# Patient Record
Sex: MALE | Race: WHITE | Employment: OTHER | URBAN - METROPOLITAN AREA
[De-identification: names, ages, dates, MRNs, and addresses within clinical notes are randomized per-mention and may not be internally consistent; named-entity substitution may affect disease eponyms.]

---

## 2021-02-03 ENCOUNTER — HOSPITAL ENCOUNTER (OUTPATIENT)
Age: 66
Setting detail: OBSERVATION
Discharge: HOME | End: 2021-02-05
Attending: EMERGENCY MEDICINE | Admitting: FAMILY MEDICINE
Payer: MEDICARE

## 2021-02-03 ENCOUNTER — APPOINTMENT (OUTPATIENT)
Dept: CT IMAGING | Age: 66
End: 2021-02-03
Payer: MEDICARE

## 2021-02-03 ENCOUNTER — APPOINTMENT (OUTPATIENT)
Dept: GENERAL RADIOLOGY | Age: 66
End: 2021-02-03
Payer: MEDICARE

## 2021-02-03 DIAGNOSIS — I50.9 ACUTE CONGESTIVE HEART FAILURE, UNSPECIFIED HEART FAILURE TYPE (HCC): ICD-10-CM

## 2021-02-03 DIAGNOSIS — N17.9 AKI (ACUTE KIDNEY INJURY) (HCC): ICD-10-CM

## 2021-02-03 DIAGNOSIS — I10 HYPERTENSION, UNSPECIFIED TYPE: ICD-10-CM

## 2021-02-03 DIAGNOSIS — R42 LIGHTHEADEDNESS: Primary | ICD-10-CM

## 2021-02-03 LAB
ABSOLUTE EOS #: 0.2 K/UL (ref 0–0.4)
ABSOLUTE IMMATURE GRANULOCYTE: ABNORMAL K/UL (ref 0–0.3)
ABSOLUTE LYMPH #: 1.3 K/UL (ref 1–4.8)
ABSOLUTE MONO #: 0.5 K/UL (ref 0.1–1.3)
ANION GAP SERPL CALCULATED.3IONS-SCNC: 13 MMOL/L (ref 9–17)
BASOPHILS # BLD: 1 % (ref 0–2)
BASOPHILS ABSOLUTE: 0.1 K/UL (ref 0–0.2)
BNP INTERPRETATION: ABNORMAL
BUN BLDV-MCNC: 24 MG/DL (ref 8–23)
BUN/CREAT BLD: ABNORMAL (ref 9–20)
CALCIUM SERPL-MCNC: 9.6 MG/DL (ref 8.6–10.4)
CHLORIDE BLD-SCNC: 96 MMOL/L (ref 98–107)
CO2: 27 MMOL/L (ref 20–31)
CREAT SERPL-MCNC: 1.57 MG/DL (ref 0.7–1.2)
DIFFERENTIAL TYPE: ABNORMAL
EOSINOPHILS RELATIVE PERCENT: 3 % (ref 0–4)
ETHANOL PERCENT: <0.01 %
ETHANOL: <10 MG/DL
GFR AFRICAN AMERICAN: 54 ML/MIN
GFR NON-AFRICAN AMERICAN: 44 ML/MIN
GFR SERPL CREATININE-BSD FRML MDRD: ABNORMAL ML/MIN/{1.73_M2}
GFR SERPL CREATININE-BSD FRML MDRD: ABNORMAL ML/MIN/{1.73_M2}
GLUCOSE BLD-MCNC: 182 MG/DL (ref 70–99)
HCT VFR BLD CALC: 33.4 % (ref 41–53)
HEMOGLOBIN: 11.6 G/DL (ref 13.5–17.5)
IMMATURE GRANULOCYTES: ABNORMAL %
INR BLD: 1.4
LYMPHOCYTES # BLD: 19 % (ref 24–44)
MAGNESIUM: 1.6 MG/DL (ref 1.6–2.6)
MCH RBC QN AUTO: 32.5 PG (ref 26–34)
MCHC RBC AUTO-ENTMCNC: 34.6 G/DL (ref 31–37)
MCV RBC AUTO: 94 FL (ref 80–100)
MONOCYTES # BLD: 7 % (ref 1–7)
NRBC AUTOMATED: ABNORMAL PER 100 WBC
PARTIAL THROMBOPLASTIN TIME: 36.2 SEC (ref 24–36)
PDW BLD-RTO: 13.6 % (ref 11.5–14.9)
PLATELET # BLD: 159 K/UL (ref 150–450)
PLATELET ESTIMATE: ABNORMAL
PMV BLD AUTO: 8.7 FL (ref 6–12)
POTASSIUM SERPL-SCNC: 3.5 MMOL/L (ref 3.7–5.3)
PRO-BNP: 1803 PG/ML
PROTHROMBIN TIME: 17.4 SEC (ref 11.8–14.6)
RBC # BLD: 3.56 M/UL (ref 4.5–5.9)
RBC # BLD: ABNORMAL 10*6/UL
SEG NEUTROPHILS: 70 % (ref 36–66)
SEGMENTED NEUTROPHILS ABSOLUTE COUNT: 5.2 K/UL (ref 1.3–9.1)
SODIUM BLD-SCNC: 136 MMOL/L (ref 135–144)
TROPONIN INTERP: ABNORMAL
TROPONIN INTERP: ABNORMAL
TROPONIN T: ABNORMAL NG/ML
TROPONIN T: ABNORMAL NG/ML
TROPONIN, HIGH SENSITIVITY: 26 NG/L (ref 0–22)
TROPONIN, HIGH SENSITIVITY: 27 NG/L (ref 0–22)
WBC # BLD: 7.3 K/UL (ref 3.5–11)
WBC # BLD: ABNORMAL 10*3/UL

## 2021-02-03 PROCEDURE — 99283 EMERGENCY DEPT VISIT LOW MDM: CPT

## 2021-02-03 PROCEDURE — 85610 PROTHROMBIN TIME: CPT

## 2021-02-03 PROCEDURE — 71046 X-RAY EXAM CHEST 2 VIEWS: CPT

## 2021-02-03 PROCEDURE — 84484 ASSAY OF TROPONIN QUANT: CPT

## 2021-02-03 PROCEDURE — 83880 ASSAY OF NATRIURETIC PEPTIDE: CPT

## 2021-02-03 PROCEDURE — 96375 TX/PRO/DX INJ NEW DRUG ADDON: CPT

## 2021-02-03 PROCEDURE — 36415 COLL VENOUS BLD VENIPUNCTURE: CPT

## 2021-02-03 PROCEDURE — 6360000002 HC RX W HCPCS: Performed by: EMERGENCY MEDICINE

## 2021-02-03 PROCEDURE — 2500000003 HC RX 250 WO HCPCS: Performed by: EMERGENCY MEDICINE

## 2021-02-03 PROCEDURE — 85025 COMPLETE CBC W/AUTO DIFF WBC: CPT

## 2021-02-03 PROCEDURE — 80048 BASIC METABOLIC PNL TOTAL CA: CPT

## 2021-02-03 PROCEDURE — 83735 ASSAY OF MAGNESIUM: CPT

## 2021-02-03 PROCEDURE — 6370000000 HC RX 637 (ALT 250 FOR IP): Performed by: EMERGENCY MEDICINE

## 2021-02-03 PROCEDURE — 93005 ELECTROCARDIOGRAM TRACING: CPT | Performed by: EMERGENCY MEDICINE

## 2021-02-03 PROCEDURE — G0480 DRUG TEST DEF 1-7 CLASSES: HCPCS

## 2021-02-03 PROCEDURE — 85730 THROMBOPLASTIN TIME PARTIAL: CPT

## 2021-02-03 PROCEDURE — G0378 HOSPITAL OBSERVATION PER HR: HCPCS

## 2021-02-03 PROCEDURE — 70450 CT HEAD/BRAIN W/O DYE: CPT

## 2021-02-03 PROCEDURE — 96374 THER/PROPH/DIAG INJ IV PUSH: CPT

## 2021-02-03 RX ORDER — FOLIC ACID 1 MG/1
1 TABLET ORAL ONCE
Status: COMPLETED | OUTPATIENT
Start: 2021-02-03 | End: 2021-02-03

## 2021-02-03 RX ORDER — GAUZE BANDAGE 2" X 2"
100 BANDAGE TOPICAL ONCE
Status: COMPLETED | OUTPATIENT
Start: 2021-02-03 | End: 2021-02-03

## 2021-02-03 RX ORDER — FUROSEMIDE 10 MG/ML
80 INJECTION INTRAMUSCULAR; INTRAVENOUS ONCE
Status: COMPLETED | OUTPATIENT
Start: 2021-02-03 | End: 2021-02-03

## 2021-02-03 RX ORDER — METOPROLOL TARTRATE 5 MG/5ML
5 INJECTION INTRAVENOUS ONCE
Status: COMPLETED | OUTPATIENT
Start: 2021-02-03 | End: 2021-02-03

## 2021-02-03 RX ADMIN — POTASSIUM BICARBONATE 40 MEQ: 782 TABLET, EFFERVESCENT ORAL at 23:24

## 2021-02-03 RX ADMIN — THIAMINE HCL TAB 100 MG 100 MG: 100 TAB at 23:23

## 2021-02-03 RX ADMIN — METOPROLOL TARTRATE 5 MG: 1 INJECTION, SOLUTION INTRAVENOUS at 23:27

## 2021-02-03 RX ADMIN — FUROSEMIDE 80 MG: 10 INJECTION, SOLUTION INTRAMUSCULAR; INTRAVENOUS at 23:26

## 2021-02-03 RX ADMIN — FOLIC ACID 1 MG: 1 TABLET ORAL at 23:23

## 2021-02-03 RX ADMIN — Medication 400 MG: at 23:23

## 2021-02-03 ASSESSMENT — ENCOUNTER SYMPTOMS
ABDOMINAL PAIN: 0
BACK PAIN: 0
DIARRHEA: 0
COUGH: 0
VOMITING: 0
SHORTNESS OF BREATH: 0

## 2021-02-04 ENCOUNTER — APPOINTMENT (OUTPATIENT)
Dept: NUCLEAR MEDICINE | Age: 66
End: 2021-02-04
Payer: MEDICARE

## 2021-02-04 LAB
ABSOLUTE EOS #: 0.2 K/UL (ref 0–0.4)
ABSOLUTE IMMATURE GRANULOCYTE: ABNORMAL K/UL (ref 0–0.3)
ABSOLUTE LYMPH #: 1.3 K/UL (ref 1–4.8)
ABSOLUTE MONO #: 0.6 K/UL (ref 0.1–1.3)
ALBUMIN SERPL-MCNC: 3.7 G/DL (ref 3.5–5.2)
ALBUMIN/GLOBULIN RATIO: ABNORMAL (ref 1–2.5)
ALP BLD-CCNC: 86 U/L (ref 40–129)
ALT SERPL-CCNC: 34 U/L (ref 5–41)
ANION GAP SERPL CALCULATED.3IONS-SCNC: 10 MMOL/L (ref 9–17)
AST SERPL-CCNC: 32 U/L
BASOPHILS # BLD: 1 % (ref 0–2)
BASOPHILS ABSOLUTE: 0 K/UL (ref 0–0.2)
BILIRUB SERPL-MCNC: 0.72 MG/DL (ref 0.3–1.2)
BUN BLDV-MCNC: 22 MG/DL (ref 8–23)
BUN/CREAT BLD: ABNORMAL (ref 9–20)
CALCIUM SERPL-MCNC: 10 MG/DL (ref 8.6–10.4)
CHLORIDE BLD-SCNC: 100 MMOL/L (ref 98–107)
CHOLESTEROL/HDL RATIO: 4
CHOLESTEROL: 112 MG/DL
CO2: 29 MMOL/L (ref 20–31)
CREAT SERPL-MCNC: 1.72 MG/DL (ref 0.7–1.2)
DIFFERENTIAL TYPE: ABNORMAL
EKG ATRIAL RATE: 89 BPM
EKG Q-T INTERVAL: 388 MS
EKG QRS DURATION: 96 MS
EKG QTC CALCULATION (BAZETT): 450 MS
EKG R AXIS: 81 DEGREES
EKG T AXIS: 60 DEGREES
EKG VENTRICULAR RATE: 81 BPM
EOSINOPHILS RELATIVE PERCENT: 3 % (ref 0–4)
GFR AFRICAN AMERICAN: 48 ML/MIN
GFR NON-AFRICAN AMERICAN: 40 ML/MIN
GFR SERPL CREATININE-BSD FRML MDRD: ABNORMAL ML/MIN/{1.73_M2}
GFR SERPL CREATININE-BSD FRML MDRD: ABNORMAL ML/MIN/{1.73_M2}
GLUCOSE BLD-MCNC: 156 MG/DL (ref 75–110)
GLUCOSE BLD-MCNC: 158 MG/DL (ref 75–110)
GLUCOSE BLD-MCNC: 168 MG/DL (ref 70–99)
GLUCOSE BLD-MCNC: 267 MG/DL (ref 75–110)
HCT VFR BLD CALC: 32.6 % (ref 41–53)
HDLC SERPL-MCNC: 28 MG/DL
HEMOGLOBIN: 11.3 G/DL (ref 13.5–17.5)
IMMATURE GRANULOCYTES: ABNORMAL %
INR BLD: 1.5
LDL CHOLESTEROL: 45 MG/DL (ref 0–130)
LV EF: 53 %
LVEF MODALITY: NORMAL
LYMPHOCYTES # BLD: 19 % (ref 24–44)
MCH RBC QN AUTO: 32.3 PG (ref 26–34)
MCHC RBC AUTO-ENTMCNC: 34.5 G/DL (ref 31–37)
MCV RBC AUTO: 93.7 FL (ref 80–100)
MONOCYTES # BLD: 9 % (ref 1–7)
NRBC AUTOMATED: ABNORMAL PER 100 WBC
PDW BLD-RTO: 13.9 % (ref 11.5–14.9)
PLATELET # BLD: 162 K/UL (ref 150–450)
PLATELET ESTIMATE: ABNORMAL
PMV BLD AUTO: 9 FL (ref 6–12)
POTASSIUM SERPL-SCNC: 4.1 MMOL/L (ref 3.7–5.3)
PROTHROMBIN TIME: 17.7 SEC (ref 11.8–14.6)
RBC # BLD: 3.48 M/UL (ref 4.5–5.9)
RBC # BLD: ABNORMAL 10*6/UL
SEG NEUTROPHILS: 68 % (ref 36–66)
SEGMENTED NEUTROPHILS ABSOLUTE COUNT: 4.7 K/UL (ref 1.3–9.1)
SODIUM BLD-SCNC: 139 MMOL/L (ref 135–144)
TOTAL PROTEIN: 6.7 G/DL (ref 6.4–8.3)
TRIGL SERPL-MCNC: 195 MG/DL
TROPONIN INTERP: ABNORMAL
TROPONIN T: ABNORMAL NG/ML
TROPONIN, HIGH SENSITIVITY: 28 NG/L (ref 0–22)
TSH SERPL DL<=0.05 MIU/L-ACNC: 3.93 MIU/L (ref 0.3–5)
VLDLC SERPL CALC-MCNC: ABNORMAL MG/DL (ref 1–30)
WBC # BLD: 6.9 K/UL (ref 3.5–11)
WBC # BLD: ABNORMAL 10*3/UL

## 2021-02-04 PROCEDURE — 3430000000 HC RX DIAGNOSTIC RADIOPHARMACEUTICAL: Performed by: INTERNAL MEDICINE

## 2021-02-04 PROCEDURE — 82947 ASSAY GLUCOSE BLOOD QUANT: CPT

## 2021-02-04 PROCEDURE — 85610 PROTHROMBIN TIME: CPT

## 2021-02-04 PROCEDURE — 6360000004 HC RX CONTRAST MEDICATION: Performed by: INTERNAL MEDICINE

## 2021-02-04 PROCEDURE — 85025 COMPLETE CBC W/AUTO DIFF WBC: CPT

## 2021-02-04 PROCEDURE — G0378 HOSPITAL OBSERVATION PER HR: HCPCS

## 2021-02-04 PROCEDURE — 80053 COMPREHEN METABOLIC PANEL: CPT

## 2021-02-04 PROCEDURE — 6370000000 HC RX 637 (ALT 250 FOR IP): Performed by: PHYSICIAN ASSISTANT

## 2021-02-04 PROCEDURE — C8929 TTE W OR WO FOL WCON,DOPPLER: HCPCS

## 2021-02-04 PROCEDURE — 80061 LIPID PANEL: CPT

## 2021-02-04 PROCEDURE — 93010 ELECTROCARDIOGRAM REPORT: CPT | Performed by: INTERNAL MEDICINE

## 2021-02-04 PROCEDURE — 2580000003 HC RX 258: Performed by: FAMILY MEDICINE

## 2021-02-04 PROCEDURE — 2580000003 HC RX 258: Performed by: INTERNAL MEDICINE

## 2021-02-04 PROCEDURE — 84443 ASSAY THYROID STIM HORMONE: CPT

## 2021-02-04 PROCEDURE — A9500 TC99M SESTAMIBI: HCPCS | Performed by: INTERNAL MEDICINE

## 2021-02-04 PROCEDURE — 6370000000 HC RX 637 (ALT 250 FOR IP): Performed by: FAMILY MEDICINE

## 2021-02-04 PROCEDURE — 36415 COLL VENOUS BLD VENIPUNCTURE: CPT

## 2021-02-04 PROCEDURE — 84484 ASSAY OF TROPONIN QUANT: CPT

## 2021-02-04 PROCEDURE — 6370000000 HC RX 637 (ALT 250 FOR IP): Performed by: INTERNAL MEDICINE

## 2021-02-04 RX ORDER — LOSARTAN POTASSIUM 50 MG/1
50 TABLET ORAL
Status: DISCONTINUED | OUTPATIENT
Start: 2021-02-05 | End: 2021-02-04

## 2021-02-04 RX ORDER — CARVEDILOL 12.5 MG/1
12.5 TABLET ORAL 2 TIMES DAILY WITH MEALS
Status: DISCONTINUED | OUTPATIENT
Start: 2021-02-04 | End: 2021-02-05 | Stop reason: HOSPADM

## 2021-02-04 RX ORDER — ASPIRIN 81 MG/1
81 TABLET ORAL DAILY
Status: DISCONTINUED | OUTPATIENT
Start: 2021-02-04 | End: 2021-02-05 | Stop reason: HOSPADM

## 2021-02-04 RX ORDER — FUROSEMIDE 40 MG/1
80 TABLET ORAL DAILY
Status: DISCONTINUED | OUTPATIENT
Start: 2021-02-04 | End: 2021-02-05 | Stop reason: HOSPADM

## 2021-02-04 RX ORDER — ATORVASTATIN CALCIUM 40 MG/1
40 TABLET, FILM COATED ORAL DAILY
Status: DISCONTINUED | OUTPATIENT
Start: 2021-02-04 | End: 2021-02-04

## 2021-02-04 RX ORDER — ISOSORBIDE MONONITRATE 30 MG/1
30 TABLET, EXTENDED RELEASE ORAL DAILY
Status: DISCONTINUED | OUTPATIENT
Start: 2021-02-04 | End: 2021-02-05

## 2021-02-04 RX ORDER — ATROPINE SULFATE 0.1 MG/ML
0.5 INJECTION INTRAVENOUS EVERY 5 MIN PRN
Status: DISCONTINUED | OUTPATIENT
Start: 2021-02-04 | End: 2021-02-04

## 2021-02-04 RX ORDER — NITROGLYCERIN 0.4 MG/1
0.4 TABLET SUBLINGUAL EVERY 5 MIN PRN
Status: DISCONTINUED | OUTPATIENT
Start: 2021-02-04 | End: 2021-02-04

## 2021-02-04 RX ORDER — GLIMEPIRIDE 4 MG/1
4 TABLET ORAL
Status: ON HOLD | COMMUNITY
End: 2022-03-15 | Stop reason: HOSPADM

## 2021-02-04 RX ORDER — WARFARIN SODIUM 2 MG/1
4 TABLET ORAL
COMMUNITY
Start: 2020-06-23

## 2021-02-04 RX ORDER — WARFARIN SODIUM 3 MG/1
6 TABLET ORAL ONCE
Status: COMPLETED | OUTPATIENT
Start: 2021-02-04 | End: 2021-02-04

## 2021-02-04 RX ORDER — CARVEDILOL 25 MG/1
25 TABLET ORAL 2 TIMES DAILY WITH MEALS
Status: DISCONTINUED | OUTPATIENT
Start: 2021-02-04 | End: 2021-02-04

## 2021-02-04 RX ORDER — NICOTINE POLACRILEX 4 MG
15 LOZENGE BUCCAL PRN
Status: DISCONTINUED | OUTPATIENT
Start: 2021-02-04 | End: 2021-02-05 | Stop reason: HOSPADM

## 2021-02-04 RX ORDER — SODIUM CHLORIDE 0.9 % (FLUSH) 0.9 %
10 SYRINGE (ML) INJECTION PRN
Status: DISCONTINUED | OUTPATIENT
Start: 2021-02-04 | End: 2021-02-04

## 2021-02-04 RX ORDER — HYDRALAZINE HYDROCHLORIDE 50 MG/1
50 TABLET, FILM COATED ORAL EVERY 8 HOURS SCHEDULED
Status: DISCONTINUED | OUTPATIENT
Start: 2021-02-04 | End: 2021-02-05 | Stop reason: HOSPADM

## 2021-02-04 RX ORDER — SODIUM CHLORIDE 0.9 % (FLUSH) 0.9 %
10 SYRINGE (ML) INJECTION PRN
Status: DISCONTINUED | OUTPATIENT
Start: 2021-02-04 | End: 2021-02-05 | Stop reason: HOSPADM

## 2021-02-04 RX ORDER — HYDRALAZINE HYDROCHLORIDE 25 MG/1
25 TABLET, FILM COATED ORAL DAILY
Status: ON HOLD | COMMUNITY
Start: 2020-07-22 | End: 2021-02-05 | Stop reason: HOSPADM

## 2021-02-04 RX ORDER — HYDRALAZINE HYDROCHLORIDE 25 MG/1
25 TABLET, FILM COATED ORAL EVERY 8 HOURS SCHEDULED
Status: DISCONTINUED | OUTPATIENT
Start: 2021-02-04 | End: 2021-02-04

## 2021-02-04 RX ORDER — ATORVASTATIN CALCIUM 40 MG/1
40 TABLET, FILM COATED ORAL NIGHTLY
Status: ON HOLD | COMMUNITY
End: 2021-02-04

## 2021-02-04 RX ORDER — DEXTROSE MONOHYDRATE 50 MG/ML
100 INJECTION, SOLUTION INTRAVENOUS PRN
Status: DISCONTINUED | OUTPATIENT
Start: 2021-02-04 | End: 2021-02-05 | Stop reason: HOSPADM

## 2021-02-04 RX ORDER — FUROSEMIDE 80 MG
80 TABLET ORAL DAILY
Status: ON HOLD | COMMUNITY
End: 2022-03-07

## 2021-02-04 RX ORDER — DEXTROSE MONOHYDRATE 25 G/50ML
12.5 INJECTION, SOLUTION INTRAVENOUS PRN
Status: DISCONTINUED | OUTPATIENT
Start: 2021-02-04 | End: 2021-02-05 | Stop reason: HOSPADM

## 2021-02-04 RX ORDER — SODIUM CHLORIDE 9 MG/ML
INJECTION, SOLUTION INTRAVENOUS CONTINUOUS
Status: DISCONTINUED | OUTPATIENT
Start: 2021-02-04 | End: 2021-02-05

## 2021-02-04 RX ORDER — NITROGLYCERIN 0.4 MG/1
0.4 TABLET SUBLINGUAL EVERY 5 MIN PRN
Status: DISCONTINUED | OUTPATIENT
Start: 2021-02-04 | End: 2021-02-05 | Stop reason: HOSPADM

## 2021-02-04 RX ORDER — SODIUM CHLORIDE 0.9 % (FLUSH) 0.9 %
10 SYRINGE (ML) INJECTION EVERY 12 HOURS SCHEDULED
Status: DISCONTINUED | OUTPATIENT
Start: 2021-02-04 | End: 2021-02-05 | Stop reason: HOSPADM

## 2021-02-04 RX ORDER — AMINOPHYLLINE DIHYDRATE 25 MG/ML
50 INJECTION, SOLUTION INTRAVENOUS PRN
Status: DISCONTINUED | OUTPATIENT
Start: 2021-02-04 | End: 2021-02-04

## 2021-02-04 RX ORDER — LABETALOL HYDROCHLORIDE 5 MG/ML
20 INJECTION, SOLUTION INTRAVENOUS ONCE
Status: DISCONTINUED | OUTPATIENT
Start: 2021-02-04 | End: 2021-02-05 | Stop reason: HOSPADM

## 2021-02-04 RX ORDER — CARVEDILOL 12.5 MG/1
12.5 TABLET ORAL 2 TIMES DAILY WITH MEALS
Status: DISCONTINUED | OUTPATIENT
Start: 2021-02-04 | End: 2021-02-04

## 2021-02-04 RX ORDER — SODIUM CHLORIDE 9 MG/ML
500 INJECTION, SOLUTION INTRAVENOUS CONTINUOUS PRN
Status: DISCONTINUED | OUTPATIENT
Start: 2021-02-04 | End: 2021-02-04

## 2021-02-04 RX ORDER — METOPROLOL TARTRATE 5 MG/5ML
5 INJECTION INTRAVENOUS EVERY 5 MIN PRN
Status: DISCONTINUED | OUTPATIENT
Start: 2021-02-04 | End: 2021-02-04

## 2021-02-04 RX ORDER — ACETAMINOPHEN 325 MG/1
650 TABLET ORAL EVERY 4 HOURS PRN
Status: DISCONTINUED | OUTPATIENT
Start: 2021-02-04 | End: 2021-02-05 | Stop reason: HOSPADM

## 2021-02-04 RX ORDER — CARVEDILOL 6.25 MG/1
6.25 TABLET ORAL 2 TIMES DAILY WITH MEALS
Status: DISCONTINUED | OUTPATIENT
Start: 2021-02-04 | End: 2021-02-04

## 2021-02-04 RX ORDER — LOSARTAN POTASSIUM 50 MG/1
50 TABLET ORAL NIGHTLY
Status: DISCONTINUED | OUTPATIENT
Start: 2021-02-04 | End: 2021-02-04

## 2021-02-04 RX ORDER — WARFARIN SODIUM 2 MG/1
4 TABLET ORAL DAILY
Status: DISCONTINUED | OUTPATIENT
Start: 2021-02-04 | End: 2021-02-04 | Stop reason: DRUGHIGH

## 2021-02-04 RX ORDER — LOSARTAN POTASSIUM 50 MG/1
50 TABLET ORAL NIGHTLY
Status: ON HOLD | COMMUNITY
End: 2021-02-05 | Stop reason: HOSPADM

## 2021-02-04 RX ADMIN — INSULIN LISPRO 3 UNITS: 100 INJECTION, SOLUTION INTRAVENOUS; SUBCUTANEOUS at 21:06

## 2021-02-04 RX ADMIN — FUROSEMIDE 80 MG: 40 TABLET ORAL at 09:07

## 2021-02-04 RX ADMIN — HYDRALAZINE HYDROCHLORIDE 50 MG: 50 TABLET, FILM COATED ORAL at 23:02

## 2021-02-04 RX ADMIN — CARVEDILOL 12.5 MG: 12.5 TABLET, FILM COATED ORAL at 09:07

## 2021-02-04 RX ADMIN — ISOSORBIDE MONONITRATE 30 MG: 30 TABLET, EXTENDED RELEASE ORAL at 09:07

## 2021-02-04 RX ADMIN — TETRAKIS(2-METHOXYISOBUTYLISOCYANIDE)COPPER(I) TETRAFLUOROBORATE 12.1 MILLICURIE: 1 INJECTION, POWDER, LYOPHILIZED, FOR SOLUTION INTRAVENOUS at 06:54

## 2021-02-04 RX ADMIN — SODIUM CHLORIDE: 9 INJECTION, SOLUTION INTRAVENOUS at 11:52

## 2021-02-04 RX ADMIN — WARFARIN SODIUM 6 MG: 3 TABLET ORAL at 19:05

## 2021-02-04 RX ADMIN — ASPIRIN 81 MG: 81 TABLET, COATED ORAL at 09:06

## 2021-02-04 RX ADMIN — INSULIN LISPRO 2 UNITS: 100 INJECTION, SOLUTION INTRAVENOUS; SUBCUTANEOUS at 17:31

## 2021-02-04 RX ADMIN — PERFLUTREN 2.2 MG: 6.52 INJECTION, SUSPENSION INTRAVENOUS at 11:09

## 2021-02-04 RX ADMIN — HYDRALAZINE HYDROCHLORIDE 50 MG: 50 TABLET, FILM COATED ORAL at 17:29

## 2021-02-04 RX ADMIN — HYDRALAZINE HYDROCHLORIDE 25 MG: 25 TABLET, FILM COATED ORAL at 07:53

## 2021-02-04 RX ADMIN — Medication 10 ML: at 07:54

## 2021-02-04 RX ADMIN — SODIUM CHLORIDE, PRESERVATIVE FREE 10 ML: 5 INJECTION INTRAVENOUS at 06:54

## 2021-02-04 RX ADMIN — CARVEDILOL 12.5 MG: 12.5 TABLET, FILM COATED ORAL at 17:29

## 2021-02-04 ASSESSMENT — PAIN SCALES - GENERAL
PAINLEVEL_OUTOF10: 0

## 2021-02-04 NOTE — PROGRESS NOTES
Spoke with Onesimo Lepe who was on call for Dr. Radha Mason regarding patients high blood pressure. Orders received to give 20mg labetalol IV and Cozaar 50mg. Orders entered at this time.

## 2021-02-04 NOTE — PROGRESS NOTES
Pharmacy Note  Warfarin Consult    Jolanta Rodriguez is a 76 y.o. male for whom pharmacy has been consulted to manage warfarin therapy. Consulting Physician: Charla Woods,  Reason for Admission: HF    Warfarin dose prior to admission: 4 mg daily  Warfarin indication: AFib  Target INR range: 2-3    Past Medical History:   Diagnosis Date    Atrial fibrillation (HCC)     CAD (coronary artery disease)     CHF (congestive heart failure) ([facility] [address]     Di[facility]     Hyperlipidemia     Hypertension                 Recent Labs     02/03/21 2120   INR 1.4     Recent Labs     02/03/21 2120   HGB 11.6*   HCT 33.4*          Current warfarin drug-drug interactions: ASA, Lovenox      Date             INR        Dose   2/4/2021          pending    Daily PT/INR while inpatient. Thank you for the consult. Will continue to follow.

## 2021-02-04 NOTE — PROGRESS NOTES
[facility] cardiology visits; speaks with Dr Shanel Perez and pt; stress test cancelled for today; stress lab notified

## 2021-02-04 NOTE — FLOWSHEET NOTE
02/04/21 1438   Encounter Summary   Services provided to: Patient   Referral/Consult From: [facility]   Continue Visiting   (2-4-21)   Complexity of Encounter Low   Length of Encounter 15 minutes   Routine   Type Initial   Assessment Calm; Approachable;Coping;Peaceful   Intervention Active listening;Explored feelings, thoughts, concerns;[place];Sustaining presence/ Ministry of presence   Outcome Expressed gratitude;Engaged in conversation;Coping;Receptive

## 2021-02-04 NOTE — ED NOTES
Report given to, MADALYN from [facility]. Report method by phone   The following was reviewed with receiving RN:   Current vital signs:  BP (!) 195/90   Pulse 71   Temp 97.6 °F (36.4 °C) (Oral)   Resp 20   Ht 5' 10\" (1.778 m)   Wt 220 lb (99.8 kg)   SpO2 99%   BMI 31.57 kg/m²                      Any medication or safety alerts were reviewed. Any pending diagnostics and notifications were also reviewed, as well as any safety concerns or issues, abnormal labs, abnormal imaging, and abnormal assessment findings. Questions were answered.           Karl Jane RN  02/03/21 5881

## 2021-02-04 NOTE — PLAN OF CARE
Discharge- Марина Burks 1960, 60 y o  male MRN: [id number]    Unit/Bed#: [address] [address] Encounter: [id number]    Primary Care Provider: Viri Waite MD   Date and time admitted to hospital: 11/16/2018 12:17 AM        Shortness of breath   Assessment & Plan    Likely multifactorial from COPD, restrictive lung disease, deconditioning  Continue oxygen supplementation     * Acute on chronic respiratory failure with hypoxia [facility])   Assessment & Plan    Resolved  Likely secondary to COPD and deconditioning  Continue 2 liters oxygen supplementation with CPAP q h s  COPD with exacerbation [facility])   Assessment & Plan    Patient was treated with IV steroids and has been discharged on prednisone taper during last admission  Continue prednisone taper and nebulizers and Advair  Continue Tessalon Perles     Positive blood culture   Assessment & Plan    Patient is 1/2 blood cultures was due which later turned out to be coagulase-negative Staphylococcus which is most likely contaminant     SHAVONNE (obstructive sleep apnea)   Assessment & Plan    Patient has mild obstructive sleep apnea  Continue CPAP 9 centimeter water with 2 liters oxygen q h s  Diabetes mellitus type 2, uncontrolled ([facility] [address] )   Assessment & Plan    Lab Results   Component Value Date    HGBA1C 7 6 (H) 10/29/2018       Recent Labs      11/18/18   2102  11/19/18   0709  11/19/18   1121  11/19/18   1606   POCGLU  195*  229*  280*  284*       Blood Sugar Average: Last 72 hrs:  (P) 040 [id number]   Elevated blood sugar likely 2nd to steroids  Continue Lantus 40 units subcutaneously daily along with Humalog sliding scale  Continue metformin and Victoza     Esophageal reflux   Assessment & Plan    Continue PPI     CAD (coronary artery disease)   Assessment & Plan    Continue aspirin, metoprolol and statin     Bipolar affective disorder (HCC)   Assessment & Plan    Continue Effexor, Seroquel, Remeron, BuSpar, Xanax           Discharging Physician / Problem: Falls - Risk of:  Goal: Will remain free from falls  Description: Will remain free from falls  Outcome: Met This Shift  Goal: Absence of physical injury  Description: Absence of physical injury  Outcome: Met This Shift     Problem: Safety:  Goal: Free from accidental physical injury  Description: Free from accidental physical injury  Outcome: Met This Shift     Problem: Daily Care:  Goal: Daily care needs are met  Description: Daily care needs are met  Outcome: Met This Shift     Problem: Pain:  Goal: Patient's pain/discomfort is manageable  Description: Patient's pain/discomfort is manageable  Outcome: Met This Shift     Problem: Infection:  Goal: Will remain free from infection  Description: Will remain free from infection  Outcome: Ongoing     Problem: Skin Integrity:  Goal: Skin integrity will stabilize  Description: Skin integrity will stabilize  Outcome: Ongoing     Problem: Discharge Planning:  Goal: Patients continuum of care needs are met  Description: Patients continuum of care needs are met  Outcome: Ongoing     Problem: Fluid Volume:  Goal: Hemodynamic stability will improve  Description: Hemodynamic stability will improve  Outcome: Ongoing  Goal: Ability to maintain a balanced intake and output will improve  Description: Ability to maintain a balanced intake and output will improve  Outcome: Ongoing     Problem: Health Behavior:  Goal: Identification of resources available to assist in meeting health care needs will improve  Description: Identification of resources available to assist in meeting health care needs will improve  Outcome: Ongoing Practitioner: Ivelisse Adams MD  PCP: Mindy Perez MD  Admission Date: 11/16/2018  Discharge Date: 11/19/18    Reason for Admission: Shortness of Breath (pt c/o SOB, in hopsital last week for same, states coughed up some blood x2 today)        Resolved Problems  Date Reviewed: 11/19/2018    None          Consultations During Hospital Stay:  Elba Altman    Significant Findings / Test Results:     ·     Results from last 7 days  Lab Units 11/18/18  0507 11/16/18  0436 11/16/18  0024   WBC Thousand/uL 9 08 14 20* 17 15*   HEMOGLOBIN g/dL 13 5 12 6 13 7   PLATELETS Thousands/uL 198 210 230       Results from last 7 days  Lab Units 11/18/18  0507 11/17/18  0513 11/16/18  0917 11/16/18  0436 11/16/18  0024   SODIUM mmol/L 136 137 138 138 133*   POTASSIUM mmol/L 3 6 3 7 3 9 3 8 4 6   CHLORIDE mmol/L 99* 101 102 103 95*   CO2 mmol/L 25 27 25 20* 17*   BUN mg/dL 19 19 21 23 30*   CREATININE mg/dL 0 80 0 81 0 98 1 02 1 25   CALCIUM mg/dL 8 9 9 2 8 7 8 3 8 8   TOTAL BILIRUBIN mg/dL  --   --   --  0 30 0 40   ALK PHOS U/L  --   --   --  73 84   ALT U/L  --   --   --  23 28   AST U/L  --   --   --  5 12       Results from last 7 days  Lab Units 11/16/18  0024   INR  0 91       Results from last 7 days  Lab Units 11/16/18  0917 11/16/18  0438 11/16/18  0024   TROPONIN I ng/mL <0 02 <0 02 <0 02     Lab Results   Component Value Date/Time    HGBA1C 7 6 (H) 10/29/2018 08:01 AM    HGBA1C 8 0 07/21/2017 09:27 AM       Results from last 7 days  Lab Units 11/19/18  1606 11/19/18  1121 11/19/18  0709 11/18/18  2102 11/18/18  1605 11/18/18  1120 11/18/18  0748 11/17/18  2126 11/17/18  1611 11/17/18  1111 11/17/18  0659 11/16/18  2114   POC GLUCOSE mg/dl 284* 280* 229* 195* 256* 243* 165* 209* 247* 285* 173* 248*       Results from last 7 days  Lab Units 11/16/18  0435 11/16/18  0024   LACTIC ACID mmol/L  --  1 8   PROCALCITONIN ng/ml <0 05  --      Blood Culture:   Lab Results   Component Value Date    BLOODCX No Growth at 72 hrs  11/16/2018    BLOODCX Staphylococcus coagulase negative (A) 11/16/2018    BLOODCX No Growth After 5 Days  10/31/2018    BLOODCX No Growth After 5 Days  10/31/2018    BLOODCX No Growth After 5 Days  09/06/2017    BLOODCX No Growth After 5 Days  09/06/2017     Urine Culture:   Lab Results   Component Value Date    URINECX 1193-7055 cfu/ml Mixed Contaminants X2 03/20/2017    URINECX No Growth <1000 cfu/mL 11/27/2016    URINECX No Growth <1000 cfu/mL 09/02/2016     Sputum Culture: No components found for: SPUTUMCX  Wound Culture: No results found for: WOUNDCULT     VAS lower limb venous duplex study, complete bilateral   Final Result by Alana Ponce MD (11/16 1100)      XR chest 1 view portable   Final Result by Shannan Basilio DO (11/16 0205)      Low lung volumes  Mild platelike atelectasis suspected in the bilateral lower lung fields but no acute cardiopulmonary disease is seen  No significant interval change  Workstation performed: [id number]              Outpatient Tests Requested:  · Follow-up with PCP    Complications:  None    Reason for Admission:   Chief Complaint   Patient presents with    Shortness of Breath     pt c/o SOB, in hopsital last week for same, states coughed up some blood x2 today       Hospital Course:     Helen Markham is a 60 y o  male patient with a PMH of obstructive sleep apnea, obesity, hypertension, hyperlipidemia, COPD, diabetes, CAD who originally presented to the hospital on 11/16/2018 due to worsening shortness of breath  Patient had a recent hospitalization before this admission for COPD exacerbation was discharged home on p  O  Prednisone  In the ED patient was noted to have anion gap with a stone in his blood concerning for diabetic ketoacidosis  Patient was initially placed on BiPAP    Patient's Breo dose was increased and patient was started on supportive treatment for his cough which was thought to be causing his worsening dyspnea  Patient initially needed insulin drip for brief period of time  Patient continued to improve with supportive treatment and patient was later transferred out of the floor and patient remained stable on 2 liters oxygen  Patient also had physical therapy and patient will be discharged to short-term rehabilitation  Patient will continue on prednisone taper as was prescribed previously without any antibiotics  Patient will continue on Breo and nebulizers  Please see above list of diagnoses and related plan for additional information  Condition at Discharge: stable       Discharge Day Visit / Exam:     Subjective:  Patient is feeling well  Patient is still complaining of pain in the right lower chest and right upper abdomen where he had up bruising previously  Patient otherwise denies any shortness of breath, abdominal pain, nausea or vomiting  Cough has improved significantly  Vitals: Blood Pressure: 135/87 (11/19/18 1349)  Pulse: 92 (11/19/18 1349)  Temperature: 98 2 °F (36 8 °C) (11/19/18 1349)  Temp Source: Oral (11/19/18 1349)  Respirations: 20 (11/19/18 1349)  Height: 5' 11" (180 3 cm) (11/16/18 0602)  Weight - Scale: 130 kg (286 lb) (11/19/18 0600)  SpO2: 92 % (11/19/18 1349)  Exam:   Physical Exam   Constitutional: No distress  HENT:   Head: Normocephalic and atraumatic  Eyes: Pupils are equal, round, and reactive to light  Conjunctivae are normal    Neck: Normal range of motion  Neck supple  Cardiovascular: Normal rate, regular rhythm and normal heart sounds  Pulmonary/Chest: Effort normal  No respiratory distress  He has no wheezes  He has no rhonchi  He has no rales  He exhibits no tenderness  Abdominal: Soft  Bowel sounds are normal  He exhibits no distension  There is no tenderness  There is no rebound and no guarding  Musculoskeletal: He exhibits no edema  Neurological: He is alert  No cranial nerve deficit     Skin: Skin is warm and dry  No rash noted  Discharge instructions/Information to patient and family:   See after visit summary for information provided to patient and family  Provisions for Follow-Up Care:  See after visit summary for information related to follow-up care and any pertinent home health orders  Disposition:     Home    Planned Readmission: No     Discharge Statement:  I spent 35 minutes discharging the patient  This time was spent on the day of discharge  I had direct contact with the patient on the day of discharge  Greater than 50% of the total time was spent examining patient, answering all patient questions, arranging and discussing plan of care with patient as well as directly providing post-discharge instructions  Additional time then spent on discharge activities  Discharge Medications:  See after visit summary for reconciled discharge medications provided to patient and family        ** Please Note: This note has been constructed using a voice recognition system **

## 2021-02-04 NOTE — H&P
E11.621, L97.509    PVD (peripheral vascular disease) ([facility]) I73.9    Atherosclerosis of coronary artery without angina pectoris I25.10    Cardiomyopathy ([facility] [address] I42.9    Cardiac left ventricular ejection fraction 21-40 percent R94.30    Diabetes mellitus type 2 with peripheral artery disease ([facility]) E11.51    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 ([facility]) L97.514    Chronic osteomyelitis of left foot ([facility]) M86.672    Onychomycosis B35.1    Heart failure ([facility]) I50.9       Past Surgical History:       Past Surgical History:   Procedure Laterality Date    CARDIAC SURGERY  2010    CABG X3    COLONOSCOPY      DEBRIDEMENT Right 11/19/2015    DEBRIDEMENT WOUND FOOT RIGHT W/ APPLICATION BILAT INTEG RAT GRAFT    ENDOSCOPY, COLON, DIAGNOSTIC      EYE SURGERY Bilateral     cataracts    HERNIA REPAIR      umbilical    KNEE ARTHROSCOPY Right     OTHER SURGICAL HISTORY Right 12 29 15    wound care graft procedure foot,appl of integra    PACEMAKER PLACEMENT  2011    WITH DEFIBRILLATOR    TOE AMPUTATION Right     R great       Current Medications:    Current Facility-Administered Medications   Medication Dose Route Frequency Provider Last Rate Last Admin    sodium chloride flush 0.9 % injection 10 mL  10 mL Intravenous 2 times per day Corrinne Bloomer, DO   10 mL at 02/04/21 0754    sodium chloride flush 0.9 % injection 10 mL  10 mL Intravenous PRN Lucas T Satya, DO        enoxaparin (LOVENOX) injection 40 mg  40 mg Subcutaneous Daily Lucas T Satya, DO        acetaminophen (TYLENOL) tablet 650 mg  650 mg Oral Q4H PRN Lucas T Satya, DO        labetalol (NORMODYNE;TRANDATE) injection 20 mg  20 mg Intravenous Once Shira Sprague PA-C        regadenoson (LEXISCAN) injection 0.4 mg  0.4 mg Intravenous ONCE PRN Amirah Sims MD        sodium chloride flush 0.9 % injection 10 mL  10 mL Intravenous PRN Amirah Sims MD        0.9 % sodium chloride infusion  500 mL Intravenous Continuous PRN Tato POMPA MD Levi        atropine injection 0.5 mg  0.5 mg Intravenous Q5 Min PRN Vicki Sims MD        nitroGLYCERIN (NITROSTAT) SL tablet 0.4 mg  0.4 mg Sublingual Q5 Min PRN Vicki Sims MD        metoprolol (LOPRESSOR) injection 5 mg  5 mg Intravenous Q5 Min PRN Vicki Sims MD        aminophylline injection 50 mg  50 mg Intravenous PRN Mel Juarez MD        aspirin EC tablet 81 mg  81 mg Oral Daily Vicki Sims MD        atorvastatin (LIPITOR) tablet 40 mg  40 mg Oral Daily Vicki Sims MD        furosemide (LASIX) tablet 80 mg  80 mg Oral Daily Vicki Sims MD        nitroGLYCERIN (NITROSTAT) SL tablet 0.4 mg  0.4 mg Sublingual Q5 Min PRN Mel Juarez MD        warfarin (COUMADIN) daily dosing (placeholder)   Other RX Placeholder Mel Juarez MD        warfarin (COUMADIN) tablet 4 mg  4 mg Oral Daily Vicki Sims MD        sodium chloride flush 0.9 % injection 10 mL  10 mL Intravenous PRN Vicki Sims MD   10 mL at 02/04/21 0654    perflutren lipid microspheres (DEFINITY) injection 2.2 mg  2 mL Intravenous ONCE PRN Craig Little MD        hydrALAZINE (APRESOLINE) tablet 50 mg  50 mg Oral 3 times per day Lucas Hernadez, DO        carvedilol (COREG) tablet 12.5 mg  12.5 mg Oral BID  Lucasvicente Hernadez, DO        isosorbide mononitrate (IMDUR) extended release tablet 30 mg  30 mg Oral Daily Mel Juarez MD           Allergies:  Pcn [penicillins]    Social History:    reports that he has never smoked. He has never used smokeless tobacco. He reports current alcohol use. He reports that he does not use drugs.     Family History:   Family History   Problem Relation Age of Onset   Josue Iniguez Cancer Father         pancreatic cancer    Other Brother         MI    Osteoarthritis Mother     Other Maternal Grandfather         MI       REVIEW OF SYSTEMS:  RESPIRATORY:  negative for  dry cough, dyspnea, wheezing and chest pain positive for CARDIOVASCULAR:  negative for  chest pain, dyspnea, palpitations, orthopnea, exertional chest pressure/discomfort, fatigue, edema, syncope   GASTROINTESTINAL:  negative for nausea, vomiting, change in bowel habits, diarrhea, constipation, abdominal pain and reflux   GENITOURINARY:  negative for frequency, dysuria, nocturia, urinary incontinence and hesitancy   HEMATOLOGIC/LYMPHATIC:  negative for easy bruising, bleeding and swelling/edemapositive for   ENDOCRINE:  negative for weight changes, change in bowel habits and diabetic symptoms including neither polyuria nor polydipsia nor blurred vision nor foot ulcerations nor neuropathypositive for   MUSCULOSKELETAL:  negative for  myalgias, arthralgias, pain, joint swelling, stiff joints and muscle weakness   NEUROLOGICAL:  negative for headaches, dizziness, memory problems, speech problems, visual disturbance, gait problems, weakness and numbness     Vitals:  BP (!) 203/103   Pulse 78   Temp 97.6 °F (36.4 °C) (Oral)   Resp 12   Ht 5' 10\" (1.778 m)   Wt 220 lb (99.8 kg)   SpO2 96%   BMI 31.57 kg/m²     PHYSICAL EXAM:    CONSTITUTIONAL:  awake, alert, cooperative, no apparent distress, and appears stated age  EYES:  Lids and lashes normal, pupils equal, round and reactive to light, extra ocular muscles intact, sclera clear, conjunctiva normal   ENT:  Normocephalic, without obvious abnormality, atraumatic, sinuses nontender on palpation, external ears without lesions, oral pharynx with moist mucus membranes, tonsils without erythema or exudates,   NECK:  Supple, symmetrical, trachea midline, no adenopathy, thyroid symmetric, not enlarged and no tenderness, skin normal   HEMATOLOGIC/LYMPHATICS:  no cervical lymphadenopathy   BACK:  Symmetric, no curvature, spinous processes are non-tender on palpation, paraspinous muscles are non-tender on palpation, no costal vertebral tenderness    LUNGS:  No increased work of breathing, good air exchange, clear to auscultation bilaterally, no crackles or wheezing   CARDIOVASCULAR:  Normal apical impulse, regular rate and rhythm, normal S1 and S2, no S3 or S4, and pos  murmur noted    ABDOMEN:  No scars, normal bowel sounds, soft, non-distended, non-tender, no masses palpated, no hepatosplenomegally    GENITAL/URINARY:  na  MUSCULOSKELETAL:  There is no redness, warmth, or swelling of the joints. Full range of motion noted. Motor strength is 5 out of 5 all extremities bilaterally. Tone is normal.   NEUROLOGIC:  Awake, alert, oriented to name, place and time. Cranial nerves II-XII are grossly intact. Motor is 5 out of 5 bilaterally. Sensory is intact. gait is normal.    SKIN:  no bruising or bleeding, normal skin color, texture, turgor, no redness, warmth, or swelling and no rashes     RECENT DATA:  No results found for: CBC, BMP  EKG is normal sinus with some PVCs than afib  DATA:   Component Value Units   Troponin [[id number]]    Collected: 02/04/21 0816    Updated: 02/04/21 0825    Specimen Source: Blood    EKG 12 Lead [[id number]]    Collected: 02/03/21 2025    Updated: 02/04/21 0723     Ventricular Rate 81 BPM    Atrial Rate 89 BPM    QRS Duration 96 ms    Q-T Interval 388 ms    QTc Calculation (Bazett) 450 ms    R Axis 81 degrees    T Axis 60 degrees   Narrative:     Atrial fibrillation   Low voltage QRS   Cannot rule out Inferior infarct , age undetermined   Abnormal ECG   When compared with ECG of 23-AUG-2012 19:47,   Atrial fibrillation has replaced Sinus rhythm   Vent.  rate has decreased BY  40 BPM   T wave inversion no longer evident in Inferior leads   NM Cardiac Stress Test Nuclear Imaging [[id number]]    Resulted: 02/04/21 0648    Updated: 02/04/21 0648    TSH w/reflex to FT4 [[id number]]    Collected: 02/04/21 0558    Updated: 02/04/21 0643    Specimen Type: Blood     TSH 3.93 mIU/L   Comprehensive Metabolic Panel w/ Reflex to MG [[id number]] (Abnormal)    Collected: 02/04/21 0558    Updated: 02/04/21 0428    Specimen Source: Blood     Glucose 168High  mg/dL    BUN 22 mg/dL    CREATININE 1. 72High  mg/dL    Bun/Cre Ratio NOT REPORTED    Calcium 10.0 mg/dL    Sodium 139 mmol/L    Potassium 4.1 mmol/L    Chloride 100 mmol/L    CO2 29 mmol/L    Anion Gap 10 mmol/L    Alkaline Phosphatase 86 U/L    ALT 34 U/L    AST 32 U/L    Total Bilirubin 0.72 mg/dL    Total Protein 6.7 g/dL    Albumin 3.7 g/dL    Albumin/Globulin Ratio NOT REPORTED    GFR Non- 40Low  mL/min    GFR  48Low  mL/min    GFR Comment         Comment: Average GFR for 61-76 years old:    85 mL/min/1.73sq m   Chronic Kidney Disease:    <60 mL/min/1.73sq m   Kidney failure:    <15 mL/min/1.73sq m               eGFR calculated using average adult body mass. Additional eGFR calculator available at:         [email or web address]              GFR Staging NOT REPORTED   Lipid Panel [[id number]] (Abnormal)    Collected: 02/04/21 0558    Updated: 02/04/21 0643    Specimen Source: Blood     Cholesterol 112 mg/dL    Comment:     Cholesterol Guidelines:       <200  Desirable    200-240  Borderline       >240  Undesirable            HDL 28Low  mg/dL    Comment:     HDL Guidelines:     <40     Undesirable    40-59    Borderline     >59     Desirable            LDL Cholesterol 45 mg/dL    Comment:     LDL Guidelines:      <100    Desirable    100-129   Near to/above Desirable    130-159   Borderline       >159   Undesirable       Direct (measured) LDL and calculated LDL are not interchangeable tests.         Chol/HDL Ratio 4.0    Comment:            Triglycerides 195High  mg/dL    Comment:     Triglyceride Guidelines:      <150   Desirable    150-199  Borderline    200-499  High      >499   Very high    Based on AHA Guidelines for fasting triglyceride, October 2012.            VLDL NOT REPORTEDHigh  mg/dL   CBC with DIFF [[id number]] (Abnormal)    Collected: 02/04/21 0558    Updated: 02/04/21 2698    Specimen Source: Blood     WBC FINDINGS:   BRAIN/VENTRICLES: Mild generalized involutional changes with prominence of   ventricles sulci.  Mild periventricular subcortical white matter   hypoattenuation suggestive chronic small vessel ischemic disease.  Remote   lacunar infarct right anterior basal ganglia.  Encephalomalacia identified   both inferior frontal lobes corresponding to the site previous hemorrhage. No shift of midline structure.  Basal cisterns are patent.  Gray-white   differentiation is otherwise grossly preserved.  Intracranial vascular   calcifications. ORBITS: Previous cataract surgery. SINUSES: Paranasal sinus mucosal thickening. SOFT TISSUES/SKULL:  No acute abnormality of the visualized skull or soft   tissues. Impression:     No acute intracranial abnormality. Mild chronic small ischemic disease with remote right basal ganglia lacunar   infarct. Encephalomalacia of both inferior frontal lobes related to site of previous   trauma/hemorrhage. Brain Natriuretic Peptide [[id number]] (Abnormal)    Collected: 02/03/21 2120    Updated: 02/03/21 2216    Specimen Source: Blood     Pro-BNP 1,803High  pg/mL    Comment:  Pro-BNP results cannot be compared to BNP results. BNP Interpretation Pro-BNP Reference Range:    Comment:        Rule Out:    <300         Grey Zone:    Age <50     300-450    Age 50-75   300-900    Age >75     300-1800   Usually represents mild to moderate HF but other cardiopulmonary causes cannot be ruled out.         Rule In:    Age <50     >450    Age 50-75   >900    Age >75    >1800       XR CHEST (2 VW) [[id number]]    Collected: 02/03/21 2203    Updated: 02/03/21 2207    Specimen Type: Chest    Narrative:     EXAMINATION:   TWO XRAY VIEWS OF THE CHEST     2/3/2021 9:31 pm     COMPARISON:   October 23, 2015     HISTORY:   ORDERING SYSTEM PROVIDED HISTORY: lightheaded   TECHNOLOGIST PROVIDED HISTORY:   lightheaded   Reason for Exam: PT CO dizziness X several days.  HX cardiac GFR Non- 44Low  mL/min    GFR  54Low  mL/min    GFR Comment         Comment: Average GFR for 61-76 years old:    85 mL/min/1.73sq m   Chronic Kidney Disease:    <60 mL/min/1.73sq m   Kidney failure:    <15 mL/min/1.73sq m               eGFR calculated using average adult body mass.  Additional eGFR calculator available at:         [email or web address]              GFR Staging NOT REPORTED   Ethanol [[id number]]    Collected: 02/03/21 2120    Updated: 02/03/21 2147    Specimen Source: Blood     Ethanol <10 mg/dL    Ethanol percent <0.010 %   CBC Auto Differential [[id number]] (Abnormal)    Collected: 02/03/21 2120    Updated: 02/03/21 2134    Specimen Source: Blood     WBC 7.3 k/uL    RBC 3.56Low  m/uL    Hemoglobin 11.6Low  g/dL    Hematocrit 33.4Low  %    MCV 94.0 fL    MCH 32.5 pg    MCHC 34.6 g/dL    RDW 13.6 %    Platelets 665 k/uL    MPV 8.7 fL    NRBC Automated NOT REPORTED per 100 WBC    Differential Type NOT REPORTED    Seg Neutrophils 70High  %    Lymphocytes 19Low  %    Monocytes 7 %    Eosinophils % 3 %    Basophils 1 %    Immature Granulocytes NOT REPORTED %    Segs Absolute 5.20 k/uL    Absolute Lymph # 1.30 k/uL    Absolute Mono # 0.50 k/uL    Absolute Eos # 0.20 k/uL    Basophils Absolute 0.10 k/uL    Absolute Immature Granulocyte NOT REPORTED k/uL    WBC Morphology NOT REPORTED    RBC Morphology NOT REPORTED    Platelet Estimate NOT REPORTED             ASSESSMENT:  Patient Active Problem List   Diagnosis Code    Diabetic foot infection (HCC) E11.628, L08.9    Diabetes ([facility] [address] E11.9    Chronic osteomyelitis (HCC) M86.60    Diabetic foot ulcer ([facility] [address] E11.621, L97.509    PVD (peripheral vascular disease) ([facility] [address] I73.9    Atherosclerosis of coronary artery without angina pectoris I25.10    Cardiomyopathy ([facility] [address] I42.9    Cardiac left ventricular ejection fraction 21-40 percent R94.30    Diabetes mellitus type 2 with peripheral artery disease [facility]) E11.51    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 ([facility] [address] L97.514    Chronic osteomyelitis of left foot ([facility]) M86.672    Onychomycosis B35.1    Heart failure ([facility]) I50.9     · Dizziness  ·   · Hypertensive urgency  ·   · Heart murmur history of tricuspid regurg  ·   · History of coronary disease status post CABG  ·   · Defibrillator  ·   · History of chronic venous stasis discoloration of his legs  ·   · History of peripheral vascular disease  ·   · History of CHF  ·   · Renal insufficiency  ·   · History of diabetes mellitus  ·   ·     PLAN:  · We will give him normal saline at about 50 mL an hour for now  ·   · We will hold on stress test today as discussed with cardiology both Ariel Juarez his nurse practitioner Michael Yusuf  ·   · We will adjust his blood pressure meds we will increase his hydralazine to 50 3 times daily and his Coreg was increased to 12.5 mg twice daily just this morning we will keep him on that dose for now monitor his blood pressure  ·   · The blood pressure comes down may be stress test tomorrow with meloxicam  ·   · He is getting an echocardiogram  ·   · We will get an order for troponin since it was not ordered follow-up last night  ·   · We will monitor his symptoms once his blood pressure decreases        Electronically signed by JOSE Briceno on 2/4/2021 at 8:38 [address] [facility], [address]

## 2021-02-04 NOTE — CARE COORDINATION
CASE MANAGEMENT NOTE:    Admission Date:  2/3/2021 Steve De La Paz is a 76 y.o.  male    Admitted for : Heart failure ([facility] [address] [I50.9]    Met with:  Patient    PCP:  Dr. Fermin Muñoz:  Carlene Font[employer] Medicare      Current Residence/ Living Arrangements:  independently at home with his mother. Is a              Current Services PTA:  On Coumadin, follows at [facility] [place]    Is patient agreeable to VNS: No    Freedom of choice provided:  Yes    List of [address] provided: No    VNS chosen:  NA    DME:  none    Home Oxygen: No    Nebulizer: No    CPAP/BIPAP: No    Supplier: N/A    Potential Assistance Needed: No    SNF needed: No    Freedom of choice and list provided: NA    Pharmacy:  [facility]       Does Patient want to use MEDS to BEDS? No    Is patient currently receiving oral anticoagulation therapy? Yes - Coumadin, INR 1.5    Is the Patient an [facility] with Readmission Risk Score greater than 14%? No  If yes, pt needs a follow up appointment made within 7 days. Family Members/Caregivers that pt would like involved in their care:    Yes    If yes, list name here:  Aakash Griffin    Transportation Provider:  Patient              Discharge Plan:  Home without needs.                  Electronically signed by: Qing Avila RN on 2/4/2021 at 10:25 AM

## 2021-02-04 NOTE — PROGRESS NOTES
Pharmacy Note  Warfarin Consult follow-up      Recent Labs     02/03/21 2120 02/04/21  0600   INR 1.4 1.5     Recent Labs     02/03/21 2120 02/04/21  0558   HGB 11.6* 11.3*   HCT 33.4* 32.6*    162       Significant Drug-Drug Interactions:  New warfarin drug-drug interactions: none  Discontinued drug-drug interactions: none  Current warfarin drug-drug interactions: aspirin      Date             INR        Dose given previous day  Dose scheduled for today  2/4/2021            1.5      none           6 mg        Notes:         Spoke with patient who confirms that he takes 4 mg daily but did not take any yesterday before he came in. Will give small booster dose of 6 mg tonight, likely resume 4 mg tomorrow. Daily PT/INR while inpatient.      Ev Bliss,PharmD,  2/4/2021, 2:01 PM

## 2021-02-04 NOTE — PROGRESS NOTES
Patient arrived to floor via wheelchair. Patient ambulated to bed independently. RN assessed patient, obtained vitals, and applied telemetry. Admission paperwork complete. Patient resting comfortably, no distress noted.

## 2021-02-04 NOTE — ED PROVIDER NOTES
EMERGENCY DEPARTMENT ENCOUNTER    Pt Name: Ev Hart  MRN: [id number]  [place] 1955  Date of evaluation: 2/3/21  CHIEF COMPLAINT       Chief Complaint   Patient presents with    Dizziness     HISTORY OF PRESENT ILLNESS   HPI     This is a 77-year-old male with a history of diabetes who comes in today. The patient's has been feeling lightheaded when going from a seated to a standing position for the last few days. He is a  and he was at the [employer] and he \"did not look\" the  took his blood pressure and it was in the 950 systolic. The patient reports compliance with his medications except for the losartan because he looked and saw that the side effect was lightheadedness but he has been taking all of his other medications including Coumadin. The patient does have a history of atrial fibrillation for which he is on warfarin. He also is status post triple bypass. His most recent echocardiogram revealed an EF of 25% with severe mitral regurgitation and severe tricuspid regurgitation. He is status post Medtronic pacer defibrillator. He denies any chest pain shortness of breath nausea vomiting diaphoresis no recent illness fever cough. He states he is a drinker. He describes lightheadedness not room spinning sensation no near syncope. REVIEW OF SYSTEMS     Review of Systems   Constitutional: Negative for fever. HENT: Negative for congestion. Respiratory: Negative for cough and shortness of breath. Cardiovascular: Negative for chest pain. Gastrointestinal: Negative for abdominal pain, diarrhea and vomiting. Genitourinary: Negative for dysuria. Musculoskeletal: Negative for back pain. Skin: Negative for rash. Neurological: Positive for light-headedness. Negative for headaches. All other systems reviewed and are negative.     PASTMEDICAL HISTORY     Past Medical History:   Diagnosis Date    Atrial fibrillation ([address]     CAD (coronary artery disease)     [facility]     Diabetes mellitus ([facility] [address]     Hyperlipidemia     Hypertension      SURGICAL HISTORY       Past Surgical History:   Procedure Laterality Date    CARDIAC SURGERY  2010    CABG X3    COLONOSCOPY      DEBRIDEMENT Right 11/19/2015    DEBRIDEMENT WOUND FOOT RIGHT W/ APPLICATION BILAT INTEG RAT GRAFT    ENDOSCOPY, COLON, DIAGNOSTIC      EYE SURGERY Bilateral     cataracts    HERNIA REPAIR      umbilical    KNEE ARTHROSCOPY Right     OTHER SURGICAL HISTORY Right 12 29 15    wound care graft procedure foot,appl of integra    PACEMAKER PLACEMENT  2011    WITH DEFIBRILLATOR    TOE AMPUTATION Right     R great     CURRENT MEDICATIONS       Previous Medications    ASPIRIN 81 MG TABLET    Take 81 mg by mouth daily     ATORVASTATIN (LIPITOR) 40 MG TABLET    Take 40 mg by mouth daily. CARVEDILOL (COREG) 12.5 MG TABLET    Take 25 mg by mouth 2 times daily Patient takes 6.25mg (1/2 of a 12.5mg tablet) twice a day    FUROSEMIDE (LASIX) 80 MG TABLET    Take 80 mg by mouth 2 times daily    GLIMEPIRIDE (AMARYL) 4 MG TABLET    Take 4 mg by mouth 2 times daily. ISOSORBIDE MONONITRATE (IMDUR) 30 MG CR TABLET    Take 30 mg by mouth daily. LOSARTAN (COZAAR) 50 MG TABLET    Take 50 mg by mouth daily Take 1/2 of pill in am    METFORMIN (GLUCOPHAGE) 500 MG TABLET    Take 1,000 mg by mouth 2 times daily (with meals). METOLAZONE (ZAROXOLYN) 5 MG TABLET    Take 5 mg by mouth as needed. SODIUM HYPOCHLORITE, DAKINS, 0.125 % SOLN EXTERNAL SOLUTION    Moist to moist dressing with 0.125% dakins solution BID. SODIUM HYPOCHLORITE, DAKINS, 0.125 % SOLN EXTERNAL SOLUTION    Cleanse wound with 0.125% dakins solution. WARFARIN (COUMADIN) 2 MG TABLET    Take 6 mg by mouth four times a week Managed by [facility] - 6mg (3 x 2mg warfarin tablets) on Mon/Thurs  and 4mg (2 x 2mg tablets) all other days. ALLERGIES     is allergic to pcn [penicillins].   FAMILY otherwisenoted in MDM or here. XR CHEST (2 VW)   Final Result   Interstitial prominence throughout the lungs which may represent mild edema. CT HEAD WO CONTRAST   Final Result   No acute intracranial abnormality. Mild chronic small ischemic disease with remote right basal ganglia lacunar   infarct. Encephalomalacia of both inferior frontal lobes related to site of previous   trauma/hemorrhage. LABS: All lab results were reviewed by myself, and all abnormals are listed below. Labs Reviewed   CBC WITH AUTO DIFFERENTIAL - Abnormal; Notable for the following components:       Result Value    RBC 3.56 (*)     Hemoglobin 11.6 (*)     Hematocrit 33.4 (*)     Seg Neutrophils 70 (*)     Lymphocytes 19 (*)     All other components within normal limits   BASIC METABOLIC PANEL - Abnormal; Notable for the following components:    Glucose 182 (*)     BUN 24 (*)     CREATININE 1.57 (*)     Potassium 3.5 (*)     Chloride 96 (*)     GFR Non- 44 (*)     GFR  54 (*)     All other components within normal limits   BRAIN NATRIURETIC PEPTIDE - Abnormal; Notable for the following components:    Pro-BNP 1,803 (*)     All other components within normal limits   TROPONIN - Abnormal; Notable for the following components:    Troponin, High Sensitivity 26 (*)     All other components within normal limits   APTT - Abnormal; Notable for the following components:    PTT 36.2 (*)     All other components within normal limits   PROTIME-INR - Abnormal; Notable for the following components:    Protime 17.4 (*)     All other components within normal limits   MAGNESIUM   ETHANOL   TROPONIN       EMERGENCY DEPARTMENTCOURSE:     Differential diagnosis includes hypertensive urgency, orthostatic hypotension, arrhythmia, electrolyte abnormality, ACS. 11:09 PM EST  Creatinine is elevated to 1.57.   Mildly low potassium at 3.5 I am going to give him diuretics so I will replace both his magnesium and potassium for electrolyte management given his heart failure. He does have an elevation in BNP as well as edema on his chest x-ray as well as a troponin of 26 because of this I am concerned that the patient has heart failure especially given the extra evidence that upon device interrogation it stated that there was fluid accumulation which can indicate heart failure. He has no leukocytosis he does have a chronic anemia with a hemoglobin of 11.6. CT head is negative for acute process. Will meet the patient for heart failure lightheadedness in the setting of congestive heart failure. I did speak to Dr. Earl Vincent on call for Dr. Kim Hoover who recommends giving the patient medication to decrease his blood pressure he recommends 5 mg of Lopressor. This will be done. He also recommends consulted cardiology Dr. Anila Salinas will be consulted in the morning. Bridging orders have been placed patient will be admitted for further management         EKG:All EKG's are interpreted by the Emergency Department Physician who either signs or Co-signs this chart in the absence of a cardiologist.  Atrial fibrillation with a heart rate 81 bpm no prolonged intervals normal axis no acute ST or T wave changes      CONSULTS:  IP CONSULT TO FAMILY MEDICINE  IP CONSULT TO CARDIOLOGY    Vitals:    Vitals:    02/03/21 2110 02/03/21 2111 02/03/21 2112 02/03/21 2113   BP:       Pulse: 82 79 80 90   Resp: 19 17 18 20   Temp:       TempSrc:       SpO2: 96% 98% 98% 99%   Weight:       Height:           The patient was given the following medications while in the emergency department:  Orders Placed This Encounter   Medications    thiamine mononitrate tablet 027 mg    folic acid (FOLVITE) tablet 1 mg    furosemide (LASIX) injection 80 mg    metoprolol (LOPRESSOR) injection 5 mg    potassium bicarb-citric acid (EFFER-K) effervescent tablet 40 mEq    magnesium oxide (MAG-OX) tablet 400 mg         FINAL IMPRESSION      1. Lightheadedness    2. Hypertension, unspecified type    3. Acute congestive heart failure, unspecified heart failure type ([facility] [address]    4.  BRIANNA (acute kidney injury) [facility])         [address] Admitted 02/03/2021 11:09:08 PM    Smith Camarillo MD  Attending Emergency Physician    This charting supersedes any ED resident or staff charting and was written using speech recognition Emma Tang MD  02/03/21 9230

## 2021-02-04 NOTE — PLAN OF CARE
Problem: Falls - Risk of:  Goal: Will remain free from falls  Description: Will remain free from falls  2/4/2021 1757 by Jessi Beal RN  Outcome: Met This Shift  2/4/2021 0758 by Tom Roy RN  Outcome: Met This Shift  Goal: Absence of physical injury  Description: Absence of physical injury  2/4/2021 1757 by Jessi Beal RN  Outcome: Met This Shift  Note: Up independenytly w/o c/o lightheadedness; BP now under clontrol  2/4/2021 0758 by Tom Roy RN  Outcome: Met This Shift     Problem: Infection:  Goal: Will remain free from infection  Description: Will remain free from infection  2/4/2021 1757 by Jessi Beal RN  Outcome: Met This Shift  2/4/2021 0758 by oTm Roy RN  Outcome: Ongoing     Problem: Safety:  Goal: Free from accidental physical injury  Description: Free from accidental physical injury  2/4/2021 1757 by Jessi Beal RN  Outcome: Met This Shift  2/4/2021 0758 by Tom Roy RN  Outcome: Met This Shift  Goal: Free from intentional harm  Description: Free from intentional harm  Outcome: Met This Shift     Problem: Daily Care:  Goal: Daily care needs are met  Description: Daily care needs are met  2/4/2021 1757 by Jessi Beal RN  Outcome: Met This Shift  2/4/2021 0758 by Tom Roy RN  Outcome: Met This Shift     Problem: Pain:  Goal: Patient's pain/discomfort is manageable  Description: Patient's pain/discomfort is manageable  2/4/2021 1757 by Jessi Beal RN  Outcome: Met This Shift  2/4/2021 0758 by Tom Roy RN  Outcome: Met This Shift     Problem: Skin Integrity:  Goal: Skin integrity will stabilize  Description: Skin integrity will stabilize  2/4/2021 1757 by Jessi Beal RN  Outcome: Met This Shift  2/4/2021 0758 by Tom Roy RN  Outcome: Ongoing     Problem: Health Behavior:  Goal: Identification of resources available to assist in meeting health care needs will improve  Description: Identification of resources available to assist in meeting health care needs will improve  2/4/2021 1757 by Cyrus Mejia RN  Outcome: Met This Shift  2/4/2021 0758 by Maricruz Cunningham RN  Outcome: Ongoing     Problem: Respiratory:  Goal: Respiratory status will improve  Description: Respiratory status will improve  Outcome: Met This Shift

## 2021-02-04 NOTE — PROGRESS NOTES
Stress lab notified of elevated /103; will discuss stress test with Dr Jacques Neal; Dr Jacques Neal visits;condition update given re: elevated /103; see orders; monitor BP

## 2021-02-04 NOTE — CONSULTS
Consult Note            Date:2/4/2021        Patient Name:Asher Rahman     YOB: 1955     Age:65 y.o. Consults    Chief Complaint     Chief Complaint   Patient presents with    Dizziness      Acute HFrEF    History Obtained From   patient, electronic medical record    History of Present Illness     Patient with known history CABG x3 in 03/2010, ischemic CMP with EF 25-30%, chronic HFrEF, DC -ICD for primary prevention, permanent Afib, severe MR, severe TR, type 2 DM, chronic anemia, weekend EtOH, occasional cigar smoking. ER 02/03/2021 w/  Positional lightheadedness noted for a few days prior to presentation, significant elevated BP. Patient reports no progressive dyspnea, PND, orthopnea. Denies chest pain, syncope, near syncope, palpitations. ER workup:  proBNP 1803  high sensitivity troponin 26  creatinine 1.57  K+ 3.5  EKG rate controlled AFib  CXR with evidence mild edema  CT head no acute process  ICD interrogation in ER unremarkable    Admitted for HFrEF and BP management. Patient received furosemide 80 mg IV x1. This a.m. creatinine increased 1.7, BP with suboptimal control. Patient seen and discussed with Dr. Toma Alba. Plan outlined below.     Past Medical History     Past Medical History:   Diagnosis Date    [facility] [address]     CAD (coronary artery disease)     CHF (congestive heart failure) (H[facility])     Diabetes mellitus ([facility] [address]     Hyperlipidemia     Hypertension         Past Surgical History     Past Surgical History:   Procedure Laterality Date    CARDIAC SURGERY  2010    CABG X3    COLONOSCOPY      DEBRIDEMENT Right 11/19/2015    DEBRIDEMENT WOUND FOOT RIGHT W/ APPLICATION BILAT INTEG RAT GRAFT    ENDOSCOPY, COLON, DIAGNOSTIC      EYE SURGERY Bilateral     cataracts    HERNIA REPAIR      umbilical    KNEE ARTHROSCOPY Right     OTHER SURGICAL HISTORY Right 12 29 15    wound care graft procedure foot,appl of integra    PACEMAKER PLACEMENT  2011    WITH atropine injection 0.5 mg, Q5 Min PRN      nitroGLYCERIN (NITROSTAT) SL tablet 0.4 mg, Q5 Min PRN      metoprolol (LOPRESSOR) injection 5 mg, Q5 Min PRN      aminophylline injection 50 mg, PRN      aspirin EC tablet 81 mg, Daily      atorvastatin (LIPITOR) tablet 40 mg, Daily      furosemide (LASIX) tablet 80 mg, Daily      nitroGLYCERIN (NITROSTAT) SL tablet 0.4 mg, Q5 Min PRN      warfarin (COUMADIN) daily dosing (placeholder), RX Placeholder      warfarin (COUMADIN) tablet 4 mg, Daily      sodium chloride flush 0.9 % injection 10 mL, PRN      perflutren lipid microspheres (DEFINITY) injection 2.2 mg, ONCE PRN      hydrALAZINE (APRESOLINE) tablet 50 mg, 3 times per day      carvedilol (COREG) tablet 12.5 mg, BID WC        Allergies   Pcn [penicillins]    Social History     Social History     Tobacco History     Smoking Status  Never Smoker    Smokeless Tobacco Use  Never Used          Alcohol History     Alcohol Use Status  Yes Comment  SOCIAL on weekends          Drug Use     Drug Use Status  No          Sexual Activity     Sexually Active  Not Asked                Family History     Family History   Problem Relation Age of Onset    Cancer Father         pancreatic cancer    Other Brother         MI    Osteoarthritis Mother     Other Maternal Grandfather         MI       Review of Systems   Review of Systems   Review of Systems - General ROS: negative  Respiratory ROS: no cough, shortness of breath, or wheezing  Cardiovascular ROS: no chest pain or dyspnea on exertion  Gastrointestinal ROS: no abdominal pain, change in bowel habits, or black or bloody stools  Neurological ROS: no TIA or stroke symptoms      Physical Exam   BP (!) 203/103   Pulse 78   Temp 97.6 °F (36.4 °C) (Oral)   Resp 12   Ht 5' 10\" (1.778 m)   Wt 220 lb (99.8 kg)   SpO2 96%   BMI 31.57 kg/m²      Physical Exam   Physical Examination: General appearance - alert, well appearing, and in no distress  Neck - supple, no JVD  Chest -slightly diminished posterior bases  Heart - irregularly irregular rhythm with rate controlled A. fib, systolic murmur at TV and MV location  Abdomen - soft, nontender, nondistended, no masses or organomegaly  Extremities -chronic vascular discoloration, mild bilateral lower extremity peripheral edema, nonpitting, bilateral great toe amputations, pulses palpable      Labs    CBC:  Recent Labs     02/03/21 2120 02/04/21  0558   WBC 7.3 6.9   RBC 3.56* 3.48*   HGB 11.6* 11.3*   HCT 33.4* 32.6*   MCV 94.0 93.7   RDW 13.6 13.9    162     CHEMISTRIES:  Recent Labs     02/03/21 2120 02/04/21  0558    139   K 3.5* 4.1   CL 96* 100   CO2 27 29   BUN 24* 22   CREATININE 1.57* 1.72*   GLUCOSE 182* 168*   MG 1.6  --      PT/INR:  Recent Labs     02/03/21 2120   PROTIME 17.4*   INR 1.4     APTT:  Recent Labs     02/03/21 2120   APTT 36.2*     LIVER PROFILE:  Recent Labs     02/04/21  0558   AST 32   ALT 34   BILITOT 0.72   ALKPHOS 86       Imaging/Diagnostics   Xr Chest (2 Vw)    Result Date: 2/3/2021  Interstitial prominence throughout the lungs which may represent mild edema. Ct Head Wo Contrast    Result Date: 2/3/2021  No acute intracranial abnormality. Mild chronic small ischemic disease with remote right basal ganglia lacunar infarct. Encephalomalacia of both inferior frontal lobes related to site of previous trauma/hemorrhage.        Assessment      Hospital Problems           Last Modified POA    Heart failure ([facility] [address] 2/3/2021 Yes          Native vessel ASCVD w/o angina   - CABG x3: LIMA/LAD, SVG/Diag/RCA in 03/2010    ICMP, EF 25-30% on TTE 2017   Mild acute on chronic HFrEF -no overt fluid overload on exam  DC-ICD ([employer]) for primary prevention    Mod-severe MR  Severe TR    Permanent Afib - rate controlled   - [id number] score 5 (Age-1, DM, HTN, CHF, CAD) - warfarin    Essential HTN    Hypokalemia -replacement as ordered    EtOH daily  Tobacco abuse    Type 2 DM                Check stress test once BP with better control. Check TTE. Increase carvedilol 12.5 mg twice daily. Continue hydralazine. Hold off on losartan 2/2 increasing creatinine. Dr. Catherine Drake considering mild IV hydration.           Electronically signed by KAYLENE Bee CNP on 2/4/21 at 7:14 AM EST

## 2021-02-05 ENCOUNTER — APPOINTMENT (OUTPATIENT)
Dept: NUCLEAR MEDICINE | Age: 66
End: 2021-02-05
Payer: MEDICARE

## 2021-02-05 VITALS
HEIGHT: 70 IN | RESPIRATION RATE: 16 BRPM | DIASTOLIC BLOOD PRESSURE: 73 MMHG | HEART RATE: 77 BPM | TEMPERATURE: 97.3 F | WEIGHT: 212.96 LBS | BODY MASS INDEX: 30.49 KG/M2 | SYSTOLIC BLOOD PRESSURE: 156 MMHG | OXYGEN SATURATION: 99 %

## 2021-02-05 LAB
ABSOLUTE EOS #: 0.2 K/UL (ref 0–0.4)
ABSOLUTE IMMATURE GRANULOCYTE: ABNORMAL K/UL (ref 0–0.3)
ABSOLUTE LYMPH #: 1.1 K/UL (ref 1–4.8)
ABSOLUTE MONO #: 0.5 K/UL (ref 0.1–1.3)
ANION GAP SERPL CALCULATED.3IONS-SCNC: 13 MMOL/L (ref 9–17)
BASOPHILS # BLD: 1 % (ref 0–2)
BASOPHILS ABSOLUTE: 0 K/UL (ref 0–0.2)
BUN BLDV-MCNC: 25 MG/DL (ref 8–23)
BUN/CREAT BLD: ABNORMAL (ref 9–20)
CALCIUM SERPL-MCNC: 9.2 MG/DL (ref 8.6–10.4)
CHLORIDE BLD-SCNC: 103 MMOL/L (ref 98–107)
CO2: 26 MMOL/L (ref 20–31)
CREAT SERPL-MCNC: 1.63 MG/DL (ref 0.7–1.2)
DIFFERENTIAL TYPE: ABNORMAL
EOSINOPHILS RELATIVE PERCENT: 3 % (ref 0–4)
GFR AFRICAN AMERICAN: 52 ML/MIN
GFR NON-AFRICAN AMERICAN: 43 ML/MIN
GFR SERPL CREATININE-BSD FRML MDRD: ABNORMAL ML/MIN/{1.73_M2}
GFR SERPL CREATININE-BSD FRML MDRD: ABNORMAL ML/MIN/{1.73_M2}
GLUCOSE BLD-MCNC: 136 MG/DL (ref 75–110)
GLUCOSE BLD-MCNC: 146 MG/DL (ref 70–99)
GLUCOSE BLD-MCNC: 154 MG/DL (ref 75–110)
HCT VFR BLD CALC: 30.8 % (ref 41–53)
HEMOGLOBIN: 10.8 G/DL (ref 13.5–17.5)
IMMATURE GRANULOCYTES: ABNORMAL %
INR BLD: 1.4
LV EF: 38 %
LVEF MODALITY: NORMAL
LYMPHOCYTES # BLD: 17 % (ref 24–44)
MCH RBC QN AUTO: 32.6 PG (ref 26–34)
MCHC RBC AUTO-ENTMCNC: 35.1 G/DL (ref 31–37)
MCV RBC AUTO: 92.7 FL (ref 80–100)
MONOCYTES # BLD: 9 % (ref 1–7)
NRBC AUTOMATED: ABNORMAL PER 100 WBC
PDW BLD-RTO: 14.2 % (ref 11.5–14.9)
PLATELET # BLD: 145 K/UL (ref 150–450)
PLATELET ESTIMATE: ABNORMAL
PMV BLD AUTO: 8.6 FL (ref 6–12)
POTASSIUM SERPL-SCNC: 3.6 MMOL/L (ref 3.7–5.3)
PROTHROMBIN TIME: 16.8 SEC (ref 11.8–14.6)
RBC # BLD: 3.32 M/UL (ref 4.5–5.9)
RBC # BLD: ABNORMAL 10*6/UL
SEG NEUTROPHILS: 70 % (ref 36–66)
SEGMENTED NEUTROPHILS ABSOLUTE COUNT: 4.3 K/UL (ref 1.3–9.1)
SODIUM BLD-SCNC: 142 MMOL/L (ref 135–144)
WBC # BLD: 6.2 K/UL (ref 3.5–11)
WBC # BLD: ABNORMAL 10*3/UL

## 2021-02-05 PROCEDURE — 93017 CV STRESS TEST TRACING ONLY: CPT

## 2021-02-05 PROCEDURE — 85610 PROTHROMBIN TIME: CPT

## 2021-02-05 PROCEDURE — 82947 ASSAY GLUCOSE BLOOD QUANT: CPT

## 2021-02-05 PROCEDURE — A9500 TC99M SESTAMIBI: HCPCS | Performed by: FAMILY MEDICINE

## 2021-02-05 PROCEDURE — G0378 HOSPITAL OBSERVATION PER HR: HCPCS

## 2021-02-05 PROCEDURE — 2580000003 HC RX 258: Performed by: FAMILY MEDICINE

## 2021-02-05 PROCEDURE — 6370000000 HC RX 637 (ALT 250 FOR IP): Performed by: FAMILY MEDICINE

## 2021-02-05 PROCEDURE — 78452 HT MUSCLE IMAGE SPECT MULT: CPT

## 2021-02-05 PROCEDURE — 85025 COMPLETE CBC W/AUTO DIFF WBC: CPT

## 2021-02-05 PROCEDURE — 96372 THER/PROPH/DIAG INJ SC/IM: CPT

## 2021-02-05 PROCEDURE — 6360000002 HC RX W HCPCS: Performed by: FAMILY MEDICINE

## 2021-02-05 PROCEDURE — 80048 BASIC METABOLIC PNL TOTAL CA: CPT

## 2021-02-05 PROCEDURE — 6370000000 HC RX 637 (ALT 250 FOR IP): Performed by: INTERNAL MEDICINE

## 2021-02-05 PROCEDURE — 3430000000 HC RX DIAGNOSTIC RADIOPHARMACEUTICAL: Performed by: FAMILY MEDICINE

## 2021-02-05 PROCEDURE — 36415 COLL VENOUS BLD VENIPUNCTURE: CPT

## 2021-02-05 PROCEDURE — 2580000003 HC RX 258: Performed by: INTERNAL MEDICINE

## 2021-02-05 PROCEDURE — 6360000002 HC RX W HCPCS: Performed by: INTERNAL MEDICINE

## 2021-02-05 RX ORDER — ALBUTEROL SULFATE 90 UG/1
2 AEROSOL, METERED RESPIRATORY (INHALATION) PRN
Status: ACTIVE | OUTPATIENT
Start: 2021-02-05 | End: 2021-02-05

## 2021-02-05 RX ORDER — METOPROLOL TARTRATE 5 MG/5ML
5 INJECTION INTRAVENOUS EVERY 5 MIN PRN
Status: ACTIVE | OUTPATIENT
Start: 2021-02-05 | End: 2021-02-05

## 2021-02-05 RX ORDER — ATROPINE SULFATE 0.1 MG/ML
0.5 INJECTION INTRAVENOUS EVERY 5 MIN PRN
Status: ACTIVE | OUTPATIENT
Start: 2021-02-05 | End: 2021-02-05

## 2021-02-05 RX ORDER — POTASSIUM CHLORIDE 20 MEQ/1
20 TABLET, EXTENDED RELEASE ORAL
Qty: 60 TABLET | Refills: 3 | Status: SHIPPED | OUTPATIENT
Start: 2021-02-06 | End: 2022-03-07

## 2021-02-05 RX ORDER — SODIUM CHLORIDE 0.9 % (FLUSH) 0.9 %
10 SYRINGE (ML) INJECTION PRN
Status: DISCONTINUED | OUTPATIENT
Start: 2021-02-05 | End: 2021-02-05 | Stop reason: HOSPADM

## 2021-02-05 RX ORDER — CARVEDILOL 12.5 MG/1
12.5 TABLET ORAL 2 TIMES DAILY WITH MEALS
Qty: 60 TABLET | Refills: 3 | Status: ON HOLD | OUTPATIENT
Start: 2021-02-05 | End: 2022-07-13

## 2021-02-05 RX ORDER — WARFARIN SODIUM 3 MG/1
6 TABLET ORAL
Status: DISCONTINUED | OUTPATIENT
Start: 2021-02-05 | End: 2021-02-05 | Stop reason: HOSPADM

## 2021-02-05 RX ORDER — POTASSIUM CHLORIDE 20 MEQ/1
20 TABLET, EXTENDED RELEASE ORAL
Status: DISCONTINUED | OUTPATIENT
Start: 2021-02-05 | End: 2021-02-05 | Stop reason: HOSPADM

## 2021-02-05 RX ORDER — NITROGLYCERIN 0.4 MG/1
TABLET SUBLINGUAL
Qty: 25 TABLET | Refills: 3 | Status: ON HOLD | OUTPATIENT
Start: 2021-02-05 | End: 2022-03-07

## 2021-02-05 RX ORDER — HYDRALAZINE HYDROCHLORIDE 50 MG/1
50 TABLET, FILM COATED ORAL EVERY 8 HOURS SCHEDULED
Qty: 90 TABLET | Refills: 3 | Status: SHIPPED | OUTPATIENT
Start: 2021-02-05 | End: 2022-03-07

## 2021-02-05 RX ORDER — AMINOPHYLLINE DIHYDRATE 25 MG/ML
50 INJECTION, SOLUTION INTRAVENOUS PRN
Status: ACTIVE | OUTPATIENT
Start: 2021-02-05 | End: 2021-02-05

## 2021-02-05 RX ORDER — ISOSORBIDE MONONITRATE 60 MG/1
60 TABLET, EXTENDED RELEASE ORAL DAILY
Qty: 30 TABLET | Refills: 3 | Status: SHIPPED | OUTPATIENT
Start: 2021-02-06 | End: 2022-03-07

## 2021-02-05 RX ORDER — NITROGLYCERIN 0.4 MG/1
0.4 TABLET SUBLINGUAL EVERY 5 MIN PRN
Status: ACTIVE | OUTPATIENT
Start: 2021-02-05 | End: 2021-02-05

## 2021-02-05 RX ORDER — SODIUM CHLORIDE 0.9 % (FLUSH) 0.9 %
10 SYRINGE (ML) INJECTION PRN
Status: ACTIVE | OUTPATIENT
Start: 2021-02-05 | End: 2021-02-05

## 2021-02-05 RX ORDER — SODIUM CHLORIDE 9 MG/ML
500 INJECTION, SOLUTION INTRAVENOUS CONTINUOUS PRN
Status: ACTIVE | OUTPATIENT
Start: 2021-02-05 | End: 2021-02-05

## 2021-02-05 RX ORDER — ISOSORBIDE MONONITRATE 60 MG/1
60 TABLET, EXTENDED RELEASE ORAL DAILY
Status: DISCONTINUED | OUTPATIENT
Start: 2021-02-05 | End: 2021-02-05 | Stop reason: HOSPADM

## 2021-02-05 RX ADMIN — HYDRALAZINE HYDROCHLORIDE 50 MG: 50 TABLET, FILM COATED ORAL at 14:05

## 2021-02-05 RX ADMIN — CARVEDILOL 12.5 MG: 12.5 TABLET, FILM COATED ORAL at 17:22

## 2021-02-05 RX ADMIN — ISOSORBIDE MONONITRATE 60 MG: 30 TABLET, EXTENDED RELEASE ORAL at 11:37

## 2021-02-05 RX ADMIN — FUROSEMIDE 80 MG: 40 TABLET ORAL at 11:39

## 2021-02-05 RX ADMIN — INSULIN LISPRO 2 UNITS: 100 INJECTION, SOLUTION INTRAVENOUS; SUBCUTANEOUS at 07:49

## 2021-02-05 RX ADMIN — ENOXAPARIN SODIUM 100 MG: 100 INJECTION SUBCUTANEOUS at 12:03

## 2021-02-05 RX ADMIN — SODIUM CHLORIDE, PRESERVATIVE FREE 10 ML: 5 INJECTION INTRAVENOUS at 07:41

## 2021-02-05 RX ADMIN — ASPIRIN 81 MG: 81 TABLET, COATED ORAL at 07:49

## 2021-02-05 RX ADMIN — HYDRALAZINE HYDROCHLORIDE 50 MG: 50 TABLET, FILM COATED ORAL at 07:49

## 2021-02-05 RX ADMIN — REGADENOSON 0.4 MG: 0.08 INJECTION, SOLUTION INTRAVENOUS at 09:19

## 2021-02-05 RX ADMIN — CARVEDILOL 12.5 MG: 12.5 TABLET, FILM COATED ORAL at 11:39

## 2021-02-05 RX ADMIN — Medication 10 ML: at 08:52

## 2021-02-05 RX ADMIN — TETRAKIS(2-METHOXYISOBUTYLISOCYANIDE)COPPER(I) TETRAFLUOROBORATE 11 MILLICURIE: 1 INJECTION, POWDER, LYOPHILIZED, FOR SOLUTION INTRAVENOUS at 07:41

## 2021-02-05 RX ADMIN — POTASSIUM CHLORIDE 20 MEQ: 1500 TABLET, EXTENDED RELEASE ORAL at 12:06

## 2021-02-05 RX ADMIN — TETRAKIS(2-METHOXYISOBUTYLISOCYANIDE)COPPER(I) TETRAFLUOROBORATE 37.5 MILLICURIE: 1 INJECTION, POWDER, LYOPHILIZED, FOR SOLUTION INTRAVENOUS at 09:21

## 2021-02-05 ASSESSMENT — PAIN SCALES - GENERAL
PAINLEVEL_OUTOF10: 0

## 2021-02-05 NOTE — CARE COORDINATION
ONGOING DISCHARGE PLAN:    Spoke with patient regarding discharge plan yesterday and patient confirmed that plan is home without needs. VNS has been declined. Stress test today, based on results, possible d/c home today. On Coumadin, INR 1.4 today. Will continue to follow for additional discharge needs.     Electronically signed by Nathan Tafoya RN on 2/5/2021 at 11:03 AM

## 2021-02-05 NOTE — PROGRESS NOTES
RN paged and spoke with Dr. Randi Armstrong. Updated MD that cardiology is ok with patient discharging and following up. MD states to continue current medications at discharge.

## 2021-02-05 NOTE — PROGRESS NOTES
RN paged and spoke with Dr. Reginald Lei regarding stress test results. RN inquired if patient could discharge. MD states will discuss with Dr. Jl White. MD calls RN back immediately to states that patient may discharge home and follow up with cardiology in the office in a couple weeks.

## 2021-02-05 NOTE — PROGRESS NOTES
[id number] [employer]      PROGRESS NOTE        Patient:  Theron Glass  YOB: 1955    MRN: [id number]     Acct: [de-identified]     Admit date: 2/3/2021    Pt seen and Chart reviewed. Consultant notes reviewed and care evaluated. Subjective: Patient stated he has been feeling okay he has not had any episodes of dizziness with ambulation. His echo x-ray does not look better compared to what he had about his history now his EF reported as 50 to 55% he does have moderate pulmonary hypertension since his symptoms and the numbers coming down we will proceed with Lexiscan stress test discussed with cardiology Jyoti Zepeda yesterday and he was okay with that as well.   Pending on the test will decide on discharge  Diet:  Diet NPO, After Midnight      Medications:Current Inpatient    Scheduled Meds:   sodium chloride flush  10 mL Intravenous 2 times per day    labetalol  20 mg Intravenous Once    aspirin  81 mg Oral Daily    furosemide  80 mg Oral Daily    warfarin (COUMADIN) daily dosing (placeholder)   Other RX Placeholder    hydrALAZINE  50 mg Oral 3 times per day    carvedilol  12.5 mg Oral BID WC    isosorbide mononitrate  30 mg Oral Daily    insulin lispro  0-12 Units Subcutaneous TID WC    insulin lispro  0-6 Units Subcutaneous Nightly     Continuous Infusions:   sodium chloride 50 mL/hr at 02/04/21 1152    dextrose       PRN Meds:sodium chloride flush, sodium chloride flush, acetaminophen, nitroGLYCERIN, sodium chloride flush, glucose, dextrose, glucagon (rDNA), dextrose        Physical Exam:  Vitals: BP (!) 177/87   Pulse 72   Temp 97.5 °F (36.4 °C)   Resp 16   Ht 5' 10\" (1.778 m)   Wt 212 lb 15.4 oz (96.6 kg)   SpO2 96%   BMI 30.56 kg/m²   24 hour intake/output:    Intake/Output Summary (Last 24 hours) at 2/5/2021 0750  Last data filed at 2/5/2021 1227  Gross per 24 hour   Intake 999.17 ml   Output 1700 ml   Net -700.83 ml     Last 3 weights: Wt Readings from Last 3 Encounters:   02/05/21 212 lb 15.4 oz (96.6 kg)   05/23/16 223 lb (101.2 kg)   05/16/16 223 lb (101.2 kg)       Physical Examination:   General appearance - alert, well appearing, and in no distress  Mental status - alert, oriented to person, place, and time  PERRLA wnl  Chest - clear to auscultation, no wheezes, rales or rhonchi, symmetric air entry  Heart - normal rate, regular rhythm, normal S1, S2, no murmurs, rubs, clicks or gallops  Abdomen - soft, nontender, nondistended, no masses or organomegaly  Neurological - alert, oriented, normal speech, no focal findings or movement disorder noted}  Extremities - peripheral pulses normal, no pedal edema, no clubbing or cyanosis just venous stasis discoloration changes  Skin - normal coloration and turgor, no rashes, no suspicious skin lesions noted      Component Value Units   POC Glucose Fingerstick [[id number]] (Abnormal)    Collected: 02/05/21 0729    Updated: 02/05/21 0743     POC Glucose 154High  mg/dL   NM MYOCARDIAL SPECT REST EXERCISE OR RX [[id number]]    Resulted: 02/05/21 0741    Updated: 02/05/21 9442    Basic Metabolic Prof [[id number]] (Abnormal)    Collected: 02/05/21 0530    Updated: 02/05/21 1882    Specimen Source: Blood     Glucose 146High  mg/dL    BUN 25High  mg/dL    CREATININE 1. 63High  mg/dL    Bun/Cre Ratio NOT REPORTED    Calcium 9.2 mg/dL    Sodium 142 mmol/L    Potassium 3.6Low  mmol/L    Chloride 103 mmol/L    CO2 26 mmol/L    Anion Gap 13 mmol/L    GFR Non-African American 43Low  mL/min    GFR African American 52Low  mL/min    GFR Comment         Comment: Average GFR for 61-76 years old:    85 mL/min/1.73sq m   Chronic Kidney Disease:    <60 mL/min/1.73sq m   Kidney failure:    <15 mL/min/1.73sq m               eGFR calculated using average adult body mass.  Additional eGFR calculator available at:         [email or web address]              GFR Staging NOT REPORTED PROTIME-INR [[id number]] (Abnormal)    Collected: 02/05/21 0530    Updated: 02/05/21 0559    Specimen Source: Blood     Protime 16. 8High  sec    INR 1.4    Comment:        Non-therapeutic Range:      INR = 0.9-1.2   Therapeutic Range:    Moderate Anticoagulant Intensity:      INR = 2.0-3.0    High Anticoagulant Intensity:      INR = 2.5-3. 5             CBC with DIFF [[id number]] (Abnormal)    Collected: 02/05/21 0530    Updated: 02/05/21 0544    Specimen Source: Blood     WBC 6.2 k/uL    RBC 3. 32Low  m/uL    Hemoglobin 10.8Low  g/dL    Hematocrit 30.8Low  %    MCV 92.7 fL    MCH 32.6 pg    MCHC 35.1 g/dL    RDW 14.2 %    Platelets 205WUN  k/uL    MPV 8.6 fL    NRBC Automated NOT REPORTED per 100 WBC    Differential Type NOT REPORTED    Seg Neutrophils 70High  %    Lymphocytes 17Low  %    Monocytes 9High  %    Eosinophils % 3 %    Basophils 1 %    Immature Granulocytes NOT REPORTED %    Segs Absolute 4.30 k/uL    Absolute Lymph # 1.10 k/uL    Absolute Mono # 0.50 k/uL    Absolute Eos # 0.20 k/uL    Basophils Absolute 0.00 k/uL    Absolute Immature Granulocyte NOT REPORTED k/uL    WBC Morphology NOT REPORTED    RBC Morphology NOT REPORTED    Platelet Estimate NOT REPORTED   POC Glucose Fingerstick [[id number]] (Abnormal)    Collected: 02/04/21 2102    Updated: 02/04/21 2116     POC Glucose 267High  mg/dL   POC Glucose Fingerstick [[id number]] (Abnormal)    Collected: 02/04/21 1659    Updated: 02/04/21 1708     POC Glucose 156High  mg/dL   ECHO Complete 2D W Doppler W Color [[id number]]    Collected: 02/04/21 1026    Updated: 02/04/21 1227    Narrative:     [address] [facility]     Transthoracic Echocardiography Report (TTE)      Patient Name Tyra Winston    Date of Study               02/04/2021                 JONO CUNNINGHAM        Date of      1955  Gender                      Male    Birth        Age          65 year(s)  Race                                Room Number  5667        Height:             ventricular systolic   pressure is 56 mmHg. Signature   ----------------------------------------------------------------------------    Electronically signed by Tsering Zarate(Sonographer) on 02/04/2021 12:23    PM   ----------------------------------------------------------------------------     ----------------------------------------------------------------------------    Electronically signed by Mono Isabel(Interpreting physician) on 02/04/2021    12:27 PM   ----------------------------------------------------------------------------   FINDINGS   Left Atrium   Left atrium is moderately dilated. Left Ventricle   Left ventricle is normal in size. Mild left ventricular hypertrophy. Lower limits of normal left ventricular systolic function. Estimated LV EF 50-55 %. Apical hypokinesis noted. Right Atrium   Right atrium is mildly dilated . Right Ventricle   Difficult visualization of the right ventricle. MPI and TAPSE values suggest   normal RV systolic function. Mitral Valve   No obvious valvular abnormality seen. Mild mitral regurgitation. Aortic Valve   Aortic valve sclerosis without stenosis. No evidence of aortic insufficiency. Tricuspid Valve   Tricuspid valve was not well visualized. Mild tricuspid regurgitation. Moderate pulmonary hypertension. Estimated right ventricular systolic pressure is 56 mmHg. Pulmonic Valve   Pulmonic valve was not well visualized. No evidence of pulmonic insufficiency or stenosis. Pericardial Effusion   No significant pericardial effusion is seen. Pleural Effusion   No pleural effusion seen. Miscellaneous   Aortic root dimension is at upper limit of normal.   E/E' average = 10.3.      M-mode / 2D Measurements & Calculations:        LVIDd:5.64 cm(3.7 - 5.6 cm)      Diastolic MHJTPM:742 ml    ETKPX:7.36 cm(2.2 - 4.0 cm)      Systolic ZXGKZD:25.5 ml    IVSd:1.1 cm(0.6 - 1.1 cm)        Aortic Root:3.8 cm(2.0 - 3.7 cm)    LVPWd:1.15 cm(0.6 - 1.1 cm)      LA Dimension: 4.7 cm(1.9 - 4.0 cm)    Fractional Shortenin.62 %    LA volume/Index: 95.8 ml /44m^2    Calculated LVEF (%): 45.98 %     LVOT:2.1 cm        Mitral:                                Aortic        Peak E-Wave: 1.00 m/s                  Peak Velocity: 0.79 m/s    Peak A-Wave: 0.36 m/s                  Mean Velocity: 0.53 m/s    E/A Ratio: 2.74                        Peak Gradient: 2.49 mmHg    Peak Gradient: 3.99 mmHg               Mean Gradient: 1 mmHg    Deceleration Time: 243 msec                                               Area (continuity): 2.42 cm^2                                           AV VTI: 16.3 cm        Tricuspid:                             Pulmonic:        Estimated RVSP: 56 mmHg    Peak TR Velocity: 3.49 m/s    Peak TR Gradient: 48.7204 mmHg    Estimated RA Pressure: 8 mmHg                                           Estimated PASP: 56.72 mmHg       Septal Wall E' velocity:0.10 m/s   Lateral Wall E' velocity:0.09 m/s   EKG REPORT [[id number]]    Resulted: 21    Updated: 21    EKG 12 Lead [[id number]]    Collected: 21    Updated: 21     Ventricular Rate 81 BPM    Atrial Rate 89 BPM    QRS Duration 96 ms    Q-T Interval 388 ms    QTc Calculation (Bazett) 450 ms    R Axis 81 degrees    T Axis 60 degrees   Narrative:     Atrial fibrillation   Low voltage QRS   Cannot rule out Inferior infarct , age undetermined   Abnormal ECG   When compared with ECG of 23-AUG-2012 19:47,   Atrial fibrillation has replaced Sinus rhythm   Vent. rate has decreased BY  40 BPM   T wave inversion no longer evident in Inferior leads   PROTIME-INR [[id number]] (Abnormal)    Collected: 21 06    Updated: 21 0855    Specimen Source: Blood     Protime 17. 7High  sec    INR 1.5    Comment:        Non-therapeutic Range:      INR = 0.9-1.2   Therapeutic Range:    Moderate Anticoagulant Intensity:      INR = 2.0-3.0    High Anticoagulant Intensity:      INR = 2.5-3. 5             POC Glucose Fingerstick [[id number]] (Abnormal)    Collected: 02/04/21 0837    Updated: 02/04/21 0850     POC Glucose 158High  mg/dL   Troponin [[id number]] (Abnormal)    Collected: 02/04/21 0816    Updated: 02/04/21 0846    Specimen Source: Blood     Troponin, High Sensitivity 28High  ng/L    Comment:        High Sensitivity Troponin values cannot be compared with other Troponin methodologies.         Patients with high levels of Biotin oral intake (i.e >5mg/day) may have falsely decreased   Troponin levels. Samples collected within 8 hours of biotin intake may require additional   information for diagnosis. Troponin T NOT REPORTED ng/mL    Troponin Interp NOT REPORTED     INR 1.3    Assessment:  Active Problems:    Heart failure ([facility])  Resolved Problems:    * No resolved hospital problems. *     Diabetic foot infection ([facility]) E11.628, L08.9    Diabetes ([facility] E11.9    Chronic osteomyelitis ([facility]) M86.60    Diabetic foot ulcer ([facility] E11.621, L97.509    PVD (peripheral vascular disease) ([facility] I73.9    Atherosclerosis of coronary artery without angina pectoris I25.10    Cardiomyopathy ([facility] I42.9    Cardiac left ventricular ejection fraction 21-40 percent R94.30    Diabetes mellitus type 2 with peripheral artery disease ([facility]) E11.51    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 ([facility]) L97.514    Chronic osteomyelitis of left foot ([facility]) M69.666    Onychomycosis B35.1    Heart failure ([facility]) I50.9      · Dizziness  ·    · Hypertensive urgency  ·    · Heart murmur history of tricuspid regurg  ·    · History of coronary disease status post CABG  ·    · Defibrillator  ·    · History of chronic venous stasis discoloration of his legs  ·    · History of peripheral vascular disease  ·    · History of CHF  ·    · Renal insufficiency  ·    · History of diabetes mellitus  · Given a dose of Lovenox since his INR is low  ·           Plan:  1.  We will proceed with Lexiscan stress test this morning based on the results we will decide on discharge this afternoon home and continue with blood pressure medicine controlling his blood pressure and follow-up patient discussed with patient he agrees with the plan  2.  We will also increase his isosorbide to 60 mg daily secondary to continuation of increase in blood pressure    JOSE Mack             2/5/2021, 7:50 AM

## 2021-02-05 NOTE — PROGRESS NOTES
RN called the [facility] pharmacy in [facility] and left voicemail for new meds prescribed at discharged. Prescription called in for hydralazine, Imdur, nitro SL, and potassium.

## 2021-02-05 NOTE — DISCHARGE INSTR - DIET

## 2021-02-05 NOTE — PROGRESS NOTES
[facility] CARDIOLOGY Progress Note    2/5/2021 9:04 AM      Subjective:  Mr. Beto Bolden had a good night sleep and denies any chest pain or shortness of breath or palpitations or lightheadedness or dizziness. Review of systems:  No fever or chills. No cough. No diarrhea. No headaches. I saw the patient in the stress lab area. Await nuclear images.              LABS:     Recent Results (from the past 24 hour(s))   POC Glucose Fingerstick    Collection Time: 02/04/21  4:59 PM   Result Value Ref Range    POC Glucose 156 (H) 75 - 110 mg/dL   POC Glucose Fingerstick    Collection Time: 02/04/21  9:02 PM   Result Value Ref Range    POC Glucose 267 (H) 75 - 110 mg/dL   PROTIME-INR    Collection Time: 02/05/21  5:30 AM   Result Value Ref Range    Protime 16.8 (H) 11.8 - 14.6 sec    INR 1.4    CBC with DIFF    Collection Time: 02/05/21  5:30 AM   Result Value Ref Range    WBC 6.2 3.5 - 11.0 k/uL    RBC 3.32 (L) 4.5 - 5.9 m/uL    Hemoglobin 10.8 (L) 13.5 - 17.5 g/dL    Hematocrit 30.8 (L) 41 - 53 %    MCV 92.7 80 - 100 fL    MCH 32.6 26 - 34 pg    MCHC 35.1 31 - 37 g/dL    RDW 14.2 11.5 - 14.9 %    Platelets 914 (L) 752 - 450 k/uL    MPV 8.6 6.0 - 12.0 fL    NRBC Automated NOT REPORTED per 100 WBC    Differential Type NOT REPORTED     Seg Neutrophils 70 (H) 36 - 66 %    Lymphocytes 17 (L) 24 - 44 %    Monocytes 9 (H) 1 - 7 %    Eosinophils % 3 0 - 4 %    Basophils 1 0 - 2 %    Immature Granulocytes NOT REPORTED 0 %    Segs Absolute 4.30 1.3 - 9.1 k/uL    Absolute Lymph # 1.10 1.0 - 4.8 k/uL    Absolute Mono # 0.50 0.1 - 1.3 k/uL    Absolute Eos # 0.20 0.0 - 0.4 k/uL    Basophils Absolute 0.00 0.0 - 0.2 k/uL    Absolute Immature Granulocyte NOT REPORTED 0.00 - 0.30 k/uL    WBC Morphology NOT REPORTED     RBC Morphology NOT REPORTED     Platelet Estimate NOT REPORTED    Basic Metabolic Prof    Collection Time: 02/05/21  5:30 AM   Result Value Ref Range    Glucose 146 (H) 70 - 99 mg/dL    BUN 25 (H) 8 - 23 mg/dL    CREATININE 1.63 (H) 0.70 - 1.20 mg/dL    Bun/Cre Ratio NOT REPORTED 9 - 20    Calcium 9.2 8.6 - 10.4 mg/dL    Sodium 142 135 - 144 mmol/L    Potassium 3.6 (L) 3.7 - 5.3 mmol/L    Chloride 103 98 - 107 mmol/L    CO2 26 20 - 31 mmol/L    Anion Gap 13 9 - 17 mmol/L    GFR Non-African American 43 (L) >60 mL/min    GFR  52 (L) >60 mL/min    GFR Comment          GFR Staging NOT REPORTED    POC Glucose Fingerstick    Collection Time: 21  7:29 AM   Result Value Ref Range    POC Glucose 154 (H) 75 - 110 mg/dL       Pulse Ox: SpO2  Av.3 %  Min: 96 %  Max: 98 %    Supplemental O2:       Current Meds:    isosorbide mononitrate  60 mg Oral Daily    enoxaparin  1 mg/kg Subcutaneous Once    warfarin  6 mg Oral Once    sodium chloride flush  10 mL Intravenous 2 times per day    labetalol  20 mg Intravenous Once    aspirin  81 mg Oral Daily    furosemide  80 mg Oral Daily    warfarin (COUMADIN) daily dosing (placeholder)   Other RX Placeholder    hydrALAZINE  50 mg Oral 3 times per day    carvedilol  12.5 mg Oral BID WC    insulin lispro  0-12 Units Subcutaneous TID WC    insulin lispro  0-6 Units Subcutaneous Nightly         Continuous Infusions:    sodium chloride      sodium chloride 50 mL/hr at 21 1152    dextrose                VITAL SIGNS:    BP (!) 177/87   Pulse 72   Temp 97.5 °F (36.4 °C)   Resp 16   Ht 5' 10\" (1.778 m)   Wt 212 lb 15.4 oz (96.6 kg)   SpO2 96%   BMI 30.56 kg/m²         Admit Weight:  220 lb (99.8 kg)    Last 3 weights: Wt Readings from Last 3 Encounters:   21 212 lb 15.4 oz (96.6 kg)   16 223 lb (101.2 kg)   16 223 lb (101.2 kg)       BMI: Body mass index is 30.56 kg/m². INPUT/OUTPUT:          Intake/Output Summary (Last 24 hours) at 2021 0904  Last data filed at 2021 6427  Gross per 24 hour   Intake 999.17 ml   Output 1700 ml   Net -700.83 ml         Telemetry shows sinus rhythm.       EXAM:     General appearance: awake, alert. Pleasant. Neck: No JVD or thyromegaly  Chest: clear bilaterally. No tenderness. No rhonchi or wheezing. Cardiac: Regular rate and rhythm. No significant murmur or gallop or rubs. Abdomen: soft, non-tender. Extremities: no cyanosis, no clubbing, no calf tenderness, no leg edema. Pulses: intact bilateral radial pulses. Skin:  warm and dry. Neuro:  Able to move all 4 extremities. No new gross focal deficits. 2 D ECHO 2/4/2021:    Summary  Contrast was utilized on this technically difficult study. Left ventricle is normal in size. Mild left ventricular hypertrophy. Lower limits of normal left ventricular systolic function. Estimated LV EF  50-55 %. Apical hypokinesis noted. Left atrium is moderately dilated. Right atrium is mildly dilated . Difficult visualization of the right ventricle. MPI and TAPSE values suggest  normal RV systolic function. Mild mitral regurgitation. Mild tricuspid regurgitation. Moderate pulmonary hypertension. Estimated right ventricular systolic  pressure is 56 mmHg.     Nuclear stress test 2/5/2021:    Pending. ASSESSMENT:    Intermittent lightheadedness and dizziness - positional.  Improved.     Acute on chronic systolic heart failure.       ASCAD, prior CABG x 3 with the LIMA to LAD, SVG to diagonal, SVG to RCA in March 2010. No active angina pectoris.       Nonspecific high sensitivity troponin  Elevation which peaked at only 26 range. Does not represent acute myocardial infarction.     Ischemic cardiomyopathy with severe left ventricular systolic dysfunction with LVEF 25-30% on 2D echocardiogram 2017. Improved LVEF 50 to 55% with apical hypokinesis on 2D echocardiogram 2/4/2021.     [employer] AICD -no AICD shocks.       Permanent atrial fibrillation - on chronic warfarin therapy.       History of moderate to severe mitral regurgitation, severe tricuspid regurgitation on 2D echocardiogram in 2017.     Only mild MR, mild TR, moderate pulmonary hypertension with RVSP 56 mmHg on repeat 2D echocardiogram 2/4/2021.     Essential hypertension-poorly controlled.       Acute on chronic kidney disease Stage III with creatinine 1.6 to 1.7.     Hypokalemia -being replaced. Potassium level 3.6 today.      Diabetes mellitus.     History of alcohol use.     Obesity with BMI 31 range.       Retired . .       Other problems as charted. REC/PLAN:    Improved clinically. Reviewed official 2D echocardiogram results which showed improved LVEF to 50 to 55%. Suggest discontinuation of IV fluids. Discussed with Dr. Anthony Mario personally over the phone on 2/4/2021. Continue on all current cardiac medications including aspirin 81 mg daily, Coreg 12.5 mg twice daily, Apresoline 50 mg 3 times daily, Imdur 30 mg daily, Lasix 80 mg daily, add KCl 20 mEq daily, warfarin with target INR of 2-3 range. Patient was taken off of losartan 30 mg daily which was his home medication due to CKD stage III. Alternatively patient is on nitrates/hydralazine combination for afterload reduction. If no large areas of stress-induced myocardial ischemia on nuclear images, okay to discharge patient to home later today along with continued medical therapy and aggressive cardiac risk factor modification and follow-up closely in our office from cardiac standpoint. Also advised patient to stop drinking alcohol. Discussed with the charge nurse. Thank you. Electronically signed by Shaw Arriola MD, [facility]        PLEASE NOTE:  This progress note was completed using a voice transcription system. Every effort was made to ensure accuracy. However, inadvertent computerized transcription errors may be present. ADDENDUM 3:44 PM:    Nuclear stress test 2/5/2021:    EKG IMPRESSION: ELECTROCARDIOGRAPHICALLY UNINTERPRETABLE LEXISCAN STRESS  TEST. RADIOISOTOPE RESULTS TO FOLLOW FROM THE DEPARTMENT OF NUCLEAR  MEDICINE. Impression:     1. Small fixed perfusion defect within the left ventricular apex suggestive   of a small infarction. 2. Septal hypokinesis/akinesis.  Calculated ejection fraction of 38%. 3. Risk stratification: Intermediate      OK to discharge today. Follow up with me in the office in 3-4 weeks at [phone]. F/U with PCP next week. D/W Dr Shanel Perez. Signing off. Thanks.     Arnold Bernard MD, [facility] [place]

## 2021-02-05 NOTE — PROCEDURES
[address] [facility]                    [address] [facility]                              CARDIAC STRESS TEST    PATIENT NAME: Hi Tian                 :        1955  MED REC NO:   [id number]                              ROOM:       [id number]  ACCOUNT NO:   [de-identified]                           ADMIT DATE: 2021  PROVIDER:     Tommy Belcher    DATE OF STUDY:  2021    TEST TYPE: LEXISCAN CARDIOLYTE STRESS TEST  INDICATION: DIZZINESS WITH ACTIVITY  REFERRING PHYSICIAN: ELAINE GUIDRY    RESTING HEART RATE: 72 BEATS PER MINUTE  RESTING BLOOD PRESSURE: 175/95    MEDICATION(S) GIVEN: 0.4MG IV LEXISCAN  REASON FOR TERMINATION: MEDICATION INFUSION COMPLETE    RESTING EKG: ABNORMAL, ATRIAL FIBRILLATION  STRESS HEART RESPONSE: NORMAL RESPONSE  BLOOD PRESSURE RESPONSE: APPROPRIATE  STRESS EKGs: NO CHANGES SEEN  CHEST DISCOMFORT: NO PAIN DURING STRESS  ISCHEMIC EKG CHANGES: UNINTERPRETABLE    EKG IMPRESSION: ELECTROCARDIOGRAPHICALLY UNINTERPRETABLE LEXISCAN STRESS  TEST. RADIOISOTOPE RESULTS TO FOLLOW FROM THE DEPARTMENT OF NUCLEAR  MEDICINE.     Bennie Aguirre    D: 2021 12:31:45       T: 2021 12:32:37     KOBE/MEGA  Job#: [id number]     Doc#: Unknown    CC:    (Retain this field even if not dictated or not decipherable)

## 2021-02-05 NOTE — PLAN OF CARE
Problem: Falls - Risk of:  Goal: Will remain free from falls  Description: Will remain free from falls  2/5/2021 0330 by Hood Coelho RN  Outcome: Ongoing  2/4/2021 1757 by Manny Coles RN  Outcome: Met This Shift  Goal: Absence of physical injury  Description: Absence of physical injury  2/5/2021 0330 by Hood Coelho RN  Outcome: Ongoing  2/4/2021 1757 by Manny Coles RN  Outcome: Met This Shift  Note: Up independenytly w/o c/o lightheadedness; BP now under clontrol     Problem: Infection:  Goal: Will remain free from infection  Description: Will remain free from infection  2/5/2021 0330 by Hood Coelho RN  Outcome: Ongoing  2/4/2021 1757 by Manny Coles RN  Outcome: Met This Shift     Problem: Safety:  Goal: Free from accidental physical injury  Description: Free from accidental physical injury  2/5/2021 0330 by Hood Coelho RN  Outcome: Ongoing  2/4/2021 1757 by Manny Coles RN  Outcome: Met This Shift  Goal: Free from intentional harm  Description: Free from intentional harm  2/5/2021 0330 by Hood Coelho RN  Outcome: Ongoing  2/4/2021 1757 by Manny Coles RN  Outcome: Met This Shift     Problem: Daily Care:  Goal: Daily care needs are met  Description: Daily care needs are met  2/5/2021 0330 by Hood Coelho RN  Outcome: Ongoing  2/4/2021 1757 by Manny Coles RN  Outcome: Met This Shift     Problem: Pain:  Goal: Patient's pain/discomfort is manageable  Description: Patient's pain/discomfort is manageable  2/5/2021 0330 by Hood Coelho RN  Outcome: Ongoing  2/4/2021 1757 by Manny Coles RN  Outcome: Met This Shift     Problem: Skin Integrity:  Goal: Skin integrity will stabilize  Description: Skin integrity will stabilize  2/5/2021 0330 by Hood Coelho RN  Outcome: Ongoing  2/4/2021 1757 by Manny Coles RN  Outcome: Met This Shift     Problem: Discharge Planning:  Goal: Patients continuum of care needs are met  Description: Patients continuum of care needs are met  2/5/2021 0330 by Asuncion Pete RN  Outcome: Ongoing  2/4/2021 1757 by Tejas Plasencia RN  Outcome: Not Met This Shift     Problem: Fluid Volume:  Goal: Hemodynamic stability will improve  Description: Hemodynamic stability will improve  2/5/2021 0330 by Asuncion Pete RN  Outcome: Ongoing  2/4/2021 1757 by Tejas Plasencia RN  Outcome: Ongoing  Goal: Ability to maintain a balanced intake and output will improve  Description: Ability to maintain a balanced intake and output will improve  2/5/2021 0330 by Asuncion Pete RN  Outcome: Ongoing  2/4/2021 1757 by Tejas Plasencia RN  Outcome: Ongoing  Note: Voiding much post diuretic; also on continuous IVF     Problem: Health Behavior:  Goal: Identification of resources available to assist in meeting health care needs will improve  Description: Identification of resources available to assist in meeting health care needs will improve  2/5/2021 0330 by Asuncion Pete RN  Outcome: Ongoing  2/4/2021 1757 by Tejas Plasencia RN  Outcome: Met This Shift     Problem: Respiratory:  Goal: Respiratory status will improve  Description: Respiratory status will improve  2/5/2021 0330 by Asuncion Pete RN  Outcome: Ongoing  2/4/2021 1757 by Tejas Plasencia RN  Outcome: Met This Shift

## 2021-02-05 NOTE — PROGRESS NOTES
Pharmacy Note  Warfarin Consult follow-up      Recent Labs     02/03/21 2120 02/04/21  0600 02/05/21  0530   INR 1.4 1.5 1.4     Recent Labs     02/03/21 2120 02/04/21  0558 02/05/21  0530   HGB 11.6* 11.3* 10.8*   HCT 33.4* 32.6* 30.8*    162 145*       Significant Drug-Drug Interactions:  New warfarin drug-drug interactions: none  Discontinued drug-drug interactions: none  Current warfarin drug-drug interactions: aspirin      Date             INR        Dose given previous day  Dose scheduled for today  2/5/2021            1.4       6 mg           6 mg        Notes:                   INR remains subtherapeutic. Will give a higher dose of 6 mg tonight. (Pt takes 4 mg daily at home)  Daily PT/INR while inpatient.    Lady Fall, Pharm D, [address]  2/5/2021 8:18 AM

## 2021-02-05 NOTE — PLAN OF CARE
Problem: Falls - Risk of:  Goal: Will remain free from falls  Description: Will remain free from falls  2/5/2021 1724 by Wood Gibbs RN  Outcome: Completed  2/5/2021 0330 by Teresa Rose RN  Outcome: Ongoing  Goal: Absence of physical injury  Description: Absence of physical injury  2/5/2021 1724 by Wood Gibbs RN  Outcome: Completed  2/5/2021 0330 by Teresa Rose RN  Outcome: Ongoing     Problem: Infection:  Goal: Will remain free from infection  Description: Will remain free from infection  2/5/2021 1724 by Wood Gibbs RN  Outcome: Completed  2/5/2021 0330 by Teresa Rose RN  Outcome: Ongoing     Problem: Safety:  Goal: Free from accidental physical injury  Description: Free from accidental physical injury  2/5/2021 1724 by Wood Gibbs RN  Outcome: Completed  2/5/2021 0330 by Teresa Rose RN  Outcome: Ongoing  Goal: Free from intentional harm  Description: Free from intentional harm  2/5/2021 1724 by Wood Gibbs RN  Outcome: Completed  2/5/2021 0330 by Teresa Rose RN  Outcome: Ongoing     Problem: Daily Care:  Goal: Daily care needs are met  Description: Daily care needs are met  2/5/2021 1724 by Wood Gibbs RN  Outcome: Completed  2/5/2021 0330 by Teresa Rose RN  Outcome: Ongoing     Problem: Pain:  Goal: Patient's pain/discomfort is manageable  Description: Patient's pain/discomfort is manageable  2/5/2021 1724 by Wood Gibbs RN  Outcome: Completed  2/5/2021 0330 by Teresa Rose RN  Outcome: Ongoing     Problem: Skin Integrity:  Goal: Skin integrity will stabilize  Description: Skin integrity will stabilize  2/5/2021 1724 by Wood Gibbs RN  Outcome: Completed  2/5/2021 0330 by Teresa Rose RN  Outcome: Ongoing     Problem: Discharge Planning:  Goal: Patients continuum of care needs are met  Description: Patients continuum of care needs are met  2/5/2021 1724 by Wood Gibbs RN  Outcome: Completed  2/5/2021 0330 by Aggie Alarcon RN  Outcome: Ongoing     Problem: Fluid Volume:  Goal: Hemodynamic stability will improve  Description: Hemodynamic stability will improve  2/5/2021 1724 by Adina Ford RN  Outcome: Completed  2/5/2021 0330 by Aggie Alarcon RN  Outcome: Ongoing  Goal: Ability to maintain a balanced intake and output will improve  Description: Ability to maintain a balanced intake and output will improve  2/5/2021 1724 by Adina Ford RN  Outcome: Completed  2/5/2021 0330 by Aggie Alarcon RN  Outcome: Ongoing     Problem: Health Behavior:  Goal: Identification of resources available to assist in meeting health care needs will improve  Description: Identification of resources available to assist in meeting health care needs will improve  2/5/2021 1724 by Adina Ford RN  Outcome: Completed  2/5/2021 0330 by Aggie Alarcon RN  Outcome: Ongoing     Problem: Respiratory:  Goal: Respiratory status will improve  Description: Respiratory status will improve  2/5/2021 1724 by Adina Ford RN  Outcome: Completed  2/5/2021 0330 by Aggie Alarcon RN  Outcome: Ongoing

## 2021-02-06 NOTE — DISCHARGE SUMMARY
Physician Discharge Summary     Patient ID:  Jer Pichardo  [id number]  52 y.o.  1955    Admit date: 2/3/2021    Discharge date and time: 2/5/2021  7:00 PM     Admitting Physician: Steffany Cummings DO     Discharge Physician: Steffany Cummings DO      Admission Diagnoses:   Heart failure [facility]) [I50.9]    Discharge Diagnoses: Active Problems:    Heart failure ([facility]  Resolved Problems:    * No resolved hospital problems. *     Diabetic foot infection (HCC) E11.628, L08.9    Diabetes ([facility] E11.9    Chronic osteomyelitis ([facility]) M86.60    Diabetic foot ulcer ([facility] E11.621, L97.509    PVD (peripheral vascular disease) ([facility] I73.9    Atherosclerosis of coronary artery without angina pectoris I25.10    Cardiomyopathy ([facility] I42.9    Cardiac left ventricular ejection fraction 21-40 percent R94.30    Diabetes mellitus type 2 with peripheral artery disease ([facility]) E11.51    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 ([facility]) L97.514    Chronic osteomyelitis of left foot ([facility]) X15.119    Onychomycosis B35.1    Heart failure ([facility]) I50.9      · Dizziness  ·    · Hypertensive urgency  ·    · Heart murmur history of tricuspid regurg  ·    · History of coronary disease status post CABG  ·    · Defibrillator  ·    · History of chronic venous stasis discoloration of his legs  ·    · History of peripheral vascular disease  ·    · History of CHF  ·    · Renal insufficiency  ·    · History of diabetes mellitus  ·   ·        Hospital Course: Patient was admitted with dizziness feeling lethargic he was found to have a blood pressure in the 200s. He has not was having some shortness of breath with some activities he does have coronary artery disease with A. fib history as well as cardiomyopathy has defibrillator. That was working okay interrogated in the emergency room. Patient troponins were okay seen by his cardiologist his blood pressure medication adjusted.   He had a stress test and was negative for acute ischemia his echo showed an EF in the 50- 85% but his stress test showed around the 40 percentile. But patient was okay to be discharged home to be followed outpatient and was [facility] by his cardiologist.  Transthoracic Echocardiography Report (TTE)      Patient Name Tyra Winston    Date of Study               02/04/2021                 JONO CUNNINGHAM        Date of      1955  Gender                      Male    Birth        Age          65 year(s)  Race                                Room Number  9050        Height:                     70 inch, 177.8 cm        Corporate ID [id number]    Weight:                     220 pounds, 99.8 kg    #        Patient Acct [de-identified]   BSA:          2.17 m^2      BMI:      31.57    #                                                              kg/m^2        MR #         [id number]      Sonographer                 Tsering Zarate        Accession #  [id number]  Interpreting Physician      Mono Isabel        Fellow                   Referring Nurse                             Practitioner        Interpreting             Referring Physician         Vinod Sims    Fellow       Additional Comments   Technically difficult study. Type of Study        TTE procedure:2D Echocardiogram, M-Mode, Doppler, Color Doppler, Contrast    study.       Procedure Date   Date: 02/04/2021 Start: 10:26 AM     Study Location: [facility]   Technical Quality: Limited visualization due to body habitus. Indications:Coronary artery disease, Cardiomyopathy, ischemic, Congestive   heart failure, Hypertension, Dizziness and Atrial fibrillation. History / Tech. Comments:   Pacemaker, HLD, CAD, HTN, CABG x3, CHF, DM     Patient Status: Inpatient     Contrast Medium: Definity. Amount - 2 ml     Height: 70 inches Weight: 220 pounds BSA: 2.17 m^2 BMI: 31.57 kg/m^2     Rhythm: Within normal limits HR: 69 bpm BP: 149/83 mmHg     Allergies     - Penicillin.      CONCLUSIONS     Summary   Contrast was evidence of pulmonic insufficiency or stenosis. Pericardial Effusion   No significant pericardial effusion is seen. Pleural Effusion   No pleural effusion seen. Miscellaneous   Aortic root dimension is at upper limit of normal.   E/E' average = 10.3.      M-mode / 2D Measurements & Calculations:        LVIDd:5.64 cm(3.7 - 5.6 cm)      Diastolic TIPGVZ:395 ml    OWWCC:7.53 cm(2.2 - 4.0 cm)      Systolic IFGKNE:56.0 ml    IVSd:1.1 cm(0.6 - 1.1 cm)        Aortic Root:3.8 cm(2.0 - 3.7 cm)    LVPWd:1.15 cm(0.6 - 1.1 cm)      LA Dimension: 4.7 cm(1.9 - 4.0 cm)    Fractional Shortenin.62 %    LA volume/Index: 95.8 ml /44m^2    Calculated LVEF (%): 45.98 %     LVOT:2.1 cm        Mitral:                                Aortic        Peak E-Wave: 1.00 m/s                  Peak Velocity: 0.79 m/s    Peak A-Wave: 0.36 m/s                  Mean Velocity: 0.53 m/s    E/A Ratio: 2.74                        Peak Gradient: 2.49 mmHg    Peak Gradient: 3.99 mmHg               Mean Gradient: 1 mmHg    Deceleration Time: 243 msec                                               Area (continuity): 2.42 cm^2                                           AV VTI: 16.3 cm        Tricuspid:                             Pulmonic:        Estimated RVSP: 56 mmHg    Peak TR Velocity: 3.49 m/s    Peak TR Gradient: 48.7204 mmHg    Estimated RA Pressure: 8 mmHg                                           Estimated PASP: 56.72 mmHg       Septal Wall E' velocity:0.10 m/s   Lateral Wall E' velocity:0.09 m/s   EKG REPORT [[id number]]     Resulted: 21     Updated: 21     EKG 12 Lead [[id number]]     Collected: 21     Updated: 21       Ventricular Rate 81 BPM     Atrial Rate 89 BPM     QRS Duration 96 ms     Q-T Interval 388 ms     QTc Calculation (Bazett) 450 ms     R Axis 81 degrees     T Axis 60 degrees   Narrative:     Atrial fibrillation   Low voltage QRS   Cannot rule out Inferior infarct , age undetermined Abnormal ECG   When compared with ECG of 23-AUG-2012 19:47,   Atrial fibrillation has replaced Sinus rhythm   Vent. rate has decreased BY  40 BPM   T wave inversion no longer evident in Inferior leads   PROTIME-INR [[id number]] (Abnormal)     Collected: 02/04/21 0600     Updated: 02/04/21 0855     Specimen Source: Blood       Protime 17. 7High  sec     INR 1.5     Comment:        Non-therapeutic Range:      INR = 0.9-1.2   Therapeutic Range:    Moderate Anticoagulant Intensity:      INR = 2.0-3.0    High Anticoagulant Intensity:      INR = 2.5-3. 5             POC Glucose Fingerstick [[id number]] (Abnormal)     Collected: 02/04/21 0837     Updated: 02/04/21 0850       POC Glucose 158High  mg/dL   Troponin [[id number]] (Abnormal)     Collected: 02/04/21 0816     Updated: 02/04/21 0846     Specimen Source: Blood       Troponin, High Sensitivity 28High  ng/L     Comment:        High Sensitivity Troponin values cannot be compared with other Troponin methodologies.         Patients with high levels of Biotin oral intake (i.e >5mg/day) may have falsely decreased   Troponin levels. Samples collected within 8 hours of biotin intake may require additional   information for diagnosis.         Troponin T NOT REPORTED ng/mL     Troponin Interp NOT REPORTED            Patient Instructions: Continue with home meds and the new medications  Position at home  Discharge Medications: Continue with the medications hydralazine and Coreg and diuretics held his Coumadin and follow-up INR outpatient  [unfilled]    Activity: Ad mariah. Diet: Low-fat low-cholesterol diet low-salt diet  Follow-up with  in 1 to 2 weeks,  With dr Ector Lubin patient to call  for an appointment and if has any problems.       Electronically signed by JOSE Santos  on 2/6/2021 at 11:19 AM

## 2021-02-06 NOTE — CARE COORDINATION
Writer received a [facility] [employer] [facility] and audie wanted to clarify order for Imdur and Nitro. Amirah Sanford needed the Quantity  dose.   Gave quantity dose from discharge med list.    Electronically signed by Gerardo Newton RN on 2/6/2021 at 10:38 AM

## 2021-06-30 ENCOUNTER — HOSPITAL ENCOUNTER (OUTPATIENT)
Age: 66
Discharge: HOME | End: 2021-06-30
Payer: MEDICARE

## 2021-06-30 LAB
ABSOLUTE EOS #: 0.1 K/UL (ref 0–0.4)
ABSOLUTE IMMATURE GRANULOCYTE: ABNORMAL K/UL (ref 0–0.3)
ABSOLUTE LYMPH #: 1 K/UL (ref 1–4.8)
ABSOLUTE MONO #: 0.4 K/UL (ref 0.1–1.3)
ALBUMIN SERPL-MCNC: 3.9 G/DL (ref 3.5–5.2)
ALBUMIN/GLOBULIN RATIO: ABNORMAL (ref 1–2.5)
ALP BLD-CCNC: 93 U/L (ref 40–129)
ALT SERPL-CCNC: 18 U/L (ref 5–41)
ANION GAP SERPL CALCULATED.3IONS-SCNC: 10 MMOL/L (ref 9–17)
AST SERPL-CCNC: 19 U/L
BASOPHILS # BLD: 1 % (ref 0–2)
BASOPHILS ABSOLUTE: 0.1 K/UL (ref 0–0.2)
BILIRUB SERPL-MCNC: 0.56 MG/DL (ref 0.3–1.2)
BUN BLDV-MCNC: 20 MG/DL (ref 8–23)
BUN/CREAT BLD: ABNORMAL (ref 9–20)
CALCIUM SERPL-MCNC: 9 MG/DL (ref 8.6–10.4)
CHLORIDE BLD-SCNC: 107 MMOL/L (ref 98–107)
CHOLESTEROL/HDL RATIO: 3.3
CHOLESTEROL: 88 MG/DL
CO2: 25 MMOL/L (ref 20–31)
CREAT SERPL-MCNC: 1.74 MG/DL (ref 0.7–1.2)
CREATININE URINE: 138.9 MG/DL (ref 39–259)
DIFFERENTIAL TYPE: ABNORMAL
EOSINOPHILS RELATIVE PERCENT: 3 % (ref 0–4)
GFR AFRICAN AMERICAN: 48 ML/MIN
GFR NON-AFRICAN AMERICAN: 39 ML/MIN
GFR SERPL CREATININE-BSD FRML MDRD: ABNORMAL ML/MIN/{1.73_M2}
GFR SERPL CREATININE-BSD FRML MDRD: ABNORMAL ML/MIN/{1.73_M2}
GLUCOSE BLD-MCNC: 140 MG/DL (ref 70–99)
HCT VFR BLD CALC: 33.2 % (ref 41–53)
HDLC SERPL-MCNC: 27 MG/DL
HEMOGLOBIN: 11.4 G/DL (ref 13.5–17.5)
IMMATURE GRANULOCYTES: ABNORMAL %
LDL CHOLESTEROL: 33 MG/DL (ref 0–130)
LYMPHOCYTES # BLD: 20 % (ref 24–44)
MCH RBC QN AUTO: 31.7 PG (ref 26–34)
MCHC RBC AUTO-ENTMCNC: 34.2 G/DL (ref 31–37)
MCV RBC AUTO: 92.7 FL (ref 80–100)
MICROALBUMIN/CREAT 24H UR: 1894 MG/L
MICROALBUMIN/CREAT UR-RTO: 1364 MCG/MG CREAT
MONOCYTES # BLD: 8 % (ref 1–7)
NRBC AUTOMATED: ABNORMAL PER 100 WBC
PDW BLD-RTO: 14.5 % (ref 11.5–14.9)
PLATELET # BLD: 160 K/UL (ref 150–450)
PLATELET ESTIMATE: ABNORMAL
PMV BLD AUTO: 8.8 FL (ref 6–12)
POTASSIUM SERPL-SCNC: 4 MMOL/L (ref 3.7–5.3)
PROSTATE SPECIFIC ANTIGEN: 2.43 UG/L
RBC # BLD: 3.59 M/UL (ref 4.5–5.9)
RBC # BLD: ABNORMAL 10*6/UL
SEG NEUTROPHILS: 68 % (ref 36–66)
SEGMENTED NEUTROPHILS ABSOLUTE COUNT: 3.6 K/UL (ref 1.3–9.1)
SODIUM BLD-SCNC: 142 MMOL/L (ref 135–144)
THYROXINE, FREE: 1.31 NG/DL (ref 0.93–1.7)
TOTAL PROTEIN: 6.9 G/DL (ref 6.4–8.3)
TRIGL SERPL-MCNC: 141 MG/DL
TSH SERPL DL<=0.05 MIU/L-ACNC: 3.8 MIU/L (ref 0.3–5)
VITAMIN D 25-HYDROXY: 27.5 NG/ML (ref 30–100)
VLDLC SERPL CALC-MCNC: ABNORMAL MG/DL (ref 1–30)
WBC # BLD: 5.2 K/UL (ref 3.5–11)
WBC # BLD: ABNORMAL 10*3/UL

## 2021-06-30 PROCEDURE — G0103 PSA SCREENING: HCPCS

## 2021-06-30 PROCEDURE — 36415 COLL VENOUS BLD VENIPUNCTURE: CPT

## 2021-06-30 PROCEDURE — 85025 COMPLETE CBC W/AUTO DIFF WBC: CPT

## 2021-06-30 PROCEDURE — 80053 COMPREHEN METABOLIC PANEL: CPT

## 2021-06-30 PROCEDURE — 84443 ASSAY THYROID STIM HORMONE: CPT

## 2021-06-30 PROCEDURE — 84439 ASSAY OF FREE THYROXINE: CPT

## 2021-06-30 PROCEDURE — 82043 UR ALBUMIN QUANTITATIVE: CPT

## 2021-06-30 PROCEDURE — 82306 VITAMIN D 25 HYDROXY: CPT

## 2021-06-30 PROCEDURE — 80061 LIPID PANEL: CPT

## 2021-06-30 PROCEDURE — 82570 ASSAY OF URINE CREATININE: CPT

## 2022-03-07 ENCOUNTER — HOSPITAL ENCOUNTER (INPATIENT)
Age: 67
LOS: 8 days | Discharge: HOME | DRG: 256 | End: 2022-03-15
Attending: STUDENT IN AN ORGANIZED HEALTH CARE EDUCATION/TRAINING PROGRAM | Admitting: FAMILY MEDICINE
Payer: MEDICARE

## 2022-03-07 ENCOUNTER — APPOINTMENT (OUTPATIENT)
Dept: CT IMAGING | Age: 67
DRG: 256 | End: 2022-03-07
Payer: MEDICARE

## 2022-03-07 ENCOUNTER — APPOINTMENT (OUTPATIENT)
Dept: GENERAL RADIOLOGY | Age: 67
DRG: 256 | End: 2022-03-07
Payer: MEDICARE

## 2022-03-07 DIAGNOSIS — L08.9 DIABETIC FOOT INFECTION (HCC): Primary | ICD-10-CM

## 2022-03-07 DIAGNOSIS — I96 GANGRENE OF TOE OF LEFT FOOT (HCC): ICD-10-CM

## 2022-03-07 DIAGNOSIS — M86.672 CHRONIC OSTEOMYELITIS OF LEFT FOOT (HCC): ICD-10-CM

## 2022-03-07 DIAGNOSIS — L97.524 ULCER OF TOE OF LEFT FOOT, WITH NECROSIS OF BONE (HCC): ICD-10-CM

## 2022-03-07 DIAGNOSIS — N17.9 ACUTE KIDNEY INJURY (HCC): ICD-10-CM

## 2022-03-07 DIAGNOSIS — E11.628 DIABETIC FOOT INFECTION (HCC): Primary | ICD-10-CM

## 2022-03-07 LAB
ABSOLUTE EOS #: 0 K/UL (ref 0–0.4)
ABSOLUTE LYMPH #: 0.5 K/UL (ref 1–4.8)
ABSOLUTE MONO #: 0.4 K/UL (ref 0.1–1.3)
ANION GAP SERPL CALCULATED.3IONS-SCNC: 16 MMOL/L (ref 9–17)
BASOPHILS # BLD: 1 % (ref 0–2)
BASOPHILS ABSOLUTE: 0.1 K/UL (ref 0–0.2)
BUN BLDV-MCNC: 31 MG/DL (ref 8–23)
C-REACTIVE PROTEIN: 61.1 MG/L (ref 0–5)
CALCIUM SERPL-MCNC: 9.5 MG/DL (ref 8.6–10.4)
CHLORIDE BLD-SCNC: 90 MMOL/L (ref 98–107)
CO2: 25 MMOL/L (ref 20–31)
CREAT SERPL-MCNC: 2.1 MG/DL (ref 0.7–1.2)
EOSINOPHILS RELATIVE PERCENT: 0 % (ref 0–4)
GFR AFRICAN AMERICAN: 38 ML/MIN
GFR NON-AFRICAN AMERICAN: 32 ML/MIN
GFR SERPL CREATININE-BSD FRML MDRD: ABNORMAL ML/MIN/{1.73_M2}
GLUCOSE BLD-MCNC: 140 MG/DL (ref 70–99)
GLUCOSE BLD-MCNC: 164 MG/DL (ref 75–110)
HCT VFR BLD CALC: 32.6 % (ref 41–53)
HEMOGLOBIN: 11.4 G/DL (ref 13.5–17.5)
INR BLD: 1.4
LACTIC ACID, SEPSIS: 1.7 MMOL/L (ref 0.5–1.9)
LACTIC ACID, SEPSIS: 3.1 MMOL/L (ref 0.5–1.9)
LYMPHOCYTES # BLD: 6 % (ref 24–44)
MAGNESIUM: 1.6 MG/DL (ref 1.6–2.6)
MCH RBC QN AUTO: 31.8 PG (ref 26–34)
MCHC RBC AUTO-ENTMCNC: 34.9 G/DL (ref 31–37)
MCV RBC AUTO: 91.2 FL (ref 80–100)
MONOCYTES # BLD: 6 % (ref 1–7)
PARTIAL THROMBOPLASTIN TIME: 35.6 SEC (ref 24–36)
PDW BLD-RTO: 14.4 % (ref 11.5–14.9)
PLATELET # BLD: 207 K/UL (ref 150–450)
PMV BLD AUTO: 7.7 FL (ref 6–12)
POTASSIUM SERPL-SCNC: 3.4 MMOL/L (ref 3.7–5.3)
PROTHROMBIN TIME: 16.7 SEC (ref 11.8–14.6)
RBC # BLD: 3.58 M/UL (ref 4.5–5.9)
SEDIMENTATION RATE, ERYTHROCYTE: 21 MM (ref 0–20)
SEG NEUTROPHILS: 87 % (ref 36–66)
SEGMENTED NEUTROPHILS ABSOLUTE COUNT: 6.4 K/UL (ref 1.3–9.1)
SODIUM BLD-SCNC: 131 MMOL/L (ref 135–144)
WBC # BLD: 7.3 K/UL (ref 3.5–11)

## 2022-03-07 PROCEDURE — 6360000002 HC RX W HCPCS: Performed by: FAMILY MEDICINE

## 2022-03-07 PROCEDURE — 73630 X-RAY EXAM OF FOOT: CPT

## 2022-03-07 PROCEDURE — 73700 CT LOWER EXTREMITY W/O DYE: CPT

## 2022-03-07 PROCEDURE — 85652 RBC SED RATE AUTOMATED: CPT

## 2022-03-07 PROCEDURE — 99223 1ST HOSP IP/OBS HIGH 75: CPT | Performed by: PODIATRIST

## 2022-03-07 PROCEDURE — 96365 THER/PROPH/DIAG IV INF INIT: CPT

## 2022-03-07 PROCEDURE — 99285 EMERGENCY DEPT VISIT HI MDM: CPT

## 2022-03-07 PROCEDURE — 87040 BLOOD CULTURE FOR BACTERIA: CPT

## 2022-03-07 PROCEDURE — 83605 ASSAY OF LACTIC ACID: CPT

## 2022-03-07 PROCEDURE — 80048 BASIC METABOLIC PNL TOTAL CA: CPT

## 2022-03-07 PROCEDURE — 2500000003 HC RX 250 WO HCPCS: Performed by: STUDENT IN AN ORGANIZED HEALTH CARE EDUCATION/TRAINING PROGRAM

## 2022-03-07 PROCEDURE — 85610 PROTHROMBIN TIME: CPT

## 2022-03-07 PROCEDURE — 2580000003 HC RX 258: Performed by: STUDENT IN AN ORGANIZED HEALTH CARE EDUCATION/TRAINING PROGRAM

## 2022-03-07 PROCEDURE — 82947 ASSAY GLUCOSE BLOOD QUANT: CPT

## 2022-03-07 PROCEDURE — 6370000000 HC RX 637 (ALT 250 FOR IP): Performed by: FAMILY MEDICINE

## 2022-03-07 PROCEDURE — 6360000002 HC RX W HCPCS: Performed by: STUDENT IN AN ORGANIZED HEALTH CARE EDUCATION/TRAINING PROGRAM

## 2022-03-07 PROCEDURE — 86140 C-REACTIVE PROTEIN: CPT

## 2022-03-07 PROCEDURE — 2580000003 HC RX 258: Performed by: FAMILY MEDICINE

## 2022-03-07 PROCEDURE — 83735 ASSAY OF MAGNESIUM: CPT

## 2022-03-07 PROCEDURE — 85730 THROMBOPLASTIN TIME PARTIAL: CPT

## 2022-03-07 PROCEDURE — 36415 COLL VENOUS BLD VENIPUNCTURE: CPT

## 2022-03-07 PROCEDURE — 87186 SC STD MICRODIL/AGAR DIL: CPT

## 2022-03-07 PROCEDURE — 1200000000 HC SEMI PRIVATE

## 2022-03-07 PROCEDURE — 87205 SMEAR GRAM STAIN: CPT

## 2022-03-07 PROCEDURE — 87150 DNA/RNA AMPLIFIED PROBE: CPT

## 2022-03-07 PROCEDURE — 85025 COMPLETE CBC W/AUTO DIFF WBC: CPT

## 2022-03-07 PROCEDURE — 86403 PARTICLE AGGLUT ANTBDY SCRN: CPT

## 2022-03-07 RX ORDER — CARVEDILOL 6.25 MG/1
6.25 TABLET ORAL 2 TIMES DAILY WITH MEALS
Status: DISCONTINUED | OUTPATIENT
Start: 2022-03-07 | End: 2022-03-15 | Stop reason: HOSPADM

## 2022-03-07 RX ORDER — ONDANSETRON 2 MG/ML
4 INJECTION INTRAMUSCULAR; INTRAVENOUS EVERY 6 HOURS PRN
Status: DISCONTINUED | OUTPATIENT
Start: 2022-03-07 | End: 2022-03-15 | Stop reason: HOSPADM

## 2022-03-07 RX ORDER — DEXTROSE MONOHYDRATE 50 MG/ML
100 INJECTION, SOLUTION INTRAVENOUS PRN
Status: DISCONTINUED | OUTPATIENT
Start: 2022-03-07 | End: 2022-03-15 | Stop reason: HOSPADM

## 2022-03-07 RX ORDER — ONDANSETRON 4 MG/1
4 TABLET, ORALLY DISINTEGRATING ORAL EVERY 8 HOURS PRN
Status: DISCONTINUED | OUTPATIENT
Start: 2022-03-07 | End: 2022-03-15 | Stop reason: HOSPADM

## 2022-03-07 RX ORDER — LINEZOLID 2 MG/ML
600 INJECTION, SOLUTION INTRAVENOUS EVERY 12 HOURS
Status: DISCONTINUED | OUTPATIENT
Start: 2022-03-07 | End: 2022-03-08

## 2022-03-07 RX ORDER — SODIUM CHLORIDE 0.9 % (FLUSH) 0.9 %
5-40 SYRINGE (ML) INJECTION PRN
Status: DISCONTINUED | OUTPATIENT
Start: 2022-03-07 | End: 2022-03-15 | Stop reason: HOSPADM

## 2022-03-07 RX ORDER — ACETAMINOPHEN 325 MG/1
650 TABLET ORAL EVERY 4 HOURS PRN
Status: DISCONTINUED | OUTPATIENT
Start: 2022-03-07 | End: 2022-03-15 | Stop reason: HOSPADM

## 2022-03-07 RX ORDER — SODIUM CHLORIDE 0.9 % (FLUSH) 0.9 %
5-40 SYRINGE (ML) INJECTION EVERY 12 HOURS SCHEDULED
Status: DISCONTINUED | OUTPATIENT
Start: 2022-03-07 | End: 2022-03-15 | Stop reason: HOSPADM

## 2022-03-07 RX ORDER — DEXTROSE MONOHYDRATE 25 G/50ML
12.5 INJECTION, SOLUTION INTRAVENOUS PRN
Status: DISCONTINUED | OUTPATIENT
Start: 2022-03-07 | End: 2022-03-15 | Stop reason: HOSPADM

## 2022-03-07 RX ORDER — SODIUM CHLORIDE 9 MG/ML
25 INJECTION, SOLUTION INTRAVENOUS PRN
Status: DISCONTINUED | OUTPATIENT
Start: 2022-03-07 | End: 2022-03-15 | Stop reason: HOSPADM

## 2022-03-07 RX ORDER — 0.9 % SODIUM CHLORIDE 0.9 %
1000 INTRAVENOUS SOLUTION INTRAVENOUS ONCE
Status: COMPLETED | OUTPATIENT
Start: 2022-03-07 | End: 2022-03-07

## 2022-03-07 RX ORDER — ATORVASTATIN CALCIUM 40 MG/1
40 TABLET, FILM COATED ORAL DAILY
Status: DISCONTINUED | OUTPATIENT
Start: 2022-03-07 | End: 2022-03-08

## 2022-03-07 RX ORDER — BENAZEPRIL HYDROCHLORIDE 5 MG/1
5 TABLET, FILM COATED ORAL DAILY
Status: ON HOLD | COMMUNITY
End: 2022-03-07

## 2022-03-07 RX ORDER — NICOTINE POLACRILEX 4 MG
15 LOZENGE BUCCAL PRN
Status: DISCONTINUED | OUTPATIENT
Start: 2022-03-07 | End: 2022-03-15 | Stop reason: HOSPADM

## 2022-03-07 RX ORDER — GLIPIZIDE 5 MG/1
10 TABLET ORAL
Status: DISCONTINUED | OUTPATIENT
Start: 2022-03-08 | End: 2022-03-10

## 2022-03-07 RX ADMIN — METRONIDAZOLE 500 MG: 500 INJECTION, SOLUTION INTRAVENOUS at 14:31

## 2022-03-07 RX ADMIN — CARVEDILOL 6.25 MG: 6.25 TABLET, FILM COATED ORAL at 20:09

## 2022-03-07 RX ADMIN — SODIUM CHLORIDE 1000 ML: 9 INJECTION, SOLUTION INTRAVENOUS at 13:42

## 2022-03-07 RX ADMIN — ENOXAPARIN SODIUM 80 MG: 100 INJECTION SUBCUTANEOUS at 20:20

## 2022-03-07 RX ADMIN — LINEZOLID 600 MG: 600 INJECTION, SOLUTION INTRAVENOUS at 16:58

## 2022-03-07 RX ADMIN — ATORVASTATIN CALCIUM 40 MG: 40 TABLET, FILM COATED ORAL at 20:09

## 2022-03-07 RX ADMIN — ACETAMINOPHEN 650 MG: 325 TABLET, FILM COATED ORAL at 23:54

## 2022-03-07 RX ADMIN — SODIUM CHLORIDE, PRESERVATIVE FREE 10 ML: 5 INJECTION INTRAVENOUS at 20:12

## 2022-03-07 RX ADMIN — CEFEPIME HYDROCHLORIDE 2000 MG: 2 INJECTION, POWDER, FOR SOLUTION INTRAVENOUS at 13:44

## 2022-03-07 ASSESSMENT — ENCOUNTER SYMPTOMS
DIARRHEA: 0
EYE PAIN: 0
NAUSEA: 0
SORE THROAT: 0
CONSTIPATION: 0
EYE REDNESS: 0
BACK PAIN: 0
FACIAL SWELLING: 0
SHORTNESS OF BREATH: 0
ABDOMINAL PAIN: 0
CHEST TIGHTNESS: 0
RHINORRHEA: 0
COUGH: 0
COLOR CHANGE: 0
VOMITING: 0
COLOR CHANGE: 1

## 2022-03-07 ASSESSMENT — PAIN - FUNCTIONAL ASSESSMENT: PAIN_FUNCTIONAL_ASSESSMENT: 0-10

## 2022-03-07 ASSESSMENT — PAIN DESCRIPTION - DESCRIPTORS: DESCRIPTORS: SHOOTING

## 2022-03-07 ASSESSMENT — PAIN DESCRIPTION - PAIN TYPE: TYPE: ACUTE PAIN

## 2022-03-07 ASSESSMENT — PAIN SCALES - GENERAL
PAINLEVEL_OUTOF10: 1
PAINLEVEL_OUTOF10: 0

## 2022-03-07 ASSESSMENT — PAIN DESCRIPTION - LOCATION: LOCATION: FOOT

## 2022-03-07 ASSESSMENT — PAIN DESCRIPTION - ORIENTATION: ORIENTATION: LEFT

## 2022-03-07 ASSESSMENT — PAIN DESCRIPTION - FREQUENCY: FREQUENCY: INTERMITTENT

## 2022-03-07 NOTE — ED NOTES
Podiatry resident notified that patient not able to have mri on foot d/t patient having pacemaker.  Order changed to [address], RN  03/07/22 3719

## 2022-03-07 NOTE — ED TRIAGE NOTES
Mode of arrival (squad #, walk in, police, etc) : squad        Chief complaint(s): Lt foot pain, toe infection        Arrival Note (brief scenario, treatment PTA, etc). : onset \"2 days\" with open area to Tip of 2nd digit, left foot. Pt has black fransisca- area, and redness into foot. C= \"Have you ever felt that you should Cut down on your drinking? \"  no  A= \"Have people Annoyed you by criticizing your drinking? \" no  G= \"Have you ever felt bad or Guilty about your drinking? \" no  E= \"Have you ever had a drink as an Eye-opener first thing in the morning to steady your nerves or to help a hangover? \"  no     Deferred []      Reason for deferring: na    *If yes to two or more: probable alcohol abuse. *

## 2022-03-07 NOTE — ED PROVIDER NOTES
EMERGENCY DEPARTMENT ENCOUNTER    Pt Name: Donny Turcios  MRN: [id number]  [place] 1955  Date of evaluation: 3/7/22  CHIEF COMPLAINT       Chief Complaint   Patient presents with    Foot Pain     HISTORY OF PRESENT ILLNESS   HPI  70-year-old male history of hypertension, diabetes, A. fib on Coumadin, peripheral vascular disease status post bilateral TMA's presents for evaluation of left foot pain and swelling. Symptoms began gradually over the past 2 days. Noticed discoloration of his left foot and second toe. Symptoms are moderate. Symptoms are progressive. No associated fever or chills. No home treatment prior to arrival. History of similar symptoms with diabetic foot infections in the past.       REVIEW OF SYSTEMS     Review of Systems   Constitutional: Negative for chills and fatigue. HENT: Negative for facial swelling, nosebleeds, rhinorrhea and sore throat. Eyes: Negative for pain and redness. Respiratory: Negative for cough and shortness of breath. Cardiovascular: Negative for chest pain and leg swelling. Gastrointestinal: Negative for abdominal pain, diarrhea, nausea and vomiting. Genitourinary: Negative for flank pain and hematuria. Musculoskeletal: Positive for arthralgias. Negative for back pain. Skin: Positive for wound. Negative for color change and rash. Neurological: Negative for dizziness, tremors, facial asymmetry, speech difficulty, weakness and numbness.      PASTMEDICAL HISTORY     Past Medical History:   Diagnosis Date    Atrial fibrillation ([facility] [address]     CAD (coronary artery disease)     CHF (congestive heart failure) (HCC)     Diabetes mellitus (HCC)     Hyperlipidemia     Hypertension      Past Problem List  Patient Active Problem List   Diagnosis Code    Diabetic foot infection ([facility] [address] E11.628, L08.9    Diabetes ([address] E11.9    Chronic osteomyelitis ([facility] [address] M86.60    Diabetic foot ulcer (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 (HCC) L97.514    Chronic osteomyelitis of left foot ([facility]) M86.672    Onychomycosis B35.1    Heart failure ([facility]) I50.9     SURGICAL HISTORY       Past Surgical History:   Procedure Laterality Date    CARDIAC SURGERY  2010    CABG X3    COLONOSCOPY      DEBRIDEMENT Right 11/19/2015    DEBRIDEMENT WOUND FOOT RIGHT W/ APPLICATION BILAT INTEG RAT GRAFT    ENDOSCOPY, COLON, DIAGNOSTIC      EYE SURGERY Bilateral     cataracts    HERNIA REPAIR      umbilical    KNEE ARTHROSCOPY Right     OTHER SURGICAL HISTORY Right 12 29 15    wound care graft procedure foot,appl of integra    PACEMAKER PLACEMENT  2011    WITH DEFIBRILLATOR    TOE AMPUTATION Right     R great     CURRENT MEDICATIONS       Previous Medications    ASPIRIN 81 MG EC TABLET    Take 81 mg by mouth daily     ATORVASTATIN (LIPITOR) 40 MG TABLET    Take 40 mg by mouth daily. BENAZEPRIL (LOTENSIN) 5 MG TABLET    Take 5 mg by mouth daily    CARVEDILOL (COREG) 12.5 MG TABLET    Take 1 tablet by mouth 2 times daily (with meals)    FUROSEMIDE (LASIX) 80 MG TABLET    Take 80 mg by mouth daily    GLIMEPIRIDE (AMARYL) 4 MG TABLET    Take 4 mg by mouth 2 times daily (before meals)     METFORMIN (GLUCOPHAGE) 500 MG TABLET    Take 1,000 mg by mouth 2 times daily (with meals). NITROGLYCERIN (NITROSTAT) 0.4 MG SL TABLET    up to max of 3 total doses. If no relief after 1 dose, call 911. WARFARIN (COUMADIN) 2 MG TABLET    Take 4 mg by mouth daily [facility]     ALLERGIES     is allergic to pcn [penicillins]. FAMILY HISTORY     He indicated that the status of his mother is unknown. He indicated that the status of his father is unknown.  He indicated that the status of his brother is unknown. He indicated that the status of his maternal grandfather is unknown. SOCIAL HISTORY       Social History     Tobacco Use    Smoking status: Never Smoker    Smokeless tobacco: Never Used   Substance Use Topics    Alcohol use: Yes     Comment: SOCIAL on weekends    Drug use: No     PHYSICAL EXAM     INITIAL VITALS: /77   Pulse 78   Temp 98.1 °F (36.7 °C) (Oral)   Resp 18   Ht 5' 11\" (1.803 m)   Wt 215 lb (97.5 kg)   SpO2 100%   BMI 29.99 kg/m²    Physical Exam  Vitals and nursing note reviewed. Constitutional:       Appearance: Normal appearance. HENT:      Head: Normocephalic and atraumatic. Nose: Nose normal.   Eyes:      Extraocular Movements: Extraocular movements intact. Pupils: Pupils are equal, round, and reactive to light. Cardiovascular:      Rate and Rhythm: Normal rate and regular rhythm. Pulmonary:      Effort: Pulmonary effort is normal. No respiratory distress. Breath sounds: Normal breath sounds. Abdominal:      General: Abdomen is flat. There is no distension. Palpations: Abdomen is soft. There is no mass. Musculoskeletal:         General: No swelling. Normal range of motion. Cervical back: Normal range of motion. No rigidity. Skin:     General: Skin is warm and dry. Comments: Wet gangrene of the left 2nd toe with associated erythema extending up to the dorsal midfoot. Soft compartments neurovascularly intact. S/p bilateral TMA of 1st toe   Neurological:      General: No focal deficit present. Mental Status: He is alert. Mental status is at baseline. Cranial Nerves: No cranial nerve deficit. MEDICAL DECISION MAKIN-year-old male presents for evaluation with looks like wet gangrene of the left second toe with associated cellulitis of the foot.  We will check inflammatory markers, blood cultures, x-ray, plan for podiatry consultation, IV antibiotics, other components within normal limits   SEDIMENTATION RATE - Abnormal; Notable for the following components:    Sed Rate 21 (*)     All other components within normal limits   C-REACTIVE PROTEIN - Abnormal; Notable for the following components:    CRP 61.1 (*)     All other components within normal limits   LACTATE, SEPSIS - Abnormal; Notable for the following components:    Lactic Acid, Sepsis 3.1 (*)     All other components within normal limits   PROTIME-INR - Abnormal; Notable for the following components:    Protime 16.7 (*)     All other components within normal limits   CULTURE, BLOOD 1   CULTURE, BLOOD 2   LACTATE, SEPSIS   APTT   MAGNESIUM       EMERGENCY DEPARTMENTCOURSE:         Vitals:    Vitals:    03/07/22 1229 03/07/22 1235 03/07/22 1250 03/07/22 1315   BP: 123/73 123/73 108/77    Pulse: 82   78   Resp: 18      Temp: 98.1 °F (36.7 °C)      TempSrc: Oral      SpO2: 100% 100% 100%    Weight: 215 lb (97.5 kg)      Height: 5' 11\" (1.803 m)          The patient was given the following medications while in the emergency department:  Orders Placed This Encounter   Medications    0.9 % sodium chloride IV bolus 1,000 mL    cefepime (MAXIPIME) 2000 mg IVPB minibag     Order Specific Question:   Antimicrobial Indications     Answer:   Skin and Soft Tissue Infection    metronidazole (FLAGYL) 500 mg in NaCl 100 mL IVPB premix     Order Specific Question:   Antimicrobial Indications     Answer:   Skin and Soft Tissue Infection    DISCONTD: vancomycin (VANCOCIN) intermittent dosing (placeholder)     Order Specific Question:   Antimicrobial Indications     Answer:   Skin and Soft Tissue Infection    DISCONTD: vancomycin (VANCOCIN) 2,500 mg in dextrose 5 % 500 mL IVPB     Order Specific Question:   Antimicrobial Indications     Answer:   Skin and Soft Tissue Infection    linezolid (ZYVOX) IVPB 600 mg     Order Specific Question:   Antimicrobial Indications     Answer:   Skin and Soft Tissue Infection    sodium chloride flush 0.9 % injection 5-40 mL    sodium chloride flush 0.9 % injection 5-40 mL    0.9 % sodium chloride infusion    acetaminophen (TYLENOL) tablet 650 mg    OR Linked Order Group     ondansetron (ZOFRAN-ODT) disintegrating tablet 4 mg     ondansetron (ZOFRAN) injection 4 mg     CONSULTS:  PHARMACY TO DOSE VANCOMYCIN  IP CONSULT TO INTERNAL MEDICINE  IP CONSULT TO PODIATRY    FINAL IMPRESSION      1. Diabetic foot infection ([facility] [address]    2. Acute kidney injury [facility])          DISPOSITION/PLAN   DISPOSITION Admitted 03/07/2022 02:11:39 PM      PATIENT REFERRED TO:  No follow-up provider specified. DISCHARGE MEDICATIONS:  New Prescriptions    No medications on file     The care is provided during an unprecedented national emergency due to the novel coronavirus, COVID 19.   MD Klarissa Lang MD  03/07/22 8901

## 2022-03-07 NOTE — CONSULTS
Consultation Note  Podiatric Medicine and Surgery     Subjective     Chief Complaint: left foot wound, cellulitis    HPI:  Sulma Jeffers is a 78 y.o. male seen at [address] [place] [address] ER for left 2nd digit wound and cellulitis. Patient is a type 2 diabetic with history of PVD. He has had previous amputations of hallux of bilateral feet, and partial 2nd digit amputation. Per patient, he was on the way to doctor appoitnment today and was feeling fatigued and unwell, was told by staff at PCP to present to ED. Denies any trauma to left foot, reports started noticing worsening discoloration of 2nd digit and feeling of general malaise over the weekend. Denies any further pedal complaints at this time. PCP is Vincent Xiong DO    ROS:   Review of Systems   Constitutional: Negative for chills and fever. Respiratory: Negative for chest tightness and shortness of breath. Cardiovascular: Negative for chest pain and leg swelling. Gastrointestinal: Negative for abdominal pain, constipation, diarrhea, nausea and vomiting. Musculoskeletal: Negative for arthralgias, gait problem, joint swelling and myalgias. Skin: Positive for color change and wound. Neurological: Negative for weakness and numbness. Past Medical History   has a past medical history of Atrial fibrillation ([address] CAD (coronary artery disease), CHF (congestive heart failure) ([address] Diabetes mellitus ([address] Hyperlipidemia, and Hypertension. Past Surgical History   has a past surgical history that includes eye surgery (Bilateral); hernia repair; Knee arthroscopy (Right); Colonoscopy; Endoscopy, colon, diagnostic; pacemaker placement (2011); Cardiac surgery (2010); Toe amputation (Right); debridement (Right, 11/19/2015); and other surgical history (Right, 12 29 15). Medications  Prior to Admission medications    Medication Sig Start Date End Date Taking?  Authorizing Provider   isosorbide mononitrate (IMDUR) 60 MG extended release tablet Take 1 tablet by mouth daily 2/6/21   Lucas  Satya, DO   nitroGLYCERIN (NITROSTAT) 0.4 MG SL tablet up to max of 3 total doses. If no relief after 1 dose, call 911. 2/5/21   Mikaela Franklin, DO   hydrALAZINE (APRESOLINE) 50 MG tablet Take 1 tablet by mouth every 8 hours 2/5/21   Lucas T Satya, DO   carvedilol (COREG) 12.5 MG tablet Take 1 tablet by mouth 2 times daily (with meals) 2/5/21   Lucas T Satya, DO   potassium chloride (KLOR-CON M) 20 MEQ extended release tablet Take 1 tablet by mouth daily (with breakfast) 2/6/21   Lucas T Satya, DO   furosemide (LASIX) 80 MG tablet Take 80 mg by mouth daily    Historical Provider, MD   warfarin (COUMADIN) 2 MG tablet Take 2 tablets by mouth daily as directed by [facility]. 6/23/20   Historical Provider, MD   glimepiride (AMARYL) 4 MG tablet Take 4 mg by mouth every morning (before breakfast)    Historical Provider, MD   warfarin (COUMADIN) 2 MG tablet Take 6 mg by mouth four times a week Managed by [facility] - 6mg (3 x 2mg warfarin tablets) on Mon/Thurs  and 4mg (2 x 2mg tablets) all other days. Historical Provider, MD   aspirin 81 MG tablet Take 81 mg by mouth daily     Historical Provider, MD   metFORMIN (GLUCOPHAGE) 500 MG tablet Take 1,000 mg by mouth 2 times daily (with meals). Historical Provider, MD   atorvastatin (LIPITOR) 40 MG tablet Take 40 mg by mouth daily. Historical Provider, MD   metolazone (ZAROXOLYN) 5 MG tablet Take 5 mg by mouth as needed. Historical Provider, MD    Scheduled Meds:   0.9 % sodium chloride  1,000 mL IntraVENous Once    cefepime  2,000 mg IntraVENous Once    metroNIDAZOLE  500 mg IntraVENous Once    linezolid  600 mg IntraVENous Q12H     Continuous Infusions:  PRN Meds:. Allergies  is allergic to pcn [penicillins]. Family History  family history includes Cancer in his father; Osteoarthritis in his mother; Other in his brother and maternal grandfather.     Social History   reports that he has never smoked. He has never used smokeless tobacco.   reports current alcohol use. reports no history of drug use. Objective     Vitals:  Patient Vitals for the past 8 hrs:   BP Temp Temp src Pulse Resp SpO2 Height Weight   22 1315 -- -- -- 78 -- -- -- --   22 1229 123/73 98.1 °F (36.7 °C) Oral 82 18 100 % 5' 11\" (1.803 m) 215 lb (97.5 kg)     Average, Min, and Max for last 24 hours Vitals:  TEMPERATURE:  Temp  Av.1 °F (36.7 °C)  Min: 98.1 °F (36.7 °C)  Max: 98.1 °F (36.7 °C)    RESPIRATIONS RANGE: Resp  Av  Min: 18  Max: 18    PULSE RANGE: Pulse  Av  Min: 78  Max: 82    BLOOD PRESSURE RANGE:  Systolic (77NHW), HRK:230 , Min:123 , URP:691   ; Diastolic (09EFY), FXR:74, Min:73, Max:73      PULSE OXIMETRY RANGE: SpO2  Av %  Min: 100 %  Max: 100 %  I&O:  No intake/output data recorded. CBC:  Recent Labs     22  1256   WBC 7.3   HGB 11.4*   HCT 32.6*      CRP 61.1*        BMP:  Recent Labs     22  1256   *   K 3.4*   CL 90*   CO2 25   BUN 31*   CREATININE 2.10*   GLUCOSE 140*   CALCIUM 9.5        Coags:  Recent Labs     22  1256   APTT 35.6   INR 1.4       Lab Results   Component Value Date    LABA1C 8.0 (H) 2016     Lab Results   Component Value Date    SEDRATE 21 (H) 2022     Lab Results   Component Value Date    CRP 61.1 (H) 2022         Lower Extremity Physical Exam:  Vascular: DP and PT pulses are non-palpable, bilateral. Audible waveform on doppler. CFT >3 seconds to all digits. Hair growth is absent to the level of the digits. Mild nonpitting edema localized to dorsum of left foot and 2nd digit of left foot. Neuro: Saph/sural/SP/DP/plantar sensation intact to light touch. Musculoskeletal: Muscle strength is adequate ROM, adequate strength to all lower extremity muscle groups. Gross deformity is previous hallux amputation of bilateral LE, partial 2nd digit amputation, right foot. .    Dermatologic: Sirisha Adams chronic ulceration to 2nd digit, left foot with necrotic changes to distal tip. Pinpoint sanguinous drainage noted to distal aspect of wound. No malodor, no purulent drainage noted. Erythema overlying dorsal midfoot, left. No fluctuance,mild induration. Interdigital maceration absent. Clinical Images:            Imaging:   XR FOOT LEFT (MIN 3 VIEWS)   Final Result   Findings suspicious for possible early osteomyelitis at the 2nd toe distal   phalanx. Small area of erosive change at the 1st metatarsal head, though the features   are more suggestive possible underlying erosive or inflammatory arthropathy   rather than osteomyelitis, though clinical correlation is required. Soft tissue swelling of the 2nd toe at the forefoot. Assessment     Fiorella Camara is a 78 y.o. male with   Type 2 diabetes mellitus with nonhealing ulceration  PVD  Cellulitis    Active Problems:    * No active hospital problems. *  Resolved Problems:    * No resolved hospital problems. *        Plan     Patient examined and evaluated at bedside   Treatment options discussed in detail with the patient  X-rays left foot reviewed. No signs of acute gas noted. Possible early osteomyelitis to left 2nd digit     No acute surgical intervention planned at this time. Will monitor clinical response to IV abx. Discussed with patient he will benefit from receiving IV antibiotic therapy at this time due to his prior history of amputations and high risk of healing secondary to PVD. Patient is amenable to plan.   Pt has pacemaker in place, CT left foot ordered  NIVS ordered  IV abx: Zyvox, cefepime, flagyl , to be continued upon admission  ID recommendation appreciated  Medical management per IM  Dressing applied to Left foot: DSD, tape  WBAT to left lower extremity in surgical shoe  Will discuss with Dr. Zaheer Garcia, EMREM   Podiatric Medicine & Surgery   3/7/2022 at 2:05 PM      I performed a history and physical examination of the patient and discussed management with the resident. I reviewed the residents note and agree with the documented findings and plan of care. Any areas of disagreement are noted on the chart. I was personally present for the key portions of any procedures. I have documented in the chart those procedures where I was not present during the key portions. I have reviewed the Podiatry Resident progress note. I agree with the chief complaint, past medical history, past surgical history, allergies, medications, social and family history as documented unless otherwise noted below. Documentation of the HPI, Physical Exam and Medical Decision Making performed by medical students or scribes is based on my personal performance of the HPI, PE and MDM. I have personally evaluated this patient and have completed at least one if not all key elements of the E/M (history, physical exam, and MDM). Additional findings are as noted.      Electronically signed by Brennan Pandya DPM on 3/7/2022

## 2022-03-07 NOTE — PROGRESS NOTES
Medication History completed:    New medications: benazepril    Medications discontinued: potassium chloride ER tablets, metolazone, isosorbide mononitrate, hydralazine    Changes to dosing:   Warfarin changed to 4 mg (two 2 mg tablets) daily  Glimepiride changed to 4 mg twice daily with meals    Stated allergies: As listed    Other pertinent information: Medications [facility]. The patient was seen on 3/1/22 with an INR of 2.2 at that time. The patient reports that he believes he missed a dose of warfarin on Saturday 3/5/22.      Thank you,  Ed Xie, PharmD, BCPS  [phone]

## 2022-03-07 NOTE — PROGRESS NOTES
Ro Davis DPM was contacted through [employer] [facility] by Dave Bell about Arterial Segmental Pressure order. Dr. Geni Kauffman confirms order is routine and may be performed Tuesday morning.

## 2022-03-08 ENCOUNTER — APPOINTMENT (OUTPATIENT)
Dept: ULTRASOUND IMAGING | Age: 67
DRG: 256 | End: 2022-03-08
Payer: MEDICARE

## 2022-03-08 ENCOUNTER — APPOINTMENT (OUTPATIENT)
Dept: VASCULAR LAB | Age: 67
DRG: 256 | End: 2022-03-08
Payer: MEDICARE

## 2022-03-08 LAB
ABSOLUTE EOS #: 0 K/UL (ref 0–0.4)
ABSOLUTE LYMPH #: 0.6 K/UL (ref 1–4.8)
ABSOLUTE MONO #: 0.5 K/UL (ref 0.1–1.3)
ALBUMIN SERPL-MCNC: 3.6 G/DL (ref 3.5–5.2)
ALP BLD-CCNC: 112 U/L (ref 40–129)
ALT SERPL-CCNC: 19 U/L (ref 5–41)
ANION GAP SERPL CALCULATED.3IONS-SCNC: 13 MMOL/L (ref 9–17)
AST SERPL-CCNC: 23 U/L
BACTERIA: NORMAL
BASOPHILS # BLD: 0 % (ref 0–2)
BASOPHILS ABSOLUTE: 0 K/UL (ref 0–0.2)
BILIRUB SERPL-MCNC: 1.55 MG/DL (ref 0.3–1.2)
BILIRUBIN DIRECT: 0.83 MG/DL
BILIRUBIN URINE: NEGATIVE
BILIRUBIN, INDIRECT: 0.72 MG/DL (ref 0–1)
BUN BLDV-MCNC: 33 MG/DL (ref 8–23)
C-REACTIVE PROTEIN: 87.8 MG/L (ref 0–5)
CALCIUM SERPL-MCNC: 9 MG/DL (ref 8.6–10.4)
CASTS UA: NORMAL /LPF
CHLORIDE BLD-SCNC: 94 MMOL/L (ref 98–107)
CO2: 25 MMOL/L (ref 20–31)
COLOR: ABNORMAL
COMPLEMENT C3: 137 MG/DL (ref 90–180)
COMPLEMENT C4: 27 MG/DL (ref 10–40)
CREAT SERPL-MCNC: 2.13 MG/DL (ref 0.7–1.2)
CREATININE URINE: 99.5 MG/DL (ref 39–259)
EOSINOPHILS RELATIVE PERCENT: 0 % (ref 0–4)
EPITHELIAL CELLS UA: NORMAL /HPF
FREE KAPPA/LAMBDA RATIO: 1.47 (ref 0.26–1.65)
GFR AFRICAN AMERICAN: 38 ML/MIN
GFR NON-AFRICAN AMERICAN: 31 ML/MIN
GFR SERPL CREATININE-BSD FRML MDRD: ABNORMAL ML/MIN/{1.73_M2}
GLUCOSE BLD-MCNC: 115 MG/DL (ref 75–110)
GLUCOSE BLD-MCNC: 118 MG/DL (ref 75–110)
GLUCOSE BLD-MCNC: 119 MG/DL (ref 70–99)
GLUCOSE BLD-MCNC: 126 MG/DL (ref 75–110)
GLUCOSE BLD-MCNC: 70 MG/DL (ref 75–110)
GLUCOSE URINE: NEGATIVE
HCT VFR BLD CALC: 32 % (ref 41–53)
HEMOGLOBIN: 11.2 G/DL (ref 13.5–17.5)
KAPPA FREE LIGHT CHAINS QNT: 5.94 MG/DL (ref 0.37–1.94)
KETONES, URINE: NEGATIVE
LAMBDA FREE LIGHT CHAINS QNT: 4.05 MG/DL (ref 0.57–2.63)
LEUKOCYTE ESTERASE, URINE: NEGATIVE
LYMPHOCYTES # BLD: 8 % (ref 24–44)
MAGNESIUM: 1.7 MG/DL (ref 1.6–2.6)
MCH RBC QN AUTO: 31.8 PG (ref 26–34)
MCHC RBC AUTO-ENTMCNC: 35.1 G/DL (ref 31–37)
MCV RBC AUTO: 90.7 FL (ref 80–100)
MONOCYTES # BLD: 8 % (ref 1–7)
NITRITE, URINE: NEGATIVE
PDW BLD-RTO: 14.3 % (ref 11.5–14.9)
PH UA: 5.5 (ref 5–8)
PLATELET # BLD: 178 K/UL (ref 150–450)
PMV BLD AUTO: 8.5 FL (ref 6–12)
POTASSIUM SERPL-SCNC: 3.5 MMOL/L (ref 3.7–5.3)
PROTEIN UA: ABNORMAL
RBC # BLD: 3.52 M/UL (ref 4.5–5.9)
RBC UA: NORMAL /HPF
SEDIMENTATION RATE, ERYTHROCYTE: 63 MM (ref 0–20)
SEG NEUTROPHILS: 84 % (ref 36–66)
SEGMENTED NEUTROPHILS ABSOLUTE COUNT: 5.9 K/UL (ref 1.3–9.1)
SODIUM BLD-SCNC: 132 MMOL/L (ref 135–144)
SODIUM,UR: 78 MMOL/L
SPECIFIC GRAVITY UA: 1.01 (ref 1–1.03)
TOTAL PROTEIN, URINE: 147 MG/DL
TOTAL PROTEIN: 6.7 G/DL (ref 6.4–8.3)
TURBIDITY: CLEAR
UREA NITROGEN, UR: 535 MG/DL
URINE HGB: ABNORMAL
URINE TOTAL PROTEIN CREATININE RATIO: 1.48 (ref 0–0.2)
UROBILINOGEN, URINE: NORMAL
WBC # BLD: 7 K/UL (ref 3.5–11)
WBC UA: NORMAL /HPF

## 2022-03-08 PROCEDURE — 76770 US EXAM ABDO BACK WALL COMP: CPT

## 2022-03-08 PROCEDURE — 2580000003 HC RX 258: Performed by: FAMILY MEDICINE

## 2022-03-08 PROCEDURE — 86225 DNA ANTIBODY NATIVE: CPT

## 2022-03-08 PROCEDURE — 80048 BASIC METABOLIC PNL TOTAL CA: CPT

## 2022-03-08 PROCEDURE — 84166 PROTEIN E-PHORESIS/URINE/CSF: CPT

## 2022-03-08 PROCEDURE — 1200000000 HC SEMI PRIVATE

## 2022-03-08 PROCEDURE — 6360000002 HC RX W HCPCS: Performed by: INTERNAL MEDICINE

## 2022-03-08 PROCEDURE — 82947 ASSAY GLUCOSE BLOOD QUANT: CPT

## 2022-03-08 PROCEDURE — 86021 WBC ANTIBODY IDENTIFICATION: CPT

## 2022-03-08 PROCEDURE — 84540 ASSAY OF URINE/UREA-N: CPT

## 2022-03-08 PROCEDURE — 84165 PROTEIN E-PHORESIS SERUM: CPT

## 2022-03-08 PROCEDURE — 86160 COMPLEMENT ANTIGEN: CPT

## 2022-03-08 PROCEDURE — 85652 RBC SED RATE AUTOMATED: CPT

## 2022-03-08 PROCEDURE — 84155 ASSAY OF PROTEIN SERUM: CPT

## 2022-03-08 PROCEDURE — 85025 COMPLETE CBC W/AUTO DIFF WBC: CPT

## 2022-03-08 PROCEDURE — 81001 URINALYSIS AUTO W/SCOPE: CPT

## 2022-03-08 PROCEDURE — 2580000003 HC RX 258: Performed by: INTERNAL MEDICINE

## 2022-03-08 PROCEDURE — 6360000002 HC RX W HCPCS: Performed by: STUDENT IN AN ORGANIZED HEALTH CARE EDUCATION/TRAINING PROGRAM

## 2022-03-08 PROCEDURE — 84156 ASSAY OF PROTEIN URINE: CPT

## 2022-03-08 PROCEDURE — 93923 UPR/LXTR ART STDY 3+ LVLS: CPT

## 2022-03-08 PROCEDURE — 82570 ASSAY OF URINE CREATININE: CPT

## 2022-03-08 PROCEDURE — 86038 ANTINUCLEAR ANTIBODIES: CPT

## 2022-03-08 PROCEDURE — 6370000000 HC RX 637 (ALT 250 FOR IP): Performed by: FAMILY MEDICINE

## 2022-03-08 PROCEDURE — 99222 1ST HOSP IP/OBS MODERATE 55: CPT | Performed by: INTERNAL MEDICINE

## 2022-03-08 PROCEDURE — 2500000003 HC RX 250 WO HCPCS: Performed by: FAMILY MEDICINE

## 2022-03-08 PROCEDURE — 86140 C-REACTIVE PROTEIN: CPT

## 2022-03-08 PROCEDURE — 83735 ASSAY OF MAGNESIUM: CPT

## 2022-03-08 PROCEDURE — 84300 ASSAY OF URINE SODIUM: CPT

## 2022-03-08 PROCEDURE — 80076 HEPATIC FUNCTION PANEL: CPT

## 2022-03-08 PROCEDURE — 6360000002 HC RX W HCPCS: Performed by: FAMILY MEDICINE

## 2022-03-08 PROCEDURE — 36415 COLL VENOUS BLD VENIPUNCTURE: CPT

## 2022-03-08 PROCEDURE — 83883 ASSAY NEPHELOMETRY NOT SPEC: CPT

## 2022-03-08 RX ORDER — POTASSIUM CHLORIDE 20 MEQ/1
20 TABLET, EXTENDED RELEASE ORAL ONCE
Status: DISCONTINUED | OUTPATIENT
Start: 2022-03-08 | End: 2022-03-08

## 2022-03-08 RX ORDER — SODIUM CHLORIDE 9 MG/ML
INJECTION, SOLUTION INTRAVENOUS CONTINUOUS
Status: DISCONTINUED | OUTPATIENT
Start: 2022-03-08 | End: 2022-03-10

## 2022-03-08 RX ADMIN — LINEZOLID 600 MG: 600 INJECTION, SOLUTION INTRAVENOUS at 02:31

## 2022-03-08 RX ADMIN — METRONIDAZOLE 500 MG: 500 INJECTION, SOLUTION INTRAVENOUS at 16:45

## 2022-03-08 RX ADMIN — METRONIDAZOLE 500 MG: 500 INJECTION, SOLUTION INTRAVENOUS at 10:06

## 2022-03-08 RX ADMIN — CARVEDILOL 6.25 MG: 6.25 TABLET, FILM COATED ORAL at 16:45

## 2022-03-08 RX ADMIN — CEFEPIME HYDROCHLORIDE 1000 MG: 1 INJECTION, POWDER, FOR SOLUTION INTRAMUSCULAR; INTRAVENOUS at 21:02

## 2022-03-08 RX ADMIN — SODIUM CHLORIDE 450 MG: 9 INJECTION, SOLUTION INTRAVENOUS at 14:22

## 2022-03-08 RX ADMIN — ENOXAPARIN SODIUM 80 MG: 100 INJECTION SUBCUTANEOUS at 07:59

## 2022-03-08 RX ADMIN — SODIUM CHLORIDE, PRESERVATIVE FREE 10 ML: 5 INJECTION INTRAVENOUS at 08:00

## 2022-03-08 RX ADMIN — CEFEPIME HYDROCHLORIDE 1000 MG: 1 INJECTION, POWDER, FOR SOLUTION INTRAMUSCULAR; INTRAVENOUS at 09:49

## 2022-03-08 RX ADMIN — GLIPIZIDE 10 MG: 5 TABLET ORAL at 05:41

## 2022-03-08 RX ADMIN — SODIUM CHLORIDE: 9 INJECTION, SOLUTION INTRAVENOUS at 09:47

## 2022-03-08 RX ADMIN — POTASSIUM BICARBONATE 20 MEQ: 782 TABLET, EFFERVESCENT ORAL at 10:02

## 2022-03-08 RX ADMIN — CARVEDILOL 6.25 MG: 6.25 TABLET, FILM COATED ORAL at 07:59

## 2022-03-08 RX ADMIN — ACETAMINOPHEN 650 MG: 325 TABLET, FILM COATED ORAL at 21:06

## 2022-03-08 RX ADMIN — ATORVASTATIN CALCIUM 40 MG: 40 TABLET, FILM COATED ORAL at 07:59

## 2022-03-08 ASSESSMENT — PAIN SCALES - GENERAL
PAINLEVEL_OUTOF10: 3
PAINLEVEL_OUTOF10: 0
PAINLEVEL_OUTOF10: 0

## 2022-03-08 ASSESSMENT — ENCOUNTER SYMPTOMS
COLOR CHANGE: 1
NAUSEA: 0
COUGH: 0
WHEEZING: 0
CONSTIPATION: 0
SHORTNESS OF BREATH: 0
DIARRHEA: 0
CHEST TIGHTNESS: 0
ABDOMINAL PAIN: 0

## 2022-03-08 NOTE — FLOWSHEET NOTE
03/08/22 1120   Encounter Summary   Services provided to: Patient   Referral/Consult From: Juliette   Continue Visiting   (3-8-22)   Complexity of Encounter Moderate   Length of Encounter 15 minutes   Spiritual Assessment Completed Yes   Spiritual/Jainism   Type Spiritual support   Assessment Calm; Approachable;Coping   Intervention Active listening;Explored feelings, thoughts, concerns;Sustaining presence/ Ministry of presence   Outcome Expressed gratitude; Refused/declined;Engaged in conversation

## 2022-03-08 NOTE — PLAN OF CARE
Problem: Falls - Risk of:  Goal: Will remain free from falls  Description: Will remain free from falls  3/8/2022 1820 by Franky Jones RN  Outcome: Ongoing     Problem: Falls - Risk of:  Goal: Absence of physical injury  Description: Absence of physical injury  3/8/2022 1820 by Franky Jones RN  Outcome: Ongoing     Problem: Skin Integrity:  Goal: Will show no infection signs and symptoms  Description: Will show no infection signs and symptoms  3/8/2022 1820 by Franky Jones RN  Outcome: Ongoing     Problem: Skin Integrity:  Goal: Absence of new skin breakdown  Description: Absence of new skin breakdown  3/8/2022 1820 by Franky Jones RN  Outcome: Ongoing     Problem: Nutrition  Goal: Optimal nutrition therapy  3/8/2022 1820 by Franky Jones RN  Outcome: Ongoing

## 2022-03-08 NOTE — CONSULTS
NEPHROLOGY CONSULT     Patient :  Woo Grace; 78 y.o. MRN# [id number]  Location:  2052/2052-01  Attending:  Mikaela Franklin DO  Admit Date:  3/7/2022   Hospital Day: 1      Reason for Consult: Acute kidney injury      Chief Complaint:    History Obtained From:      History of Present Illness: This is a 78 y.o. male past medical history of coronary artery disease, bypass surgery, CHF, ype 2 diabetes, Essential hypertension, chronic kidney disease stage IIIb with baseline creatinine of about 1.5 to 1.7 mg/dL since 2019, patient has not follow-up with any nephrologist as an outpatient. Patient has patient presented to the hospital with complaints of left foot redness and black toe, patient noticed black toe about last 1 week, patient started to have pain in the foot and redness and therefore decided to come to the hospital.  Denied any nausea vomiting diarrhea no fever chills  Denied any shortness of breath chest pain  Labs showed serum creatinine of 2.1 mg/dL on admission from nephrology consultation has been requested  Blood cultures positive for MRSA  Pt denies any history of  prolonged NSAID use. Patient denies dysuria, gross hematuria, flank pain, nocturia, urgency, passing frothy urine or urinary incontinence. There has been no recent exposure to IV contrast.   There is no history  of paraprotein disease. Pt denies any history of recurrent UTI or kidney stones. Medication review shows use of ACE-inhibitor/diuretics.     Past Medical History:        Diagnosis Date    [facility]rial fibrillation [facility]     CAD (coronary artery disease)     [facility]congestive heart failure) [facility]     Diabetes mellitus ([facility]     Hyperlipidemia     Hypertension        Past Surgical History:        Procedure Laterality Date    CARDIAC SURGERY  2010    CABG X3    COLONOSCOPY      DEBRIDEMENT Right 11/19/2015    DEBRIDEMENT WOUND FOOT RIGHT W/ APPLICATION BILAT INTEG RAT GRAFT    ENDOSCOPY, COLON, DIAGNOSTIC      EYE SURGERY Bilateral     cataracts    HERNIA REPAIR      umbilical    KNEE ARTHROSCOPY Right     OTHER SURGICAL HISTORY Right 12 29 15    wound care graft procedure foot,appl of integra    PACEMAKER PLACEMENT  2011    WITH DEFIBRILLATOR    TOE AMPUTATION Right     R great       Current Medications:    cefepime (MAXIPIME) 1000 mg IVPB minibag, Q12H  metronidazole (FLAGYL) 500 mg in NaCl 100 mL IVPB premix, Q8H  [START ON 3/9/2022] enoxaparin (LOVENOX) injection 30 mg, Daily  0.9 % sodium chloride infusion, Continuous  DAPTOmycin (CUBICIN) 450 mg in sodium chloride 0.9 % 50 mL IVPB, Q24H  sodium chloride flush 0.9 % injection 5-40 mL, 2 times per day  sodium chloride flush 0.9 % injection 5-40 mL, PRN  0.9 % sodium chloride infusion, PRN  acetaminophen (TYLENOL) tablet 650 mg, Q4H PRN  ondansetron (ZOFRAN-ODT) disintegrating tablet 4 mg, Q8H PRN   Or  ondansetron (ZOFRAN) injection 4 mg, Q6H PRN  carvedilol (COREG) tablet 6.25 mg, BID WC  glipiZIDE (GLUCOTROL) tablet 10 mg, QAM AC  insulin lispro (HUMALOG) injection vial 0-12 Units, TID WC  insulin lispro (HUMALOG) injection vial 0-6 Units, Nightly  glucose (GLUTOSE) 40 % oral gel 15 g, PRN  dextrose 50 % IV solution, PRN  glucagon (rDNA) injection 1 mg, PRN  dextrose 5 % solution, PRN        Allergies:  Pcn [penicillins]    Social History:   Social History     Socioeconomic History    Marital status: Single     Spouse name: Not on file    Number of children: Not on file    Years of education: Not on file    Highest education level: Not on file   Occupational History    Not on file   Tobacco Use    Smoking status: Never Smoker    Smokeless tobacco: Never Used   Substance and Sexual Activity    Alcohol use: Yes     Comment: SOCIAL on weekends    Drug use: No    Sexual activity: Not on file   Other Topics Concern    Not on file   Social History Narrative    Not on file     Social Determinants of Health     Financial Resource Strain:     Difficulty of Paying Living Expenses: Not on file   Food Insecurity:     Worried About [address] [employer] in the Last Year: Not on file    Tee of Food in the Last Year: Not on file   Transportation Needs:     Lack of Transportation (Medical): Not on file    Lack of Transportation (Non-Medical): Not on file   Physical Activity:     Days of Exercise per Week: Not on file    Minutes of Exercise per Session: Not on file   Stress:     Feeling of Stress : Not on file   Social Connections:     Frequency of Communication with Friends and Family: Not on file    Frequency of Social Gatherings with Friends and Family: Not on file    Attends Gnosticist Services: Not on file    Active Member of [address] [place] [address] or Organizations: Not on file    Attends Club or Organization Meetings: Not on file    Marital Status: Not on file   Intimate Partner Violence:     Fear of Current or Ex-Partner: Not on file    Emotionally Abused: Not on file    Physically Abused: Not on file    Sexually Abused: Not on file   Housing Stability:     Unable to Pay for Housing in the Last Year: Not on file    Number of [place] in the Last Year: Not on file    Unstable Housing in the Last Year: Not on file       Family History:   Family History   Problem Relation Age of Onset    Cancer Father         pancreatic cancer    Other Brother         MI    Osteoarthritis Mother     Other Maternal Grandfather         MI       Review of Systems:    Constitutional: No fever, no chills, no night sweats, fatigue, generalized weakness, loss of appetite  HEENT:  No headache, otalgia, itchy eyes, epistaxis, nasal discharge or sore throat. Cardiac:  No chest pain, dyspnea, orthopnea or PND, palpitations  Chest:  No cough, hemoptysis, pleuritic chest pain, wheezing,SOB  Abdomen:  No abdominal pain, nausea, vomiting, diarrhea, melena, dysphagia hematemesis,constipation, abdominal bloating, flank pain  Neuro:  No CVA, TIA or seizure like activity.   Skin:   No rashes, no itching. :   No hematuria, no pyuria, no dysuria, no flank pain. Extremities:  Left foot pain redness black toe      Objective:  CURRENT TEMPERATURE:  Temp: 98.6 °F (37 °C)  MAXIMUM TEMPERATURE OVER 24HRS:  Temp (24hrs), Av.2 °F (37.3 °C), Min:98.2 °F (36.8 °C), Max:102 °F (38.9 °C)    CURRENT RESPIRATORY RATE:  Resp: 16  CURRENT PULSE:  Pulse: 73  CURRENT BLOOD PRESSURE:  BP: (!) 117/55  24HR BLOOD PRESSURE RANGE:  Systolic (09UCU), TYO:909 , Min:106 , WRK:319   ; Diastolic (27KWG), EFR:55, Min:55, Max:81    24HR INTAKE/OUTPUT:      Intake/Output Summary (Last 24 hours) at 3/8/2022 1326  Last data filed at 3/8/2022 1206  Gross per 24 hour   Intake 970 ml   Output 200 ml   Net 770 ml     Patient Vitals for the past 96 hrs (Last 3 readings):   Weight   22 2331 198 lb 13.7 oz (90.2 kg)   22 1722 198 lb 10.2 oz (90.1 kg)   22 1229 215 lb (97.5 kg)       Physical Exam:  GENERAL APPEARANCE: Alert and cooperative, and appears to be in no acute distress. HEAD: normocephalic  EYES:  EOMI. Not pale, anicteric   NOSE:  No nasal discharge. THROAT:  Oral cavity and pharynx normal. Moist  CARDIAC: Normal S1 and S2. No S3, S4 or murmurs. Rhythm is regular. LUNGS: Clear to auscultation and percussion without rales, rhonchi, wheezing or diminished breath sounds. GI-soft nontender  MUSKULOSKELETAL: Adequately aligned spine. No joint erythema or tenderness. EXTREMITIES: No edema.   Left foot toe erythema over the foot, second toe black  NEURO: Nonfocal      Labs:   CBC:  Recent Labs     22  1256 22  0543   WBC 7.3 7.0   RBC 3.58* 3.52*   HGB 11.4* 11.2*   HCT 32.6* 32.0*   MCV 91.2 90.7   MCH 31.8 31.8   MCHC 34.9 35.1   RDW 14.4 14.3    178   MPV 7.7 8.5      BMP:   Recent Labs     22  1256 22  0543   * 132*   K 3.4* 3.5*   CL 90* 94*   CO2 25 25   BUN 31* 33*   CREATININE 2.10* 2.13*   GLUCOSE 140* 119*   CALCIUM 9.5 9.0      Phosphorus:  No results for input(s): PHOS in the last 72 hours. Magnesium:   Recent Labs     03/07/22  1256 03/08/22  0543   MG 1.6 1.7     Albumin:   Recent Labs     03/08/22  0543   LABALBU 3.6       IRON:  No results for input(s): IRON in the last 72 hours. Iron Saturation:  Invalid input(s): PERCENTFE  TIBC:  No results for input(s): TIBC in the last 72 hours. FERRITIN:  No results for input(s): FERRITIN in the last 72 hours. SPEP: No results for input(s): SPEP in the last 72 hours. Recent Labs     03/08/22  0543   PROT 6.7         Radiology:  Reviewed as available. 1. Assessment:  Patient admitted with left foot infection/cellulitis dry gangrene of left foot second toe toe    2. Acute kidney injury on CKD stage IIIb most likely secondary to prerenal azotemia, use of diuretics, rule out urinary retention  3. Chronic kidney disease stage IIIb secondary to diabetic/hypertensive nephropathy, baseline serum creatinine is about 1.5 to 1.7 mg/dL  4. Type 2 diabetes non-insulin-dependent diabetes mellitus  5. Coronary artery disease status post CABG  6. Peripheral vascular disease  7. CHF with preserved EF mild left ventricular hypertrophy diastolic dysfunction moderate pulmonary hypertension       Plan:  1. Urine electrolytes, post void bladder scan  2. Gentle hydration  3. Hold Lasix  4. UA, urine protein to creatinine ratio  5. AZIZA, ANCA, C3-C4  6. SPEP UPEP kappa lambda ratio      Thank you for the consultation.       Electronically signed by Ramon Pitts MD on 3/8/2022 at 1:26 PM

## 2022-03-08 NOTE — PLAN OF CARE
Problem: Falls - Risk of:  Goal: Will remain free from falls  Description: Will remain free from falls  3/8/2022 0523 by Francisca Monson RN  Outcome: Ongoing  Note: Patient remains free of falls and injuries throughout shift. Bed remains in the lowest position, wheels locked, call light and bedside table are within reach. 3/8/2022 0522 by Francisca Monson RN  Note: Patient remains free of falls and injuries throughout shift. Bed remains in the lowest position, wheels locked, call light and bedside table are within reach. Goal: Absence of physical injury  Description: Absence of physical injury  Outcome: Ongoing     Problem: Skin Integrity:  Goal: Will show no infection signs and symptoms  Description: Will show no infection signs and symptoms  Outcome: Ongoing  Goal: Absence of new skin breakdown  Description: Absence of new skin breakdown  Outcome: Ongoing  Note: Assess the overall condition of the skin. Check on bony prominences such as the sacrum, trochanters, scapulae, elbows, heels, inner and outer malleolus, inner and outer knees, back of head). Reinforce the importance of turning, mobility, and ambulation.

## 2022-03-08 NOTE — PROGRESS NOTES
Writer quiana served Dr. Hieu Bardales in regards to new consult. Dr. Pete Sotelo is on call. Antoine Parsons Writer spoke with Dr. Pete Sotelo @ 1276.

## 2022-03-08 NOTE — PROGRESS NOTES
Comprehensive Nutrition Assessment    Type and Reason for Visit:  Initial,Positive Nutrition Screen (wt loss, wounds)    Nutrition Recommendations/Plan:   Will provide 5 carbohydrate choices per tray and add Ensure High Protein twice daily    Nutrition Assessment:  Pt admitted with gangrenous toe/diabetic foot infection. He consumed around 50% of pancake at breakfast. He is willing to try Ensure High Protein (Chocolate). Malnutrition Assessment:  Malnutrition Status: At risk for malnutrition (Comment)    Context:  Acute Illness     Findings of the 6 clinical characteristics of malnutrition:  Energy Intake:  No significant decrease in energy intake  Weight Loss:  No significant weight loss     Body Fat Loss:  No significant body fat loss     Muscle Mass Loss:  No significant muscle mass loss    Fluid Accumulation:  1 - Mild Extremities   Strength:  Not Performed    Estimated Daily Nutrient Needs:  Energy (kcal): Glennie x 1.2= 2000 kcal; Weight Used for Energy Requirements:  Admission     Protein (g):  1.5g/kg= 115-120 g; Weight Used for Protein Requirements:  Ideal          Nutrition Related Findings:  +2 LLE edema, Labs: Glu 198, Meds: reviewed, PMH: DM      Wounds:   (gangrenous toe)       Current Nutrition Therapies:    ADULT DIET; Regular; 4 carb choices (60 gm/meal); Low Fat/Low Chol/High Fiber/SANJEEV    Anthropometric Measures:  · Height: 5' 11\" (180.3 cm)  · Current Body Weight: 198 lb (89.8 kg)   · Admission Body Weight: 198 lb (89.8 kg)    · Usual Body Weight: 203 lb (92.1 kg) (stated)     · Ideal Body Weight: 172 lbs; BMI: 27.6  · BMI Categories: Overweight (BMI 25.0-29. 9)       Nutrition Diagnosis:   · Increased nutrient needs related to  (healing) as evidenced by wounds    Nutrition Interventions:   Food and/or Nutrient Delivery:  Modify Current Diet,Start Oral Nutrition Supplement  Nutrition Education/Counseling:  Education completed (discussed supplements)   Coordination of Nutrition Care: Continue to monitor while inpatient    Goals:  po intake greater than 50%       Nutrition Monitoring and Evaluation:   Food/Nutrient Intake Outcomes:  Food and Nutrient Intake,Supplement Intake  Physical Signs/Symptoms Outcomes:  Biochemical Data,GI Status,Fluid Status or Edema,Skin,Weight     Discharge Planning:     Too soon to determine     Electronically signed by Chadwick Orozco RD, LD on 3/8/22 at 2:39 PM EST    Contact: [phone]

## 2022-03-08 NOTE — PROGRESS NOTES
Writer quiana served Dr. Linda Alcala in regards to new consult. .. Dr. Linda Alcala replied to perfect serve 1270.

## 2022-03-08 NOTE — H&P
Dr. Yola Mireles        History and Physical Examination   Admission Note    CHIEF COMPLAINT:   Chief Complaint   Patient presents with    Foot Pain       Reason for Admission: Left foot cellulitis and gangrenous toe    History Obtained From:  Patient     HISTORY OF PRESENT ILLNESS:      The patient is a pleasant 78 y.o. male with significant medical history of diabetes and peripheral vascular disease has had already his big toes on both feet amputated in the past about 10 years ago or so presented with left toe gangrenous and cellulitis patient states he stubbed his foot on the weekend and had like a blister he peeled it off and the next day it got red and yesterday got more red came into the emergency room he denies having any fevers or chills or any nausea or vomiting. He has no pain he has neuropathy and peripheral vascular disease and he does not feel any stabbing sensation in those areas he says. He was seen by vascular surgeon in the past he think it might have been Dr. Radha Carrera but was told there is no intervention can be done for his disease according to the patient.   He is admitted with antibiotics and podiatry was consulted    Past Medical History:    Past Medical History:   Diagnosis Date    [facility]     CAD (coronary artery disease)     CHF (congestive heart failure) ([facility]     Diabetes mellitus ([facility]     Hyperlipidemia     Hypertension      Patient Active Problem List   Diagnosis Code    Diabetic foot infection ([facility] E11.628, L08.9    Diabetes ([facility] E11.9    Chronic osteomyelitis ([facility] M86.60    Diabetic foot ulcer ([facility] E11.621, L97.509    PVD (peripheral vascular disease) ([facility] I73.9    Atherosclerosis of coronary artery without angina pectoris I25.10    Cardiomyopathy ([facility] I42.9    Cardiac left ventricular ejection fraction 21-40 percent R94.30    Diabetes mellitus type 2 with peripheral artery disease (HCC) E11.51    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 ([facility] L97.514    Chronic osteomyelitis of left foot ([facility] [facility] M86.672    Onychomycosis B35.1    Heart failure (HCC) I50.9       Past Surgical History:       Past Surgical History:   Procedure Laterality Date    CARDIAC SURGERY  2010    CABG X3    COLONOSCOPY      DEBRIDEMENT Right 11/19/2015    DEBRIDEMENT WOUND FOOT RIGHT W/ APPLICATION BILAT INTEG RAT GRAFT    ENDOSCOPY, COLON, DIAGNOSTIC      EYE SURGERY Bilateral     cataracts    HERNIA REPAIR      umbilical    KNEE ARTHROSCOPY Right     OTHER SURGICAL HISTORY Right 12 29 15    wound care graft procedure foot,appl of integra    PACEMAKER PLACEMENT  2011    WITH DEFIBRILLATOR    TOE AMPUTATION Right     R great       Current Medications:    Current Facility-Administered Medications   Medication Dose Route Frequency Provider Last Rate Last Admin    linezolid (ZYVOX) IVPB 600 mg  600 mg IntraVENous Q12H Ahmet Gomez MD   Stopped at 03/08/22 0331    sodium chloride flush 0.9 % injection 5-40 mL  5-40 mL IntraVENous 2 times per day Alivia Camarillo, DO   10 mL at 03/07/22 2012    sodium chloride flush 0.9 % injection 5-40 mL  5-40 mL IntraVENous PRN Lucas T Satya, DO        0.9 % sodium chloride infusion  25 mL IntraVENous PRN Lucas T Satya, DO        acetaminophen (TYLENOL) tablet 650 mg  650 mg Oral Q4H PRN Lucas T Satya, DO   650 mg at 03/07/22 2354    ondansetron (ZOFRAN-ODT) disintegrating tablet 4 mg  4 mg Oral Q8H PRN Lucas T Satya, DO        Or    ondansetron (ZOFRAN) injection 4 mg  4 mg IntraVENous Q6H PRN Lucas T Satya, DO        atorvastatin (LIPITOR) tablet 40 mg  40 mg Oral Daily Lucas T Satya, DO   40 mg at 03/07/22 2009    carvedilol (COREG) tablet 6.25 mg  6.25 mg Oral BID  Lucas T Satya, DO   6.25 mg at 03/07/22 2009    glipiZIDE (GLUCOTROL) tablet 10 mg  10 mg Oral QAM  Lucas T Satya, DO   10 mg at 03/08/22 0541    insulin lispro (HUMALOG) injection vial 0-12 Units  0-12 Units SubCUTAneous TID  Lucas T Satya, DO  insulin lispro (HUMALOG) injection vial 0-6 Units  0-6 Units SubCUTAneous Nightly Lucas T Satya, DO        enoxaparin (LOVENOX) injection 80 mg  1 mg/kg (Adjusted) SubCUTAneous Daily Lucas T Satya, DO   80 mg at 03/07/22 2020    glucose (GLUTOSE) 40 % oral gel 15 g  15 g Oral PRN Lucas T Satya, DO        dextrose 50 % IV solution  12.5 g IntraVENous PRN Lucas T Satya, DO        glucagon (rDNA) injection 1 mg  1 mg IntraMUSCular PRN Lucas T Satya, DO        dextrose 5 % solution  100 mL/hr IntraVENous PRN Lucas T Satya, DO           Allergies:  Pcn [penicillins]    Social History:    reports that he has never smoked. He has never used smokeless tobacco. He reports current alcohol use. He reports that he does not use drugs.     Family History:   Family History   Problem Relation Age of Onset   [facility] Cancer Father         pancreatic cancer    Other Brother         MI    Osteoarthritis Mother     Other Maternal Grandfather         MI       REVIEW OF SYSTEMS:  RESPIRATORY:  negative for  dry cough, dyspnea, wheezing and chest pain   CARDIOVASCULAR:  negative for  chest pain, dyspnea, palpitations, orthopnea, exertional chest pressure/discomfort, fatigue, edema, syncope   GASTROINTESTINAL:  negative for nausea, vomiting, change in bowel habits, diarrhea, constipation, abdominal pain and reflux   GENITOURINARY:  negative for frequency, dysuria, nocturia, urinary incontinence and hesitancy positive for   HEMATOLOGIC/LYMPHATIC:  negative for easy bruising, bleeding and swelling/edemapositive for   ENDOCRINE:  negative for weight changes, change in bowel habits and diabetic symptoms including neither polyuria nor polydipsia nor blurred vision nor foot ulcerations nor neuropathy  MUSCULOSKELETAL:  negative for  myalgias, arthralgias, pain, joint swelling, stiff joints and muscle weakness   NEUROLOGICAL:  negative for headaches, dizziness, memory problems, speech problems, visual disturbance, gait problems, weakness and numbness     Vitals:  /65   Pulse 73   Temp 99.6 °F (37.6 °C) (Oral)   Resp 18   Ht 5' 11\" (1.803 m)   Wt 198 lb 13.7 oz (90.2 kg)   SpO2 96%   BMI 27.73 kg/m²     PHYSICAL EXAM:    CONSTITUTIONAL:  awake, alert, cooperative, no apparent distress, and appears stated age  EYES:  Lids and lashes normal, pupils equal, round and reactive to light, extra ocular muscles intact, sclera clear, conjunctiva normal   ENT:  Normocephalic, without obvious abnormality, atraumatic, sinuses nontender on palpation, external ears without lesions, oral pharynx with moist mucus membranes, tonsils without erythema or exudates,    NECK:  Supple, symmetrical, trachea midline, no adenopathy, thyroid symmetric, not enlarged and no tenderness, skin normal   HEMATOLOGIC/LYMPHATICS:  no cervical lymphadenopathy   BACK:  Symmetric, no curvature, spinous processes are non-tender on palpation, paraspinous muscles are non-tender on palpation, no costal vertebral tenderness  LUNGS:  No increased work of breathing, good air exchange, clear to auscultation bilaterally, no crackles or wheezing   CARDIOVASCULAR:  Normal apical impulse, regular rate and rhythm, normal S1 and S2, no S3 or S4, and no murmur noted    ABDOMEN:  No scars, normal bowel sounds, soft, non-distended, non-tender, no masses palpated, no hepatosplenomegally    GENITAL/URINARY:    MUSCULOSKELETAL: Right foot has amputation of the right big toe and evidence of PVD changes    The left foot has cellulitis of the forefoot and erythema slight swelling his second toe is very gangrenous. NEUROLOGIC:  Awake, alert, oriented to name, place and time. Cranial nerves II-XII are grossly intact. Motor is 5 out of 5 bilaterally.     Sensory is intact. gait is normal.     SKIN:  no bruising or bleeding, normal skin color, texture, turgor, no redness, warmth, or swelling and no rashes    RECENT DATA:  No results found for: CBC, BMP    DATA:    Time Davina Sterlign Orders] Results     Component Value Units   C-Reactive Protein [[id number]] (Abnormal)    Collected: 03/08/22 0543    Updated: 03/08/22 0742    Specimen Source: Blood     CRP 87.8 High  mg/L   Sedimentation Rate [[id number]] (Abnormal)    Collected: 03/08/22 0543    Updated: 03/08/22 0728    Specimen Type: Blood     Sed Rate 63 High  mm   Basic Metabolic Panel [[id number]] (Abnormal)    Collected: 03/08/22 0543    Updated: 03/08/22 0651    Specimen Source: Blood     Glucose 119 High  mg/dL    BUN 33 High  mg/dL    CREATININE 2.13 High  mg/dL    Calcium 9.0 mg/dL    Sodium 132 Low  mmol/L    Potassium 3.5 Low  mmol/L    Chloride 94 Low  mmol/L    CO2 25 mmol/L    Anion Gap 13 mmol/L    GFR Non-African American 31 Low  mL/min    GFR  38 Low  mL/min    GFR Comment         Comment: Average GFR for 61-76 years old:    85 mL/min/1.73sq m   Chronic Kidney Disease:    <60 mL/min/1.73sq m   Kidney failure:    <15 mL/min/1.73sq m               eGFR calculated using average adult body mass.  Additional eGFR calculator available at:         [email or web address]             CBC with Auto Differential [[id number]] (Abnormal)    Collected: 03/08/22 0543    Updated: 03/08/22 0636    Specimen Source: Blood     WBC 7.0 k/uL    RBC 3.52 Low  m/uL    Hemoglobin 11.2 Low  g/dL    Hematocrit 32.0 Low  %    MCV 90.7 fL    MCH 31.8 pg    MCHC 35.1 g/dL    RDW 14.3 %    Platelets 117 k/uL    MPV 8.5 fL    Seg Neutrophils 84 High  %    Lymphocytes 8 Low  %    Monocytes 8 High  %    Eosinophils % 0 %    Basophils 0 %    Segs Absolute 5.90 k/uL    Absolute Lymph # 0.60 Low  k/uL    Absolute Mono # 0.50 k/uL    Absolute Eos # 0.00 k/uL    Basophils Absolute 0.00 k/uL   POC Glucose Fingerstick [[id number]] (Abnormal)    Collected: 03/08/22 0603    Updated: 03/08/22 0610     POC Glucose 126 High  mg/dL   Culture, Blood 1 [[id number]]    Collected: 03/07/22 1247    Updated: 03/08/22 0542    Specimen Source: Blood     Specimen Description . BLOOD LT AC. UNK VOL. Culture NO GROWTH 12 HOURS   Culture, Blood 2 [[id number]]    Collected: 03/07/22 1255    Updated: 03/08/22 0541    Specimen Source: Blood     Specimen Description . BLOOD LT WRIST. UNK VOL. Culture NO GROWTH 12 HOURS   POC Glucose Fingerstick [[id number]] (Abnormal)    Collected: 03/07/22 1933    Updated: 03/07/22 1939     POC Glucose 164 High  mg/dL   CT FOOT LEFT WO CONTRAST [[id number]]    Collected: 03/07/22 1606    Updated: 03/07/22 1938    Narrative:     EXAMINATION:   CT OF THE LEFT FOOT WITHOUT CONTRAST 3/7/2022 4:00 pm     TECHNIQUE:   CT of the left foot was performed without the administration of intravenous   contrast.  Multiplanar reformatted images are provided for review. Dose   modulation, iterative reconstruction, and/or weight based adjustment of the   mA/kV was utilized to reduce the radiation dose to as low as reasonably   achievable. COMPARISON:   Left foot radiographs 03/07/2022. HISTORY   ORDERING SYSTEM PROVIDED HISTORY: r/o osteo v gas   TECHNOLOGIST PROVIDED HISTORY:   r/o osteo v gas   Reason for Exam: diabetic wound on left foot     FINDINGS:   The distal tibia and fibula are intact.  No syndesmotic widening.  The ankle   mortise is intact.  Avascular necrosis of the talar dome without subchondral   collapse.  Mild tibiotalar degenerative changes.  The subtalar joint is   unremarkable.  Small retrocalcaneal and plantar calcaneal enthesophytes.  The   talonavicular joint is unremarkable.  Mild calcaneocuboid degenerative   changes.  Normal midfoot alignment is maintained.  No Lisfranc interval   widening.  No significant midfoot degenerative changes.  Status post 1st toe   amputation.  Mild degenerative changes at the articulation between the 1st   metatarsal head and hallux sesamoids.  No fracture or dislocation.  No   osseous erosion.      Diffuse subcutaneous edema.  No drainable fluid collection or soft tissue gas identified.  Extensive atherosclerotic vascular calcifications.  The   visualized tendons are grossly intact. Impression:     1. No convincing evidence of osteomyelitis or other acute osseous   abnormality.  If there is persistent clinical concern for osteomyelitis   further evaluation could be obtained with MRI. 2. Extensive subcutaneous edema compatible with bland edema or cellulitis. No soft tissue gas or drainable fluid collection identified. Basic Metabolic Panel w/ Reflex to MG [[id number]] (Abnormal)    Collected: 03/07/22 1256    Updated: 03/07/22 1523    Specimen Source: Blood     Glucose 140 High  mg/dL    BUN 31 High  mg/dL    CREATININE 2.10 High  mg/dL    Calcium 9.5 mg/dL    Sodium 131 Low  mmol/L    Potassium 3.4 Low  mmol/L    Chloride 90 Low  mmol/L    CO2 25 mmol/L    Anion Gap 16 mmol/L    GFR Non-African American 32 Low  mL/min    GFR  38 Low  mL/min    GFR Comment         Comment: Average GFR for 61-76 years old:    85 mL/min/1.73sq m   Chronic Kidney Disease:    <60 mL/min/1.73sq m   Kidney failure:    <15 mL/min/1.73sq m               eGFR calculated using average adult body mass.  Additional eGFR calculator available at:         [employer]             Magnesium [[id number]]    Collected: 03/07/22 1256    Updated: 03/07/22 1523     Magnesium 1.6 mg/dL   Lactate, Sepsis [[id number]]    Collected: 03/07/22 1435    Updated: 03/07/22 1454    Specimen Source: Blood     Lactic Acid, Sepsis 1.7 mmol/L   C-Reactive Protein [[id number]] (Abnormal)    Collected: 03/07/22 1256    Updated: 03/07/22 1332    Specimen Source: Blood     CRP 61.1 High  mg/L   Lactate, Sepsis [[id number]] (Abnormal)    Collected: 03/07/22 1257    Updated: 03/07/22 1318    Specimen Source: Blood     Lactic Acid, Sepsis 3.1 High  mmol/L   APTT [[id number]]    Collected: 03/07/22 1256    Updated: 03/07/22 1315    Specimen Source: Blood     PTT 35.6 sec    Comment:        IV Heparin Therapy Range:       62.0-94.0             Protime-INR [[id number]] (Abnormal)    Collected: 03/07/22 1256    Updated: 03/07/22 1314    Specimen Source: Blood     Protime 16.7 High  sec    INR 1.4    Comment:        Non-therapeutic Range:      INR = 0.9-1.2   Therapeutic Range:    Moderate Anticoagulant Intensity:      INR = 2.0-3.0    High Anticoagulant Intensity:      INR = 2.5-3. 5             Sedimentation Rate [[id number]] (Abnormal)    Collected: 03/07/22 1256    Updated: 03/07/22 1312    Specimen Type: Blood     Sed Rate 21 High  mm   CBC with Auto Differential [[id number]] (Abnormal)    Collected: 03/07/22 1256    Updated: 03/07/22 1305    Specimen Source: Blood     WBC 7.3 k/uL    RBC 3.58 Low  m/uL    Hemoglobin 11.4 Low  g/dL    Hematocrit 32.6 Low  %    MCV 91.2 fL    MCH 31.8 pg    MCHC 34.9 g/dL    RDW 14.4 %    Platelets 804 k/uL    MPV 7.7 fL    Seg Neutrophils 87 High  %    Lymphocytes 6 Low  %    Monocytes 6 %    Eosinophils % 0 %    Basophils 1 %    Segs Absolute 6.40 k/uL    Absolute Lymph # 0.50 Low  k/uL    Absolute Mono # 0.40 k/uL    Absolute Eos # 0.00 k/uL    Basophils Absolute 0.10 k/uL   XR FOOT LEFT (MIN 3 VIEWS) [[id number]]    Collected: 03/07/22 1244    Updated: 03/07/22 130    Narrative:     EXAMINATION:   THREE XRAY VIEWS OF THE LEFT FOOT     3/7/2022 9:26 am     COMPARISON:   None. HISTORY:   ORDERING SYSTEM PROVIDED HISTORY: infection   TECHNOLOGIST PROVIDED HISTORY:   infection   Reason for Exam: left foot pain/ infection to distal foot and 2nd digit     FINDINGS:   Mild irregularity and demineralization at the distal tuft of the 2nd toe with   soft tissue swelling of the digit.  Prior amputation of the great toe. Suggestion of a small erosion at the medial aspect of the 1st metatarsal   head.  Degenerative changes at the ankle and midfoot which are overall mild.    Tiny posterior and plantar calcaneal enthesophytes.  Arterial vascular   calcifications throughout with sheet like dermal calcifications in the imaged   portion of the lower leg.  Forefoot soft tissue swelling. Impression:     Findings suspicious for possible early osteomyelitis at the 2nd toe distal   phalanx. Small area of erosive change at the 1st metatarsal head, though the features   are more suggestive possible underlying erosive or inflammatory arthropathy   rather than osteomyelitis, though clinical correlation is required. Soft tissue swelling of the 2nd toe at the forefoot. Current  Meds            ASSESSMENT:  Patient Active Problem List   Diagnosis Code    Diabetic foot infection ([facility] E11.628, L08.9    Diabetes ([address] E11.9    Chronic osteomyelitis ([facility] M86.60    Diabetic foot ulcer ([facility] E11.621, L97.509    PVD (peripheral vascular disease) ([facility] I73.9    Atherosclerosis of coronary artery without angina pectoris I25.10    Cardiomyopathy ([facility] I42.9    Cardiac left ventricular ejection fraction 21-40 percent R94.30    Diabetes mellitus type 2 with peripheral artery disease ([facility] E11.51    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 ([facility] L97.514    Chronic osteomyelitis of left foot ([facility]) I77.749    Onychomycosis B35.1    Heart failure ([facility]) I50.9     · Cellulitis of left foot  ·   · Gangrenous left big toe questionable osteomyelitis no evidence of that on CT  ·   · Renal insufficiency and probably chronic because that MRI probably would not be done secondary to requirement of IV dye  ·   · Diabetes mellitus  ·   · History of peripheral vascular disease  ·   · History of previous amputations  ·   · Elevated inflammatory markers consistent with his infection  · Mild hypokalemia    PLAN:  · Start IV fluid normal saline 70 mL an hour  ·   · We will replace his potassium with 20 mEq p.o.   ·   · We will consult vascular  ·   · We will consult ID  ·   · Continue with cefepime Flagyl and Zyvox for now  ·   · We will await podiatry input  · Consult nephrology as well  · Patient might require amputation of the second toe        Electronically signed by JOSE Alba on 3/8/2022 at 8:01 [address], [id number]

## 2022-03-08 NOTE — CONSULTS
[facility] -   Infectious diseases evaluation  admission date 3/7/2022    reason for consultation:   Left second toe gangrene and cellulitis    Impression :   Current:  · MRSA bacteremia 2/2 suspected osteomyelitis of the left 2nd toe r/o infective endocarditis  · Blood culture 2/2 growing gram-positive cocci in clusters  · Preliminary report shows 1/2 blood cultures growing MRSA  · Gangrene left 2nd toe  · Cellulitis left foot  · S/p bilateral great big toe amputation  · Type 2 diabetes mellitus  · BRIANNA on CKD stage IIIb  · PVD  · CHF  · S/p CABG  · A. fib on warfarin  · Essential hypertension  · LA 3.1-->1.7  · Elevated ESR 21-->63  · Elevated CRP 61.1-->87.8    Other:  ·   Discussion / summary of stay / plan of care   -Blood culture 2/2 growing gram-positive cocci in clusters 3/7/22 likely MRSA  -Podiatry: No acute surgical intervention    Recommendations   · Suspected MRSA bacteremia - r/o infective endocarditis  · Left foot 2nd toe gangrene -we'll discuss case with podiatry  · F/u vascular input  · F/u ECHO, VL lower extremity arterial segmental pressures  · DC Lipitor  · LFTs WNL  · We will DC IV Zyvox and start IV Daptomycin pharmacy to dose  · Continue IV cefepime and IV Flagyl  · Follow-up cultures and sensitivities  · CBC  · Monitor renal function    Infection Control Recommendations   · Vista Precautions  · Contact Isolation   · Airborne isolation  · Droplet Isolation  · George discontinuation  · Central line discontinuation    Antimicrobial Stewardship Recommendations   · Simplification of therapy  · Targeted therapy  · IV to oral conversion  · PK dosing  · Restricted antimicrobial use  · Discontinuation of therapy    Coordination ofOutpatient Care:   · Estimated Length of IV antimicrobials:  · Patient will need Midline / picc Catheter Insertion:   · Patient will need SNF:  · Patient will need outpatient wound care:     History of Present Illness: Initial history:  Edyta Gutierrez is a 78y.o.-year-old male with a PMH significant for type 2 diabetes mellitus, CKD stage IIIb,  PVD, s/p CABG, CHF, A. fib on Coumadin, essential hypertension, and s/p bilateral great big toe amputation who presents to [facility] on 3/7/2022 and is admitted for the management of left second toe gangrene and cellulitis. Labs upon presentation were remarkable for , K3.4, creatinine 2.10, LA 3.1--->1.7, CRP 61.1--->87.8, WBC 7.3, ESR 21--->63. X-ray left foot showed suspicion for possible early osteomyelitis at the second toe distal phalanx. It also showed soft tissue swelling of the second toe at the forefoot. MRI of the foot was not possible because patient has an ICD. Therefore, CT of the left foot was taken which showed no convincing evidence of osteomyelitis or other acute osseous abnormality. There was extensive subcutaneous edema compatible with bland edema or cellulitis. No soft tissue gas or drainable fluid collection identified. Blood cultures 1 and 2 positive for gram-positive cocci in clusters with at least 1 blood culture showing MRSA in preliminary report. Hence, infectious disease consultation. Interval changes  3/8/2022   Patient Vitals for the past 8 hrs:   BP Temp Temp src Pulse Resp SpO2   03/08/22 1200 (!) 117/55 98.6 °F (37 °C) Oral 73 16 99 %       Summary of relevant labs:    Labs: LA 3.1--->1.7  CRP 61.1--->87.8  ESR 21--->63  Creatitine 2.10---2.13    Micro: Blood culture 1& 2 positive for gram-positive cocci in clusters 3/7/22. At least one blood culture shows MRSA in preliminary report. -UA pending  -ALT 19, AST 23, alk phos 112, TB 1.55    Imaging:    3/7/22 XR left foot: shows suspicion for possible early osteomyelitis at the second toe distal phalanx. Soft tissue swelling of the second toe at the forefoot. 3/7/22 CT left foot: No convincing evidence of osteomyelitis or other acute osseous abnormality.   Extensive subcutaneous edema compatible with benign edema or cellulitis. No soft tissue gas or drainable fluid collection identified. VL duplex arterial pending     ECHO pending        I have personally reviewed the past medical history, past surgical history, medications, social history, and family history, and I haveupdated the database accordingly. Allergies:   Pcn [penicillins]     Review of Systems:     Review of Systems   Constitutional: Negative for activity change, appetite change and fever. HENT: Negative for congestion. Respiratory: Negative for cough, chest tightness, shortness of breath and wheezing. Cardiovascular: Negative for chest pain. Gastrointestinal: Negative for abdominal pain, constipation, diarrhea and nausea. Genitourinary: Negative for difficulty urinating. Musculoskeletal: Negative for joint swelling. Skin: Positive for color change and wound. Neurological: Negative for dizziness, weakness, light-headedness, numbness and headaches. Psychiatric/Behavioral: Negative for agitation and confusion. Physical Examination :       Physical Exam  Constitutional:       General: He is not in acute distress. Appearance: Normal appearance. He is not ill-appearing. Cardiovascular:      Rate and Rhythm: Normal rate and regular rhythm. Heart sounds: No murmur heard. No gallop. Pulmonary:      Effort: Pulmonary effort is normal. No respiratory distress. Breath sounds: Normal breath sounds. No wheezing. Abdominal:      General: Bowel sounds are normal. There is no distension. Tenderness: There is no abdominal tenderness. There is no guarding or rebound. Musculoskeletal:         General: No swelling or deformity. Comments: S/p bilateral great big toe amputation. Left foot second toe gangrenous. Cellulitis of the left foot erythematous and warm. Negative TTP   Neurological:      General: No focal deficit present.       Mental Status: He is alert and oriented to person, place, and time. Psychiatric:         Mood and Affect: Mood normal.         Thought Content:  Thought content normal.         Past Medical History:     Past Medical History:   Diagnosis Date    [facility]rial fibrillation [facility] [address]     CAD (coronary artery disease)     [facility]ive [facility]     Diabetes mellitus ([facility] [address]     Hyperlipidemia     Hypertension        Past Surgical  History:     Past Surgical History:   Procedure Laterality Date    CARDIAC SURGERY  2010    CABG X3    COLONOSCOPY      DEBRIDEMENT Right 11/19/2015    DEBRIDEMENT WOUND FOOT RIGHT W/ APPLICATION BILAT INTEG RAT GRAFT    ENDOSCOPY, COLON, DIAGNOSTIC      EYE SURGERY Bilateral     cataracts    HERNIA REPAIR      umbilical    KNEE ARTHROSCOPY Right     OTHER SURGICAL HISTORY Right 12 29 15    wound care graft procedure foot,appl of integra    PACEMAKER PLACEMENT  2011    WITH DEFIBRILLATOR    TOE AMPUTATION Right     R great       Medications:      cefepime  1,000 mg IntraVENous Q12H    metroNIDAZOLE  500 mg IntraVENous Q8H    [START ON 3/9/2022] enoxaparin  30 mg SubCUTAneous Daily    daptomycin (CUBICIN) IVPB  6 mg/kg (Ideal) IntraVENous Q24H    sodium chloride flush  5-40 mL IntraVENous 2 times per day    carvedilol  6.25 mg Oral BID WC    glipiZIDE  10 mg Oral QAM AC    insulin lispro  0-12 Units SubCUTAneous TID WC    insulin lispro  0-6 Units SubCUTAneous Nightly       Social History:     Social History     Socioeconomic History    Marital status: Single     Spouse name: Not on file    Number of children: Not on file    Years of education: Not on file    Highest education level: Not on file   Occupational History    Not on file   Tobacco Use    Smoking status: Never Smoker    Smokeless tobacco: Never Used   Substance and Sexual Activity    Alcohol use: Yes     Comment: SOCIAL on weekends    Drug use: No    Sexual activity: Not on file   Other Topics Concern    Not on file Social History Narrative    Not on file     Social Determinants of Health     Financial Resource Strain:     Difficulty of Paying Living Expenses: Not on file   Food Insecurity:     Worried About Running Out of Food in the Last Year: Not on file    Tee of Food in the Last Year: Not on file   Transportation Needs:     Lack of Transportation (Medical): Not on file    Lack of Transportation (Non-Medical):  Not on file   Physical Activity:     Days of Exercise per Week: Not on file    Minutes of Exercise per Session: Not on file   Stress:     Feeling of Stress : Not on file   Social Connections:     Frequency of Communication with Friends and Family: Not on file    Frequency of Social Gatherings with Friends and Family: Not on file    Attends Zoroastrian Services: Not on file    Active Member of [address] [place] [address] [employer] or Organizations: Not on file    Attends Club or Organization Meetings: Not on file    Marital Status: Not on file   Intimate Partner Violence:     Fear of Current or Ex-Partner: Not on file    Emotionally Abused: Not on file    Physically Abused: Not on file    Sexually Abused: Not on file   Housing Stability:     Unable to Pay for Housing in the Last Year: Not on file    Number of Places Lived in the Last Year: Not on file    Unstable Housing in the Last Year: Not on file       Family History:     Family History   Problem Relation Age of Onset    Cancer Father         pancreatic cancer    Other Brother         MI    Osteoarthritis Mother     Other Maternal Grandfather         MI      Medical Decision Making:   I have independently reviewed/ordered the following labs:    CBC with Differential:   Recent Labs     03/07/22  1256 03/08/22  0543   WBC 7.3 7.0   HGB 11.4* 11.2*   HCT 32.6* 32.0*    178   LYMPHOPCT 6* 8*   MONOPCT 6 8*     BMP:  Recent Labs     03/07/22  1256 03/08/22  0543   * 132*   K 3.4* 3.5*   CL 90* 94*   CO2 25 25   BUN 31* 33*   CREATININE 2.10* 2.13*   MG 1.6 1.7     Hepatic Function Panel:   Recent Labs     03/08/22  0543   PROT 6.7   LABALBU 3.6   BILIDIR 0.83*   IBILI 0.72   BILITOT 1.55*   ALKPHOS 112   ALT 19   AST 23     No results for input(s): RPR in the last 72 hours. No results for input(s): HIV in the last 72 hours. No results for input(s): BC in the last 72 hours. Lab Results   Component Value Date    CREATININE 2.13 03/08/2022    GLUCOSE 119 03/08/2022    GLUCOSE 335 12/28/2011       Detailed results: Thank you for allowing us to participate in the care of this patient. Please call with questions. This note is created with the assistance of a speech recognition program.  While intending to generate adocument that actually reflects the content of the visit, the document can still have some errors including those of syntax and sound a like substitutions which may escape proof reading. It such instances, actual meaningcan be extrapolated by contextual diversion. Nery Perdomo MD   Attending Physician Statement  I have discussed the care of the patient, including pertinent history and exam findings,  with the resident. I have reviewed the key elements of all parts of the encounter with the resident. I agree with the assessment, plan and orders as documented by the resident.     Lidia Powell MD    Office: [phone]  Perfect serve / office [phone]

## 2022-03-08 NOTE — PROGRESS NOTES
Progress Note  Podiatric Medicine and Surgery     Subjective     CC: left foot wound, cellulitis    Patient seen and examined at bedside. Vital signs, labs, and imaging reviewed  No acute events overnight. Afebrile, vital signs stable, slight hypotension  Podiatry will sign off and patient at this point wound care orders have been put in for Betadine to be painted on the right second digit daily    HPI :  Zoila Rice is a 78 y.o. male seen at [facility] ER for left 2nd digit wound and cellulitis. Patient is a type 2 diabetic with history of PVD. He has had previous amputations of hallux of bilateral feet, and partial 2nd digit amputation. Per patient, he was on the way to doctor appoitnment today and was feeling fatigued and unwell, was told by staff at PCP to present to ED. Denies any trauma to left foot, reports started noticing worsening discoloration of 2nd digit and feeling of general malaise over the weekend. Denies any further pedal complaints at this time.       PCP is Vincent Xiong DO    ROS: Denies N/V/F/C/SOB/CP. Otherwise negative except at stated in the HPI.      Medications:  Scheduled Meds:   linezolid  600 mg IntraVENous Q12H    sodium chloride flush  5-40 mL IntraVENous 2 times per day    atorvastatin  40 mg Oral Daily    carvedilol  6.25 mg Oral BID WC    glipiZIDE  10 mg Oral QAM AC    insulin lispro  0-12 Units SubCUTAneous TID WC    insulin lispro  0-6 Units SubCUTAneous Nightly    enoxaparin  1 mg/kg (Adjusted) SubCUTAneous Daily       Continuous Infusions:   sodium chloride      dextrose         PRN Meds:sodium chloride flush, sodium chloride, acetaminophen, ondansetron **OR** ondansetron, glucose, dextrose, glucagon (rDNA), dextrose    Objective     Vitals:  Patient Vitals for the past 8 hrs:   BP Temp Pulse Resp SpO2 Weight   03/08/22 0604 (!) 114/56 98.2 °F (36.8 °C) 68 18 96 % --   03/08/22 0236 -- 98.5 °F (36.9 °C) -- -- -- --   03/07/22 2331 -- -- -- -- -- 198 lb 13.7 oz (90.2 kg)   22 2325 137/81 102 °F (38.9 °C) 86 18 93 % --     Average, Min, and Max for last 24 hours Vitals:  TEMPERATURE:  Temp  Av.1 °F (37.3 °C)  Min: 98.1 °F (36.7 °C)  Max: 102 °F (38.9 °C)    RESPIRATIONS RANGE: Resp  Av.8  Min: 12  Max: 25    PULSE RANGE: Pulse  Av.9  Min: 68  Max: 97    BLOOD PRESSURE RANGE:  Systolic (85WLK), JZV:517 , Min:106 , WAZ:483   ; Diastolic (33BJM), FXR:39, Min:55, Max:81      PULSE OXIMETRY RANGE: SpO2  Av.3 %  Min: 93 %  Max: 100 %    I/O last 3 completed shifts:  In: -   Out: 200 [Urine:200]    CBC:  Recent Labs     22  1256 22  0543   WBC 7.3 7.0   HGB 11.4* 11.2*   HCT 32.6* 32.0*    178   CRP 61.1*  --         BMP:  Recent Labs     22  1256 22  0543   * 132*   K 3.4* 3.5*   CL 90* 94*   CO2 25 25   BUN 31* 33*   CREATININE 2.10* 2.13*   GLUCOSE 140* 119*   CALCIUM 9.5 9.0        Coags:  Recent Labs     22  1256   APTT 35.6   INR 1.4       Lab Results   Component Value Date    SEDRATE 21 (H) 2022     Recent Labs     22  1256   CRP 61.1*       Physical Exam:  Vascular: DP and PT pulses are non-palpable, bilateral. Audible waveform on doppler. CFT >3 seconds to all digits. Hair growth is absent to the level of the digits. Mild nonpitting edema localized to dorsum of left foot and 2nd digit of left foot.     Neuro: Saph/sural/SP/DP/plantar sensation intact to light touch.     Musculoskeletal: Muscle strength is adequate ROM, adequate strength to all lower extremity muscle groups. Gross deformity is previous hallux amputation of bilateral LE, partial 2nd digit amputation, right foot. .     Dermatologic: Unstageable chronic ulceration to 2nd digit, left foot with necrotic changes to distal tip. Pinpoint sanguinous drainage noted to distal aspect of wound. No malodor, no purulent drainage noted. Erythema overlying dorsal midfoot, left. No fluctuance,mild induration.   Interdigital maceration absent.        Clinical Images:          Imaging:   CT FOOT LEFT WO CONTRAST   Final Result   1. No convincing evidence of osteomyelitis or other acute osseous   abnormality. If there is persistent clinical concern for osteomyelitis   further evaluation could be obtained with MRI. 2. Extensive subcutaneous edema compatible with bland edema or cellulitis. No soft tissue gas or drainable fluid collection identified. XR FOOT LEFT (MIN 3 VIEWS)   Final Result   Findings suspicious for possible early osteomyelitis at the 2nd toe distal   phalanx. Small area of erosive change at the 1st metatarsal head, though the features   are more suggestive possible underlying erosive or inflammatory arthropathy   rather than osteomyelitis, though clinical correlation is required. Soft tissue swelling of the 2nd toe at the forefoot. VL Lower Extremity Arterial Segmental Pressures W Ppg    (Results Pending)       Cultures:  NGTD    Assessment   Amy Uiras is a 78 y.o. male with   1. DMII with non-healing ulceration down to muscle  2. Osteomyelitis left second toe  3. PVD  4. Cellulitis, left foot    Principal Problem:    Diabetic foot infection ([address]  Resolved Problems:    * No resolved hospital problems. *       Plan   · Patient examined and evaluated at bedside   · Treatment options discussed in detail with the patient  · X-rays and CT left foot reviewed. No signs of acute gas noted. No early OM noted on CT.  · No acute surgical intervention planned at this time. Will monitor clinical response to IV abx. Discussed with patient he will benefit from receiving IV antibiotic therapy at this time due to his prior history of amputations and high risk of healing secondary to PVD.  Patient is amenable to plan and open to an amputation as outpatient  · Pt has pacemaker in place therefore no MRI was possible  · NIVS ordered  · IV abx: Zyvox, cefepime, flagyl , to be continued upon admission  · ID recommendation appreciated  · Medical management per IM  · Dressing applied to Left foot: Betadine paint left second digit  · Podiatry will sign off at this time wound care orders are placed for daily application of Betadine to left second second digit to control any potential change from dry to wet gangrene. The patient understands the importance of maintaining a very dry environment around his left foot at all times  · Follow-up with Dr. Chris Dan within 1 week of discharge for possible planning of outpatient amputation of left second digit.   · WBAT to left lower extremity in surgical shoe  · Will discuss with Dr. Bonnie Craven DPM   Podiatric Medicine & Surgery   3/8/2022 at 7:19 AM

## 2022-03-08 NOTE — CARE COORDINATION
CASE MANAGEMENT NOTE:    Admission Date:  3/7/2022 Shantel Peraza is a 78 y.o.  male    Admitted for : Acute kidney injury [facility]) [N17.9]  Diabetic foot infection ([facility] [address] [E11.628, L08.9]    Met with:  Patient    PCP:  Sarbjit Shah                                Insurance:  [employer] Medicare      Is patient alert and oriented at time of discussion:  Yes    Current Residence/ Living Arrangements:  independently at home w/ Mom, Pt. Is a , for [address] PTA:  [employer], INR 1.4    Does patient go to outpatient dialysis: No  If yes, location and chair time: NA    Is patient agreeable to VNS: No    Freedom of choice provided:  Yes    List of [address] provided: No    VNS chosen:  No, Denies the need    DME:  none    Home Oxygen: No    Nebulizer: No    CPAP/BIPAP: No    Supplier: N/A    Potential Assistance Needed: No    SNF needed: No    Freedom of choice and list provided: NA    Pharmacy:  [facility]       Does Patient want to use MEDS to BEDS? No    Is patient currently receiving oral anticoagulation therapy? Yes    Is the Patient an [facility] [employer] with Readmission Risk Score greater than 14%? No  If yes, pt needs a follow up appointment made within 7 days. Family Members/Caregivers that pt would like involved in their care:    Yes    If yes, list name here:  Lilliana [place]arzamsted    Transportation Provider:  Patient             Discharge Plan:  3/8/22 [employer] Medicare Pt. Lives at home w/ Mom. Pt. Is a , for [employer]. No DME. Follows at [facility], INR 1.4. CR 2.13, [address], , Nephro, IV Zyvox/Cefepime/Flagyl, ID, Has Don Big Toes amp already. Podiatry/Vascular.  Denies needs, will follow closely//KB                 Electronically signed by: Edwin Ludwig RN on 3/8/2022 at 9:37 AM

## 2022-03-09 ENCOUNTER — APPOINTMENT (OUTPATIENT)
Dept: NON INVASIVE DIAGNOSTICS | Age: 67
DRG: 256 | End: 2022-03-09
Payer: MEDICARE

## 2022-03-09 LAB
ABSOLUTE EOS #: 0.04 K/UL (ref 0–0.4)
ABSOLUTE LYMPH #: 0.53 K/UL (ref 1–4.8)
ABSOLUTE MONO #: 0.49 K/UL (ref 0.1–1.3)
ALBUMIN SERPL-MCNC: 3.1 G/DL (ref 3.5–5.2)
ALP BLD-CCNC: 112 U/L (ref 40–129)
ALT SERPL-CCNC: 23 U/L (ref 5–41)
ANION GAP SERPL CALCULATED.3IONS-SCNC: 14 MMOL/L (ref 9–17)
ANTI DNA DOUBLE STRANDED: 0.8 IU/ML
ANTI-NUCLEAR ANTIBODY (ANA): NEGATIVE
AST SERPL-CCNC: 34 U/L
BASOPHILS # BLD: 1 % (ref 0–2)
BASOPHILS ABSOLUTE: 0.04 K/UL (ref 0–0.2)
BILIRUB SERPL-MCNC: 1.65 MG/DL (ref 0.3–1.2)
BILIRUBIN DIRECT: 1.12 MG/DL
BILIRUBIN, INDIRECT: 0.53 MG/DL (ref 0–1)
BUN BLDV-MCNC: 31 MG/DL (ref 8–23)
CALCIUM SERPL-MCNC: 8.7 MG/DL (ref 8.6–10.4)
CHLORIDE BLD-SCNC: 98 MMOL/L (ref 98–107)
CO2: 23 MMOL/L (ref 20–31)
CREAT SERPL-MCNC: 1.92 MG/DL (ref 0.7–1.2)
CULTURE: ABNORMAL
ENA ANTIBODIES SCREEN: 0.4 U/ML
EOSINOPHILS RELATIVE PERCENT: 1 % (ref 0–4)
GFR AFRICAN AMERICAN: 43 ML/MIN
GFR NON-AFRICAN AMERICAN: 35 ML/MIN
GFR SERPL CREATININE-BSD FRML MDRD: ABNORMAL ML/MIN/{1.73_M2}
GLUCOSE BLD-MCNC: 275 MG/DL (ref 75–110)
GLUCOSE BLD-MCNC: 50 MG/DL (ref 75–110)
GLUCOSE BLD-MCNC: 50 MG/DL (ref 75–110)
GLUCOSE BLD-MCNC: 54 MG/DL (ref 70–99)
GLUCOSE BLD-MCNC: 77 MG/DL (ref 75–110)
GLUCOSE BLD-MCNC: 80 MG/DL (ref 75–110)
GLUCOSE BLD-MCNC: 89 MG/DL (ref 75–110)
HCT VFR BLD CALC: 30.5 % (ref 41–53)
HEMOGLOBIN: 10.8 G/DL (ref 13.5–17.5)
LV EF: 38 %
LVEF MODALITY: NORMAL
LYMPHOCYTES # BLD: 14 % (ref 24–44)
MCH RBC QN AUTO: 31.8 PG (ref 26–34)
MCHC RBC AUTO-ENTMCNC: 35.5 G/DL (ref 31–37)
MCV RBC AUTO: 89.5 FL (ref 80–100)
MONOCYTES # BLD: 13 % (ref 1–7)
MORPHOLOGY: ABNORMAL
PDW BLD-RTO: 14.8 % (ref 11.5–14.9)
PLATELET # BLD: 143 K/UL (ref 150–450)
PMV BLD AUTO: 7.9 FL (ref 6–12)
POTASSIUM SERPL-SCNC: 3.8 MMOL/L (ref 3.7–5.3)
RBC # BLD: 3.41 M/UL (ref 4.5–5.9)
SEG NEUTROPHILS: 71 % (ref 36–66)
SEGMENTED NEUTROPHILS ABSOLUTE COUNT: 2.7 K/UL (ref 1.3–9.1)
SODIUM BLD-SCNC: 135 MMOL/L (ref 135–144)
SPECIMEN DESCRIPTION: ABNORMAL
SPECIMEN DESCRIPTION: ABNORMAL
TOTAL CK: 99 U/L (ref 39–308)
TOTAL PROTEIN: 6.3 G/DL (ref 6.4–8.3)
WBC # BLD: 3.8 K/UL (ref 3.5–11)

## 2022-03-09 PROCEDURE — 6360000004 HC RX CONTRAST MEDICATION: Performed by: INTERNAL MEDICINE

## 2022-03-09 PROCEDURE — 2500000003 HC RX 250 WO HCPCS: Performed by: FAMILY MEDICINE

## 2022-03-09 PROCEDURE — C8929 TTE W OR WO FOL WCON,DOPPLER: HCPCS

## 2022-03-09 PROCEDURE — 1200000000 HC SEMI PRIVATE

## 2022-03-09 PROCEDURE — 2580000003 HC RX 258: Performed by: INTERNAL MEDICINE

## 2022-03-09 PROCEDURE — 36415 COLL VENOUS BLD VENIPUNCTURE: CPT

## 2022-03-09 PROCEDURE — 6360000002 HC RX W HCPCS: Performed by: FAMILY MEDICINE

## 2022-03-09 PROCEDURE — 80048 BASIC METABOLIC PNL TOTAL CA: CPT

## 2022-03-09 PROCEDURE — 6360000002 HC RX W HCPCS: Performed by: INTERNAL MEDICINE

## 2022-03-09 PROCEDURE — 6370000000 HC RX 637 (ALT 250 FOR IP): Performed by: FAMILY MEDICINE

## 2022-03-09 PROCEDURE — 99233 SBSQ HOSP IP/OBS HIGH 50: CPT | Performed by: INTERNAL MEDICINE

## 2022-03-09 PROCEDURE — 80076 HEPATIC FUNCTION PANEL: CPT

## 2022-03-09 PROCEDURE — 85025 COMPLETE CBC W/AUTO DIFF WBC: CPT

## 2022-03-09 PROCEDURE — 82550 ASSAY OF CK (CPK): CPT

## 2022-03-09 PROCEDURE — 2580000003 HC RX 258: Performed by: FAMILY MEDICINE

## 2022-03-09 PROCEDURE — 51798 US URINE CAPACITY MEASURE: CPT

## 2022-03-09 PROCEDURE — 82947 ASSAY GLUCOSE BLOOD QUANT: CPT

## 2022-03-09 RX ADMIN — SODIUM CHLORIDE, PRESERVATIVE FREE 10 ML: 5 INJECTION INTRAVENOUS at 21:10

## 2022-03-09 RX ADMIN — CARVEDILOL 6.25 MG: 6.25 TABLET, FILM COATED ORAL at 17:26

## 2022-03-09 RX ADMIN — METRONIDAZOLE 500 MG: 500 INJECTION, SOLUTION INTRAVENOUS at 10:03

## 2022-03-09 RX ADMIN — SODIUM CHLORIDE: 9 INJECTION, SOLUTION INTRAVENOUS at 17:22

## 2022-03-09 RX ADMIN — METRONIDAZOLE 500 MG: 500 INJECTION, SOLUTION INTRAVENOUS at 17:26

## 2022-03-09 RX ADMIN — METRONIDAZOLE 500 MG: 500 INJECTION, SOLUTION INTRAVENOUS at 00:59

## 2022-03-09 RX ADMIN — CEFEPIME HYDROCHLORIDE 1000 MG: 1 INJECTION, POWDER, FOR SOLUTION INTRAMUSCULAR; INTRAVENOUS at 21:10

## 2022-03-09 RX ADMIN — ACETAMINOPHEN 650 MG: 325 TABLET, FILM COATED ORAL at 10:38

## 2022-03-09 RX ADMIN — GLIPIZIDE 10 MG: 5 TABLET ORAL at 06:09

## 2022-03-09 RX ADMIN — SODIUM CHLORIDE 500 MG: 9 INJECTION, SOLUTION INTRAVENOUS at 14:33

## 2022-03-09 RX ADMIN — CARVEDILOL 6.25 MG: 6.25 TABLET, FILM COATED ORAL at 08:36

## 2022-03-09 RX ADMIN — CEFEPIME HYDROCHLORIDE 1000 MG: 1 INJECTION, POWDER, FOR SOLUTION INTRAMUSCULAR; INTRAVENOUS at 08:36

## 2022-03-09 RX ADMIN — ENOXAPARIN SODIUM 30 MG: 100 INJECTION SUBCUTANEOUS at 09:51

## 2022-03-09 RX ADMIN — PERFLUTREN 3 MG: 6.52 INJECTION, SUSPENSION INTRAVENOUS at 09:42

## 2022-03-09 RX ADMIN — DEXTROSE 15 G: 15 GEL ORAL at 16:10

## 2022-03-09 ASSESSMENT — PAIN SCALES - GENERAL: PAINLEVEL_OUTOF10: 1

## 2022-03-09 ASSESSMENT — ENCOUNTER SYMPTOMS
DIARRHEA: 0
NAUSEA: 0
COLOR CHANGE: 1
WHEEZING: 0
COUGH: 0
SHORTNESS OF BREATH: 0
CONSTIPATION: 0
CHEST TIGHTNESS: 0
ABDOMINAL PAIN: 0

## 2022-03-09 NOTE — CONSULTS
VASCULAR SURGERY H&P      Subjective:    Mr. Jared Haji is a 78year old patient with a past medical history that includes Atrial fibrillation (on warfarin), CAD, CHF, diabetes, hypertension, lower extremity wounds with a history of left great toe amputation that follows with podiatry, and peripheral arterial disease. He presented on 3/7/22 with left foot pain and swelling. There was clinical suspicion for osteomyelitis, and CT demonstrated no clear abscess or gas, and showed subcutaneous edema consistent with cellulitis. He does havea history of chronic osteomyelitis of the left foot according to the documentation. He has MRSA bacteremia, and is being worked up for infective endocarditis in addition to suspicion of left second toe as a source. Currently, Mr. Jared Haji reports feeling \"tired\", stating that \"my sheets were soaked overnight\", and reporting mild discomfort associated with the left foot. Podiatry has evaluated Mr. Jared Haji and elected to monitor his progress on antibiotics. The patient is under the impression that no surgery is being planned. My understanding based on the documentation is that monitoring on antibiotics is being pursued as noted above with an eye toward outpatient follow up and potential future toe amputation. MONIQUE/PVR studies demonstrated dampened toe waveforms and inability to obtain MONIQUE at the ankles due to noncompressibility.     Objective:      Past Medical History  Past Medical History:   Diagnosis Date    Atrial fibrillation ([facility] [address]     CAD (coronary artery disease)     CHF (congestive heart failure) (HCC)     Diabetes mellitus ([facility] [facility]     Hyperlipidemia     Hypertension         Past Surgical History   Past Surgical History:   Procedure Laterality Date    CARDIAC SURGERY  2010    CABG X3    COLONOSCOPY      DEBRIDEMENT Right 11/19/2015    DEBRIDEMENT WOUND FOOT RIGHT W/ APPLICATION BILAT INTEG RAT GRAFT    ENDOSCOPY, COLON, DIAGNOSTIC      EYE SURGERY Bilateral     cataracts    HERNIA REPAIR      umbilical    KNEE ARTHROSCOPY Right     OTHER SURGICAL HISTORY Right 12 29 15    wound care graft procedure foot,appl of integra    PACEMAKER PLACEMENT  2011    WITH DEFIBRILLATOR    TOE AMPUTATION Right     R great        Social History  Social History     Socioeconomic History    Marital status: Single     Spouse name: Not on file    Number of children: Not on file    Years of education: Not on file    Highest education level: Not on file   Occupational History    Not on file   Tobacco Use    Smoking status: Never Smoker    Smokeless tobacco: Never Used   Substance and Sexual Activity    Alcohol use: Yes     Comment: SOCIAL on weekends    Drug use: No    Sexual activity: Not on file   Other Topics Concern    Not on file   Social History Narrative    Not on file     Social Determinants of Health     Financial Resource Strain:     Difficulty of Paying Living Expenses: Not on file   Food Insecurity:     Worried About Running Out of Food in the Last Year: Not on file    Tee of Food in the Last Year: Not on file   Transportation Needs:     Lack of Transportation (Medical): Not on file    Lack of Transportation (Non-Medical):  Not on file   Physical Activity:     Days of Exercise per Week: Not on file    Minutes of Exercise per Session: Not on file   Stress:     Feeling of Stress : Not on file   Social Connections:     Frequency of Communication with Friends and Family: Not on file    Frequency of Social Gatherings with Friends and Family: Not on file    Attends [employer] Services: Not on file    Active Member of Clubs or Organizations: Not on file    Attends Club or Organization Meetings: Not on file    Marital Status: Not on file   Intimate Partner Violence:     Fear of Current or Ex-Partner: Not on file    Emotionally Abused: Not on file    Physically Abused: Not on file    Sexually Abused: Not on file   Housing Stability:     Unable to Pay for Housing in the Last Year: Not on file    Number of Places Lived in the Last Year: Not on file    Unstable Housing in the Last Year: Not on file        Current Medications   [START ON 3/10/2022] daptomycin (CUBICIN) IVPB  6 mg/kg (Ideal) IntraVENous Q24H    daptomycin (CUBICIN) IVPB  500 mg IntraVENous Once    cefepime  1,000 mg IntraVENous Q12H    metroNIDAZOLE  500 mg IntraVENous Q8H    enoxaparin  30 mg SubCUTAneous Daily    sodium chloride flush  5-40 mL IntraVENous 2 times per day    carvedilol  6.25 mg Oral BID WC    glipiZIDE  10 mg Oral QAM AC    insulin lispro  0-12 Units SubCUTAneous TID WC    insulin lispro  0-6 Units SubCUTAneous Nightly        Allergies  Allergies   Allergen Reactions    Pcn [Penicillins] Rash        Labs  Recent Labs     03/09/22  0556 03/08/22  0543 03/07/22  1256   WBC 3.8 7.0 7.3   HGB 10.8* 11.2* 11.4*   HCT 30.5* 32.0* 32.6*   MCV 89.5 90.7 91.2   * 178 207     Lab Results   Component Value Date     03/09/2022    K 3.8 03/09/2022    CL 98 03/09/2022    CO2 23 03/09/2022    BUN 31 03/09/2022    CREATININE 1.92 03/09/2022    GLUCOSE 54 03/09/2022    GLUCOSE 335 12/28/2011    CALCIUM 8.7 03/09/2022      Lab Results   Component Value Date    CKTOTAL 99 03/09/2022    TROPONINI 0.04 08/23/2012     Lab Results   Component Value Date    ALT 23 03/09/2022    AST 34 03/09/2022    ALKPHOS 112 03/09/2022    BILITOT 1.65 (H) 03/09/2022          Imaging  MONIQUE/PVR/PPG, 3/8/22  ----------------------------------------------------------------    Electronically signed by Aria Banuelos(Interpreting    physician) on 03/08/2022 01:01 PM    ----------------------------------------------------------------       Findings:        Right Impression:                    Left Impression:    Normal waveforms at the thigh, calf  Normal waveforms at the thigh, calf    and ankle levels.                    and ankle levels.        Diminished digit waveforms.          Diminished digit waveforms.      MONIQUE could not be calculated due to   MONIQUE could not be calculated due to    non-compressibility.                 non-compressibility.       Allergies     - Allergy:Penicillin(Drug).     Velocities are measured in cm/s ; Diameters are measured in cm       Pressures   +------------+----+------------+---------+--------+------------+-----------+   !            !    ! Right       !         ! Left    !            !           !   +------------+----+------------+---------+--------+------------+-----------+   ! Location    !    !Pressure    !Ratio    !        !Pressure    !Ratio      !   +------------+----+------------+---------+--------+------------+-----------+   ! Ankle PT    !    !255         !1.71     !        !255         !1.71       !   +------------+----+------------+---------+--------+------------+-----------+   ! Ankle DP    !    !255         !1.71     !        !255         !1.71       !   +------------+----+------------+---------+--------+------------+-----------+         - Brachial Pressure:Right: 149. Left:149.         - MONIQUE:Right: 1.71. Left: 1.71. I/O  I/O last 3 completed shifts: In: 1 [P.O.:960; I.V.:10]  Out: 1150 [Urine:1150]  No intake/output data recorded. Vitals  Vitals:    03/09/22 1245   BP: 121/61   Pulse: 83   Resp: 18   Temp: 98 °F (36.7 °C)   SpO2: 97%          Physical Examination    General: Alert and oriented, appropriate in conversation. Resting comfortable in the bedside chair. Eyes: PERRL, EOMI, no scleral icterus or conjunctivitis  HENT: Edentulous. No facial droop noted, no bleeding or discharge from the ears or nares. CV: Irregularly irregular rhythm, 2/6 systolic murmur. Radial: 2+ bilaterally   Popliteal: Right: 2+, Left: 1+   Dorsalis Pedis: unable to appreciate   Posterior Tibial: unable to appreciate  Respiratory: No respiratory distress, effort normal  Extremities: History of left great toe amputation, well-healed. Able to move all extremities.   Skin: Erythema with calor over the dorsal aspect of the left foot with necrotic changes affecting the left second toe. Psych: Conversational content is appropriate and does not reflect aberrant mentation. Assessment:    Peripheral arterial disease    Plan:    MONIQUE/PVR studies demonstrate noncompressibility at the level of the ankles with dampened toe waveforms. We await the results of the arterial duplexes. Ultimately, we would remain available at podiatry's discretion. If they move forward with left second toe amputation and healing is poor, we would certainly be willing to evaluate him for revascularization options. Otherwise, we would defer decisions regarding toe amputations (or potentially any future need for more assertive intervention above the ankle) to their service. We would be happy to follow on an outpatient basis, and remain available to address any questions or concerns. Thank you for allowing us to participate in Mr. Denis Mc[facility], your referrals are sincerely appreciated.     Jonatan Patel PA-C  [facility] [place]  Daytime office/After-hours call #: [phone]  Pager: [phone]

## 2022-03-09 NOTE — PROGRESS NOTES
Patient's AM blood sugar this morning was 50. Writer went to check on patient and patient was sitting on the side of the bed eating breakfast. Given orange juice to drink as well. Recheck of blood sugar was 77. Patient had left for echo. Blood sugar at lunch time was 80. Before dinner check was again at 50. Patient given a tube of glutose gel, orange juice, and ice cream cup per his request. Recheck of blood sugar was 89. Patient up eating dinner at this time.

## 2022-03-09 NOTE — CARE COORDINATION
ONGOING DISCHARGE PLAN:    Patient is alert and oriented x4. Spoke with patient regarding discharge plan and patient confirms that plan is still to discharge to home with no needs  Vascular to see   Corom following for IV atbs   Continue to hold furosemide. Continue gentle hydration with IV fluid 0.9 normal saline at 70 mL/h. Will continue to follow for additional discharge needs.     Electronically signed by Jose Gillespie RN on 3/9/2022 at 2:49 PM

## 2022-03-09 NOTE — PROGRESS NOTES
[facility] -   Infectious diseases evaluation  admission date 3/7/2022    reason for consultation:   Left second toe gangrene and cellulitis    Impression :   Current:  · MRSA bacteremia 2/2 suspected osteomyelitis of the left 2nd toe   · Gangrene left 2nd toe  · Cellulitis left foot  · S/p bilateral great big toe amputation  · Type 2 diabetes mellitus  · BRIANNA on CKD stage IIIb  · PVD  · CHF  · S/p CABG  · A. fib on warfarin  · Essential hypertension      Recommendations        IV Daptomycin   · Continue IV cefepime and IV Flagyl  · Follow arterial Doppler. · Vascular surgery following  · The patient will need left second toe amputation. · Repeat blood cultures tomorrow  · Monitor renal function        History of Present Illness:   Initial history:  Parish Shirley is a 78y.o.-year-old male with a PMH significant for type 2 diabetes mellitus, CKD stage IIIb,  PVD, s/p CABG, CHF, A. fib on Coumadin, essential hypertension, and s/p bilateral great big toe amputation who presents to [facility] on 3/7/2022 and is admitted for the management of left second toe gangrene and cellulitis. Labs upon presentation were remarkable for , K3.4, creatinine 2.10, LA 3.1--->1.7, CRP 61.1--->87.8, WBC 7.3, ESR 21--->63. X-ray left foot showed suspicion for possible early osteomyelitis at the second toe distal phalanx. It also showed soft tissue swelling of the second toe at the forefoot. MRI of the foot was not possible because patient has an ICD. Therefore, CT of the left foot was taken which showed no convincing evidence of osteomyelitis or other acute osseous abnormality. There was extensive subcutaneous edema compatible with bland edema or cellulitis. No soft tissue gas or drainable fluid collection identified.      Interval changes  3/9/2022   He is afebrile today, denied significant pain, denied shortness of breath or cough, no other complaints. Patient Vitals for the past 8 hrs:   BP Temp Pulse Resp SpO2   03/09/22 0629 134/68 98.1 °F (36.7 °C) 68 18 99 %       Summary of relevant labs:      Micro: Blood cultures grew MRSA    Imaging:    3/7/22 XR left foot: shows suspicion for possible early osteomyelitis at the second toe distal phalanx. Soft tissue swelling of the second toe at the forefoot. 3/7/22 CT left foot: No convincing evidence of osteomyelitis or other acute osseous abnormality. Extensive subcutaneous edema compatible with benign edema or cellulitis. No soft tissue gas or drainable fluid collection identified. VL duplex arterial pending     ECHO pending        I have personally reviewed the past medical history, past surgical history, medications, social history, and family history, and I haveupdated the database accordingly. Allergies:   Pcn [penicillins]     Review of Systems:     Review of Systems   Constitutional: Negative for activity change, appetite change and fever. HENT: Negative for congestion. Respiratory: Negative for cough, chest tightness, shortness of breath and wheezing. Cardiovascular: Negative for chest pain. Gastrointestinal: Negative for abdominal pain, constipation, diarrhea and nausea. Genitourinary: Negative for difficulty urinating. Musculoskeletal: Negative for joint swelling. Skin: Positive for color change and wound. Neurological: Negative for dizziness, weakness, light-headedness, numbness and headaches. Psychiatric/Behavioral: Negative for agitation and confusion. Physical Examination :       Physical Exam  Constitutional:       General: He is not in acute distress. Appearance: Normal appearance. He is not ill-appearing. Cardiovascular:      Rate and Rhythm: Normal rate and regular rhythm. Heart sounds: No murmur heard. No gallop. Pulmonary:      Effort: Pulmonary effort is normal. No respiratory distress. Breath sounds: Normal breath sounds.  No wheezing. Abdominal:      General: Bowel sounds are normal. There is no distension. Tenderness: There is no abdominal tenderness. There is no guarding or rebound. Musculoskeletal:         General: No swelling or deformity. Comments: Left foot dressing was not removed   Neurological:      General: No focal deficit present. Mental Status: He is alert and oriented to person, place, and time. Psychiatric:         Mood and Affect: Mood normal.         Thought Content:  Thought content normal.         Past Medical History:     Past Medical History:   Diagnosis Date    Atrial fibrillation ([facility] [address]     CAD (coronary artery disease)     CHF (congestive heart failure) (HCC)     Diabetes mellitus ([facility] [address]     Hyperlipidemia     Hypertension        Past Surgical  History:     Past Surgical History:   Procedure Laterality Date    CARDIAC SURGERY  2010    CABG X3    COLONOSCOPY      DEBRIDEMENT Right 11/19/2015    DEBRIDEMENT WOUND FOOT RIGHT W/ APPLICATION BILAT INTEG RAT GRAFT    ENDOSCOPY, COLON, DIAGNOSTIC      EYE SURGERY Bilateral     cataracts    HERNIA REPAIR      umbilical    KNEE ARTHROSCOPY Right     OTHER SURGICAL HISTORY Right 12 29 15    wound care graft procedure foot,appl of integra    PACEMAKER PLACEMENT  2011    WITH DEFIBRILLATOR    TOE AMPUTATION Right     R great       Medications:      cefepime  1,000 mg IntraVENous Q12H    metroNIDAZOLE  500 mg IntraVENous Q8H    enoxaparin  30 mg SubCUTAneous Daily    daptomycin (CUBICIN) IVPB  6 mg/kg (Ideal) IntraVENous Q24H    sodium chloride flush  5-40 mL IntraVENous 2 times per day    carvedilol  6.25 mg Oral BID WC    glipiZIDE  10 mg Oral QAM AC    insulin lispro  0-12 Units SubCUTAneous TID WC    insulin lispro  0-6 Units SubCUTAneous Nightly       Social History:     Social History     Socioeconomic History    Marital status: Single     Spouse name: Not on file    Number of children: Not on file    Years of education: Not on file    Highest education level: Not on file   Occupational History    Not on file   Tobacco Use    Smoking status: Never Smoker    Smokeless tobacco: Never Used   Substance and Sexual Activity    Alcohol use: Yes     Comment: SOCIAL on weekends    Drug use: No    Sexual activity: Not on file   Other Topics Concern    Not on file   Social History Narrative    Not on file     Social Determinants of Health     Financial Resource Strain:     Difficulty of Paying Living Expenses: Not on file   Food Insecurity:     Worried About Running Out of Food in the Last Year: Not on file    Tee of Food in the Last Year: Not on file   Transportation Needs:     Lack of Transportation (Medical): Not on file    Lack of Transportation (Non-Medical):  Not on file   Physical Activity:     Days of Exercise per Week: Not on file    Minutes of Exercise per Session: Not on file   Stress:     Feeling of Stress : Not on file   Social Connections:     Frequency of Communication with Friends and Family: Not on file    Frequency of Social Gatherings with Friends and Family: Not on file    Attends Jehovah's witness Services: Not on file    Active Member of [address] [place] [address] [employer] or Organizations: Not on file    Attends Club or Organization Meetings: Not on file    Marital Status: Not on file   Intimate Partner Violence:     Fear of Current or Ex-Partner: Not on file    Emotionally Abused: Not on file    Physically Abused: Not on file    Sexually Abused: Not on file   Housing Stability:     Unable to Pay for Housing in the Last Year: Not on file    Number of [place] in the Last Year: Not on file    Unstable Housing in the Last Year: Not on file       Family History:     Family History   Problem Relation Age of Onset    Cancer Father         pancreatic cancer    Other Brother         MI    Osteoarthritis Mother     Other Maternal Grandfather         MI      Medical Decision Making:   I have independently reviewed/ordered the following labs:    CBC with Differential:   Recent Labs     03/08/22  0543 03/09/22  0556   WBC 7.0 3.8   HGB 11.2* 10.8*   HCT 32.0* 30.5*    143*   LYMPHOPCT 8* 14*   MONOPCT 8* 13*     BMP:  Recent Labs     03/07/22  1256 03/07/22  1256 03/08/22  0543 03/09/22  0556   *   < > 132* 135   K 3.4*   < > 3.5* 3.8   CL 90*   < > 94* 98   CO2 25   < > 25 23   BUN 31*   < > 33* 31*   CREATININE 2.10*   < > 2.13* 1.92*   MG 1.6  --  1.7  --     < > = values in this interval not displayed. Hepatic Function Panel:   Recent Labs     03/08/22  0543 03/08/22  0543 03/08/22  1516 03/09/22  0556   PROT 6.7   < > 6.2* 6.3*   LABALBU 3.6  --   --  3.1*   BILIDIR 0.83*  --   --  1.12*   IBILI 0.72  --   --  0.53   BILITOT 1.55*  --   --  1.65*   ALKPHOS 112  --   --  112   ALT 19  --   --  23   AST 23  --   --  34    < > = values in this interval not displayed. No results for input(s): RPR in the last 72 hours. No results for input(s): HIV in the last 72 hours. No results for input(s): BC in the last 72 hours. Lab Results   Component Value Date    CREATININE 1.92 03/09/2022    GLUCOSE 54 03/09/2022    GLUCOSE 335 12/28/2011       Detailed results: Thank you for allowing us to participate in the care of this patient. Please call with questions. This note is created with the assistance of a speech recognition program.  While intending to generate adocument that actually reflects the content of the visit, the document can still have some errors including those of syntax and sound a like substitutions which may escape proof reading. It such instances, actual meaningcan be extrapolated by contextual diversion.     Christelle Godinez MD  Office: [phone]  Perfect serve / office [phone]

## 2022-03-09 NOTE — PROGRESS NOTES
NEPHROLOGY PROGRESS NOTE    Patient :  Tabitha Dias; 78 y.o. MRN# [id number]  Location:  2052/2052-01  Attending:  Gemini aRmsey DO  Admit Date:  3/7/2022   Hospital Day: 2    Reason for consultation: Management of acute kidney injury. Consulting physician: Carole Goodell, DO    Interval history: Patient was seen and examined today and he is refusing to wear special boot for left second toe gangrene. He does not have increased dyspnea at rest and he is nonoliguric. History of Present Illness: This is a 78 y.o. male past medical history of coronary artery disease, bypass surgery, CHF, ype 2 diabetes, Essential hypertension, chronic kidney disease stage IIIb with baseline creatinine of about 1.5 to 1.7 mg/dL since 2019, patient has not follow-up with any nephrologist as an outpatient. Patient has patient presented to the hospital with complaints of left foot redness and black toe, patient noticed black toe about last 1 week, patient started to have pain in the foot and redness and therefore decided to come to the hospital.  Denied any nausea vomiting diarrhea no fever chills  Denied any shortness of breath chest pain  Labs showed serum creatinine of 2.1 mg/dL on admission from nephrology consultation has been requested  Blood cultures positive for MRSA  Pt denies any history of  prolonged NSAID use. Patient denies dysuria, gross hematuria, flank pain, nocturia, urgency, passing frothy urine or urinary incontinence. There has been no recent exposure to IV contrast.   There is no history  of paraprotein disease. Pt denies any history of recurrent UTI or kidney stones. Medication review shows use of ACE-inhibitor/diuretics.     Current Medications:    [START ON 3/10/2022] DAPTOmycin (CUBICIN) 450 mg in sodium chloride 0.9 % 50 mL IVPB, Q24H  DAPTOmycin (CUBICIN) 500 mg in sodium chloride 0.9 % 50 mL IVPB, Once  cefepime (MAXIPIME) 1000 mg IVPB minibag, Q12H  metronidazole (FLAGYL) 500 mg in NaCl 100 mL IVPB premix, Q8H  enoxaparin (LOVENOX) injection 30 mg, Daily  0.9 % sodium chloride infusion, Continuous  sodium chloride flush 0.9 % injection 5-40 mL, 2 times per day  sodium chloride flush 0.9 % injection 5-40 mL, PRN  0.9 % sodium chloride infusion, PRN  acetaminophen (TYLENOL) tablet 650 mg, Q4H PRN  ondansetron (ZOFRAN-ODT) disintegrating tablet 4 mg, Q8H PRN   Or  ondansetron (ZOFRAN) injection 4 mg, Q6H PRN  carvedilol (COREG) tablet 6.25 mg, BID WC  glipiZIDE (GLUCOTROL) tablet 10 mg, QAM AC  insulin lispro (HUMALOG) injection vial 0-12 Units, TID WC  insulin lispro (HUMALOG) injection vial 0-6 Units, Nightly  glucose (GLUTOSE) 40 % oral gel 15 g, PRN  dextrose 50 % IV solution, PRN  glucagon (rDNA) injection 1 mg, PRN  dextrose 5 % solution, PRN      Objective:  CURRENT TEMPERATURE:  Temp: 98 °F (36.7 °C)  MAXIMUM TEMPERATURE OVER 24HRS:  Temp (24hrs), Av.2 °F (36.8 °C), Min:98 °F (36.7 °C), Max:98.5 °F (36.9 °C)    CURRENT RESPIRATORY RATE:  Resp: 18  CURRENT PULSE:  Pulse: 83  CURRENT BLOOD PRESSURE:  BP: 121/61  24HR BLOOD PRESSURE RANGE:  Systolic (58LHM), GMP:220 , Min:115 , QVC:742   ; Diastolic (19AUP), XYX:60, Min:53, Max:68    24HR INTAKE/OUTPUT:      Intake/Output Summary (Last 24 hours) at 3/9/2022 1427  Last data filed at 3/9/2022 1068  Gross per 24 hour   Intake --   Output 950 ml   Net -950 ml     Patient Vitals for the past 96 hrs (Last 3 readings):   Weight   22 2331 198 lb 13.7 oz (90.2 kg)   22 1722 198 lb 10.2 oz (90.1 kg)   22 1229 215 lb (97.5 kg)     Physical Exam:  GENERAL APPEARANCE: Alert and cooperative, and appears to be in no acute distress. HEAD: Normocephalic and atraumatic  EYES:  EOMI. Not pale, anicteric   NOSE:  No nasal discharge. THROAT:  Oral cavity and pharynx normal. Moist  CARDIAC: Normal S1 and S2. No S3, S4 or murmurs. Rhythm is regular.   LUNGS: Clear to auscultation and percussion without rales, rhonchi, wheezing or diminished breath sounds. GI-soft nontender  MUSKULOSKELETAL: Adequately aligned spine. No joint erythema or tenderness. EXTREMITIES: Left foot erythema with wet gangrene of the second left toe. NEURO: No acute focal neurologic deficits    Labs:   CBC:  Recent Labs     03/07/22  1256 03/08/22  0543 03/09/22  0556   WBC 7.3 7.0 3.8   RBC 3.58* 3.52* 3.41*   HGB 11.4* 11.2* 10.8*   HCT 32.6* 32.0* 30.5*   MCV 91.2 90.7 89.5   MCH 31.8 31.8 31.8   MCHC 34.9 35.1 35.5   RDW 14.4 14.3 14.8    178 143*   MPV 7.7 8.5 7.9      BMP:   Recent Labs     03/07/22  1256 03/08/22  0543 03/09/22  0556   * 132* 135   K 3.4* 3.5* 3.8   CL 90* 94* 98   CO2 25 25 23   BUN 31* 33* 31*   CREATININE 2.10* 2.13* 1.92*   GLUCOSE 140* 119* 54*   CALCIUM 9.5 9.0 8.7      Magnesium:   Recent Labs     03/07/22  1256 03/08/22  0543   MG 1.6 1.7     Albumin:   Recent Labs     03/08/22  0543 03/09/22  0556   LABALBU 3.6 3.1*     Recent Labs     03/08/22  1516 03/08/22  1516 03/08/22 2033 03/09/22  0556   PROT 6.2*   < >  --  6.3*   ALBCAL 3.6  --   --   --    ALBPCT 59  --   --   --    LABALPH 0.3  0.8  --   --   --    A1PCT 4  --   --   --    A2PCT 14*  --   --   --    LABBETA 0.6  --   --   --    BETAPCT 10*  --   --   --    GAMGLOB 0.8  --   --   --    GGPCT 13  --   --   --    PATH PENDING   < > PENDING  --     < > = values in this interval not displayed. Assessment/plan:    1. 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 AZIZA is negative, complements and plasma free light chain ratios are within normal. Renal ultrasound showed bilateral nonobstructing nephrolithiasis with no hydronephrosis and is simple appearing renal cyst 3.4 cm on the left. Plan: Continue to hold furosemide. Continue gentle hydration with IV fluid 0.9 normal saline at 70 mL/h.   Avoid

## 2022-03-09 NOTE — PLAN OF CARE
Problem: Falls - Risk of:  Goal: Will remain free from falls  Description: Will remain free from falls  3/9/2022 0435 by Tab Colby RN  Outcome: Ongoing  Note: Patient remains free of falls and injuries throughout shift. Bed remains in the lowest position, wheels locked, call light and bedside table are within reach. 3/8/2022 1820 by Gee Flores RN  Outcome: Ongoing  Goal: Absence of physical injury  Description: Absence of physical injury  3/9/2022 0435 by Tab Colby RN  Outcome: Ongoing  3/8/2022 1820 by Gee Flores RN  Outcome: Ongoing     Problem: Skin Integrity:  Goal: Will show no infection signs and symptoms  Description: Will show no infection signs and symptoms  3/9/2022 0435 by Tab Colby RN  Outcome: Ongoing  3/8/2022 1820 by Gee Flores RN  Outcome: Ongoing  Goal: Absence of new skin breakdown  Description: Absence of new skin breakdown  3/9/2022 0435 by Tab Colby RN  Outcome: Ongoing  Note: Assess the overall condition of the skin. Check on bony prominences such as the sacrum, trochanters, scapulae, elbows, heels, inner and outer malleolus, inner and outer knees, back of head). Reinforce the importance of turning, mobility, and ambulation.    3/8/2022 1820 by Gee Flores RN  Outcome: Ongoing     Problem: Nutrition  Goal: Optimal nutrition therapy  3/9/2022 0435 by Tab Colby RN  Outcome: Ongoing  3/8/2022 1820 by Gee Flores RN  Outcome: Ongoing  3/8/2022 1441 by Henrry Cerda RD, LD  Outcome: Ongoing

## 2022-03-09 NOTE — PLAN OF CARE
Problem: Falls - Risk of:  Goal: Will remain free from falls  Description: Will remain free from falls  3/9/2022 1800 by Eulalio Mejia RN  Outcome: Ongoing  Note: Patient is alert and oriented and able to make needs known. 3/9/2022 0435 by Loraine Medina RN  Outcome: Ongoing  Note: Patient remains free of falls and injuries throughout shift. Bed remains in the lowest position, wheels locked, call light and bedside table are within reach. Goal: Absence of physical injury  Description: Absence of physical injury  3/9/2022 1800 by Eulalio Mejia RN  Outcome: Ongoing  3/9/2022 0435 by Loraine Medina RN  Outcome: Ongoing     Problem: Skin Integrity:  Goal: Will show no infection signs and symptoms  Description: Will show no infection signs and symptoms  3/9/2022 1800 by Eulalio Mejia RN  Outcome: Ongoing  Note: Patient taking IV antibiotics for Lt. 2nd toe infection. 3/9/2022 0435 by Loraine Medina RN  Outcome: Ongoing  Goal: Absence of new skin breakdown  Description: Absence of new skin breakdown  3/9/2022 1800 by Eulalio Mejia RN  Outcome: Ongoing  3/9/2022 0435 by Loraine Medina RN  Outcome: Ongoing  Note: Assess the overall condition of the skin. Check on bony prominences such as the sacrum, trochanters, scapulae, elbows, heels, inner and outer malleolus, inner and outer knees, back of head). Reinforce the importance of turning, mobility, and ambulation.       Problem: Nutrition  Goal: Optimal nutrition therapy  3/9/2022 1800 by Eulalio Mejia RN  Outcome: Ongoing  3/9/2022 0435 by Loraine Medina RN  Outcome: Ongoing

## 2022-03-10 LAB
ABSOLUTE EOS #: 0.04 K/UL (ref 0–0.4)
ABSOLUTE LYMPH #: 0.71 K/UL (ref 1–4.8)
ABSOLUTE MONO #: 0.46 K/UL (ref 0.1–1.3)
ALBUMIN (CALCULATED): 3.6 G/DL (ref 3.2–5.2)
ALBUMIN PERCENT: 59 % (ref 45–65)
ALPHA 1 PERCENT: 4 % (ref 3–6)
ALPHA 2 PERCENT: 14 % (ref 6–13)
ALPHA-1-GLOBULIN: 0.3 G/DL (ref 0.1–0.4)
ALPHA-2-GLOBULIN: 0.8 G/DL (ref 0.5–0.9)
ANION GAP SERPL CALCULATED.3IONS-SCNC: 12 MMOL/L (ref 9–17)
BASOPHILS # BLD: 0 % (ref 0–2)
BASOPHILS ABSOLUTE: 0 K/UL (ref 0–0.2)
BETA GLOBULIN: 0.6 G/DL (ref 0.5–1.1)
BETA PERCENT: 10 % (ref 11–19)
BUN BLDV-MCNC: 36 MG/DL (ref 8–23)
CALCIUM SERPL-MCNC: 8.6 MG/DL (ref 8.6–10.4)
CHLORIDE BLD-SCNC: 99 MMOL/L (ref 98–107)
CO2: 23 MMOL/L (ref 20–31)
CREAT SERPL-MCNC: 1.74 MG/DL (ref 0.7–1.2)
EOSINOPHILS RELATIVE PERCENT: 1 % (ref 0–4)
GAMMA GLOBULIN %: 13 % (ref 9–20)
GAMMA GLOBULIN: 0.8 G/DL (ref 0.5–1.5)
GFR AFRICAN AMERICAN: 48 ML/MIN
GFR NON-AFRICAN AMERICAN: 39 ML/MIN
GFR SERPL CREATININE-BSD FRML MDRD: ABNORMAL ML/MIN/{1.73_M2}
GLUCOSE BLD-MCNC: 108 MG/DL (ref 75–110)
GLUCOSE BLD-MCNC: 149 MG/DL (ref 75–110)
GLUCOSE BLD-MCNC: 175 MG/DL (ref 75–110)
GLUCOSE BLD-MCNC: 200 MG/DL (ref 75–110)
GLUCOSE BLD-MCNC: 48 MG/DL (ref 75–110)
GLUCOSE BLD-MCNC: 65 MG/DL (ref 70–99)
HCT VFR BLD CALC: 27.6 % (ref 41–53)
HEMOGLOBIN: 9.8 G/DL (ref 13.5–17.5)
LYMPHOCYTES # BLD: 17 % (ref 24–44)
MCH RBC QN AUTO: 32.5 PG (ref 26–34)
MCHC RBC AUTO-ENTMCNC: 35.3 G/DL (ref 31–37)
MCV RBC AUTO: 92.1 FL (ref 80–100)
MONOCYTES # BLD: 11 % (ref 1–7)
MORPHOLOGY: ABNORMAL
MORPHOLOGY: ABNORMAL
PATHOLOGIST: ABNORMAL
PDW BLD-RTO: 14.5 % (ref 11.5–14.9)
PLATELET # BLD: 145 K/UL (ref 150–450)
PMV BLD AUTO: 7.9 FL (ref 6–12)
POTASSIUM SERPL-SCNC: 3.5 MMOL/L (ref 3.7–5.3)
PROTEIN ELECTROPHORESIS, SERUM: ABNORMAL
RBC # BLD: 3 M/UL (ref 4.5–5.9)
SEG NEUTROPHILS: 71 % (ref 36–66)
SEGMENTED NEUTROPHILS ABSOLUTE COUNT: 2.99 K/UL (ref 1.3–9.1)
SODIUM BLD-SCNC: 134 MMOL/L (ref 135–144)
TOTAL PROT. SUM,%: 100 % (ref 98–102)
TOTAL PROT. SUM: 6.1 G/DL (ref 6.3–8.2)
TOTAL PROTEIN: 6.2 G/DL (ref 6.4–8.3)
WBC # BLD: 4.2 K/UL (ref 3.5–11)

## 2022-03-10 PROCEDURE — 87040 BLOOD CULTURE FOR BACTERIA: CPT

## 2022-03-10 PROCEDURE — 82947 ASSAY GLUCOSE BLOOD QUANT: CPT

## 2022-03-10 PROCEDURE — 2580000003 HC RX 258: Performed by: INTERNAL MEDICINE

## 2022-03-10 PROCEDURE — 1200000000 HC SEMI PRIVATE

## 2022-03-10 PROCEDURE — 6360000002 HC RX W HCPCS: Performed by: FAMILY MEDICINE

## 2022-03-10 PROCEDURE — 36415 COLL VENOUS BLD VENIPUNCTURE: CPT

## 2022-03-10 PROCEDURE — 6360000002 HC RX W HCPCS: Performed by: INTERNAL MEDICINE

## 2022-03-10 PROCEDURE — 2580000003 HC RX 258: Performed by: FAMILY MEDICINE

## 2022-03-10 PROCEDURE — 6370000000 HC RX 637 (ALT 250 FOR IP): Performed by: FAMILY MEDICINE

## 2022-03-10 PROCEDURE — 2500000003 HC RX 250 WO HCPCS: Performed by: FAMILY MEDICINE

## 2022-03-10 PROCEDURE — 99233 SBSQ HOSP IP/OBS HIGH 50: CPT | Performed by: INTERNAL MEDICINE

## 2022-03-10 PROCEDURE — 85025 COMPLETE CBC W/AUTO DIFF WBC: CPT

## 2022-03-10 PROCEDURE — 80048 BASIC METABOLIC PNL TOTAL CA: CPT

## 2022-03-10 RX ORDER — GLIPIZIDE 5 MG/1
5 TABLET ORAL
Status: DISCONTINUED | OUTPATIENT
Start: 2022-03-11 | End: 2022-03-15 | Stop reason: HOSPADM

## 2022-03-10 RX ORDER — DEXTROSE AND SODIUM CHLORIDE 5; .9 G/100ML; G/100ML
INJECTION, SOLUTION INTRAVENOUS CONTINUOUS
Status: DISCONTINUED | OUTPATIENT
Start: 2022-03-10 | End: 2022-03-12

## 2022-03-10 RX ADMIN — METRONIDAZOLE 500 MG: 500 INJECTION, SOLUTION INTRAVENOUS at 08:29

## 2022-03-10 RX ADMIN — CARVEDILOL 6.25 MG: 6.25 TABLET, FILM COATED ORAL at 08:07

## 2022-03-10 RX ADMIN — INSULIN LISPRO 2 UNITS: 100 INJECTION, SOLUTION INTRAVENOUS; SUBCUTANEOUS at 12:18

## 2022-03-10 RX ADMIN — DEXTROSE AND SODIUM CHLORIDE: 5; 900 INJECTION, SOLUTION INTRAVENOUS at 10:48

## 2022-03-10 RX ADMIN — DAPTOMYCIN 450 MG: 500 INJECTION, POWDER, LYOPHILIZED, FOR SOLUTION INTRAVENOUS at 15:15

## 2022-03-10 RX ADMIN — CEFEPIME HYDROCHLORIDE 1000 MG: 1 INJECTION, POWDER, FOR SOLUTION INTRAMUSCULAR; INTRAVENOUS at 21:05

## 2022-03-10 RX ADMIN — CEFEPIME HYDROCHLORIDE 1000 MG: 1 INJECTION, POWDER, FOR SOLUTION INTRAMUSCULAR; INTRAVENOUS at 10:50

## 2022-03-10 RX ADMIN — SODIUM CHLORIDE, PRESERVATIVE FREE 10 ML: 5 INJECTION INTRAVENOUS at 08:08

## 2022-03-10 RX ADMIN — DEXTROSE MONOHYDRATE 100 ML/HR: 50 INJECTION, SOLUTION INTRAVENOUS at 06:18

## 2022-03-10 RX ADMIN — ACETAMINOPHEN 650 MG: 325 TABLET, FILM COATED ORAL at 01:50

## 2022-03-10 RX ADMIN — CARVEDILOL 6.25 MG: 6.25 TABLET, FILM COATED ORAL at 17:18

## 2022-03-10 RX ADMIN — Medication 12.5 G: at 06:24

## 2022-03-10 RX ADMIN — ACETAMINOPHEN 650 MG: 325 TABLET, FILM COATED ORAL at 17:52

## 2022-03-10 RX ADMIN — ENOXAPARIN SODIUM 30 MG: 100 INJECTION SUBCUTANEOUS at 08:07

## 2022-03-10 RX ADMIN — METRONIDAZOLE 500 MG: 500 INJECTION, SOLUTION INTRAVENOUS at 17:50

## 2022-03-10 RX ADMIN — METRONIDAZOLE 500 MG: 500 INJECTION, SOLUTION INTRAVENOUS at 01:47

## 2022-03-10 RX ADMIN — INSULIN LISPRO 2 UNITS: 100 INJECTION, SOLUTION INTRAVENOUS; SUBCUTANEOUS at 17:18

## 2022-03-10 ASSESSMENT — PAIN DESCRIPTION - LOCATION
LOCATION: FOOT
LOCATION: FOOT

## 2022-03-10 ASSESSMENT — PAIN DESCRIPTION - PROGRESSION: CLINICAL_PROGRESSION: NOT CHANGED

## 2022-03-10 ASSESSMENT — PAIN DESCRIPTION - FREQUENCY: FREQUENCY: CONTINUOUS

## 2022-03-10 ASSESSMENT — ENCOUNTER SYMPTOMS
NAUSEA: 0
COLOR CHANGE: 1
CONSTIPATION: 0
SHORTNESS OF BREATH: 0
CHEST TIGHTNESS: 0
DIARRHEA: 0
ABDOMINAL PAIN: 0
WHEEZING: 0
COUGH: 0

## 2022-03-10 ASSESSMENT — PAIN DESCRIPTION - ORIENTATION
ORIENTATION: LEFT
ORIENTATION: LEFT

## 2022-03-10 ASSESSMENT — PAIN - FUNCTIONAL ASSESSMENT: PAIN_FUNCTIONAL_ASSESSMENT: ACTIVITIES ARE NOT PREVENTED

## 2022-03-10 ASSESSMENT — PAIN DESCRIPTION - PAIN TYPE
TYPE: ACUTE PAIN
TYPE: ACUTE PAIN

## 2022-03-10 ASSESSMENT — PAIN DESCRIPTION - ONSET: ONSET: ON-GOING

## 2022-03-10 ASSESSMENT — PAIN SCALES - GENERAL
PAINLEVEL_OUTOF10: 0
PAINLEVEL_OUTOF10: 1
PAINLEVEL_OUTOF10: 1

## 2022-03-10 ASSESSMENT — PAIN DESCRIPTION - DESCRIPTORS: DESCRIPTORS: BURNING;CONSTANT

## 2022-03-10 NOTE — PLAN OF CARE
Problem: Falls - Risk of:  Goal: Will remain free from falls  Description: Will remain free from falls  3/10/2022 1530 by Akash Lobato RN  Outcome: Ongoing  Note: Patient is alert and oriented and able to make needs known. 3/10/2022 0356 by Nahomy Elizabeth RN  Outcome: Ongoing  Goal: Absence of physical injury  Description: Absence of physical injury  3/10/2022 1530 by Akash Lobato RN  Outcome: Ongoing  3/10/2022 0356 by Nahomy Elizabeth RN  Outcome: Ongoing     Problem: Skin Integrity:  Goal: Will show no infection signs and symptoms  Description: Will show no infection signs and symptoms  3/10/2022 1530 by Akash Lobato RN  Outcome: Ongoing  3/10/2022 0356 by Nahomy Elizabeth RN  Outcome: Ongoing  Goal: Absence of new skin breakdown  Description: Absence of new skin breakdown  3/10/2022 1530 by Akash Lobato RN  Outcome: Ongoing  3/10/2022 0356 by Nahomy Elizabeth RN  Outcome: Ongoing     Problem: Nutrition  Goal: Optimal nutrition therapy  3/10/2022 1530 by Akash Lobato RN  Outcome: Ongoing  Note: Patient eating % of meals.   3/10/2022 0356 by Nahomy Elizabeth RN  Outcome: Ongoing

## 2022-03-10 NOTE — PLAN OF CARE
Problem: Falls - Risk of:  Goal: Will remain free from falls  Description: Will remain free from falls  3/10/2022 0356 by Ericka Soto RN  Outcome: Ongoing     Problem: Falls - Risk of:  Goal: Absence of physical injury  Description: Absence of physical injury  3/10/2022 0356 by Ericka Soto RN  Outcome: Ongoing     Problem: Skin Integrity:  Goal: Will show no infection signs and symptoms  Description: Will show no infection signs and symptoms  3/10/2022 0356 by Ericka Soto RN  Outcome: Ongoing     Problem: Skin Integrity:  Goal: Absence of new skin breakdown  Description: Absence of new skin breakdown  3/10/2022 0356 by Ericka Soto RN  Outcome: Ongoing     Problem: Nutrition  Goal: Optimal nutrition therapy  3/10/2022 0356 by Ericka Soto RN  Outcome: Ongoing

## 2022-03-10 NOTE — CARE COORDINATION
ONGOING DISCHARGE PLAN:    Patient is alert and oriented x4. Spoke with patient regarding discharge plan and patient confirms that plan is still to dsicharge to home with no needs  Patient refused vns   Check A1C tomorrow   Blood and UA Culture pending  Hold lasix        Will continue to follow for additional discharge needs.     Electronically signed by Nakita Ochoa RN on 3/10/2022 at 11:16 AM

## 2022-03-10 NOTE — PROGRESS NOTES
Writer entered patient's room. Patient requesting to put his T-shirt and shorts on. Writer informs patient that it is preferable to stay in hospital gown. Patient still insisting he wants to wear his clothes. Writer will continue to monitor.

## 2022-03-10 NOTE — PROGRESS NOTES
[id number] [employer]      PROGRESS NOTE        Patient:  Mervin Tuttle  YOB: 1955    MRN: [id number]     Acct: [de-identified]     Admit date: 3/7/2022    Pt seen and Chart reviewed. Consultant notes reviewed and care evaluated. Subjective: Patient states he is feeling better today but this morning sugar was at 50 he felt some sweaty then went back up he ate his breakfast he feels much better now and he feels better than what he felt yesterday he says. He denies any pain and in what ever he has pain wise he is tolerating that with no big problem he says vascular seen patient is just observing he still tells me podiatry is not planning to amputate his toe meanwhile he had an echo and his EF is in the 35 percentile he said he saw his cardiologist about 6 months ago and was told his heart actually stronger he is on Coumadin for chronic A. fib itself now second to planning surgery but if there is no plan for surgery might have put him back on his Coumadin    Diet:  ADULT DIET; Regular; 5 carb choices (75 gm/meal)  ADULT ORAL NUTRITION SUPPLEMENT; Breakfast, Dinner;  Low Calorie/High Protein Oral Supplement      Medications:Current Inpatient    Scheduled Meds:   daptomycin (CUBICIN) IVPB  6 mg/kg (Ideal) IntraVENous Q24H    cefepime  1,000 mg IntraVENous Q12H    metroNIDAZOLE  500 mg IntraVENous Q8H    enoxaparin  30 mg SubCUTAneous Daily    sodium chloride flush  5-40 mL IntraVENous 2 times per day    carvedilol  6.25 mg Oral BID WC    glipiZIDE  10 mg Oral QAM AC    insulin lispro  0-12 Units SubCUTAneous TID WC    insulin lispro  0-6 Units SubCUTAneous Nightly     Continuous Infusions:   sodium chloride 70 mL/hr at 03/09/22 1923    sodium chloride      dextrose 100 mL/hr (03/10/22 0618)     PRN Meds:sodium chloride flush, sodium chloride, acetaminophen, ondansetron **OR** ondansetron, glucose, dextrose, glucagon (rDNA), dextrose        Physical Exam:  Vitals: BP (!) 152/72   Pulse 55   Temp 97.9 °F (36.6 °C)   Resp 18   Ht 5' 11\" (1.803 m)   Wt 198 lb 13.7 oz (90.2 kg)   SpO2 99%   BMI 27.73 kg/m²   24 hour intake/output:    Intake/Output Summary (Last 24 hours) at 3/10/2022 0749  Last data filed at 3/10/2022 0152  Gross per 24 hour   Intake 2019.89 ml   Output 375 ml   Net 1644.89 ml     Last 3 weights: Wt Readings from Last 3 Encounters:   03/07/22 198 lb 13.7 oz (90.2 kg)   02/05/21 212 lb 15.4 oz (96.6 kg)   05/23/16 223 lb (101.2 kg)       Physical Examination:   General appearance - alert, well appearing, and in no distress  Mental status - alert, oriented to person, place, and time  PERRLA wnl  Chest - clear to auscultation, no wheezes, rales or rhonchi, symmetric air entry  Heart - normal rate, regular rhythm, normal S1, S2, no murmurs, rubs, clicks or gallops  Abdomen - soft, nontender, nondistended, no masses or organomegaly  Neurological - alert, oriented, normal speech, no focal findings or movement disorder noted}  Extremities - peripheral pulses decreased but his left foot redness is slightly more red than yesterday at the dorsal foot he still have gangrenous second toe.   No drainage  Skin - normal coloration and turgor, no rashes, no suspicious skin lesions noted      Component Value Units   CBC with Auto Differential [[id number]] (Abnormal)    Collected: 03/10/22 0534    Updated: 03/10/22 0729    Specimen Source: Blood     WBC 4.2 k/uL    RBC 3.00 Low  m/uL    Hemoglobin 9.8 Low  g/dL    Hematocrit 27.6 Low  %    MCV 92.1 fL    MCH 32.5 pg    MCHC 35.3 g/dL    RDW 14.5 %    Platelets 630 OES  k/uL    MPV 7.9 fL    Seg Neutrophils 71 High  %    Lymphocytes 17 Low  %    Monocytes 11 High  %    Eosinophils % 1 %    Basophils 0 %    Segs Absolute 2.99 k/uL    Absolute Lymph # 0.71 Low  k/uL    Absolute Mono # 0.46 k/uL    Absolute Eos # 0.04 k/uL    Basophils Absolute 0.00 k/uL    Morphology HYPOCHROMIA PRESENT Morphology ANISOCYTOSIS PRESENT   Electrophoresis Protein, Serum [[id number]] (Abnormal)    Collected: 03/08/22 1516    Updated: 03/10/22 0712    Specimen Source: Blood     Total Protein 6.2 Low  g/dL    Albumin (calculated) 3.6 g/dL    Albumin % 59 %    Alpha-1-Globulin 0.3 g/dL    Alpha 1 % 4 %    Alpha-2-Globulin 0.8 g/dL    Alpha 2 % 14 High  %    Beta Globulin 0.6 g/dL    Beta Percent 10 Low  %    Gamma Globulin 0.8 g/dL    Gamma Globulin % 13 %    Total Prot. Sum 6.1 Low  g/dL    Total Prot. Sum,% 100 %    Protein Electrophoresis, Serum NORMAL ELECTROPHORETIC PATTERN    Pathologist ELECTRONICALLY SIGNED. Alin Phelps M.D.   POC Glucose Fingerstick [[id number]]    Collected: 03/10/22 0639    Updated: 03/10/22 0647     POC Glucose 108 mg/dL   POC Glucose Fingerstick [[id number]] (Abnormal)    Collected: 03/10/22 0607    Updated: 03/10/22 0643     POC Glucose 48 Low  mg/dL    Comment: Critical Noted      Basic Metabolic Panel [[id number]] (Abnormal)    Collected: 03/10/22 0534    Updated: 03/10/22 0602    Specimen Source: Blood     Glucose 65 Low  mg/dL    BUN 36 High  mg/dL    CREATININE 1.74 High  mg/dL    Calcium 8.6 mg/dL    Sodium 134 Low  mmol/L    Potassium 3.5 Low  mmol/L    Chloride 99 mmol/L    CO2 23 mmol/L    Anion Gap 12 mmol/L    GFR Non-African American 39 Low  mL/min    GFR  48 Low  mL/min    GFR Comment         Comment: Average GFR for 61-76 years old:    85 mL/min/1.73sq m   Chronic Kidney Disease:    <60 mL/min/1.73sq m   Kidney failure:    <15 mL/min/1.73sq m               eGFR calculated using average adult body mass. Additional eGFR calculator available at:         [email or web address]             Culture, Blood 1 [[id number]] (Abnormal)    Collected: 03/07/22 1247    Updated: 03/09/22 2228    Specimen Source: Blood     Specimen Description . BLOOD LT AC. UNK VOL.     Culture POSITIVE Blood Culture     DIRECT GRAM STAIN FROM BOTTLE: Jean Carlos Greene            Practitioner        Interpreting             Referring Physician         Altaf Rondon       Type of Study        TTE procedure:2D Echocardiogram, M-Mode, Doppler, Color Doppler.       Procedure Date   Date: 03/09/2022 Start: 08:57 AM     Study Location: [address] [place] [address]   Technical Quality: Limited visualization     Indications:Bacteremia. Patient Status: Inpatient     Contrast Medium: Definity. Amount - 2 ml     Height: 71 inches Weight: 198 pounds BSA: 2.1 m^2 BMI: 27.62 kg/m^2     Rhythm: Within normal limits HR: 75 bpm BP: 134/68 mmHg     Allergies     - Penicillin. CONCLUSIONS     Summary   Contrast was utilized on this technically difficult study. Left ventricle is normal in size and wall thickness. Left ventricular systolic function is moderately reduced. Estimated LV EF   35-40 %. Global hypokinesis. Left atrium is mildly dilated. Right atrium is moderately dilated . Difficult visualization of the right ventricle. MPI and TAPSE values suggest   normal RV systolic function. Mild mitral regurgitation. Moderate tricuspid regurgitation. Mild pulmonary hypertension. Estimated right ventricular systolic pressure   is 72WLOK. No significant pericardial effusion is seen. Normal aortic root dimension.    IVC appears normal size, difficult to assess respiratory variation   No obvious vegetation noted   If clinically indicated TAMARA is recommended     Signature   ----------------------------------------------------------------------------    Electronically signed by Skye HerzogInterpreting physician) on    03/09/2022 07:30 PM   ----------------------------------------------------------------------------     ----------------------------------------------------------------------------    Electronically signed by Karol ZarateSonographer) on 03/09/2022 10:34    AM   ----------------------------------------------------------------------------   FINDINGS   Left Atrium   Left atrium is mildly dilated. Left Ventricle   Left ventricle is normal in size and wall thickness. Left ventricular systolic function is moderately reduced. Estimated LV EF 35-40 %. Global hypokinesis. Right Atrium   Right atrium is moderately dilated . Right Ventricle   Difficult visualization of the right ventricle. MPI and TAPSE values suggest   normal RV systolic function. Mitral Valve   Thickened mitral valve leaflets. Mild mitral regurgitation. Aortic Valve   No obvious valvular abnormality seen. No evidence of aortic insufficiency or stenosis. Tricuspid Valve   No obvious valvular abnormality. Moderate tricuspid regurgitation. Moderate pulmonary hypertension. Estimated right ventricular systolic   pressure is 01NVEO. Pulmonic Valve   Pulmonic valve was not well visualized. No evidence of pulmonic insufficiency or stenosis. Pericardial Effusion   No significant pericardial effusion is seen. Pleural Effusion   No pleural effusion seen. Miscellaneous   Normal aortic root dimension.    IVC normal diameter     M-mode / 2D Measurements & Calculations:        LVIDd:5.57 cm(3.7 - 5.6 cm)      Diastolic UDYFAC:539 ml    BRWCK:5.20 cm(2.2 - 4.0 cm)      Systolic ZXYGWL:12.1 ml    IVSd:0.95 cm(0.6 - 1.1 cm)       Aortic Root:2.8 cm(2.0 - 3.7 cm)    LVPWd:1.02 cm(0.6 - 1.1 cm)      LA Dimension: 4.9 cm(1.9 - 4.0 cm)    Fractional Shortenin.26 %    LA volume/Index: 90.6 ml /43m^2    Calculated LVEF (%): 53.06 %     LVOT:2 cm        Mitral:                                 Aortic        Peak E-Wave: 0.85 m/s                   Peak Velocity: 1.64 m/s    Peak A-Wave: 0.36 m/s                   Peak Gradient: 10.76 mmHg    E/A Ratio: 2.36    Peak Gradient: 2.88 mmHg    Deceleration Time: 137 msec        Tricuspid:                              Pulmonic:        Estimated RVSP: 41 mmHg    Peak TR Velocity: 3.11 m/s    Peak TR Gradient: 38.6884 mmHg    Estimated RA Pressure: 3 mmHg <=0.25  Sensitive     erythromycin >=8  Resistant     gentamicin <=0.5  Sensitive 1     Induced Clind Resist NEGATIVE  Negative     levofloxacin 4  Intermediate     oxacillin >=4  Resistant     penicillin >=0.5  Resistant     tetracycline <=1  Sensitive     trimethoprim-sulfamethoxazole <=10  Sensitive     vancomycin 1  Sensitive              1 Gentamicin is used only in combination with other active agents that test susceptible. Specimen Collected: 03/07/22 12:55 Last Resulted: 03/09/22 22:00        Lab Flowsheet     Order Details     View Encounter     Lab and Collection Details     Routing     Result History             Result Care Coordination      Patient Communication    Not Released Not seen Back to Top             Testing Performed By:    [address] [place] [address] LAB   [address] [place] [id number]   Tel: [phone]   : Jose Castaneda DO                Result Information    Flag: Abnormal Abnormal   Status: Final result (Collected: 3/7/2022 12:55) Provider Status: Ordered     Culture, Blood 2: Patient Communication    Not Released Not seen     Routing History    Priority Sent On From         Assessment:  Principal Problem:    Diabetic foot infection ([facility]  Resolved Problems:    * No resolved hospital problems.  *      Diabetic foot infection (HCC) E11.628, L08.9    Diabetes ([facility] E11.9    Chronic osteomyelitis ([facility]) M86.60    Diabetic foot ulcer ([facility] E11.621, L97.509    PVD (peripheral vascular disease) ([facility] I73.9    Atherosclerosis of coronary artery without angina pectoris I25.10    Cardiomyopathy ([facility] I42.9    Cardiac left ventricular ejection fraction 21-40 percent R94.30    Diabetes mellitus type 2 with peripheral artery disease ([facility]) E11.51    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 ([facility]) L97.514    Chronic osteomyelitis of left foot ([facility]) M86.672    Onychomycosis B35.1    Heart failure ([facility]) I50.9      · Cellulitis of left foot  ·    · Gangrenous left big toe questionable osteomyelitis no evidence of that on CT  ·    · Renal insufficiency and probably chronic because that MRI probably would not be done secondary to requirement of IV dye  · With acute on chronic kidney injury  · Diabetes mellitus  ·    · History of peripheral vascular disease  ·    · History of previous amputations  ·    · Elevated inflammatory markers consistent with his infection  · Mild hypokalemia  · Chronic A. fib  · Cardiomyopathy with EF 35 percentile no evidence of CHF clinically at this time  · Moderate tricuspid regurgitation  ·   · Moderate pulmonary hypertension  · Positive blood culture with MRSA  ·   · Hyperglycemia need to watch what he is on glimepiride              Plan:  23. Monitor blood sugar might need adjustment in his meds  24.   25. Check his A1c tomorrow  26.   27. Continue with antibiotic for now  28. We will decrease his glipizide to 5 mg daily  29. We will check with podiatry see what the plan is for his toe  30. Will make his Lovenox 1 mg/kg twice daily all for his A. fib pending surgery otherwise we will place him back on his 4 mg of Coumadin  31.  Monitor his fluid and overload secondary his cardiomyopathy    JOSE Carrera             3/10/2022, 7:49 AM

## 2022-03-10 NOTE — PROGRESS NOTES
[facility] -   Infectious diseases evaluation  admission date 3/7/2022    reason for consultation:   Left second toe gangrene and cellulitis    Impression :   Current:  · MRSA bacteremia 2/2 suspected osteomyelitis of the left 2nd toe   · Gangrene left 2nd toe  · Cellulitis left foot  · S/p bilateral great big toe amputation  · Type 2 diabetes mellitus  · BRIANNA on CKD stage IIIb  · PVD  · CHF  · S/p CABG  · A. fib on warfarin  · Essential hypertension      Recommendations        IV Daptomycin   · Continue IV cefepime and IV Flagyl  · No reported vegetations on echocardiogram  · Vascular surgery and podiatry following  · The patient will need left second toe amputation, will discuss with podiatry. · Repeat blood cultures   · Monitor renal function        History of Present Illness:   Initial history:  Zoe Kruger is a 78y.o.-year-old male with a PMH significant for type 2 diabetes mellitus, CKD stage IIIb,  PVD, s/p CABG, CHF, A. fib on Coumadin, essential hypertension, and s/p bilateral great big toe amputation who presents to [facility] on 3/7/2022 and is admitted for the management of left second toe gangrene and cellulitis. Labs upon presentation were remarkable for , K3.4, creatinine 2.10, LA 3.1--->1.7, CRP 61.1--->87.8, WBC 7.3, ESR 21--->63. X-ray left foot showed suspicion for possible early osteomyelitis at the second toe distal phalanx. It also showed soft tissue swelling of the second toe at the forefoot. MRI of the foot was not possible because patient has an ICD. Therefore, CT of the left foot was taken which showed no convincing evidence of osteomyelitis or other acute osseous abnormality. There was extensive subcutaneous edema compatible with bland edema or cellulitis. No soft tissue gas or drainable fluid collection identified.      Interval changes  3/10/2022   He is up to the chair, eating lunch, denied significant pain, denied fever or chills, no other complaint  Patient Vitals for the past 8 hrs:   BP Temp Pulse Resp SpO2   03/10/22 0611 (!) 152/72 97.9 °F (36.6 °C) 55 18 99 %       Summary of relevant labs:      Micro: Blood cultures grew MRSA    Imaging:    3/7/22 XR left foot: shows suspicion for possible early osteomyelitis at the second toe distal phalanx. Soft tissue swelling of the second toe at the forefoot. 3/7/22 CT left foot: No convincing evidence of osteomyelitis or other acute osseous abnormality. Extensive subcutaneous edema compatible with benign edema or cellulitis. No soft tissue gas or drainable fluid collection identified. I have personally reviewed the past medical history, past surgical history, medications, social history, and family history, and I haveupdated the database accordingly. Allergies:   Pcn [penicillins]     Review of Systems:     Review of Systems   Constitutional: Negative for activity change, appetite change and fever. HENT: Negative for congestion. Respiratory: Negative for cough, chest tightness, shortness of breath and wheezing. Cardiovascular: Negative for chest pain. Gastrointestinal: Negative for abdominal pain, constipation, diarrhea and nausea. Genitourinary: Negative for difficulty urinating. Musculoskeletal: Negative for joint swelling. Skin: Positive for color change and wound. Neurological: Negative for dizziness, weakness, light-headedness, numbness and headaches. Psychiatric/Behavioral: Negative for agitation and confusion. Physical Examination :       Physical Exam  Constitutional:       General: He is not in acute distress. Appearance: Normal appearance. He is not ill-appearing. Cardiovascular:      Rate and Rhythm: Normal rate and regular rhythm. Heart sounds: No murmur heard. No gallop. Pulmonary:      Effort: Pulmonary effort is normal. No respiratory distress. Breath sounds: Normal breath sounds.  No wheezing. Abdominal:      General: Bowel sounds are normal. There is no distension. Tenderness: There is no abdominal tenderness. There is no guarding or rebound. Musculoskeletal:         General: No swelling or deformity. Comments: Left foot dressing was not removed   Neurological:      General: No focal deficit present. Mental Status: He is alert and oriented to person, place, and time. Psychiatric:         Mood and Affect: Mood normal.         Thought Content:  Thought content normal.         Past Medical History:     Past Medical History:   Diagnosis Date    Atrial fibrillation ([facility]     CAD (coronary artery disease)     CHF (congestive heart failure) (HCC)     Diabetes mellitus ([facility]     Hyperlipidemia     Hypertension        Past Surgical  History:     Past Surgical History:   Procedure Laterality Date    CARDIAC SURGERY  2010    CABG X3    COLONOSCOPY      DEBRIDEMENT Right 11/19/2015    DEBRIDEMENT WOUND FOOT RIGHT W/ APPLICATION BILAT INTEG RAT GRAFT    ENDOSCOPY, COLON, DIAGNOSTIC      EYE SURGERY Bilateral     cataracts    HERNIA REPAIR      umbilical    KNEE ARTHROSCOPY Right     OTHER SURGICAL HISTORY Right 12 29 15    wound care graft procedure foot,appl of integra    PACEMAKER PLACEMENT  2011    WITH DEFIBRILLATOR    TOE AMPUTATION Right     R great       Medications:      [START ON 3/11/2022] glipiZIDE  5 mg Oral QAM AC    enoxaparin  1 mg/kg (Adjusted) SubCUTAneous BID    daptomycin (CUBICIN) IVPB  6 mg/kg (Ideal) IntraVENous Q24H    cefepime  1,000 mg IntraVENous Q12H    metroNIDAZOLE  500 mg IntraVENous Q8H    sodium chloride flush  5-40 mL IntraVENous 2 times per day    carvedilol  6.25 mg Oral BID WC    insulin lispro  0-12 Units SubCUTAneous TID WC    insulin lispro  0-6 Units SubCUTAneous Nightly       Social History:     Social History     Socioeconomic History    Marital status: Single     Spouse name: Not on file    Number of children: Not on file    Years of education: Not on file    Highest education level: Not on file   Occupational History    Not on file   Tobacco Use    Smoking status: Never Smoker    Smokeless tobacco: Never Used   Substance and Sexual Activity    Alcohol use: Yes     Comment: SOCIAL on weekends    Drug use: No    Sexual activity: Not on file   Other Topics Concern    Not on file   Social History Narrative    Not on file     Social Determinants of Health     Financial Resource Strain:     Difficulty of Paying Living Expenses: Not on file   Food Insecurity:     Worried About Running Out of Food in the Last Year: Not on file    Tee of Food in the Last Year: Not on file   Transportation Needs:     Lack of Transportation (Medical): Not on file    Lack of Transportation (Non-Medical):  Not on file   Physical Activity:     Days of Exercise per Week: Not on file    Minutes of Exercise per Session: Not on file   Stress:     Feeling of Stress : Not on file   Social Connections:     Frequency of Communication with Friends and Family: Not on file    Frequency of Social Gatherings with Friends and Family: Not on file    Attends Muslim Services: Not on file    Active Member of [address] [place] [address] or Organizations: Not on file    Attends Club or Organization Meetings: Not on file    Marital Status: Not on file   Intimate Partner Violence:     Fear of Current or Ex-Partner: Not on file    Emotionally Abused: Not on file    Physically Abused: Not on file    Sexually Abused: Not on file   Housing Stability:     Unable to Pay for Housing in the Last Year: Not on file    Number of [place] in the Last Year: Not on file    Unstable Housing in the Last Year: Not on file       Family History:     Family History   Problem Relation Age of Onset    Cancer Father         pancreatic cancer    Other Brother         MI    Osteoarthritis Mother     Other Maternal Grandfather         MI      Medical Decision Making:   I have independently reviewed/ordered the following labs:    CBC with Differential:   Recent Labs     03/09/22  0556 03/10/22  0534   WBC 3.8 4.2   HGB 10.8* 9.8*   HCT 30.5* 27.6*   * 145*   LYMPHOPCT 14* 17*   MONOPCT 13* 11*     BMP:  Recent Labs     03/08/22  0543 03/08/22  0543 03/09/22  0556 03/10/22  0534   *   < > 135 134*   K 3.5*   < > 3.8 3.5*   CL 94*   < > 98 99   CO2 25   < > 23 23   BUN 33*   < > 31* 36*   CREATININE 2.13*   < > 1.92* 1.74*   MG 1.7  --   --   --     < > = values in this interval not displayed. Hepatic Function Panel:   Recent Labs     03/08/22  0543 03/08/22  0543 03/08/22  1516 03/09/22  0556   PROT 6.7   < > 6.2* 6.3*   LABALBU 3.6  --   --  3.1*   BILIDIR 0.83*  --   --  1.12*   IBILI 0.72  --   --  0.53   BILITOT 1.55*  --   --  1.65*   ALKPHOS 112  --   --  112   ALT 19  --   --  23   AST 23  --   --  34    < > = values in this interval not displayed. No results for input(s): RPR in the last 72 hours. No results for input(s): HIV in the last 72 hours. No results for input(s): BC in the last 72 hours. Lab Results   Component Value Date    CREATININE 1.74 03/10/2022    GLUCOSE 65 03/10/2022    GLUCOSE 335 12/28/2011       Detailed results: Thank you for allowing us to participate in the care of this patient. Please call with questions. This note is created with the assistance of a speech recognition program.  While intending to generate adocument that actually reflects the content of the visit, the document can still have some errors including those of syntax and sound a like substitutions which may escape proof reading. It such instances, actual meaningcan be extrapolated by contextual diversion.     Parul Godinez MD  Office: [phone]  Perfect serve / office [phone]

## 2022-03-10 NOTE — PROGRESS NOTES
NEPHROLOGY PROGRESS NOTE    Patient :  Zeke Xie; 78 y.o. MRN# [id number]  Location:  2052/2052-01  Attending:  Benjamin Grimes DO  Admit Date:  3/7/2022   Hospital Day: 3    Reason for consultation: Management of acute kidney injury. Consulting physician: Katlin De La Torre DO    Interval history: Patient was seen and examined today and he has had recurrent episodes of hypoglycemia this morning. He has wet/dry gangrene of the left second toe and there are no immediate plans for surgical intervention. He is on IV Cubicin, metronidazole and cefepime. History of Present Illness: This is a 78 y.o. male past medical history of coronary artery disease, bypass surgery, CHF, ype 2 diabetes, Essential hypertension, chronic kidney disease stage IIIb with baseline creatinine of about 1.5 to 1.7 mg/dL since 2019, patient has not follow-up with any nephrologist as an outpatient. Patient has patient presented to the hospital with complaints of left foot redness and black toe, patient noticed black toe about last 1 week, patient started to have pain in the foot and redness and therefore decided to come to the hospital.  Denied any nausea vomiting diarrhea no fever chills  Denied any shortness of breath chest pain  Labs showed serum creatinine of 2.1 mg/dL on admission from nephrology consultation has been requested  Blood cultures positive for MRSA  Pt denies any history of  prolonged NSAID use. Patient denies dysuria, gross hematuria, flank pain, nocturia, urgency, passing frothy urine or urinary incontinence. There has been no recent exposure to IV contrast.   There is no history  of paraprotein disease. Pt denies any history of recurrent UTI or kidney stones. Medication review shows use of ACE-inhibitor/diuretics.     Current Medications:    [START ON 3/11/2022] glipiZIDE (GLUCOTROL) tablet 5 mg, QAM AC  enoxaparin (LOVENOX) injection 80 mg, BID  dextrose 5 % and 0.9 % sodium chloride infusion, Continuous  DAPTOmycin (CUBICIN) 450 mg in sodium chloride 0.9 % 50 mL IVPB, Q24H  cefepime (MAXIPIME) 1000 mg IVPB minibag, Q12H  metronidazole (FLAGYL) 500 mg in NaCl 100 mL IVPB premix, Q8H  0.9 % sodium chloride infusion, Continuous  sodium chloride flush 0.9 % injection 5-40 mL, 2 times per day  sodium chloride flush 0.9 % injection 5-40 mL, PRN  0.9 % sodium chloride infusion, PRN  acetaminophen (TYLENOL) tablet 650 mg, Q4H PRN  ondansetron (ZOFRAN-ODT) disintegrating tablet 4 mg, Q8H PRN   Or  ondansetron (ZOFRAN) injection 4 mg, Q6H PRN  carvedilol (COREG) tablet 6.25 mg, BID WC  insulin lispro (HUMALOG) injection vial 0-12 Units, TID WC  insulin lispro (HUMALOG) injection vial 0-6 Units, Nightly  glucose (GLUTOSE) 40 % oral gel 15 g, PRN  dextrose 50 % IV solution, PRN  glucagon (rDNA) injection 1 mg, PRN  dextrose 5 % solution, PRN      Objective:  CURRENT TEMPERATURE:  Temp: 97.9 °F (36.6 °C)  MAXIMUM TEMPERATURE OVER 24HRS:  Temp (24hrs), Av.9 °F (36.6 °C), Min:97.7 °F (36.5 °C), Max:98 °F (36.7 °C)    CURRENT RESPIRATORY RATE:  Resp: 18  CURRENT PULSE:  Pulse: 55  CURRENT BLOOD PRESSURE:  BP: (!) 152/72  24HR BLOOD PRESSURE RANGE:  Systolic (96RUW), QHZ:883 , Min:121 , PBT:091   ; Diastolic (01NAC), CTA:27, Min:61, Max:72    24HR INTAKE/OUTPUT:      Intake/Output Summary (Last 24 hours) at 3/10/2022 1229  Last data filed at 3/10/2022 0152  Gross per 24 hour   Intake 2019.89 ml   Output 375 ml   Net 1644.89 ml     Patient Vitals for the past 96 hrs (Last 3 readings):   Weight   22 2331 198 lb 13.7 oz (90.2 kg)   22 1722 198 lb 10.2 oz (90.1 kg)   22 1229 215 lb (97.5 kg)     Physical Exam:  GENERAL APPEARANCE: Alert and cooperative, and appears to be in no acute distress. HEAD: Normocephalic and atraumatic  EYES:  EOMI. Not pale, anicteric   NOSE:  No nasal discharge. THROAT:  Oral cavity and pharynx normal. Moist  CARDIAC: Normal S1 and S2. No S3, S4 or murmurs.  Rhythm is regular. LUNGS: Clear to auscultation and percussion without rales, rhonchi, wheezing or diminished breath sounds. GI-soft nontender  MUSKULOSKELETAL: Adequately aligned spine. No joint erythema or tenderness. EXTREMITIES: Left foot erythema with wet gangrene of the second left toe. NEURO: No acute focal neurologic deficits    Labs:   CBC:  Recent Labs     03/08/22  0543 03/09/22  0556 03/10/22  0534   WBC 7.0 3.8 4.2   RBC 3.52* 3.41* 3.00*   HGB 11.2* 10.8* 9.8*   HCT 32.0* 30.5* 27.6*   MCV 90.7 89.5 92.1   MCH 31.8 31.8 32.5   MCHC 35.1 35.5 35.3   RDW 14.3 14.8 14.5    143* 145*   MPV 8.5 7.9 7.9      BMP:   Recent Labs     03/08/22  0543 03/09/22  0556 03/10/22  0534   * 135 134*   K 3.5* 3.8 3.5*   CL 94* 98 99   CO2 25 23 23   BUN 33* 31* 36*   CREATININE 2.13* 1.92* 1.74*   GLUCOSE 119* 54* 65*   CALCIUM 9.0 8.7 8.6      Magnesium:   Recent Labs     03/07/22  1256 03/08/22  0543   MG 1.6 1.7     Albumin:   Recent Labs     03/08/22  0543 03/09/22  0556   LABALBU 3.6 3.1*     Recent Labs     03/08/22  1516 03/08/22  1516 03/08/22 2033 03/09/22  0556   PROT 6.2*   < >  --  6.3*   ALBCAL 3.6  --   --   --    ALBPCT 59  --   --   --    LABALPH 0.3  0.8  --   --   --    A1PCT 4  --   --   --    A2PCT 14*  --   --   --    LABBETA 0.6  --   --   --    BETAPCT 10*  --   --   --    GAMGLOB 0.8  --   --   --    GGPCT 13  --   --   --    PATH ELECTRONICALLY SIGNED. Prabha Messina M.D.   < > PENDING  --     < > = values in this interval not displayed. Assessment/plan:    1. 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  AZIZA is negative, complements and plasma free light chain ratios are within normal. Renal ultrasound showed bilateral nonobstructing nephrolithiasis with no hydronephrosis and is simple appearing renal cyst 3.4 cm on the left. Renal function is improving. Plan: Continue to hold furosemide. Change IV fluids to D5/0.9 normal saline at 100 mL/h. Avoid nephrotoxic agents. Basic metabolic profile daily. 2.  Systemic hypertension - Continue current medications carvedilol 6.25 mg p.o. twice daily    3. Chronic kidney disease stage IIIb [baseline serum creatinine 1.5 to 1.7 mg/dL] - consistent with diabetic glomerulopathy. Renal function is back to baseline. 4.  Peripheral arterial disease with left second toe gangrene will follow vascular and podiatry recommendations. 5.  Left second toe gangrene - continue antibiotics. Prognosis is guarded.     Electronically signed by Latanya Lima MD on 3/10/2022 at 12:29 PM

## 2022-03-11 LAB
ABSOLUTE EOS #: 0.1 K/UL (ref 0–0.4)
ABSOLUTE LYMPH #: 0.7 K/UL (ref 1–4.8)
ABSOLUTE MONO #: 0.4 K/UL (ref 0.1–1.3)
ANION GAP SERPL CALCULATED.3IONS-SCNC: 10 MMOL/L (ref 9–17)
BASOPHILS # BLD: 1 % (ref 0–2)
BASOPHILS ABSOLUTE: 0 K/UL (ref 0–0.2)
BUN BLDV-MCNC: 31 MG/DL (ref 8–23)
CALCIUM SERPL-MCNC: 8.9 MG/DL (ref 8.6–10.4)
CHLORIDE BLD-SCNC: 100 MMOL/L (ref 98–107)
CO2: 26 MMOL/L (ref 20–31)
CREAT SERPL-MCNC: 1.57 MG/DL (ref 0.7–1.2)
EOSINOPHILS RELATIVE PERCENT: 2 % (ref 0–4)
ESTIMATED AVERAGE GLUCOSE: 148 MG/DL
GFR AFRICAN AMERICAN: 54 ML/MIN
GFR NON-AFRICAN AMERICAN: 44 ML/MIN
GFR SERPL CREATININE-BSD FRML MDRD: ABNORMAL ML/MIN/{1.73_M2}
GLUCOSE BLD-MCNC: 131 MG/DL (ref 75–110)
GLUCOSE BLD-MCNC: 144 MG/DL (ref 70–99)
GLUCOSE BLD-MCNC: 145 MG/DL (ref 75–110)
GLUCOSE BLD-MCNC: 184 MG/DL (ref 75–110)
HBA1C MFR BLD: 6.8 % (ref 4–6)
HCT VFR BLD CALC: 28.7 % (ref 41–53)
HEMOGLOBIN: 10.1 G/DL (ref 13.5–17.5)
LYMPHOCYTES # BLD: 17 % (ref 24–44)
MCH RBC QN AUTO: 31.8 PG (ref 26–34)
MCHC RBC AUTO-ENTMCNC: 35.2 G/DL (ref 31–37)
MCV RBC AUTO: 90.3 FL (ref 80–100)
MONOCYTES # BLD: 10 % (ref 1–7)
PDW BLD-RTO: 14.6 % (ref 11.5–14.9)
PLATELET # BLD: 167 K/UL (ref 150–450)
PMV BLD AUTO: 7.9 FL (ref 6–12)
POTASSIUM SERPL-SCNC: 3.9 MMOL/L (ref 3.7–5.3)
PRO-BNP: 1821 PG/ML
RBC # BLD: 3.18 M/UL (ref 4.5–5.9)
SEG NEUTROPHILS: 70 % (ref 36–66)
SEGMENTED NEUTROPHILS ABSOLUTE COUNT: 2.8 K/UL (ref 1.3–9.1)
SODIUM BLD-SCNC: 136 MMOL/L (ref 135–144)
WBC # BLD: 4.1 K/UL (ref 3.5–11)

## 2022-03-11 PROCEDURE — 99232 SBSQ HOSP IP/OBS MODERATE 35: CPT | Performed by: INTERNAL MEDICINE

## 2022-03-11 PROCEDURE — 83036 HEMOGLOBIN GLYCOSYLATED A1C: CPT

## 2022-03-11 PROCEDURE — 83880 ASSAY OF NATRIURETIC PEPTIDE: CPT

## 2022-03-11 PROCEDURE — 82947 ASSAY GLUCOSE BLOOD QUANT: CPT

## 2022-03-11 PROCEDURE — 6360000002 HC RX W HCPCS: Performed by: INTERNAL MEDICINE

## 2022-03-11 PROCEDURE — 36415 COLL VENOUS BLD VENIPUNCTURE: CPT

## 2022-03-11 PROCEDURE — 2580000003 HC RX 258: Performed by: INTERNAL MEDICINE

## 2022-03-11 PROCEDURE — 80048 BASIC METABOLIC PNL TOTAL CA: CPT

## 2022-03-11 PROCEDURE — 6370000000 HC RX 637 (ALT 250 FOR IP): Performed by: FAMILY MEDICINE

## 2022-03-11 PROCEDURE — 1200000000 HC SEMI PRIVATE

## 2022-03-11 PROCEDURE — 2500000003 HC RX 250 WO HCPCS: Performed by: FAMILY MEDICINE

## 2022-03-11 PROCEDURE — 6360000002 HC RX W HCPCS: Performed by: FAMILY MEDICINE

## 2022-03-11 PROCEDURE — 85025 COMPLETE CBC W/AUTO DIFF WBC: CPT

## 2022-03-11 PROCEDURE — 2580000003 HC RX 258: Performed by: FAMILY MEDICINE

## 2022-03-11 RX ADMIN — CARVEDILOL 6.25 MG: 6.25 TABLET, FILM COATED ORAL at 08:17

## 2022-03-11 RX ADMIN — INSULIN LISPRO 1 UNITS: 100 INJECTION, SOLUTION INTRAVENOUS; SUBCUTANEOUS at 21:57

## 2022-03-11 RX ADMIN — CARVEDILOL 6.25 MG: 6.25 TABLET, FILM COATED ORAL at 16:51

## 2022-03-11 RX ADMIN — GLIPIZIDE 5 MG: 5 TABLET ORAL at 08:17

## 2022-03-11 RX ADMIN — METRONIDAZOLE 500 MG: 500 INJECTION, SOLUTION INTRAVENOUS at 01:40

## 2022-03-11 RX ADMIN — SODIUM CHLORIDE, PRESERVATIVE FREE 10 ML: 5 INJECTION INTRAVENOUS at 10:00

## 2022-03-11 RX ADMIN — CEFEPIME HYDROCHLORIDE 1000 MG: 1 INJECTION, POWDER, FOR SOLUTION INTRAMUSCULAR; INTRAVENOUS at 20:50

## 2022-03-11 RX ADMIN — METRONIDAZOLE 500 MG: 500 INJECTION, SOLUTION INTRAVENOUS at 08:25

## 2022-03-11 RX ADMIN — CEFEPIME HYDROCHLORIDE 1000 MG: 1 INJECTION, POWDER, FOR SOLUTION INTRAMUSCULAR; INTRAVENOUS at 08:22

## 2022-03-11 RX ADMIN — ACETAMINOPHEN 650 MG: 325 TABLET, FILM COATED ORAL at 23:31

## 2022-03-11 RX ADMIN — DAPTOMYCIN 450 MG: 500 INJECTION, POWDER, LYOPHILIZED, FOR SOLUTION INTRAVENOUS at 15:53

## 2022-03-11 RX ADMIN — ENOXAPARIN SODIUM 80 MG: 40 INJECTION SUBCUTANEOUS at 12:12

## 2022-03-11 RX ADMIN — METRONIDAZOLE 500 MG: 500 INJECTION, SOLUTION INTRAVENOUS at 16:52

## 2022-03-11 RX ADMIN — INSULIN LISPRO 2 UNITS: 100 INJECTION, SOLUTION INTRAVENOUS; SUBCUTANEOUS at 08:00

## 2022-03-11 RX ADMIN — INSULIN LISPRO 2 UNITS: 100 INJECTION, SOLUTION INTRAVENOUS; SUBCUTANEOUS at 12:30

## 2022-03-11 ASSESSMENT — ENCOUNTER SYMPTOMS
ABDOMINAL PAIN: 0
COLOR CHANGE: 1
DIARRHEA: 0
NAUSEA: 0
CHEST TIGHTNESS: 0
WHEEZING: 0
SHORTNESS OF BREATH: 0
COUGH: 0
CONSTIPATION: 0

## 2022-03-11 ASSESSMENT — PAIN SCALES - GENERAL
PAINLEVEL_OUTOF10: 1
PAINLEVEL_OUTOF10: 0

## 2022-03-11 NOTE — PROGRESS NOTES
[id number] [place] [employer]      PROGRESS NOTE        Patient:  Josue Dallas  YOB: 1955    MRN: [id number]     Acct: [de-identified]     Admit date: 3/7/2022    Pt seen and Chart reviewed. Consultant notes reviewed and care evaluated. Subjective: Patient states he is doing okay he feels her right occasionally gets a stabbing pain from his foot but goes away. Reviewing his records I do not see any notes since admission from podiatry. Discussed with the charge nurse and it looks like they signed off from not sure why he was told and reported to him that no surgical intervention. Diet:  ADULT DIET; Regular; 5 carb choices (75 gm/meal)  ADULT ORAL NUTRITION SUPPLEMENT; Breakfast, Dinner;  Low Calorie/High Protein Oral Supplement      Medications:Current Inpatient    Scheduled Meds:   glipiZIDE  5 mg Oral QAM AC    enoxaparin  1 mg/kg (Adjusted) SubCUTAneous BID    daptomycin (CUBICIN) IVPB  6 mg/kg (Ideal) IntraVENous Q24H    cefepime  1,000 mg IntraVENous Q12H    metroNIDAZOLE  500 mg IntraVENous Q8H    sodium chloride flush  5-40 mL IntraVENous 2 times per day    carvedilol  6.25 mg Oral BID WC    insulin lispro  0-12 Units SubCUTAneous TID WC    insulin lispro  0-6 Units SubCUTAneous Nightly     Continuous Infusions:   dextrose 5 % and 0.9 % NaCl 100 mL/hr at 03/10/22 1808    sodium chloride      dextrose 100 mL/hr (03/10/22 0618)     PRN Meds:sodium chloride flush, sodium chloride, acetaminophen, ondansetron **OR** ondansetron, glucose, dextrose, glucagon (rDNA), dextrose        Physical Exam:  Vitals: /64   Pulse 72   Temp 97.5 °F (36.4 °C)   Resp 18   Ht 5' 11\" (1.803 m)   Wt 198 lb 13.7 oz (90.2 kg)   SpO2 97%   BMI 27.73 kg/m²   24 hour intake/output:    Intake/Output Summary (Last 24 hours) at 3/11/2022 0753  Last data filed at 3/11/2022 0634  Gross per 24 hour   Intake 785.82 ml   Output 1000 ml   Net -214.18 ml Last 3 weights: Wt Readings from Last 3 Encounters:   03/07/22 198 lb 13.7 oz (90.2 kg)   02/05/21 212 lb 15.4 oz (96.6 kg)   05/23/16 223 lb (101.2 kg)       Physical Examination:   General appearance - alert, well appearing, and in no distress  Mental status - alert, oriented to person, place, and time  PERRLA;WNL  Chest - clear to auscultation, no wheezes, rales or rhonchi, symmetric air entry  Heart - normal rate, regular rhythm, normal S1, S2, no murmurs, rubs, clicks or gallops  Abdomen - soft, nontender, nondistended, no masses or organomegaly  Neurological - alert, oriented, normal speech, no focal findings or movement disorder noted}  Extremities - peripheral pulses normal, his erythema slowly resolving he still have some erythema dorsal foot. And forefoot but decrease edema his second toe still gangrenous from the proximal distal phalanx. Skin - normal coloration and turgor, no rashes, no suspicious skin lesions noted      Component Value Units   Basic Metabolic Panel [[id number]] (Abnormal)    Collected: 03/11/22 0606    Updated: 03/11/22 0723    Specimen Source: Blood     Glucose 144 High  mg/dL    BUN 31 High  mg/dL    CREATININE 1.57 High  mg/dL    Calcium 8.9 mg/dL    Sodium 136 mmol/L    Potassium 3.9 mmol/L    Chloride 100 mmol/L    CO2 26 mmol/L    Anion Gap 10 mmol/L    GFR Non-African American 44 Low  mL/min    GFR  54 Low  mL/min    GFR Comment         Comment: Average GFR for 61-76 years old:    85 mL/min/1.73sq m   Chronic Kidney Disease:    <60 mL/min/1.73sq m   Kidney failure:    <15 mL/min/1.73sq m               eGFR calculated using average adult body mass.  Additional eGFR calculator available at:         [email or web address]             CBC with Auto Differential [[id number]] (Abnormal)    Collected: 03/11/22 0606    Updated: 03/11/22 8534    Specimen Source: Blood     WBC 4.1 k/uL    RBC 3.18 Low  m/uL    Hemoglobin 10.1 Low  g/dL Hematocrit 28.7 Low  %    MCV 90.3 fL    MCH 31.8 pg    MCHC 35.2 g/dL    RDW 14.6 %    Platelets 011 k/uL    MPV 7.9 fL    Seg Neutrophils 70 High  %    Lymphocytes 17 Low  %    Monocytes 10 High  %    Eosinophils % 2 %    Basophils 1 %    Segs Absolute 2.80 k/uL    Absolute Lymph # 0.70 Low  k/uL    Absolute Mono # 0.40 k/uL    Absolute Eos # 0.10 k/uL    Basophils Absolute 0.00 k/uL   POC Glucose Fingerstick [[id number]] (Abnormal)    Collected: 03/11/22 0611    Updated: 03/11/22 0617     POC Glucose 145 High  mg/dL   Hemoglobin A1C [[id number]]    Collected: 03/11/22 0606    Updated: 03/11/22 0607    Specimen Source: Blood    Brain Natriuretic Peptide [[id number]]    Collected: 03/11/22 0606    Updated: 03/11/22 0607    Specimen Source: Blood    Culture, Blood 1 [[id number]]    Collected: 03/10/22 0942    Updated: 03/11/22 0013    Specimen Source: Blood     Specimen Description . BLOOD    Culture NO GROWTH 12 HOURS   Culture, Blood 1 [[id number]]    Collected: 03/10/22 0942    Updated: 03/11/22 0012    Specimen Source: Blood     Specimen Description . BLOOD    Culture NO GROWTH 12 HOURS   POC Glucose Fingerstick [[id number]] (Abnormal)    Collected: 03/10/22 1944    Updated: 03/10/22 1950     POC Glucose 200 High  mg/dL   POC Glucose Fingerstick [[id number]] (Abnormal)    Collected: 03/10/22 1625    Updated: 03/10/22 1638     POC Glucose 175 High  mg/dL   POC Glucose Fingerstick [[id number]] (Abnormal)    Collected: 03/10/22 1108    Updated: 03/10/22 1119     POC Glucose 149 High           Assessment:  Principal Problem:    Diabetic foot infection ([facility]  Resolved Problems:    * No resolved hospital problems.  *     Diabetic foot infection (HCC) E11.628, L08.9    Diabetes ([facility] E11.9    Chronic osteomyelitis ([facility]) M86.60    Diabetic foot ulcer ([facility] E11.621, L97.509    PVD (peripheral vascular disease) ([facility]) I73.9    Atherosclerosis of coronary artery without angina pectoris I25.10    Cardiomyopathy ([facility] I42.9    Cardiac left ventricular ejection fraction 21-40 percent R94.30    Diabetes mellitus type 2 with peripheral artery disease ([facility]) E11.51    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 ([facility]) L97.514    Chronic osteomyelitis of left foot ([facility]) U49.119    Onychomycosis B35.1    Heart failure ([facility]) I50.9      · Cellulitis of left foot  ·    · Gangrenous left big toe questionable osteomyelitis no evidence of that on CT  ·    · Renal insufficiency and probably chronic because that MRI probably would not be done secondary to requirement of IV dye  · With acute on chronic kidney injury  · Diabetes mellitus  ·    · History of peripheral vascular disease  ·    · History of previous amputations  ·    · Elevated inflammatory markers consistent with his infection  · Mild hypokalemia  · Chronic A. fib  · Cardiomyopathy with EF 35 percentile no evidence of CHF clinically at this time  · Moderate tricuspid regurgitation  ·    · Moderate pulmonary hypertension  · Positive blood culture with MRSA  ·    · Hyperglycemia need to watch WHILE  on glipizide but will decrease the dose                    Plan:  1. We will consult a new podiatrist for this case to get their opinion about the toe  2.   3. From my abuse and infectious disease the toe might need to be amputated  4.   5. Continue with the current antibiotics  6.  With IV fluid 75 mL an hour second to his cardiomyopathy and EF of 35%    Starr Has, DO  FAAFP           3/11/2022, 7:53 AM

## 2022-03-11 NOTE — PROGRESS NOTES
Lexicomp trissel's compatibility unclear if flagyl is compatible with D5NS. Writer called and spoke with Tressa Coleman Pharmacist to clarify medication and IV fluid compatibility. Tressa Coleman states it is ok to run IVPB flagyl with D5NS.

## 2022-03-11 NOTE — PROGRESS NOTES
A consult was placed per Dr Renetta Balbuena for Dr Gloria Gordillo and the residents were sent a perfect serve and notified. .. thank you    Per Mita Izaguirre DPM, he stated to changed the follow up to Dr Gloria Gordillo as there is nothing to be done for this patient at this time but Dr Gloria Gordillo can certainly do the amputation on an outpatient basis. .. thank you!

## 2022-03-11 NOTE — PLAN OF CARE
Problem: Falls - Risk of:  Goal: Will remain free from falls  Description: Will remain free from falls  3/11/2022 0313 by Benjamin Mccloud RN  Outcome: Ongoing     Problem: Falls - Risk of:  Goal: Absence of physical injury  Description: Absence of physical injury  3/11/2022 0313 by Benjamin Mccloud RN  Outcome: Ongoing     Problem: Skin Integrity:  Goal: Will show no infection signs and symptoms  Description: Will show no infection signs and symptoms  3/11/2022 0313 by Benjamin Mccloud RN  Outcome: Ongoing     Problem: Skin Integrity:  Goal: Absence of new skin breakdown  Description: Absence of new skin breakdown  3/11/2022 0313 by Benjamin Mccloud RN  Outcome: Ongoing     Problem: Nutrition  Goal: Optimal nutrition therapy  3/11/2022 0313 by Benjamin Mccloud RN  Outcome: Ongoing

## 2022-03-11 NOTE — PROGRESS NOTES
NEPHROLOGY PROGRESS NOTE    Patient :  Slava Jaimes; 78 y.o. MRN# [id number]  Location:  2052/2052-01  Attending:  Forrest Cuevas DO  Admit Date:  3/7/2022   Hospital Day: 4    Reason for consultation: Management of acute kidney injury. Consulting physician: Alison Blake DO    Interval history: Patient was seen and examined today and he had recurrent episodes of hypoglycemia yesterday and was started on D5 0.9 saline at 75 cc/hr. He has wet/dry gangrene of the left second toe and vascular consulted to determine need for revascularization. He is on IV Cubicin, metronidazole and cefepime. Renal function is improving. History of Present Illness: This is a 78 y.o. male past medical history of coronary artery disease, bypass surgery, CHF, ype 2 diabetes, Essential hypertension, chronic kidney disease stage IIIb with baseline creatinine of about 1.5 to 1.7 mg/dL since 2019, patient has not follow-up with any nephrologist as an outpatient. Patient has patient presented to the hospital with complaints of left foot redness and black toe, patient noticed black toe about last 1 week, patient started to have pain in the foot and redness and therefore decided to come to the hospital.  Denied any nausea vomiting diarrhea no fever chills  Denied any shortness of breath chest pain  Labs showed serum creatinine of 2.1 mg/dL on admission from nephrology consultation has been requested  Blood cultures positive for MRSA  Pt denies any history of  prolonged NSAID use. Patient denies dysuria, gross hematuria, flank pain, nocturia, urgency, passing frothy urine or urinary incontinence. There has been no recent exposure to IV contrast.   There is no history  of paraprotein disease. Pt denies any history of recurrent UTI or kidney stones. Medication review shows use of ACE-inhibitor/diuretics.     Current Medications:    glipiZIDE (GLUCOTROL) tablet 5 mg, QAM AC  enoxaparin (LOVENOX) injection 80 mg, BID  dextrose 5 % and 0.9 % sodium chloride infusion, Continuous  DAPTOmycin (CUBICIN) 450 mg in sodium chloride 0.9 % 50 mL IVPB, Q24H  cefepime (MAXIPIME) 1000 mg IVPB minibag, Q12H  metronidazole (FLAGYL) 500 mg in NaCl 100 mL IVPB premix, Q8H  sodium chloride flush 0.9 % injection 5-40 mL, 2 times per day  sodium chloride flush 0.9 % injection 5-40 mL, PRN  0.9 % sodium chloride infusion, PRN  acetaminophen (TYLENOL) tablet 650 mg, Q4H PRN  ondansetron (ZOFRAN-ODT) disintegrating tablet 4 mg, Q8H PRN   Or  ondansetron (ZOFRAN) injection 4 mg, Q6H PRN  carvedilol (COREG) tablet 6.25 mg, BID WC  insulin lispro (HUMALOG) injection vial 0-12 Units, TID WC  insulin lispro (HUMALOG) injection vial 0-6 Units, Nightly  glucose (GLUTOSE) 40 % oral gel 15 g, PRN  dextrose 50 % IV solution, PRN  glucagon (rDNA) injection 1 mg, PRN  dextrose 5 % solution, PRN      Objective:  CURRENT TEMPERATURE:  Temp: 97.6 °F (36.4 °C)  MAXIMUM TEMPERATURE OVER 24HRS:  Temp (24hrs), Av.5 °F (36.4 °C), Min:97.3 °F (36.3 °C), Max:97.6 °F (36.4 °C)    CURRENT RESPIRATORY RATE:  Resp: 18  CURRENT PULSE:  Pulse: 83  CURRENT BLOOD PRESSURE:  BP: (!) 155/85  24HR BLOOD PRESSURE RANGE:  Systolic (45EJW), LANA:562 , Min:139 , DSX:881   ; Diastolic (45BLK), WWP:10, Min:64, Max:87    24HR INTAKE/OUTPUT:      Intake/Output Summary (Last 24 hours) at 3/11/2022 1506  Last data filed at 3/11/2022 8812  Gross per 24 hour   Intake 785.82 ml   Output 1000 ml   Net -214.18 ml     Patient Vitals for the past 96 hrs (Last 3 readings):   Weight   22 2331 198 lb 13.7 oz (90.2 kg)   22 1722 198 lb 10.2 oz (90.1 kg)     Physical Exam:  GENERAL APPEARANCE: Alert and cooperative, and appears to be in no acute distress. HEAD: Normocephalic and atraumatic  EYES:  EOMI. Not pale, anicteric   NOSE:  No nasal discharge. THROAT:  Oral cavity and pharynx normal. Moist  CARDIAC: Normal S1 and S2. No S3, S4 or murmurs. Rhythm is regular.   LUNGS: Clear to auscultation and percussion without rales, rhonchi, wheezing or diminished breath sounds. GI-soft nontender  MUSKULOSKELETAL: Adequately aligned spine. No joint erythema or tenderness. EXTREMITIES: Left foot erythema with wet gangrene of the second left toe. NEURO: No acute focal neurologic deficits    Labs:   CBC:  Recent Labs     03/09/22  0556 03/10/22  0534 03/11/22  0606   WBC 3.8 4.2 4.1   RBC 3.41* 3.00* 3.18*   HGB 10.8* 9.8* 10.1*   HCT 30.5* 27.6* 28.7*   MCV 89.5 92.1 90.3   MCH 31.8 32.5 31.8   MCHC 35.5 35.3 35.2   RDW 14.8 14.5 14.6   * 145* 167   MPV 7.9 7.9 7.9      BMP:   Recent Labs     03/09/22  0556 03/10/22  0534 03/11/22  0606    134* 136   K 3.8 3.5* 3.9   CL 98 99 100   CO2 23 23 26   BUN 31* 36* 31*   CREATININE 1.92* 1.74* 1.57*   GLUCOSE 54* 65* 144*   CALCIUM 8.7 8.6 8.9      Magnesium:   No results for input(s): MG in the last 72 hours. Albumin:   Recent Labs     03/09/22  0556   LABALBU 3.1*     Recent Labs     03/08/22  1516 03/08/22  1516 03/08/22 2033 03/09/22  0556   PROT 6.2*   < >  --  6.3*   ALBCAL 3.6  --   --   --    ALBPCT 59  --   --   --    LABALPH 0.3  0.8  --   --   --    A1PCT 4  --   --   --    A2PCT 14*  --   --   --    LABBETA 0.6  --   --   --    BETAPCT 10*  --   --   --    GAMGLOB 0.8  --   --   --    GGPCT 13  --   --   --    PATH ELECTRONICALLY SIGNED. Leilani Kehr, M.D.   < > PENDING  --     < > = values in this interval not displayed. Assessment/plan:    1. Acute kidney injury superimposed on chronic kidney disease stage 3b - most consistent with prerenal azotemia from aggressive diuretic therapy as well as ischemic ATN 2nd to left toe infection. AZIZA is negative, complements and plasma free light chain ratios are within normal. Renal ultrasound showed bilateral nonobstructing nephrolithiasis with no hydronephrosis and is simple appearing renal cyst 3.4 cm on the left. Renal function is improving. Plan: Continue to hold furosemide.   Decrease  IV fluids  D5/0.9 normal saline to 50 cc/hour as Hypoglycemia is improving. Avoid nephrotoxic agents. Basic metabolic profile daily. Monitor CPK levels while on IV Cubicin    2. Systemic hypertension - Continue current medications carvedilol 6.25 mg p.o. twice daily    3. Chronic kidney disease stage IIIb [baseline serum creatinine 1.5 to 1.7 mg/dL] - consistent with diabetic glomerulopathy. Renal function is back to baseline. 4.  Peripheral arterial disease with left second toe gangrene will follow vascular and podiatry recommendations. 5.  Left second toe gangrene - continue antibiotics. Prognosis is guarded.     Electronically signed by Catrachito Chance MD on 3/11/2022 at 3:06 PM

## 2022-03-11 NOTE — PROGRESS NOTES
Dr. Diamante sibley, dissatisfied with current podiatry group, would like new consult for Dr. Susan Mack.

## 2022-03-11 NOTE — CARE COORDINATION
ONGOING DISCHARGE PLAN:    Patient is alert and oriented x4. Spoke with patient regarding discharge plan and patient confirms that plan is still home with mother with no needs. Sabin following for Poss need for IV ATB. Will discuss VNS if IV ATB needed. New consult for a different podiatry group. Vasc on board. On IV cefepime, daptomycin, Flagyl. Will continue to follow for additional discharge needs.     Electronically signed by Linwood Woo RN on 3/11/2022 at 11:09 AM

## 2022-03-11 NOTE — PROGRESS NOTES
[facility] -   Infectious diseases evaluation  admission date 3/7/2022    reason for consultation:   Left second toe gangrene and cellulitis    Impression :   Current:  · MRSA bacteremia 2/2 suspected osteomyelitis of the left 2nd toe   · Gangrene left 2nd toe  · Cellulitis left foot  · S/p bilateral great big toe amputation  · Type 2 diabetes mellitus  · BRIANNA on CKD stage IIIb  · PVD  · CHF  · S/p CABG  · A. fib on warfarin  · Essential hypertension      Recommendations        IV Daptomycin   · Continue IV cefepime and IV Flagyl  · No reported vegetations on echocardiogram  · The patient will need left second toe amputation. · Await vascular surgery recommendation regarding revascularization. · Primary Dr. Lara Adams  consulted another a podiatry group Dr. Robyn Oh  · Repeat blood cultures from yesterday no growth  · Monitor renal function  · Discussed with nursing staff, will discuss with Dr. Robyn Oh        History of Present Illness:   Initial history:  Rell Patel is a 78y.o.-year-old male with a PMH significant for type 2 diabetes mellitus, CKD stage IIIb,  PVD, s/p CABG, CHF, A. fib on Coumadin, essential hypertension, and s/p bilateral great big toe amputation who presents to [facility] on 3/7/2022 and is admitted for the management of left second toe gangrene and cellulitis. Labs upon presentation were remarkable for , K3.4, creatinine 2.10, LA 3.1--->1.7, CRP 61.1--->87.8, WBC 7.3, ESR 21--->63. X-ray left foot showed suspicion for possible early osteomyelitis at the second toe distal phalanx. It also showed soft tissue swelling of the second toe at the forefoot. MRI of the foot was not possible because patient has an ICD. Therefore, CT of the left foot was taken which showed no convincing evidence of osteomyelitis or other acute osseous abnormality. There was extensive subcutaneous edema compatible with bland edema or cellulitis.   No soft tissue gas or drainable fluid collection identified. Interval changes  3/11/2022   He is feeling well, denied significant pain, denied fever or chills, denied nausea or vomiting, no new events. Creatinine 1.57  Patient Vitals for the past 8 hrs:   BP Temp Pulse Resp SpO2   03/11/22 0647 139/64 97.5 °F (36.4 °C) 72 18 97 %       Summary of relevant labs:      Micro: Blood cultures grew MRSA    Imaging:    3/7/22 XR left foot: shows suspicion for possible early osteomyelitis at the second toe distal phalanx. Soft tissue swelling of the second toe at the forefoot. 3/7/22 CT left foot: No convincing evidence of osteomyelitis or other acute osseous abnormality. Extensive subcutaneous edema compatible with benign edema or cellulitis. No soft tissue gas or drainable fluid collection identified. I have personally reviewed the past medical history, past surgical history, medications, social history, and family history, and I haveupdated the database accordingly. Allergies:   Pcn [penicillins]     Review of Systems:     Review of Systems   Constitutional: Negative for activity change, appetite change and fever. HENT: Negative for congestion. Respiratory: Negative for cough, chest tightness, shortness of breath and wheezing. Cardiovascular: Negative for chest pain. Gastrointestinal: Negative for abdominal pain, constipation, diarrhea and nausea. Genitourinary: Negative for difficulty urinating. Musculoskeletal: Negative for joint swelling. Skin: Positive for color change and wound. Neurological: Negative for dizziness, weakness, light-headedness, numbness and headaches. Psychiatric/Behavioral: Negative for agitation and confusion. Physical Examination :       Physical Exam  Constitutional:       General: He is not in acute distress. Appearance: Normal appearance. He is not ill-appearing. Cardiovascular:      Rate and Rhythm: Normal rate and regular rhythm.       Heart sounds: No murmur heard. No gallop. Pulmonary:      Effort: Pulmonary effort is normal. No respiratory distress. Breath sounds: Normal breath sounds. No wheezing. Abdominal:      General: Bowel sounds are normal. There is no distension. Tenderness: There is no abdominal tenderness. There is no guarding or rebound. Musculoskeletal:         General: No swelling or deformity. Comments: Left second toe tip black discoloration, mild purulent drainage to the proximal toe with erythema. Erythema extends to the foot dorsum. Neurological:      General: No focal deficit present. Mental Status: He is alert and oriented to person, place, and time. Psychiatric:         Mood and Affect: Mood normal.         Thought Content:  Thought content normal.         Past Medical History:     Past Medical History:   Diagnosis Date    Atrial fibrillation ([facility]     CAD (coronary artery disease)     [facility]     Diabetes mellitus ([facility]     Hyperlipidemia     Hypertension        Past Surgical  History:     Past Surgical History:   Procedure Laterality Date    CARDIAC SURGERY  2010    CABG X3    COLONOSCOPY      DEBRIDEMENT Right 11/19/2015    DEBRIDEMENT WOUND FOOT RIGHT W/ APPLICATION BILAT INTEG RAT GRAFT    ENDOSCOPY, COLON, DIAGNOSTIC      EYE SURGERY Bilateral     cataracts    HERNIA REPAIR      umbilical    KNEE ARTHROSCOPY Right     OTHER SURGICAL HISTORY Right 12 29 15    wound care graft procedure foot,appl of integra    PACEMAKER PLACEMENT  2011    WITH DEFIBRILLATOR    TOE AMPUTATION Right     R great       Medications:      glipiZIDE  5 mg Oral QAM AC    enoxaparin  1 mg/kg (Adjusted) SubCUTAneous BID    daptomycin (CUBICIN) IVPB  6 mg/kg (Ideal) IntraVENous Q24H    cefepime  1,000 mg IntraVENous Q12H    metroNIDAZOLE  500 mg IntraVENous Q8H    sodium chloride flush  5-40 mL IntraVENous 2 times per day    carvedilol  6.25 mg Oral BID WC    insulin lispro  0-12 Units SubCUTAneous TID WC    insulin lispro  0-6 Units SubCUTAneous Nightly       Social History:     Social History     Socioeconomic History    Marital status: Single     Spouse name: Not on file    Number of children: Not on file    Years of education: Not on file    Highest education level: Not on file   Occupational History    Not on file   Tobacco Use    Smoking status: Never Smoker    Smokeless tobacco: Never Used   Substance and Sexual Activity    Alcohol use: Yes     Comment: SOCIAL on weekends    Drug use: No    Sexual activity: Not on file   Other Topics Concern    Not on file   Social History Narrative    Not on file     Social Determinants of Health     Financial Resource Strain:     Difficulty of Paying Living Expenses: Not on file   Food Insecurity:     Worried About Running Out of Food in the Last Year: Not on file    Tee of Food in the Last Year: Not on file   Transportation Needs:     Lack of Transportation (Medical): Not on file    Lack of Transportation (Non-Medical):  Not on file   Physical Activity:     Days of Exercise per Week: Not on file    Minutes of Exercise per Session: Not on file   Stress:     Feeling of Stress : Not on file   Social Connections:     Frequency of Communication with Friends and Family: Not on file    Frequency of Social Gatherings with Friends and Family: Not on file    Attends Mosque Services: Not on file    Active Member of [address] [place] [address] or Organizations: Not on file    Attends Club or Organization Meetings: Not on file    Marital Status: Not on file   Intimate Partner Violence:     Fear of Current or Ex-Partner: Not on file    Emotionally Abused: Not on file    Physically Abused: Not on file    Sexually Abused: Not on file   Housing Stability:     Unable to Pay for Housing in the Last Year: Not on file    Number of [place] in the Last Year: Not on file    Unstable Housing in the Last Year: Not on file       Family History:     Family History   Problem Relation Age of Onset    Cancer Father         pancreatic cancer    Other Brother         MI    Osteoarthritis Mother     Other Maternal Grandfather         MI      Medical Decision Making:   I have independently reviewed/ordered the following labs:    CBC with Differential:   Recent Labs     03/10/22  0534 03/11/22  0606   WBC 4.2 4.1   HGB 9.8* 10.1*   HCT 27.6* 28.7*   * 167   LYMPHOPCT 17* 17*   MONOPCT 11* 10*     BMP:  Recent Labs     03/10/22  0534 03/11/22  0606   * 136   K 3.5* 3.9   CL 99 100   CO2 23 26   BUN 36* 31*   CREATININE 1.74* 1.57*     Hepatic Function Panel:   Recent Labs     03/08/22  1516 03/09/22  0556   PROT 6.2* 6.3*   LABALBU  --  3.1*   BILIDIR  --  1.12*   IBILI  --  0.53   BILITOT  --  1.65*   ALKPHOS  --  112   ALT  --  23   AST  --  34     No results for input(s): RPR in the last 72 hours. No results for input(s): HIV in the last 72 hours. No results for input(s): BC in the last 72 hours. Lab Results   Component Value Date    CREATININE 1.57 03/11/2022    GLUCOSE 144 03/11/2022    GLUCOSE 335 12/28/2011       Detailed results: Thank you for allowing us to participate in the care of this patient. Please call with questions. This note is created with the assistance of a speech recognition program.  While intending to generate adocument that actually reflects the content of the visit, the document can still have some errors including those of syntax and sound a like substitutions which may escape proof reading. It such instances, actual meaningcan be extrapolated by contextual diversion.     Ruben Ellington MD  Office: [phone]  Perfect serve / office [phone]

## 2022-03-12 LAB
ABSOLUTE EOS #: 0.1 K/UL (ref 0–0.4)
ABSOLUTE LYMPH #: 0.9 K/UL (ref 1–4.8)
ABSOLUTE MONO #: 0.5 K/UL (ref 0.1–1.3)
ANION GAP SERPL CALCULATED.3IONS-SCNC: 10 MMOL/L (ref 9–17)
BASOPHILS # BLD: 1 % (ref 0–2)
BASOPHILS ABSOLUTE: 0 K/UL (ref 0–0.2)
BUN BLDV-MCNC: 26 MG/DL (ref 8–23)
CALCIUM SERPL-MCNC: 8.9 MG/DL (ref 8.6–10.4)
CHLORIDE BLD-SCNC: 100 MMOL/L (ref 98–107)
CO2: 24 MMOL/L (ref 20–31)
CREAT SERPL-MCNC: 1.52 MG/DL (ref 0.7–1.2)
EOSINOPHILS RELATIVE PERCENT: 2 % (ref 0–4)
GFR AFRICAN AMERICAN: 56 ML/MIN
GFR NON-AFRICAN AMERICAN: 46 ML/MIN
GFR SERPL CREATININE-BSD FRML MDRD: ABNORMAL ML/MIN/{1.73_M2}
GLUCOSE BLD-MCNC: 122 MG/DL (ref 75–110)
GLUCOSE BLD-MCNC: 124 MG/DL (ref 70–99)
GLUCOSE BLD-MCNC: 151 MG/DL (ref 75–110)
GLUCOSE BLD-MCNC: 162 MG/DL (ref 75–110)
GLUCOSE BLD-MCNC: 207 MG/DL (ref 75–110)
HCT VFR BLD CALC: 28.8 % (ref 41–53)
HEMOGLOBIN: 10.2 G/DL (ref 13.5–17.5)
LYMPHOCYTES # BLD: 18 % (ref 24–44)
MCH RBC QN AUTO: 32 PG (ref 26–34)
MCHC RBC AUTO-ENTMCNC: 35.3 G/DL (ref 31–37)
MCV RBC AUTO: 90.5 FL (ref 80–100)
MONOCYTES # BLD: 10 % (ref 1–7)
PDW BLD-RTO: 14.6 % (ref 11.5–14.9)
PLATELET # BLD: 180 K/UL (ref 150–450)
PMV BLD AUTO: 7.4 FL (ref 6–12)
POTASSIUM SERPL-SCNC: 4.1 MMOL/L (ref 3.7–5.3)
RBC # BLD: 3.19 M/UL (ref 4.5–5.9)
SARS-COV-2, RAPID: NOT DETECTED
SEG NEUTROPHILS: 69 % (ref 36–66)
SEGMENTED NEUTROPHILS ABSOLUTE COUNT: 3.4 K/UL (ref 1.3–9.1)
SODIUM BLD-SCNC: 134 MMOL/L (ref 135–144)
SPECIMEN DESCRIPTION: NORMAL
WBC # BLD: 5 K/UL (ref 3.5–11)

## 2022-03-12 PROCEDURE — 1200000000 HC SEMI PRIVATE

## 2022-03-12 PROCEDURE — 2500000003 HC RX 250 WO HCPCS: Performed by: FAMILY MEDICINE

## 2022-03-12 PROCEDURE — 99232 SBSQ HOSP IP/OBS MODERATE 35: CPT | Performed by: NURSE PRACTITIONER

## 2022-03-12 PROCEDURE — 2580000003 HC RX 258: Performed by: FAMILY MEDICINE

## 2022-03-12 PROCEDURE — 85025 COMPLETE CBC W/AUTO DIFF WBC: CPT

## 2022-03-12 PROCEDURE — 6370000000 HC RX 637 (ALT 250 FOR IP): Performed by: FAMILY MEDICINE

## 2022-03-12 PROCEDURE — 2580000003 HC RX 258: Performed by: INTERNAL MEDICINE

## 2022-03-12 PROCEDURE — 6360000002 HC RX W HCPCS: Performed by: FAMILY MEDICINE

## 2022-03-12 PROCEDURE — 6360000002 HC RX W HCPCS: Performed by: INTERNAL MEDICINE

## 2022-03-12 PROCEDURE — 80048 BASIC METABOLIC PNL TOTAL CA: CPT

## 2022-03-12 PROCEDURE — 82947 ASSAY GLUCOSE BLOOD QUANT: CPT

## 2022-03-12 PROCEDURE — 87635 SARS-COV-2 COVID-19 AMP PRB: CPT

## 2022-03-12 PROCEDURE — 36415 COLL VENOUS BLD VENIPUNCTURE: CPT

## 2022-03-12 RX ADMIN — GLIPIZIDE 5 MG: 5 TABLET ORAL at 06:27

## 2022-03-12 RX ADMIN — METRONIDAZOLE 500 MG: 500 INJECTION, SOLUTION INTRAVENOUS at 17:28

## 2022-03-12 RX ADMIN — CARVEDILOL 6.25 MG: 6.25 TABLET, FILM COATED ORAL at 17:33

## 2022-03-12 RX ADMIN — CEFEPIME HYDROCHLORIDE 1000 MG: 1 INJECTION, POWDER, FOR SOLUTION INTRAMUSCULAR; INTRAVENOUS at 21:54

## 2022-03-12 RX ADMIN — CARVEDILOL 6.25 MG: 6.25 TABLET, FILM COATED ORAL at 08:09

## 2022-03-12 RX ADMIN — INSULIN LISPRO 2 UNITS: 100 INJECTION, SOLUTION INTRAVENOUS; SUBCUTANEOUS at 17:33

## 2022-03-12 RX ADMIN — INSULIN LISPRO 2 UNITS: 100 INJECTION, SOLUTION INTRAVENOUS; SUBCUTANEOUS at 11:28

## 2022-03-12 RX ADMIN — SODIUM CHLORIDE, PRESERVATIVE FREE 10 ML: 5 INJECTION INTRAVENOUS at 08:15

## 2022-03-12 RX ADMIN — DAPTOMYCIN 450 MG: 500 INJECTION, POWDER, LYOPHILIZED, FOR SOLUTION INTRAVENOUS at 15:09

## 2022-03-12 RX ADMIN — METRONIDAZOLE 500 MG: 500 INJECTION, SOLUTION INTRAVENOUS at 01:59

## 2022-03-12 RX ADMIN — INSULIN LISPRO 2 UNITS: 100 INJECTION, SOLUTION INTRAVENOUS; SUBCUTANEOUS at 21:54

## 2022-03-12 RX ADMIN — METRONIDAZOLE 500 MG: 500 INJECTION, SOLUTION INTRAVENOUS at 10:28

## 2022-03-12 RX ADMIN — CEFEPIME HYDROCHLORIDE 1000 MG: 1 INJECTION, POWDER, FOR SOLUTION INTRAMUSCULAR; INTRAVENOUS at 08:42

## 2022-03-12 ASSESSMENT — PAIN SCALES - GENERAL
PAINLEVEL_OUTOF10: 0

## 2022-03-12 NOTE — PROGRESS NOTES
Progress Note  Podiatric Medicine and Surgery     Subjective     CC: left foot wound, cellulitis    Re-consulted by medicine for worsening symptoms  Patient complains no pain. WBC WNL  Plan OR tomorrow    HPI :  Kristi Porras is a 78 y.o. male seen at [facility] ER for left 2nd digit wound and cellulitis. Patient is a type 2 diabetic with history of PVD. He has had previous amputations of hallux of bilateral feet, and partial 2nd digit amputation. Per patient, he was on the way to doctor appoitnment today and was feeling fatigued and unwell, was told by staff at PCP to present to ED. Denies any trauma to left foot, reports started noticing worsening discoloration of 2nd digit and feeling of general malaise over the weekend. Denies any further pedal complaints at this time.       PCP is Vincent Xiong DO    ROS: Denies N/V/F/C/SOB/CP. Otherwise negative except at stated in the HPI.      Medications:  Scheduled Meds:   glipiZIDE  5 mg Oral QAM AC    daptomycin (CUBICIN) IVPB  6 mg/kg (Ideal) IntraVENous Q24H    cefepime  1,000 mg IntraVENous Q12H    metroNIDAZOLE  500 mg IntraVENous Q8H    sodium chloride flush  5-40 mL IntraVENous 2 times per day    carvedilol  6.25 mg Oral BID WC    insulin lispro  0-12 Units SubCUTAneous TID WC    insulin lispro  0-6 Units SubCUTAneous Nightly       Continuous Infusions:   sodium chloride      dextrose 100 mL/hr (03/10/22 0618)       PRN Meds:sodium chloride flush, sodium chloride, acetaminophen, ondansetron **OR** ondansetron, glucose, dextrose, glucagon (rDNA), dextrose    Objective     Vitals:  Patient Vitals for the past 8 hrs:   BP Temp Temp src Pulse Resp SpO2   22 1730 (!) 187/90 -- -- 85 -- --   22 1311 (!) 154/69 97.5 °F (36.4 °C) Oral 63 16 98 %   22 1130 (!) 164/78 97 °F (36.1 °C) Oral 64 16 98 %     Average, Min, and Max for last 24 hours Vitals:  TEMPERATURE:  Temp  Av.6 °F (36.4 °C)  Min: 97 °F (36.1 °C)  Max: 98.3 °F (36.8 °C)    RESPIRATIONS RANGE: Resp  Av.5  Min: 16  Max: 18    PULSE RANGE: Pulse  Av  Min: 62  Max: 85    BLOOD PRESSURE RANGE:  Systolic (75XUL), SKO:139 , Min:142 , ZFA:331   ; Diastolic (35EIW), HGP:80, Min:55, Max:91      PULSE OXIMETRY RANGE: SpO2  Av.3 %  Min: 97 %  Max: 100 %    I/O last 3 completed shifts: In: 1417.2 [I.V.:912.3; IV Piggyback:504.9]  Out: 1000 [Urine:1000]    CBC:  Recent Labs     03/10/22  0534 2215   WBC 4.2 4.1 5.0   HGB 9.8* 10.1* 10.2*   HCT 27.6* 28.7* 28.8*   * 167 180        BMP:  Recent Labs     03/10/22  0534 2215   * 136 134*   K 3.5* 3.9 4.1   CL 99 100 100   CO2 23 26 24   BUN 36* 31* 26*   CREATININE 1.74* 1.57* 1.52*   GLUCOSE 65* 144* 124*   CALCIUM 8.6 8.9 8.9        Coags:  No results for input(s): APTT, PROT, INR in the last 72 hours. Lab Results   Component Value Date    SEDRATE 63 (H) 2022     No results for input(s): CRP in the last 72 hours. Physical Exam:  Vascular: DP and PT pulses are non-palpable, bilateral. Audible waveform on doppler. CFT >3 seconds to all digits. Hair growth is absent to the level of the digits. +2 non-pitting edema.      Neuro: Saph/sural/SP/DP/plantar sensation intact to light touch.     Musculoskeletal: Muscle strength is adequate ROM, adequate strength to all lower extremity muscle groups. Gross deformity is previous hallux amputation of bilateral LE, partial 2nd digit amputation, right foot. .     Dermatologic: Dry gangrene noted to left 2nd digit. There seems to be demarcation at today's exam. Discoloration seems worse than previous exam. Skin is taut. There is no open lesion or drainage upon my evaluation today. Erythema overlying dorsal midfoot, left. No fluctuance,mild induration. Interdigital maceration absent.        Clinical Images:              Imaging:   US RENAL COMPLETE   Final Result   1. Bilateral nonobstructing nephrolithiasis.   No hydronephrosis. 2. Simple appearing bilateral renal cysts measuring up to 3.4 cm on the left. VL Lower Extremity Arterial Segmental Pressures W Ppg   Final Result      CT FOOT LEFT WO CONTRAST   Final Result   1. No convincing evidence of osteomyelitis or other acute osseous   abnormality. If there is persistent clinical concern for osteomyelitis   further evaluation could be obtained with MRI. 2. Extensive subcutaneous edema compatible with bland edema or cellulitis. No soft tissue gas or drainable fluid collection identified. XR FOOT LEFT (MIN 3 VIEWS)   Final Result   Findings suspicious for possible early osteomyelitis at the 2nd toe distal   phalanx. Small area of erosive change at the 1st metatarsal head, though the features   are more suggestive possible underlying erosive or inflammatory arthropathy   rather than osteomyelitis, though clinical correlation is required. Soft tissue swelling of the 2nd toe at the forefoot. VL Lower Extremity Arteries Bilateral    (Results Pending)       Cultures:  NGTD    Assessment   Rupinder Ham is a 78 y.o. male with   1. Dry gangrene, left foot  2. Osteomyelitis left second toe  3. PVD  4. Cellulitis, left foot  5. History of bilateral hallux amputation    Principal Problem:    Diabetic foot infection ([address]  Active Problems:    MRSA bacteremia    Gangrene of toe of left foot (HCC)    Elevated C-reactive protein (CRP)    Elevated erythrocyte sedimentation rate    Acute kidney injury ([address]    Allergy to penicillin  Resolved Problems:    * No resolved hospital problems. *       Plan   · Patient examined and evaluated at bedside   · Treatment options discussed in detail with the patient  · X-rays reviewed: possible early sign of OM to distal 2nd digit. · CT reviewed: no OM  · PVR: MONIQUE b/l 1.71  · IV abx: Daptomycin , cefepime, flagyl .  ID recommendation appreciated  · Medical management per IM  · Dressing applied to Left foot per nursing order: Betadine paint left second digit, dry gauze, tape  · We were re-consulted for worrisome symptoms and second opinion. Patient did have positive blood culture on 3/8 which is cleared now. Vascular consult note reviwed from 3/9 which defers decision to podiatry.  Given his erythema and edema have not greatly improved, we will perform surgery at 8 am tomorrow  · Consent to be signed  · Foot to be marked  · COVID test ordered  · NPO at midnight  · Morning AC will be held  · WBAT to left lower extremity in surgical shoe  · Discussed with Dr. Hanna Fox, [address] [place] [address] Surgery   3/12/2022 at [facility]

## 2022-03-12 NOTE — PROGRESS NOTES
NEPHROLOGY PROGRESS NOTE    Patient :  Eliseo Ulloa; 78 y.o. MRN# [id number]  Location:  2052/2052-01  Attending:  Saira Peters DO  Admit Date:  3/7/2022   Hospital Day: 5    Reason for consultation: Management of acute kidney injury. Consulting physician: Keyshawn Marie DO    Interval history: Patient was seen and examined today -he denies any shortness of breath but has some pain in the left foot. His blood sugars are improving renal function is stable creatinine 1.52 mg/dL   He has wet/dry gangrene of the left second toe and vascular consulted to determine need for revascularization. He is on IV Cubicin, metronidazole and cefepime. Renal function is improving. History of Present Illness: This is a 78 y.o. male past medical history of coronary artery disease, bypass surgery, CHF, ype 2 diabetes, Essential hypertension, chronic kidney disease stage IIIb with baseline creatinine of about 1.5 to 1.7 mg/dL since 2019, patient has not follow-up with any nephrologist as an outpatient. Patient has patient presented to the hospital with complaints of left foot redness and black toe, patient noticed black toe about last 1 week, patient started to have pain in the foot and redness and therefore decided to come to the hospital.  Denied any nausea vomiting diarrhea no fever chills  Denied any shortness of breath chest pain  Labs showed serum creatinine of 2.1 mg/dL on admission from nephrology consultation has been requested  Blood cultures positive for MRSA  Pt denies any history of  prolonged NSAID use. Patient denies dysuria, gross hematuria, flank pain, nocturia, urgency, passing frothy urine or urinary incontinence. There has been no recent exposure to IV contrast.   There is no history  of paraprotein disease. Pt denies any history of recurrent UTI or kidney stones. Medication review shows use of ACE-inhibitor/diuretics.     Current Medications:    glipiZIDE (GLUCOTROL) tablet 5 mg, QAM AC  dextrose 5 % and 0.9 % sodium chloride infusion, Continuous  DAPTOmycin (CUBICIN) 450 mg in sodium chloride 0.9 % 50 mL IVPB, Q24H  cefepime (MAXIPIME) 1000 mg IVPB minibag, Q12H  metronidazole (FLAGYL) 500 mg in NaCl 100 mL IVPB premix, Q8H  sodium chloride flush 0.9 % injection 5-40 mL, 2 times per day  sodium chloride flush 0.9 % injection 5-40 mL, PRN  0.9 % sodium chloride infusion, PRN  acetaminophen (TYLENOL) tablet 650 mg, Q4H PRN  ondansetron (ZOFRAN-ODT) disintegrating tablet 4 mg, Q8H PRN   Or  ondansetron (ZOFRAN) injection 4 mg, Q6H PRN  carvedilol (COREG) tablet 6.25 mg, BID WC  insulin lispro (HUMALOG) injection vial 0-12 Units, TID WC  insulin lispro (HUMALOG) injection vial 0-6 Units, Nightly  glucose (GLUTOSE) 40 % oral gel 15 g, PRN  dextrose 50 % IV solution, PRN  glucagon (rDNA) injection 1 mg, PRN  dextrose 5 % solution, PRN      Objective:  CURRENT TEMPERATURE:  Temp: 97.5 °F (36.4 °C)  MAXIMUM TEMPERATURE OVER 24HRS:  Temp (24hrs), Av.6 °F (36.4 °C), Min:97 °F (36.1 °C), Max:98.3 °F (36.8 °C)    CURRENT RESPIRATORY RATE:  Resp: 16  CURRENT PULSE:  Pulse: 63  CURRENT BLOOD PRESSURE:  BP: (!) 154/69  24HR BLOOD PRESSURE RANGE:  Systolic (50PIQ), SRQ:930 , Min:142 , UQE:504   ; Diastolic (48IMZ), PCZ:04, Min:55, Max:91    24HR INTAKE/OUTPUT:      Intake/Output Summary (Last 24 hours) at 3/12/2022 1617  Last data filed at 3/12/2022 1257  Gross per 24 hour   Intake 1777.23 ml   Output 200 ml   Net 1577.23 ml     No data found. Physical Exam:  GENERAL APPEARANCE: Alert and cooperative, and appears to be in no acute distress. HEAD: Normocephalic and atraumatic  EYES:  EOMI. Not pale, anicteric   NOSE:  No nasal discharge. THROAT:  Oral cavity and pharynx normal. Moist  CARDIAC: Normal S1 and S2. No S3, S4 or murmurs. Rhythm is regular. LUNGS: Clear to auscultation and percussion without rales, rhonchi, wheezing or diminished breath sounds. GI-soft nontender  MUSKULOSKELETAL: Adequately aligned spine. No joint erythema or tenderness. EXTREMITIES: Left foot erythema with wet gangrene of the second left toe. NEURO: No acute focal neurologic deficits    Labs:   CBC:  Recent Labs     03/10/22  0534 03/11/22  0606 03/12/22  0615   WBC 4.2 4.1 5.0   RBC 3.00* 3.18* 3.19*   HGB 9.8* 10.1* 10.2*   HCT 27.6* 28.7* 28.8*   MCV 92.1 90.3 90.5   MCH 32.5 31.8 32.0   MCHC 35.3 35.2 35.3   RDW 14.5 14.6 14.6   * 167 180   MPV 7.9 7.9 7.4      BMP:   Recent Labs     03/10/22  0534 03/11/22  0606 03/12/22  0615   * 136 134*   K 3.5* 3.9 4.1   CL 99 100 100   CO2 23 26 24   BUN 36* 31* 26*   CREATININE 1.74* 1.57* 1.52*   GLUCOSE 65* 144* 124*   CALCIUM 8.6 8.9 8.9      Magnesium:   No results for input(s): MG in the last 72 hours. Albumin:   No results for input(s): LABALBU in the last 72 hours. No results for input(s): PROT, ALBCAL, ALBCAL, ALBPCT, LABALPH, A1PCT, LABALPH, A2PCT, LABBETA, BETAPCT, GAMGLOB, GGPCT, PATH in the last 72 hours. Assessment/plan:    1. Acute kidney injury superimposed on chronic kidney disease stage 3b - most consistent with prerenal azotemia from aggressive diuretic therapy as well as ischemic ATN 2nd to left toe infection. AZIZA is negative, complements and plasma free light chain ratios are within normal. Renal ultrasound showed bilateral nonobstructing nephrolithiasis with no hydronephrosis and is simple appearing renal cyst 3.4 cm on the left. Renal function is improving-pending 1.5 mg/dL at baseline    Plan: Continue to hold furosemide. We will discontinue IV fluids as renal function is stable and blood sugars are stable  Avoid nephrotoxic agents. Basic metabolic profile daily. Monitor CPK levels while on IV Cubicin    2. Systemic hypertension - Continue current medications carvedilol 6.25 mg p.o. twice daily    3. Chronic kidney disease stage IIIb [baseline serum creatinine 1.5 to 1.7 mg/dL] - consistent with diabetic glomerulopathy.  Renal function is back to baseline. 4.  Peripheral arterial disease with left second toe gangrene will follow vascular and podiatry recommendations. 5.  Left second toe gangrene - continue antibiotics. Unable to get MRI of the foot due to AICD    Prognosis is guarded.     Electronically signed by Pranay Christianson MD on 3/12/2022 at 4:17 PM

## 2022-03-12 NOTE — PLAN OF CARE
Problem: Falls - Risk of:  Goal: Will remain free from falls  Description: Will remain free from falls  Outcome: Ongoing  Goal: Absence of physical injury  Description: Absence of physical injury  Outcome: Ongoing     Problem: Skin Integrity:  Goal: Will show no infection signs and symptoms  Description: Will show no infection signs and symptoms  Outcome: Ongoing  Note: IV flagyl, cefepime, daptomycin for foot infection  Goal: Absence of new skin breakdown  Description: Absence of new skin breakdown  Outcome: Ongoing  Note: See skin assessment in chart.       Problem: Nutrition  Goal: Optimal nutrition therapy  Outcome: Ongoing

## 2022-03-12 NOTE — PROGRESS NOTES
[facility] -   Infectious diseases evaluation  admission date 3/7/2022    reason for consultation:   Left Second Toe Gangrene and Cellulitis    Impression :   Current:  MRSA bacteremia 2/2 suspected osteomyelitis of the left 2nd toe   Gangrene left 2nd toe  Cellulitis left foot  S/p bilateral great big toe amputation  Elevated CRP  Elevated ESR  Type 2 diabetes mellitus  BRIANNA on CKD stage IIIb  PVD  CHF  S/p CABG  A. fib on warfarin  Essential hypertension  Allergy to Penicillin-Rash    Other:    Discussion / summary of stay / plan of care   Echo negative for obvious vegetations. Unable to obtain MRI as patient has an AICD. Recommendations   Continue Daptomycin IV, pharmacy to dose, Cefepime 1 gm IV every 12 hours, Flagyl 500 mg IV every 8 hours for now. No statins while on Daptomycin. Follow CBC and renal function closely. Follow final sensitivity from [facility] x 2 from 3/10. Adjust antibiotics. Discussed with patient. Infection Control Recommendations   [place] Precautions  Contact Isolation     Antimicrobial Stewardship Recommendations   Simplification of therapy  Targeted therapy    Coordination ofOutpatient Care:   Estimated Length of IV antimicrobials:  Patient will need Midline / picc Catheter Insertion:   Patient will need SNF:  Patient will need outpatient wound care:     History of Present Illness:   Initial history:  Adrianne Remy is a 78y.o.-year-old male with a PMH significant for type 2 diabetes mellitus, CKD stage IIIb,  PVD, s/p CABG, CHF, A. fib on Coumadin, essential hypertension, and s/p bilateral great big toe amputation who presents to [facility] on 3/7/2022 and is admitted for the management of left second toe gangrene and cellulitis. Labs upon presentation were remarkable for , K3.4, creatinine 2.10, LA 3.1--->1.7, CRP 61.1--->87.8, WBC 7.3, ESR 21--->63.   X-ray left foot showed suspicion for possible early osteomyelitis at the second toe distal phalanx. It also showed soft tissue swelling of the second toe at the forefoot. MRI of the foot was not possible because patient has an ICD. Therefore, CT of the left foot was taken which showed no convincing evidence of osteomyelitis or other acute osseous abnormality. There was extensive subcutaneous edema compatible with bland edema or cellulitis. No soft tissue gas or drainable fluid collection identified. 3/7/22                Interval changes  3/12/2022   Afebrile. Sitting up on side of the bed eating breakfast.  Feeling good. Denies any pain to the left foot, n/v/d. Left forefoot red, second left toe necrotic. Patient Vitals for the past 8 hrs:   BP Temp Temp src Pulse Resp SpO2   03/12/22 0825 -- -- -- 78 -- --   03/12/22 0745 (!) 159/91 97.6 °F (36.4 °C) Oral 74 16 97 %       Summary of relevant labs:  Labs:  Cre: 1.74-1.57-1.52  CRP: 87.8  WBC: 4.2-4.1-5.0  ESR: 63      Micro:  3/7 BC x 2-MRSA  3/10 BC x 2-Negative to date. Imaging:  3/7 CT Left Foot    Narrative   EXAMINATION:   CT OF THE LEFT FOOT WITHOUT CONTRAST 3/7/2022 4:00 pm       TECHNIQUE:   CT of the left foot was performed without the administration of intravenous   contrast.  Multiplanar reformatted images are provided for review.  Dose   modulation, iterative reconstruction, and/or weight based adjustment of the   mA/kV was utilized to reduce the radiation dose to as low as reasonably   achievable.       COMPARISON:   Left foot radiographs 03/07/2022.       HISTORY   ORDERING SYSTEM PROVIDED HISTORY: r/o osteo v gas   TECHNOLOGIST PROVIDED HISTORY:   r/o osteo v gas   Reason for Exam: diabetic wound on left foot       FINDINGS:   The distal tibia and fibula are intact.  No syndesmotic widening.  The ankle   mortise is intact.  Avascular necrosis of the talar dome without subchondral   collapse.  Mild tibiotalar degenerative changes.  The subtalar joint is   unremarkable.  Small retrocalcaneal and plantar calcaneal enthesophytes.  The   talonavicular joint is unremarkable.  Mild calcaneocuboid degenerative   changes.  Normal midfoot alignment is maintained.  No Lisfranc interval   widening.  No significant midfoot degenerative changes.  Status post 1st toe   amputation.  Mild degenerative changes at the articulation between the 1st   metatarsal head and hallux sesamoids.  No fracture or dislocation.  No   osseous erosion.       Diffuse subcutaneous edema.  No drainable fluid collection or soft tissue gas   identified.  Extensive atherosclerotic vascular calcifications.  The   visualized tendons are grossly intact.           Impression   1. No convincing evidence of osteomyelitis or other acute osseous   abnormality.  If there is persistent clinical concern for osteomyelitis   further evaluation could be obtained with MRI. 2. Extensive subcutaneous edema compatible with bland edema or cellulitis. No soft tissue gas or drainable fluid collection identified.           I have personally reviewed the past medical history, past surgical history, medications, social history, and family history, and I haveupdated the database accordingly. Allergies:   Pcn [penicillins]     Review of Systems:     Review of Systems   Skin: Positive for wound. Second left toe. All other systems reviewed and are negative. Physical Examination :       Physical Exam  Vitals and nursing note reviewed. Constitutional:       General: He is not in acute distress. Appearance: Normal appearance. He is normal weight. HENT:      Head: Normocephalic and atraumatic. Right Ear: External ear normal.      Left Ear: External ear normal.      Nose: Nose normal.      Mouth/Throat:      Mouth: Mucous membranes are moist.      Pharynx: Oropharynx is clear. Eyes:      Extraocular Movements: Extraocular movements intact.       Conjunctiva/sclera: Conjunctivae normal.      Pupils: Pupils are equal, round, and reactive to light. Cardiovascular:      Rate and Rhythm: Normal rate and regular rhythm. Heart sounds: Normal heart sounds. No murmur heard. Pulmonary:      Effort: Pulmonary effort is normal. No respiratory distress. Breath sounds: Normal breath sounds. No wheezing. Abdominal:      General: Bowel sounds are normal. There is no distension. Palpations: Abdomen is soft. Tenderness: There is no abdominal tenderness. Genitourinary:     Comments: No almazan. Musculoskeletal:         General: Normal range of motion. Cervical back: No rigidity. Right lower leg: No edema. Left lower leg: No edema. Comments: Bilateral great toes amputated. Second left toe necrotic. Left forefoot red. Skin:     General: Skin is warm and dry. Capillary Refill: Capillary refill takes less than 2 seconds. Findings: Erythema present. Neurological:      Mental Status: He is alert and oriented to person, place, and time. Psychiatric:         Mood and Affect: Mood normal.         Behavior: Behavior normal.         Thought Content:  Thought content normal.         Judgment: Judgment normal.         Past Medical History:     Past Medical History:   Diagnosis Date    [facility]     CAD (coronary artery disease)     [facility]congestive [facility] failure) ([facility]     Diabetes mellitus [facility]     Hyperlipidemia     Hypertension        Past Surgical  History:     Past Surgical History:   Procedure Laterality Date    CARDIAC SURGERY  2010    CABG X3    COLONOSCOPY      DEBRIDEMENT Right 11/19/2015    DEBRIDEMENT WOUND FOOT RIGHT W/ APPLICATION BILAT INTEG RAT GRAFT    ENDOSCOPY, COLON, DIAGNOSTIC      EYE SURGERY Bilateral     cataracts    HERNIA REPAIR      umbilical    KNEE ARTHROSCOPY Right     OTHER SURGICAL HISTORY Right 12 29 15    wound care graft procedure foot,appl of integra    PACEMAKER PLACEMENT  2011    WITH DEFIBRILLATOR    TOE AMPUTATION Right     R great       Medications:      glipiZIDE  5 mg Oral QAM AC    daptomycin (CUBICIN) IVPB  6 mg/kg (Ideal) IntraVENous Q24H    cefepime  1,000 mg IntraVENous Q12H    metroNIDAZOLE  500 mg IntraVENous Q8H    sodium chloride flush  5-40 mL IntraVENous 2 times per day    carvedilol  6.25 mg Oral BID WC    insulin lispro  0-12 Units SubCUTAneous TID WC    insulin lispro  0-6 Units SubCUTAneous Nightly       Social History:     Social History     Socioeconomic History    Marital status: Single     Spouse name: Not on file    Number of children: Not on file    Years of education: Not on file    Highest education level: Not on file   Occupational History    Not on file   Tobacco Use    Smoking status: Never Smoker    Smokeless tobacco: Never Used   Substance and Sexual Activity    Alcohol use: Yes     Comment: SOCIAL on weekends    Drug use: No    Sexual activity: Not on file   Other Topics Concern    Not on file   Social History Narrative    Not on file     Social Determinants of Health     Financial Resource Strain:     Difficulty of Paying Living Expenses: Not on file   Food Insecurity:     Worried About Running Out of Food in the Last Year: Not on file    Tee of Food in the Last Year: Not on file   Transportation Needs:     Lack of Transportation (Medical): Not on file    Lack of Transportation (Non-Medical):  Not on file   Physical Activity:     Days of Exercise per Week: Not on file    Minutes of Exercise per Session: Not on file   Stress:     Feeling of Stress : Not on file   Social Connections:     Frequency of Communication with Friends and Family: Not on file    Frequency of Social Gatherings with Friends and Family: Not on file    Attends Sabianism Services: Not on file    Active Member of Clubs or Organizations: Not on file    Attends Club or Organization Meetings: Not on file    Marital Status: Not on file   Intimate Partner Violence:     Fear of Current or Ex-Partner: Not on

## 2022-03-12 NOTE — CARE COORDINATION
ONGOING DISCHARGE PLAN:    Patient is alert and oriented x4. Spoke with patient regarding discharge plan and patient confirms that plan is still to return to home. Pt. Remains on IV Cefepime/Dapto/Flagyl. ID on board. [place] following if needed. Podiatry/Vascular following. Awaiting Plans. Follow for VNS or any other needs. Will continue to follow for additional discharge needs.     Electronically signed by Mita Velazco RN on 3/12/2022 at 10:44 AM

## 2022-03-12 NOTE — PROGRESS NOTES
[id number] [facility]      PROGRESS NOTE        Patient:  Shantel Peraza  YOB: 1955    MRN: [id number]     Acct: [de-identified]     Admit date: 3/7/2022    Pt seen and Chart reviewed. Consultant notes reviewed and care evaluated. Subjective: Patient states feels okay he gets some pain in the foot but he is not sleeping at night second to his IVs in his antecubital area not bothersome. As well as the machine keep peeping at night. He does 3 IVs for some reason to in the antecubital bone in his hand right now which what they are using but it looks like his left arm is swelling up and in the antecubital on the left and needs to come out discussed with his RN Parish Morataya as well as did not see any note from the podiatrist that we consulted yesterday and not sure what happened discussed with her to find out for me if there are around if not we will consult somebody else to make sure we get a an idea of what exactly can be done for this still before patient is discharged residents and staff podiatrist decided no surgical intervention but the toe still very gangrenous    Diet:  ADULT DIET; Regular; 5 carb choices (75 gm/meal)  ADULT ORAL NUTRITION SUPPLEMENT; Breakfast, Dinner;  Low Calorie/High Protein Oral Supplement      Medications:Current Inpatient    Scheduled Meds:   glipiZIDE  5 mg Oral QAM AC    daptomycin (CUBICIN) IVPB  6 mg/kg (Ideal) IntraVENous Q24H    cefepime  1,000 mg IntraVENous Q12H    metroNIDAZOLE  500 mg IntraVENous Q8H    sodium chloride flush  5-40 mL IntraVENous 2 times per day    carvedilol  6.25 mg Oral BID WC    insulin lispro  0-12 Units SubCUTAneous TID WC    insulin lispro  0-6 Units SubCUTAneous Nightly     Continuous Infusions:   dextrose 5 % and 0.9 % NaCl Stopped (03/11/22 1402)    sodium chloride      dextrose 100 mL/hr (03/10/22 0618)     PRN Meds:sodium chloride flush, sodium chloride, acetaminophen, ondansetron **OR** ondansetron, glucose, dextrose, glucagon (rDNA), dextrose        Physical Exam:  Vitals: BP (!) 154/69   Pulse 63   Temp 97.5 °F (36.4 °C) (Oral)   Resp 16   Ht 5' 11\" (1.803 m)   Wt 198 lb 13.7 oz (90.2 kg)   SpO2 98%   BMI 27.73 kg/m²   24 hour intake/output:    Intake/Output Summary (Last 24 hours) at 3/12/2022 1355  Last data filed at 3/12/2022 1257  Gross per 24 hour   Intake 1777.23 ml   Output 200 ml   Net 1577.23 ml     Last 3 weights: Wt Readings from Last 3 Encounters:   03/07/22 198 lb 13.7 oz (90.2 kg)   02/05/21 212 lb 15.4 oz (96.6 kg)   05/23/16 223 lb (101.2 kg)       Physical Examination:   General appearance - alert, well appearing, and in no distress  Mental status - alert, oriented to person, place, and time  PERRLA wnl  Chest - clear to auscultation, no wheezes, rales or rhonchi, symmetric air entry  Heart - normal rate, regular rhythm, normal S1, S2, no murmurs, rubs, clicks or gallops  Abdomen - soft, nontender, nondistended, no masses or organomegaly  Neurological - alert, oriented, normal speech, no focal findings or movement disorder noted}  Extremities -his left dorsal foot still erythematous may be slightly better than yesterday the edema is resolved he is tender to touch in that area. His left second toe at the proximal to distal phalanx is gangrenous. No drainage from it his first toes big toes on both feet amputated  Skin - normal coloration and turgor, no rashes, no suspicious skin lesions noted      Component Value Units   Culture, Blood 1 [[id number]]    Collected: 03/10/22 0942    Updated: 03/12/22 1213    Specimen Source: Blood     Specimen Description . BLOOD    Culture NO GROWTH 2 DAYS   Culture, Blood 1 [[id number]]    Collected: 03/10/22 0936    Updated: 03/12/22 1212    Specimen Source: Blood     Specimen Description . BLOOD    Culture NO GROWTH 2 DAYS   POC Glucose Fingerstick [[id number]] (Abnormal)    Collected: 03/12/22 1118    Updated: 03/12/22 1125     POC Glucose 151 High  mg/dL   Basic Metabolic Panel [[id number]] (Abnormal)    Collected: 03/12/22 0615    Updated: 03/12/22 0656    Specimen Source: Blood     Glucose 124 High  mg/dL    BUN 26 High  mg/dL    CREATININE 1.52 High  mg/dL    Calcium 8.9 mg/dL    Sodium 134 Low  mmol/L    Potassium 4.1 mmol/L    Chloride 100 mmol/L    CO2 24 mmol/L    Anion Gap 10 mmol/L    GFR Non-African American 46 Low  mL/min    GFR  56 Low  mL/min    GFR Comment         Comment: Average GFR for 61-76 years old:    85 mL/min/1.73sq m   Chronic Kidney Disease:    <60 mL/min/1.73sq m   Kidney failure:    <15 mL/min/1.73sq m               eGFR calculated using average adult body mass.  Additional eGFR calculator available at:         [employer]             POC Glucose Fingerstick [[id number]] (Abnormal)    Collected: 03/12/22 0635    Updated: 03/12/22 0641     POC Glucose 122 High  mg/dL   CBC with Auto Differential [[id number]] (Abnormal)    Collected: 03/12/22 0615    Updated: 03/12/22 0636    Specimen Source: Blood     WBC 5.0 k/uL    RBC 3.19 Low  m/uL    Hemoglobin 10.2 Low  g/dL    Hematocrit 28.8 Low  %    MCV 90.5 fL    MCH 32.0 pg    MCHC 35.3 g/dL    RDW 14.6 %    Platelets 677 k/uL    MPV 7.4 fL    Seg Neutrophils 69 High  %    Lymphocytes 18 Low  %    Monocytes 10 High  %    Eosinophils % 2 %    Basophils 1 %    Segs Absolute 3.40 k/uL    Absolute Lymph # 0.90 Low  k/uL    Absolute Mono # 0.50 k/uL    Absolute Eos # 0.10 k/uL    Basophils Absolute 0.00 k/uL   POC Glucose Fingerstick [[id number]] (Abnormal)    Collected: 03/11/22 2137    Updated: 03/11/22 2144     POC Glucose 184 High  mg/dL   POC Glucose Fingerstick [[id number]] (Abnormal)    Collected: 03/11/22 1552    Updated: 03/11/22 1629     POC Glucose 131 High  mg/dL   PROTEIN ELECTROPHORESIS, URINE [[id number]]    Collected: 03/08/22 2033    Updated: 03/11/22 1503    Specimen Source: Urine, clean catch Specimen Type . CLEAN CATCH URINE    Urine Total Protein 147 mg/dL    P E Interpretation, U PENDING    Pathologist PENDING   Hemoglobin A1C [[id number]] (Abnormal)    Collected: 03/11/22 0606    Updated: 03/11/22 1432    Specimen Source: Blood     Hemoglobin A1C 6.8 High  %    Estimated Avg Glucose 148 mg/dL    Comment: The ADA and AACC recommend providing the estimated average glucose result to permit better   patient understanding of their HBA1c result. Assessment:  Principal Problem:    Diabetic foot infection ([facility]  Active Problems:    MRSA bacteremia    Gangrene of toe of left foot ([facility])    Elevated C-reactive protein (CRP)    Elevated erythrocyte sedimentation rate    Acute kidney injury ([facility]    Allergy to penicillin  Resolved Problems:    * No resolved hospital problems.  *      Diabetic foot infection ([facility]) E11.628, L08.9    Diabetes ([facility] E11.9    Chronic osteomyelitis ([facility]) M86.60    Diabetic foot ulcer ([facility] E11.621, L97.509    PVD (peripheral vascular disease) ([facility] I73.9    Atherosclerosis of coronary artery without angina pectoris I25.10    Cardiomyopathy ([facility] I42.9    Cardiac left ventricular ejection fraction 21-40 percent R94.30    Diabetes mellitus type 2 with peripheral artery disease ([facility]) E11.51    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 ([facility]) L97.514    Chronic osteomyelitis of left foot ([facility]) S41.971    Onychomycosis B35.1    Heart failure ([facility]) I50.9      · Cellulitis of left foot  ·    · Gangrenous left big toe questionable osteomyelitis no evidence of that on CT  ·    · Renal insufficiency and probably chronic because that MRI probably would not be done secondary to requirement of IV dye  · With acute on chronic kidney injury  · Diabetes mellitus  ·    · History of peripheral vascular disease  ·    · History of previous amputations  ·    · Elevated inflammatory markers consistent with his infection  · Mild hypokalemia  · Chronic A. fib  · Cardiomyopathy with EF 35 percentile no evidence of CHF clinically at this time  · Moderate tricuspid regurgitation  ·    · Moderate pulmonary hypertension  · Positive blood culture with MRSA new blood cultures are negative  ·    · Hyperglycemia need to watch WHILE  on glipizide but will decrease the dose  And hemoglobin A1c is therapeutic working on the symptoms  Left arm swelling and infiltrate from his IV site no infection no evidence of DVT adjust the arm but the rest of the forearm is intact                  Plan:  1. Continue the current treatment monitor his hydration second to his cardiomyopathy  2.   3. We will find out if free still needs to get another podiatrist to see him to get another opinion about either debriding the toe or amputating it  4.   5. Continue with IV antibiotics  6. DC his antecubital IVs taken to infiltrated IV  7.  Agree patient does not qualify for MRI with contrast second to his kidney function    Donny Dent DO FAAFP            3/12/2022, 1:55 PM

## 2022-03-13 ENCOUNTER — ANESTHESIA EVENT (OUTPATIENT)
Dept: OPERATING ROOM | Age: 67
DRG: 256 | End: 2022-03-13
Payer: MEDICARE

## 2022-03-13 ENCOUNTER — ANESTHESIA (OUTPATIENT)
Dept: OPERATING ROOM | Age: 67
DRG: 256 | End: 2022-03-13
Payer: MEDICARE

## 2022-03-13 VITALS — SYSTOLIC BLOOD PRESSURE: 112 MMHG | TEMPERATURE: 96.8 F | DIASTOLIC BLOOD PRESSURE: 56 MMHG | OXYGEN SATURATION: 100 %

## 2022-03-13 LAB
ABSOLUTE EOS #: 0.1 K/UL (ref 0–0.4)
ABSOLUTE LYMPH #: 0.8 K/UL (ref 1–4.8)
ABSOLUTE MONO #: 0.4 K/UL (ref 0.1–1.3)
ANION GAP SERPL CALCULATED.3IONS-SCNC: 9 MMOL/L (ref 9–17)
BASOPHILS # BLD: 1 % (ref 0–2)
BASOPHILS ABSOLUTE: 0.1 K/UL (ref 0–0.2)
BUN BLDV-MCNC: 19 MG/DL (ref 8–23)
CALCIUM SERPL-MCNC: 9 MG/DL (ref 8.6–10.4)
CHLORIDE BLD-SCNC: 102 MMOL/L (ref 98–107)
CO2: 25 MMOL/L (ref 20–31)
CREAT SERPL-MCNC: 1.42 MG/DL (ref 0.7–1.2)
EOSINOPHILS RELATIVE PERCENT: 2 % (ref 0–4)
GFR AFRICAN AMERICAN: >60 ML/MIN
GFR NON-AFRICAN AMERICAN: 50 ML/MIN
GFR SERPL CREATININE-BSD FRML MDRD: ABNORMAL ML/MIN/{1.73_M2}
GLUCOSE BLD-MCNC: 123 MG/DL (ref 75–110)
GLUCOSE BLD-MCNC: 126 MG/DL (ref 70–99)
GLUCOSE BLD-MCNC: 126 MG/DL (ref 75–110)
GLUCOSE BLD-MCNC: 134 MG/DL (ref 75–110)
GLUCOSE BLD-MCNC: 136 MG/DL (ref 75–110)
GLUCOSE BLD-MCNC: 223 MG/DL (ref 75–110)
HCT VFR BLD CALC: 29.7 % (ref 41–53)
HCT VFR BLD CALC: NORMAL % (ref 41–53)
HEMOGLOBIN: 10.2 G/DL (ref 13.5–17.5)
HEMOGLOBIN: NORMAL G/DL (ref 13.5–17.5)
LYMPHOCYTES # BLD: 15 % (ref 24–44)
MCH RBC QN AUTO: 31.4 PG (ref 26–34)
MCH RBC QN AUTO: NORMAL PG (ref 26–34)
MCHC RBC AUTO-ENTMCNC: 34.3 G/DL (ref 31–37)
MCHC RBC AUTO-ENTMCNC: NORMAL G/DL (ref 31–37)
MCV RBC AUTO: 91.5 FL (ref 80–100)
MCV RBC AUTO: NORMAL FL (ref 80–100)
MONOCYTES # BLD: 8 % (ref 1–7)
PDW BLD-RTO: 14.5 % (ref 11.5–14.9)
PDW BLD-RTO: NORMAL % (ref 11.5–14.9)
PLATELET # BLD: 208 K/UL (ref 150–450)
PLATELET # BLD: NORMAL K/UL (ref 150–450)
PMV BLD AUTO: 6.9 FL (ref 6–12)
PMV BLD AUTO: NORMAL FL (ref 6–12)
POTASSIUM SERPL-SCNC: 3.9 MMOL/L (ref 3.7–5.3)
RBC # BLD: 3.25 M/UL (ref 4.5–5.9)
RBC # BLD: NORMAL M/UL (ref 4.5–5.9)
REASON FOR REJECTION: NORMAL
SEG NEUTROPHILS: 74 % (ref 36–66)
SEGMENTED NEUTROPHILS ABSOLUTE COUNT: 3.9 K/UL (ref 1.3–9.1)
SODIUM BLD-SCNC: 136 MMOL/L (ref 135–144)
WBC # BLD: 5.3 K/UL (ref 3.5–11)
WBC # BLD: NORMAL K/UL (ref 3.5–11)
ZZ NTE CLEAN UP: ORDERED TEST: NORMAL
ZZ NTE WITH NAME CLEAN UP: SPECIMEN SOURCE: NORMAL

## 2022-03-13 PROCEDURE — 2580000003 HC RX 258: Performed by: ANESTHESIOLOGY

## 2022-03-13 PROCEDURE — 2709999900 HC NON-CHARGEABLE SUPPLY: Performed by: PODIATRIST

## 2022-03-13 PROCEDURE — 14350 FILLETED FINGER/TOE FLAP: CPT | Performed by: PODIATRIST

## 2022-03-13 PROCEDURE — 7100000001 HC PACU RECOVERY - ADDTL 15 MIN: Performed by: PODIATRIST

## 2022-03-13 PROCEDURE — 87075 CULTR BACTERIA EXCEPT BLOOD: CPT

## 2022-03-13 PROCEDURE — 3600000002 HC SURGERY LEVEL 2 BASE: Performed by: PODIATRIST

## 2022-03-13 PROCEDURE — 87186 SC STD MICRODIL/AGAR DIL: CPT

## 2022-03-13 PROCEDURE — 80048 BASIC METABOLIC PNL TOTAL CA: CPT

## 2022-03-13 PROCEDURE — 88311 DECALCIFY TISSUE: CPT

## 2022-03-13 PROCEDURE — 0Y6S0Z2 DETACHMENT AT LEFT 2ND TOE, MID, OPEN APPROACH: ICD-10-PCS | Performed by: PODIATRIST

## 2022-03-13 PROCEDURE — 99232 SBSQ HOSP IP/OBS MODERATE 35: CPT | Performed by: NURSE PRACTITIONER

## 2022-03-13 PROCEDURE — 85025 COMPLETE CBC W/AUTO DIFF WBC: CPT

## 2022-03-13 PROCEDURE — 7100000000 HC PACU RECOVERY - FIRST 15 MIN: Performed by: PODIATRIST

## 2022-03-13 PROCEDURE — 1200000000 HC SEMI PRIVATE

## 2022-03-13 PROCEDURE — 88305 TISSUE EXAM BY PATHOLOGIST: CPT

## 2022-03-13 PROCEDURE — 6360000002 HC RX W HCPCS: Performed by: ANESTHESIOLOGY

## 2022-03-13 PROCEDURE — 6370000000 HC RX 637 (ALT 250 FOR IP): Performed by: FAMILY MEDICINE

## 2022-03-13 PROCEDURE — 2580000003 HC RX 258: Performed by: PODIATRIST

## 2022-03-13 PROCEDURE — 86403 PARTICLE AGGLUT ANTBDY SCRN: CPT

## 2022-03-13 PROCEDURE — 6370000000 HC RX 637 (ALT 250 FOR IP): Performed by: PODIATRIST

## 2022-03-13 PROCEDURE — 2500000003 HC RX 250 WO HCPCS: Performed by: FAMILY MEDICINE

## 2022-03-13 PROCEDURE — 3700000001 HC ADD 15 MINUTES (ANESTHESIA): Performed by: PODIATRIST

## 2022-03-13 PROCEDURE — 6360000002 HC RX W HCPCS: Performed by: FAMILY MEDICINE

## 2022-03-13 PROCEDURE — 87070 CULTURE OTHR SPECIMN AEROBIC: CPT

## 2022-03-13 PROCEDURE — 36415 COLL VENOUS BLD VENIPUNCTURE: CPT

## 2022-03-13 PROCEDURE — 3700000000 HC ANESTHESIA ATTENDED CARE: Performed by: PODIATRIST

## 2022-03-13 PROCEDURE — 2500000003 HC RX 250 WO HCPCS: Performed by: PODIATRIST

## 2022-03-13 PROCEDURE — 6360000002 HC RX W HCPCS: Performed by: PODIATRIST

## 2022-03-13 PROCEDURE — 82947 ASSAY GLUCOSE BLOOD QUANT: CPT

## 2022-03-13 PROCEDURE — 87205 SMEAR GRAM STAIN: CPT

## 2022-03-13 PROCEDURE — 3600000012 HC SURGERY LEVEL 2 ADDTL 15MIN: Performed by: PODIATRIST

## 2022-03-13 RX ORDER — LIDOCAINE HYDROCHLORIDE 10 MG/ML
INJECTION, SOLUTION INFILTRATION; PERINEURAL PRN
Status: DISCONTINUED | OUTPATIENT
Start: 2022-03-13 | End: 2022-03-13 | Stop reason: ALTCHOICE

## 2022-03-13 RX ORDER — OXYCODONE HYDROCHLORIDE 10 MG/1
10 TABLET ORAL EVERY 4 HOURS PRN
Status: DISCONTINUED | OUTPATIENT
Start: 2022-03-13 | End: 2022-03-15 | Stop reason: HOSPADM

## 2022-03-13 RX ORDER — MIDAZOLAM HYDROCHLORIDE 1 MG/ML
INJECTION INTRAMUSCULAR; INTRAVENOUS PRN
Status: DISCONTINUED | OUTPATIENT
Start: 2022-03-13 | End: 2022-03-13 | Stop reason: SDUPTHER

## 2022-03-13 RX ORDER — SODIUM CHLORIDE 9 MG/ML
INJECTION, SOLUTION INTRAVENOUS CONTINUOUS PRN
Status: DISCONTINUED | OUTPATIENT
Start: 2022-03-13 | End: 2022-03-13 | Stop reason: SDUPTHER

## 2022-03-13 RX ORDER — PROPOFOL 10 MG/ML
INJECTION, EMULSION INTRAVENOUS CONTINUOUS PRN
Status: DISCONTINUED | OUTPATIENT
Start: 2022-03-13 | End: 2022-03-13 | Stop reason: SDUPTHER

## 2022-03-13 RX ORDER — WARFARIN SODIUM 10 MG/1
10 TABLET ORAL
Status: COMPLETED | OUTPATIENT
Start: 2022-03-13 | End: 2022-03-13

## 2022-03-13 RX ORDER — FENTANYL CITRATE 50 UG/ML
INJECTION, SOLUTION INTRAMUSCULAR; INTRAVENOUS PRN
Status: DISCONTINUED | OUTPATIENT
Start: 2022-03-13 | End: 2022-03-13 | Stop reason: SDUPTHER

## 2022-03-13 RX ORDER — BUPIVACAINE HYDROCHLORIDE 5 MG/ML
INJECTION, SOLUTION EPIDURAL; INTRACAUDAL PRN
Status: DISCONTINUED | OUTPATIENT
Start: 2022-03-13 | End: 2022-03-13 | Stop reason: ALTCHOICE

## 2022-03-13 RX ORDER — OXYCODONE HYDROCHLORIDE 5 MG/1
5 TABLET ORAL EVERY 4 HOURS PRN
Status: DISCONTINUED | OUTPATIENT
Start: 2022-03-13 | End: 2022-03-15 | Stop reason: HOSPADM

## 2022-03-13 RX ADMIN — FENTANYL CITRATE 25 MCG: 50 INJECTION, SOLUTION INTRAMUSCULAR; INTRAVENOUS at 08:40

## 2022-03-13 RX ADMIN — SODIUM CHLORIDE: 9 INJECTION, SOLUTION INTRAVENOUS at 08:08

## 2022-03-13 RX ADMIN — ENOXAPARIN SODIUM 90 MG: 100 INJECTION SUBCUTANEOUS at 21:04

## 2022-03-13 RX ADMIN — FENTANYL CITRATE 25 MCG: 50 INJECTION, SOLUTION INTRAMUSCULAR; INTRAVENOUS at 08:27

## 2022-03-13 RX ADMIN — FENTANYL CITRATE 25 MCG: 50 INJECTION, SOLUTION INTRAMUSCULAR; INTRAVENOUS at 08:51

## 2022-03-13 RX ADMIN — CARVEDILOL 6.25 MG: 6.25 TABLET, FILM COATED ORAL at 09:48

## 2022-03-13 RX ADMIN — CEFEPIME HYDROCHLORIDE 1000 MG: 1 INJECTION, POWDER, FOR SOLUTION INTRAMUSCULAR; INTRAVENOUS at 23:32

## 2022-03-13 RX ADMIN — METRONIDAZOLE 500 MG: 500 INJECTION, SOLUTION INTRAVENOUS at 03:12

## 2022-03-13 RX ADMIN — CARVEDILOL 6.25 MG: 6.25 TABLET, FILM COATED ORAL at 16:43

## 2022-03-13 RX ADMIN — DAPTOMYCIN 450 MG: 500 INJECTION, POWDER, LYOPHILIZED, FOR SOLUTION INTRAVENOUS at 16:38

## 2022-03-13 RX ADMIN — METRONIDAZOLE 500 MG: 500 INJECTION, SOLUTION INTRAVENOUS at 17:48

## 2022-03-13 RX ADMIN — PROPOFOL 75 MCG/KG/MIN: 10 INJECTION, EMULSION INTRAVENOUS at 08:12

## 2022-03-13 RX ADMIN — FENTANYL CITRATE 25 MCG: 50 INJECTION, SOLUTION INTRAMUSCULAR; INTRAVENOUS at 08:26

## 2022-03-13 RX ADMIN — ENOXAPARIN SODIUM 90 MG: 100 INJECTION SUBCUTANEOUS at 14:25

## 2022-03-13 RX ADMIN — MIDAZOLAM 2 MG: 1 INJECTION INTRAMUSCULAR; INTRAVENOUS at 08:09

## 2022-03-13 RX ADMIN — OXYCODONE HYDROCHLORIDE 10 MG: 10 TABLET ORAL at 14:23

## 2022-03-13 RX ADMIN — CEFEPIME HYDROCHLORIDE 1000 MG: 1 INJECTION, POWDER, FOR SOLUTION INTRAMUSCULAR; INTRAVENOUS at 10:48

## 2022-03-13 RX ADMIN — OXYCODONE HYDROCHLORIDE 10 MG: 10 TABLET ORAL at 20:55

## 2022-03-13 RX ADMIN — METRONIDAZOLE 500 MG: 500 INJECTION, SOLUTION INTRAVENOUS at 09:08

## 2022-03-13 RX ADMIN — WARFARIN SODIUM 10 MG: 10 TABLET ORAL at 17:48

## 2022-03-13 RX ADMIN — INSULIN LISPRO 2 UNITS: 100 INJECTION, SOLUTION INTRAVENOUS; SUBCUTANEOUS at 20:55

## 2022-03-13 RX ADMIN — GLIPIZIDE 5 MG: 5 TABLET ORAL at 09:48

## 2022-03-13 ASSESSMENT — PULMONARY FUNCTION TESTS
PIF_VALUE: 1
PIF_VALUE: 2
PIF_VALUE: 1
PIF_VALUE: 2
PIF_VALUE: 1
PIF_VALUE: 1

## 2022-03-13 ASSESSMENT — PAIN DESCRIPTION - PAIN TYPE: TYPE: SURGICAL PAIN

## 2022-03-13 ASSESSMENT — PAIN SCALES - GENERAL
PAINLEVEL_OUTOF10: 7
PAINLEVEL_OUTOF10: 0
PAINLEVEL_OUTOF10: 7

## 2022-03-13 ASSESSMENT — PAIN - FUNCTIONAL ASSESSMENT: PAIN_FUNCTIONAL_ASSESSMENT: ACTIVITIES ARE NOT PREVENTED

## 2022-03-13 ASSESSMENT — PAIN DESCRIPTION - ONSET: ONSET: GRADUAL

## 2022-03-13 ASSESSMENT — PAIN DESCRIPTION - ORIENTATION: ORIENTATION: LEFT

## 2022-03-13 ASSESSMENT — PAIN DESCRIPTION - PROGRESSION: CLINICAL_PROGRESSION: GRADUALLY WORSENING

## 2022-03-13 ASSESSMENT — PAIN DESCRIPTION - FREQUENCY: FREQUENCY: CONTINUOUS

## 2022-03-13 ASSESSMENT — PAIN DESCRIPTION - LOCATION: LOCATION: FOOT

## 2022-03-13 ASSESSMENT — PAIN DESCRIPTION - DESCRIPTORS: DESCRIPTORS: CONSTANT;BURNING;ACHING

## 2022-03-13 NOTE — ANESTHESIA PRE PROCEDURE
Department of Anesthesiology  Preprocedure Note       Name:  Tereza Ponce   Age:  78 y.o.  :  1955                                          MRN:  [id number]         Date:  3/13/2022      Surgeon: Antwon Thomas):  Chet Craig DPM    Procedure: Procedure(s):  TOE AMPUTATION--left 2nd digit    Medications prior to admission:   Prior to Admission medications    Medication Sig Start Date End Date Taking? Authorizing Provider   carvedilol (COREG) 12.5 MG tablet Take 1 tablet by mouth 2 times daily (with meals)  Patient taking differently: Take 6.25 mg by mouth 2 times daily (with meals)  21  Yes Lucas Hernadez, DO   warfarin (COUMADIN) 2 MG tablet Take 4 mg by mouth daily Per [facility] 20  Yes Historical Provider, MD   glimepiride (AMARYL) 4 MG tablet Take 4 mg by mouth 2 times daily (before meals)    Yes Historical Provider, MD   metFORMIN (GLUCOPHAGE) 500 MG tablet Take 1,000 mg by mouth 2 times daily (with meals). Yes Historical Provider, MD   atorvastatin (LIPITOR) 40 MG tablet Take 40 mg by mouth daily.    Yes Historical Provider, MD       Current medications:    Current Facility-Administered Medications   Medication Dose Route Frequency Provider Last Rate Last Admin    glipiZIDE (GLUCOTROL) tablet 5 mg  5 mg Oral QAM AC Lucas ORDOÑEZ Satya, DO   5 mg at 22 1702    DAPTOmycin (CUBICIN) 450 mg in sodium chloride 0.9 % 50 mL IVPB  6 mg/kg (Ideal) IntraVENous Q24H Shayy Godinez MD   Stopped at 22 1539    cefepime (MAXIPIME) 1000 mg IVPB minibag  1,000 mg IntraVENous Q12H Lucas ORDOÑEZ Satya, DO   Stopped at 22 0155    metronidazole (FLAGYL) 500 mg in NaCl 100 mL IVPB premix  500 mg IntraVENous Q8H Lucas ORDOÑEZ Satya, DO   Stopped at 22 0415    sodium chloride flush 0.9 % injection 5-40 mL  5-40 mL IntraVENous 2 times per day Melissa Hughes, DO   10 mL at 22 0815    sodium chloride flush 0.9 % injection 5-40 mL  5-40 mL IntraVENous PRN Lucas Hernadez, DO        0.9 % sodium chloride infusion  25 mL IntraVENous PRN Samella Brewer, DO        acetaminophen (TYLENOL) tablet 650 mg  650 mg Oral Q4H PRN Lucas T Satya, DO   650 mg at 03/11/22 2331    ondansetron (ZOFRAN-ODT) disintegrating tablet 4 mg  4 mg Oral Q8H PRN Lucas T Satya, DO        Or    ondansetron (ZOFRAN) injection 4 mg  4 mg IntraVENous Q6H PRN Lucas T Satya, DO        carvedilol (COREG) tablet 6.25 mg  6.25 mg Oral BID  Lucas T Satya, DO   6.25 mg at 03/12/22 1733    insulin lispro (HUMALOG) injection vial 0-12 Units  0-12 Units SubCUTAneous TID  Lucas T Satya, DO   2 Units at 03/12/22 1733    insulin lispro (HUMALOG) injection vial 0-6 Units  0-6 Units SubCUTAneous Nightly Lucas T Satya, DO   2 Units at 03/12/22 2154    glucose (GLUTOSE) 40 % oral gel 15 g  15 g Oral PRN Lucas T Satya, DO   15 g at 03/09/22 1610    dextrose 50 % IV solution  12.5 g IntraVENous PRN Lucas T Satya, DO   12.5 g at 03/10/22 0624    glucagon (rDNA) injection 1 mg  1 mg IntraMUSCular PRN Samella Brewer, DO        dextrose 5 % solution  100 mL/hr IntraVENous PRN Lucas T Satya,  mL/hr at 03/10/22 0619 100 mL/hr at 03/10/22 7178       Allergies:     Allergies   Allergen Reactions    Pcn [Penicillins] Rash       Problem List:    Patient Active Problem List   Diagnosis Code    Diabetic foot infection ([facility] E11.628, L08.9    Diabetes ([facility] E11.9    Chronic osteomyelitis ([facility] M86.60    Diabetic foot ulcer ([facility] E11.621, L97.509    PVD (peripheral vascular disease) ([facility] I73.9    Atherosclerosis of coronary artery without angina pectoris I25.10    Cardiomyopathy ([facility] I42.9    Cardiac left ventricular ejection fraction 21-40 percent R94.30    Diabetes mellitus type 2 with peripheral artery disease ([facility] E11.51    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 ([facility] L97.514    Chronic osteomyelitis of left foot ([facility] M86.672    Onychomycosis B35.1    Heart failure ([facility] I50.9    MRSA bacteremia R78.81, B95.62  Gangrene of toe of left foot (HCC) I96    Elevated C-reactive protein (CRP) R79.82    Elevated erythrocyte sedimentation rate R70.0    Acute kidney injury ([facility] N17.9    Allergy to penicillin Z88.0       Past Medical History:        Diagnosis Date    Atrial fibrillation ([facility]     CAD (coronary artery disease)     CHF (congestive heart failure) ([facility]     Diabetes mellitus [facility]     Hyperlipidemia     Hypertension        Past Surgical History:        Procedure Laterality Date    CARDIAC SURGERY  2010    CABG X3    COLONOSCOPY      DEBRIDEMENT Right 11/19/2015    DEBRIDEMENT WOUND FOOT RIGHT W/ APPLICATION BILAT INTEG RAT GRAFT    ENDOSCOPY, COLON, DIAGNOSTIC      EYE SURGERY Bilateral     cataracts    HERNIA REPAIR      umbilical    KNEE ARTHROSCOPY Right     OTHER SURGICAL HISTORY Right 12 29 15    wound care graft procedure foot,appl of integra    PACEMAKER PLACEMENT  2011    WITH DEFIBRILLATOR    TOE AMPUTATION Right     R great       Social History:    Social History     Tobacco Use    Smoking status: Never Smoker    Smokeless tobacco: Never Used   Substance Use Topics    Alcohol use: Yes     Comment: SOCIAL on weekends                                Counseling given: Not Answered      Vital Signs (Current):   Vitals:    03/12/22 1311 03/12/22 1730 03/12/22 1922 03/13/22 0650   BP: (!) 154/69 (!) 187/90 (!) 154/78 (!) 175/75   Pulse: 63 85 83 84   Resp: 16  18 19   Temp: 97.5 °F (36.4 °C)  97.7 °F (36.5 °C) 98.6 °F (37 °C)   TempSrc: Oral      SpO2: 98%  98% 100%   Weight:       Height:                                                  BP Readings from Last 3 Encounters:   03/13/22 (!) 175/75   02/05/21 (!) 156/73   08/02/16 (!) 168/91       NPO Status:                                                                                 BMI:   Wt Readings from Last 3 Encounters:   03/07/22 198 lb 13.7 oz (90.2 kg)   02/05/21 212 lb 15.4 oz (96.6 kg)   05/23/16 223 lb (101.2 kg)     Body mass index is 27.73 kg/m².     CBC:   Lab Results   Component Value Date    WBC 5.0 03/12/2022    RBC 3.19 03/12/2022    RBC 4.43 12/28/2011    HGB 10.2 03/12/2022    HCT 28.8 03/12/2022    MCV 90.5 03/12/2022    RDW 14.6 03/12/2022     03/12/2022     12/28/2011       CMP:   Lab Results   Component Value Date     03/12/2022    K 4.1 03/12/2022     03/12/2022    CO2 24 03/12/2022    BUN 26 03/12/2022    CREATININE 1.52 03/12/2022    GFRAA 56 03/12/2022    LABGLOM 46 03/12/2022    GLUCOSE 124 03/12/2022    GLUCOSE 335 12/28/2011    PROT 6.3 03/09/2022    CALCIUM 8.9 03/12/2022    BILITOT 1.65 03/09/2022    ALKPHOS 112 03/09/2022    AST 34 03/09/2022    ALT 23 03/09/2022       POC Tests:   Recent Labs     03/13/22  3918   POCGLU 136*       Coags:   Lab Results   Component Value Date    PROTIME 16.7 03/07/2022    PROTIME 31.8 12/30/2016    PROTIME 16.0 05/11/2015    INR 1.4 03/07/2022    APTT 35.6 03/07/2022       HCG (If Applicable): No results found for: PREGTESTUR, PREGSERUM, HCG, HCGQUANT     ABGs:   Lab Results   Component Value Date    PHART 7.473 10/23/2015    PO2ART 83.2 10/23/2015    LMV6KYN 38.1 10/23/2015    EZF3MBJ 28.0 10/23/2015    I9QAHLWT 95.8 10/23/2015        Type & Screen (If Applicable):  No results found for: LABABO, LABRH    Drug/Infectious Status (If Applicable):  No results found for: HIV, HEPCAB    COVID-19 Screening (If Applicable):   Lab Results   Component Value Date    COVID19 Not Detected 03/12/2022           Anesthesia Evaluation  Patient summary reviewed and Nursing notes reviewed no history of anesthetic complications:   Airway: Mallampati: III  TM distance: >3 FB   Neck ROM: full  Mouth opening: > = 3 FB Dental:    (+) edentulous      Pulmonary:Negative Pulmonary ROS and normal exam  breath sounds clear to auscultation                             Cardiovascular:    (+) hypertension:, pacemaker: AICD, CAD:, CABG/stent:, dysrhythmias: atrial fibrillation, CHF:, ECG reviewed  Rhythm: irregular  Rate: normal  Echocardiogram reviewed                  Neuro/Psych:   (+) neuromuscular disease:,             GI/Hepatic/Renal:   (+) renal disease: CRI,           Endo/Other:    (+) DiabetesType II DM, , blood dyscrasia: anticoagulation therapy and anemia:., .                 Abdominal:             Vascular:   + PVD, aortic or cerebral, . Other Findings:           Anesthesia Plan      general     ASA 3       Induction: intravenous. MIPS: Postoperative opioids intended and Prophylactic antiemetics administered. Anesthetic plan and risks discussed with patient.                     Wesley Harrison MD   3/13/2022

## 2022-03-13 NOTE — PROGRESS NOTES
Progress Note  Podiatric Medicine and Surgery     Subjective     CC: left foot wound, cellulitis    Re-consulted by medicine for worsening symptoms  OR today at 8am  Patient NPO since midnight    HPI :  Zoe Kruger is a 78 y.o. male seen at [facility] ER for left 2nd digit wound and cellulitis. Patient is a type 2 diabetic with history of PVD. He has had previous amputations of hallux of bilateral feet, and partial 2nd digit amputation. Per patient, he was on the way to doctor appoitnment today and was feeling fatigued and unwell, was told by staff at PCP to present to ED. Denies any trauma to left foot, reports started noticing worsening discoloration of 2nd digit and feeling of general malaise over the weekend. Denies any further pedal complaints at this time.       PCP is Vincent Xiong DO    ROS: Denies N/V/F/C/SOB/CP. Otherwise negative except at stated in the HPI.      Medications:  Scheduled Meds:   glipiZIDE  5 mg Oral QAM AC    daptomycin (CUBICIN) IVPB  6 mg/kg (Ideal) IntraVENous Q24H    cefepime  1,000 mg IntraVENous Q12H    metroNIDAZOLE  500 mg IntraVENous Q8H    sodium chloride flush  5-40 mL IntraVENous 2 times per day    carvedilol  6.25 mg Oral BID WC    insulin lispro  0-12 Units SubCUTAneous TID WC    insulin lispro  0-6 Units SubCUTAneous Nightly       Continuous Infusions:   sodium chloride      dextrose 100 mL/hr (03/10/22 0619)       PRN Meds:sodium chloride flush, sodium chloride, acetaminophen, ondansetron **OR** ondansetron, glucose, dextrose, glucagon (rDNA), dextrose    Objective     Vitals:  Patient Vitals for the past 8 hrs:   BP Temp Pulse Resp SpO2   22 0650 (!) 175/75 98.6 °F (37 °C) 84 19 100 %     Average, Min, and Max for last 24 hours Vitals:  TEMPERATURE:  Temp  Av.7 °F (36.5 °C)  Min: 97 °F (36.1 °C)  Max: 98.6 °F (37 °C)    RESPIRATIONS RANGE: Resp  Av  Min: 16  Max: 19    PULSE RANGE: Pulse  Av.9  Min: 63  Max: 85    BLOOD PRESSURE RANGE:  Systolic (03IVT), XST:935 , Min:154 , AHD:910   ; Diastolic (34USF), JTD:60, Min:69, Max:91      PULSE OXIMETRY RANGE: SpO2  Av.2 %  Min: 97 %  Max: 100 %    I/O last 3 completed shifts: In: 1340.7 [P.O.:600; I.V.:324; IV Piggyback:416.6]  Out: 880 [Urine:880]    CBC:  Recent Labs     22  0606 22   WBC 4.1 5.0   HGB 10.1* 10.2*   HCT 28.7* 28.8*    180        BMP:  Recent Labs     22  0606 22    134*   K 3.9 4.1    100   CO2 26 24   BUN 31* 26*   CREATININE 1.57* 1.52*   GLUCOSE 144* 124*   CALCIUM 8.9 8.9        Coags:  No results for input(s): APTT, PROT, INR in the last 72 hours. Lab Results   Component Value Date    SEDRATE 63 (H) 2022     No results for input(s): CRP in the last 72 hours. Physical Exam: Dressing remains intact due to surgery. Exam from 3/12  Vascular: DP and PT pulses are non-palpable, bilateral. Audible waveform on doppler. CFT >3 seconds to all digits. Hair growth is absent to the level of the digits. +2 non-pitting edema.      Neuro: Saph/sural/SP/DP/plantar sensation intact to light touch.     Musculoskeletal: Muscle strength is adequate ROM, adequate strength to all lower extremity muscle groups. Gross deformity is previous hallux amputation of bilateral LE, partial 2nd digit amputation, right foot. .     Dermatologic: Dry gangrene noted to left 2nd digit. There seems to be demarcation at today's exam. Discoloration seems worse than previous exam. Skin is taut. There is no open lesion or drainage upon my evaluation today. Erythema overlying dorsal midfoot, left. No fluctuance,mild induration. Interdigital maceration absent.        Clinical Images:              Imaging:   US RENAL COMPLETE   Final Result   1. Bilateral nonobstructing nephrolithiasis. No hydronephrosis. 2. Simple appearing bilateral renal cysts measuring up to 3.4 cm on the left.          VL Lower Extremity Arterial Segmental Pressures W Ppg Final Result      CT FOOT LEFT WO CONTRAST   Final Result   1. No convincing evidence of osteomyelitis or other acute osseous   abnormality. If there is persistent clinical concern for osteomyelitis   further evaluation could be obtained with MRI. 2. Extensive subcutaneous edema compatible with bland edema or cellulitis. No soft tissue gas or drainable fluid collection identified. XR FOOT LEFT (MIN 3 VIEWS)   Final Result   Findings suspicious for possible early osteomyelitis at the 2nd toe distal   phalanx. Small area of erosive change at the 1st metatarsal head, though the features   are more suggestive possible underlying erosive or inflammatory arthropathy   rather than osteomyelitis, though clinical correlation is required. Soft tissue swelling of the 2nd toe at the forefoot. VL Lower Extremity Arteries Bilateral    (Results Pending)       Cultures:  NGTD    Assessment   Ishmael Rogel is a 78 y.o. male with   1. Dry gangrene, left foot  2. Osteomyelitis left second toe  3. PVD  4. Cellulitis, left foot  5. History of bilateral hallux amputation    Principal Problem:    Diabetic foot infection ([facility]  Active Problems:    MRSA bacteremia    Gangrene of toe of left foot (HCC)    Elevated C-reactive protein (CRP)    Elevated erythrocyte sedimentation rate    Acute kidney injury ([facility]    Allergy to penicillin  Resolved Problems:    * No resolved hospital problems. *       Plan   · Patient examined and evaluated at bedside   · Treatment options discussed in detail with the patient  · X-rays reviewed: possible early sign of OM to distal 2nd digit. · CT reviewed: no OM  · PVR: MONIQUE b/l 1.71  · IV abx: Daptomycin , cefepime, flagyl .  ID recommendation appreciated  · Medical management per IM  · Dressing intact to Left foot per nursing order: Betadine paint left second digit, dry gauze, tape  · OR at 8 am ttoday  · Consent signed  · Foot to marked  · COVID test negative  · NPO at midnight  · Morning AC will be held  · WBAT to left lower extremity in surgical shoe  · Discussed with Dr. Celio Rodriguez, [place] [id number] & Surgery   3/13/2022 at 7:42 AM

## 2022-03-13 NOTE — PROGRESS NOTES
NEPHROLOGY PROGRESS NOTE    Patient :  Antonia Castellano; 78 y.o. MRN# [id number]  Location:  2052/2052-01  Attending:  Sean Munoz DO  Admit Date:  3/7/2022   Hospital Day: 6    Reason for consultation: Management of acute kidney injury. Consulting physician: Jatinder Dong DO    Interval history: Patient was seen and examined today -he denies any shortness of breath. Left second toe amputation and fillet  today by podiatry. He is on IV Cubicin, metronidazole and cefepime. Renal function is improving. History of Present Illness: This is a 78 y.o. male past medical history of coronary artery disease, bypass surgery, CHF, ype 2 diabetes, Essential hypertension, chronic kidney disease stage IIIb with baseline creatinine of about 1.5 to 1.7 mg/dL since 2019, patient has not follow-up with any nephrologist as an outpatient. Patient has patient presented to the hospital with complaints of left foot redness and black toe, patient noticed black toe about last 1 week, patient started to have pain in the foot and redness and therefore decided to come to the hospital.  Denied any nausea vomiting diarrhea no fever chills  Denied any shortness of breath chest pain  Labs showed serum creatinine of 2.1 mg/dL on admission from nephrology consultation has been requested  Blood cultures positive for MRSA  Pt denies any history of  prolonged NSAID use. Patient denies dysuria, gross hematuria, flank pain, nocturia, urgency, passing frothy urine or urinary incontinence. There has been no recent exposure to IV contrast.   There is no history  of paraprotein disease. Pt denies any history of recurrent UTI or kidney stones. Medication review shows use of ACE-inhibitor/diuretics.     Current Medications:    oxyCODONE (ROXICODONE) immediate release tablet 5 mg, Q4H PRN   Or  oxyCODONE HCl (OXY-IR) immediate release tablet 10 mg, Q4H PRN  warfarin (COUMADIN) tablet 10 mg, Once  enoxaparin (LOVENOX) injection 90 mg, BID  glipiZIDE (GLUCOTROL) tablet 5 mg, QAM AC  DAPTOmycin (CUBICIN) 450 mg in sodium chloride 0.9 % 50 mL IVPB, Q24H  cefepime (MAXIPIME) 1000 mg IVPB minibag, Q12H  metronidazole (FLAGYL) 500 mg in NaCl 100 mL IVPB premix, Q8H  sodium chloride flush 0.9 % injection 5-40 mL, 2 times per day  sodium chloride flush 0.9 % injection 5-40 mL, PRN  0.9 % sodium chloride infusion, PRN  acetaminophen (TYLENOL) tablet 650 mg, Q4H PRN  ondansetron (ZOFRAN-ODT) disintegrating tablet 4 mg, Q8H PRN   Or  ondansetron (ZOFRAN) injection 4 mg, Q6H PRN  carvedilol (COREG) tablet 6.25 mg, BID WC  insulin lispro (HUMALOG) injection vial 0-12 Units, TID WC  insulin lispro (HUMALOG) injection vial 0-6 Units, Nightly  glucose (GLUTOSE) 40 % oral gel 15 g, PRN  dextrose 50 % IV solution, PRN  glucagon (rDNA) injection 1 mg, PRN  dextrose 5 % solution, PRN      Objective:  CURRENT TEMPERATURE:  Temp: 98.2 °F (36.8 °C)  MAXIMUM TEMPERATURE OVER 24HRS:  Temp (24hrs), Av.9 °F (36.6 °C), Min:96.8 °F (36 °C), Max:98.6 °F (37 °C)    CURRENT RESPIRATORY RATE:  Resp: 16  CURRENT PULSE:  Pulse: 76  CURRENT BLOOD PRESSURE:  BP: (!) 144/74  24HR BLOOD PRESSURE RANGE:  Systolic (54YKC), XFK:565 , Min:112 , CWL:371   ; Diastolic (07VTF), QSJ:18, Min:56, Max:98    24HR INTAKE/OUTPUT:      Intake/Output Summary (Last 24 hours) at 3/13/2022 1611  Last data filed at 3/13/2022 1424  Gross per 24 hour   Intake 1328.55 ml   Output 800 ml   Net 528.55 ml     No data found. Physical Exam:  GENERAL APPEARANCE: Alert and cooperative, and appears to be in no acute distress. HEAD: Normocephalic and atraumatic  EYES:  EOMI. Not pale, anicteric   NOSE:  No nasal discharge. THROAT:  Oral cavity and pharynx normal. Moist  CARDIAC: Normal S1 and S2. No S3, S4 or murmurs. Rhythm is regular. LUNGS: Clear to auscultation and percussion without rales, rhonchi, wheezing or diminished breath sounds. GI-soft nontender  MUSKULOSKELETAL: Adequately aligned spine.  No joint erythema or tenderness. EXTREMITIES: Left foot erythema with wet gangrene of the second left toe. NEURO: No acute focal neurologic deficits    Labs:   CBC:  Recent Labs     03/12/22  0615 03/13/22  1029 03/13/22  1135   WBC 5.0 PLEASE DISREGARD RESULTS. SPECIMEN CONTAMINATED. 5.3   RBC 3.19* PLEASE DISREGARD RESULTS. SPECIMEN CONTAMINATED. 3.25*   HGB 10.2* PLEASE DISREGARD RESULTS. SPECIMEN CONTAMINATED. 10.2*   HCT 28.8* PLEASE DISREGARD RESULTS. SPECIMEN CONTAMINATED. 29.7*   MCV 90.5 PLEASE DISREGARD RESULTS. SPECIMEN CONTAMINATED. 91.5   MCH 32.0 PLEASE DISREGARD RESULTS. SPECIMEN CONTAMINATED. 31.4   MCHC 35.3 PLEASE DISREGARD RESULTS. SPECIMEN CONTAMINATED. 34.3   RDW 14.6 PLEASE DISREGARD RESULTS. SPECIMEN CONTAMINATED. 14.5    PLEASE DISREGARD RESULTS. SPECIMEN CONTAMINATED. 208   MPV 7.4 PLEASE DISREGARD RESULTS. SPECIMEN CONTAMINATED. 6.9      BMP:   Recent Labs     03/11/22  0606 03/12/22  0615 03/13/22  1135    134* 136   K 3.9 4.1 3.9    100 102   CO2 26 24 25   BUN 31* 26* 19   CREATININE 1.57* 1.52* 1.42*   GLUCOSE 144* 124* 126*   CALCIUM 8.9 8.9 9.0      Magnesium:   No results for input(s): MG in the last 72 hours. Albumin:   No results for input(s): LABALBU in the last 72 hours. No results for input(s): PROT, ALBCAL, ALBCAL, ALBPCT, LABALPH, A1PCT, LABALPH, A2PCT, LABBETA, BETAPCT, GAMGLOB, GGPCT, PATH in the last 72 hours. Assessment/plan:    1. Acute kidney injury superimposed on chronic kidney disease stage 3b - most consistent with prerenal azotemia from aggressive diuretic therapy - ischemic ATN ruled out as renal function is improving with IV fluids, holding lasix and patient is non oliguric . AZIZA is negative, complements and plasma free light chain ratios are within normal. Renal ultrasound showed bilateral nonobstructing nephrolithiasis with no hydronephrosis and is simple appearing renal cyst 3.4 cm on the left.  Renal function is improving-1.4  mg/dL at baseline    Plan: Continue to hold furosemide. We will discontinue IV fluids as renal function is stable   Avoid nephrotoxic agents. Basic metabolic profile daily. Monitor CPK levels while on IV Cubicin    2. Systemic hypertension - Continue current medications carvedilol 6.25 mg p.o. twice daily    3. Chronic kidney disease stage IIIb [baseline serum creatinine 1.5 to 1.7 mg/dL] - consistent with diabetic glomerulopathy. Renal function is back to baseline. 4.  Peripheral arterial disease with left second toe gangrene will follow vascular and podiatry recommendations. 5.  Left second toe gangrene -S/P amputation. continue antibiotics. Prognosis is guarded.     Electronically signed by Zeny Robles MD on 3/13/2022 at 4:11 PM

## 2022-03-13 NOTE — PROGRESS NOTES
RN notified patient of surgery planned at 8 am. Patient requested his mother be notified. Rapid covid test sent down. RN notified patient's mother Jailyn De Los Santos of surgical plan. Jailyn De Los Santos states she will be up around 0730. Patient notified.

## 2022-03-13 NOTE — PROGRESS NOTES
[facility] -   Infectious diseases evaluation  admission date 3/7/2022    reason for consultation:   Left Second Toe Gangrene and Cellulitis    Impression :   Current:  MRSA bacteremia 2/2 suspected osteomyelitis of the left 2nd toe   Gangrene left 2nd toe; S/p fillet 2nd left toe 3/13  Cellulitis left foot  S/p bilateral great big toe amputation  Elevated CRP  Elevated ESR  Type 2 diabetes mellitus  BRIANNA on CKD stage IIIb  PVD  CHF  S/p CABG  A. fib on warfarin  Essential hypertension  Allergy to Penicillin-Rash    Other:    Discussion / summary of stay / plan of care   Echo negative for obvious vegetations. Unable to obtain MRI as patient has an AICD. Recommendations   Continue Daptomycin IV, pharmacy to dose, Cefepime 1 gm IV every 12 hours, Flagyl 500 mg IV every 8 hours for now. No statins while on Daptomycin. Follow CBC and renal function closely. Follow final sensitivity from [facility] x 2 from 3/10. Adjust antibiotics. Discussed with patient. Infection Control Recommendations   [place] Precautions  Contact Isolation     Antimicrobial Stewardship Recommendations   Simplification of therapy  Targeted therapy    Coordination ofOutpatient Care:   Estimated Length of IV antimicrobials:  Patient will need Midline / picc Catheter Insertion:   Patient will need SNF:  Patient will need outpatient wound care:     History of Present Illness:   Initial history:  Sally Botello is a 78y.o.-year-old male with a PMH significant for type 2 diabetes mellitus, CKD stage IIIb,  PVD, s/p CABG, CHF, A. fib on Coumadin, essential hypertension, and s/p bilateral great big toe amputation who presents to [facility] on 3/7/2022 and is admitted for the management of left second toe gangrene and cellulitis. Labs upon presentation were remarkable for , K3.4, creatinine 2.10, LA 3.1--->1.7, CRP 61.1--->87.8, WBC 7.3, ESR 21--->63.   X-ray left foot showed suspicion for possible early osteomyelitis at the second toe distal phalanx. It also showed soft tissue swelling of the second toe at the forefoot. MRI of the foot was not possible because patient has an ICD. Therefore, CT of the left foot was taken which showed no convincing evidence of osteomyelitis or other acute osseous abnormality. There was extensive subcutaneous edema compatible with bland edema or cellulitis. No soft tissue gas or drainable fluid collection identified. 3/7/22                Interval changes  3/13/2022   Afebrile  SpO2 97% room air  In bed, finished lunch  S/p fillet 2nd left toe this am. C/o pain in left foot \"where they operated\"  Pain rated as 1 on scale 1-10  Surgical dressing clean, dry and intact    Patient Vitals for the past 8 hrs:   BP Temp Temp src Pulse Resp SpO2   03/13/22 1130 (!) 144/74 98.2 °F (36.8 °C) -- 76 -- --   03/13/22 0930 (!) 150/77 98.6 °F (37 °C) -- 72 14 97 %   03/13/22 0920 (!) 145/85 -- -- 71 12 97 %   03/13/22 0910 133/73 -- -- 80 13 94 %   03/13/22 0901 125/74 98.6 °F (37 °C) Infrared 83 13 --   03/13/22 0650 (!) 175/75 98.6 °F (37 °C) -- 84 19 100 %       Summary of relevant labs:  Labs:  Cre: 1.74-1.57-1.52  CRP: 87.8  WBC: 4.2-4.1-5.0-3.6  ESR: 63       Micro:  3/7 BC x 2-MRSA  3/10 BC x 2-Negative to date. Imaging:  3/7 CT Left Foot    Narrative   EXAMINATION:   CT OF THE LEFT FOOT WITHOUT CONTRAST 3/7/2022 4:00 pm       TECHNIQUE:   CT of the left foot was performed without the administration of intravenous   contrast.  Multiplanar reformatted images are provided for review.  Dose   modulation, iterative reconstruction, and/or weight based adjustment of the   mA/kV was utilized to reduce the radiation dose to as low as reasonably   achievable.       COMPARISON:   Left foot radiographs 03/07/2022.       HISTORY   ORDERING SYSTEM PROVIDED HISTORY: r/o osteo v gas   TECHNOLOGIST PROVIDED HISTORY:   r/o osteo v gas   Reason for Exam: diabetic wound on left foot       FINDINGS:   The distal tibia and fibula are intact.  No syndesmotic widening.  The ankle   mortise is intact.  Avascular necrosis of the talar dome without subchondral   collapse.  Mild tibiotalar degenerative changes.  The subtalar joint is   unremarkable.  Small retrocalcaneal and plantar calcaneal enthesophytes.  The   talonavicular joint is unremarkable.  Mild calcaneocuboid degenerative   changes.  Normal midfoot alignment is maintained.  No Lisfranc interval   widening.  No significant midfoot degenerative changes.  Status post 1st toe   amputation.  Mild degenerative changes at the articulation between the 1st   metatarsal head and hallux sesamoids.  No fracture or dislocation.  No   osseous erosion.       Diffuse subcutaneous edema.  No drainable fluid collection or soft tissue gas   identified.  Extensive atherosclerotic vascular calcifications.  The   visualized tendons are grossly intact.           Impression   1. No convincing evidence of osteomyelitis or other acute osseous   abnormality.  If there is persistent clinical concern for osteomyelitis   further evaluation could be obtained with MRI. 2. Extensive subcutaneous edema compatible with bland edema or cellulitis. No soft tissue gas or drainable fluid collection identified.           I have personally reviewed the past medical history, past surgical history, medications, social history, and family history, and I haveupdated the database accordingly. Allergies:   Pcn [penicillins]     Review of Systems:     Review of Systems   Skin: Positive for wound. Second left toe. All other systems reviewed and are negative. Physical Examination :       Physical Exam  Vitals and nursing note reviewed. Constitutional:       General: He is not in acute distress. Appearance: Normal appearance. He is normal weight. HENT:      Head: Normocephalic and atraumatic.       Right Ear: External ear normal.      Left Ear: External ear normal.      Nose: Nose normal.      Mouth/Throat:      Mouth: Mucous membranes are moist.      Pharynx: Oropharynx is clear. Eyes:      Conjunctiva/sclera: Conjunctivae normal.   Cardiovascular:      Rate and Rhythm: Normal rate and regular rhythm. Heart sounds: Normal heart sounds. Pulmonary:      Effort: Pulmonary effort is normal. No respiratory distress. Breath sounds: Normal breath sounds. Abdominal:      General: Bowel sounds are normal. There is no distension. Palpations: Abdomen is soft. Tenderness: There is no abdominal tenderness. Genitourinary:     Comments: No almazan. Musculoskeletal:         General: Normal range of motion. Cervical back: No rigidity. Right lower leg: No edema. Left lower leg: No edema. Comments: Bilateral great toes amputated. S/p fillet second left toe this am    Skin:     General: Skin is warm and dry. Capillary Refill: Capillary refill takes less than 2 seconds. Comments: Surgical dressing intact to left foot   Neurological:      Mental Status: He is alert and oriented to person, place, and time.    Psychiatric:         Behavior: Behavior normal.         Past Medical History:     Past Medical History:   Diagnosis Date    [facility]rial [facility]     CAD (coronary artery disease)     CHF (congestive heart failure) (HCC)     Diabetes mellitus ([facility]     Hyperlipidemia     Hypertension        Past Surgical  History:     Past Surgical History:   Procedure Laterality Date    CARDIAC SURGERY  2010    CABG X3    COLONOSCOPY      DEBRIDEMENT Right 11/19/2015    DEBRIDEMENT WOUND FOOT RIGHT W/ APPLICATION BILAT INTEG RAT GRAFT    ENDOSCOPY, COLON, DIAGNOSTIC      EYE SURGERY Bilateral     cataracts    HERNIA REPAIR      umbilical    KNEE ARTHROSCOPY Right     OTHER SURGICAL HISTORY Right 12 29 15    wound care graft procedure foot,appl of integra    PACEMAKER PLACEMENT  2011    WITH DEFIBRILLATOR    TOE Current or Ex-Partner: Not on file    Emotionally Abused: Not on file    Physically Abused: Not on file    Sexually Abused: Not on file   Housing Stability:     Unable to Pay for Housing in the Last Year: Not on file    Number of Places Lived in the Last Year: Not on file    Unstable Housing in the Last Year: Not on file       Family History:     Family History   Problem Relation Age of Onset    Cancer Father         pancreatic cancer    Other Brother         MI    Osteoarthritis Mother     Other Maternal Grandfather         MI      Medical Decision Making:   I have independently reviewed/ordered the following labs:    CBC with Differential:   Recent Labs     03/12/22  0615 03/13/22  1029   WBC 5.0 3.6   HGB 10.2* 7.4*   HCT 28.8* 21.9*    133*   LYMPHOPCT 18* PENDING   MONOPCT 10* PENDING     BMP:  Recent Labs     03/11/22  0606 03/12/22  0615    134*   K 3.9 4.1    100   CO2 26 24   BUN 31* 26*   CREATININE 1.57* 1.52*     Hepatic Function Panel: No results for input(s): PROT, LABALBU, BILIDIR, IBILI, BILITOT, ALKPHOS, ALT, AST in the last 72 hours. No results for input(s): RPR in the last 72 hours. No results for input(s): HIV in the last 72 hours. No results for input(s): BC in the last 72 hours. Lab Results   Component Value Date    CREATININE 1.52 03/12/2022    GLUCOSE 124 03/12/2022    GLUCOSE 335 12/28/2011       Detailed results: Thank you for allowing us to participate in the care of this patient. Please call with questions. This note is created with the assistance of a speech recognition program.  While intending to generate adocument that actually reflects the content of the visit, the document can still have some errors including those of syntax and sound a like substitutions which may escape proof reading. It such instances, actual meaningcan be extrapolated by contextual diversion.     KAYLENE Gilliam - CNP  Office: [phone]  Perfect serve / office [phone]

## 2022-03-13 NOTE — OP NOTE
PODIATRY OP NOTE    PATIENT NAME: Alex Castellon DATE: 1955  -  78 y.o. male  MRN: [id number]  DATE: 3/13/2022  BILLING #: [id number]    Surgeon(s):  Steffany Durbin DPM     ASSISTANTS: Miranda Melendez DPM PGY-1    PRE-OP DIAGNOSIS:   1. Osteomyelitis, left foot  2. Left foot 2nd toe ulcer with necrosis of bone   3. Cellulitis, left foot  4. PAD  5. Type 2 DM with peripheral neuropathy    POST-OP DIAGNOSIS: Same as above. PROCEDURE:   1. Fillet toe, left 2nd digit    ANESTHESIA: MAC    HEMOSTASIS:     ESTIMATED BLOOD LOSS: Less than 8cc. MATERIALS:   * No implants in log *    INJECTABLES: 5cc 1:1 mix of 0.5% marcaine plain and 1% lidocaine plain    SPECIMEN:   ID Type Source Tests Collected by Time Destination   1 : culture 2nd digit left foot  Swab Toe CULTURE, ANAEROBIC AND AEROBIC [place], [place] 3/13/2022 6001    A : 2nd digit left foot  Tissue Toe SURGICAL PATHOLOGY Steffany Durbin DPM 3/13/2022 9485        COMPLICATIONS: none    FINDINGS: Dry gangrene of left 2nd digit was noted to be demarcated. Some purulent drainage was noted to along flexor tendon of the 2nd digit proximally. There is diminished bleeding noted. INDICATION FOR PROCEDURE: The patient is a 34-year-old male who has had an infection and dry gangrene of the left 2nd digit for some time. The patient has undergone previous amputation of the hallux amputation of the bilateral foot. Radiographs  of the left foot were done preoperatively which demonstrated early osteomyelitis of the left 2nd digit. MRI could not be performed due to patient's pacemaker. Patient has severe PAD with MONIQUE of 1.71 bilaterally. It was initially decided to treat infection with IV antibiotic until dry gangrene is demarcated. Patient's cellulitis has not shown significant improvement with IV antibiotic. This has failed conservative treatments thus far, and the patient elects to undergo surgical correction.  Discussed with patient that the he is at high risk for further amputation and limb loss given his history of infections, diabetes mellitus, previous amputations, and PAD. All risks and benefits discussed with the patient in detail. No guarantees were given or implied. Consent signed and in the chart. Covid is negative. PROCEDURE IN DETAIL: Under mild sedation patient was transported from inpatient room to the operating room and placed on the operating table in the supine position with a safety strap across the lap. After adequate sedation by anesthesia, a local block of 10 cc of one-to-one mixture of 0.5% Marcaine plain 1% lidocaine plain was injected. The foot was then prepped and draped in the usual aseptic fashion. A timeout was performed confirming the correct patient, correct procedure, correct site, preoperative antibiotics, everyone in the room was in agreement. Attention was directed to the dorsal aspect of the 2nd digit. The bone was exposed with necrosis of the bone noted thru the ulcer to the 2nd digit. . Medial aspect of 2nd digit was noted to have some healthy skin. A #15 blade was used to create two full-thickness curvilinear incisions around the proximal phalanx base. A full-thickness fillet flap was then created at the medial 2nd digit. The proximal phalanx of the left 2nd digit was disarticulated at the level of the MPJ and passed from the table as specimen to be sent for pathology and culture. The proximal phalanx of left 2nd digit was noted to be soft and necrotic upon bone quality testing consistent with osteomyelitis. All non-viable tissue was sharply excised. There was diminished bleeding appreciated. Vitalized and viable tissue noted surrounding the left 2nd MPJ complex. The underlying head of the 2nd MPJ was meticulously inspected and found white, hard, with intact cartilage. Next the surgical site was irrigated with copious amounts of sterile saline.   The surgical incision margins were then remodeled to allow for adequate closure. The surgical site was closed in layers utilizing 3-0 Vicryl for deep closure and 4-0 prolene for skin closure. Dressings consisted of adaptic, 4 x 4s, ABD, Kerlix and an Ace bandage were applied. The patient tolerated the above procedure and anesthesia well without complications. The patient was transported from the operating room to the PACU with vital signs stable and neurovascular status intact to the left foot. Patient was then transferred back to the floor. The patient was counseled at length about the risks of angie Covid-19 during their perioperative period and any recovery window from their procedure. The patient was made aware that angie Covid-19  may worsen their prognosis for recovering from their procedure  and lend to a higher morbidity and/or mortality risk. All material risks, benefits, and reasonable alternatives including postponing the procedure were discussed. The patient does wish to proceed with the procedure at this time.     Zohaib Yap DPM   Podiatric Medicine & Surgery   3/13/2022 at 9:08 AM

## 2022-03-13 NOTE — ANESTHESIA POSTPROCEDURE EVALUATION
Department of Anesthesiology  Postprocedure Note    Patient: Vickie Shankar  MRN: [id number]  YOB: 1955  Date of evaluation: 3/13/2022  Time:  9:39 AM     Procedure Summary     Date: 03/13/22 Room / Location: [address] [place] [address] Michael Ang 01 / [facility]     Anesthesia Start: 6442 Anesthesia Stop: 4016    Procedure: TOE AMPUTATION--left 2nd digit (Left Toes) Diagnosis: (diabetic foot infection)    Surgeons: Jordyn Rodriguez DPM Responsible Provider: Tonia Taylor MD    Anesthesia Type: general ASA Status: 3          Anesthesia Type: general    Maxwell Phase I: Maxwell Score: 10    Maxwell Phase II:      Last vitals: Reviewed and per EMR flowsheets.        Anesthesia Post Evaluation    Comments: POST- ANESTHESIA EVALUATION       Pt Name: Vickie Shankar  MRN: [id number]  YOB: 1955  Date of evaluation: 3/13/2022  Time:  9:39 AM      BP (!) 150/77   Pulse 72   Temp 98.6 °F (37 °C)   Resp 14   Ht 5' 11\" (1.803 m)   Wt 198 lb 13.7 oz (90.2 kg)   SpO2 97%   BMI 27.73 kg/m²      Consciousness Level  Awake  Cardiopulmonary Status  Stable  Pain Adequately Treated YES  Nausea / Vomiting  NO  Adequate Hydration  YES  Anesthesia Related Complications NONE      Electronically signed by Tonia Taylor MD on 3/13/2022 at 9:39 AM

## 2022-03-13 NOTE — BRIEF OP NOTE
PODIATRY BRIEF OP NOTE    PATIENT NAME: Blane Devlin DATE: 1955  -  78 y.o. male  MRN: [id number]  DATE: 3/13/2022  BILLING #: [id number]    Surgeon(s):  Jer Strange DPM     ASSISTANTS: Barrie Gary DPM PGY-1    PRE-OP DIAGNOSIS:   1. Osteomyelitis, left foot  2. Dry gangrene, left foot  3. Cellulitis, left foot  4. PAD  5. Type 2 DM with peripheral neuropathy    POST-OP DIAGNOSIS: Same as above. PROCEDURE:   1. Fillet toe, left 2nd digit    ANESTHESIA: MAC    HEMOSTASIS:     ESTIMATED BLOOD LOSS: Less than 8cc. MATERIALS:   * No implants in log *    INJECTABLES: 5cc 1:1 mix of 0.5% marcaine plain and 1% lidocaine plain    SPECIMEN:   ID Type Source Tests Collected by Time Destination   1 : culture 2nd digit left foot  Swab Toe CULTURE, ANAEROBIC AND AEROBIC [facility] 3/13/2022 3361    A : 2nd digit left foot  Tissue Toe SURGICAL PATHOLOGY Jer Strange DPM 3/13/2022 4730        COMPLICATIONS: none    FINDINGS: Dry gangrene of left 2nd digit was noted to be demarcated. Some purulent drainage was noted to along flexor tendon of the 2nd digit proximally. There is diminished bleeding noted. The patient was counseled at length about the risks of angie Covid-19 during their perioperative period and any recovery window from their procedure. The patient was made aware that angie Covid-19  may worsen their prognosis for recovering from their procedure  and lend to a higher morbidity and/or mortality risk. All material risks, benefits, and reasonable alternatives including postponing the procedure were discussed. The patient does wish to proceed with the procedure at this time.     Barrie Gary DPM   Podiatric Medicine & Surgery   3/13/2022 at 9:05 AM

## 2022-03-13 NOTE — PROGRESS NOTES
[address] [employer]      PROGRESS NOTE        Patient:  Eliseo Ulloa  YOB: 1955    MRN: [id number]     Acct: [de-identified]     Admit date: 3/7/2022    Pt seen and Chart reviewed. Consultant notes reviewed and care evaluated. Subjective: Patient is doing okay from he went for left second toe amputation and fillet and the closure he seen has done okay he has a low-grade pain but is tolerating that. He denies any increased chest pain or shortness of breath. As patient is about discharge planning if he wants to go to rehab or home he prefers to go home he thinks he can manage this at home. Since patient has surgery will put him back on his Coumadin to start on him up    Diet:  ADULT DIET; Regular; 4 carb choices (60 gm/meal)      Medications:Current Inpatient    Scheduled Meds:   glipiZIDE  5 mg Oral QAM AC    daptomycin (CUBICIN) IVPB  6 mg/kg (Ideal) IntraVENous Q24H    cefepime  1,000 mg IntraVENous Q12H    metroNIDAZOLE  500 mg IntraVENous Q8H    sodium chloride flush  5-40 mL IntraVENous 2 times per day    carvedilol  6.25 mg Oral BID WC    insulin lispro  0-12 Units SubCUTAneous TID WC    insulin lispro  0-6 Units SubCUTAneous Nightly     Continuous Infusions:   sodium chloride      dextrose 100 mL/hr (03/10/22 0619)     PRN Meds:oxyCODONE **OR** oxyCODONE, sodium chloride flush, sodium chloride, acetaminophen, ondansetron **OR** ondansetron, glucose, dextrose, glucagon (rDNA), dextrose        Physical Exam:  Vitals: BP (!) 144/74   Pulse 76   Temp 98.2 °F (36.8 °C) (Oral)   Resp 16   Ht 5' 11\" (1.803 m)   Wt 198 lb 13.7 oz (90.2 kg)   SpO2 97%   BMI 27.73 kg/m²   24 hour intake/output:    Intake/Output Summary (Last 24 hours) at 3/13/2022 1258  Last data filed at 3/13/2022 1041  Gross per 24 hour   Intake 1328.55 ml   Output 680 ml   Net 648.55 ml     Last 3 weights:   Wt Readings from Last 3 Encounters:   03/07/22 198 lb 13.7 oz (90.2 kg)   02/05/21 212 lb 15.4 oz (96.6 kg)   05/23/16 223 lb (101.2 kg)       Physical Examination:   General appearance - alert, well appearing, and in no distress  Mental status - alert, oriented to person, place, and time  PERRLA wnl  Chest - clear to auscultation, no wheezes, rales or rhonchi, symmetric air entry  Heart - normal rate, regular rhythm, normal S1, S2, no murmurs, rubs, clicks or gallops  Abdomen - soft, nontender, nondistended, no masses or organomegaly  Neurological - alert, oriented, normal speech, no focal findings or movement disorder noted}  Extremities -he has venous stasis changes. His left leg is a +2 pitting edema he said that usually swells up more than his right second to having his veins stripped in the past his foot is wrapped I could see some of the other toes but the second 1 difficulty because the dressing but the other toes and the color looks okay basically his third fourth and fifth no Homans and no calf tenderness  Skin - normal coloration and turgor, no rashes, no suspicious skin lesions noted     Comment: CORRECTED ON 03/13 AT 1211: PREVIOUSLY REPORTED AS 7.1       Basic Metabolic Panel [[id number]] (Abnormal)    Collected: 03/13/22 1135    Updated: 03/13/22 1156     Glucose 126 High  mg/dL    BUN 19 mg/dL    CREATININE 1.42 High  mg/dL    Calcium 9.0 mg/dL    Sodium 136 mmol/L    Potassium 3.9 mmol/L    Chloride 102 mmol/L    CO2 25 mmol/L    Anion Gap 9 mmol/L    GFR Non-African American 50 Low  mL/min    GFR African American >60 mL/min    GFR Comment         Comment: Average GFR for 61-76 years old:    85 mL/min/1.73sq m   Chronic Kidney Disease:    <60 mL/min/1.73sq m   Kidney failure:    <15 mL/min/1.73sq m               eGFR calculated using average adult body mass.  Additional eGFR calculator available at:         [employer]             CBC with Auto Differential [[id number]] (Abnormal)    Collected: 03/13/22 113 Updated: 03/13/22 1151     WBC 5.3 k/uL    RBC 3.25 Low  m/uL    Hemoglobin 10.2 Low  g/dL    Hematocrit 29.7 Low  %    MCV 91.5 fL    MCH 31.4 pg    MCHC 34.3 g/dL    RDW 14.5 %    Platelets 860 k/uL    MPV 6.9 fL    Seg Neutrophils 74 High  %    Lymphocytes 15 Low  %    Monocytes 8 High  %    Eosinophils % 2 %    Basophils 1 %    Segs Absolute 3.90 k/uL    Absolute Lymph # 0.80 Low  k/uL    Absolute Mono # 0.40 k/uL    Absolute Eos # 0.10 k/uL    Basophils Absolute 0.10 k/uL   POC Glucose Fingerstick [[id number]] (Abnormal)    Collected: 03/13/22 1124    Updated: 03/13/22 1131     POC Glucose 123 High  mg/dL   SPECIMEN REJECTION [[id number]]    Collected: 03/13/22 1029    Updated: 03/13/22 1100     Specimen Source BLOOD    Ordered Test CDP,BMP    Reason for Rejection Unable to perform testing: Specimen contaminated. Culture, Anaerobic and Aerobic [[id number]]    Collected: 03/13/22 0836    Updated: 03/13/22 0929    Specimen Type: Swab    Specimen Source: Toe    POC Glucose Fingerstick [[id number]] (Abnormal)    Collected: 03/13/22 0907    Updated: 03/13/22 0915     POC Glucose 126 High  mg/dL   POC Glucose Fingerstick [[id number]] (Abnormal)    Collected: 03/13/22 0614    Updated: 03/13/22 0623     POC Glucose 136 High  mg/dL   Culture, Blood 1 [[id number]]    Collected: 03/10/22 0942    Updated: 03/13/22 0058    Specimen Source: Blood     Specimen Description . BLOOD    Culture NO GROWTH 3 DAYS   Culture, Blood 1 [[id number]]    Collected: 03/10/22 0942    Updated: 03/13/22 0058    Specimen Source: Blood     Specimen Description . BLOOD    Culture NO GROWTH 3 DAYS   COVID-19, Rapid [[id number]]    Collected: 03/12/22 2205    Updated: 03/12/22 2237    Specimen Source: Nasopharyngeal Swab     Specimen Description . NASOPHARYNGEAL SWAB    SARS-CoV-2, Rapid Not Detected    Comment:        Rapid NAAT:  The specimen is NEGATIVE for SARS-CoV-2, the novel coronavirus associated with   COVID-19.         The ID NOW COVID-19 assay is designed to detect the virus that causes COVID-19 in patients   with signs and symptoms of infection who are suspected of COVID-19. An individual without symptoms of COVID-19 and who is not shedding SARS-CoV-2 virus would   expect to have a negative (not detected) result in this assay. Negative results should be treated as presumptive and, if inconsistent with clinical signs   and symptoms or necessary for patient management,   should be tested with an alternative molecular assay. Negative results do not preclude   SARS-CoV-2 infection and   should not be used as the sole basis for patient management decisions.         Fact sheet for Healthcare Providers: [employer]   Fact sheet for Patients: [employer]           Methodology: Isothermal Nucleic Acid Amplification       POC Glucose Fingerstick [[id number]] (Abnormal)    Collected: 03/12/22 2036    Updated: 03/12/22 2043     POC Glucose 207 High  mg/dL   POC Glucose Fingerstick [[id number]] (Abnormal)    Collected: 03/12/22 1703    Updated: 03/12/22 1709     POC Glucose 162 High  mg/dL       Current IP Meds  Hide  (From admission, onward)            Assessment:  Principal Problem:    Diabetic foot infection ([facility]  Active Problems:    MRSA bacteremia    Gangrene of toe of left foot (HCC)    Elevated C-reactive protein (CRP)    Elevated erythrocyte sedimentation rate    Acute kidney injury ([facility]    Allergy to penicillin  Resolved Problems:    * No resolved hospital problems.  *      Diabetic foot infection (HCC) E11.628, L08.9    Diabetes ([facility] E11.9    Chronic osteomyelitis (HCC) M86.60    Diabetic foot ulcer ([facility] E11.621, L97.509    PVD (peripheral vascular disease) ([facility] I73.9    Atherosclerosis of coronary artery without angina pectoris I25.10    Cardiomyopathy ([facility] I42.9    Cardiac left ventricular ejection fraction 21-40 percent R94.30    Diabetes mellitus type 2 with peripheral artery disease ([facility]) E11.51    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 ([facility]) L97.514    Chronic osteomyelitis of left foot ([facility]) A56.806    Onychomycosis B35.1    Heart failure ([facility]) I50.9      · Cellulitis of left foot  ·    · Gangrenous left big toe questionable osteomyelitis no evidence of that on CT  ·    · Renal insufficiency and probably chronic because that MRI probably would not be done secondary to requirement of IV dye  · With acute on chronic kidney injury  · Diabetes mellitus  ·    · History of peripheral vascular disease  ·    · History of previous amputations  ·    · Elevated inflammatory markers consistent with his infection  · Mild hypokalemia  · Chronic A. fib  · Cardiomyopathy with EF 35 percentile no evidence of CHF clinically at this time  · Moderate tricuspid regurgitation  ·    · Moderate pulmonary hypertension  · Positive blood culture with MRSA new blood cultures are negative  ·    · Hyperglycemia need to watch WHILE  on glipizide but will decrease the dose  And hemoglobin A1c is therapeutic working on the symptoms  Left arm swelling and infiltrate from his IV site no infection no evidence of DVT adjust the arm but the rest of the forearm is intact   Status post second toe amputation closure    Left leg pitting edema and dependent edema                  Plan:  1. We will start him on Coumadin we will give him 10 mg dose x1 tonight he is usually on 4 mg daily  2.   3. Check PT/INR in the morning  4. Also looks like patient Lovenox was stopped. For surgery will make sure he is on until his INR is between 2-3 and then DC we will keep him on 1 mg/kg twice daily secondary his A. fib  5. .  Planning on discharge may be VNS at home and to follow-up with wound care podiatry  6.  Sed rate and CRP already ordered by ID for tomorrow to monitor his levels    JOSE Ocampo             3/13/2022, 12:58 PM

## 2022-03-13 NOTE — PLAN OF CARE
Problem: Falls - Risk of:  Goal: Will remain free from falls  Description: Will remain free from falls  3/13/2022 0425 by Augustina Damon RN  Outcome: Ongoing  3/12/2022 1706 by Maninder Nuñez RN  Outcome: Ongoing  Goal: Absence of physical injury  Description: Absence of physical injury  3/13/2022 0425 by Augustina Damon RN  Outcome: Ongoing  3/12/2022 1706 by Maninder Nuñez RN  Outcome: Ongoing     Problem: Skin Integrity:  Goal: Will show no infection signs and symptoms  Description: Will show no infection signs and symptoms  3/13/2022 0425 by Augustina Damon RN  Outcome: Ongoing  3/12/2022 1706 by Maninder Nuñez RN  Outcome: Ongoing  Note: IV flagyl, cefepime, daptomycin for foot infection  Goal: Absence of new skin breakdown  Description: Absence of new skin breakdown  3/13/2022 0425 by Augustina Damon RN  Outcome: Ongoing  3/12/2022 1706 by Maninder Nuñez RN  Outcome: Ongoing  Note: See skin assessment in chart.       Problem: Nutrition  Goal: Optimal nutrition therapy  3/13/2022 0425 by Augustina Damon RN  Outcome: Ongoing  3/12/2022 1706 by Maninder Nuñez RN  Outcome: Ongoing

## 2022-03-13 NOTE — ACP (ADVANCE CARE PLANNING)
ONGOING DISCHARGE PLAN:    Patient is alert and oriented x4. Spoke with patient regarding discharge plan and patient confirms that plan is still  to return to home.     Pt. Remains on IV Cefepime/Dapto/Flagyl. ID on board. [place] following if needed. Awaiting for any rec for antibiotics at home. Pt is agreeable to VNS, if needed. States He could do own dressing changes, Will follow. Pt. Had Lt 2nd digit amp today w/ Podiatry. Will continue to follow for additional discharge needs.     Electronically signed by Jersey Rios RN on 3/13/2022 at 11:07 AM

## 2022-03-13 NOTE — DISCHARGE SUMMARY
Physician Discharge Summary     Patient ID:  Graham Bernal  [id number]  82 y.o.  1955    Admit date: 3/7/2022    Discharge date and time: 3/15/2022     Admitting Physician: Toney Bob DO     Discharge Physician: Toney Bob DO      Admission Diagnoses:   Acute kidney injury [facility]) [N17.9]  Diabetic foot infection ([facility] [E11.628, L08.9]    Discharge Diagnoses:   Principal Problem:    Diabetic foot infection ([facility]  Active Problems:    MRSA bacteremia    Gangrene of toe of left foot ([facility]    Elevated C-reactive protein (CRP)    Elevated erythrocyte sedimentation rate    Acute kidney injury ([facility]    Allergy to penicillin  Resolved Problems:    * No resolved hospital problems.  *      Diabetic foot infection (HCC) E11.628, L08.9    Diabetes ([facility] E11.9    Chronic osteomyelitis ([facility]) M86.60    Diabetic foot ulcer ([facility] E11.621, L97.509    PVD (peripheral vascular disease) ([facility]) I73.9    Atherosclerosis of coronary artery without angina pectoris I25.10    Cardiomyopathy ([facility] I42.9    Cardiac left ventricular ejection fraction 21-40 percent R94.30    Diabetes mellitus type 2 with peripheral artery disease ([facility]) E11.51    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 ([facility]) L97.514    Chronic osteomyelitis of left foot ([facility]) N87.608    Onychomycosis B35.1    Heart failure ([facility]) I50.9      · Cellulitis of left foot  ·    · Gangrenous left big toe questionable osteomyelitis no evidence of that on CT  ·    · Renal insufficiency and probably chronic because that MRI probably would not be done secondary to requirement of IV dye  · With acute on chronic kidney injury  · Diabetes mellitus  ·    · History of peripheral vascular disease  ·    · History of previous amputations  ·    · Elevated inflammatory markers consistent with his infection  · Mild hypokalemia  · Chronic A. fib  · Cardiomyopathy with EF 35 percentile no evidence of CHF clinically at this time  · Moderate tricuspid regurgitation  ·    · Moderate pulmonary hypertension  · Positive blood culture with MRSA new blood cultures are negative  ·    · Hyperglycemia need to watch WHILE  on glipizide but will decrease the dose  And hemoglobin A1c is therapeutic working on the symptoms  Left arm swelling and infiltrate from his IV site no infection no evidence of DVT adjust the arm but the rest of the forearm is intact   Status post second toe amputation closure     Left leg pitting edema and dependent edema            Hospital Course: Patient was admitted with cellulitis of his left foot and gangrenous toe suspected osteomyelitis second to his sed rate being high he was seen by ID he has had renal insufficiency seen by nephrology and podiatry was consulted from the emergency room initially they signed off report the patient no surgical intervention. There is told did not look any better and we consulted podiatry 1 more time with a different group but also has not seen finally he was seen by the same group and was taken to surgery and toe amputated and had primary closure patient felt he could manage his wound and treatment at home minimal plan was to discharge home with VNS wound care and podiatry follow-up and to continue with antibiotics per ID    Patient Instructions:  To watch for worsening redness or fevers or chills call or return if any problems  Disposition to home with VNS follow with wound care and podiatry  Discharge Medications: See list  [unfilled]    Frequency    warfarin (COUMADIN) tablet 10 mg     Discontinue      ONCE Warfarin    oxyCODONE (ROXICODONE) immediate release tablet 5 mg  (oxyCODONE (MODERATE AND SEVERE) Pain Panel)     Discontinue     \"Or\" Linked Group Details    EVERY 4 HOURS PRN    oxyCODONE HCl (OXY-IR) immediate release tablet 10 mg  (oxyCODONE (MODERATE AND SEVERE) Pain Panel)     Discontinue     \"Or\" Linked Group Details    EVERY 4 HOURS PRN    glipiZIDE (GLUCOTROL) tablet 5 mg     Discontinue DAILY BEFORE BREAKFAST    DAPTOmycin (CUBICIN) 450 mg in sodium chloride 0.9 % 50 mL IVPB     Discontinue      EVERY 24 HOURS    metronidazole (FLAGYL) 500 mg in NaCl 100 mL IVPB premix     Discontinue      EVERY 8 HOURS    cefepime (MAXIPIME) 1000 mg IVPB minibag     Discontinue      EVERY 12 HOURS    sodium chloride flush 0.9 % injection 5-40 mL  (Saline Flushes)     Discontinue      2 times per day    insulin lispro (HUMALOG) injection vial 0-6 Units  (Medium Dose Correction Insulin)     Discontinue      NIGHTLY    insulin lispro (HUMALOG) injection vial 0-12 Units  (Medium Dose Correction Insulin)     Discontinue      3 TIMES DAILY WITH MEALS    glucagon (rDNA) injection 1 mg     Discontinue      PRN    dextrose 5 % solution     Discontinue      PRN    glucose (GLUTOSE) 40 % oral gel 15 g     Discontinue      PRN    dextrose 50 % IV solution     Discontinue      PRN    carvedilol (COREG) tablet 6.25 mg     Discontinue      2 TIMES DAILY WITH MEALS    ondansetron (ZOFRAN-ODT) disintegrating tablet 4 mg  (ondansetron (ZOFRAN) ODT or ondansetron (ZOFRAN) IV)     Discontinue     \"Or\" Linked Group Details    EVERY 8 HOURS PRN    ondansetron (ZOFRAN) injection 4 mg  (ondansetron (ZOFRAN) ODT or ondansetron (ZOFRAN) IV)     Discontinue     \"Or\" Linked Group Details    EVERY 6 HOURS PRN    sodium chloride flush 0.9 % injection 5-40 mL  (Saline Flushes)     Discontinue      PRN    0.9 % sodium chloride infusion  (Saline Flushes)     Discontinue      PRN    acetaminophen (TYLENOL) tablet 650 mg     Discontinue             Activity: ad mariah    Diet: ADULT DIET; Regular; 4 carb choices (60 gm/meal)    Follow-up with DR CABRERA in 1 to 2 weeks, patient to call  for an appointment and if has any problems.       Electronically signed by Lamonte Spring DO FAAJABIER on 3/13/2022 at 1:31 PM

## 2022-03-14 LAB
ABSOLUTE EOS #: 0.1 K/UL (ref 0–0.4)
ABSOLUTE LYMPH #: 1 K/UL (ref 1–4.8)
ABSOLUTE MONO #: 0.6 K/UL (ref 0.1–1.3)
ANION GAP SERPL CALCULATED.3IONS-SCNC: 11 MMOL/L (ref 9–17)
BASOPHILS # BLD: 1 % (ref 0–2)
BASOPHILS ABSOLUTE: 0 K/UL (ref 0–0.2)
BUN BLDV-MCNC: 18 MG/DL (ref 8–23)
C-REACTIVE PROTEIN: 24.6 MG/L (ref 0–5)
CALCIUM SERPL-MCNC: 9 MG/DL (ref 8.6–10.4)
CHLORIDE BLD-SCNC: 99 MMOL/L (ref 98–107)
CO2: 24 MMOL/L (ref 20–31)
CREAT SERPL-MCNC: 1.43 MG/DL (ref 0.7–1.2)
EOSINOPHILS RELATIVE PERCENT: 2 % (ref 0–4)
GFR AFRICAN AMERICAN: 60 ML/MIN
GFR NON-AFRICAN AMERICAN: 49 ML/MIN
GFR SERPL CREATININE-BSD FRML MDRD: ABNORMAL ML/MIN/{1.73_M2}
GLUCOSE BLD-MCNC: 107 MG/DL (ref 75–110)
GLUCOSE BLD-MCNC: 113 MG/DL (ref 75–110)
GLUCOSE BLD-MCNC: 117 MG/DL (ref 70–99)
GLUCOSE BLD-MCNC: 135 MG/DL (ref 75–110)
GLUCOSE BLD-MCNC: 144 MG/DL (ref 75–110)
HCT VFR BLD CALC: 29.6 % (ref 41–53)
HEMOGLOBIN: 10.4 G/DL (ref 13.5–17.5)
INR BLD: 1.3
LYMPHOCYTES # BLD: 15 % (ref 24–44)
MCH RBC QN AUTO: 31.9 PG (ref 26–34)
MCHC RBC AUTO-ENTMCNC: 35.1 G/DL (ref 31–37)
MCV RBC AUTO: 91.1 FL (ref 80–100)
MONOCYTES # BLD: 9 % (ref 1–7)
P E INTERPRETATION, U: NORMAL
PATHOLOGIST: NORMAL
PDW BLD-RTO: 14.5 % (ref 11.5–14.9)
PLATELET # BLD: 234 K/UL (ref 150–450)
PMV BLD AUTO: 7.2 FL (ref 6–12)
POTASSIUM SERPL-SCNC: 4.3 MMOL/L (ref 3.7–5.3)
PRO-BNP: 4724 PG/ML
PROTHROMBIN TIME: 16.5 SEC (ref 11.8–14.6)
RBC # BLD: 3.25 M/UL (ref 4.5–5.9)
SEDIMENTATION RATE, ERYTHROCYTE: 57 MM (ref 0–20)
SEG NEUTROPHILS: 73 % (ref 36–66)
SEGMENTED NEUTROPHILS ABSOLUTE COUNT: 4.9 K/UL (ref 1.3–9.1)
SODIUM BLD-SCNC: 134 MMOL/L (ref 135–144)
SPECIMEN TYPE: NORMAL
URINE TOTAL PROTEIN: 147 MG/DL
WBC # BLD: 6.6 K/UL (ref 3.5–11)

## 2022-03-14 PROCEDURE — 2500000003 HC RX 250 WO HCPCS: Performed by: PODIATRIST

## 2022-03-14 PROCEDURE — 99233 SBSQ HOSP IP/OBS HIGH 50: CPT | Performed by: INTERNAL MEDICINE

## 2022-03-14 PROCEDURE — 99222 1ST HOSP IP/OBS MODERATE 55: CPT | Performed by: STUDENT IN AN ORGANIZED HEALTH CARE EDUCATION/TRAINING PROGRAM

## 2022-03-14 PROCEDURE — 2580000003 HC RX 258: Performed by: PODIATRIST

## 2022-03-14 PROCEDURE — 6360000002 HC RX W HCPCS: Performed by: PODIATRIST

## 2022-03-14 PROCEDURE — 6370000000 HC RX 637 (ALT 250 FOR IP): Performed by: FAMILY MEDICINE

## 2022-03-14 PROCEDURE — 82947 ASSAY GLUCOSE BLOOD QUANT: CPT

## 2022-03-14 PROCEDURE — 80048 BASIC METABOLIC PNL TOTAL CA: CPT

## 2022-03-14 PROCEDURE — 6370000000 HC RX 637 (ALT 250 FOR IP): Performed by: PODIATRIST

## 2022-03-14 PROCEDURE — 86140 C-REACTIVE PROTEIN: CPT

## 2022-03-14 PROCEDURE — 85025 COMPLETE CBC W/AUTO DIFF WBC: CPT

## 2022-03-14 PROCEDURE — 85610 PROTHROMBIN TIME: CPT

## 2022-03-14 PROCEDURE — 36415 COLL VENOUS BLD VENIPUNCTURE: CPT

## 2022-03-14 PROCEDURE — 85652 RBC SED RATE AUTOMATED: CPT

## 2022-03-14 PROCEDURE — 1200000000 HC SEMI PRIVATE

## 2022-03-14 PROCEDURE — 97162 PT EVAL MOD COMPLEX 30 MIN: CPT

## 2022-03-14 PROCEDURE — 97166 OT EVAL MOD COMPLEX 45 MIN: CPT

## 2022-03-14 PROCEDURE — 6360000002 HC RX W HCPCS: Performed by: FAMILY MEDICINE

## 2022-03-14 PROCEDURE — 83880 ASSAY OF NATRIURETIC PEPTIDE: CPT

## 2022-03-14 PROCEDURE — 97530 THERAPEUTIC ACTIVITIES: CPT

## 2022-03-14 RX ORDER — FUROSEMIDE 10 MG/ML
20 INJECTION INTRAMUSCULAR; INTRAVENOUS ONCE
Status: COMPLETED | OUTPATIENT
Start: 2022-03-14 | End: 2022-03-14

## 2022-03-14 RX ORDER — WARFARIN SODIUM 10 MG/1
10 TABLET ORAL
Status: COMPLETED | OUTPATIENT
Start: 2022-03-14 | End: 2022-03-14

## 2022-03-14 RX ADMIN — OXYCODONE HYDROCHLORIDE 5 MG: 5 TABLET ORAL at 03:38

## 2022-03-14 RX ADMIN — CARVEDILOL 6.25 MG: 6.25 TABLET, FILM COATED ORAL at 07:47

## 2022-03-14 RX ADMIN — WARFARIN SODIUM 10 MG: 10 TABLET ORAL at 19:15

## 2022-03-14 RX ADMIN — INSULIN LISPRO 1 UNITS: 100 INJECTION, SOLUTION INTRAVENOUS; SUBCUTANEOUS at 21:52

## 2022-03-14 RX ADMIN — GLIPIZIDE 5 MG: 5 TABLET ORAL at 06:40

## 2022-03-14 RX ADMIN — SODIUM CHLORIDE, PRESERVATIVE FREE 10 ML: 5 INJECTION INTRAVENOUS at 08:21

## 2022-03-14 RX ADMIN — OXYCODONE HYDROCHLORIDE 10 MG: 10 TABLET ORAL at 07:55

## 2022-03-14 RX ADMIN — CEFEPIME HYDROCHLORIDE 1000 MG: 1 INJECTION, POWDER, FOR SOLUTION INTRAMUSCULAR; INTRAVENOUS at 11:10

## 2022-03-14 RX ADMIN — CARVEDILOL 6.25 MG: 6.25 TABLET, FILM COATED ORAL at 19:15

## 2022-03-14 RX ADMIN — METRONIDAZOLE 500 MG: 500 INJECTION, SOLUTION INTRAVENOUS at 03:35

## 2022-03-14 RX ADMIN — ENOXAPARIN SODIUM 90 MG: 100 INJECTION SUBCUTANEOUS at 21:53

## 2022-03-14 RX ADMIN — FUROSEMIDE 20 MG: 10 INJECTION, SOLUTION INTRAMUSCULAR; INTRAVENOUS at 08:37

## 2022-03-14 RX ADMIN — DAPTOMYCIN 450 MG: 500 INJECTION, POWDER, LYOPHILIZED, FOR SOLUTION INTRAVENOUS at 15:39

## 2022-03-14 RX ADMIN — ENOXAPARIN SODIUM 90 MG: 100 INJECTION SUBCUTANEOUS at 08:55

## 2022-03-14 RX ADMIN — METRONIDAZOLE 500 MG: 500 INJECTION, SOLUTION INTRAVENOUS at 07:50

## 2022-03-14 RX ADMIN — OXYCODONE HYDROCHLORIDE 5 MG: 5 TABLET ORAL at 21:52

## 2022-03-14 ASSESSMENT — ENCOUNTER SYMPTOMS
SHORTNESS OF BREATH: 0
ABDOMINAL PAIN: 0
DOUBLE VISION: 0
BLURRED VISION: 0

## 2022-03-14 ASSESSMENT — PAIN SCALES - GENERAL
PAINLEVEL_OUTOF10: 5
PAINLEVEL_OUTOF10: 4
PAINLEVEL_OUTOF10: 7

## 2022-03-14 NOTE — CONSULTS
Physical Medicine & Rehabilitation  Consult Note      Admitting Physician:  Alivia Camarillo DO    Primary Care Provider:  Zaria Mai DO     Reason for Consult:  Acute Inpatient Rehabilitation    Chief Complaint:  Left toe infection    History of Present Illness:  Referring Provider is requesting an evaluation for appropriate placement upon discharge from acute care. History from chart review and patient. Edyta Gutierrez is a 78 y.o. male with history of atrial fibrillation, CAD, CHF, HTN, HLD, diabetes, and previous bilateral great toe amputations admitted to [facility] on 3/7/2022. He initially presented with left toe gangrene and cellulitis. He was suspected to have osteomyelitis. He underwent left 2nd digit amputation on 3/13/22 (Dr. Murphy Khan). He is WBAT to the left lower limb in surgical shoe. He is currently on daptomycin. Hospital course has also been complicated by BRIANNA, which is improving. He currently denies any acute concerns, including pain in the left foot. He states that he would like to go home today. Review of Systems:  Review of Systems   Constitutional: Negative for fever. HENT: Positive for hearing loss. Eyes: Negative for blurred vision and double vision. Respiratory: Negative for shortness of breath. Cardiovascular: Negative for chest pain. Gastrointestinal: Negative for abdominal pain. No change in bowel control   Genitourinary:        No change in bladder control   Musculoskeletal:        No left foot pain   Skin: Negative for rash. Neurological: Negative for sensory change and headaches.      Premorbid function:  Independent    Current function:    PT:  Restrictions/Precautions: Weight Bearing,Fall Risk,Up as Tolerated  Implants present? : Metal implants,Pacemaker (CABG x3)  Other position/activity restrictions: IV L UE, h/o Bilat great toe amputations  Left Lower Extremity Weight Bearing: Weight Bearing As Tolerated (with post op shoe on) Transfers  Sit to Stand: Independent  Stand to sit: Independent  Ambulation 1  Surface: level tile  Device: No Device  Assistance: Independent  Quality of Gait: gait deficit d/t leg legth descrepentency d/t post op shoe L LE and slipper R LE  Gait Deviations: Slow Melisa  Distance: 30 ft    Transfers  Sit to Stand: Independent  Stand to sit: Independent  Ambulation  Ambulation?: Yes  Ambulation 1  Surface: level tile  Device: No Device  Assistance: Independent  Quality of Gait: gait deficit d/t leg legth descrepentency d/t post op shoe L LE and slipper R LE  Gait Deviations: Slow Melisa  Distance: 30 ft    Surface: level tile  Ambulation 1  Surface: level tile  Device: No Device  Assistance: Independent  Quality of Gait: gait deficit d/t leg legth descrepentency d/t post op shoe L LE and slipper R LE  Gait Deviations: Slow Melisa  Distance: 30 ft    OT:   ADL  Feeding: Modified independent   Grooming: Modified independent   UE Bathing: Modified independent   LE Bathing: Modified independent   UE Dressing: Independent  LE Dressing: Modified independent   Toileting: Modified independent   Additional Comments: ADL scores based on skilled observation and clinical reasoning, unless otherwise noted. Balance  Sitting Balance: Independent  Standing Balance: Independent   Standing Balance  Time: 4-5 minutes, 2-3 minutes  Activity: functional transfers, functional mobility, stair management  Comment: no device for UE support  Functional Mobility  Functional - Mobility Device: No device  Activity:  (To/from patient's door)  Assist Level: Independent  Functional Mobility Comments: No LOB. Educated on wear of surgical shoe and tennis shoe for similiar height. Bed mobility  Supine to Sit: Independent  Sit to Supine: Independent  Scooting: Independent  Transfers  Sit to stand: Independent  Stand to sit:  Independent  Transfer Comments: no LOB                 SLP:      Past Medical History:        Diagnosis Date  Atrial fibrillation (HCC)     CAD (coronary artery disease)     CHF (congestive heart failure) (HCC)     Diabetes mellitus ([facility] [address]     Hyperlipidemia     Hypertension        Past Surgical History:        Procedure Laterality Date    CARDIAC SURGERY  2010    CABG X3    COLONOSCOPY      DEBRIDEMENT Right 11/19/2015    DEBRIDEMENT WOUND FOOT RIGHT W/ APPLICATION BILAT INTEG RAT GRAFT    ENDOSCOPY, COLON, DIAGNOSTIC      EYE SURGERY Bilateral     cataracts    HERNIA REPAIR      umbilical    KNEE ARTHROSCOPY Right     OTHER SURGICAL HISTORY Right 12 29 15    wound care graft procedure foot,appl of integra    PACEMAKER PLACEMENT  2011    WITH DEFIBRILLATOR    TOE AMPUTATION Right     R great    TOE AMPUTATION Left 3/13/2022    TOE AMPUTATION--left 2nd digit performed by Jazz Horner DPM at [facility]       Allergies:     Allergies   Allergen Reactions    Pcn [Penicillins] Rash        Current Medications:   Current Facility-Administered Medications: warfarin (COUMADIN) tablet 10 mg, 10 mg, Oral, Once  warfarin placeholder: dosing by provider, , Other, RX Placeholder  oxyCODONE (ROXICODONE) immediate release tablet 5 mg, 5 mg, Oral, Q4H PRN **OR** oxyCODONE HCl (OXY-IR) immediate release tablet 10 mg, 10 mg, Oral, Q4H PRN  enoxaparin (LOVENOX) injection 90 mg, 1 mg/kg, SubCUTAneous, BID  glipiZIDE (GLUCOTROL) tablet 5 mg, 5 mg, Oral, QAM AC  DAPTOmycin (CUBICIN) 450 mg in sodium chloride 0.9 % 50 mL IVPB, 6 mg/kg (Ideal), IntraVENous, Q24H  sodium chloride flush 0.9 % injection 5-40 mL, 5-40 mL, IntraVENous, 2 times per day  sodium chloride flush 0.9 % injection 5-40 mL, 5-40 mL, IntraVENous, PRN  0.9 % sodium chloride infusion, 25 mL, IntraVENous, PRN  acetaminophen (TYLENOL) tablet 650 mg, 650 mg, Oral, Q4H PRN  ondansetron (ZOFRAN-ODT) disintegrating tablet 4 mg, 4 mg, Oral, Q8H PRN **OR** ondansetron (ZOFRAN) injection 4 mg, 4 mg, IntraVENous, Q6H PRN  carvedilol (COREG) tablet 6.25 mg, 6.25 mg, Oral, BID WC  insulin lispro (HUMALOG) injection vial 0-12 Units, 0-12 Units, SubCUTAneous, TID WC  insulin lispro (HUMALOG) injection vial 0-6 Units, 0-6 Units, SubCUTAneous, Nightly  glucose (GLUTOSE) 40 % oral gel 15 g, 15 g, Oral, PRN  dextrose 50 % IV solution, 12.5 g, IntraVENous, PRN  glucagon (rDNA) injection 1 mg, 1 mg, IntraMUSCular, PRN  dextrose 5 % solution, 100 mL/hr, IntraVENous, PRN    Family History:       Problem Relation Age of Onset    Cancer Father         pancreatic cancer    Other Brother         MI    Osteoarthritis Mother     Other Maternal Grandfather         MI       Social History:  Lives With:  (lives with mom)  Type of Home: House  Home Layout: One level  Home Access: Stairs to enter with rails  Entrance Stairs - Number of Steps: 4-5 steps, Bilat rails  Entrance Stairs - Rails: Both  Bathroom Shower/Tub: Tub/Shower unit  Bathroom Toilet: Standard  Bathroom Accessibility: Walker accessible (RW will \"barely\" fit into bathroom)  ADL Assistance: Independent  Ambulation Assistance: Independent  Transfer Assistance: Independent  Active : Yes  Mode of Transportation: SUV,Truck  Occupation: Retired  Additional Comments: States his mom will be 80 yrs old this July, otherwise does not have other family members who can assist. States mom can assist with IADLs at home.     Social History     Socioeconomic History    Marital status: Single     Spouse name: None    Number of children: None    Years of education: None    Highest education level: None   Occupational History    None   Tobacco Use    Smoking status: Never Smoker    Smokeless tobacco: Never Used   Substance and Sexual Activity    Alcohol use: Yes     Comment: SOCIAL on weekends    Drug use: No    Sexual activity: None   Other Topics Concern    None   Social History Narrative    None     Social Determinants of Health     Financial Resource Strain:     Difficulty of Paying Living Expenses: Not on file   Food Insecurity:     Worried About Running Out of Food in the Last Year: Not on file    Tee of Food in the Last Year: Not on file   Transportation Needs:     Lack of Transportation (Medical): Not on file    Lack of Transportation (Non-Medical): Not on file   Physical Activity:     Days of Exercise per Week: Not on file    Minutes of Exercise per Session: Not on file   Stress:     Feeling of Stress : Not on file   Social Connections:     Frequency of Communication with Friends and Family: Not on file    Frequency of Social Gatherings with Friends and Family: Not on file    Attends Sabianist Services: Not on file    Active Member of [address] [place] [address] [employer] or Organizations: Not on file    Attends Club or Organization Meetings: Not on file    Marital Status: Not on file   Intimate Partner Violence:     Fear of Current or Ex-Partner: Not on file    Emotionally Abused: Not on file    Physically Abused: Not on file    Sexually Abused: Not on file   Housing Stability:     Unable to Pay for Housing in the Last Year: Not on file    Number of [place] in the Last Year: Not on file    Unstable Housing in the Last Year: Not on file       Physical Exam:  /84   Pulse 92   Temp 98.6 °F (37 °C)   Resp 18   Ht 5' 11\" (1.803 m)   Wt 198 lb 13.7 oz (90.2 kg)   SpO2 95%   BMI 27.73 kg/m²     GEN: Well developed, well nourished, no acute distress  HEENT: NCAT. EOM grossly intact. Hearing grossly intact. Mucous membranes pink and moist.  RESP: Normal breath sounds with no wheezing, rales, or rhonchi. Respirations WNL and unlabored. CV: Regular rate and rhythm. No murmurs, rubs, or gallops. ABD: Soft, non-distended, BS+ and equal.  NEURO: Alert. Speech fluent. Sensation to light touch intact. MSK:  Left 1st and 2nd toe amputations. Muscle tone and bulk are normal bilaterally. Strength 5/5 in bilateral upper limbs. Able to dorsiflex and plantarflex the bilateral ankles with at least antigravity strength.   LIMBS: Edema present in left lower limb. Dressing in place on left foot. SKIN: Warm and dry with good turgor. PSYCH: Mood WNL. Affect WNL. Appropriately interactive. Diagnostics:    CBC:   Recent Labs     03/13/22  1029 03/13/22  1135 03/14/22  0644   WBC PLEASE DISREGARD RESULTS. SPECIMEN CONTAMINATED. 5.3 6.6   RBC PLEASE DISREGARD RESULTS. SPECIMEN CONTAMINATED. 3.25* 3.25*   HGB PLEASE DISREGARD RESULTS. SPECIMEN CONTAMINATED. 10.2* 10.4*   HCT PLEASE DISREGARD RESULTS. SPECIMEN CONTAMINATED. 29.7* 29.6*   MCV PLEASE DISREGARD RESULTS. SPECIMEN CONTAMINATED. 91.5 91.1   RDW PLEASE DISREGARD RESULTS. SPECIMEN CONTAMINATED. 14.5 14.5   PLT PLEASE DISREGARD RESULTS. SPECIMEN CONTAMINATED. 208 234     BMP:   Recent Labs     03/12/22  0615 03/13/22  1135 03/14/22  0644   * 136 134*   K 4.1 3.9 4.3    102 99   CO2 24 25 24   BUN 26* 19 18   CREATININE 1.52* 1.42* 1.43*   GLUCOSE 124* 126* 117*      HbA1c:   Lab Results   Component Value Date    LABA1C 6.8 (H) 03/11/2022     BNP: No results for input(s): BNP in the last 72 hours. PT/INR:   Recent Labs     03/14/22  0644   PROTIME 16.5*   INR 1.3     APTT: No results for input(s): APTT in the last 72 hours. CARDIAC ENZYMES: No results for input(s): CKMB, CKMBINDEX, TROPONINT in the last 72 hours. Invalid input(s): CKTOTAL;3  FASTING LIPID PANEL:  Lab Results   Component Value Date    CHOL 88 06/30/2021    HDL 27 (L) 06/30/2021    TRIG 141 06/30/2021     LIVER PROFILE: No results for input(s): AST, ALT, ALB, BILIDIR, BILITOT, ALKPHOS in the last 72 hours. Radiology:  US RENAL COMPLETE   Final Result   1. Bilateral nonobstructing nephrolithiasis. No hydronephrosis. 2. Simple appearing bilateral renal cysts measuring up to 3.4 cm on the left. VL Lower Extremity Arterial Segmental Pressures W Ppg   Final Result      CT FOOT LEFT WO CONTRAST   Final Result   1. No convincing evidence of osteomyelitis or other acute osseous   abnormality. If there is persistent clinical concern for osteomyelitis   further evaluation could be obtained with MRI. 2. Extensive subcutaneous edema compatible with bland edema or cellulitis. No soft tissue gas or drainable fluid collection identified. XR FOOT LEFT (MIN 3 VIEWS)   Final Result   Findings suspicious for possible early osteomyelitis at the 2nd toe distal   phalanx. Small area of erosive change at the 1st metatarsal head, though the features   are more suggestive possible underlying erosive or inflammatory arthropathy   rather than osteomyelitis, though clinical correlation is required. Soft tissue swelling of the 2nd toe at the forefoot. VL Lower Extremity Arteries Bilateral    (Results Pending)   IR FLUORO GUIDED CVA DEVICE PLMT/REPLACE/REMOVAL    (Results Pending)         Impression:    1. Left 2nd toe amputation in the setting of previous bilateral great toe amputations  2. Anemia  3. BRIANNA - improved  4. Atrial fibrillation  5. CAD  6. CHF  7. HTN  8. HLD  9. Diabetes    Recommendations:    1. Diagnosis:  Left 2nd toe amputation in the setting of previous bilateral great toe amputations  2. Therapy: No PT/OT needs  3. Medical Necessity: As above  4. Support: Lives with mother  5. Rehab Recommendation:  The patient does not meet criteria for acute inpatient rehabilitation at this time, as he is too high-functioning. He states that he would like to go home upon discharge. 6. DVT Prophylaxis: On lovenox bridge to coumadin    It was my pleasure to evaluate Stoney Ply today. Please call with questions.     Fortino Bolton MD

## 2022-03-14 NOTE — PROGRESS NOTES
Comprehensive Nutrition Assessment    Type and Reason for Visit:  Reassess    Nutrition Recommendations/Plan:   Will provide 5 carbohydrate choices per tray. Encouraged pt's intake. Nutrition Assessment:  Pt is consuing variable amounts (0-75%) and refuses Glucerna. He is s/p 3/13 toe amp. Malnutrition Assessment:  Malnutrition Status: At risk for malnutrition (Comment)    Context:  Acute Illness     Findings of the 6 clinical characteristics of malnutrition:  Energy Intake:  No significant decrease in energy intake  Weight Loss:  No significant weight loss     Body Fat Loss:  No significant body fat loss     Muscle Mass Loss:  No significant muscle mass loss    Fluid Accumulation:  1 - Mild Extremities   Strength:  Not Performed    Estimated Daily Nutrient Needs:  Energy (kcal): [place] x 1.2= 2000 kcal; Weight Used for Energy Requirements:  Admission     Protein (g):  1.5g/kg= 115-120 g; Weight Used for Protein Requirements:  Ideal          Nutrition Related Findings:  +2 BLE edema, labs: Glu 117, Meds: reviewed, BM 3/12      Wounds:  Surgical Incision       Current Nutrition Therapies:    ADULT DIET;  Regular; 5 carb choices (75 gm/meal)    Anthropometric Measures:  · Height: 5' 11\" (180.3 cm)  · Current Body Weight: 198 lb (89.8 kg)   · Admission Body Weight: 198 lb (89.8 kg)    · Usual Body Weight: 203 lb (92.1 kg) (stated)       Nutrition Diagnosis:   · Increased nutrient needs related to  (healing) as evidenced by wounds    Nutrition Interventions:   Food and/or Nutrient Delivery:  Discontinue Oral Nutrition Supplement,Modify Current Diet  Nutrition Education/Counseling:  Education completed (discussed supplements)   Coordination of Nutrition Care:  Continue to monitor while inpatient    Goals:  po intake greater than 50%       Nutrition Monitoring and Evaluation:   Food/Nutrient Intake Outcomes:  Food and Nutrient Intake  Physical Signs/Symptoms Outcomes:  Biochemical Data,GI Status,Fluid Status or Edema,Skin,Weight     Discharge Planning:     Too soon to determine     Electronically signed by Michael Flaherty RD, LD on 3/14/22 at 1:55 PM EDT    Contact: [phone]

## 2022-03-14 NOTE — PROGRESS NOTES
Writer verified with Dr. Dante Campbell, patient's creatinine is 1.43. Patient is ok to have PICC line placed in arm, no need for tunneled PICC.

## 2022-03-14 NOTE — PROGRESS NOTES
Notified Kia Resendiz, that per Dr Itzel Muñoz pt is requesting to go home at this time. Writer requested therapy notes for d/c determination recs for ARU.

## 2022-03-14 NOTE — CARE COORDINATION
ONGOING DISCHARGE PLAN:    Patient is alert and oriented x4. Spoke with patient regarding discharge plan and patient confirms that plan is ARU. New consult placed for ARU, awaiting input. POD #1 S/p left 2nd digit amputation Per Podiatry. Pt is currently on IV Cefepime, Daptomycin, Flagyl. ID following. Keith is also following for possible IV ATB at home. Coumadin PTA follows with [address]. Will continue to follow for additional discharge needs.     Electronically signed by Larry Du RN on 3/14/2022 at 10:59 AM

## 2022-03-14 NOTE — PLAN OF CARE
Problem: Falls - Risk of:  Goal: Will remain free from falls  Description: Will remain free from falls  3/14/2022 1757 by Nathalie Singh RN  Outcome: Met This Shift  3/14/2022 1756 by Nathalie Singh RN  Outcome: Met This Shift  3/14/2022 0514 by Giorgio Young RN  Outcome: Ongoing  Goal: Absence of physical injury  Description: Absence of physical injury  3/14/2022 1757 by Nathalie Singh RN  Outcome: Met This Shift  3/14/2022 1756 by Nathalie Singh RN  Outcome: Met This Shift  3/14/2022 0514 by Giorgio Young RN  Outcome: Ongoing     Problem: Skin Integrity:  Goal: Will show no infection signs and symptoms  Description: Will show no infection signs and symptoms  3/14/2022 1757 by Nathalie Singh RN  Outcome: Met This Shift  3/14/2022 1756 by Nathalie Singh RN  Outcome: Met This Shift  3/14/2022 0514 by Giorgio Young RN  Outcome: Ongoing  Goal: Absence of new skin breakdown  Description: Absence of new skin breakdown  3/14/2022 1757 by Nathalie Singh RN  Outcome: Met This Shift  3/14/2022 1756 by Nathalie Singh RN  Outcome: Met This Shift  3/14/2022 0514 by Giorgio Young RN  Outcome: Ongoing     Problem: Nutrition  Goal: Optimal nutrition therapy  3/14/2022 1757 by Nathalie Singh RN  Outcome: Met This Shift  3/14/2022 1756 by Nathalie Singh RN  Outcome: Met This Shift  3/14/2022 1356 by Delfin Marks RD, LD  Outcome: Ongoing  3/14/2022 0514 by Giorgio Young RN  Outcome: Ongoing

## 2022-03-14 NOTE — PROGRESS NOTES
[id number] [place] [employer]      PROGRESS NOTE        Patient:  Ishmael Rogel  YOB: 1955    MRN: [id number]     Acct: [de-identified]     Admit date: 3/7/2022    Pt seen and Chart reviewed. Consultant notes reviewed and care evaluated. Subjective: Patient was up Friday breakfast which he is not liking he said this morning but otherwise no nausea no vomiting no chest pain he does not feel any increased shortness of breath orthopnea he is having pain he describes about 4 but is getting pain medications for it and is helping discussed with patient disposition he likes to go home discussed with him possibly going to acute rehab especially as you need IV antibiotics he is okay to try that option we will see if acute rehab will evaluate patient then has a BMP when up his IV fluid is done he is to be on Lasix we will give him a dose of Lasix as well as await ID input in regard to antibiotic either p.o. or IV his sed rate are coming down    Diet:  ADULT DIET;  Regular; 4 carb choices (60 gm/meal)      Medications:Current Inpatient    Scheduled Meds:   enoxaparin  1 mg/kg SubCUTAneous BID    glipiZIDE  5 mg Oral QAM AC    daptomycin (CUBICIN) IVPB  6 mg/kg (Ideal) IntraVENous Q24H    cefepime  1,000 mg IntraVENous Q12H    metroNIDAZOLE  500 mg IntraVENous Q8H    sodium chloride flush  5-40 mL IntraVENous 2 times per day    carvedilol  6.25 mg Oral BID WC    insulin lispro  0-12 Units SubCUTAneous TID WC    insulin lispro  0-6 Units SubCUTAneous Nightly     Continuous Infusions:   sodium chloride      dextrose 100 mL/hr (03/10/22 0619)     PRN Meds:oxyCODONE **OR** oxyCODONE, sodium chloride flush, sodium chloride, acetaminophen, ondansetron **OR** ondansetron, glucose, dextrose, glucagon (rDNA), dextrose        Physical Exam:  Vitals: /84   Pulse 92   Temp 98.6 °F (37 °C)   Resp 18   Ht 5' 11\" (1.803 m)   Wt 198 lb 13.7 oz (90.2 kg) SpO2 95%   BMI 27.73 kg/m²   24 hour intake/output:    Intake/Output Summary (Last 24 hours) at 3/14/2022 0747  Last data filed at 3/14/2022 5548  Gross per 24 hour   Intake 585.34 ml   Output 1125 ml   Net -539.66 ml     Last 3 weights: Wt Readings from Last 3 Encounters:   03/07/22 198 lb 13.7 oz (90.2 kg)   02/05/21 212 lb 15.4 oz (96.6 kg)   05/23/16 223 lb (101.2 kg)       Physical Examination:   General appearance - alert, well appearing, and in no distress  Mental status - alert, oriented to person, place, and time  PERRLA wnl  Chest - clear to auscultation, no wheezes, rales or rhonchi, symmetric air entry  Heart - normal rate, regular regular rhythm, normal S1, S2, no murmurs, rubs, clicks or gallops  Abdomen - soft, nontender, nondistended, no masses or organomegaly  Neurological - alert, oriented, normal speech, no focal findings or movement disorder noted}  Extremities - peripheral pulses normal, no pedal edema, no clubbing or cyanosis edema left lower extremity but reports one today. No erythema or infection he does have venous stasis changes. His toes are okay his right no edema just slightly compared to yesterday  Skin - normal coloration and turgor, no rashes, no suspicious skin lesions noted     Time Cipriano Calero Orders]       Results     Component Value Units   Brain Natriuretic Peptide [[id number]] (Abnormal)    Collected: 03/14/22 0644    Updated: 03/14/22 0726    Specimen Source: Blood     Pro-BNP 4,724 High  pg/mL    Comment:        An age-independent cutoff point of 300 pg/ml has a 98% negative predictive value excluding   acute heart failure.        Basic Metabolic Panel [[id number]] (Abnormal)    Collected: 03/14/22 0644    Updated: 03/14/22 0726    Specimen Source: Blood     Glucose 117 High  mg/dL    BUN 18 mg/dL    CREATININE 1.43 High  mg/dL    Calcium 9.0 mg/dL    Sodium 134 Low  mmol/L    Potassium 4.3 mmol/L    Chloride 99 mmol/L    CO2 24 mmol/L    Anion Gap 11 mmol/L    GFR Non- 49 Low  mL/min    GFR  60 Low  mL/min    GFR Comment         Comment: Average GFR for 61-76 years old:    85 mL/min/1.73sq m   Chronic Kidney Disease:    <60 mL/min/1.73sq m   Kidney failure:    <15 mL/min/1.73sq m               eGFR calculated using average adult body mass. Additional eGFR calculator available at:         [employer]             C-Reactive Protein [[id number]] (Abnormal)    Collected: 03/14/22 0644    Updated: 03/14/22 0726    Specimen Source: Blood     CRP 24.6 High  mg/L   Sedimentation Rate [[id number]] (Abnormal)    Collected: 03/14/22 0644    Updated: 03/14/22 0722    Specimen Type: Blood     Sed Rate 57 High  mm   Protime-INR [[id number]] (Abnormal)    Collected: 03/14/22 0644    Updated: 03/14/22 0715    Specimen Source: Blood     Protime 16.5 High  sec    INR 1.3    Comment:        Non-therapeutic Range:      INR = 0.9-1.2   Therapeutic Range:    Moderate Anticoagulant Intensity:      INR = 2.0-3.0    High Anticoagulant Intensity:      INR = 2.5-3. 5             CBC with Auto Differential [[id number]] (Abnormal)    Collected: 03/14/22 0644    Updated: 03/14/22 0708    Specimen Source: Blood     WBC 6.6 k/uL    RBC 3.25 Low  m/uL    Hemoglobin 10.4 Low  g/dL    Hematocrit 29.6 Low  %    MCV 91.1 fL    MCH 31.9 pg    MCHC 35.1 g/dL    RDW 14.5 %    Platelets 101 k/uL    MPV 7.2 fL    Seg Neutrophils 73 High  %    Lymphocytes 15 Low  %    Monocytes 9 High  %    Eosinophils % 2 %    Basophils 1 %    Segs Absolute 4.90 k/uL    Absolute Lymph # 1.00 k/uL    Absolute Mono # 0.60 k/uL    Absolute Eos # 0.10 k/uL    Basophils Absolute 0.00 k/uL   POC Glucose Fingerstick [[id number]]    Collected: 03/14/22 0620    Updated: 03/14/22 0626     POC Glucose 107 mg/dL   POC Glucose Fingerstick [[id number]] (Abnormal)    Collected: 03/13/22 2020    Updated: 03/13/22 2026     POC Glucose 223 High  mg/dL   POC Glucose Fingerstick [[id number]] (Abnormal)    Collected: 03/13/22 1642    Updated: 03/13/22 1649     POC Glucose 134 High  mg/dL   Culture, Anaerobic and Aerobic [[id number]] (Abnormal)    Collected: 03/13/22 0836    Updated: 03/13/22 1451    Specimen Type: Swab    Specimen Source: Toe     Specimen Description . TOE 2ND DIGIT LEFT FOOT    Direct Exam MODERATE NEUTROPHILS Abnormal      NO BACTERIA SEEN    Culture PENDING   CBC with Auto Differential [[id number]]    Collected: 03/13/22 1029    Updated: 03/13/22 1214    Specimen Source: Blood     WBC      PLEASE DISREGARD RESULTS.  SPECIMEN CONTAMINATED.    k/uL    Comment: CORRECTED ON 03/13 AT 1211: PREVIOUSLY REPORTED AS 3.6       RBC      PLEASE DISREGARD RESULTS.  SPECIMEN CONTAMINATED.    m/uL    Comment: CORRECTED ON 03/13 AT 1211: PREVIOUSLY REPORTED AS 2.33       Hemoglobin      PLEASE DISREGARD RESULTS.  SPECIMEN CONTAMINATED.    g/dL    Comment: CORRECTED ON 03/13 AT 1211: PREVIOUSLY REPORTED AS 7.4       Hematocrit      PLEASE DISREGARD RESULTS.  SPECIMEN CONTAMINATED.    %    Comment: CORRECTED ON 03/13 AT 1211: PREVIOUSLY REPORTED AS 21.9       MCV      PLEASE DISREGARD RESULTS.  SPECIMEN CONTAMINATED. fL    Comment: CORRECTED ON 03/13 AT 1211: PREVIOUSLY REPORTED AS 94.0       MCH      PLEASE DISREGARD RESULTS.  SPECIMEN CONTAMINATED.    pg    Comment: CORRECTED ON 03/13 AT 1211: PREVIOUSLY REPORTED AS 31.7       MCHC      PLEASE DISREGARD RESULTS.  SPECIMEN CONTAMINATED.    g/dL    Comment: CORRECTED ON 03/13 AT 1211: PREVIOUSLY REPORTED AS 33.7       RDW      PLEASE DISREGARD RESULTS.  SPECIMEN CONTAMINATED.    %    Comment: CORRECTED ON 03/13 AT 1211: PREVIOUSLY REPORTED AS 14.6       Platelets      PLEASE DISREGARD RESULTS.  SPECIMEN CONTAMINATED.    k/uL    Comment: CORRECTED ON 03/13 AT 1211: PREVIOUSLY REPORTED        MPV      PLEASE DISREGARD RESULTS.  SPECIMEN CONTAMINATED.     fL    Comment: CORRECTED ON 03/13 AT 1211: PREVIOUSLY REPORTED AS 7.1      Basic Metabolic Panel [[id number]] (Abnormal)    Collected: 03/13/22 1135    Updated: 03/13/22 1156     Glucose 126 High  mg/dL    BUN 19 mg/dL    CREATININE 1.42 High  mg/dL    Calcium 9.0 mg/dL    Sodium 136 mmol/L    Potassium 3.9 mmol/L    Chloride 102 mmol/L    CO2 25 mmol/L    Anion Gap 9 mmol/L    GFR Non-African American 50 Low  mL/min    GFR African American >60 mL/min    GFR Comment         Comment: Average GFR for 61-76 years old:    85 mL/min/1.73sq m   Chronic Kidney Disease:    <60 mL/min/1.73sq m   Kidney failure:    <15 mL/min/1.73sq m               eGFR calculated using average adult body mass. Additional eGFR calculator available at:         [email or web address]             CBC with Auto Differential [[id number]] (Abnormal)    Collected: 03/13/22 1135    Updated: 03/13/22 1151     WBC 5.3 k/uL    RBC 3.25 Low  m/uL    Hemoglobin 10.2 Low  g/dL    Hematocrit 29.7 Low  %    MCV 91.5 fL    MCH 31.4 pg    MCHC 34.3 g/dL    RDW 14.5 %    Platelets 750 k/uL    MPV 6.9 fL    Seg Neutrophils 74 High  %    Lymphocytes 15 Low  %    Monocytes 8 High  %    Eosinophils % 2 %    Basophils 1 %    Segs Absolute 3.90 k/uL    Absolute Lymph # 0.80 Low  k/uL    Absolute Mono # 0.40 k/uL    Absolute Eos # 0.10 k/uL    Basophils Absolute 0.10 k/uL   POC Glucose Fingerstick [[id number]] (Abnormal)    Collected: 03/13/22 1124    Updated: 03/13/22 1131     POC Glucose 123 High  mg/dL   SPECIMEN REJECTION [[id number]]    Collected: 03/13/22 1029    Updated: 03/13/22 1100     Specimen Source BLOOD    Ordered Test CDP,BMP    Reason for Rejection Unable to perform testing: Specimen contaminated.    POC Glucose Fingerstick [[id number]] (Abnormal)    Collected: 03/13/22 0907    Updated: 03/13/22 0915     POC Glucose 126 High  mg/dL     Current IP Meds          Assessment:  Principal Problem:    Diabetic foot infection ([address]  Active Problems:    MRSA bacteremia    Gangrene of toe of left foot ([address] Elevated C-reactive protein (CRP)    Elevated erythrocyte sedimentation rate    Acute kidney injury ([facility]    Allergy to penicillin  Resolved Problems:    * No resolved hospital problems.  *      Diabetic foot infection ([facility]) E11.628, L08.9    Diabetes ([facility] E11.9    Chronic osteomyelitis ([facility]) M86.60    Diabetic foot ulcer ([facility] E11.621, L97.509    PVD (peripheral vascular disease) ([facility] I73.9    Atherosclerosis of coronary artery without angina pectoris I25.10    Cardiomyopathy ([facility] I42.9    Cardiac left ventricular ejection fraction 21-40 percent R94.30    Diabetes mellitus type 2 with peripheral artery disease ([facility]) E11.51    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 ([facility]) L97.514    Chronic osteomyelitis of left foot ([facility]) V97.034    Onychomycosis B35.1    Heart failure ([facility]) I50.9      · Cellulitis of left foot  ·    · Gangrenous left big toe questionable osteomyelitis no evidence of that on CT  ·    · Renal insufficiency and probably chronic because that MRI probably would not be done secondary to requirement of IV dye  · With acute on chronic kidney injury  · Diabetes mellitus  ·    · History of peripheral vascular disease  ·    · History of previous amputations  ·    · Elevated inflammatory markers consistent with his infection  · Mild hypokalemia  · Chronic A. fib  · Cardiomyopathy with EF 35 percentile no evidence of CHF clinically at this time  · Moderate tricuspid regurgitation  ·    · Moderate pulmonary hypertension  · Positive blood culture with MRSA new blood cultures are negative  ·    · Hyperglycemia need to watch WHILE  on glipizide but will decrease the dose  And hemoglobin A1c is therapeutic working on the symptoms  Left arm swelling and infiltrate from his IV site no infection no evidence of DVT adjust the arm but the rest of the forearm is intact   Status post second toe amputation closure     Left leg pitting edema and dependent edema   Recent BMP with history of cardiomyopathy but no signs or symptoms of acute CHF                       Plan:  Lasix 2omg ivx1  We will consult acute rehab for evaluation    Continue with physical therapy    If patient needs long-term IV antibiotic he might need a PICC line will await ID input  Also patient INR is 1.3 this morning we will give him 10 mg dose tonight of his Coumadin  Continue to monitor his other labs and blood sugar  Jon Rodriguez DO [facility]            3/14/2022, 7:47 AM

## 2022-03-14 NOTE — PLAN OF CARE
Nutrition Problem #1: Increased nutrient needs  Intervention: Food and/or Nutrient Delivery: Discontinue Oral Nutrition Supplement,Modify Current Diet  Nutritional Goals: po intake greater than 50%

## 2022-03-14 NOTE — PROGRESS NOTES
NEPHROLOGY PROGRESS NOTE    Patient :  Mervin Tuttle; 78 y.o. MRN# [id number]  Location:  2052/2052-01  Attending:  Lennox Sauce, DO  Admit Date:  3/7/2022   Hospital Day: 7    Reason for consultation: Management of acute kidney injury. Consulting physician: Gordon Omre DO    Interval history:   Patient was seen and examined today -he denies any shortness of breath. No nausea vomiting no fever chills. Status post left second toe amputation. He is on IV Cubicin, metronidazole and cefepime. Renal function is improving. History of Present Illness: This is a 78 y.o. male past medical history of coronary artery disease, bypass surgery, CHF, ype 2 diabetes, Essential hypertension, chronic kidney disease stage IIIb with baseline creatinine of about 1.5 to 1.7 mg/dL since 2019, patient has not follow-up with any nephrologist as an outpatient. Patient has patient presented to the hospital with complaints of left foot redness and black toe, patient noticed black toe about last 1 week, patient started to have pain in the foot and redness and therefore decided to come to the hospital.  Denied any nausea vomiting diarrhea no fever chills  Denied any shortness of breath chest pain  Labs showed serum creatinine of 2.1 mg/dL on admission from nephrology consultation has been requested  Blood cultures positive for MRSA  Pt denies any history of  prolonged NSAID use. Patient denies dysuria, gross hematuria, flank pain, nocturia, urgency, passing frothy urine or urinary incontinence. There has been no recent exposure to IV contrast.   There is no history  of paraprotein disease. Pt denies any history of recurrent UTI or kidney stones. Medication review shows use of ACE-inhibitor/diuretics.     Current Medications:    warfarin (COUMADIN) tablet 10 mg, Once  warfarin placeholder: dosing by provider, RX Placeholder  oxyCODONE (ROXICODONE) immediate release tablet 5 mg, Q4H PRN   Or  oxyCODONE HCl (OXY-IR) immediate release tablet 10 mg, Q4H PRN  enoxaparin (LOVENOX) injection 90 mg, BID  glipiZIDE (GLUCOTROL) tablet 5 mg, QAM AC  DAPTOmycin (CUBICIN) 450 mg in sodium chloride 0.9 % 50 mL IVPB, Q24H  sodium chloride flush 0.9 % injection 5-40 mL, 2 times per day  sodium chloride flush 0.9 % injection 5-40 mL, PRN  0.9 % sodium chloride infusion, PRN  acetaminophen (TYLENOL) tablet 650 mg, Q4H PRN  ondansetron (ZOFRAN-ODT) disintegrating tablet 4 mg, Q8H PRN   Or  ondansetron (ZOFRAN) injection 4 mg, Q6H PRN  carvedilol (COREG) tablet 6.25 mg, BID WC  insulin lispro (HUMALOG) injection vial 0-12 Units, TID WC  insulin lispro (HUMALOG) injection vial 0-6 Units, Nightly  glucose (GLUTOSE) 40 % oral gel 15 g, PRN  dextrose 50 % IV solution, PRN  glucagon (rDNA) injection 1 mg, PRN  dextrose 5 % solution, PRN      Objective:  CURRENT TEMPERATURE:  Temp: 98.6 °F (37 °C)  MAXIMUM TEMPERATURE OVER 24HRS:  Temp (24hrs), Av.7 °F (37.1 °C), Min:98.2 °F (36.8 °C), Max:99.3 °F (37.4 °C)    CURRENT RESPIRATORY RATE:  Resp: 18  CURRENT PULSE:  Pulse: 92  CURRENT BLOOD PRESSURE:  BP: 129/84  24HR BLOOD PRESSURE RANGE:  Systolic (50ISG), JOSE:258 , Min:129 , ULJ:940   ; Diastolic (36DDU), JOSE:50, Min:79, Max:89    24HR INTAKE/OUTPUT:      Intake/Output Summary (Last 24 hours) at 3/14/2022 1632  Last data filed at 3/14/2022 8213  Gross per 24 hour   Intake 137.45 ml   Output 725 ml   Net -587.55 ml     No data found. Physical Exam:  GENERAL APPEARANCE: Alert and cooperative, and appears to be in no acute distress. HEAD: Normocephalic and atraumatic  EYES:  EOMI. Not pale, anicteric   NOSE:  No nasal discharge. THROAT:  Oral cavity and pharynx normal. Moist  CARDIAC: Normal S1 and S2. No S3, S4 or murmurs. Rhythm is regular. LUNGS: Clear to auscultation and percussion without rales, rhonchi, wheezing or diminished breath sounds. GI-soft nontender  MUSKULOSKELETAL: Adequately aligned spine. No joint erythema or tenderness.    EXTREMITIES: Left foot erythema with wet gangrene of the second left toe. NEURO: No acute focal neurologic deficits    Labs:   CBC:  Recent Labs     03/13/22  1029 03/13/22  1135 03/14/22  0644   WBC PLEASE DISREGARD RESULTS. SPECIMEN CONTAMINATED. 5.3 6.6   RBC PLEASE DISREGARD RESULTS. SPECIMEN CONTAMINATED. 3.25* 3.25*   HGB PLEASE DISREGARD RESULTS. SPECIMEN CONTAMINATED. 10.2* 10.4*   HCT PLEASE DISREGARD RESULTS. SPECIMEN CONTAMINATED. 29.7* 29.6*   MCV PLEASE DISREGARD RESULTS. SPECIMEN CONTAMINATED. 91.5 91.1   MCH PLEASE DISREGARD RESULTS. SPECIMEN CONTAMINATED. 31.4 31.9   MCHC PLEASE DISREGARD RESULTS. SPECIMEN CONTAMINATED. 34.3 35.1   RDW PLEASE DISREGARD RESULTS. SPECIMEN CONTAMINATED. 14.5 14.5   PLT PLEASE DISREGARD RESULTS. SPECIMEN CONTAMINATED. 208 234   MPV PLEASE DISREGARD RESULTS. SPECIMEN CONTAMINATED. 6.9 7.2      BMP:   Recent Labs     03/12/22  0615 03/13/22  1135 03/14/22  0644   * 136 134*   K 4.1 3.9 4.3    102 99   CO2 24 25 24   BUN 26* 19 18   CREATININE 1.52* 1.42* 1.43*   GLUCOSE 124* 126* 117*   CALCIUM 8.9 9.0 9.0      Magnesium:   No results for input(s): MG in the last 72 hours. Albumin:   No results for input(s): LABALBU in the last 72 hours. No results for input(s): PROT, ALBCAL, ALBCAL, ALBPCT, LABALPH, A1PCT, LABALPH, A2PCT, LABBETA, BETAPCT, GAMGLOB, GGPCT, PATH in the last 72 hours. Assessment/plan:    1. Acute kidney injury superimposed on chronic kidney disease stage 3b - most consistent with prerenal azotemia from aggressive diuretic therapy as well as ischemic ATN 2nd to left toe infection. AZIZA is negative, complements and plasma free light chain ratios are within normal. Renal ultrasound showed bilateral nonobstructing nephrolithiasis with no hydronephrosis and is simple appearing renal cyst 3.4 cm on the left. Renal function is improving-1.4  mg/dL at baseline    2.   Systemic hypertension - Continue current medications carvedilol 6.25 mg p.o. twice daily    3. Chronic kidney disease stage IIIb [baseline serum creatinine 1.5 to 1.7 mg/dL] - consistent with diabetic glomerulopathy. Renal function is back to baseline. 4.  Peripheral arterial disease with left second toe gangrene will follow vascular and podiatry recommendations. 5.  Left second toe gangrene -S/P amputation. continue antibiotics. Plan. Continue current treatment  No objection with PICC line    Prognosis is guarded.     Electronically signed by Raghav Reyes MD on 3/14/2022 at 4:32 PM

## 2022-03-14 NOTE — PROGRESS NOTES
Physical Therapy    Facility/Department: [facility] MED SURG  Initial Assessment    NAME: Kristi Porras  : 1955  MRN: [id number]    Date of Service: 3/14/2022    Discharge Recommendations:  Home with assist PRN (lives with mother, states she can assist PRN)   PT Equipment Recommendations  Equipment Needed: No (Adamantly states he does not need and will not use DME)    Assessment   Assessment: Pt presents with wet gangrene L second toe requiring amputation 3/13/22. Pt mobilizes at SBA to IND level. Pt does not need rehab placement at this time as he is IND. Treatment Diagnosis: impaired mobility d/t L second toe gangrene  Prognosis: Good  Decision Making: Medium Complexity  History: Kristi Porras is a 78 y.o. male seen at [facility] ER for left 2nd digit wound and cellulitis. Patient is a type 2 diabetic with history of PVD. He has had previous amputations of hallux of bilateral feet, and partial 2nd digit amputation. Per patient, he was on the way to doctor appoitnment today and was feeling fatigued and unwell, was told by staff at PCP to present to ED. Denies any trauma to left foot, reports started noticing worsening discoloration of 2nd digit and feeling of general malaise over the weekend. Denies any further pedal complaints at this time. Exam: ROM, MMT, sensation, balance, endurance, mobility, steps, social hx  Clinical Presentation: cooperative, no adverse response to session  PT Education: PT Role;General Safety;Precautions;Gait Training;Weight-bearing Education  Patient Education: Pt states he will not wear L post op shoe despite being ordered. Pt is agreeable to use during eval and educated to wear shoe in R LE to balance leg lengths. Pt is also educated to avoid allowing L foot/ACE drsg to touch floor. Pt's foot covered with large sock and pt is instructed to use when sitting at EOB with feet on floor.  Pt is cooperative but will have questionable follow through at home  Barriers to Learning: none  REQUIRES PT FOLLOW UP: No  Activity Tolerance  Activity Tolerance: Patient Tolerated treatment well  Activity Tolerance: Denies dizziness at all times during session       Patient Diagnosis(es): The primary encounter diagnosis was Diabetic foot infection ([facility] [address] A diagnosis of Acute kidney injury [facility]) was also pertinent to this visit. has a past medical history of Atrial fibrillation ([facility] [address] CAD (coronary artery disease), CHF (congestive heart failure) ([facility] [address] Diabetes mellitus ([facility] [address] Hyperlipidemia, and Hypertension. has a past surgical history that includes eye surgery (Bilateral); hernia repair; Knee arthroscopy (Right); Colonoscopy; Endoscopy, colon, diagnostic; pacemaker placement (2011); Cardiac surgery (2010); Toe amputation (Right); debridement (Right, 11/19/2015); other surgical history (Right, 12 29 15); and Toe amputation (Left, 3/13/2022). Restrictions  Restrictions/Precautions  Restrictions/Precautions: Weight Bearing,Fall Risk,Up as Tolerated  Required Braces or Orthoses?: Yes (post op shoe L LE)  Implants present? : Metal implants,Pacemaker (CABG x3)  Lower Extremity Weight Bearing Restrictions  Left Lower Extremity Weight Bearing: Weight Bearing As Tolerated (with post op shoe on)  Position Activity Restriction  Other position/activity restrictions: IV L UE, h/o Bilat great toe amputations  Vision/Hearing  Vision: Impaired  Vision Exceptions: Wears glasses for reading  Hearing: Within functional limits     Subjective  General  Chart Reviewed: Yes  Patient assessed for rehabilitation services?: Yes  Additional Pertinent Hx: L second toe amputation 3/13/22  Response To Previous Treatment: Not applicable  Family / Caregiver Present: No  Diagnosis: L foot wound and cellulitis  Follows Commands: Within Functional Limits  General Comment  Comments: RN ok's session  Subjective  Subjective: Pt is resting in bed and agreeable to PT/OT. States he wants to go home rather than rehab facility.  Pt refuses going to SNF, but would be agreeable to ARU of necessary. Pain Screening  Patient Currently in Pain: Denies  Vital Signs  Patient Currently in Pain: Denies  Pre Treatment Pain Screening  Intervention List: Patient able to continue with treatment    Orientation  Orientation  Overall Orientation Status: Within Functional Limits  Social/Functional History  Social/Functional History  Lives With: Other (comment) (lives with mom)  Type of Home: House  Home Layout: One level  Home Access: Stairs to enter with rails  Entrance Stairs - Number of Steps: 4-5 steps, Bilat rails  Entrance Stairs - Rails: Both  Bathroom Shower/Tub: Tub/Shower unit  Bathroom Toilet: Standard  Bathroom Accessibility: Matt Sample accessible (Pt states he has small bathroom. RW will \"barely\" fit into bathroom)  ADL Assistance: Independent  Ambulation Assistance: Independent  Transfer Assistance: Independent  Active : Yes  Mode of Transportation: SUV,Truck  Additional Comments: States his mom will be 80 yrs old this July, otherwise does not have other family members who can assist. States mom can assist with IADLs at home.   Cognition        Objective     Observation/Palpation  Posture: Fair  Observation: L foot ACE bandaging; incision area not observed  Edema: distal L LE edema; pt reports being chronic d/t vein harvest site from CABG    AROM RLE (degrees)  RLE AROM: WFL  AROM LLE (degrees)  LLE AROM : WFL  AROM RUE (degrees)  RUE AROM : WFL  AROM LUE (degrees)  LUE AROM : WFL  Strength RLE  Strength RLE: WFL  Strength LLE  Comment: MMT deferred LE; demo's at least 3/5  Strength RUE  Strength RUE: WFL  Strength LUE  Strength LUE: WFL  Tone RLE  RLE Tone: Normotonic  Tone LLE  LLE Tone: Normotonic  Motor Control  Gross Motor?: WFL  Sensation  Overall Sensation Status: WFL (pt repeatedly denies sensation deficits)  Bed mobility  Supine to Sit: Independent  Sit to Supine: Independent  Transfers  Sit to Stand: Independent  Stand to sit: Independent  Ambulation  Ambulation?: Yes  Ambulation 1  Surface: level tile  Device: No Device  Assistance: Independent  Quality of Gait: gait deficit d/t leg legth descrepentency d/t post op shoe L LE and slipper R LE  Gait Deviations: Slow Melisa  Distance: 30 ft  Stairs/Curb  Stairs?: Yes  Stairs  # Steps : 5  Stairs Height: 4\"  Rails: Left ascending;Right ascending  Device: No Device  Assistance: Stand by assistance  Comment: vc for sequencing, no LOB or difficulty noted     Balance  Posture: Fair  Sitting - Static: Good  Sitting - Dynamic: Good  Standing - Static: Fair;+  Standing - Dynamic: Fair;+        Plan   Plan  Times per week: No further PT needs at this time  Safety Devices  Type of devices: Nurse notified,Left in bed,Call light within reach (sitting at EOB)    G-Code       OutComes Score                                                  AM-PAC Score  AM-PAC Inpatient Mobility Raw Score : 23 (03/14/22 1355)  AM-PAC Inpatient T-Scale Score : 56.93 (03/14/22 1355)  Mobility Inpatient CMS 0-100% Score: 11.2 (03/14/22 1355)  Mobility Inpatient CMS G-Code Modifier : CI (03/14/22 1355)          Goals          Therapy Time   Individual Concurrent Group Co-treatment   Time In       1325   Time Out       1352   Minutes       32           John Oh, PT

## 2022-03-14 NOTE — PROGRESS NOTES
Progress Note  Podiatric Medicine and Surgery     Subjective     CC: s/p left 2nd digit amputation (DOS: 3/13/22), left foot wound    S/p left 2nd digit amputation. Patient seen and evaluated bedside. Denies any N/V/F/SOB/CP. Pain is controlled to left foot, however reports intermittent shooting pain  Afebrile, VSS. HPI :  Tabitha Dias is a 78 y.o. male seen at [facility] ER for left 2nd digit wound and cellulitis. Patient is a type 2 diabetic with history of PVD. He has had previous amputations of hallux of bilateral feet, and partial 2nd digit amputation. Per patient, he was on the way to doctor appoitnment today and was feeling fatigued and unwell, was told by staff at PCP to present to ED. Denies any trauma to left foot, reports started noticing worsening discoloration of 2nd digit and feeling of general malaise over the weekend. Denies any further pedal complaints at this time.       PCP is Vincent Xiong DO    ROS: Denies N/V/F/C/SOB/CP. Otherwise negative except at stated in the HPI.      Medications:  Scheduled Meds:   warfarin  10 mg Oral Once    warfarin placeholder: dosing by provider   Other RX Placeholder    enoxaparin  1 mg/kg SubCUTAneous BID    glipiZIDE  5 mg Oral QAM AC    daptomycin (CUBICIN) IVPB  6 mg/kg (Ideal) IntraVENous Q24H    cefepime  1,000 mg IntraVENous Q12H    metroNIDAZOLE  500 mg IntraVENous Q8H    sodium chloride flush  5-40 mL IntraVENous 2 times per day    carvedilol  6.25 mg Oral BID WC    insulin lispro  0-12 Units SubCUTAneous TID WC    insulin lispro  0-6 Units SubCUTAneous Nightly       Continuous Infusions:   sodium chloride      dextrose 100 mL/hr (03/10/22 0619)       PRN Meds:oxyCODONE **OR** oxyCODONE, sodium chloride flush, sodium chloride, acetaminophen, ondansetron **OR** ondansetron, glucose, dextrose, glucagon (rDNA), dextrose    Objective     Vitals:  Patient Vitals for the past 8 hrs:   BP Temp Pulse Resp SpO2   03/14/22 0633 129/84 98.6 °F (37 °C) 92 18 95 %     Average, Min, and Max for last 24 hours Vitals:  TEMPERATURE:  Temp  Av.6 °F (37 °C)  Min: 98.2 °F (36.8 °C)  Max: 99.3 °F (37.4 °C)    RESPIRATIONS RANGE: Resp  Av  Min: 0  Max: 20    PULSE RANGE: Pulse  Av.9  Min: 71  Max: 97    BLOOD PRESSURE RANGE:  Systolic (40NQK), BRW:730 , Min:125 , OB   ; Diastolic (94CMF), DOJ:07, Min:73, Max:89      PULSE OXIMETRY RANGE: SpO2  Av.4 %  Min: 92 %  Max: 98 %    I/O last 3 completed shifts: In: 585.3 [I.V.:75; IV Piggyback:510.3]  Out: 1525 [Urine:1525]    CBC:  Recent Labs     22  1029 22  1135 22  0644   WBC PLEASE DISREGARD RESULTS. SPECIMEN CONTAMINATED. 5.3 6.6   HGB PLEASE DISREGARD RESULTS. SPECIMEN CONTAMINATED. 10.2* 10.4*   HCT PLEASE DISREGARD RESULTS. SPECIMEN CONTAMINATED. 29.7* 29.6*   PLT PLEASE DISREGARD RESULTS. SPECIMEN CONTAMINATED. 208 234   CRP  --   --  24.6*        BMP:  Recent Labs     22  0615 22  1135 22  0644   * 136 134*   K 4.1 3.9 4.3    102 99   CO2 24 25 24   BUN 26* 19 18   CREATININE 1.52* 1.42* 1.43*   GLUCOSE 124* 126* 117*   CALCIUM 8.9 9.0 9.0        Coags:  Recent Labs     22  0644   INR 1.3       Lab Results   Component Value Date    SEDRATE 57 (H) 2022     Recent Labs     22  0644   CRP 24.6*       Physical Exam:  Vascular: DP and PT pulses are non-palpable, bilateral. Audible waveform on doppler. CFT >3 seconds to all digits. Hair growth is absent to the level of the digits. +2 non-pitting edema.      Neuro: Saph/sural/SP/DP/plantar sensation intact to light touch.     Musculoskeletal: Muscle strength is adequate ROM, adequate strength to all lower extremity muscle groups. Gross deformity is previous hallux amputation of bilateral LE, partial 2nd digit amputation, right foot. .     Dermatologic: Incision site with sutures intact to left 2nd digit amputation site.  No signs of dehiscence, no malodor, no drainage appreciated. No clinical signs of acute infection. Periwound erythema noted to incision site. No fluctuance, no crepitus. Clinical Images:            Imaging:   US RENAL COMPLETE   Final Result   1. Bilateral nonobstructing nephrolithiasis. No hydronephrosis. 2. Simple appearing bilateral renal cysts measuring up to 3.4 cm on the left. VL Lower Extremity Arterial Segmental Pressures W Ppg   Final Result      CT FOOT LEFT WO CONTRAST   Final Result   1. No convincing evidence of osteomyelitis or other acute osseous   abnormality. If there is persistent clinical concern for osteomyelitis   further evaluation could be obtained with MRI. 2. Extensive subcutaneous edema compatible with bland edema or cellulitis. No soft tissue gas or drainable fluid collection identified. XR FOOT LEFT (MIN 3 VIEWS)   Final Result   Findings suspicious for possible early osteomyelitis at the 2nd toe distal   phalanx. Small area of erosive change at the 1st metatarsal head, though the features   are more suggestive possible underlying erosive or inflammatory arthropathy   rather than osteomyelitis, though clinical correlation is required. Soft tissue swelling of the 2nd toe at the forefoot. VL Lower Extremity Arteries Bilateral    (Results Pending)       Cultures:  NGTD    Assessment   Nuvia Cano is a 78 y.o. male with   1. Dry gangrene, left foot  2. Osteomyelitis left second toe  3. PVD  4. Cellulitis, left foot  5. History of bilateral hallux amputation    Principal Problem:    Diabetic foot infection ([address]  Active Problems:    Diabetic foot ulcer with osteomyelitis ([address]    MRSA bacteremia    Gangrene of toe of left foot (HCC)    Elevated C-reactive protein (CRP)    Elevated erythrocyte sedimentation rate    Acute kidney injury ([address]    Allergy to penicillin    Ulcer of toe of left foot, with necrosis of bone ([address]  Resolved Problems:    * No resolved hospital problems.  *       Plan · Patient examined and evaluated at bedside   · Treatment options discussed in detail with the patient  · X-rays reviewed: possible early sign of OM to distal 2nd digit. · CT reviewed: no OM or acute gas  · PVR: MONIQUE b/l 1.71  · IV abx: Daptomycin, cefepime, flagyl . ID recommendation appreciated for abx upon dc  · Medical management per IM  · Dressing changed to Left foot: adaptic, DSD, light ace bandage  · Patient is stable for discharge on podiatry standpoint. No further acute surgical interventions planned during this inpatient stay. Dressings to remain clean, dry, intact until follow up with Dr. Jeanette Winters within 1 week of discharge.   · WBAT to left lower extremity in surgical shoe  · Discussed with Dr. Nhi Millan DPM   Podiatric Medicine & Surgery   3/14/2022 at 8:57 AM

## 2022-03-14 NOTE — DISCHARGE INSTR - COC
Continuity of Care Form    Patient Name: Nathan Monson   :  1955  MRN:  [id number]    Admit date:  3/7/2022  Discharge date:  ***    Code Status Order: Full Code   Advance Directives:      Admitting Physician:  Alina Stratton DO  PCP: Irvin Hernandez DO    Discharging Nurse: [facility] Unit/Room#:   Discharging Unit Phone Number: ***    Emergency Contact:   Extended Emergency Contact Information  Primary Emergency Contact: Pat Ayon  Address: [address] [facility] Phone: [phone]  Relation: Parent    Past Surgical History:  Past Surgical History:   Procedure Laterality Date    CARDIAC SURGERY  2010    CABG X3    COLONOSCOPY      DEBRIDEMENT Right 2015    DEBRIDEMENT WOUND FOOT RIGHT W/ APPLICATION BILAT INTEG RAT GRAFT    ENDOSCOPY, COLON, DIAGNOSTIC      EYE SURGERY Bilateral     cataracts    HERNIA REPAIR      umbilical    KNEE ARTHROSCOPY Right     OTHER SURGICAL HISTORY Right 12 29 15    wound care graft procedure foot,appl of integra    PACEMAKER PLACEMENT      WITH DEFIBRILLATOR    TOE AMPUTATION Right     R great       Immunization History: There is no immunization history on file for this patient.     Active Problems:  Patient Active Problem List   Diagnosis Code    Diabetic foot infection ([address] E11.628, L08.9    Diabetes ([address] E11.9    Chronic osteomyelitis ([address] M86.60    Diabetic foot ulcer ([address] E11.621, L97.509    PVD (peripheral vascular disease) ([address] I73.9    Atherosclerosis of coronary artery without angina pectoris I25.10    Cardiomyopathy ([address] I42.9    Cardiac left ventricular ejection fraction 21-40 percent R94.30    Diabetes mellitus type 2 with peripheral artery disease (HCC) E11.51    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 ([facility]) L97.514    Chronic osteomyelitis of left foot ([facility]) D99.324    Onychomycosis B35.1    Heart failure ([facility]) I50.9    MRSA bacteremia R78.81, B95.62    Gangrene of toe of left foot ([facility] I96    Elevated C-reactive protein (CRP) R79.82    Elevated erythrocyte sedimentation rate R70.0    Acute kidney injury ([facility] [address] N17.9    Allergy to penicillin Z88.0       Isolation/Infection:   Isolation            Contact          Patient Infection Status       Infection Onset Added Last Indicated Last Indicated By Review Planned Expiration Resolved Resolved By    MRSA  09/15/15 03/07/22 Culture, Blood 2        foot            Nurse Assessment:  Last Vital Signs: /84   Pulse 92   Temp 98.6 °F (37 °C)   Resp 18   Ht 5' 11\" (1.803 m)   Wt 198 lb 13.7 oz (90.2 kg)   SpO2 95%   BMI 27.73 kg/m²     Last documented pain score (0-10 scale): Pain Level: 7  Last Weight:   Wt Readings from Last 1 Encounters:   22 198 lb 13.7 oz (90.2 kg)     Mental Status:  {IP PT MENTAL STATUS:76758}    IV Access:  { KARINA IV ACCESS:208048771}    Nursing Mobility/ADLs:  Walking   {CHP DME TKCJ:260904494}  Transfer  {P DME FMQU:942942803}  Bathing  {CHP DME BBOU:059204472}  Dressing  {CHP DME AMCS:212328641}  Toileting  {CHP DME YQLB:706788212}  Feeding  {CHP DME IFDW:079148172}  Med Admin  {CHP DME RVN}  Med Delivery   { KARINA MED Delivery:051940984}    Wound Care Documentation and Therapy:  Incision 09/09/15 Foot Right (Active)   Number of days: 4454       Incision 09/09/15 Toe (Comment  which one) Right;Upper (Active)   Number of days: 3415       Incision 11/19/15 Foot Right (Active)   Number of days: 6531       Incision 12/29/15 Foot Right;Distal (Active)   Culture Taken No 22 1236   Drainage Amount None 22 1236   Odor None 22 1236   Dressing/Treatment Gauze dressing/dressing sponge 22 1236   Dressing Changed Dressing reinforced 22 1236   Dressing Status Clean;Dry; Intact 22 1236   Number of days: 2266        Elimination:  Continence:    Bowel: {YES / VN:07609}  Bladder: {YES / YK:85227}  Urinary Catheter: {Urinary Catheter:938544649}   Colostomy/Ileostomy/Ileal Conduit: {YES / TZ:66888}       Date of Last BM: ***    Intake/Output Summary (Last 24 hours) at 3/14/2022 0812  Last data filed at 3/14/2022 0642  Gross per 24 hour   Intake 585.34 ml   Output 1125 ml   Net -539.66 ml     I/O last 3 completed shifts:   In: 585.3 [I.V.:75; IV Piggyback:510.3]  Out: 1525 [Urine:1525]    Safety Concerns:     [address] KARINA Safety Concerns:171323890}    Impairments/Disabilities:      { KARINA Impairments/Disabilities:948846075}    Nutrition Therapy:  Current Nutrition Therapy:   { KARINA Diet List:255044937}    Routes of Feeding: {[facility] Other Feedings:031499306}  Liquids: {Slp liquid thickness:56990}  Daily Fluid Restriction: {[facility] DME Yes amt example:283429473}  Last Modified Barium Swallow with Video (Video Swallowing Test): {Done Not Done UUEO:417890247}    Treatments at the Time of Hospital Discharge:   Respiratory Treatments: ***  Oxygen Therapy:  {Therapy; copd oxygen:28907}  Ventilator:    {[facility] Vent SEIQ:183495599}    Rehab Therapies: {THERAPEUTIC INTERVENTION:7657765751}  Weight Bearing Status/Restrictions: [address] [facility] Weight Bearin}  Other Medical Equipment (for information only, NOT a DME order):  {EQUIPMENT:180519693}  Other Treatments: ***    Patient's personal belongings (please select all that are sent with patient):  {[facility] DME Belongings:482623661}    RN SIGNATURE:  {Esignature:713914485}    CASE MANAGEMENT/SOCIAL WORK SECTION    Inpatient Status Date: ***    Readmission Risk Assessment Score:  Readmission Risk              Risk of Unplanned Readmission:  11           Discharging to Facility/ Agency   Name:   Address:  Phone:  Fax:    Dialysis Facility (if applicable)   Name:  Address:  Dialysis Schedule:  Phone:  Fax:    / signature: {Esignature:801347707}    PHYSICIAN SECTION    Prognosis: Good    Condition at Discharge: Stable    Rehab Potential (if transferring to Rehab): Good    Recommended Labs or Other Treatments After Discharge: CBC differential BMP sed rate CRP in 7 days. Dressings to foot to remain clean, dry, intact until follow up outpatient with Dr. Jayden Mariee    Physician Certification: I certify the above information and transfer of Rell Patel  is necessary for the continuing treatment of the diagnosis listed and that he requires Acute Rehab for greater 30 days.      Update Admission H&P: No change in H&P    PHYSICIAN SIGNATURE:  Electronically signed by Flores Batista DO on 3/14/22 at 8:13 AM EDT

## 2022-03-14 NOTE — PROGRESS NOTES
Writer called IR to ask about PICC line placement. Writer was informed they \"would not be able to get to it today. \"

## 2022-03-14 NOTE — CARE COORDINATION
TC from Dr Latisha Wolfe re: IV ATB at d/c. New order for PICC line. RN to check with Nephro before placing PICC. D/C Cefepime and Flagyl. Keep IV Daptomycin until 3/24/22. ARU to evaluate for admission. Clin Lead Amelia Arrieta notified of above.   Electronically signed by Shayla Duncan RN on 3/14/2022 at 11:25 AM

## 2022-03-14 NOTE — PROGRESS NOTES
[address]   Occupational Therapy Evaluation  Date: 3/14/22  Patient Name: Nathan Monson       Room: [address] [place], [place] [address]  MRN: [id number]  Account: [de-identified]   : 1955  (77 y.o.) Gender: male     Discharge Recommendations: The patient may need non-skilled ADL assistance after discharge. Equipment Needed: No    Referring Practitioner: Alina Stratton DO  Diagnosis: Diabetic foot infection; s/p second toe amputation on L foot 3/13/22  Additional Pertinent Hx: 78 y.o. male with significant medical history of diabetes and peripheral vascular disease has had already his big toes on both feet amputated in the past about 10 years ago or so presented with left toe gangrenous and cellulitis patient states he stubbed his foot on the weekend and had like a blister he peeled it off and the next day it got red and yesterday got more red came into the emergency room he denies having any fevers or chills or any nausea or vomiting. Past Medical History:  has a past medical history of Atrial fibrillation ([address] CAD (coronary artery disease), CHF (congestive heart failure) ([address] Diabetes mellitus ([address] Hyperlipidemia, and Hypertension. Past Surgical History:   has a past surgical history that includes eye surgery (Bilateral); hernia repair; Knee arthroscopy (Right); Colonoscopy; Endoscopy, colon, diagnostic; pacemaker placement (); Cardiac surgery (); Toe amputation (Right); debridement (Right, 2015); other surgical history (Right, 12 29 15); and Toe amputation (Left, 3/13/2022).     Restrictions  Restrictions/Precautions: Weight Bearing,Fall Risk,Up as Tolerated  Implants present? : Metal implants (CABG x3)  Other position/activity restrictions: IV L UE, h/o Bilat great toe amputations  Required Braces or Orthoses?: Yes (post op surgical shoe L LE)     Vitals  Temp: 98.6 °F (37 °C)  Pulse: 92  Resp: 18  BP: 129/84  Height: 5' 11\" (180.3 cm)  Weight: 198 lb 13.7 oz (90.2 kg)  BMI (Calculated): 27.8  Oxygen Therapy  SpO2: 95 %  Pulse Oximeter Device Mode: Intermittent  Pulse Oximeter Device Location: Finger  O2 Device: None (Room air)  Skin Assessment: Clean, dry, & intact  O2 Flow Rate (L/min): 0 L/min  Level of Consciousness: Alert (0)    Subjective  Comments: Okay for OT/PT per RN. Overall Orientation Status: Within Functional Limits  Vision  Vision: Impaired  Vision Exceptions: Wears glasses for reading  Hearing  Hearing: Within functional limits  Social/Functional History  Lives With:  (lives with mom)  Type of Home: House  Home Layout: One level  Home Access: Stairs to enter with rails  Entrance Stairs - Number of Steps: 4-5 steps, Bilat rails  Entrance Stairs - Rails: Both  Bathroom Shower/Tub: Tub/Shower unit  Bathroom Toilet: Standard  Bathroom Accessibility: [place] accessible (RW will \"barely\" fit into bathroom)  ADL Assistance: Independent  Ambulation Assistance: Independent  Transfer Assistance: Independent  Active : Yes  Mode of Transportation: SUV,Truck  Occupation: Retired  Additional Comments: States his mom will be 80 yrs old this July, otherwise does not have other family members who can assist. States mom can assist with IADLs at home. Objective      Cognition  Overall Cognitive Status: WFL   Sensation  Overall Sensation Status: WFL (pt repeatedly denies sensation deficits)   ADL  Feeding: Modified independent   Grooming: Modified independent   UE Bathing: Modified independent   LE Bathing: Modified independent   UE Dressing: Independent  LE Dressing: Modified independent   Toileting: Modified independent   Additional Comments: ADL scores based on skilled observation and clinical reasoning, unless otherwise noted.      UE Function           LUE Strength  L Hand General: 4+/5  LUE Strength Comment: Overall 4+/5     LUE Tone: Normotonic     LUE AROM (degrees)  LUE AROM : WFL     Left Hand AROM (degrees)  Left Hand AROM: WFL  RUE Strength  R Hand General: 4+/5  RUE Strength Comment: Overall 4+/5      RUE Tone: Normotonic     RUE AROM (degrees)  RUE AROM : WFL     Right Hand AROM (degrees)  Right Hand AROM: WFL    Fine Motor Skills  Coordination  Movements Are Fluid And Coordinated: Yes                           Mobility  Supine to Sit: Independent  Sit to Supine: Independent       Balance  Sitting Balance: Independent  Standing Balance: Independent  Standing Balance  Time: 4-5 minutes, 2-3 minutes  Activity: functional transfers, functional mobility, stair management  Comment: no device for UE support  Functional Mobility  Functional - Mobility Device: No device  Activity:  (To/from patient's door)  Assist Level: Independent  Functional Mobility Comments: No LOB. Educated on wear of surgical shoe and tennis shoe for similiar height. Bed mobility  Supine to Sit: Independent  Sit to Supine: Independent  Scooting: Independent     Transfers  Sit to stand: Independent  Stand to sit: Independent  Transfer Comments: no LOB  Functional Activity Tolerance  Functional Activity Tolerance:  Tolerates 30 min exercise with multiple rests     Assessment  Assessment  Prognosis: Good  Decision Making: Medium Complexity  REQUIRES OT FOLLOW UP: No  No Skilled OT: At baseline function,No OT goals identified  Discharge Recommendations: Home with assist PRN  Activity Tolerance: Patient Tolerated treatment well         Functional Outcome Measures  AM-PAC Daily Activity Inpatient   How much help for putting on and taking off regular lower body clothing?: None  How much help for Bathing?: None  How much help for Toileting?: None  How much help for putting on and taking off regular upper body clothing?: None  How much help for taking care of personal grooming?: None  How much help for eating meals?: None  AM-[facility] Inpatient Daily Activity Raw Score: 24  AM-PAC Inpatient ADL T-Scale Score : 57.54  ADL Inpatient CMS 0-100% Score: 0  ADL Inpatient CMS G-Code Modifier : [address] Goals       Plan  Safety Devices  Safety Devices in place: Yes  Type of devices:  All fall risk precautions in place,Call light within reach,Gait belt,Patient at risk for falls,Left in bed,Nurse notified             Equipment Recommendations  Equipment Needed: No  OT Individual Minutes  Time In: 9778  Time Out: 9538  Minutes: 24    Electronically signed by Luz Charles OT on 3/14/22 at 5:20 PM EDT         03/14/22 1720   OT Individual Minutes   Time In 0243   Time Out 5535   Minutes 24   Time Code Minutes    Timed Code Treatment Minutes 10 Minutes

## 2022-03-14 NOTE — PROGRESS NOTES
[facility] -   Infectious diseases evaluation  admission date 3/7/2022    reason for consultation:   Left Second Toe Gangrene and Cellulitis    Impression :   Current:  MRSA bacteremia 2/2 suspected osteomyelitis of the left 2nd toe   Gangrene left 2nd toe; S/p fillet 2nd left toe 3/13  Cellulitis left foot  S/p bilateral great big toe amputation  Elevated CRP  Elevated ESR  Type 2 diabetes mellitus  BRIANNA on CKD stage IIIb  PVD  CHF  S/p CABG  A. fib on warfarin  Essential hypertension  Allergy to Penicillin-Rash      Discussion / summary of stay / plan of care   Echo negative for obvious vegetations. Unable to obtain MRI as patient has an AICD. Recommendations   Continue Daptomycin IV, pharmacy to dose through 3/24/2022  Follow intraoperative cultures and adjust antibiotics as needed  Discontinue cefepime and Flagyl  No statins while on Daptomycin. PICC line if okay with nephrology. Follow CBC and renal function closely. Follow LFTs and CK  Discussed with discharge planner. Infection Control Recommendations   [place] Precautions  Contact Isolation         History of Present Illness:   Initial history:  Parish Shirley is a 78y.o.-year-old male with a PMH significant for type 2 diabetes mellitus, CKD stage IIIb,  PVD, s/p CABG, CHF, A. fib on Coumadin, essential hypertension, and s/p bilateral great big toe amputation who presents to [facility] on 3/7/2022 and is admitted for the management of left second toe gangrene and cellulitis. Labs upon presentation were remarkable for , K3.4, creatinine 2.10, LA 3.1--->1.7, CRP 61.1--->87.8, WBC 7.3, ESR 21--->63. X-ray left foot showed suspicion for possible early osteomyelitis at the second toe distal phalanx. It also showed soft tissue swelling of the second toe at the forefoot. MRI of the foot was not possible because patient has an ICD.   Therefore, CT of the left foot was taken which showed no convincing evidence of osteomyelitis or other acute osseous abnormality. There was extensive subcutaneous edema compatible with bland edema or cellulitis. No soft tissue gas or drainable fluid collection identified. 3/7/22                Interval changes  3/14/2022   Afebrile  He is feeling well, postoperative pain under control , denied fever or chills,no new complaints   S/p fillet 2nd left toe yesterday  Surgical dressing clean, dry and intact    Patient Vitals for the past 8 hrs:   BP Temp Pulse Resp SpO2   03/14/22 0633 129/84 98.6 °F (37 °C) 92 18 95 %       Summary of relevant labs:  Labs:  Cre: 1.74-1.57-1.52-1.43  CRP: 87.8  WBC: 4.2-4.1-5.0-3.6- 6.6  ESR: 63       Micro:  3/7 BC x 2-MRSA  3/10 BC x 2-Negative to date. Imaging:  3/7 CT Left Foot    Narrative   EXAMINATION:   CT OF THE LEFT FOOT WITHOUT CONTRAST 3/7/2022 4:00 pm       TECHNIQUE:   CT of the left foot was performed without the administration of intravenous   contrast.  Multiplanar reformatted images are provided for review.  Dose   modulation, iterative reconstruction, and/or weight based adjustment of the   mA/kV was utilized to reduce the radiation dose to as low as reasonably   achievable.       COMPARISON:   Left foot radiographs 03/07/2022.       HISTORY   ORDERING SYSTEM PROVIDED HISTORY: r/o osteo v gas   TECHNOLOGIST PROVIDED HISTORY:   r/o osteo v gas   Reason for Exam: diabetic wound on left foot       FINDINGS:   The distal tibia and fibula are intact.  No syndesmotic widening.  The ankle   mortise is intact.  Avascular necrosis of the talar dome without subchondral   collapse.  Mild tibiotalar degenerative changes.  The subtalar joint is   unremarkable.  Small retrocalcaneal and plantar calcaneal enthesophytes.  The   talonavicular joint is unremarkable.  Mild calcaneocuboid degenerative   changes.  Normal midfoot alignment is maintained.  No Lisfranc interval   widening.  No significant midfoot degenerative changes.  Status post 1st toe   amputation.  Mild degenerative changes at the articulation between the 1st   metatarsal head and hallux sesamoids.  No fracture or dislocation.  No   osseous erosion.       Diffuse subcutaneous edema.  No drainable fluid collection or soft tissue gas   identified.  Extensive atherosclerotic vascular calcifications.  The   visualized tendons are grossly intact.           Impression   1. No convincing evidence of osteomyelitis or other acute osseous   abnormality.  If there is persistent clinical concern for osteomyelitis   further evaluation could be obtained with MRI. 2. Extensive subcutaneous edema compatible with bland edema or cellulitis. No soft tissue gas or drainable fluid collection identified.           I have personally reviewed the past medical history, past surgical history, medications, social history, and family history, and I haveupdated the database accordingly. Allergies:   Pcn [penicillins]     Review of Systems:     Review of Systems   Skin: Positive for wound. Second left toe. All other systems reviewed and are negative. Physical Examination :       Physical Exam  Vitals and nursing note reviewed. Constitutional:       General: He is not in acute distress. Appearance: Normal appearance. He is normal weight. HENT:      Head: Normocephalic and atraumatic. Right Ear: External ear normal.      Left Ear: External ear normal.      Nose: Nose normal.   Eyes:      Conjunctiva/sclera: Conjunctivae normal.   Pulmonary:      Effort: Pulmonary effort is normal. No respiratory distress. Abdominal:      General: There is no distension. Palpations: Abdomen is soft. Tenderness: There is no abdominal tenderness. Genitourinary:     Comments: No almazan. Musculoskeletal:         General: Normal range of motion. Right lower leg: No edema. Left lower leg: No edema. Skin:     General: Skin is warm and dry.       Comments: Surgical dressing intact to left foot   Neurological:      Mental Status: He is alert and oriented to person, place, and time.    Psychiatric:         Behavior: Behavior normal.         Past Medical History:     Past Medical History:   Diagnosis Date    Atrial fibrillation ([facility]     CAD (coronary artery disease)     CHF (congestive heart failure) (HCC)     Diabetes mellitus ([facility]     Hyperlipidemia     Hypertension        Past Surgical  History:     Past Surgical History:   Procedure Laterality Date    CARDIAC SURGERY  2010    CABG X3    COLONOSCOPY      DEBRIDEMENT Right 11/19/2015    DEBRIDEMENT WOUND FOOT RIGHT W/ APPLICATION BILAT INTEG RAT GRAFT    ENDOSCOPY, COLON, DIAGNOSTIC      EYE SURGERY Bilateral     cataracts    HERNIA REPAIR      umbilical    KNEE ARTHROSCOPY Right     OTHER SURGICAL HISTORY Right 12 29 15    wound care graft procedure foot,appl of integra    PACEMAKER PLACEMENT  2011    WITH DEFIBRILLATOR    TOE AMPUTATION Right     R great       Medications:      furosemide  20 mg IntraVENous Once    warfarin  10 mg Oral Once    warfarin placeholder: dosing by provider   Other RX Placeholder    enoxaparin  1 mg/kg SubCUTAneous BID    glipiZIDE  5 mg Oral QAM AC    daptomycin (CUBICIN) IVPB  6 mg/kg (Ideal) IntraVENous Q24H    cefepime  1,000 mg IntraVENous Q12H    metroNIDAZOLE  500 mg IntraVENous Q8H    sodium chloride flush  5-40 mL IntraVENous 2 times per day    carvedilol  6.25 mg Oral BID WC    insulin lispro  0-12 Units SubCUTAneous TID WC    insulin lispro  0-6 Units SubCUTAneous Nightly       Social History:     Social History     Socioeconomic History    Marital status: Single     Spouse name: Not on file    Number of children: Not on file    Years of education: Not on file    Highest education level: Not on file   Occupational History    Not on file   Tobacco Use    Smoking status: Never Smoker    Smokeless tobacco: Never Used   Substance and Sexual Activity    Alcohol use: Yes     Comment: SOCIAL on weekends    Drug use: No    Sexual activity: Not on file   Other Topics Concern    Not on file   Social History Narrative    Not on file     Social Determinants of Health     Financial Resource Strain:     Difficulty of Paying Living Expenses: Not on file   Food Insecurity:     Worried About Running Out of Food in the Last Year: Not on file    Tee of Food in the Last Year: Not on file   Transportation Needs:     Lack of Transportation (Medical): Not on file    Lack of Transportation (Non-Medical):  Not on file   Physical Activity:     Days of Exercise per Week: Not on file    Minutes of Exercise per Session: Not on file   Stress:     Feeling of Stress : Not on file   Social Connections:     Frequency of Communication with Friends and Family: Not on file    Frequency of Social Gatherings with Friends and Family: Not on file    Attends Hindu Services: Not on file    Active Member of [address] [place] [address] or Organizations: Not on file    Attends Club or Organization Meetings: Not on file    Marital Status: Not on file   Intimate Partner Violence:     Fear of Current or Ex-Partner: Not on file    Emotionally Abused: Not on file    Physically Abused: Not on file    Sexually Abused: Not on file   Housing Stability:     Unable to Pay for Housing in the Last Year: Not on file    Number of [place] in the Last Year: Not on file    Unstable Housing in the Last Year: Not on file       Family History:     Family History   Problem Relation Age of Onset    Cancer Father         pancreatic cancer    Other Brother         MI    Osteoarthritis Mother     Other Maternal Grandfather         MI      Medical Decision Making:   I have independently reviewed/ordered the following labs:    CBC with Differential:   Recent Labs     03/13/22  1135 03/14/22  0644   WBC 5.3 6.6   HGB 10.2* 10.4*   HCT 29.7* 29.6*    234   LYMPHOPCT 15* 15*   MONOPCT 8* 9* BMP:  Recent Labs     03/13/22  1135 03/14/22  0644    134*   K 3.9 4.3    99   CO2 25 24   BUN 19 18   CREATININE 1.42* 1.43*     Hepatic Function Panel: No results for input(s): PROT, LABALBU, BILIDIR, IBILI, BILITOT, ALKPHOS, ALT, AST in the last 72 hours. No results for input(s): RPR in the last 72 hours. No results for input(s): HIV in the last 72 hours. No results for input(s): BC in the last 72 hours. Lab Results   Component Value Date    CREATININE 1.43 03/14/2022    GLUCOSE 117 03/14/2022    GLUCOSE 335 12/28/2011       Detailed results: Thank you for allowing us to participate in the care of this patient. Please call with questions. This note is created with the assistance of a speech recognition program.  While intending to generate adocument that actually reflects the content of the visit, the document can still have some errors including those of syntax and sound a like substitutions which may escape proof reading. It such instances, actual meaningcan be extrapolated by contextual diversion.     Lubna Cheek MD  Office: [phone]  Perfect serve / office [phone]

## 2022-03-14 NOTE — PLAN OF CARE
Problem: Falls - Risk of:  Goal: Will remain free from falls  Description: Will remain free from falls  3/14/2022 1756 by Anya Shaw RN  Outcome: Met This Shift  3/14/2022 0514 by Miguelito Drake RN  Outcome: Ongoing  Goal: Absence of physical injury  Description: Absence of physical injury  3/14/2022 1756 by Anya Shaw RN  Outcome: Met This Shift  3/14/2022 0514 by Miguelito Drake RN  Outcome: Ongoing     Problem: Skin Integrity:  Goal: Will show no infection signs and symptoms  Description: Will show no infection signs and symptoms  3/14/2022 1756 by Anya Shaw RN  Outcome: Met This Shift  3/14/2022 0514 by Miguelito Drake RN  Outcome: Ongoing  Goal: Absence of new skin breakdown  Description: Absence of new skin breakdown  3/14/2022 1756 by Anya Shaw RN  Outcome: Met This Shift  3/14/2022 0514 by Miguelito Drake RN  Outcome: Ongoing     Problem: Nutrition  Goal: Optimal nutrition therapy  3/14/2022 1756 by Anya Shaw RN  Outcome: Met This Shift  3/14/2022 1356 by Magdiel Roberson RD, LD  Outcome: Ongoing  3/14/2022 0514 by Miguelito Drake RN  Outcome: Ongoing

## 2022-03-15 ENCOUNTER — APPOINTMENT (OUTPATIENT)
Dept: INTERVENTIONAL RADIOLOGY/VASCULAR | Age: 67
DRG: 256 | End: 2022-03-15
Payer: MEDICARE

## 2022-03-15 VITALS
HEIGHT: 71 IN | DIASTOLIC BLOOD PRESSURE: 68 MMHG | HEART RATE: 76 BPM | WEIGHT: 198.85 LBS | SYSTOLIC BLOOD PRESSURE: 135 MMHG | RESPIRATION RATE: 18 BRPM | TEMPERATURE: 98.2 F | BODY MASS INDEX: 27.84 KG/M2 | OXYGEN SATURATION: 96 %

## 2022-03-15 LAB
ABSOLUTE EOS #: 0.1 K/UL (ref 0–0.4)
ABSOLUTE LYMPH #: 0.9 K/UL (ref 1–4.8)
ABSOLUTE MONO #: 0.6 K/UL (ref 0.1–1.3)
ANION GAP SERPL CALCULATED.3IONS-SCNC: 11 MMOL/L (ref 9–17)
BASOPHILS # BLD: 1 % (ref 0–2)
BASOPHILS ABSOLUTE: 0 K/UL (ref 0–0.2)
BUN BLDV-MCNC: 18 MG/DL (ref 8–23)
CALCIUM SERPL-MCNC: 8.7 MG/DL (ref 8.6–10.4)
CHLORIDE BLD-SCNC: 98 MMOL/L (ref 98–107)
CO2: 23 MMOL/L (ref 20–31)
CREAT SERPL-MCNC: 1.54 MG/DL (ref 0.7–1.2)
CULTURE: NORMAL
CULTURE: NORMAL
EOSINOPHILS RELATIVE PERCENT: 2 % (ref 0–4)
GFR AFRICAN AMERICAN: 55 ML/MIN
GFR NON-AFRICAN AMERICAN: 45 ML/MIN
GFR SERPL CREATININE-BSD FRML MDRD: ABNORMAL ML/MIN/{1.73_M2}
GLUCOSE BLD-MCNC: 107 MG/DL (ref 75–110)
GLUCOSE BLD-MCNC: 118 MG/DL (ref 70–99)
GLUCOSE BLD-MCNC: 118 MG/DL (ref 75–110)
HCT VFR BLD CALC: 26.2 % (ref 41–53)
HEMOGLOBIN: 9 G/DL (ref 13.5–17.5)
INR BLD: 1.8
LYMPHOCYTES # BLD: 18 % (ref 24–44)
MCH RBC QN AUTO: 31.5 PG (ref 26–34)
MCHC RBC AUTO-ENTMCNC: 34.5 G/DL (ref 31–37)
MCV RBC AUTO: 91.2 FL (ref 80–100)
MONOCYTES # BLD: 11 % (ref 1–7)
NEUTROPHIL AB, FLOW: NEGATIVE
PDW BLD-RTO: 15 % (ref 11.5–14.9)
PLATELET # BLD: 196 K/UL (ref 150–450)
PMV BLD AUTO: 7 FL (ref 6–12)
POTASSIUM SERPL-SCNC: 4.2 MMOL/L (ref 3.7–5.3)
PROTHROMBIN TIME: 20.4 SEC (ref 11.8–14.6)
RBC # BLD: 2.87 M/UL (ref 4.5–5.9)
SEG NEUTROPHILS: 68 % (ref 36–66)
SEGMENTED NEUTROPHILS ABSOLUTE COUNT: 3.5 K/UL (ref 1.3–9.1)
SODIUM BLD-SCNC: 132 MMOL/L (ref 135–144)
SPECIMEN DESCRIPTION: NORMAL
SPECIMEN DESCRIPTION: NORMAL
WBC # BLD: 5.1 K/UL (ref 3.5–11)

## 2022-03-15 PROCEDURE — 80048 BASIC METABOLIC PNL TOTAL CA: CPT

## 2022-03-15 PROCEDURE — 85025 COMPLETE CBC W/AUTO DIFF WBC: CPT

## 2022-03-15 PROCEDURE — 82947 ASSAY GLUCOSE BLOOD QUANT: CPT

## 2022-03-15 PROCEDURE — 2580000003 HC RX 258: Performed by: RADIOLOGY

## 2022-03-15 PROCEDURE — 05HB33Z INSERTION OF INFUSION DEVICE INTO RIGHT BASILIC VEIN, PERCUTANEOUS APPROACH: ICD-10-PCS | Performed by: FAMILY MEDICINE

## 2022-03-15 PROCEDURE — 02HV33Z INSERTION OF INFUSION DEVICE INTO SUPERIOR VENA CAVA, PERCUTANEOUS APPROACH: ICD-10-PCS | Performed by: RADIOLOGY

## 2022-03-15 PROCEDURE — 2709999900 IR FLUORO GUIDED CVA DEVICE PLMT/REPLACE/REMOVAL

## 2022-03-15 PROCEDURE — 36573 INSJ PICC RS&I 5 YR+: CPT

## 2022-03-15 PROCEDURE — B54MZZA ULTRASONOGRAPHY OF RIGHT UPPER EXTREMITY VEINS, GUIDANCE: ICD-10-PCS | Performed by: FAMILY MEDICINE

## 2022-03-15 PROCEDURE — 99232 SBSQ HOSP IP/OBS MODERATE 35: CPT | Performed by: INTERNAL MEDICINE

## 2022-03-15 PROCEDURE — 36415 COLL VENOUS BLD VENIPUNCTURE: CPT

## 2022-03-15 PROCEDURE — 6370000000 HC RX 637 (ALT 250 FOR IP): Performed by: PODIATRIST

## 2022-03-15 PROCEDURE — 2580000003 HC RX 258: Performed by: PODIATRIST

## 2022-03-15 PROCEDURE — 85610 PROTHROMBIN TIME: CPT

## 2022-03-15 PROCEDURE — 6360000002 HC RX W HCPCS: Performed by: FAMILY MEDICINE

## 2022-03-15 PROCEDURE — 6370000000 HC RX 637 (ALT 250 FOR IP): Performed by: FAMILY MEDICINE

## 2022-03-15 RX ORDER — ISOSORBIDE MONONITRATE 60 MG/1
60 TABLET, EXTENDED RELEASE ORAL DAILY
Qty: 30 TABLET | Refills: 3 | Status: ON HOLD | OUTPATIENT
Start: 2022-03-15 | End: 2022-07-01 | Stop reason: HOSPADM

## 2022-03-15 RX ORDER — NITROGLYCERIN 0.4 MG/1
TABLET SUBLINGUAL
Qty: 25 TABLET | Refills: 3 | Status: ON HOLD | OUTPATIENT
Start: 2022-03-15 | End: 2022-08-26 | Stop reason: HOSPADM

## 2022-03-15 RX ORDER — GLIPIZIDE 5 MG/1
5 TABLET ORAL
Qty: 60 TABLET | Refills: 3 | Status: SHIPPED | OUTPATIENT
Start: 2022-03-16 | End: 2022-08-23

## 2022-03-15 RX ORDER — ACETAMINOPHEN AND CODEINE PHOSPHATE 300; 30 MG/1; MG/1
1 TABLET ORAL EVERY 4 HOURS PRN
Qty: 42 TABLET | Refills: 0 | Status: SHIPPED | OUTPATIENT
Start: 2022-03-15 | End: 2022-03-22

## 2022-03-15 RX ORDER — LINEZOLID 600 MG/1
600 TABLET, FILM COATED ORAL 2 TIMES DAILY
Qty: 18 TABLET | Refills: 0
Start: 2022-03-15 | End: 2022-03-24

## 2022-03-15 RX ORDER — ASPIRIN 81 MG/1
81 TABLET ORAL DAILY
Qty: 30 TABLET | Refills: 3 | Status: SHIPPED | OUTPATIENT
Start: 2022-03-15

## 2022-03-15 RX ORDER — WARFARIN SODIUM 5 MG/1
5 TABLET ORAL
Status: DISCONTINUED | OUTPATIENT
Start: 2022-03-15 | End: 2022-03-15 | Stop reason: HOSPADM

## 2022-03-15 RX ORDER — HYDRALAZINE HYDROCHLORIDE 50 MG/1
50 TABLET, FILM COATED ORAL EVERY 8 HOURS SCHEDULED
Qty: 90 TABLET | Refills: 3 | Status: ON HOLD | OUTPATIENT
Start: 2022-03-15 | End: 2022-07-13

## 2022-03-15 RX ORDER — FUROSEMIDE 80 MG
80 TABLET ORAL DAILY
Qty: 60 TABLET | Refills: 3 | Status: SHIPPED | OUTPATIENT
Start: 2022-03-15

## 2022-03-15 RX ORDER — HYDRALAZINE HYDROCHLORIDE 50 MG/1
50 TABLET, FILM COATED ORAL EVERY 8 HOURS SCHEDULED
Status: DISCONTINUED | OUTPATIENT
Start: 2022-03-15 | End: 2022-03-15 | Stop reason: HOSPADM

## 2022-03-15 RX ORDER — SODIUM CHLORIDE 9 MG/ML
25 INJECTION, SOLUTION INTRAVENOUS PRN
Status: DISCONTINUED | OUTPATIENT
Start: 2022-03-15 | End: 2022-03-15 | Stop reason: HOSPADM

## 2022-03-15 RX ORDER — SODIUM CHLORIDE 0.9 % (FLUSH) 0.9 %
5-40 SYRINGE (ML) INJECTION PRN
Status: DISCONTINUED | OUTPATIENT
Start: 2022-03-15 | End: 2022-03-15 | Stop reason: HOSPADM

## 2022-03-15 RX ORDER — ISOSORBIDE MONONITRATE 60 MG/1
60 TABLET, EXTENDED RELEASE ORAL DAILY
Status: DISCONTINUED | OUTPATIENT
Start: 2022-03-15 | End: 2022-03-15 | Stop reason: HOSPADM

## 2022-03-15 RX ORDER — FERROUS SULFATE 325(65) MG
325 TABLET ORAL
Status: DISCONTINUED | OUTPATIENT
Start: 2022-03-15 | End: 2022-03-15 | Stop reason: HOSPADM

## 2022-03-15 RX ORDER — LISINOPRIL 5 MG/1
5 TABLET ORAL DAILY
Status: DISCONTINUED | OUTPATIENT
Start: 2022-03-15 | End: 2022-03-15 | Stop reason: HOSPADM

## 2022-03-15 RX ORDER — BENAZEPRIL HYDROCHLORIDE 10 MG/1
5 TABLET ORAL DAILY
Status: DISCONTINUED | OUTPATIENT
Start: 2022-03-15 | End: 2022-03-15 | Stop reason: RX

## 2022-03-15 RX ORDER — BENAZEPRIL HYDROCHLORIDE 5 MG/1
5 TABLET, FILM COATED ORAL DAILY
Qty: 30 TABLET | Refills: 3 | Status: SHIPPED | OUTPATIENT
Start: 2022-03-15 | End: 2022-08-23

## 2022-03-15 RX ORDER — SODIUM CHLORIDE 0.9 % (FLUSH) 0.9 %
5-40 SYRINGE (ML) INJECTION EVERY 12 HOURS SCHEDULED
Status: DISCONTINUED | OUTPATIENT
Start: 2022-03-15 | End: 2022-03-15 | Stop reason: HOSPADM

## 2022-03-15 RX ORDER — ASPIRIN 81 MG/1
81 TABLET ORAL DAILY
Status: DISCONTINUED | OUTPATIENT
Start: 2022-03-15 | End: 2022-03-15 | Stop reason: HOSPADM

## 2022-03-15 RX ADMIN — LISINOPRIL 5 MG: 5 TABLET ORAL at 09:05

## 2022-03-15 RX ADMIN — CARVEDILOL 6.25 MG: 6.25 TABLET, FILM COATED ORAL at 17:15

## 2022-03-15 RX ADMIN — ASPIRIN 81 MG: 81 TABLET, COATED ORAL at 09:05

## 2022-03-15 RX ADMIN — HYDRALAZINE HYDROCHLORIDE 50 MG: 50 TABLET, FILM COATED ORAL at 09:05

## 2022-03-15 RX ADMIN — OXYCODONE HYDROCHLORIDE 5 MG: 5 TABLET ORAL at 17:18

## 2022-03-15 RX ADMIN — ISOSORBIDE MONONITRATE 60 MG: 60 TABLET, EXTENDED RELEASE ORAL at 09:05

## 2022-03-15 RX ADMIN — FERROUS SULFATE TAB 325 MG (65 MG ELEMENTAL FE) 325 MG: 325 (65 FE) TAB at 09:05

## 2022-03-15 RX ADMIN — CARVEDILOL 6.25 MG: 6.25 TABLET, FILM COATED ORAL at 09:05

## 2022-03-15 RX ADMIN — HYDRALAZINE HYDROCHLORIDE 50 MG: 50 TABLET, FILM COATED ORAL at 15:33

## 2022-03-15 RX ADMIN — GLIPIZIDE 5 MG: 5 TABLET ORAL at 06:29

## 2022-03-15 RX ADMIN — ENOXAPARIN SODIUM 90 MG: 100 INJECTION SUBCUTANEOUS at 09:06

## 2022-03-15 RX ADMIN — SODIUM CHLORIDE, PRESERVATIVE FREE 10 ML: 5 INJECTION INTRAVENOUS at 08:53

## 2022-03-15 RX ADMIN — Medication 10 ML: at 16:44

## 2022-03-15 RX ADMIN — OXYCODONE HYDROCHLORIDE 5 MG: 5 TABLET ORAL at 06:28

## 2022-03-15 ASSESSMENT — PAIN SCALES - GENERAL
PAINLEVEL_OUTOF10: 4
PAINLEVEL_OUTOF10: 4

## 2022-03-15 NOTE — FLOWSHEET NOTE
03/15/22 1625   Encounter Summary   Services provided to: Patient not available  (patient with nurse)

## 2022-03-15 NOTE — PLAN OF CARE
Problem: Falls - Risk of:  Goal: Will remain free from falls  Description: Will remain free from falls  3/15/2022 0438 by Eula Osman RN  Outcome: Ongoing  3/14/2022 1757 by Tone Bryant RN  Outcome: Met This Shift  3/14/2022 1756 by Tone Bryant RN  Outcome: Met This Shift  Goal: Absence of physical injury  Description: Absence of physical injury  3/15/2022 0438 by Eula Osman RN  Outcome: Ongoing  3/14/2022 1757 by Tone Bryant RN  Outcome: Met This Shift  3/14/2022 1756 by Tone Bryant RN  Outcome: Met This Shift     Problem: Skin Integrity:  Goal: Will show no infection signs and symptoms  Description: Will show no infection signs and symptoms  3/15/2022 0438 by Elua Osman RN  Outcome: Ongoing  3/14/2022 1757 by Tone Bryant RN  Outcome: Met This Shift  3/14/2022 1756 by Tone Bryant RN  Outcome: Met This Shift  Goal: Absence of new skin breakdown  Description: Absence of new skin breakdown  3/15/2022 0438 by Eula Osman RN  Outcome: Ongoing  3/14/2022 1757 by Tone Bryant RN  Outcome: Met This Shift  3/14/2022 1756 by Tone Bryant RN  Outcome: Met This Shift     Problem: Nutrition  Goal: Optimal nutrition therapy  3/15/2022 0438 by Eula Osman RN  Outcome: Ongoing  3/14/2022 1757 by Tone Bryant RN  Outcome: Met This Shift  3/14/2022 1756 by Tone Bryant RN  Outcome: Met This Shift

## 2022-03-15 NOTE — PLAN OF CARE
Problem: Falls - Risk of:  Goal: Will remain free from falls  Description: Will remain free from falls  3/15/2022 1636 by Sara Garcia RN  Outcome: Met This Shift  3/15/2022 0438 by Allyson Galeas RN  Outcome: Ongoing  Goal: Absence of physical injury  Description: Absence of physical injury  3/15/2022 1636 by Sara Garcia RN  Outcome: Met This Shift  3/15/2022 0438 by Allyson Galeas RN  Outcome: Ongoing     Problem: Skin Integrity:  Goal: Will show no infection signs and symptoms  Description: Will show no infection signs and symptoms  3/15/2022 1636 by Sara Garcia RN  Outcome: Met This Shift  3/15/2022 0438 by Allyson Galeas RN  Outcome: Ongoing  Goal: Absence of new skin breakdown  Description: Absence of new skin breakdown  3/15/2022 1636 by Sara Garcia RN  Outcome: Met This Shift  3/15/2022 0438 by Allyson Galeas RN  Outcome: Ongoing     Problem: Nutrition  Goal: Optimal nutrition therapy  3/15/2022 1636 by Sara Garcia RN  Outcome: Met This Shift  3/15/2022 0438 by Allyson Galeas RN  Outcome: Ongoing

## 2022-03-15 NOTE — PROGRESS NOTES
[id number] [email or web address]      PROGRESS NOTE        Patient:  Rupinder Ham  YOB: 1955    MRN: [id number]     Acct: [de-identified]     Admit date: 3/7/2022    Pt seen and Chart reviewed. Consultant notes reviewed and care evaluated. Subjective: Patient feeling okay pain is controlled no chest pain or increased shortness of breath no nausea vomiting. He likes to go home but he knows he needs IV antibiotic per ID he is on daptomycin the RN tells me this might cause some thousand blocks today patient obviously concerned about that acute rehab thinks he might qualify to go to their facility after talking the patient he did not mind going to acute rehab for now asked RN and the floor charge nurse to have acute rehab recheck his status and if he is approved we could transfer him to acute rehab    Diet:  ADULT DIET;  Regular; 5 carb choices (75 gm/meal)      Medications:Current Inpatient    Scheduled Meds:   warfarin placeholder: dosing by provider   Other RX Placeholder    enoxaparin  1 mg/kg SubCUTAneous BID    glipiZIDE  5 mg Oral QAM AC    daptomycin (CUBICIN) IVPB  6 mg/kg (Ideal) IntraVENous Q24H    sodium chloride flush  5-40 mL IntraVENous 2 times per day    carvedilol  6.25 mg Oral BID WC    insulin lispro  0-12 Units SubCUTAneous TID WC    insulin lispro  0-6 Units SubCUTAneous Nightly     Continuous Infusions:   sodium chloride      dextrose 100 mL/hr (03/10/22 0619)     PRN Meds:oxyCODONE **OR** oxyCODONE, sodium chloride flush, sodium chloride, acetaminophen, ondansetron **OR** ondansetron, glucose, dextrose, glucagon (rDNA), dextrose        Physical Exam:  Vitals: BP (!) 160/77   Pulse 85   Temp 98.2 °F (36.8 °C)   Resp 18   Ht 5' 11\" (1.803 m)   Wt 198 lb 13.7 oz (90.2 kg)   SpO2 97%   BMI 27.73 kg/m²   24 hour intake/output:    Intake/Output Summary (Last 24 hours) at 3/15/2022 3223  Last data filed at 3/14/2022 2224  Gross per 24 hour   Intake --   Output 800 ml   Net -800 ml     Last 3 weights: Wt Readings from Last 3 Encounters:   03/07/22 198 lb 13.7 oz (90.2 kg)   02/05/21 212 lb 15.4 oz (96.6 kg)   05/23/16 223 lb (101.2 kg)       Physical Examination:   General appearance - alert, well appearing, and in no distress  Mental status - alert, oriented to person, place, and time  PERRLA wnl  Chest - clear to auscultation, no wheezes, rales or rhonchi, symmetric air entry  Heart - normal rate, regular rhythm, normal S1, S2, no murmurs, rubs, clicks or gallops  Abdomen - soft, nontender, nondistended, no masses or organomegaly  Neurological - alert, oriented, normal speech, no focal findings or movement disorder noted}  Extremities -his left foot is wrapped what I see from it looks intact the rest of the toe picture from the podiatry from yesterday looks like surgical changes is still some erythema to the dorsal aspect of his foot. He still has edema to the left leg but this is chronic no calf tenderness. Skin - normal coloration and turgor, no rashes, no suspicious skin lesions noted      Component Value Units   POC Glucose Fingerstick [[id number]]    Collected: 03/15/22 0613    Updated: 03/15/22 0620     POC Glucose 107 mg/dL   Protime-INR [[id number]] (Abnormal)    Collected: 03/15/22 0529    Updated: 03/15/22 0605    Specimen Source: Blood     Protime 20.4 High  sec    INR 1.8    Comment:        Non-therapeutic Range:      INR = 0.9-1.2   Therapeutic Range:    Moderate Anticoagulant Intensity:      INR = 2.0-3.0    High Anticoagulant Intensity:      INR = 2.5-3. 5             Basic Metabolic Panel [[id number]] (Abnormal)    Collected: 03/15/22 0529    Updated: 03/15/22 0604    Specimen Source: Blood     Glucose 118 High  mg/dL    BUN 18 mg/dL    CREATININE 1.54 High  mg/dL    Calcium 8.7 mg/dL    Sodium 132 Low  mmol/L    Potassium 4.2 mmol/L    Chloride 98 mmol/L    CO2 23 mmol/L    Anion Gap 11 mmol/L    GFR Non- 45 Low  mL/min    GFR African American 55 Low  mL/min    GFR Comment         Comment: Average GFR for 61-76 years old:    85 mL/min/1.73sq m   Chronic Kidney Disease:    <60 mL/min/1.73sq m   Kidney failure:    <15 mL/min/1.73sq m               eGFR calculated using average adult body mass. Additional eGFR calculator available at:         [email or web address]             CBC with Auto Differential [[id number]] (Abnormal)    Collected: 03/15/22 0529    Updated: 03/15/22 0549    Specimen Source: Blood     WBC 5.1 k/uL    RBC 2.87 Low  m/uL    Hemoglobin 9.0 Low  g/dL    Hematocrit 26.2 Low  %    MCV 91.2 fL    MCH 31.5 pg    MCHC 34.5 g/dL    RDW 15.0 High  %    Platelets 037 k/uL    MPV 7.0 fL    Seg Neutrophils 68 High  %    Lymphocytes 18 Low  %    Monocytes 11 High  %    Eosinophils % 2 %    Basophils 1 %    Segs Absolute 3.50 k/uL    Absolute Lymph # 0.90 Low  k/uL    Absolute Mono # 0.60 k/uL    Absolute Eos # 0.10 k/uL    Basophils Absolute 0.00 k/uL   POC Glucose Fingerstick [[id number]] (Abnormal)    Collected: 03/14/22 2008    Updated: 03/14/22 2014     POC Glucose 144 High  mg/dL   POC Glucose Fingerstick [[id number]] (Abnormal)    Collected: 03/14/22 1616    Updated: 03/14/22 1635     POC Glucose 135 High  mg/dL   Culture, Blood 1 [[id number]]    Collected: 03/10/22 0942    Updated: 03/14/22 1322    Specimen Source: Blood     Specimen Description . BLOOD    Culture NO GROWTH 4 DAYS   Culture, Blood 1 [[id number]]    Collected: 03/10/22 0942    Updated: 03/14/22 1322    Specimen Source: Blood     Specimen Description . BLOOD    Culture NO GROWTH 4 DAYS   Culture, Anaerobic and Aerobic [[id number]] (Abnormal)    Collected: 03/13/22 0836    Updated: 03/14/22 1220    Specimen Type: Swab    Specimen Source: Toe     Specimen Description . TOE 2ND DIGIT LEFT FOOT    Direct Exam MODERATE NEUTROPHILS Abnormal      NO BACTERIA SEEN    Culture CULTURE IN PROGRESS   POC Glucose Fingerstick [[id number]] (Abnormal)    Collected: 03/14/22 1118    Updated: 03/14/22 1135     POC Glucose 113 High  mg/dL   PROTEIN ELECTROPHORESIS, URINE [[id number]]    Collected: 03/08/22 2033    Updated: 03/14/22 4681    Specimen Source: Urine, clean catch     Specimen Type . CLEAN CATCH URINE    Urine Total Protein 147 mg/dL    P E Interpretation, U ELEVATED PROTEIN CONCENTRATION.  MOST SERUM PROTEINS ARE DETECTED IN THIS URINE.  USUALLY OBSERVED WITH MARKEDLY INCREASED NON-SELECTIVE GLOMERULAR PERMEABILITY (i.e. SEVERE GLOMERULAR DISEASE), CONTAMINATION OF    Comment:   URINE WITH BLOOD, OR A COMBINATION OF THESE.  A DECREASE IN TUBULAR FUNCTION CANNOT BE   RULED OUT. Pathologist ELECTRONICALLY SIGNED. Gopal Mata M.D. Assessment:  Principal Problem:    Diabetic foot infection ([facility] [address]  Active Problems:    Diabetic foot ulcer with osteomyelitis ([facility] [address]    MRSA bacteremia    Gangrene of toe of left foot (HCC)    Elevated C-reactive protein (CRP)    Elevated erythrocyte sedimentation rate    Acute kidney injury ([address]    Allergy to penicillin    Ulcer of toe of left foot, with necrosis of bone ([address]  Resolved Problems:    * No resolved hospital problems.  *     Diabetic foot infection (HCC) E11.628, L08.9    Diabetes ([facility] [address] E11.9    Chronic osteomyelitis ([facility]) M86.60    Diabetic foot ulcer ([address] E11.621, L97.509    PVD (peripheral vascular disease) ([facility] [address] I73.9    Atherosclerosis of coronary artery without angina pectoris I25.10    Cardiomyopathy ([address] I42.9    Cardiac left ventricular ejection fraction 21-40 percent R94.30    Diabetes mellitus type 2 with peripheral artery disease ([facility]) E11.51    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 ([facility]) L97.514    Chronic osteomyelitis of left foot ([facility]) K15.811    Onychomycosis B35.1    Heart failure ([facility]) I50.9      · Cellulitis of left foot  ·    · Gangrenous left big toe questionable osteomyelitis no evidence of that on CT  ·    · Renal insufficiency and probably chronic because that MRI probably would not be done secondary to requirement of IV dye  · With acute on chronic kidney injury  · Diabetes mellitus  ·    · History of peripheral vascular disease  ·    · History of previous amputations  ·    · Elevated inflammatory markers consistent with his infection  · Mild hypokalemia  · Chronic A. fib  · Cardiomyopathy with EF 35 percentile no evidence of CHF clinically at this time  · Moderate tricuspid regurgitation  ·    · Moderate pulmonary hypertension  · Positive blood culture with MRSA new blood cultures are negative  ·    · Hyperglycemia need to watch WHILE  on glipizide but will decrease the dose  And hemoglobin A1c is therapeutic working on the symptoms  Left arm swelling and infiltrate from his IV site no infection no evidence of DVT adjust the arm but the rest of the forearm is intact   Status post second toe amputation closure     Left leg pitting edema and dependent edema   Recent BMP with history of cardiomyopathy but no signs or symptoms of acute CHF  ID recommended daptomycin till 3/24/2022                       Plan:  23. Patient INR is 1.8 will to give him 5 mg Coumadin tonight once he is above 2 will DC Lovenox continue with the current treatment for now  24.   25. Continue with physical therapy  26. ID recommend daptomycin till 3/24/2022  27.  Acute rehab to reevaluate his admission status and if he is approved he can be transferred    Jose Francisco Mendes DO [facility]            3/15/2022, 7:42 AM

## 2022-03-15 NOTE — PROGRESS NOTES
[facility] -   Infectious diseases evaluation  admission date 3/7/2022    reason for consultation:   Left Second Toe Gangrene and Cellulitis    Impression :   Current:  MRSA bacteremia 2/2 suspected osteomyelitis of the left 2nd toe   Gangrene left 2nd toe; S/p fillet 2nd left toe 3/13  Cellulitis left foot  S/p bilateral great big toe amputation  Elevated CRP  Elevated ESR  Type 2 diabetes mellitus  BRIANNA on CKD stage IIIb  PVD  CHF  S/p CABG  A. fib on warfarin  Essential hypertension  Allergy to Penicillin-Rash      Discussion / summary of stay / plan of care   Echo negative for obvious vegetations. Unable to obtain MRI as patient has an AICD. PICC line placement 3/15  Recommendations   Continue Daptomycin IV, pharmacy to dose through 3/24/2022  However, due to financial reasons patient may need to be discharged on oral Zyvox 600 twice daily through 3/24/2022  Okay to discharge from an ID standpoint  Follow intraoperative cultures and adjust antibiotics as needed  Follow CBC and renal function closely. Follow LFTs and CK      Infection Control Recommendations   [place] Precautions  Contact Isolation         History of Present Illness:   Initial history:  Janie Jaimes is a 78y.o.-year-old male with a PMH significant for type 2 diabetes mellitus, CKD stage IIIb,  PVD, s/p CABG, CHF, A. fib on Coumadin, essential hypertension, and s/p bilateral great big toe amputation who presents to [facility] on 3/7/2022 and is admitted for the management of left second toe gangrene and cellulitis. Labs upon presentation were remarkable for , K3.4, creatinine 2.10, LA 3.1--->1.7, CRP 61.1--->87.8, WBC 7.3, ESR 21--->63. X-ray left foot showed suspicion for possible early osteomyelitis at the second toe distal phalanx. It also showed soft tissue swelling of the second toe at the forefoot. MRI of the foot was not possible because patient has an ICD. Therefore, CT of the left foot was taken which showed no convincing evidence of osteomyelitis or other acute osseous abnormality. There was extensive subcutaneous edema compatible with bland edema or cellulitis. No soft tissue gas or drainable fluid collection identified. 3/7/22                Interval changes  3/15/2022     Patient was seen and examined bedside. No acute events overnight. Patient remains afebrile with a creatinine 1.54. Patient successfully underwent PICC placement today. However, patient may need to be discharged on oral antibiotics due to financial reasons. He denies any fever or chills. Denies any chest pain, S OB, palpitations, abdominal pain, nausea, or vomiting. Patient Vitals for the past 8 hrs:   BP Temp Pulse Resp SpO2   03/15/22 1533 135/68 -- -- -- --   03/15/22 1337 117/62 98.2 °F (36.8 °C) 76 18 96 %   03/15/22 0905 (!) 155/94 -- 85 -- --       Summary of relevant labs:  Labs:  Cre: 1.74-1.57-1.52-1.43  CRP: 87.8  WBC: 4.2-4.1-5.0-3.6- 6.6  ESR: 63       Micro:  3/7 BC x 2-MRSA  3/10 BC x 2-Negative to date. Imaging:  3/7 CT Left Foot    Narrative   EXAMINATION:   CT OF THE LEFT FOOT WITHOUT CONTRAST 3/7/2022 4:00 pm       TECHNIQUE:   CT of the left foot was performed without the administration of intravenous   contrast.  Multiplanar reformatted images are provided for review.  Dose   modulation, iterative reconstruction, and/or weight based adjustment of the   mA/kV was utilized to reduce the radiation dose to as low as reasonably   achievable.       COMPARISON:   Left foot radiographs 03/07/2022.       HISTORY   ORDERING SYSTEM PROVIDED HISTORY: r/o osteo v gas   TECHNOLOGIST PROVIDED HISTORY:   r/o osteo v gas   Reason for Exam: diabetic wound on left foot       FINDINGS:   The distal tibia and fibula are intact.  No syndesmotic widening.  The ankle   mortise is intact.  Avascular necrosis of the talar dome without subchondral   collapse.  Mild tibiotalar degenerative changes.  The subtalar joint is   unremarkable.  Small retrocalcaneal and plantar calcaneal enthesophytes.  The   talonavicular joint is unremarkable.  Mild calcaneocuboid degenerative   changes.  Normal midfoot alignment is maintained.  No Lisfranc interval   widening.  No significant midfoot degenerative changes.  Status post 1st toe   amputation.  Mild degenerative changes at the articulation between the 1st   metatarsal head and hallux sesamoids.  No fracture or dislocation.  No   osseous erosion.       Diffuse subcutaneous edema.  No drainable fluid collection or soft tissue gas   identified.  Extensive atherosclerotic vascular calcifications.  The   visualized tendons are grossly intact.           Impression   1. No convincing evidence of osteomyelitis or other acute osseous   abnormality.  If there is persistent clinical concern for osteomyelitis   further evaluation could be obtained with MRI. 2. Extensive subcutaneous edema compatible with bland edema or cellulitis. No soft tissue gas or drainable fluid collection identified.           I have personally reviewed the past medical history, past surgical history, medications, social history, and family history, and I haveupdated the database accordingly. Allergies:   Pcn [penicillins]     Review of Systems:     Review of Systems   Skin: Positive for wound. S/p Second left toe amputation   All other systems reviewed and are negative. Physical Examination :       Physical Exam  Vitals and nursing note reviewed. Constitutional:       General: He is not in acute distress. Appearance: Normal appearance. He is normal weight. HENT:      Head: Normocephalic and atraumatic. Right Ear: External ear normal.      Left Ear: External ear normal.      Nose: Nose normal.   Eyes:      Conjunctiva/sclera: Conjunctivae normal.   Pulmonary:      Effort: Pulmonary effort is normal. No respiratory distress.    Abdominal:      General: There is no distension. Palpations: Abdomen is soft. Tenderness: There is no abdominal tenderness. Genitourinary:     Comments: No almazan. Musculoskeletal:         General: Normal range of motion. Right lower leg: No edema. Left lower leg: No edema. Skin:     General: Skin is warm and dry. Comments: Surgical dressing intact to left foot   Neurological:      Mental Status: He is alert and oriented to person, place, and time.    Psychiatric:         Behavior: Behavior normal.         Past Medical History:     Past Medical History:   Diagnosis Date    Atrial fibrillation ([facility] [address]     CAD (coronary artery disease)     [facility]     Diabetes mellitus ([facility] [address]     Hyperlipidemia     Hypertension        Past Surgical  History:     Past Surgical History:   Procedure Laterality Date    CARDIAC SURGERY  2010    CABG X3    COLONOSCOPY      DEBRIDEMENT Right 11/19/2015    DEBRIDEMENT WOUND FOOT RIGHT W/ APPLICATION BILAT INTEG RAT GRAFT    ENDOSCOPY, COLON, DIAGNOSTIC      EYE SURGERY Bilateral     cataracts    HERNIA REPAIR      umbilical    KNEE ARTHROSCOPY Right     OTHER SURGICAL HISTORY Right 12 29 15    wound care graft procedure foot,appl of integra    PACEMAKER PLACEMENT  2011    WITH DEFIBRILLATOR    TOE AMPUTATION Right     R great    TOE AMPUTATION Left 3/13/2022    TOE AMPUTATION--left 2nd digit performed by Jazz Horner DPM at [facility] OR       Medications:      warfarin  5 mg Oral Once    ferrous sulfate  325 mg Oral Daily with breakfast    aspirin  81 mg Oral Daily    hydrALAZINE  50 mg Oral 3 times per day    isosorbide mononitrate  60 mg Oral Daily    lisinopril  5 mg Oral Daily    sodium chloride flush  5-40 mL IntraVENous 2 times per day    warfarin placeholder: dosing by provider   Other RX Placeholder    enoxaparin  1 mg/kg SubCUTAneous BID    glipiZIDE  5 mg Oral QAM AC    daptomycin (CUBICIN) IVPB  6 mg/kg (Ideal) IntraVENous Q24H    sodium chloride flush  5-40 mL IntraVENous 2 times per day    carvedilol  6.25 mg Oral BID WC    insulin lispro  0-12 Units SubCUTAneous TID WC    insulin lispro  0-6 Units SubCUTAneous Nightly       Social History:     Social History     Socioeconomic History    Marital status: Single     Spouse name: Not on file    Number of children: Not on file    Years of education: Not on file    Highest education level: Not on file   Occupational History    Not on file   Tobacco Use    Smoking status: Never Smoker    Smokeless tobacco: Never Used   Substance and Sexual Activity    Alcohol use: Yes     Comment: SOCIAL on weekends    Drug use: No    Sexual activity: Not on file   Other Topics Concern    Not on file   Social History Narrative    Not on file     Social Determinants of Health     Financial Resource Strain:     Difficulty of Paying Living Expenses: Not on file   Food Insecurity:     Worried About Running Out of Food in the Last Year: Not on file    Tee of Food in the Last Year: Not on file   Transportation Needs:     Lack of Transportation (Medical): Not on file    Lack of Transportation (Non-Medical):  Not on file   Physical Activity:     Days of Exercise per Week: Not on file    Minutes of Exercise per Session: Not on file   Stress:     Feeling of Stress : Not on file   Social Connections:     Frequency of Communication with Friends and Family: Not on file    Frequency of Social Gatherings with Friends and Family: Not on file    Attends Druze Services: Not on file    Active Member of Clubs or Organizations: Not on file    Attends Club or Organization Meetings: Not on file    Marital Status: Not on file   Intimate Partner Violence:     Fear of Current or Ex-Partner: Not on file    Emotionally Abused: Not on file    Physically Abused: Not on file    Sexually Abused: Not on file   Housing Stability:     Unable to Pay for Housing in the Last Year: Not on file    Number of Places Lived in the Last Year: Not on file    Unstable Housing in the Last Year: Not on file       Family History:     Family History   Problem Relation Age of Onset    Cancer Father         pancreatic cancer    Other Brother         MI    Osteoarthritis Mother     Other Maternal Grandfather         MI      Medical Decision Making:   I have independently reviewed/ordered the following labs:    CBC with Differential:   Recent Labs     03/14/22  0644 03/15/22  0529   WBC 6.6 5.1   HGB 10.4* 9.0*   HCT 29.6* 26.2*    196   LYMPHOPCT 15* 18*   MONOPCT 9* 11*     BMP:  Recent Labs     03/14/22  0644 03/15/22  0529   * 132*   K 4.3 4.2   CL 99 98   CO2 24 23   BUN 18 18   CREATININE 1.43* 1.54*     Hepatic Function Panel: No results for input(s): PROT, LABALBU, BILIDIR, IBILI, BILITOT, ALKPHOS, ALT, AST in the last 72 hours. No results for input(s): RPR in the last 72 hours. No results for input(s): HIV in the last 72 hours. No results for input(s): BC in the last 72 hours. Lab Results   Component Value Date    CREATININE 1.54 03/15/2022    GLUCOSE 118 03/15/2022    GLUCOSE 335 12/28/2011       Detailed results: Thank you for allowing us to participate in the care of this patient. Please call with questions. This note is created with the assistance of a speech recognition program.  While intending to generate adocument that actually reflects the content of the visit, the document can still have some errors including those of syntax and sound a like substitutions which may escape proof reading. It such instances, actual meaningcan be extrapolated by contextual diversion. Jayjay Mensah MD   Attending Physician Statement  I have discussed the care of the patient, including pertinent history and exam findings,  with the resident. I have reviewed the key elements of all parts of the encounter with the resident.   I agree with the assessment, plan and orders as documented by the resident.     Jacky Burgess MD    Office: [phone]  Perfect serve / office [phone]

## 2022-03-15 NOTE — PROGRESS NOTES
NEPHROLOGY PROGRESS NOTE    Patient :  Mervin Tuttle; 78 y.o. MRN# [id number]  Location:  2052/2052-01  Attending:  Lennox Sauce, DO  Admit Date:  3/7/2022   Hospital Day: 8    Reason for consultation: Management of acute kidney injury. Consulting physician: Gordon Omer DO    Interval history:   Patient was seen and examined today -he denies any shortness of breath. No nausea vomiting no fever chills. Status post left second toe amputation. He is on IV Cubicin, metronidazole and cefepime. Renal function stable  Blood pressure stable  Nonoliguric    History of Present Illness: This is a 78 y.o. male past medical history of coronary artery disease, bypass surgery, CHF, ype 2 diabetes, Essential hypertension, chronic kidney disease stage IIIb with baseline creatinine of about 1.5 to 1.7 mg/dL since 2019, patient has not follow-up with any nephrologist as an outpatient. Patient has patient presented to the hospital with complaints of left foot redness and black toe, patient noticed black toe about last 1 week, patient started to have pain in the foot and redness and therefore decided to come to the hospital.  Denied any nausea vomiting diarrhea no fever chills  Denied any shortness of breath chest pain  Labs showed serum creatinine of 2.1 mg/dL on admission from nephrology consultation has been requested  Blood cultures positive for MRSA  Pt denies any history of  prolonged NSAID use. Patient denies dysuria, gross hematuria, flank pain, nocturia, urgency, passing frothy urine or urinary incontinence. There has been no recent exposure to IV contrast.   There is no history  of paraprotein disease. Pt denies any history of recurrent UTI or kidney stones. Medication review shows use of ACE-inhibitor/diuretics.     Current Medications:    warfarin (COUMADIN) tablet 5 mg, Once  ferrous sulfate (IRON 325) tablet 325 mg, Daily with breakfast  aspirin EC tablet 81 mg, Daily  hydrALAZINE (APRESOLINE) tablet 50 mg, 3 times per day  isosorbide mononitrate (IMDUR) extended release tablet 60 mg, Daily  lisinopril (PRINIVIL;ZESTRIL) tablet 5 mg, Daily  sodium chloride flush 0.9 % injection 5-40 mL, 2 times per day  sodium chloride flush 0.9 % injection 5-40 mL, PRN  0.9 % sodium chloride infusion, PRN  warfarin placeholder: dosing by provider, RX Placeholder  oxyCODONE (ROXICODONE) immediate release tablet 5 mg, Q4H PRN   Or  oxyCODONE HCl (OXY-IR) immediate release tablet 10 mg, Q4H PRN  enoxaparin (LOVENOX) injection 90 mg, BID  glipiZIDE (GLUCOTROL) tablet 5 mg, QAM AC  DAPTOmycin (CUBICIN) 450 mg in sodium chloride 0.9 % 50 mL IVPB, Q24H  sodium chloride flush 0.9 % injection 5-40 mL, 2 times per day  sodium chloride flush 0.9 % injection 5-40 mL, PRN  0.9 % sodium chloride infusion, PRN  acetaminophen (TYLENOL) tablet 650 mg, Q4H PRN  ondansetron (ZOFRAN-ODT) disintegrating tablet 4 mg, Q8H PRN   Or  ondansetron (ZOFRAN) injection 4 mg, Q6H PRN  carvedilol (COREG) tablet 6.25 mg, BID WC  insulin lispro (HUMALOG) injection vial 0-12 Units, TID WC  insulin lispro (HUMALOG) injection vial 0-6 Units, Nightly  glucose (GLUTOSE) 40 % oral gel 15 g, PRN  dextrose 50 % IV solution, PRN  glucagon (rDNA) injection 1 mg, PRN  dextrose 5 % solution, PRN      Objective:  CURRENT TEMPERATURE:  Temp: 98.2 °F (36.8 °C)  MAXIMUM TEMPERATURE OVER 24HRS:  Temp (24hrs), Av.5 °F (36.9 °C), Min:98.2 °F (36.8 °C), Max:99.1 °F (37.3 °C)    CURRENT RESPIRATORY RATE:  Resp: 18  CURRENT PULSE:  Pulse: 76  CURRENT BLOOD PRESSURE:  BP: 117/62  24HR BLOOD PRESSURE RANGE:  Systolic (99POD), NUL:038 , Min:117 , PTX:664   ; Diastolic (88UXP), EUA:74, Min:62, Max:94    24HR INTAKE/OUTPUT:      Intake/Output Summary (Last 24 hours) at 3/15/2022 1449  Last data filed at 3/14/2022 2224  Gross per 24 hour   Intake --   Output 800 ml   Net -800 ml     No data found.   Physical Exam:  GENERAL APPEARANCE: Alert and cooperative, and appears to be in no acute Renal function is back to baseline. 4.  Peripheral arterial disease with left second toe gangrene will follow vascular and podiatry recommendations. 5.  Left second toe gangrene -S/P amputation. continue antibiotics. Plan. Continue current treatment      Prognosis is guarded.     Electronically signed by Tata Alvarez MD on 3/15/2022 at 2:49 PM

## 2022-03-15 NOTE — CARE COORDINATION
DISCHARGE PLANNING NOTE:    CM looking for other options for IV ATB as outpt. IV infusion center at [facility] could accommodate the IV infusion. Pt's estimated cost would be $195.14 per visit. Once he met his deductible the cost would possibly go down. CM will discuss with patient. Pharmacist also recommended IV Vanco as an option if it would be cheaper. Venecia from [place] ran options. First delivery would be $207.31 and subsequent deliveries would be $57.31. CM will discuss with patient. He is currently off the floor for PICC line. Electronically signed by Ike Pierson RN on 3/15/2022 at 11:31 AM     CM spoke to Dr Ritesh Shane re: cost of IV ATB and patients inability to pay for ATB. Amira gave new order for Zyvox 600 mg PO BID until 3/24. If patient does go on PO Zyvox. Pt will need CBC, BUN, CREAT in 1 week and follow with Dr Ritesh Shane in 1-2 weeks. CM spoke to Marine at [address] [facility] to check cost.  Electronically signed by Ike Pierson RN on 3/15/2022 at 3:24 PM      CM called outpt spoke to Marine and gave OK to fill RX for discharge and voucher .     Follow up appt made with Dr Ritesh Shane for March 23 at 10 am  Electronically signed by Ike Pierson RN on 3/15/2022 at 3:25 PM

## 2022-03-15 NOTE — BRIEF OP NOTE
Brief Postoperative Note    Zev Warren  YOB: 1955  [id number]    Pre-operative Diagnosis: Foot oesteomyelitis    Post-operative Diagnosis: Same    Procedure: PICC placement    Anesthesia: Local    Surgeons/Assistants: Warren    Estimated Blood Loss: less than 50     Complications: None    Specimens: Was Not Obtained    Electronically signed by Timmy Adam MD on 3/15/2022 at 10:28 AM

## 2022-03-15 NOTE — PROGRESS NOTES
Talked to  he is good with discharging the pt and gave me order for discharge over the phone along with home med rec orders. Put in another page for discharge of pain meds.

## 2022-03-15 NOTE — PROGRESS NOTES
Patient discharged from unit to home. Patient provided education and instructions before discharge. Writer informed patient that there was issue with pharmacy and medication would have top be sent first thing in the morning. Writer will call outpatient pharmacy and have them send medications to [facility] pharmacy in [facility].

## 2022-03-15 NOTE — PROGRESS NOTES
Dr. Diamante Pickering returned page, unable to get prescription for pain medication tonight. Writer to call podiatry resident.

## 2022-03-15 NOTE — PROGRESS NOTES
CLINICAL PHARMACY NOTE: MEDS TO BEDS    Total # of Prescriptions Filled: 1   The following medications were delivered to the patient:  · Linezolid 600mg    Additional Documentation:  Delivered Medication to Patients Room     Voucher On File - Approved by MINNIE

## 2022-03-16 ENCOUNTER — CARE COORDINATION (OUTPATIENT)
Dept: CASE MANAGEMENT | Age: 67
End: 2022-03-16

## 2022-03-16 LAB
CULTURE: ABNORMAL
CULTURE: ABNORMAL
DIRECT EXAM: ABNORMAL
DIRECT EXAM: ABNORMAL
SPECIMEN DESCRIPTION: ABNORMAL
SURGICAL PATHOLOGY REPORT: NORMAL

## 2022-03-16 NOTE — CARE COORDINATION
[facility] Transitions Initial Follow Up Call- nolvia Oh MD- one time call     Call within 2 business days of discharge: Yes    Patient: Dandre Talavera Patient : 1955   MRN: [id number]  Reason for Admission: diabetic foot infection, BRIANNA, chronic osteomyelitis, toe amputation left 2nd digit  Discharge Date: 3/15/22 RARS: Readmission Risk Score: 12.6 ( )      Last Discharge [facility]       Complaint Diagnosis Description Type Department Provider    3/7/22 Foot Pain Diabetic foot infection ([facility] [address] . .. ED to Hosp-Admission (Discharged) (ADMITTED) [facility] Ricky Nayak DO; [facility] .. Spoke with: Amanuel Walker     Call to pt who states he is doing good. Denies pain in left foot, drainage on dressing. States he has not needed the pain med (Tylenol 3). Denies fever/ chills, nausea  States he has a boot to wear for his walking   Confirms new and DC meds. States understanding to take abx until complete   Confirms lab orders due 3/22  Denies needs  Writer informs this is final ([facility]) phone call- v/u. Encouraged call writer/ CTC or providers if questions or concerns- v/u. Episode closed      Transitions of Care Initial Call    Was this an external facility discharge? No Discharge Facility: [facility]     Challenges to be reviewed by the provider   Additional needs identified to be addressed with provider: No  none             Method of communication with provider : none      Advance Care Planning:   Does patient have an Advance Directive: decision maker updated. Was this a readmission? No  Patient stated reason for admission: toe amp  Patients top risk factors for readmission: medical condition-s/p toe amputation     Care Transition Nurse (CTN) contacted the patient by telephone to perform post hospital discharge assessment. Verified name and  with patient as identifiers. Provided introduction to self, and explanation of the CTN role.      CTN reviewed discharge instructions, medical action plan and red flags with patient who verbalized understanding. Patient given an opportunity to ask questions and does not have any further questions or concerns at this time. Were discharge instructions available to patient? Yes. Reviewed appropriate site of care based on symptoms and resources available to patient including: PCP, Specialist and When to call 911. The patient agrees to contact the PCP office for questions related to their healthcare. Medication reconciliation was performed with patient, who verbalizes understanding of administration of home medications. Advised obtaining a 90-day supply of all daily and as-needed medications. Covid Risk Education     Educated patient about risk for severe COVID-19 due to risk factors according to CDC guidelines. CTN reviewed discharge instructions, medical action plan and red flag symptoms with the patient who verbalized understanding. Discussed COVID vaccination status: Yes. Education provided on COVID-19 vaccination as appropriate. Discussed exposure protocols and quarantine with CDC Guidelines. Patient was given an opportunity to verbalize any questions and concerns and agrees to contact CTN or health care provider for questions related to their healthcare. Reviewed and educated patient on any new and changed medications related to discharge diagnosis. Was patient discharged with a pulse oximeter? No Discussed and confirmed pulse oximeter discharge instructions and when to notify provider or seek emergency care. CTN provided contact information. No further follow-up call indicated based on severity of symptoms and risk factors. Plan for next call: n/a          Non-face-to-face services provided:  Scheduled appointment with PCP-PCP office not open today- he will call tomorrow to schedule   Scheduled appointment with Specialist-confirms appt with Dr Michelle Rondon 3/22, podiatry office closed today as well.  Will be called tomorrow to schedule Obtained and reviewed discharge summary and/or continuity of care documents  Reviewed and followed up on pending diagnostic tests and treatments-confirms lab orders due 3/22  Assessment and support for treatment adherence and medication management-confirms new and DC meds    Care Transitions 24 Hour Call    Do you have any ongoing symptoms?: No  Do you have a copy of your discharge instructions?: Yes  Do you have all of your prescriptions and are they filled?: Yes  Have you been contacted by a [employer] [address]?: No  Have you scheduled your follow up appointment?: Yes  How are you going to get to your appointment?: Car - drive self  Were you discharged with any Home Care or Post Acute Services: No  Do you feel like you have everything you need to keep you well at home?: Yes  Care Transitions Interventions         Follow Up  Future Appointments   Date Time Provider Yan Scales   3/21/2022  2:00 PM CLAUDIA Cheatham   3/23/2022 10:00 AM Yuan Cheema MD INF [facility] Meng Kimbrough RN

## 2022-03-16 NOTE — PROGRESS NOTES
Writer contacted Outpatient pharmacy and the patient to inform patient that he would need to come to the hospital to  his medication. Patient was agreeable and will be here to  prescription at 1100.

## 2022-03-21 ENCOUNTER — OFFICE VISIT (OUTPATIENT)
Dept: PODIATRY | Age: 67
End: 2022-03-21

## 2022-03-21 VITALS — BODY MASS INDEX: 27.72 KG/M2 | WEIGHT: 198 LBS | HEIGHT: 71 IN

## 2022-03-21 DIAGNOSIS — Z98.890 POST-OPERATIVE STATE: Primary | ICD-10-CM

## 2022-03-21 PROCEDURE — 99024 POSTOP FOLLOW-UP VISIT: CPT | Performed by: PODIATRIST

## 2022-03-21 NOTE — PROGRESS NOTES
[address]  Dept: [phone]    POST-OP PROGRESS NOTE  Date of patient's visit: 3/21/2022  Patient's Name:  Norma Muñoz YOB: 1955            Patient Care Team:  Crispin Ray DO as PCP - General        Chief Complaint   Patient presents with    Post-Op Check       Pt's primary care physician is Crispin Ray DO last seen December 30 2021     Subjective: Norma Muñoz is a 78 y.o. male who presents to the office today 1week(s)  S/P TOE AMPUTATION--left 2nd digit for correction of diabetic foot infection  Problem List Items Addressed This Visit     None      . Patient relates pain is Absent  and IMPROVED. Pain is rated 0 out of 10 and is described as none. Currently denies F/C/N/V. Is patient taking pain medications as prescribed and is controlling pain yes    Physical Examination:  Incision is coapted, sutures/steri-strips are intact. Minimal bleeding post operatively. Edema present. No erythema. No Pus. Operative correction is satisfactory. Erythema much improved. Radiographs: none      Assessment: Norma Muñoz is status post TOE AMPUTATION--left 2nd digit  Normal post operative course. Doing well         Diagnosis Orders   1. Post-operative state             Plan:  Patient examined and evaluated. Current condition and treatment options discussed in detail. Advised pt to his conditon. Sutures to come out next week. Verbal and written instructions given to patient. Orders: No orders of the defined types were placed in this encounter. Contact office with any questions/problems/concerns. RTC in 10day(s).      Electronically signed by Radha Ferris DPM on 3/21/2022 at 1:35 PM  3/21/2022

## 2022-03-23 ENCOUNTER — OFFICE VISIT (OUTPATIENT)
Dept: INFECTIOUS DISEASES | Age: 67
End: 2022-03-23
Payer: MEDICARE

## 2022-03-23 VITALS
WEIGHT: 198 LBS | DIASTOLIC BLOOD PRESSURE: 85 MMHG | HEIGHT: 71 IN | BODY MASS INDEX: 27.72 KG/M2 | HEART RATE: 76 BPM | OXYGEN SATURATION: 96 % | RESPIRATION RATE: 16 BRPM | SYSTOLIC BLOOD PRESSURE: 126 MMHG | TEMPERATURE: 98.1 F

## 2022-03-23 DIAGNOSIS — E11.628 DIABETIC FOOT INFECTION (HCC): Primary | ICD-10-CM

## 2022-03-23 DIAGNOSIS — L08.9 DIABETIC FOOT INFECTION (HCC): Primary | ICD-10-CM

## 2022-03-23 PROCEDURE — 99213 OFFICE O/P EST LOW 20 MIN: CPT | Performed by: INTERNAL MEDICINE

## 2022-03-23 PROCEDURE — 3044F HG A1C LEVEL LT 7.0%: CPT | Performed by: INTERNAL MEDICINE

## 2022-03-23 RX ORDER — LINEZOLID 600 MG/1
600 TABLET, FILM COATED ORAL 2 TIMES DAILY
Qty: 28 TABLET | Refills: 0 | Status: SHIPPED | OUTPATIENT
Start: 2022-03-23 | End: 2022-04-06

## 2022-03-23 NOTE — PROGRESS NOTES
Infectious disease Consult Note      Patient: Naresh Melendez  : 1955  Acct#:  [id number]     Date:  3/23/2022    Subjective:       History of Present Illness  Patient is a 78 y.o.  male    Chief Complaint   Patient presents with    Follow-up     post [facility]   The patient was hospitalized recently at [facility] with left second toe gangrene/osteomyelitis and MRSA bacteremia status post for late second left toe on 3/13/2022. He was treated with IV antibiotics during his hospitalization, was could not afford IV antibiotics on discharge was discharged on oral Zyvox that he is tolerating. Surgical incision is healing, still have mild redness to the foot, denied significant pain, denied fever or chills, no other complaints.         Past Medical History:   Diagnosis Date    Atrial fibrillation ([facility] [address]     CAD (coronary artery disease)     [facility]congestive [facility] failure) [facility]     Diabetes mellitus ([facility] [address]     Hyperlipidemia     Hypertension       Past Surgical History:   Procedure Laterality Date    CARDIAC SURGERY      CABG X3    COLONOSCOPY      DEBRIDEMENT Right 2015    DEBRIDEMENT WOUND FOOT RIGHT W/ APPLICATION BILAT INTEG RAT GRAFT    ENDOSCOPY, COLON, DIAGNOSTIC      EYE SURGERY Bilateral     cataracts    HERNIA REPAIR      umbilical    KNEE ARTHROSCOPY Right     OTHER SURGICAL HISTORY Right 12 29 15    wound care graft procedure foot,appl of integra    PACEMAKER PLACEMENT      WITH DEFIBRILLATOR    TOE AMPUTATION Right     R great    TOE AMPUTATION Left 3/13/2022    TOE AMPUTATION--left 2nd digit performed by Freeman Green DPM at [address] [place] [address] OR          Admission Meds  Current Outpatient Medications on File Prior to Visit   Medication Sig Dispense Refill    linezolid (ZYVOX) 600 MG tablet Take 1 tablet by mouth 2 times daily for 9 days 18 tablet 0    aspirin 81 MG EC tablet Take 1 tablet by mouth daily 30 tablet 3    isosorbide mononitrate (IMDUR) 60 MG extended release tablet Take 1 tablet by mouth daily 30 tablet 3    nitroGLYCERIN (NITROSTAT) 0.4 MG SL tablet up to max of 3 total doses. If no relief after 1 dose, call 911. 25 tablet 3    glipiZIDE (GLUCOTROL) 5 MG tablet Take 1 tablet by mouth every morning (before breakfast) 60 tablet 3    hydrALAZINE (APRESOLINE) 50 MG tablet Take 1 tablet by mouth every 8 hours 90 tablet 3    benazepril (LOTENSIN) 5 MG tablet Take 1 tablet by mouth daily 30 tablet 3    furosemide (LASIX) 80 MG tablet Take 1 tablet by mouth daily 60 tablet 3    carvedilol (COREG) 12.5 MG tablet Take 1 tablet by mouth 2 times daily (with meals) (Patient taking differently: Take 6.25 mg by mouth 2 times daily (with meals) ) 60 tablet 3    warfarin (COUMADIN) 2 MG tablet Take 4 mg by mouth daily Per [facility]      atorvastatin (LIPITOR) 40 MG tablet Take 40 mg by mouth daily. No current facility-administered medications on file prior to visit. Allergies  Allergies   Allergen Reactions    Pcn [Penicillins] Rash        Social   Social History     Tobacco Use    Smoking status: Never Smoker    Smokeless tobacco: Never Used   Substance Use Topics    Alcohol use: Yes     Comment: SOCIAL on weekends     Family History   Problem Relation Age of Onset    Cancer Father         pancreatic cancer    Other Brother         MI    Osteoarthritis Mother     Other Maternal Grandfather         MI          Review of Systems    Other than above 14systems reviewed were negative .              Physical Exam  /85   Pulse 76   Temp 98.1 °F (36.7 °C)   Resp 16   Ht 5' 11\" (1.803 m)   Wt 198 lb (89.8 kg)   SpO2 96%   BMI 27.62 kg/m²           General Appearance: alert and oriented to person, place and time, well-developed and well-nourished, in no acute distress  Skin: warm and dry, no rash   Head: normocephalic and atraumatic  Eyes: pupils equal, round,conjunctivae normal  ENT: hearing grossly normal bilaterally. Neck: neck supple and non tender . Pulmonary/Chest: clear to auscultation bilaterally- no wheezes, rales or rhonchi, normal air movement, no respiratory distress  Cardiovascular: normal rate, regular rhythm, normal S1 and S2, no murmurs. Abdomen: soft, non-tender, non-distended, normal bowel sounds, no masses or organomegaly  Extremities: no cyanosis, clubbing ,left big toe amputation site incision intact, no purulent drainage, mild erythema to the foot, no fluctuation, no crepitance.   Neurologic: no cranial nerve deficit and muscle strength normal    Data Review:    WBC   Date Value Ref Range Status   03/15/2022 5.1 3.5 - 11.0 k/uL Final   03/14/2022 6.6 3.5 - 11.0 k/uL Final   03/13/2022 5.3 3.5 - 11.0 k/uL Final     Hemoglobin   Date Value Ref Range Status   03/15/2022 9.0 (L) 13.5 - 17.5 g/dL Final   03/14/2022 10.4 (L) 13.5 - 17.5 g/dL Final   03/13/2022 10.2 (L) 13.5 - 17.5 g/dL Final     Hematocrit   Date Value Ref Range Status   03/15/2022 26.2 (L) 41 - 53 % Final   03/14/2022 29.6 (L) 41 - 53 % Final   03/13/2022 29.7 (L) 41 - 53 % Final     MCV   Date Value Ref Range Status   03/15/2022 91.2 80 - 100 fL Final   03/14/2022 91.1 80 - 100 fL Final   03/13/2022 91.5 80 - 100 fL Final     Platelet Count   Date Value Ref Range Status   12/28/2011 145 140 - 450 k/uL Final     Platelets   Date Value Ref Range Status   03/15/2022 196 150 - 450 k/uL Final   03/14/2022 234 150 - 450 k/uL Final   03/13/2022 208 150 - 450 k/uL Final     Sodium   Date Value Ref Range Status   03/15/2022 132 (L) 135 - 144 mmol/L Final   03/14/2022 134 (L) 135 - 144 mmol/L Final   03/13/2022 136 135 - 144 mmol/L Final     Potassium   Date Value Ref Range Status   03/15/2022 4.2 3.7 - 5.3 mmol/L Final   03/14/2022 4.3 3.7 - 5.3 mmol/L Final   03/13/2022 3.9 3.7 - 5.3 mmol/L Final     Chloride   Date Value Ref Range Status   03/15/2022 98 98 - 107 mmol/L Final   03/14/2022 99 98 - 107 mmol/L Final   03/13/2022 102 98 - 107 mmol/L Final     CO2   Date Value Ref Range Status   03/15/2022 23 20 - 31 mmol/L Final   03/14/2022 24 20 - 31 mmol/L Final   03/13/2022 25 20 - 31 mmol/L Final     BUN   Date Value Ref Range Status   03/15/2022 18 8 - 23 mg/dL Final   03/14/2022 18 8 - 23 mg/dL Final   03/13/2022 19 8 - 23 mg/dL Final     CREATININE   Date Value Ref Range Status   03/15/2022 1.54 (H) 0.70 - 1.20 mg/dL Final   03/14/2022 1.43 (H) 0.70 - 1.20 mg/dL Final   03/13/2022 1.42 (H) 0.70 - 1.20 mg/dL Final     AST   Date Value Ref Range Status   03/09/2022 34 <40 U/L Final   03/08/2022 23 <40 U/L Final   06/30/2021 19 <40 U/L Final     ALT   Date Value Ref Range Status   03/09/2022 23 5 - 41 U/L Final   03/08/2022 19 5 - 41 U/L Final   06/30/2021 18 5 - 41 U/L Final     Bilirubin, Direct   Date Value Ref Range Status   03/09/2022 1.12 (H) <0.31 mg/dL Final   03/08/2022 0.83 (H) <0.31 mg/dL Final   07/22/2016 0.25 <0.31 mg/dL Final     Total Bilirubin   Date Value Ref Range Status   03/09/2022 1.65 (H) 0.3 - 1.2 mg/dL Final   03/08/2022 1.55 (H) 0.3 - 1.2 mg/dL Final   06/30/2021 0.56 0.3 - 1.2 mg/dL Final     Alkaline Phosphatase   Date Value Ref Range Status   03/09/2022 112 40 - 129 U/L Final   03/08/2022 112 40 - 129 U/L Final   06/30/2021 93 40 - 129 U/L Final     No results found for: LIPASE, AMYLASE  Protime   Date Value Ref Range Status   03/15/2022 20.4 (H) 11.8 - 14.6 sec Final   03/14/2022 16.5 (H) 11.8 - 14.6 sec Final   03/07/2022 16.7 (H) 11.8 - 14.6 sec Final   12/30/2016 31.8 seconds Final   12/02/2016 35 seconds Final   11/04/2016 31.1 seconds Final   05/11/2015 16.0 (H) 10.4 - 14.2 sec Final   03/26/2014 29.6 (H) 9.6 - 14.4 sec Final   01/29/2014 24.3 (H) 9.6 - 14.4 sec Final     INR   Date Value Ref Range Status   03/15/2022 1.8  Final     Comment:           Non-therapeutic Range:     INR = 0.9-1.2  Therapeutic Range:    Moderate Anticoagulant Intensity:     INR = 2.0-3.0   High Anticoagulant Intensity:     INR = 2.5-3.5 03/14/2022 1.3  Final     Comment:           Non-therapeutic Range:     INR = 0.9-1.2  Therapeutic Range: Moderate Anticoagulant Intensity:     INR = 2.0-3.0   High Anticoagulant Intensity:     INR = 2.5-3.5           03/07/2022 1.4  Final     Comment:           Non-therapeutic Range:     INR = 0.9-1.2  Therapeutic Range: Moderate Anticoagulant Intensity:     INR = 2.0-3.0   High Anticoagulant Intensity:     INR = 2.5-3.5             No results found for: PTT  No results found for: OCCULTBLD  No results found for: GLUMET     Imaging Studies:                           All appropriate imaging studies and reports reviewed: Yes  ECHO Complete 2D W Doppler W Color    Result Date: 3/9/2022  [address] [facility] [place] Transthoracic Echocardiography Report (TTE)  Patient Name Dalila Jimenez    Date of Study               03/09/2022               JONO CUNNINGHAM   Date of      1955  Gender                      Male  Birth   Age          78 year(s)  Race                           Room Number  2052        Height:                     71 inch, 180.34 cm   Corporate ID [id number]    Weight:                     198 pounds, 89.8 kg  #   Patient Acct [de-identified]   BSA:          2.1 m^2       BMI:      27.62  #                                                              kg/m^2   MR #         [id number]      Sonographer                 Tsering Zarate   Accession #  [id number]  Interpreting Physician      Bonilla Sellers 60   Fellow                   Referring Nurse                           Practitioner   Interpreting             Referring Physician         Ngozi Oneal  Fellow  Type of Study   TTE procedure:2D Echocardiogram, M-Mode, Doppler, Color Doppler. Procedure Date Date: 03/09/2022 Start: 08:57 AM Study Location: [address] [place] [address] Technical Quality: Limited visualization Indications:Bacteremia. Patient Status: Inpatient Contrast Medium: Definity.  Amount - 2 ml Height: 71 inches Weight: 198 pounds BSA: 2.1 m^2 BMI: 27.62 kg/m^2 Rhythm: Within normal limits HR: 75 bpm BP: 134/68 mmHg Allergies   - Penicillin. CONCLUSIONS Summary Contrast was utilized on this technically difficult study. Left ventricle is normal in size and wall thickness. Left ventricular systolic function is moderately reduced. Estimated LV EF 35-40 %. Global hypokinesis. Left atrium is mildly dilated. Right atrium is moderately dilated . Difficult visualization of the right ventricle. MPI and TAPSE values suggest normal RV systolic function. Mild mitral regurgitation. Moderate tricuspid regurgitation. Mild pulmonary hypertension. Estimated right ventricular systolic pressure is 74VWJP. No significant pericardial effusion is seen. Normal aortic root dimension. IVC appears normal size, difficult to assess respiratory variation No obvious vegetation noted If clinically indicated TAMARA is recommended Signature ----------------------------------------------------------------------------  Electronically signed by Ale Herzog(Interpreting physician) on  03/09/2022 07:30 PM ---------------------------------------------------------------------------- ----------------------------------------------------------------------------  Electronically signed by Tsering Zarate(Sonographer) on 03/09/2022 10:34  AM ---------------------------------------------------------------------------- FINDINGS Left Atrium Left atrium is mildly dilated. Left Ventricle Left ventricle is normal in size and wall thickness. Left ventricular systolic function is moderately reduced. Estimated LV EF 35-40 %. Global hypokinesis. Right Atrium Right atrium is moderately dilated . Right Ventricle Difficult visualization of the right ventricle. MPI and TAPSE values suggest normal RV systolic function. Mitral Valve Thickened mitral valve leaflets. Mild mitral regurgitation. Aortic Valve No obvious valvular abnormality seen. No evidence of aortic insufficiency or stenosis.  Tricuspid Valve No obvious valvular abnormality. Moderate tricuspid regurgitation. Moderate pulmonary hypertension. Estimated right ventricular systolic pressure is 13ASVO. Pulmonic Valve Pulmonic valve was not well visualized. No evidence of pulmonic insufficiency or stenosis. Pericardial Effusion No significant pericardial effusion is seen. Pleural Effusion No pleural effusion seen. Miscellaneous Normal aortic root dimension. IVC normal diameter M-mode / 2D Measurements & Calculations:   LVIDd:5.57 cm(3.7 - 5.6 cm)      Diastolic MNSTNN:462 ml  AYAVB:1.10 cm(2.2 - 4.0 cm)      Systolic BFHEVK:54.2 ml  VCRD:9.72 cm(0.6 - 1.1 cm)       Aortic Root:2.8 cm(2.0 - 3.7 cm)  LVPWd:1.02 cm(0.6 - 1.1 cm)      LA Dimension: 4.9 cm(1.9 - 4.0 cm)  Fractional Shortenin.26 %    LA volume/Index: 90.6 ml /43m^2  Calculated LVEF (%): 53.06 %     LVOT:2 cm   Mitral:                                 Aortic   Peak E-Wave: 0.85 m/s                   Peak Velocity: 1.64 m/s  Peak A-Wave: 0.36 m/s                   Peak Gradient: 10.76 mmHg  E/A Ratio: 2.36  Peak Gradient: 2.88 mmHg  Deceleration Time: 137 msec   Tricuspid:                              Pulmonic:   Estimated RVSP: 41 mmHg  Peak TR Velocity: 3.11 m/s  Peak TR Gradient: 38.6884 mmHg  Estimated RA Pressure: 3 mmHg                                          Estimated PASP: 41.69 mmHg      XR FOOT LEFT (MIN 3 VIEWS)    Result Date: 3/7/2022  EXAMINATION: THREE XRAY VIEWS OF THE LEFT FOOT 3/7/2022 9:26 am COMPARISON: None. HISTORY: ORDERING SYSTEM PROVIDED HISTORY: infection TECHNOLOGIST PROVIDED HISTORY: infection Reason for Exam: left foot pain/ infection to distal foot and 2nd digit FINDINGS: Mild irregularity and demineralization at the distal tuft of the 2nd toe with soft tissue swelling of the digit. Prior amputation of the great toe. Suggestion of a small erosion at the medial aspect of the 1st metatarsal head. Degenerative changes at the ankle and midfoot which are overall mild.  Tiny posterior and plantar calcaneal enthesophytes. Arterial vascular calcifications throughout with sheet like dermal calcifications in the imaged portion of the lower leg. Forefoot soft tissue swelling. Findings suspicious for possible early osteomyelitis at the 2nd toe distal phalanx. Small area of erosive change at the 1st metatarsal head, though the features are more suggestive possible underlying erosive or inflammatory arthropathy rather than osteomyelitis, though clinical correlation is required. Soft tissue swelling of the 2nd toe at the forefoot. US RENAL COMPLETE    Result Date: 3/8/2022  EXAMINATION: RETROPERITONEAL ULTRASOUND OF THE KIDNEYS AND URINARY BLADDER 3/8/2022 COMPARISON: None HISTORY: ORDERING SYSTEM PROVIDED HISTORY: renal insuf TECHNOLOGIST PROVIDED HISTORY: renal insuf FINDINGS: Kidneys: The right kidney measures 12.7 cm in length and the left kidney measures 13 cm in length. There is normal renal cortical echogenicity. There are bilateral stones without hydronephrosis. There is a 3.4 x 3 x 2.7 cm simple appearing left renal cyst.  There is also a 1.1 x 0.7 x 1.3 cm simple appearing right renal cyst. Bladder: Unremarkable appearance of the bladder. 1. Bilateral nonobstructing nephrolithiasis. No hydronephrosis. 2. Simple appearing bilateral renal cysts measuring up to 3.4 cm on the left. IR FLUORO GUIDED CVA DEVICE PLMT/REPLACE/REMOVAL    Result Date: 3/15/2022  PROCEDURE: ULTRASOUND GUIDED VASCULAR ACCESS. FLUOROSCOPY GUIDED PICC PLACEMENT 3/15/2022. HISTORY: ORDERING SYSTEM PROVIDED HISTORY: IV ATB TECHNOLOGIST PROVIDED HISTORY: IV ATB Lumen?->Double Lumen Foot osteomyelitis SEDATION: None FLUOROSCOPY DOSE AND TYPE OR TIME AND EXPOSURES: DAP 58 centigrays cm squared TECHNIQUE: Informed consent was obtained after a detailed explanation of the procedure including risks, benefits, and alternatives. Universal protocol was observed.  The right arm was prepped and draped in sterile fashion using maximum sterile barrier technique. Local anesthesia was achieved with lidocaine. A micropuncture needle was used to access the right basilic vein using ultrasound guidance. An ultrasound image demonstrating patency of the vein with needle tip located within it. An image was obtained and stored in PACs. A 0.018 guidewire was used to place a peel-a-way sheath and a  PICC was advanced with fluoroscopic guidance with the tip at the cavo-atrial junction. The catheter flushed easily and there was a good blood return. The catheter was secured to the skin. The patient tolerated the procedure well and there were no immediate complications. FINDINGS: Fluoroscopic image demonstrates the tip of the catheter at the cavo-atrial junction. Successful ultrasound and fluoroscopy guided right basilic vein power injectable PICC placement     CT FOOT LEFT WO CONTRAST    Result Date: 3/7/2022  EXAMINATION: CT OF THE LEFT FOOT WITHOUT CONTRAST 3/7/2022 4:00 pm TECHNIQUE: CT of the left foot was performed without the administration of intravenous contrast.  Multiplanar reformatted images are provided for review. Dose modulation, iterative reconstruction, and/or weight based adjustment of the mA/kV was utilized to reduce the radiation dose to as low as reasonably achievable. COMPARISON: Left foot radiographs 03/07/2022. HISTORY ORDERING SYSTEM PROVIDED HISTORY: r/o osteo v gas TECHNOLOGIST PROVIDED HISTORY: r/o osteo v gas Reason for Exam: diabetic wound on left foot FINDINGS: The distal tibia and fibula are intact. No syndesmotic widening. The ankle mortise is intact. Avascular necrosis of the talar dome without subchondral collapse. Mild tibiotalar degenerative changes. The subtalar joint is unremarkable. Small retrocalcaneal and plantar calcaneal enthesophytes. The talonavicular joint is unremarkable. Mild calcaneocuboid degenerative changes. Normal midfoot alignment is maintained. No Lisfranc interval widening.   No significant midfoot degenerative changes. Status post 1st toe amputation. Mild degenerative changes at the articulation between the 1st metatarsal head and hallux sesamoids. No fracture or dislocation. No osseous erosion. Diffuse subcutaneous edema. No drainable fluid collection or soft tissue gas identified. Extensive atherosclerotic vascular calcifications. The visualized tendons are grossly intact. 1. No convincing evidence of osteomyelitis or other acute osseous abnormality. If there is persistent clinical concern for osteomyelitis further evaluation could be obtained with MRI. 2. Extensive subcutaneous edema compatible with bland edema or cellulitis. No soft tissue gas or drainable fluid collection identified. VL Lower Extremity Arterial Segmental Pressures W Ppg    Result Date: 3/8/2022    [address] [place] [address]  Vascular Lower Arterial Plethysmography Procedure   Patient Name    Gertrude Arora     Date of Study             03/08/2022                  Eliza Sterling   Date of Birth   1955   Gender                    Male   Age             78 year(s)   Race                         Room Number     2052   Corporate ID #  [id number]   Patient Acct #  [de-identified]   MR #            [id number]       Sonographer               Dustin Son   Accession #     [id number]   Interpreting Physician    Aneta Painter   Referring Nurse              Referring Physician       Brenna Burns DPM  Practitioner  Procedure Type of Study:   Extremities Arteries: Lower Arterial Plethysmography, PVR Lower with PPG. Indications for Study:PVD. Patient Status: In Patient. Technical Quality:Limited visualization. Conclusions   Summary   Bilateral lower extremity ABIs and PVRs (with toe pressures) were  performed for the evaluation of peripheral vascular disease.  Study  demonstrates:   Right:  Normal PVRs  Dampened digit waveforms suggesting microvascular level of disease  MONIQUE could not be calculated due to non-compressibility. Left:  Normal PVRs  MONIQUE suggests no vascular insufficiency at rest  Dampened digit waveforms suggesting microvascular level of disease  MONIQUE could not be calculated due to non-compressibility. Signature   ----------------------------------------------------------------  Electronically signed by Cesar Jiang(Sonographer) on  03/08/2022 09:15 AM  ----------------------------------------------------------------   ----------------------------------------------------------------  Electronically signed by Aria Christie(Interpreting  physician) on 03/08/2022 01:01 PM  ----------------------------------------------------------------  Findings:   Right Impression:                    Left Impression:  Normal waveforms at the thigh, calf  Normal waveforms at the thigh, calf  and ankle levels. and ankle levels. Diminished digit waveforms. Diminished digit waveforms. MONIQUE could not be calculated due to   MONIQUE could not be calculated due to  non-compressibility. non-compressibility. Allergies   - Allergy:Penicillin(Drug). Velocities are measured in cm/s ; Diameters are measured in cm Pressures +------------+----+------------+---------+--------+------------+-----------+ !            ! !Right       ! ! Left    !            !           ! +------------+----+------------+---------+--------+------------+-----------+ ! Location    ! !Pressure    ! Ratio    ! !Pressure    ! Ratio      ! +------------+----+------------+---------+--------+------------+-----------+ ! Ankle PT    !    !255         !1.71     !        !255         !1.71       ! +------------+----+------------+---------+--------+------------+-----------+ ! Ankle DP    !    !255         !1.71     !        !255         !1.71       ! +------------+----+------------+---------+--------+------------+-----------+   - Brachial Pressure:Right: 149. Left:149.   - MONIQUE:Right: 1.71. Left: 1.71. Assessment:   · MRSA bacteremia 2/2 suspected osteomyelitis of the left 2nd toe   · Gangrene left 2nd toe; S/p fillet 2nd left toe 3/13  · Cellulitis left foot  · S/p bilateral great big toe amputation  · Elevated CRP  · Elevated ESR  · Type 2 diabetes mellitus  · BRIANNA on CKD stage IIIb  · PVD  · CHF  · S/p CABG  · A. fib on warfarin  · Essential hypertension  · Essential hypertension  · Allergy to Penicillin-Rash  ·       Recommendations:   Continue oral Zyvox for 2 more weeks  Follow CBC, renal function, sedimentation rate and C-reactive protein  Follow-up in 1 month    Thank you for allowing me to participate in the care of your patient. Please feel free to contact me with any questions or concerns.      Shayy Godinez MD

## 2022-03-24 ENCOUNTER — TELEPHONE (OUTPATIENT)
Dept: INFECTIOUS DISEASES | Age: 67
End: 2022-03-24

## 2022-03-24 NOTE — TELEPHONE ENCOUNTER
Received a PA notice for Linezolid. This was processed with covermymeds via inbasket. Outcome: approved.

## 2022-03-30 ENCOUNTER — OFFICE VISIT (OUTPATIENT)
Dept: PODIATRY | Age: 67
End: 2022-03-30

## 2022-03-30 VITALS — WEIGHT: 198 LBS | BODY MASS INDEX: 27.72 KG/M2 | HEIGHT: 71 IN

## 2022-03-30 DIAGNOSIS — Z98.890 POST-OPERATIVE STATE: Primary | ICD-10-CM

## 2022-03-30 PROCEDURE — 99024 POSTOP FOLLOW-UP VISIT: CPT | Performed by: PODIATRIST

## 2022-03-30 NOTE — PROGRESS NOTES
[address].  Dept: [phone]    POST-OP PROGRESS NOTE  Date of patient's visit: 3/30/2022  Patient's Name:  Graham Bernal YOB: 1955            Patient Care Team:  Efrain Mckeon DO as PCP - General        Chief Complaint   Patient presents with    Post-Op Check       Pt's primary care physician is Efrain Mckeon DO last seen December 30 2021     Subjective: Graham Bernal is a 78 y.o. male who presents to the office today 2 week(s)  S/P TOE AMPUTATION--left 2nd digit for correction of diabetic foot infection  Problem List Items Addressed This Visit     None      Visit Diagnoses     Post-operative state    -  Primary      . Patient relates pain is Absent and IMPROVED. Pain is rated 0 out of 10 and is described as none. Currently denies F/C/N/V. Is patient taking pain medications as prescribed and is controlling pain yes    Physical Examination:  Incision is coapted, sutures/steri-strips are intact. Minimal bleeding post operatively. Edema present. No erythema. No Pus. Operative correction is satisfactory. Erythema much improved. Radiographs: none      Assessment: Graham Bernal is status post TOE AMPUTATION--left 2nd digit  Normal post operative course. Doing well         Diagnosis Orders   1. Post-operative state             Plan:  Patient examined and evaluated. Current condition and treatment options discussed in detail. Advised pt to his conditon. Verbal and written instructions given to patient. Orders: No orders of the defined types were placed in this encounter. Patient presents for suture removal. The wound is well healed without signs of infection. The sutures are removed. Wound care and activity instructions given. Contact office with any questions/problems/concerns. RTC in 2 weeks.   Continue to stay in surgical shoe to prevent rubbing to the incision site     Electronically signed by Sahra Alejandra DPM on 3/30/2022 at 11:12 AM  3/30/2022

## 2022-04-07 ENCOUNTER — HOSPITAL ENCOUNTER (OUTPATIENT)
Age: 67
Discharge: HOME | End: 2022-04-07
Payer: MEDICARE

## 2022-04-07 DIAGNOSIS — L08.9 DIABETIC FOOT INFECTION (HCC): ICD-10-CM

## 2022-04-07 DIAGNOSIS — E11.628 DIABETIC FOOT INFECTION (HCC): ICD-10-CM

## 2022-04-07 LAB
BUN BLDV-MCNC: 36 MG/DL (ref 8–23)
C-REACTIVE PROTEIN: <3 MG/L (ref 0–5)
CREAT SERPL-MCNC: 1.97 MG/DL (ref 0.7–1.2)
GFR AFRICAN AMERICAN: 41 ML/MIN
GFR NON-AFRICAN AMERICAN: 34 ML/MIN
GFR SERPL CREATININE-BSD FRML MDRD: ABNORMAL ML/MIN/{1.73_M2}
HCT VFR BLD CALC: 27.1 % (ref 41–53)
HEMOGLOBIN: 9.7 G/DL (ref 13.5–17.5)
MCH RBC QN AUTO: 30.9 PG (ref 26–34)
MCHC RBC AUTO-ENTMCNC: 35.7 G/DL (ref 31–37)
MCV RBC AUTO: 86.6 FL (ref 80–100)
PDW BLD-RTO: 14 % (ref 11.5–14.9)
PLATELET # BLD: 91 K/UL (ref 150–450)
PMV BLD AUTO: 7.9 FL (ref 6–12)
RBC # BLD: 3.13 M/UL (ref 4.5–5.9)
SEDIMENTATION RATE, ERYTHROCYTE: 31 MM/HR (ref 0–20)
WBC # BLD: 5.9 K/UL (ref 3.5–11)

## 2022-04-07 PROCEDURE — 85027 COMPLETE CBC AUTOMATED: CPT

## 2022-04-07 PROCEDURE — 36415 COLL VENOUS BLD VENIPUNCTURE: CPT

## 2022-04-07 PROCEDURE — 86140 C-REACTIVE PROTEIN: CPT

## 2022-04-07 PROCEDURE — 85652 RBC SED RATE AUTOMATED: CPT

## 2022-04-07 PROCEDURE — 84520 ASSAY OF UREA NITROGEN: CPT

## 2022-04-07 PROCEDURE — 82565 ASSAY OF CREATININE: CPT

## 2022-04-14 NOTE — PROGRESS NOTES
Physician Progress Note      PATIENT:               Mark Hernandez  CSN #:                  [id number]  :                       1955  ADMIT DATE:       3/7/2022 12:20 PM  [address] DATE:        3/15/2022 6:00 PM  RESPONDING  PROVIDER #:        Willa Manjarrez MD          QUERY TEXT:    Pt admitted w/ left foot cellulitis/ dry gangrene. Noted documentation of BRIANNA   on CKD IIIb in consult note  followed by documentation of ischemic ATN on    in progress notes. In order to support the diagnosis of ATN, please   include additional clinical indicators in your documentation, or please   document ATN has been ruled out after further study. 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 CKD   IIIb, Baseline creatinine 1.5 to 1.7 mg/dL. Initial Creatinine on admission of   2.10 mg/dL. 3/10: Creatinine 1.54mg/dL, noted \"Renal function back to   baseline\". Documentation on 3/11 \"ischemic ATN 2nd to left toe infection\". (Creatinine 1.57). Renal US: bilateral nonobstructing nephrolithiasis, no   hydronephrosis. DC'd on 3/15, Creatinine 1.54 mg/dL. Treatment: Tx of infection/ Antibiotics, IVF/ hydration, Urine electrolytes,   Hold Lasix  Options provided:  -- Ischemic ATN evidenced by, Please document evidence. -- Ischemic ATN ruled out after study  -- Other - I will add my own diagnosis  -- Disagree - Not applicable / Not valid  -- Disagree - Clinically unable to determine / Unknown  -- Refer to Clinical Documentation Reviewer    PROVIDER RESPONSE TEXT:    Ischemic ATN was ruled out after study.     Query created by: Refugio Sahu on 2022 10:10 AM      Electronically signed by:  Willa Manjarrez MD 2022 7:06 AM

## 2022-04-28 ENCOUNTER — HOSPITAL ENCOUNTER (OUTPATIENT)
Age: 67
Setting detail: SPECIMEN
Discharge: HOME | End: 2022-04-28

## 2022-05-05 LAB
CULTURE: ABNORMAL
DIRECT EXAM: ABNORMAL
SPECIMEN DESCRIPTION: ABNORMAL

## 2022-05-24 ENCOUNTER — HOSPITAL ENCOUNTER (EMERGENCY)
Age: 67
Discharge: HOME | End: 2022-05-24
Attending: EMERGENCY MEDICINE
Payer: MEDICARE

## 2022-05-24 ENCOUNTER — OFFICE VISIT (OUTPATIENT)
Dept: PODIATRY | Age: 67
End: 2022-05-24
Payer: MEDICARE

## 2022-05-24 VITALS
OXYGEN SATURATION: 100 % | RESPIRATION RATE: 20 BRPM | BODY MASS INDEX: 26.77 KG/M2 | HEIGHT: 70 IN | SYSTOLIC BLOOD PRESSURE: 154 MMHG | WEIGHT: 187 LBS | DIASTOLIC BLOOD PRESSURE: 68 MMHG | TEMPERATURE: 98.5 F | HEART RATE: 85 BPM

## 2022-05-24 VITALS — HEIGHT: 70 IN | BODY MASS INDEX: 26.77 KG/M2 | WEIGHT: 187 LBS

## 2022-05-24 DIAGNOSIS — Z89.412 HISTORY OF AMPUTATION OF GREAT TOE OF BOTH FEET (HCC): ICD-10-CM

## 2022-05-24 DIAGNOSIS — R60.0 EDEMA, LOWER EXTREMITY: ICD-10-CM

## 2022-05-24 DIAGNOSIS — E11.51 TYPE 2 DIABETES MELLITUS WITH PERIPHERAL VASCULAR DISEASE (HCC): ICD-10-CM

## 2022-05-24 DIAGNOSIS — R79.1 SUPRATHERAPEUTIC INR: Primary | ICD-10-CM

## 2022-05-24 DIAGNOSIS — Z89.422 HISTORY OF AMPUTATION OF LESSER TOE OF LEFT FOOT (HCC): ICD-10-CM

## 2022-05-24 DIAGNOSIS — Z89.411 HISTORY OF AMPUTATION OF GREAT TOE OF BOTH FEET (HCC): ICD-10-CM

## 2022-05-24 DIAGNOSIS — L97.523 ULCER OF LEFT FOOT WITH NECROSIS OF MUSCLE (HCC): Primary | ICD-10-CM

## 2022-05-24 LAB
ABSOLUTE EOS #: 0.1 K/UL (ref 0–0.4)
ABSOLUTE LYMPH #: 1.2 K/UL (ref 1–4.8)
ABSOLUTE MONO #: 0.3 K/UL (ref 0.1–1.3)
ANION GAP SERPL CALCULATED.3IONS-SCNC: 11 MMOL/L (ref 9–17)
BASOPHILS # BLD: 1 % (ref 0–2)
BASOPHILS ABSOLUTE: 0 K/UL (ref 0–0.2)
BUN BLDV-MCNC: 15 MG/DL (ref 8–23)
CALCIUM SERPL-MCNC: 8.9 MG/DL (ref 8.6–10.4)
CHLORIDE BLD-SCNC: 97 MMOL/L (ref 98–107)
CO2: 28 MMOL/L (ref 20–31)
CREAT SERPL-MCNC: 1.68 MG/DL (ref 0.7–1.2)
EOSINOPHILS RELATIVE PERCENT: 2 % (ref 0–4)
GFR AFRICAN AMERICAN: 50 ML/MIN
GFR NON-AFRICAN AMERICAN: 41 ML/MIN
GFR SERPL CREATININE-BSD FRML MDRD: ABNORMAL ML/MIN/{1.73_M2}
GLUCOSE BLD-MCNC: 227 MG/DL (ref 70–99)
HCT VFR BLD CALC: 29.7 % (ref 41–53)
HEMOGLOBIN: 10.1 G/DL (ref 13.5–17.5)
INR BLD: 3.8
LYMPHOCYTES # BLD: 22 % (ref 24–44)
MCH RBC QN AUTO: 29.6 PG (ref 26–34)
MCHC RBC AUTO-ENTMCNC: 34.1 G/DL (ref 31–37)
MCV RBC AUTO: 86.9 FL (ref 80–100)
MONOCYTES # BLD: 6 % (ref 1–7)
PARTIAL THROMBOPLASTIN TIME: 52.7 SEC (ref 24–36)
PDW BLD-RTO: 18.7 % (ref 11.5–14.9)
PLATELET # BLD: 220 K/UL (ref 150–450)
PMV BLD AUTO: 8.2 FL (ref 6–12)
POTASSIUM SERPL-SCNC: 3 MMOL/L (ref 3.7–5.3)
PROTHROMBIN TIME: 37.8 SEC (ref 11.8–14.6)
RBC # BLD: 3.41 M/UL (ref 4.5–5.9)
SEG NEUTROPHILS: 69 % (ref 36–66)
SEGMENTED NEUTROPHILS ABSOLUTE COUNT: 3.8 K/UL (ref 1.3–9.1)
SODIUM BLD-SCNC: 136 MMOL/L (ref 135–144)
WBC # BLD: 5.4 K/UL (ref 3.5–11)

## 2022-05-24 PROCEDURE — 85025 COMPLETE CBC W/AUTO DIFF WBC: CPT

## 2022-05-24 PROCEDURE — 99203 OFFICE O/P NEW LOW 30 MIN: CPT | Performed by: PODIATRIST

## 2022-05-24 PROCEDURE — 85610 PROTHROMBIN TIME: CPT

## 2022-05-24 PROCEDURE — 3044F HG A1C LEVEL LT 7.0%: CPT | Performed by: PODIATRIST

## 2022-05-24 PROCEDURE — 11043 DBRDMT MUSC&/FSCA 1ST 20/<: CPT | Performed by: PODIATRIST

## 2022-05-24 PROCEDURE — 85730 THROMBOPLASTIN TIME PARTIAL: CPT

## 2022-05-24 PROCEDURE — 99283 EMERGENCY DEPT VISIT LOW MDM: CPT

## 2022-05-24 PROCEDURE — 1123F ACP DISCUSS/DSCN MKR DOCD: CPT | Performed by: PODIATRIST

## 2022-05-24 PROCEDURE — 80048 BASIC METABOLIC PNL TOTAL CA: CPT

## 2022-05-24 PROCEDURE — 36415 COLL VENOUS BLD VENIPUNCTURE: CPT

## 2022-05-24 ASSESSMENT — ENCOUNTER SYMPTOMS
TROUBLE SWALLOWING: 0
BLOOD IN STOOL: 0
CONSTIPATION: 0
SORE THROAT: 0
VOMITING: 0
COUGH: 0
BACK PAIN: 0
NAUSEA: 0
ABDOMINAL PAIN: 0
DIARRHEA: 0
SHORTNESS OF BREATH: 0
COLOR CHANGE: 0

## 2022-05-24 ASSESSMENT — LIFESTYLE VARIABLES
HOW MANY STANDARD DRINKS CONTAINING ALCOHOL DO YOU HAVE ON A TYPICAL DAY: 1 OR 2
HOW OFTEN DO YOU HAVE A DRINK CONTAINING ALCOHOL: MONTHLY OR LESS

## 2022-05-24 ASSESSMENT — PAIN - FUNCTIONAL ASSESSMENT
PAIN_FUNCTIONAL_ASSESSMENT: NONE - DENIES PAIN
PAIN_FUNCTIONAL_ASSESSMENT: NONE - DENIES PAIN

## 2022-05-24 NOTE — PROGRESS NOTES
Real Garduno is a 78 y.o. male who presents to the office today with chief complaint of wound to the left foot. Chief Complaint   Patient presents with    Wound Check     wound left foot for about 1 month     Diabetes     last a1c 6.8   Symptoms began about 1 month(s) ago. Patient denies injury to the left foot. Patient states that his left second toe was amputated about one month ago and the surgical wound has never healed. Patient also has a pressure wound to the outside of the left foot. Patient states that the foot is not painful at the amputated site, but is painful where the pressure sore is. Pain is rated 1 out of 10 at it's worst and is described as intermittent. Treatments prior to today's visit include: Patient was undergoing wound care by his PCP, but was referred to my office for care. Allergies   Allergen Reactions    Doxycycline Other (See Comments)     Skin peeling    Pcn [Penicillins] Rash    Penicillin G Rash       Past Medical History:   Diagnosis Date    [facility] fibrillation ([email or web address]     CAD (coronary artery disease)     [facility]cong[facility] failure) [facility])     Diabetes mellitus [facility]     Hyperlipidemia     Hypertension        Prior to Admission medications    Medication Sig Start Date End Date Taking? Authorizing Provider   aspirin 81 MG EC tablet Take 1 tablet by mouth daily 3/15/22  Yes Lucas Hernadez, DO   glipiZIDE (GLUCOTROL) 5 MG tablet Take 1 tablet by mouth every morning (before breakfast) 3/16/22  Yes Lucasreece Hernadez, DO   benazepril (LOTENSIN) 5 MG tablet Take 1 tablet by mouth daily 3/15/22  Yes Lucasreece Hernadez, DO   furosemide (LASIX) 80 MG tablet Take 1 tablet by mouth daily 3/15/22  Yes Lucaserece Hernadez, DO   warfarin (COUMADIN) 2 MG tablet Take 4 mg by mouth daily Per [facility] 6/23/20  Yes Historical Provider, MD   atorvastatin (LIPITOR) 40 MG tablet Take 40 mg by mouth daily.    Yes Historical Provider, MD   silver sulfADIAZINE (SILVADENE) 1 % cream APPLY DAILY TO SKIN TO AFFECTED AREA EVERY DAY FOR 30 DAYS 5/19/22   Historical Provider, MD   isosorbide mononitrate (IMDUR) 60 MG extended release tablet Take 1 tablet by mouth daily 3/15/22   Lucas Hernadez DO   nitroGLYCERIN (NITROSTAT) 0.4 MG SL tablet up to max of 3 total doses. If no relief after 1 dose, call 911. Patient not taking: Reported on 5/24/2022 3/15/22   Sruthi Cardoza DO   hydrALAZINE (APRESOLINE) 50 MG tablet Take 1 tablet by mouth every 8 hours 3/15/22   Lucas Hernadez DO   carvedilol (COREG) 12.5 MG tablet Take 1 tablet by mouth 2 times daily (with meals)  Patient taking differently: Take 6.25 mg by mouth 2 times daily (with meals)  2/5/21   Sruthi Cardoza DO       Past Surgical History:   Procedure Laterality Date    CARDIAC SURGERY  2010    CABG X3    COLONOSCOPY      DEBRIDEMENT Right 11/19/2015    DEBRIDEMENT WOUND FOOT RIGHT W/ APPLICATION BILAT INTEG RAT GRAFT    ENDOSCOPY, COLON, DIAGNOSTIC      EYE SURGERY Bilateral     cataracts    HERNIA REPAIR      umbilical    KNEE ARTHROSCOPY Right     OTHER SURGICAL HISTORY Right 12 29 15    wound care graft procedure foot,appl of integra    PACEMAKER PLACEMENT  2011    WITH DEFIBRILLATOR    TOE AMPUTATION Right     R great    TOE AMPUTATION Left 3/13/2022    TOE AMPUTATION--left 2nd digit performed by Braden Peraza DPM at [place] History   Problem Relation Age of Onset    Cancer Father         pancreatic cancer    Other Brother         MI    Osteoarthritis Mother     Other Maternal Grandfather         MI       Social History     Tobacco Use    Smoking status: Never Smoker    Smokeless tobacco: Never Used   Substance Use Topics    Alcohol use: Yes     Comment: SOCIAL on weekends       Review of Systems   Constitutional: Negative for activity change, appetite change, chills, diaphoresis, fatigue and fever. Respiratory: Negative for shortness of breath. Cardiovascular: Negative for leg swelling. Gastrointestinal: Negative for diarrhea and nausea. Endocrine: Negative for cold intolerance, heat intolerance and polyuria. Musculoskeletal: Positive for arthralgias. Negative for back pain, gait problem, joint swelling and myalgias. Skin: Positive for wound. Negative for color change, pallor and rash. Allergic/Immunologic: Negative for environmental allergies and food allergies. Neurological: Negative for dizziness, weakness, light-headedness and numbness. Hematological: Does not bruise/bleed easily. Psychiatric/Behavioral: Negative for behavioral problems, confusion and self-injury. The patient is not nervous/anxious. Vitals: There were no vitals filed for this visit. General: AAO x 3 in NAD. Integument: There is a full thickness wound to the depth of the muscle and tendon of the left foot secondary to amputation of the second toe. There is mild erythema surrounding the wound, but no calor is noted. The wound base is necrotic with yellow and black discolored tissue. The wound is very moist. Debridement of the tissue with a fifteen blade produces some bleeding, but it is not excessive. There is no purulence or malodor noted to the wound. No bone was probed or visualized. The wound measures 1.9 cm x 1.1 cm x 1.4 cm deep. There is no induration, subcutaneous nodules, or tightening of the skin noted to the bilateral.     Toenails 1-5 of the right foot do present with thickness, discoloration, brittleness, subungual debris. Toenails 1-5 of the left foot do present with thickness, discoloration, brittleness, subungual debris. Interdigital maceration absent to web spaces 1-4, Bilateral.     There are no preulcerative lesions noted to the right foot. There are no preulcerative lesions noted to the left foot. The skin to the bilateral feet is  thin and shiny. The skin to the bilateral feet is not warm, supple, and dry.       Vascular: DP pulse of the right foot is not palpable. DP pulse of the left foot is not palpable. PT pulse of the right foot is not palpable. PT pulse of the left foot is not palpable. Doppler exam produces only monophasic pulses to the bilateral DP and PT arteries. CFT is greater than 3 secs to the digits of the right foot. CFT is greater than 3 secs to the digits of the left foot. There is edema noted to the left foot. There is no hair growth noted to the digits of the bilateral feet. There are no varicosities noted to the right foot/ankle. There are no varicosities noted to the left foot/ankle. Erythema is present to the left feet. Neurological: Reflexes are present to the right plantar foot and to the Achilles tendon. Reflexes are present to the left plantar foot and to the Achilles tendon. Protective sensation is present to the right plantar foot as noted with a 5.07 [place] monofilament. Protective sensation is present to the left plantar foot as noted with a 5.07 [place] monofilament. Musculoskeletal:  Muscle strength is +5/5 to all four muscle groups of the right lower extremity and +5/5 to all four muscle groups of the left lower extremity. There are no areas of subluxation, dislocation, or laxity noted to either lower extremity. Range of motion to the right ankle is  free of pain or grinding. Range of motion to the left ankle is  free of pain or grinding. Range of motion to the right subtalar joint is  free of pain or grinding. Range of motion to the left subtalar joint is  free of pain or grinding. No abnormalities, asymmetries, or misalignments are seen between the extremities. Weightbearing evaluation does not reveal rearfoot eversion, medial prominence of the talar head, loss of the medial longitudinal arch height, and too many toes sign bilaterally. The lesser digits of the right foot are contracted.      The lesser digits of the left foot are contracted. There is no prominence noted to the first metatarsal head without abduction of the hallux of the right foot. There is no prominence noted to the first metatarsal head without abduction of the hallux of the left foot. Shoe examination was performed. Biomechanical Exam: abnormal left. Asessment: Patient is a 78 y.o. male with:    Diagnosis Orders   1. Ulcer of left foot with necrosis of muscle ([address]  Heather Jesus MD, Vascular Surgery, [address], SKIN, SUB-Q TISSUE,MUSCLE,=<20 SQ CM   2. Edema, lower extremity  Althea Sinclair MD, Vascular Surgery, [facility] DEBRIDEMENT, SKIN, SUB-Q TISSUE,MUSCLE,=<20 SQ CM   3. History of amputation of lesser toe of left foot ([facility])  Heather Jesus MD, Vascular Surgery, [facility] DEBRIDEMENT, SKIN, SUB-Q TISSUE,MUSCLE,=<20 SQ CM   4. History of amputation of great toe of both feet ([facility])  Heather Jesus MD, Vascular Surgery, [facility] DEBRIDEMENT, SKIN, SUB-Q TISSUE,MUSCLE,=<20 SQ CM   5. Type 2 diabetes mellitus with peripheral vascular disease ([facility])  Heather Jesus MD, Vascular Surgery, [facility] DEBRIDEMENT, SKIN, SUB-Q TISSUE,MUSCLE,=<20 SQ CM       Plan:  1. Clinical evaluation of the patient. 2. Following excisional debridement of 100 % of nonviable tissue from the wound of the left foot to the depth of the subcutaneous tissue, the wound was dressed with Silver Cell and a DSD. Patient to change this dressing daily. Patient referred to Dr. Alba Manjarrez as the patient is at high risk for nonhealing of his wound and for ischemia. 3. Contact office with any questions/problems/concerns. Return in about 1 week (around 5/31/2022) for Wound Care.    5/24/2022      Laura Leong DPM

## 2022-05-25 ASSESSMENT — ENCOUNTER SYMPTOMS
NAUSEA: 0
SHORTNESS OF BREATH: 0
DIARRHEA: 0
BACK PAIN: 0
COLOR CHANGE: 0

## 2022-05-25 NOTE — ED PROVIDER NOTES
[address]  EMERGENCY DEPARTMENT ENCOUNTER    Pt Name: Mervin Tuttle  MRN: [id number]  YOB: 1955  Date of evaluation:5/24/22  PCP: Bull Porter DO    CHIEF COMPLAINT       Chief Complaint   Patient presents with    Coagulation Disorder     Beeding L toes after wound care, pt on coumadin and ASA. HISTORY OF PRESENT ILLNESS    Mervin Tuttle is a 78 y.o. male who presents with a chief complaint of bleeding from his left foot. Patient states that he had a debridement done at the podiatrist office today around 10 AM.  States he continues to have bleeding from his left foot all throughout the day today. He is on Coumadin. He denies any chest pain, difficulty breathing or syncope. Denies any bleeding anywhere else. No blood in the stools, no blood in his urine. Symptoms are acute. Symptoms are mild. Nothing else make symptoms better or worse. Patient has no other complaints at this time. REVIEW OF SYSTEMS       Review of Systems   Constitutional: Negative for chills, fatigue and fever. HENT: Negative for congestion, ear pain, sore throat and trouble swallowing. Eyes: Negative for visual disturbance. Respiratory: Negative for cough and shortness of breath. Cardiovascular: Negative for chest pain, palpitations and leg swelling. Gastrointestinal: Negative for abdominal pain, blood in stool, constipation, diarrhea, nausea and vomiting. Genitourinary: Negative for dysuria and flank pain. Musculoskeletal: Negative for arthralgias, back pain, myalgias and neck pain. Skin: Positive for wound. Negative for color change and rash. Neurological: Negative for dizziness, weakness, light-headedness, numbness and headaches. Psychiatric/Behavioral: Negative for confusion. All other systems reviewed and are negative. Negative in 10 essential Systems except as mentioned above and in the HPI.         PAST MEDICAL HISTORY     Past Medical History:   Diagnosis Date    Atrial fibrillation ([facility]     CAD (coronary artery disease)     CHF (congestive heart failure) ([facility]     Diabetes mellitus ([facility]     Hyperlipidemia     Hypertension          SURGICAL HISTORY      has a past surgical history that includes eye surgery (Bilateral); hernia repair; Knee arthroscopy (Right); Colonoscopy; Endoscopy, colon, diagnostic; pacemaker placement (2011); Cardiac surgery (2010); Toe amputation (Right); debridement (Right, 11/19/2015); other surgical history (Right, 12 29 15); and Toe amputation (Left, 3/13/2022). CURRENT MEDICATIONS       Current Discharge Medication List      CONTINUE these medications which have NOT CHANGED    Details   silver sulfADIAZINE (SILVADENE) 1 % cream APPLY DAILY TO SKIN TO AFFECTED AREA EVERY DAY FOR 30 DAYS      aspirin 81 MG EC tablet Take 1 tablet by mouth daily  Qty: 30 tablet, Refills: 3      isosorbide mononitrate (IMDUR) 60 MG extended release tablet Take 1 tablet by mouth daily  Qty: 30 tablet, Refills: 3      nitroGLYCERIN (NITROSTAT) 0.4 MG SL tablet up to max of 3 total doses. If no relief after 1 dose, call 911. Qty: 25 tablet, Refills: 3      glipiZIDE (GLUCOTROL) 5 MG tablet Take 1 tablet by mouth every morning (before breakfast)  Qty: 60 tablet, Refills: 3      hydrALAZINE (APRESOLINE) 50 MG tablet Take 1 tablet by mouth every 8 hours  Qty: 90 tablet, Refills: 3      benazepril (LOTENSIN) 5 MG tablet Take 1 tablet by mouth daily  Qty: 30 tablet, Refills: 3      furosemide (LASIX) 80 MG tablet Take 1 tablet by mouth daily  Qty: 60 tablet, Refills: 3      carvedilol (COREG) 12.5 MG tablet Take 1 tablet by mouth 2 times daily (with meals)  Qty: 60 tablet, Refills: 3      warfarin (COUMADIN) 2 MG tablet Take 4 mg by mouth daily Per [facility]      atorvastatin (LIPITOR) 40 MG tablet Take 40 mg by mouth daily. ALLERGIES     is allergic to doxycycline, pcn [penicillins], and penicillin g.     FAMILY HISTORY     He indicated that the status of his mother is unknown. He indicated that the status of his father is unknown. He indicated that the status of his brother is unknown. He indicated that the status of his maternal grandfather is unknown.     family history includes Cancer in his father; Osteoarthritis in his mother; Other in his brother and maternal grandfather. SOCIAL HISTORY      reports that he has never smoked. He has never used smokeless tobacco. He reports current alcohol use. He reports that he does not use drugs. PHYSICAL EXAM     INITIAL VITALS:  height is 5' 10\" (1.778 m) and weight is 187 lb (84.8 kg). His oral temperature is 98.5 °F (36.9 °C). His blood pressure is 154/68 (abnormal) and his pulse is 85. His respiration is 20 and oxygen saturation is 100%. Physical Exam  Vitals and nursing note reviewed. Constitutional:       General: He is not in acute distress. HENT:      Head: Normocephalic and atraumatic. Eyes:      Conjunctiva/sclera: Conjunctivae normal.      Pupils: Pupils are equal, round, and reactive to light. Cardiovascular:      Rate and Rhythm: Normal rate and regular rhythm. Heart sounds: Normal heart sounds. No murmur heard. Pulmonary:      Effort: Pulmonary effort is normal. No respiratory distress. Breath sounds: Normal breath sounds. Abdominal:      General: Bowel sounds are normal. There is no distension. Palpations: Abdomen is soft. Tenderness: There is no abdominal tenderness. Musculoskeletal:         General: No tenderness. Cervical back: Neck supple. Comments: Prior first and second toe amputations noted at the left foot. Lymphadenopathy:      Cervical: No cervical adenopathy. Skin:     General: Skin is warm and dry. Findings: No rash. Comments: Dried blood around the base of the foot where the first and second toes were. No active bleeding noted.    Neurological:      Mental Status: He is alert and oriented to person, place, and time. Psychiatric:         Judgment: Judgment normal.           DIFFERENTIAL DIAGNOSIS/MDM:   54-year-old male presents after a debridement of podiatry day today. He is afebrile, nontoxic, normal vital signs. No acute distress. On exam his dressing was actually clean, dry and intact however at the lower portion of the dressing he did have some serosanguineous bloodsoaked gauze. On direct examination he really does not have any active bleeding at this time. Will check blood work, CBC, INR. DIAGNOSTIC RESULTS     EKG: All EKG's are interpreted by the Emergency Department Physician who either signs or Co-signs this chart in the absence of a cardiologist.        RADIOLOGY:   I directly visualized the following  images and reviewed the radiologist interpretations:  No orders to display           ED BEDSIDE ULTRASOUND:      LABS:  Labs Reviewed   APTT - Abnormal; Notable for the following components:       Result Value    PTT 52.7 (*)     All other components within normal limits   BASIC METABOLIC PANEL - Abnormal; Notable for the following components:    Glucose 227 (*)     CREATININE 1.68 (*)     Potassium 3.0 (*)     Chloride 97 (*)     GFR Non- 41 (*)     GFR  50 (*)     All other components within normal limits   CBC WITH AUTO DIFFERENTIAL - Abnormal; Notable for the following components:    RBC 3.41 (*)     Hemoglobin 10.1 (*)     Hematocrit 29.7 (*)     RDW 18.7 (*)     Seg Neutrophils 69 (*)     Lymphocytes 22 (*)     All other components within normal limits   PROTIME-INR - Abnormal; Notable for the following components:    Protime 37.8 (*)     All other components within normal limits         EMERGENCY DEPARTMENT COURSE:   Vitals:    Vitals:    05/24/22 2200   BP: (!) 154/68   Pulse: 85   Resp: 20   Temp: 98.5 °F (36.9 °C)   TempSrc: Oral   SpO2: 100%   Weight: 187 lb (84.8 kg)   Height: 5' 10\" (1.778 m)     11:19 PM EDT  INR is elevated at 3.8.   Again his bleeding has totally resolved. Clean dressing was placed. I spoke with Dr. Santi Rinaldi and discussed case. He recommended having the patient skip his Coumadin dose tonight, eat vegetables or other foods high in vitamin K tonight and to get his INR rechecked at Coumadin clinic tomorrow. Patient is agreeable this plan he will be discharged home today. CRITICALCARE:      CONSULTS:  IP CONSULT TO PRIMARY CARE PROVIDER      PROCEDURES:      FINAL IMPRESSION      1. Supratherapeutic INR            DISPOSITION/PLAN   DISPOSITION Decision To Discharge 05/24/2022 11:19:19 PM          PATIENT REFERRED TO:  Bull Porter DO  [address] [facility] [address]  [phone]    Schedule an appointment as soon as possible for a visit       [facility] ED  [facility] [address]  [address] [facility]  [phone]    As needed, If symptoms worsen      DISCHARGE MEDICATIONS:  Current Discharge Medication List          The care is provided during an unprecedented national emergency due to the novel coronavirus, COVID-19.     (Please note that portions ofthis note were completed with a voice recognition program.  Efforts were made to edit the dictations but occasionally words are mis-transcribed.)    Jasmin Guzmán DO  Attending Emergency Physician          Jasmin Guzmán DO  05/24/22 1355

## 2022-05-31 ENCOUNTER — OFFICE VISIT (OUTPATIENT)
Dept: PODIATRY | Age: 67
End: 2022-05-31
Payer: MEDICARE

## 2022-05-31 VITALS — WEIGHT: 187 LBS | HEIGHT: 70 IN | BODY MASS INDEX: 26.77 KG/M2

## 2022-05-31 DIAGNOSIS — E11.51 TYPE 2 DIABETES MELLITUS WITH PERIPHERAL VASCULAR DISEASE (HCC): ICD-10-CM

## 2022-05-31 DIAGNOSIS — Z89.422 HISTORY OF AMPUTATION OF LESSER TOE OF LEFT FOOT (HCC): ICD-10-CM

## 2022-05-31 DIAGNOSIS — L97.523 ULCER OF LEFT FOOT WITH NECROSIS OF MUSCLE (HCC): Primary | ICD-10-CM

## 2022-05-31 DIAGNOSIS — R60.0 EDEMA, LOWER EXTREMITY: ICD-10-CM

## 2022-05-31 PROCEDURE — 99999 PR OFFICE/OUTPT VISIT,PROCEDURE ONLY: CPT | Performed by: PODIATRIST

## 2022-05-31 PROCEDURE — 11043 DBRDMT MUSC&/FSCA 1ST 20/<: CPT | Performed by: PODIATRIST

## 2022-05-31 ASSESSMENT — ENCOUNTER SYMPTOMS
DIARRHEA: 0
BACK PAIN: 0
SHORTNESS OF BREATH: 0
COLOR CHANGE: 0
NAUSEA: 0

## 2022-05-31 NOTE — PROGRESS NOTES
(Active)   Number of days: 2455       Incision 09/09/15 Toe (Comment  which one) Right;Upper (Active)   Number of days: 5325       Incision 11/19/15 Foot Right (Active)   Number of days: 2385       Incision 12/29/15 Foot Right;Distal (Active)   Number of days: 6871       Procedure: The wound(s) to the left foot was debrided with removal of fibrotic, necrotic, and hyperkeratotic tissue, including a layer of surrounding healthy tissue down through and including subcutaneous tissue,using curette. Excisional Debridement  Percent of Wound Debrided: 100%    Wound: is unchanged    Bleeding: Minimal    Hemostasis:   by pressure    Response to treatment:  Well tolerated by patient. Assessment:      Diagnosis Orders   1. Ulcer of left foot with necrosis of muscle (HCC)  MI DEBRIDEMENT, SKIN, SUB-Q TISSUE,MUSCLE,=<20 SQ CM   2. Edema, lower extremity  MI DEBRIDEMENT, SKIN, SUB-Q TISSUE,MUSCLE,=<20 SQ CM   3. History of amputation of lesser toe of left foot (HCC)  MI DEBRIDEMENT, SKIN, SUB-Q TISSUE,MUSCLE,=<20 SQ CM   4. Type 2 diabetes mellitus with peripheral vascular disease (HCC)  MI DEBRIDEMENT, SKIN, SUB-Q TISSUE,MUSCLE,=<20 SQ CM           Plan:   Plan for wound -   Treatment:                Dressed with: Antibiotic ointment and a DSD. Home care: Patient to change the dressing to the left foot daily with antibiotic ointment and a DSD. Abx :No Cultures :were notobtained. Return if symptoms worsen or fail to improve, for Patient to follow up with Dr. Mel Manuel for wound care.    5/31/2022        Vishal Gan DPM

## 2022-06-02 ENCOUNTER — TELEPHONE (OUTPATIENT)
Dept: VASCULAR SURGERY | Age: 67
End: 2022-06-02

## 2022-06-07 ENCOUNTER — OFFICE VISIT (OUTPATIENT)
Dept: PODIATRY | Age: 67
End: 2022-06-07
Payer: MEDICARE

## 2022-06-07 VITALS — WEIGHT: 187 LBS | BODY MASS INDEX: 26.77 KG/M2 | HEIGHT: 70 IN

## 2022-06-07 DIAGNOSIS — L97.523 ULCER OF LEFT FOOT WITH NECROSIS OF MUSCLE (HCC): ICD-10-CM

## 2022-06-07 DIAGNOSIS — Z98.890 POST-OPERATIVE STATE: Primary | ICD-10-CM

## 2022-06-07 DIAGNOSIS — R60.0 EDEMA, LOWER EXTREMITY: ICD-10-CM

## 2022-06-07 DIAGNOSIS — Z89.422 HISTORY OF AMPUTATION OF LESSER TOE OF LEFT FOOT (HCC): ICD-10-CM

## 2022-06-07 DIAGNOSIS — E11.51 TYPE 2 DIABETES MELLITUS WITH PERIPHERAL VASCULAR DISEASE (HCC): ICD-10-CM

## 2022-06-07 PROCEDURE — 1123F ACP DISCUSS/DSCN MKR DOCD: CPT | Performed by: PODIATRIST

## 2022-06-07 PROCEDURE — 99999 PR OFFICE/OUTPT VISIT,PROCEDURE ONLY: CPT | Performed by: PODIATRIST

## 2022-06-07 PROCEDURE — 3044F HG A1C LEVEL LT 7.0%: CPT | Performed by: PODIATRIST

## 2022-06-07 PROCEDURE — 11043 DBRDMT MUSC&/FSCA 1ST 20/<: CPT | Performed by: PODIATRIST

## 2022-06-07 RX ORDER — GABAPENTIN 100 MG/1
100 CAPSULE ORAL 3 TIMES DAILY
Qty: 90 CAPSULE | Refills: 1 | Status: ON HOLD | OUTPATIENT
Start: 2022-06-07 | End: 2022-07-01 | Stop reason: HOSPADM

## 2022-06-07 NOTE — PROGRESS NOTES
[address]  Dept: [phone]    POST-OP PROGRESS NOTE  Date of patient's visit: 6/7/2022  Patient's Name:  Cate Harmon YOB: 1955            Patient Care Team:  Nam Weinberg DO as PCP - General        Chief Complaint   Patient presents with    Post-Op Check       Pt's primary care physician is Nam Weinberg DO last seen December 30 2021     Subjective: Cate Harmon is a 78 y.o. male who presents to the office today 12 week(s)  S/P TOE AMPUTATION--left 2nd digit for correction of diabetic foot infection  Problem List Items Addressed This Visit     None      Visit Diagnoses     Post-operative state    -  Primary      . Patient relates pain is Present and IMPROVED. Pain is rated 1 out of 10 and is described as intermittent. Currently denies F/C/N/V. Is patient taking pain medications as prescribed and is controlling pain no    Physical Examination:  Incision is coapted, sutures/steri-strips are intact. Minimal bleeding post operatively. Edema present. No erythema. No Pus. Operative correction is satisfactory. ***    Radiographs: none      Assessment: Cate Harmon is status post TOE AMPUTATION--left 2nd digit  Normal post operative course. Doing well          ICD-10-CM    1. Post-operative state  Z98.890          Plan:  Patient examined and evaluated. Current condition and treatment options discussed in detail. Advised pt to ***. Verbal and written instructions given to patient. Orders: No orders of the defined types were placed in this encounter. Contact office with any questions/problems/concerns. RTC in {Numbers; 1-10:78355}{DAYS/WEEKS/MONTHS (D):68933}.      Electronically signed by Chad Chu DPM on 6/7/2022 at 10:37 AM  6/7/2022

## 2022-06-16 ASSESSMENT — ENCOUNTER SYMPTOMS
BACK PAIN: 0
DIARRHEA: 0
COLOR CHANGE: 0
NAUSEA: 0
SHORTNESS OF BREATH: 0

## 2022-06-16 NOTE — PROGRESS NOTES
Follow-Up Wound Progress Note   Janie Jaimes  AGE: 78 y.o. GENDER: male  : 1955  TODAY'S DATE:  2022  Subjective:    Janie Jaimes is a 78 y.o. male who presents today for wound check. Wound Location : Left foot. The patients pain is 1 out of 10. Patient states that they have been changing the dressing to the left lower extremity(s) with Silver Cell and a DSD daily as instructed since last visit. Modifying factors: Diabetes with PVD. Review of Systems   Constitutional: Negative for activity change, appetite change, chills, diaphoresis, fatigue and fever. Respiratory: Negative for shortness of breath. Cardiovascular: Negative for leg swelling. Gastrointestinal: Negative for diarrhea and nausea. Endocrine: Negative for cold intolerance, heat intolerance and polyuria. Musculoskeletal: Positive for arthralgias. Negative for back pain, gait problem, joint swelling and myalgias. Skin: Positive for wound. Negative for color change, pallor and rash. Allergic/Immunologic: Negative for environmental allergies and food allergies. Neurological: Negative for dizziness, weakness, light-headedness and numbness. Hematological: Does not bruise/bleed easily. Psychiatric/Behavioral: Negative for behavioral problems, confusion and self-injury. The patient is not nervous/anxious. Objective:   Exam:  There is a full thickness wound to the depth of the muscle and tendon of the left foot secondary to amputation of the second toe. There is mild erythema surrounding the wound, but no calor is noted. The wound base is necrotic with yellow and black discolored tissue. The wound is very moist. Debridement of the tissue with a fifteen blade produces some bleeding, but it is not excessive. There is no purulence or malodor noted to the wound. + probe to bone. The wound measures 2.0 cm x 1.1 cm x 1.5 cm deep.     Wound Care Documentation:  Incision 09/09/15 Foot Right (Active)   Number of days: 2455       Incision 09/09/15 Toe (Comment  which one) Right;Upper (Active)   Number of days: 3683       Incision 11/19/15 Foot Right (Active)   Number of days: 2385       Incision 12/29/15 Foot Right;Distal (Active)   Number of days: 5660       Procedure: The wound(s) to the left foot was debrided with removal of fibrotic, necrotic, and hyperkeratotic tissue, including a layer of surrounding healthy tissue down through and including muscle tissue,using curette. Excisional Debridement  Percent of Wound Debrided: 100%    Wound: is unchanged    Bleeding: Minimal    Hemostasis:   by pressure    Response to treatment:  Well tolerated by patient. Assessment:      Diagnosis Orders   1. Post-operative state     2. Ulcer of left foot with necrosis of muscle (HCC)  MS DEBRIDEMENT, SKIN, SUB-Q TISSUE,MUSCLE,=<20 SQ CM   3. Edema, lower extremity     4. History of amputation of lesser toe of left foot ([address]     5. Type 2 diabetes mellitus with peripheral vascular disease (HCC)  MS DEBRIDEMENT, SKIN, SUB-Q TISSUE,MUSCLE,=<20 SQ CM           Plan:   Plan for wound -   Treatment:                Dressed with: silvercell and a DSD. Home care: Patient to change the dressing to the left foot daily with antibiotic ointment and a DSD. All labs were reviewed and all imagining including the above findings were reviewed PRIOR to the patients arrival and with the patient today. Previous patient encounter was reviewed. Encounters from the patients other medical providers were reviewed and noted. Time was spent educating the patient on proper care of the feet and ankles. All the above diagnosis were addressed at todays visit and all questions were answered.   A total of 25 minutes was spent with this patients encounter which included charting after the patients visit       6/7/2022        Pancho Martins DPM

## 2022-06-22 ENCOUNTER — OFFICE VISIT (OUTPATIENT)
Dept: PODIATRY | Age: 67
End: 2022-06-22
Payer: MEDICARE

## 2022-06-22 VITALS — HEIGHT: 67 IN | BODY MASS INDEX: 29.35 KG/M2 | WEIGHT: 187 LBS

## 2022-06-22 DIAGNOSIS — Z89.422 HISTORY OF AMPUTATION OF LESSER TOE OF LEFT FOOT (HCC): ICD-10-CM

## 2022-06-22 DIAGNOSIS — E11.51 TYPE 2 DIABETES MELLITUS WITH PERIPHERAL VASCULAR DISEASE (HCC): ICD-10-CM

## 2022-06-22 DIAGNOSIS — L97.523 ULCER OF LEFT FOOT WITH NECROSIS OF MUSCLE (HCC): ICD-10-CM

## 2022-06-22 DIAGNOSIS — L97.509 DIABETIC FOOT ULCER WITH OSTEOMYELITIS (HCC): ICD-10-CM

## 2022-06-22 DIAGNOSIS — E11.621 DIABETIC FOOT ULCER WITH OSTEOMYELITIS (HCC): ICD-10-CM

## 2022-06-22 DIAGNOSIS — Z98.890 POST-OPERATIVE STATE: Primary | ICD-10-CM

## 2022-06-22 DIAGNOSIS — E11.69 DIABETIC FOOT ULCER WITH OSTEOMYELITIS (HCC): ICD-10-CM

## 2022-06-22 DIAGNOSIS — M86.9 DIABETIC FOOT ULCER WITH OSTEOMYELITIS (HCC): ICD-10-CM

## 2022-06-22 PROCEDURE — 11043 DBRDMT MUSC&/FSCA 1ST 20/<: CPT | Performed by: PODIATRIST

## 2022-06-22 PROCEDURE — 99999 PR OFFICE/OUTPT VISIT,PROCEDURE ONLY: CPT | Performed by: PODIATRIST

## 2022-06-22 NOTE — PROGRESS NOTES
[address]  Dept: [phone]    POST-OP PROGRESS NOTE  Date of patient's visit: 6/22/2022  Patient's Name:  Donny Turcios YOB: 1955            Patient Care Team:  Jocelynn Crockett DO as PCP - General        Chief Complaint   Patient presents with    Post-Op Check     2 wks post op left foot       Pt's primary care physician is Jocelynn Crockett DO last seen 06/08/2022     Subjective: Donny Turcios is a 78 y.o. male who presents to the office today 3 months (s)  S/P amputation of the left 2nd toe for correction of osteomyelitis. Pt has not followed up recently and he has not seen vascular   Problem List Items Addressed This Visit     None      Visit Diagnoses     Post-operative state    -  Primary      . Patient relates pain is Present and improved. Pain is rated 1 out of 10 and is described as intermittent, mild. Currently denies F/C/N/V. Is patient taking pain medications as prescribed and is controlling pain      Physical Examination:  Wound #:   1    diabetic foot ulcer and dehisced surgical wound   left foot    Wound measurements:  #1  2 cm by 2 cm  by   3 mm        Wound Depth: muscle. Drainage:    minimal, serosanguinous, serous Type:minimal  Wound Bed status:   Granulation Tissue: 50%   Necrotic 50%  Type:   Epithelialization 0%   Hypergranular 0%   Periwound hyperkeratosis:  absent  Erythema absent    Odor:no  Maceration:no  Undermining/tract/tunneling:no  Culture taken:   yes                   Assessment: Donny Turcios is status post as above  Normal post operative course. Doing well         Diagnosis Orders   1. Post-operative state  [address] [facility] [address]    CT DEBRIDEMENT, SKIN, SUB-Q TISSUE,MUSCLE,=<20 SQ CM   2.  Ulcer of left foot with necrosis of muscle ([facility] [address]  [address] [place] [address]

## 2022-06-27 ENCOUNTER — INITIAL CONSULT (OUTPATIENT)
Dept: VASCULAR SURGERY | Age: 67
End: 2022-06-27
Payer: MEDICARE

## 2022-06-27 VITALS
SYSTOLIC BLOOD PRESSURE: 138 MMHG | TEMPERATURE: 96.9 F | RESPIRATION RATE: 16 BRPM | HEIGHT: 67 IN | BODY MASS INDEX: 29.35 KG/M2 | DIASTOLIC BLOOD PRESSURE: 75 MMHG | OXYGEN SATURATION: 97 % | WEIGHT: 187 LBS | HEART RATE: 69 BPM

## 2022-06-27 DIAGNOSIS — I70.245 ATHEROSCLEROSIS OF NATIVE ARTERIES OF LEFT LEG WITH ULCERATION OF OTHER PART OF FOOT (HCC): Primary | ICD-10-CM

## 2022-06-27 PROCEDURE — 99204 OFFICE O/P NEW MOD 45 MIN: CPT | Performed by: SURGERY

## 2022-06-27 PROCEDURE — 1123F ACP DISCUSS/DSCN MKR DOCD: CPT | Performed by: SURGERY

## 2022-06-27 ASSESSMENT — ENCOUNTER SYMPTOMS
ABDOMINAL DISTENTION: 0
ABDOMINAL PAIN: 0
SHORTNESS OF BREATH: 0
TROUBLE SWALLOWING: 0
CHEST TIGHTNESS: 0
COLOR CHANGE: 0
VOICE CHANGE: 0
COUGH: 0
EYE PAIN: 0
VOMITING: 0

## 2022-06-27 NOTE — PROGRESS NOTES
[address] [facility] VASCULAR  [address] [place] [id number]  Dept: [phone]     Patient: Parish Shirley  : 1955  MRN: [id number]  DOS: 2022    Referring provider:  DIAMOND Back         HPI:  Parish Shirley is a 78 y.o. male who comes to the office for the first time regarding left toe amputation site nonhealing wound. He had a first and second toe amputation approximately 3 months ago which is still having trouble with wound healing. He has been diabetic for approximately 30 years. He has diabetic neuropathy. He has been seen by vascular surgeon at least 10 years ago who had stated that there was nothing that can be done for his lower extremity arterial disease. He has not had recent arteriography and has had MONIQUE which is noncompressible. Digital waveforms are actually quite poor bilaterally. He does not smoke and quit sometime ago. He has had coronary artery disease for which she has had coronary artery bypass grafting quite a while back at least a decade ago. Ever since that procedure he explains that his leg has been swollen.   Past Medical History:   Diagnosis Date    Atrial fibrillation ([facility] [address]     CAD (coronary artery disease)     CHF (congestive heart failure) (HCC)     Diabetes mellitus ([facility] [address] [facility]     Hyperlipidemia     Hypertension      Family History   Problem Relation Age of Onset    Cancer Father         pancreatic cancer    Other Brother         MI    Osteoarthritis Mother     Other Maternal Grandfather         MI      Social History     Socioeconomic History    Marital status: Single     Spouse name: Not on file    Number of children: Not on file    Years of education: Not on file    Highest education level: Not on file   Occupational History    Not on file   Tobacco Use    Smoking status: Never Smoker    Smokeless tobacco: Never Used   Substance and Sexual Activity    Alcohol use: Yes     Comment: SOCIAL on weekends    Drug use: No    Sexual activity: Not on file   Other Topics Concern    Not on file   Social History Narrative    Not on file     Social Determinants of Health     Financial Resource Strain:     Difficulty of Paying Living Expenses: Not on file   Food Insecurity:     Worried About Running Out of Food in the Last Year: Not on file    Tee of Food in the Last Year: Not on file   Transportation Needs:     Lack of Transportation (Medical): Not on file    Lack of Transportation (Non-Medical):  Not on file   Physical Activity:     Days of Exercise per Week: Not on file    Minutes of Exercise per Session: Not on file   Stress:     Feeling of Stress : Not on file   Social Connections:     Frequency of Communication with Friends and Family: Not on file    Frequency of Social Gatherings with Friends and Family: Not on file    Attends Jain Services: Not on file    Active Member of Clubs or Organizations: Not on file    Attends Club or Organization Meetings: Not on file    Marital Status: Not on file   Intimate Partner Violence:     Fear of Current or Ex-Partner: Not on file    Emotionally Abused: Not on file    Physically Abused: Not on file    Sexually Abused: Not on file   Housing Stability:     Unable to Pay for Housing in the Last Year: Not on file    Number of [place] in the Last Year: Not on file    Unstable Housing in the Last Year: Not on file      Past Surgical History:   Procedure Laterality Date    CARDIAC SURGERY  2010    CABG X3    COLONOSCOPY      DEBRIDEMENT Right 11/19/2015    DEBRIDEMENT WOUND FOOT RIGHT W/ APPLICATION BILAT INTEG RAT GRAFT    ENDOSCOPY, COLON, DIAGNOSTIC      EYE SURGERY Bilateral     cataracts    HERNIA REPAIR      umbilical    KNEE ARTHROSCOPY Right     OTHER SURGICAL HISTORY Right 12 29 15    wound care graft procedure foot,appl of integra    PACEMAKER PLACEMENT  2011    WITH DEFIBRILLATOR    TOE AMPUTATION Right     R great    TOE AMPUTATION Left 3/13/2022    TOE AMPUTATION--left 2nd digit performed by Salena Bui DPM at [place] [address] of [facility]   Constitutional: Negative for activity change, fever and unexpected weight change. HENT: Negative for trouble swallowing and voice change. Eyes: Negative for pain and visual disturbance. Respiratory: Negative for cough, chest tightness and shortness of breath. Cardiovascular: Negative for chest pain and palpitations. Gastrointestinal: Negative for abdominal distention, abdominal pain and vomiting. Endocrine: Negative for cold intolerance and heat intolerance. Genitourinary: Negative for dysuria, flank pain and hematuria. Musculoskeletal: Negative for joint swelling and neck pain. Skin: Negative for color change and rash. Allergic/Immunologic: Negative for immunocompromised state. Neurological: Negative for syncope, speech difficulty, weakness, numbness and headaches. Hematological: Negative for adenopathy. Psychiatric/Behavioral: Negative for behavioral problems and suicidal ideas. Vitals:    06/27/22 1434   BP: 138/75   Pulse: 69   Resp: 16   Temp: 96.9 °F (36.1 °C)   SpO2: 97%   Weight: 187 lb (84.8 kg)   Height: 5' 7\" (1.702 m)          Physical Exam  Constitutional:       General: He is not in acute distress. HENT:      Mouth/Throat:      Mouth: Mucous membranes are moist.      Pharynx: Oropharynx is clear. Eyes:      General: No scleral icterus. Extraocular Movements: Extraocular movements intact. Conjunctiva/sclera: Conjunctivae normal.   Neck:      Thyroid: No thyroid mass or thyromegaly. Cardiovascular:      Rate and Rhythm: Normal rate and regular rhythm. Heart sounds: No murmur heard. Comments: On examination he has palpable femoral and popliteal pulses bilaterally. He has a dorsalis pedis pulse on the right but not the left.   There are no posterior tibial pulses in either lower extremity. His toe amputation site on the left is erythematous and still has an open wound. He does have edema in the left lower extremity which is mild to moderate. It is lymphatic in nature. He has no tenderness but he does have neuropathy which can alter his examination. He has no other ischemia. His heel is intact. He has no varicosities. Pulmonary:      Effort: No respiratory distress. Breath sounds: No rales. Abdominal:      General: There is no distension. Palpations: There is no mass. Tenderness: There is no abdominal tenderness. There is no guarding. Musculoskeletal:      Cervical back: No rigidity or tenderness. Lymphadenopathy:      Cervical: No cervical adenopathy. Skin:     Coloration: Skin is not jaundiced. Findings: No rash. Neurological:      General: No focal deficit present. Mental Status: He is alert and oriented to person, place, and time. Cranial Nerves: No cranial nerve deficit. Psychiatric:         Mood and Affect: Mood normal.         Assessment:  1. Atherosclerosis of native arteries of left leg with ulceration of other part of foot ([facility] [address]          Plan: At this time I think his best option is arteriography of the left lower extremity via the right groin. I do not think his SFA and popliteal artery are problematic but I think he has significant tibial disease. If there is something that can be done it may be able to be identified and/or treated during this procedure. He understands that if nothing can be done I will back out and continue employ local wound care measures. He agrees to proceed and he understands the tenants of increasing the amount of oxygen delivery to the wound to get it to heal.  He will be seeing wound care in the near future and I think hyperbaric therapy is an option especially if we cannot revascularize. I think it can be utilized even if we do revascularized to accelerate wound healing.   He also understands that there are risks to these procedures including not limited to bleeding, infection, hematoma, nerve damage, renal insufficiency or failure, limb loss, stroke, heart attack and death. We will be seeing him in the operating room for left lower extremity arteriography via the right groin in the near future.     Electronically signed by:  Derick oDnovan MD

## 2022-06-27 NOTE — H&P (VIEW-ONLY)
[address] AND VASCULAR  [address] [place] [address]  Dept: [phone]     Patient: Shalonda Mckeon  : 1955  MRN: [id number]  DOS: 2022    Referring provider:  DIAMOND Torres         HPI:  Shalonda Mckeon is a 78 y.o. male who comes to the office for the first time regarding left toe amputation site nonhealing wound. He had a first and second toe amputation approximately 3 months ago which is still having trouble with wound healing. He has been diabetic for approximately 30 years. He has diabetic neuropathy. He has been seen by vascular surgeon at least 10 years ago who had stated that there was nothing that can be done for his lower extremity arterial disease. He has not had recent arteriography and has had MONIQUE which is noncompressible. Digital waveforms are actually quite poor bilaterally. He does not smoke and quit sometime ago. He has had coronary artery disease for which she has had coronary artery bypass grafting quite a while back at least a decade ago. Ever since that procedure he explains that his leg has been swollen.   Past Medical History:   Diagnosis Date    Atrial fibrillation ([facility]     CAD (coronary artery disease)     CHF (congestive heart failure) (HCC)     Diabetes mellitus ([facility]     Hyperlipidemia     Hypertension      Family History   Problem Relation Age of Onset    Cancer Father         pancreatic cancer    Other Brother         MI    Osteoarthritis Mother     Other Maternal Grandfather         MI      Social History     Socioeconomic History    Marital status: Single     Spouse name: Not on file    Number of children: Not on file    Years of education: Not on file    Highest education level: Not on file   Occupational History    Not on file   Tobacco Use    Smoking status: Never Smoker    Smokeless tobacco: Never Used   Substance and Sexual Activity    Alcohol use: Yes     Comment: SOCIAL on weekends    Drug use: No    Sexual activity: Not on file   Other Topics Concern    Not on file   Social History Narrative    Not on file     Social Determinants of Health     Financial Resource Strain:     Difficulty of Paying Living Expenses: Not on file   Food Insecurity:     Worried About Running Out of Food in the Last Year: Not on file    Tee of Food in the Last Year: Not on file   Transportation Needs:     Lack of Transportation (Medical): Not on file    Lack of Transportation (Non-Medical):  Not on file   Physical Activity:     Days of Exercise per Week: Not on file    Minutes of Exercise per Session: Not on file   Stress:     Feeling of Stress : Not on file   Social Connections:     Frequency of Communication with Friends and Family: Not on file    Frequency of Social Gatherings with Friends and Family: Not on file    Attends [employer] Services: Not on file    Active Member of Clubs or Organizations: Not on file    Attends Club or Organization Meetings: Not on file    Marital Status: Not on file   Intimate Partner Violence:     Fear of Current or Ex-Partner: Not on file    Emotionally Abused: Not on file    Physically Abused: Not on file    Sexually Abused: Not on file   Housing Stability:     Unable to Pay for Housing in the Last Year: Not on file    Number of [place] in the Last Year: Not on file    Unstable Housing in the Last Year: Not on file      Past Surgical History:   Procedure Laterality Date    CARDIAC SURGERY  2010    CABG X3    COLONOSCOPY      DEBRIDEMENT Right 11/19/2015    DEBRIDEMENT WOUND FOOT RIGHT W/ APPLICATION BILAT INTEG RAT GRAFT    ENDOSCOPY, COLON, DIAGNOSTIC      EYE SURGERY Bilateral     cataracts    HERNIA REPAIR      umbilical    KNEE ARTHROSCOPY Right     OTHER SURGICAL HISTORY Right 12 29 15    wound care graft procedure foot,appl of integra    PACEMAKER PLACEMENT  2011    WITH DEFIBRILLATOR    TOE AMPUTATION Right     R great    TOE AMPUTATION Left 3/13/2022    TOE AMPUTATION--left 2nd digit performed by Reg Oh DPM at [address]   Constitutional: Negative for activity change, fever and unexpected weight change. HENT: Negative for trouble swallowing and voice change. Eyes: Negative for pain and visual disturbance. Respiratory: Negative for cough, chest tightness and shortness of breath. Cardiovascular: Negative for chest pain and palpitations. Gastrointestinal: Negative for abdominal distention, abdominal pain and vomiting. Endocrine: Negative for cold intolerance and heat intolerance. Genitourinary: Negative for dysuria, flank pain and hematuria. Musculoskeletal: Negative for joint swelling and neck pain. Skin: Negative for color change and rash. Allergic/Immunologic: Negative for immunocompromised state. Neurological: Negative for syncope, speech difficulty, weakness, numbness and headaches. Hematological: Negative for adenopathy. Psychiatric/Behavioral: Negative for behavioral problems and suicidal ideas. Vitals:    06/27/22 1434   BP: 138/75   Pulse: 69   Resp: 16   Temp: 96.9 °F (36.1 °C)   SpO2: 97%   Weight: 187 lb (84.8 kg)   Height: 5' 7\" (1.702 m)          Physical Exam  Constitutional:       General: He is not in acute distress. HENT:      Mouth/Throat:      Mouth: Mucous membranes are moist.      Pharynx: Oropharynx is clear. Eyes:      General: No scleral icterus. Extraocular Movements: Extraocular movements intact. Conjunctiva/sclera: Conjunctivae normal.   Neck:      Thyroid: No thyroid mass or thyromegaly. Cardiovascular:      Rate and Rhythm: Normal rate and regular rhythm. Heart sounds: No murmur heard. Comments: On examination he has palpable femoral and popliteal pulses bilaterally. He has a dorsalis pedis pulse on the right but not the left.   There are no posterior tibial pulses in either lower extremity. His toe amputation site on the left is erythematous and still has an open wound. He does have edema in the left lower extremity which is mild to moderate. It is lymphatic in nature. He has no tenderness but he does have neuropathy which can alter his examination. He has no other ischemia. His heel is intact. He has no varicosities. Pulmonary:      Effort: No respiratory distress. Breath sounds: No rales. Abdominal:      General: There is no distension. Palpations: There is no mass. Tenderness: There is no abdominal tenderness. There is no guarding. Musculoskeletal:      Cervical back: No rigidity or tenderness. Lymphadenopathy:      Cervical: No cervical adenopathy. Skin:     Coloration: Skin is not jaundiced. Findings: No rash. Neurological:      General: No focal deficit present. Mental Status: He is alert and oriented to person, place, and time. Cranial Nerves: No cranial nerve deficit. Psychiatric:         Mood and Affect: Mood normal.         Assessment:  1. Atherosclerosis of native arteries of left leg with ulceration of other part of foot ([facility]          Plan: At this time I think his best option is arteriography of the left lower extremity via the right groin. I do not think his SFA and popliteal artery are problematic but I think he has significant tibial disease. If there is something that can be done it may be able to be identified and/or treated during this procedure. He understands that if nothing can be done I will back out and continue employ local wound care measures. He agrees to proceed and he understands the tenants of increasing the amount of oxygen delivery to the wound to get it to heal.  He will be seeing wound care in the near future and I think hyperbaric therapy is an option especially if we cannot revascularize. I think it can be utilized even if we do revascularized to accelerate wound healing.   He also understands that there are risks to these procedures including not limited to bleeding, infection, hematoma, nerve damage, renal insufficiency or failure, limb loss, stroke, heart attack and death. We will be seeing him in the operating room for left lower extremity arteriography via the right groin in the near future.     Electronically signed by:  Leana Astorga MD

## 2022-06-30 ENCOUNTER — APPOINTMENT (OUTPATIENT)
Dept: GENERAL RADIOLOGY | Age: 67
DRG: 565 | End: 2022-06-30
Payer: MEDICARE

## 2022-06-30 ENCOUNTER — HOSPITAL ENCOUNTER (OUTPATIENT)
Dept: WOUND CARE | Age: 67
Discharge: HOME | End: 2022-06-30
Payer: MEDICARE

## 2022-06-30 ENCOUNTER — HOSPITAL ENCOUNTER (INPATIENT)
Age: 67
LOS: 1 days | Discharge: HOME | DRG: 565 | End: 2022-07-01
Attending: EMERGENCY MEDICINE | Admitting: INTERNAL MEDICINE
Payer: MEDICARE

## 2022-06-30 VITALS
HEART RATE: 92 BPM | DIASTOLIC BLOOD PRESSURE: 91 MMHG | RESPIRATION RATE: 16 BRPM | WEIGHT: 187 LBS | TEMPERATURE: 97.7 F | SYSTOLIC BLOOD PRESSURE: 170 MMHG | BODY MASS INDEX: 29.35 KG/M2 | HEIGHT: 67 IN

## 2022-06-30 DIAGNOSIS — M86.172 ACUTE OSTEOMYELITIS OF LEFT FOOT (HCC): ICD-10-CM

## 2022-06-30 DIAGNOSIS — L03.116 CELLULITIS OF LEFT FOOT: ICD-10-CM

## 2022-06-30 DIAGNOSIS — M71.072 ABSCESS OF BURSA OF LEFT FOOT: ICD-10-CM

## 2022-06-30 DIAGNOSIS — Z89.422 HISTORY OF AMPUTATION OF LESSER TOE OF LEFT FOOT (HCC): Primary | ICD-10-CM

## 2022-06-30 DIAGNOSIS — E11.42 DIABETIC POLYNEUROPATHY ASSOCIATED WITH TYPE 2 DIABETES MELLITUS (HCC): ICD-10-CM

## 2022-06-30 DIAGNOSIS — M86.60 OTHER CHRONIC OSTEOMYELITIS, UNSPECIFIED SITE (HCC): ICD-10-CM

## 2022-06-30 PROBLEM — M86.9 OSTEOMYELITIS (HCC): Status: ACTIVE | Noted: 2022-06-30

## 2022-06-30 LAB
ABSOLUTE EOS #: 0.09 K/UL (ref 0–0.44)
ABSOLUTE IMMATURE GRANULOCYTE: 0.04 K/UL (ref 0–0.3)
ABSOLUTE LYMPH #: 0.72 K/UL (ref 1.1–3.7)
ABSOLUTE MONO #: 0.64 K/UL (ref 0.1–1.2)
ANION GAP SERPL CALCULATED.3IONS-SCNC: 13 MMOL/L (ref 9–17)
BASOPHILS # BLD: 1 % (ref 0–2)
BASOPHILS ABSOLUTE: 0.07 K/UL (ref 0–0.2)
BUN BLDV-MCNC: 29 MG/DL (ref 8–23)
C-REACTIVE PROTEIN: 36.7 MG/L (ref 0–5)
CALCIUM SERPL-MCNC: 10 MG/DL (ref 8.6–10.4)
CHLORIDE BLD-SCNC: 99 MMOL/L (ref 98–107)
CO2: 25 MMOL/L (ref 20–31)
CREAT SERPL-MCNC: 1.66 MG/DL (ref 0.7–1.2)
EOSINOPHILS RELATIVE PERCENT: 1 % (ref 1–4)
GFR AFRICAN AMERICAN: 50 ML/MIN
GFR NON-AFRICAN AMERICAN: 42 ML/MIN
GFR SERPL CREATININE-BSD FRML MDRD: ABNORMAL ML/MIN/{1.73_M2}
GLUCOSE BLD-MCNC: 153 MG/DL (ref 70–99)
GLUCOSE BLD-MCNC: 169 MG/DL (ref 75–110)
HCT VFR BLD CALC: 29.3 % (ref 40.7–50.3)
HEMOGLOBIN: 9.3 G/DL (ref 13–17)
IMMATURE GRANULOCYTES: 1 %
INR BLD: 2
LACTIC ACID, SEPSIS WHOLE BLOOD: 1.1 MMOL/L (ref 0.5–1.9)
LACTIC ACID, SEPSIS WHOLE BLOOD: 1.5 MMOL/L (ref 0.5–1.9)
LYMPHOCYTES # BLD: 9 % (ref 24–43)
MAGNESIUM: 1.9 MG/DL (ref 1.6–2.6)
MCH RBC QN AUTO: 28.1 PG (ref 25.2–33.5)
MCHC RBC AUTO-ENTMCNC: 31.7 G/DL (ref 28.4–34.8)
MCV RBC AUTO: 88.5 FL (ref 82.6–102.9)
MONOCYTES # BLD: 8 % (ref 3–12)
NRBC AUTOMATED: 0 PER 100 WBC
PARTIAL THROMBOPLASTIN TIME: 34.1 SEC (ref 20.5–30.5)
PDW BLD-RTO: 15.9 % (ref 11.8–14.4)
PLATELET # BLD: 277 K/UL (ref 138–453)
PMV BLD AUTO: 10.2 FL (ref 8.1–13.5)
POTASSIUM SERPL-SCNC: 3.9 MMOL/L (ref 3.7–5.3)
PROTHROMBIN TIME: 20 SEC (ref 9.1–12.3)
RBC # BLD: 3.31 M/UL (ref 4.21–5.77)
RBC # BLD: ABNORMAL 10*6/UL
REASON FOR REJECTION: NORMAL
SEDIMENTATION RATE, ERYTHROCYTE: 97 MM/HR (ref 0–20)
SEG NEUTROPHILS: 80 % (ref 36–65)
SEGMENTED NEUTROPHILS ABSOLUTE COUNT: 6.91 K/UL (ref 1.5–8.1)
SODIUM BLD-SCNC: 137 MMOL/L (ref 135–144)
WBC # BLD: 8.5 K/UL (ref 3.5–11.3)
ZZ NTE CLEAN UP: ORDERED TEST: NORMAL
ZZ NTE WITH NAME CLEAN UP: SPECIMEN SOURCE: NORMAL

## 2022-06-30 PROCEDURE — 80048 BASIC METABOLIC PNL TOTAL CA: CPT

## 2022-06-30 PROCEDURE — 83605 ASSAY OF LACTIC ACID: CPT

## 2022-06-30 PROCEDURE — 86140 C-REACTIVE PROTEIN: CPT

## 2022-06-30 PROCEDURE — 87040 BLOOD CULTURE FOR BACTERIA: CPT

## 2022-06-30 PROCEDURE — 6360000002 HC RX W HCPCS: Performed by: PODIATRIST

## 2022-06-30 PROCEDURE — 2060000000 HC ICU INTERMEDIATE R&B

## 2022-06-30 PROCEDURE — 87076 CULTURE ANAEROBE IDENT EACH: CPT

## 2022-06-30 PROCEDURE — 87186 SC STD MICRODIL/AGAR DIL: CPT

## 2022-06-30 PROCEDURE — 96375 TX/PRO/DX INJ NEW DRUG ADDON: CPT

## 2022-06-30 PROCEDURE — 36415 COLL VENOUS BLD VENIPUNCTURE: CPT

## 2022-06-30 PROCEDURE — 85025 COMPLETE CBC W/AUTO DIFF WBC: CPT

## 2022-06-30 PROCEDURE — 96365 THER/PROPH/DIAG IV INF INIT: CPT

## 2022-06-30 PROCEDURE — 6370000000 HC RX 637 (ALT 250 FOR IP): Performed by: PODIATRIST

## 2022-06-30 PROCEDURE — 87205 SMEAR GRAM STAIN: CPT

## 2022-06-30 PROCEDURE — 88311 DECALCIFY TISSUE: CPT

## 2022-06-30 PROCEDURE — 88305 TISSUE EXAM BY PATHOLOGIST: CPT

## 2022-06-30 PROCEDURE — 2580000003 HC RX 258: Performed by: EMERGENCY MEDICINE

## 2022-06-30 PROCEDURE — 6360000002 HC RX W HCPCS

## 2022-06-30 PROCEDURE — 87075 CULTR BACTERIA EXCEPT BLOOD: CPT

## 2022-06-30 PROCEDURE — 99223 1ST HOSP IP/OBS HIGH 75: CPT | Performed by: INTERNAL MEDICINE

## 2022-06-30 PROCEDURE — 11044 DBRDMT BONE 1ST 20 SQ CM/<: CPT

## 2022-06-30 PROCEDURE — 6370000000 HC RX 637 (ALT 250 FOR IP)

## 2022-06-30 PROCEDURE — 99285 EMERGENCY DEPT VISIT HI MDM: CPT

## 2022-06-30 PROCEDURE — 85652 RBC SED RATE AUTOMATED: CPT

## 2022-06-30 PROCEDURE — 87070 CULTURE OTHR SPECIMN AEROBIC: CPT

## 2022-06-30 PROCEDURE — 86403 PARTICLE AGGLUT ANTBDY SCRN: CPT

## 2022-06-30 PROCEDURE — 73630 X-RAY EXAM OF FOOT: CPT

## 2022-06-30 PROCEDURE — 2500000003 HC RX 250 WO HCPCS: Performed by: PODIATRIST

## 2022-06-30 PROCEDURE — 83735 ASSAY OF MAGNESIUM: CPT

## 2022-06-30 PROCEDURE — 96366 THER/PROPH/DIAG IV INF ADDON: CPT

## 2022-06-30 PROCEDURE — 99213 OFFICE O/P EST LOW 20 MIN: CPT

## 2022-06-30 PROCEDURE — 85610 PROTHROMBIN TIME: CPT

## 2022-06-30 PROCEDURE — 87176 TISSUE HOMOGENIZATION CULTR: CPT

## 2022-06-30 PROCEDURE — 82947 ASSAY GLUCOSE BLOOD QUANT: CPT

## 2022-06-30 PROCEDURE — 85730 THROMBOPLASTIN TIME PARTIAL: CPT

## 2022-06-30 PROCEDURE — 87185 SC STD ENZYME DETCJ PER NZM: CPT

## 2022-06-30 RX ORDER — LIDOCAINE HYDROCHLORIDE 40 MG/ML
SOLUTION TOPICAL ONCE
Status: CANCELLED | OUTPATIENT
Start: 2022-06-30 | End: 2022-06-30

## 2022-06-30 RX ORDER — MAGNESIUM SULFATE 1 G/100ML
1000 INJECTION INTRAVENOUS ONCE
Status: COMPLETED | OUTPATIENT
Start: 2022-06-30 | End: 2022-06-30

## 2022-06-30 RX ORDER — SODIUM HYPOCHLORITE 1.25 MG/ML
SOLUTION TOPICAL DAILY
Status: DISCONTINUED | OUTPATIENT
Start: 2022-06-30 | End: 2022-07-01 | Stop reason: HOSPADM

## 2022-06-30 RX ORDER — WARFARIN SODIUM 2 MG/1
4 TABLET ORAL DAILY
Status: DISCONTINUED | OUTPATIENT
Start: 2022-06-30 | End: 2022-06-30

## 2022-06-30 RX ORDER — GINSENG 100 MG
CAPSULE ORAL ONCE
Status: CANCELLED | OUTPATIENT
Start: 2022-06-30 | End: 2022-06-30

## 2022-06-30 RX ORDER — SODIUM CHLORIDE 9 MG/ML
INJECTION, SOLUTION INTRAVENOUS PRN
Status: DISCONTINUED | OUTPATIENT
Start: 2022-06-30 | End: 2022-07-01 | Stop reason: HOSPADM

## 2022-06-30 RX ORDER — LIDOCAINE 50 MG/G
OINTMENT TOPICAL ONCE
Status: CANCELLED | OUTPATIENT
Start: 2022-06-30 | End: 2022-06-30

## 2022-06-30 RX ORDER — POLYETHYLENE GLYCOL 3350 17 G/17G
17 POWDER, FOR SOLUTION ORAL DAILY PRN
Status: DISCONTINUED | OUTPATIENT
Start: 2022-06-30 | End: 2022-07-01 | Stop reason: HOSPADM

## 2022-06-30 RX ORDER — ASPIRIN 81 MG/1
81 TABLET ORAL DAILY
Status: DISCONTINUED | OUTPATIENT
Start: 2022-06-30 | End: 2022-07-01 | Stop reason: HOSPADM

## 2022-06-30 RX ORDER — BACITRACIN, NEOMYCIN, POLYMYXIN B 400; 3.5; 5 [USP'U]/G; MG/G; [USP'U]/G
OINTMENT TOPICAL ONCE
Status: CANCELLED | OUTPATIENT
Start: 2022-06-30 | End: 2022-06-30

## 2022-06-30 RX ORDER — GABAPENTIN 100 MG/1
100 CAPSULE ORAL 3 TIMES DAILY
Status: DISCONTINUED | OUTPATIENT
Start: 2022-06-30 | End: 2022-07-01

## 2022-06-30 RX ORDER — ACETAMINOPHEN 325 MG/1
650 TABLET ORAL EVERY 6 HOURS PRN
Status: DISCONTINUED | OUTPATIENT
Start: 2022-06-30 | End: 2022-07-01 | Stop reason: HOSPADM

## 2022-06-30 RX ORDER — ACETAMINOPHEN 650 MG/1
650 SUPPOSITORY RECTAL EVERY 6 HOURS PRN
Status: DISCONTINUED | OUTPATIENT
Start: 2022-06-30 | End: 2022-07-01 | Stop reason: HOSPADM

## 2022-06-30 RX ORDER — INSULIN LISPRO 100 [IU]/ML
0-3 INJECTION, SOLUTION INTRAVENOUS; SUBCUTANEOUS NIGHTLY
Status: DISCONTINUED | OUTPATIENT
Start: 2022-06-30 | End: 2022-07-01 | Stop reason: HOSPADM

## 2022-06-30 RX ORDER — ONDANSETRON 2 MG/ML
4 INJECTION INTRAMUSCULAR; INTRAVENOUS EVERY 6 HOURS PRN
Status: DISCONTINUED | OUTPATIENT
Start: 2022-06-30 | End: 2022-07-01 | Stop reason: HOSPADM

## 2022-06-30 RX ORDER — ATORVASTATIN CALCIUM 80 MG/1
40 TABLET, FILM COATED ORAL DAILY
Status: DISCONTINUED | OUTPATIENT
Start: 2022-06-30 | End: 2022-07-01 | Stop reason: HOSPADM

## 2022-06-30 RX ORDER — CLOBETASOL PROPIONATE 0.5 MG/G
OINTMENT TOPICAL ONCE
Status: CANCELLED | OUTPATIENT
Start: 2022-06-30 | End: 2022-06-30

## 2022-06-30 RX ORDER — MAGNESIUM SULFATE 1 G/100ML
INJECTION INTRAVENOUS
Status: DISPENSED
Start: 2022-06-30 | End: 2022-07-01

## 2022-06-30 RX ORDER — BACITRACIN ZINC AND POLYMYXIN B SULFATE 500; 1000 [USP'U]/G; [USP'U]/G
OINTMENT TOPICAL ONCE
Status: CANCELLED | OUTPATIENT
Start: 2022-06-30 | End: 2022-06-30

## 2022-06-30 RX ORDER — DEXTROSE MONOHYDRATE 50 MG/ML
100 INJECTION, SOLUTION INTRAVENOUS PRN
Status: DISCONTINUED | OUTPATIENT
Start: 2022-06-30 | End: 2022-07-01 | Stop reason: HOSPADM

## 2022-06-30 RX ORDER — BETAMETHASONE DIPROPIONATE 0.05 %
OINTMENT (GRAM) TOPICAL ONCE
Status: CANCELLED | OUTPATIENT
Start: 2022-06-30 | End: 2022-06-30

## 2022-06-30 RX ORDER — HEPARIN SODIUM 5000 [USP'U]/ML
5000 INJECTION, SOLUTION INTRAVENOUS; SUBCUTANEOUS EVERY 8 HOURS SCHEDULED
Status: DISCONTINUED | OUTPATIENT
Start: 2022-06-30 | End: 2022-06-30

## 2022-06-30 RX ORDER — 0.9 % SODIUM CHLORIDE 0.9 %
30 INTRAVENOUS SOLUTION INTRAVENOUS ONCE
Status: COMPLETED | OUTPATIENT
Start: 2022-06-30 | End: 2022-06-30

## 2022-06-30 RX ORDER — INSULIN LISPRO 100 [IU]/ML
0-6 INJECTION, SOLUTION INTRAVENOUS; SUBCUTANEOUS
Status: DISCONTINUED | OUTPATIENT
Start: 2022-07-01 | End: 2022-07-01 | Stop reason: HOSPADM

## 2022-06-30 RX ORDER — ONDANSETRON 4 MG/1
4 TABLET, ORALLY DISINTEGRATING ORAL EVERY 8 HOURS PRN
Status: DISCONTINUED | OUTPATIENT
Start: 2022-06-30 | End: 2022-07-01 | Stop reason: HOSPADM

## 2022-06-30 RX ORDER — LIDOCAINE HYDROCHLORIDE 20 MG/ML
JELLY TOPICAL ONCE
Status: CANCELLED | OUTPATIENT
Start: 2022-06-30 | End: 2022-06-30

## 2022-06-30 RX ORDER — SODIUM CHLORIDE 0.9 % (FLUSH) 0.9 %
5-40 SYRINGE (ML) INJECTION PRN
Status: DISCONTINUED | OUTPATIENT
Start: 2022-06-30 | End: 2022-07-01 | Stop reason: HOSPADM

## 2022-06-30 RX ORDER — GENTAMICIN SULFATE 1 MG/G
OINTMENT TOPICAL ONCE
Status: CANCELLED | OUTPATIENT
Start: 2022-06-30 | End: 2022-06-30

## 2022-06-30 RX ORDER — CARVEDILOL 12.5 MG/1
6.25 TABLET ORAL 2 TIMES DAILY WITH MEALS
Status: DISCONTINUED | OUTPATIENT
Start: 2022-06-30 | End: 2022-07-01

## 2022-06-30 RX ORDER — SODIUM CHLORIDE 0.9 % (FLUSH) 0.9 %
5-40 SYRINGE (ML) INJECTION EVERY 12 HOURS SCHEDULED
Status: DISCONTINUED | OUTPATIENT
Start: 2022-06-30 | End: 2022-07-01 | Stop reason: HOSPADM

## 2022-06-30 RX ORDER — LIDOCAINE 40 MG/G
CREAM TOPICAL ONCE
Status: CANCELLED | OUTPATIENT
Start: 2022-06-30 | End: 2022-06-30

## 2022-06-30 RX ORDER — WARFARIN SODIUM 2 MG/1
4 TABLET ORAL
Status: COMPLETED | OUTPATIENT
Start: 2022-06-30 | End: 2022-06-30

## 2022-06-30 RX ADMIN — CEFEPIME HYDROCHLORIDE 2000 MG: 2 INJECTION, POWDER, FOR SOLUTION INTRAVENOUS at 14:04

## 2022-06-30 RX ADMIN — MAGNESIUM SULFATE HEPTAHYDRATE 1000 MG: 1 INJECTION, SOLUTION INTRAVENOUS at 20:39

## 2022-06-30 RX ADMIN — WARFARIN SODIUM 4 MG: 2 TABLET ORAL at 23:16

## 2022-06-30 RX ADMIN — SODIUM CHLORIDE 2500 ML: 9 INJECTION, SOLUTION INTRAVENOUS at 20:18

## 2022-06-30 RX ADMIN — Medication 1250 MG: at 14:58

## 2022-06-30 RX ADMIN — DAKIN'S SOLUTION 0.125% (QUARTER STRENGTH): 0.12 SOLUTION at 14:08

## 2022-06-30 ASSESSMENT — ENCOUNTER SYMPTOMS
SHORTNESS OF BREATH: 0
ABDOMINAL PAIN: 0
COLOR CHANGE: 0
RHINORRHEA: 0
SORE THROAT: 0
CONSTIPATION: 0
NAUSEA: 0
VOMITING: 0
VOMITING: 0
DIARRHEA: 0
DIARRHEA: 0
COUGH: 0
NAUSEA: 0
CHEST TIGHTNESS: 0

## 2022-06-30 ASSESSMENT — PAIN SCALES - GENERAL
PAINLEVEL_OUTOF10: 0
PAINLEVEL_OUTOF10: 0

## 2022-06-30 NOTE — ED NOTES
The following labs labeled with pt sticker and tubed to lab:     [x] Blue     [x] Lavender   [] on ice  [x] Green/yellow  [] Green/black [] on ice  [] Yellow  [] Red  [] Pink      [] COVID-19 swab    [] Rapid  [] PCR  [] Flu swab  [] Peds Viral Panel     [] Urine Sample  [] Pelvic Cultures  [] Blood Cultures     Redraw of the green top        Teodora Schroeder RN  06/30/22 2654

## 2022-06-30 NOTE — PROGRESS NOTES
[address]   Progress Note and Procedure Note      [address] RECORD NUMBER:  [id number]  AGE: 78 y.o. GENDER: male  : 1955  EPISODE DATE:  2022    Subjective:     Chief Complaint   Patient presents with    Wound Check     left foot         HISTORY of PRESENT ILLNESS HPI     Zena Saez is a 78 y.o. male who presents today for wound/ulcer evaluation. History of Wound Context:  Chato Khan presents for evaluation of left foot ulceration. He underwent second toe amputation in March. Has had a nonhealing wound since then. He presents today in slip on shoes. Has been working at [employer]. He saw vascular surgery recently who is planning for an angiogram.  Denies systemic sign of infection.     Ulcer Identification:  Ulcer Type: diabetic, non-healing surgical and neuropathic  Contributing Factors: diabetes, poor glucose control, chronic pressure, shear force, arterial insufficiency and decreased tissue oxygenation          PAST MEDICAL HISTORY        Diagnosis Date    Atrial fibrillation (HCC)     CAD (coronary artery disease)     [facility]     Diabetes mellitus ([facility]     Hyperlipidemia     Hypertension        PAST SURGICAL HISTORY    Past Surgical History:   Procedure Laterality Date    CARDIAC SURGERY      CABG X3    COLONOSCOPY      DEBRIDEMENT Right 2015    DEBRIDEMENT WOUND FOOT RIGHT W/ APPLICATION BILAT INTEG RAT GRAFT    ENDOSCOPY, COLON, DIAGNOSTIC      EYE SURGERY Bilateral     cataracts    HERNIA REPAIR      umbilical    KNEE ARTHROSCOPY Right     OTHER SURGICAL HISTORY Right 12 29 15    wound care graft procedure foot,appl of integra    PACEMAKER PLACEMENT      WITH DEFIBRILLATOR    TOE AMPUTATION Right     R great    TOE AMPUTATION Left 3/13/2022    TOE AMPUTATION--left 2nd digit performed by Sussy Gómez DPM at [facility] OR       FAMILY HISTORY    Family History   Problem Relation Age of Onset    Cancer Father         pancreatic cancer    Other Brother         MI    Osteoarthritis Mother     Other Maternal Grandfather         MI       SOCIAL HISTORY    Social History     Tobacco Use    Smoking status: Never Smoker    Smokeless tobacco: Never Used   Substance Use Topics    Alcohol use: Yes     Comment: SOCIAL on weekends    Drug use: No       ALLERGIES    Allergies   Allergen Reactions    Doxycycline Other (See Comments)     Skin peeling    Pcn [Penicillins] Rash    Penicillin G Rash       MEDICATIONS    Current Outpatient Medications on File Prior to Encounter   Medication Sig Dispense Refill    gabapentin (NEURONTIN) 100 MG capsule Take 1 capsule by mouth 3 times daily for 60 days. Intended supply: 30 days 90 capsule 1    silver sulfADIAZINE (SILVADENE) 1 % cream APPLY DAILY TO SKIN TO AFFECTED AREA EVERY DAY FOR 30 DAYS      aspirin 81 MG EC tablet Take 1 tablet by mouth daily 30 tablet 3    isosorbide mononitrate (IMDUR) 60 MG extended release tablet Take 1 tablet by mouth daily 30 tablet 3    nitroGLYCERIN (NITROSTAT) 0.4 MG SL tablet up to max of 3 total doses. If no relief after 1 dose, call 911. 25 tablet 3    glipiZIDE (GLUCOTROL) 5 MG tablet Take 1 tablet by mouth every morning (before breakfast) 60 tablet 3    hydrALAZINE (APRESOLINE) 50 MG tablet Take 1 tablet by mouth every 8 hours 90 tablet 3    benazepril (LOTENSIN) 5 MG tablet Take 1 tablet by mouth daily 30 tablet 3    furosemide (LASIX) 80 MG tablet Take 1 tablet by mouth daily 60 tablet 3    carvedilol (COREG) 12.5 MG tablet Take 1 tablet by mouth 2 times daily (with meals) (Patient taking differently: Take 6.25 mg by mouth 2 times daily (with meals) ) 60 tablet 3    warfarin (COUMADIN) 2 MG tablet Take 4 mg by mouth daily Per [facility]      atorvastatin (LIPITOR) 40 MG tablet Take 40 mg by mouth daily.        No current facility-administered medications on file prior to encounter. REVIEW OF SYSTEMS    Review of Systems   Constitutional: Negative for chills and fever. Gastrointestinal: Negative for diarrhea, nausea and vomiting. Skin: Positive for wound. Objective:      BP (!) 170/91   Pulse 92   Temp 97.7 °F (36.5 °C) (Tympanic)   Resp 16   Ht 5' 7\" (1.702 m)   Wt 187 lb (84.8 kg)   BMI 29.29 kg/m²     Wt Readings from Last 3 Encounters:   06/30/22 187 lb (84.8 kg)   06/27/22 187 lb (84.8 kg)   06/22/22 187 lb (84.8 kg)       Physical Exam:  General:  Alert and oriented x3. In no acute distress. Lower Extremity Physical Exam:    Vascular: DP pulses are not palpable, Bilateral. PT pulses are not palpable, Bilateral. CFT <3 seconds to all digits, Bilateral.   Edema localized to the left forefoot. Hair growth is absent to the level of the lower leg, Bilateral.     Neuro: Saph/sural/SP/DP/plantar sensation absent to light touch. Protective sensation is intact to 0/10 sites as tested with a 5.07g SWMF, Bilateral.     Musculoskeletal: EHL/FHL/GS/TA gross motor intact. Gross deformity is present, s/p multiple digit amputations bilateral foot. Dermatologic: Open wound present to left 2nd toe amputation site as documented in detail below. Wound base is necrotic with purulent drainage and malodor extending to the 2nd metatarsal head. Metatarsal head is loose secondary to pathologic fracture and easily removed. There is no erythema. There is no purulent drainage. There is no fluctuance or crepitus.   Interdigital maceration absent, Bilateral.       Assessment:      Active Hospital Problems    Diagnosis Date Noted    Acute osteomyelitis of left foot [facility]) [M86.172] 06/30/2022     Priority: Medium    Cellulitis of left foot [L03.116] 06/30/2022     Priority: Medium    Abscess of bursa of left foot [M71.072] 06/30/2022     Priority: Medium    Diabetic polyneuropathy associated with type 2 diabetes mellitus ([facility] [E11.42] 06/30/2022     Priority: Medium   [facility] Ulcer of left foot, with necrosis of bone ([facility] [L97.524]     Diabetes mellitus type 2 with peripheral artery disease ([facility] [E11.51] 02/19/2016    PVD (peripheral vascular disease) ([facility] [I73.9] 01/12/2016       Plan:     Treatment Note please see attached Discharge Instructions    Tissue culture obtained and sent  Bone culture as well as bone for surgical pathology was sent. Discussed admission to the hospital for initiation of IV antibiotics, ID consult, vascular surgery consult. Discussed that he will likely require removal of the remainder of the infected bone pending further testing. Patient is in agreement and will present to [facility] for admission today. We discussed likelihood of needing IV antibiotics and hyperbaric oxygen therapy following discharge from the hospital for his best chance of healing the wound and treating the residual infection. Bandar Mcmahon is high risk of limb loss secondary to medical co-morbidities, extent of infection, history of amputations. RTC in 1 week following discharge       Procedure Note  Indications:  Based on my examination of this patient's wound(s)/ulcer(s) today, debridement is required to promote healing and evaluate the wound base. Performed by: Analia Fuentes DPM    Consent obtained:  Yes    Time out taken:  Yes    Pain Control:   Topical lidocaine 4% solution      Debridement: Excisional Debridement    Using curette, scissors, forceps and rongeur the wound(s)/ulcer(s) was/were sharply debrided down through and including the removal of subcutaneous tissue, muscle/fascia and bone.         Devitalized Tissue Debrided:  fibrin, biofilm, slough and necrotic/eschar    Pre Debridement Measurements:  Are located in the [facility]  Documentation Flow Sheet    Wound/Ulcer #: 1    Post Debridement Measurements:  Wound/Ulcer Descriptions are Pre Debridement except measurements:    Incision 09/09/15 Foot Right (Active)   Number of days: 2485       Incision 09/09/15 Toe (Comment  which one) Right;Upper (Active)   Number of days: 2485       Incision 11/19/15 Foot Right (Active)   Number of days: 1532       Incision 12/29/15 Foot Right;Distal (Active)   Number of days: 2374       Wound 06/30/22 Toe (Comment  which one) Anterior; Left wound #1 second toe (Active)   Wound Image   06/30/22 0931   Wound Etiology Diabetic Cort[place] 3 06/30/22 0931   Wound Cleansed Soap and water 06/30/22 0931   Wound Length (cm) 0.2 cm 06/30/22 0931   Wound Width (cm) 0.7 cm 06/30/22 0931   Wound Depth (cm) 0.3 cm 06/30/22 0931   Wound Surface Area (cm^2) 0.14 cm^2 06/30/22 0931   Wound Volume (cm^3) 0.042 cm^3 06/30/22 0931   Post-Procedure Length (cm) 0.2 cm 06/30/22 0931   Post-Procedure Width (cm) 0.7 cm 06/30/22 0931   Post-Procedure Depth (cm) 2.3 cm 06/30/22 0931   Post-Procedure Surface Area (cm^2) 0.14 cm^2 06/30/22 0931   Post-Procedure Volume (cm^3) 0.322 cm^3 06/30/22 0931   Wound Assessment Exposed structure bone 06/30/22 0931   Drainage Amount Moderate 06/30/22 0931   Drainage Description Serosanguinous 06/30/22 0931   Odor Moderate 06/30/22 0931   [place]-wound Assessment Hyperpigmented 06/30/22 0931   Margins Unattached edges 06/30/22 0931   Number of days: 0       Wound 06/30/22 Anterior;Left;Lateral Wound #2 (Active)   Wound Image   06/30/22 0931   Wound Etiology Diabetic Cort[place] 3 06/30/22 0931   Wound Cleansed Irrigated with saline 06/30/22 0931   Wound Length (cm) 0.4 cm 06/30/22 0931   Wound Width (cm) 0.5 cm 06/30/22 0931   Wound Depth (cm) 0.3 cm 06/30/22 0931   Wound Surface Area (cm^2) 0.2 cm^2 06/30/22 0931   Wound Volume (cm^3) 0.06 cm^3 06/30/22 0931   Post-Procedure Length (cm) 0.4 cm 06/30/22 0931   Post-Procedure Width (cm) 0.5 cm 06/30/22 0931   Post-Procedure Depth (cm) 0.3 cm 06/30/22 0931   Post-Procedure Surface Area (cm^2) 0.2 cm^2 06/30/22 0931   Post-Procedure Volume (cm^3) 0.06 cm^3 06/30/22 0931   Wound Assessment [facility] 06/30/22 0931   Drainage Amount Moderate 06/30/22 0931   Drainage Description Serosanguinous 06/30/22 0931   Odor None 06/30/22 0931   [place]-wound Assessment Hyperpigmented 06/30/22 0931   Number of days: 0          Percent of Wound(s)/Ulcer(s) Debrided: 100%    Total Surface Area Debrided:  0.14 sq cm     Diabetic/Pressure/Non Pressure Ulcers only:  Ulcer: Diabetic ulcer, bone     Estimated Blood Loss:  Estimated amount of blood loss is 5ml. Hemostasis Achieved:  by pressure    Response to treatment:  Well tolerated by patient. Written patient discharge instructions given to patient and signed by patient or POA. No orders of the defined types were placed in this encounter. Orders Placed This Encounter   Procedures    Culture, Anaerobic and Aerobic     Standing Status:   Standing     Number of Occurrences:   1    Culture, Anaerobic and Aerobic     Standing Status:   Standing     Number of Occurrences:   1          Discharge Instructions         [address] [place] [place] [facility]      Visit  Discharge Instructions / Physician Orders  DATE:6/30/22   Home Care:NONE     SUPPLIES ORDERED THRU:    NONE                 DATE LAST SUPPLIED     Wound Location:  Left foot 2nd toe and lateral foot     Cleanse with: Liquid antibacterial soap and water, rinse well      Dressing Orders:  Primary dressing   1/2 inch iodoform                    Secondary dressing  Apply abd  And wrap with kerlix                      secure with           x 30days     Frequency:  Change daily     Additional Orders: Increase protein to diet (meat, cheese, eggs, fish, peanut butter, nuts and beans)  Multivitamin daily    OFFLOADING [x] YES  TYPE:                  [] NA  surg shoe  Weekly wound care visits until determined otherwise.     Antibiotic therapy-wound care related YES [] NO [] NA[]    MY CHART []     Smart Device  []     HYPERBARIC TREATMENT-                TREATMENT #                          Your next appointment with the [address] [place] [address] is in 1 week with Dr. Duane Velazquez Friday                                                                                                       2 WEEKS hbo eval      GO TO .  ER FOR ADMISSION- OPEN WOUND BONE EXPOSED, REDNESS- OSTEO- WILL NEED IV THERAPY                                                                                              (Please note your next appointment above and if you are unable to keep, kindly give a 24 hour notice. Thank you.)  If more than 15 min late we cannot guarantee you will be seen due to clinician schedule  Per Policy, Excessive cancellation will call for dismissal from program.  If you experience any of the following, please call the [facility] during business hours:  [phone]     * Increase in Pain  * Temperature over 101  * Increase in drainage from your wound  * Drainage with a foul odor  * Bleeding  * Increase in swelling  * Need for compression bandage changes due to slippage, breakthrough drainage. If you need medical attention outside of the business hours of the [facility] please contact your PCP or go to the nearest emergency room. The information contained in the After Visit Summary has been reviewed with me, the patient and/or responsible adult, by my health care provider(s). I had the opportunity to ask questions regarding this information.  I have elected to receive;      []After Visit Summary  [x]Comprehensive Discharge Instruction      Patient signature______________________________________Date:________  Electronically signed by Armando Diaz DPM on 6/30/2022 at 8:54 AM  Electronically signed by Floyd Barillas RN on 6/30/2022 at 9:50 AM              Electronically signed by Armando Diaz DPM on 6/30/2022 at 10:02 AM

## 2022-06-30 NOTE — ED NOTES
Pt is taking home medications,Glipizide, lasix, atorvastatin, asaprin and benazepril   Verbal Ok per Dr Kelley Mcguire, liquids offered no food until Podiatry and Vascular see pt   Pt also more comfortable to wear own Praceta Ovidio Martinez, RN  06/30/22 2128

## 2022-06-30 NOTE — ED PROVIDER NOTES
[facility]     Emergency Department     Faculty Attestation    I performed a history and physical examination of the patient and discussed management with the resident. I reviewed the residents note and agree with the documented findings and plan of care. Any areas of disagreement are noted on the chart. I was personally present for the key portions of any procedures. I have documented in the chart those procedures where I was not present during the key portions. I have reviewed the emergency nurses triage note. I agree with the chief complaint, past medical history, past surgical history, allergies, medications, social and family history as documented unless otherwise noted below. For Physician Assistant/ Nurse Practitioner cases/documentation I have personally evaluated this patient and have completed at least one if not all key elements of the E/M (history, physical exam, and MDM). Additional findings are as noted. I have personally seen and evaluated the patient. I find the patient's history and physical exam are consistent with the NP/PA documentation. I agree with the care provided, treatment rendered, disposition and follow-up plan. This patient was evaluated in the Emergency Department for symptoms described in the history of present illness. The patient was evaluated in the context of the global COVID-19 pandemic, which necessitated consideration that the patient might be at risk for infection with the SARS-CoV-2 virus that causes COVID-19. Institutional protocols and algorithms that pertain to the evaluation of patients at risk for COVID-19 are in a state of rapid change based on information released by regulatory bodies including the CDC and federal and state organizations. These policies and algorithms were followed during the patient's care in the ED. 48-year-old male sent in by podiatrist with concern for infected foot wound.   Patient has been following up with wound care for poorly healing wound on his left foot. Today he states that they pulled some bone fragments out and are concerned that he may have infection into the bone. He was told to come to [facility] to be admitted for IV antibiotics and vascular surgery evaluation. He has no complaints at this time. Exam:  General: Sitting on the bed, awake, alert and in no acute distress  CV: normal rate and regular rhythm  Lungs: Breathing comfortably on room air with no tachypnea, hypoxia, or increased work of breathing    Plan:  Not septic appearing, will obtain basic labs including CBC, electrolytes, ESR/CRP, and INR (on warfarin).   Will consult podiatry and admit for further management        Zoe Jenkins MD   Attending Emergency  Physician    (Please note that portions of this note were completed with a voice recognition program. Efforts were made to edit the dictations but occasionally words are mis-transcribed.)             Zoe Jenkins MD  06/30/22 9941

## 2022-06-30 NOTE — ED NOTES
The following labs labeled with pt sticker and tubed to lab:     [x] Blue     [x] Lavender   [] on ice  [x] Green/yellow  [x] Green/black [] on ice  [] Yellow  [] Red  [] Pink      [] COVID-19 swab    [] Rapid  [] PCR  [] Flu swab  [] Peds Viral Panel     [] Urine Sample  [] Pelvic Cultures  [] Blood Cultures            Damir Blackwood RN  06/30/22 2164

## 2022-06-30 NOTE — ED TRIAGE NOTES
Pt is her for left foot wound   Pt was at [facility] wound care clinic, pt states they removed a piece of bone, sent it down for cultures and told him to come to [facility]  This wound has been ongoing since March 2022    Pt is packing it daily and wrapping in ACE wrap    Last A1C 6.2, pt states his blood sugar have been well managed at home    Blood sugars at home reported to be 120s- 130s    Denies no pain to left foot

## 2022-06-30 NOTE — CARE COORDINATION
06/30/22 1240   Service Assessment   Patient Orientation Alert and Oriented   Cognition Alert   History Provided By Patient   Primary [address] [facility]  Parent   PCP Verified by CM Yes   Last Visit to PCP Within last 3 months   Prior Functional Level Independent in ADLs/IADLs   Current Functional Level Independent in ADLs/IADLs   Can patient return to prior living arrangement Yes   Ability to make needs known: Good   Community Resources Other (Comment)  (wound care clinic at [address] [place] [address])   [address] History   Lives With Parent   Type of [address] One level   ADL Assistance Independent   Homemaking Assistance Independent   Homemaking Responsibilities Yes   Ambulation Assistance Independent   Transfer Assistance Independent   Active  Yes   Discharge Planning   Current Services Prior To Admission None   Potential Assistance Needed Home Care   Condition of Participation: Discharge [address] for Transition of Care is related to the following treatment goals: increased comfort level   Home with mother, goes to [facility] wound clinic, follow for [facility] [place], [facility]. needs

## 2022-06-30 NOTE — PROGRESS NOTES
[address] [facility] Pharmacokinetic Monitoring Service - Vancomycin     Charity Menchaca is a 78 y.o. male starting on vancomycin therapy for surgical site infection. Pharmacy consulted by Dr. Brian Ordoñez for monitoring and adjustment. Target Concentration: Goal AUC/EUGENE 400-600 mg*hr/L    Additional Antimicrobials: Cefepime    Pertinent Laboratory Values: Wt Readings from Last 1 Encounters:   06/30/22 187 lb (84.8 kg)     Temp Readings from Last 1 Encounters:   06/30/22 97.3 °F (36.3 °C) (Oral)     Estimated Creatinine Clearance: 45 mL/min (A) (based on SCr of 1.66 mg/dL (H)). Recent Labs     06/30/22  1219 06/30/22  1319   CREATININE  --  1.66*   WBC 8.5  --          Pertinent Cultures:  Culture Date Source Results   6/30 Blood x 2 Pending   MRSA Nasal Swab: N/A. Non-respiratory infection. Came in for evaluation of toe amputation site, concerned for infection. Evaluated by podiatry, may undergo vascular intervention. Scr around baseline. Afebrile, vitals stable, WBC normal.    Plan:  Dosing recommendations based on Bayesian software and pharmacokinetic calculations  Start vancomycin 1250 mg Q24  Anticipated AUC of ~520 and trough concentration of ~14 at steady state  Renal labs as indicated   Vancomycin concentration not ordered yet  Pharmacy will continue to monitor patient and adjust therapy as indicated    Thank you for the consult,  Jennifer Mcmahan, [facility]  6/30/2022 2:13 PM

## 2022-06-30 NOTE — CONSULTS
DAILY TO SKIN TO AFFECTED AREA EVERY DAY FOR 30 DAYS 5/19/22   Historical Provider, MD   aspirin 81 MG EC tablet Take 1 tablet by mouth daily 3/15/22   Lucasreece Hernadez, DO   isosorbide mononitrate (IMDUR) 60 MG extended release tablet Take 1 tablet by mouth daily 3/15/22   [facility], DO   nitroGLYCERIN (NITROSTAT) 0.4 MG SL tablet up to max of 3 total doses. If no relief after 1 dose, call 911. 3/15/22   Lucasreece Hernadez, DO   glipiZIDE (GLUCOTROL) 5 MG tablet Take 1 tablet by mouth every morning (before breakfast) 3/16/22   Emerson Ruiz, DO   hydrALAZINE (APRESOLINE) 50 MG tablet Take 1 tablet by mouth every 8 hours 3/15/22   [facility] Satya, DO   benazepril (LOTENSIN) 5 MG tablet Take 1 tablet by mouth daily 3/15/22   Emerson Ruiz, DO   furosemide (LASIX) 80 MG tablet Take 1 tablet by mouth daily 3/15/22   [facility], DO   carvedilol (COREG) 12.5 MG tablet Take 1 tablet by mouth 2 times daily (with meals)  Patient taking differently: Take 6.25 mg by mouth 2 times daily (with meals)  2/5/21   Emerson Ruiz,    warfarin (COUMADIN) 2 MG tablet Take 4 mg by mouth daily Per [facility] 6/23/20   Historical Provider, MD   atorvastatin (LIPITOR) 40 MG tablet Take 40 mg by mouth daily. Historical Provider, MD    Scheduled Meds:  Continuous Infusions:  PRN Meds:. Allergies  is allergic to doxycycline, pcn [penicillins], and penicillin g. Family History  family history includes Cancer in his father; Osteoarthritis in his mother; Other in his brother and maternal grandfather. Social History   reports that he has never smoked. He has never used smokeless tobacco.   reports current alcohol use. reports no history of drug use.     Objective     Vitals:  Patient Vitals for the past 8 hrs:   BP Temp Temp src Pulse Resp SpO2 Height Weight   06/30/22 1136 139/83 97.3 °F (36.3 °C) Oral 98 18 97 % 5' 10\" (1.778 m) 187 lb (84.8 kg)     Average, Min, and Max for last 24 hours Vitals:  TEMPERATURE:  Temp Av.5 °F (36.4 °C)  Min: 97.3 °F (36.3 °C)  Max: 97.7 °F (36.5 °C)    RESPIRATIONS RANGE: Resp  Av  Min: 16  Max: 18    PULSE RANGE: Pulse  Av  Min: 92  Max: 98    BLOOD PRESSURE RANGE:  Systolic (58IEB), SARITA:941 , Min:139 , QDK:496   ; Diastolic (51NGI), VMY:49, Min:83, Max:91      PULSE OXIMETRY RANGE: SpO2  Av %  Min: 97 %  Max: 97 %  I&O:  No intake/output data recorded. CBC:  Recent Labs     22  1219   WBC 8.5   HGB 9.3*   HCT 29.3*           BMP:  No results for input(s): NA, K, CL, CO2, BUN, CREATININE, GLUCOSE, CALCIUM in the last 72 hours. Coags:  Recent Labs     22  1219   APTT 34.1*   INR 2.0       Lab Results   Component Value Date    LABA1C 6.8 (H) 2022     Lab Results   Component Value Date    SEDRATE 31 (H) 2022     Lab Results   Component Value Date    CRP <3.0 2022         Physical Exam:  General:  Alert and oriented x3. In no acute distress.      Lower Extremity Physical Exam:     Vascular: DP pulses are not palpable, Bilateral. PT pulses are not palpable, Bilateral. CFT <3 seconds to all digits, Bilateral.   Edema localized to the left forefoot. Hair growth is absent to the level of the lower leg, Bilateral.      Neuro: Saph/sural/SP/DP/plantar sensation absent to light touch. Protective sensation is intact to 0/10 sites as tested with a 5.07g SWMF, Bilateral.      Musculoskeletal: EHL/FHL/GS/TA gross motor intact. Gross deformity is present, s/p multiple digit amputations bilateral foot.      Dermatologic: Open wound present to left 2nd toe amputation site, full thickness. Wound base is necrotic with purulent drainage and malodor extending to the 2nd metatarsal head. Metatarsal head is loose secondary to pathologic fracture and easily removed. There is no erythema. There is no purulent drainage. There is no fluctuance or crepitus.   Interdigital maceration absent, Bilateral.         Clinical:           Imaging:   No orders to display       Cultures: obtained    Assessment     Jes Burns is a 78 y.o. male with   Osteomyelitis of left foot  Cellulitis of left  DMII w/ polyneuropathy  Full thickness ulceration down to level of bone, left foot  PVD    Active Problems:    * No active hospital problems. *  Resolved Problems:    * No resolved hospital problems. *        Plan     Patient examined and evaluated at bedside   Treatment options discussed in detail with the patient including possible vascular intervention at a sooner date than the planned angio 7/7  Bone/aerobic/anaerobic cultures taken at [facility] reviewed and discussed in detail with patient   Blood cultures ordered    IV abx ordered - cefepime/vanco  Dressing applied to left lower extremity: dsd, packing, dakins  Plan: await vascular rec's, iv abx, monitor clinically, dakins  Heel WB status to LLE. Discussed with Dr. Benja Mason, DPM   Podiatric Medicine & Surgery   6/30/2022 at 1:19 PM    This note is created with the assistance of a speech recognition program.  While intending to generate a document that actually reflects the content of the visit, the document can still have some errors including those of syntax and sound a like substitutions which may escape proof reading. In such instances, actual meaning can be extrapolated by contextual diversion.

## 2022-06-30 NOTE — ED PROVIDER NOTES
[facility] ED  Emergency Department Encounter  EmergencyMedicine Resident     Pt Name:Asher Singh  MRN: [id number]  [place] 1955  Date of evaluation: 6/30/22  PCP:  Rashida Barnett DO    CHIEF COMPLAINT       Chief Complaint   Patient presents with    Wound Check     2nd toe on L foot       HISTORY OF PRESENT ILLNESS  (Location/Symptom, Timing/Onset, Context/Setting, Quality, Duration, Modifying Factors, Severity.)      Shane Murray is a 78 y.o. male who presents with wound or ulcer evaluation. Patient states he had a second toe amputation of the left foot back in March. Patient was seen in the wound care clinic at [facility] by podiatry and there was concern for worsening infection, \"the bone was removed from my foot\". Patient denied any fevers, chills, nausea or vomiting or other signs of systemic infection. Patient states the erythema has seemed to improve over his foot than what look like a couple months ago. Patient states he has been compliant. Patient pending an angiogram with vascular surgery in a week. Patient was sent here by podiatry/wound clinic for further evaluation work-up with \"IV antibiotics, ID, and vascular surgery consult\"    PAST MEDICAL / SURGICAL / SOCIAL / FAMILY HISTORY      has a past medical history of Atrial fibrillation ([address] CAD (coronary artery disease), CHF (congestive heart failure) ([address] Diabetes mellitus ([address] Hyperlipidemia, and Hypertension. No additional pertinent     has a past surgical history that includes eye surgery (Bilateral); hernia repair; Knee arthroscopy (Right); Colonoscopy; Endoscopy, colon, diagnostic; pacemaker placement (2011); Cardiac surgery (2010); Toe amputation (Right); Wound debridement (Right, 11/19/2015); other surgical history (Right, 12 29 15); and Toe amputation (Left, 3/13/2022).   No additional pertinent    Social History     Socioeconomic History    Marital status: Single     Spouse name: Not on file    Number of children: Not on file    Years of education: Not on file    Highest education level: Not on file   Occupational History    Not on file   Tobacco Use    Smoking status: Never Smoker    Smokeless tobacco: Never Used   Substance and Sexual Activity    Alcohol use: Yes     Comment: SOCIAL on weekends    Drug use: No    Sexual activity: Not on file   Other Topics Concern    Not on file   Social History Narrative    Not on file     Social Determinants of Health     Financial Resource Strain:     Difficulty of Paying Living Expenses: Not on file   Food Insecurity:     Worried About Running Out of Food in the Last Year: Not on file    Tee of Food in the Last Year: Not on file   Transportation Needs:     Lack of Transportation (Medical): Not on file    Lack of Transportation (Non-Medical):  Not on file   Physical Activity:     Days of Exercise per Week: Not on file    Minutes of Exercise per Session: Not on file   Stress:     Feeling of Stress : Not on file   Social Connections:     Frequency of Communication with Friends and Family: Not on file    Frequency of Social Gatherings with Friends and Family: Not on file    Attends Orthodox Services: Not on file    Active Member of [address] [place] [address] [employer] or Organizations: Not on file    Attends Club or Organization Meetings: Not on file    Marital Status: Not on file   Intimate Partner Violence:     Fear of Current or Ex-Partner: Not on file    Emotionally Abused: Not on file    Physically Abused: Not on file    Sexually Abused: Not on file   Housing Stability:     Unable to Pay for Housing in the Last Year: Not on file    Number of [place] in the Last Year: Not on file    Unstable Housing in the Last Year: Not on file       Family History   Problem Relation Age of Onset    Cancer Father         pancreatic cancer    Other Brother         MI    Osteoarthritis Mother     Other Maternal Grandfather         MI       Allergies: Doxycycline, Pcn [penicillins], and Penicillin g    Home Medications:  Prior to Admission medications    Medication Sig Start Date End Date Taking? Authorizing Provider   gabapentin (NEURONTIN) 100 MG capsule Take 1 capsule by mouth 3 times daily for 60 days. Intended supply: 30 days 6/7/22 8/6/22  Jessa Gave, DPM   silver sulfADIAZINE (SILVADENE) 1 % cream APPLY DAILY TO SKIN TO AFFECTED AREA EVERY DAY FOR 30 DAYS 5/19/22   Historical Provider, MD   aspirin 81 MG EC tablet Take 1 tablet by mouth daily 3/15/22   Emerson Ruiz, DO   isosorbide mononitrate (IMDUR) 60 MG extended release tablet Take 1 tablet by mouth daily 3/15/22   Lucas T Satya, DO   nitroGLYCERIN (NITROSTAT) 0.4 MG SL tablet up to max of 3 total doses. If no relief after 1 dose, call 911. 3/15/22   [facility] Satya, DO   glipiZIDE (GLUCOTROL) 5 MG tablet Take 1 tablet by mouth every morning (before breakfast) 3/16/22   Emerson Ruiz, DO   hydrALAZINE (APRESOLINE) 50 MG tablet Take 1 tablet by mouth every 8 hours 3/15/22   [facility], DO   benazepril (LOTENSIN) 5 MG tablet Take 1 tablet by mouth daily 3/15/22   Emerson Ruiz, DO   furosemide (LASIX) 80 MG tablet Take 1 tablet by mouth daily 3/15/22   [facility], DO   carvedilol (COREG) 12.5 MG tablet Take 1 tablet by mouth 2 times daily (with meals)  Patient taking differently: Take 6.25 mg by mouth 2 times daily (with meals)  2/5/21   Emerson Ruiz,    warfarin (COUMADIN) 2 MG tablet Take 4 mg by mouth daily Per [facility] 6/23/20   Historical Provider, MD   atorvastatin (LIPITOR) 40 MG tablet Take 40 mg by mouth daily. Historical Provider, MD       REVIEW OF SYSTEMS    (2-9 systems for level 4, 10 or more for level 5)      Review of Systems   Constitutional: Negative for chills and fever. HENT: Negative for congestion. Respiratory: Negative for chest tightness and shortness of breath. Cardiovascular: Negative for chest pain and leg swelling.    Gastrointestinal: Negative for abdominal pain, constipation, diarrhea, nausea and vomiting. Endocrine: Negative for polyuria. Genitourinary: Negative for difficulty urinating. Musculoskeletal: Positive for arthralgias (Second toe left foot) and gait problem. Skin: Positive for wound. Negative for color change. Neurological: Negative for dizziness, weakness, light-headedness and headaches. Psychiatric/Behavioral: Negative for confusion. PHYSICAL EXAM   (up to 7 for level 4, 8 or more for level 5)      INITIAL VITALS:   BP (!) 166/94   Pulse 86   Temp 97.3 °F (36.3 °C) (Oral)   Resp 20   Ht 5' 10\" (1.778 m)   Wt 187 lb (84.8 kg)   SpO2 96%   BMI 26.83 kg/m²     Physical Exam  Constitutional:       Appearance: Normal appearance. HENT:      Head: Normocephalic and atraumatic. Mouth/Throat:      Mouth: Mucous membranes are moist.      Pharynx: Oropharynx is clear. Eyes:      Extraocular Movements: Extraocular movements intact. Conjunctiva/sclera: Conjunctivae normal.   Cardiovascular:      Rate and Rhythm: Normal rate and regular rhythm. Pulses: Normal pulses. Pulmonary:      Effort: Pulmonary effort is normal.      Breath sounds: Normal breath sounds. Abdominal:      General: Abdomen is flat. Musculoskeletal:         General: Tenderness and signs of injury (Amputation of the second toe left foot, mildly erythematous) present. Normal range of motion. Comments: Range of motion noted to be normal with patient's natural movements   Skin:     General: Skin is warm and dry. Findings: Erythema present. No rash (On exposed skin). Neurological:      Mental Status: He is alert and oriented to person, place, and time. Sensory: Sensory deficit (Slight sensory deficit of his feet.) present.    Psychiatric:         Mood and Affect: Mood normal.         Behavior: Behavior normal.         DIFFERENTIAL  DIAGNOSIS     PLAN (LABS / IMAGING / EKG):  Orders Placed This Encounter   Procedures  Culture, Blood 1    Culture, Blood 1    XR FOOT LEFT (MIN 3 VIEWS)    CBC with Auto Differential    APTT    Protime-INR    Sedimentation Rate    SPECIMEN REJECTION    Basic Metabolic Panel w/ Reflex to MG    C-Reactive Protein    PREVIOUS SPECIMEN    Basic Metabolic Panel w/ Reflex to MG    CBC with Auto Differential    Magnesium    Protime-INR    ADULT DIET; Regular; 4 carb choices (60 gm/meal);  Low Fat/Low Chol/High Fiber/SANJEEV    Telemetry monitoring - continuous duration    Up as tolerated    Up with assistance    Intake and output    Elevate Head of Bed     Full Code    Inpatient consult to [address] [place] [address] Place consult to Vascular Surgery    Pharmacy to Dose: Vancomycin    Inpatient consult to Internal Medicine    Inpatient consult to Case Management    Inpatient consult to Infectious Diseases    Pharmacy to Dose: Warfarin    OT eval and treat    PT evaluation and treat    Initiate Oxygen Therapy Protocol    ADMIT TO INPATIENT       MEDICATIONS ORDERED:  Orders Placed This Encounter   Medications    ceFEPIme (MAXIPIME) 2,000 mg in sterile water 20 mL IV syringe     Order Specific Question:   Antimicrobial Indications     Answer:   Surgical Site Infection    sodium hypochlorite (DAKINS) 0.125 % external solution    vancomycin (VANCOCIN) intermittent dosing (placeholder)     Order Specific Question:   Antimicrobial Indications     Answer:   Surgical Site Infection    DISCONTD: vancomycin (VANCOCIN) 1250 mg in sodium chloride 0.9% 250 mL IVPB     Order Specific Question:   Antimicrobial Indications     Answer:   Surgical Site Infection    vancomycin (VANCOCIN) 1250 mg in sodium chloride 0.9% 250 mL IVPB     Order Specific Question:   Antimicrobial Indications     Answer:   Surgical Site Infection    sodium chloride flush 0.9 % injection 5-40 mL    sodium chloride flush 0.9 % injection 5-40 mL    0.9 % sodium chloride infusion    OR Linked Order Group     ondansetron (ZOFRAN-ODT) disintegrating tablet 4 mg     ondansetron (ZOFRAN) injection 4 mg    polyethylene glycol (GLYCOLAX) packet 17 g    OR Linked Order Group     acetaminophen (TYLENOL) tablet 650 mg     acetaminophen (TYLENOL) suppository 650 mg    DISCONTD: heparin (porcine) injection 5,000 Units    aspirin EC tablet 81 mg    atorvastatin (LIPITOR) tablet 40 mg    gabapentin (NEURONTIN) capsule 100 mg    carvedilol (COREG) tablet 6.25 mg    DISCONTD: warfarin (COUMADIN) tablet 4 mg     Order Specific Question:   Indication of Use     Answer:   A Fib/A Flutter     Order Specific Question:   What is the patient's goal INR? Answer:   2.0 - 3.0    magnesium sulfate 1000 mg in dextrose 5% 100 mL IVPB    warfarin (COUMADIN) tablet 4 mg     Order Specific Question:   Indication of Use     Answer:   A Fib/A Flutter     Order Specific Question:   What is the patient's goal INR? Answer:   2.0 - 3.0    warfarin placeholder: dosing by pharmacy     Order Specific Question:   What is the patient's goal INR? Answer:   2.0 - 3.0       DIAGNOSTIC RESULTS / EMERGENCY DEPARTMENT COURSE / MDM   LAB RESULTS:  Results for orders placed or performed during the hospital encounter of 06/30/22   Culture, Blood 1    Specimen: Blood   Result Value Ref Range    Specimen Description . BLOOD     Special Requests L AC 20ML     Culture NO GROWTH <24 HRS    Culture, Blood 1    Specimen: Blood   Result Value Ref Range    Specimen Description . BLOOD     Special Requests L HAND 20MLIDE     Culture NO GROWTH <24 HRS    CBC with Auto Differential   Result Value Ref Range    WBC 8.5 3.5 - 11.3 k/uL    RBC 3.31 (L) 4.21 - 5.77 m/uL    Hemoglobin 9.3 (L) 13.0 - 17.0 g/dL    Hematocrit 29.3 (L) 40.7 - 50.3 %    MCV 88.5 82.6 - 102.9 fL    MCH 28.1 25.2 - 33.5 pg    MCHC 31.7 28.4 - 34.8 g/dL    RDW 15.9 (H) 11.8 - 14.4 %    Platelets 829 213 - 032 k/uL    MPV 10.2 8.1 - 13.5 fL    NRBC Automated 0.0 0.0 per 100 WBC    Seg Neutrophils 80 (H) 36 - 65 %    Lymphocytes 9 (L) 24 - 43 %    Monocytes 8 3 - 12 %    Eosinophils % 1 1 - 4 %    Basophils 1 0 - 2 %    Immature Granulocytes 1 (H) 0 %    Segs Absolute 6.91 1.50 - 8.10 k/uL    Absolute Lymph # 0.72 (L) 1.10 - 3.70 k/uL    Absolute Mono # 0.64 0.10 - 1.20 k/uL    Absolute Eos # 0.09 0.00 - 0.44 k/uL    Basophils Absolute 0.07 0.00 - 0.20 k/uL    Absolute Immature Granulocyte 0.04 0.00 - 0.30 k/uL    RBC Morphology ANISOCYTOSIS PRESENT    APTT   Result Value Ref Range    PTT 34.1 (H) 20.5 - 30.5 sec   Protime-INR   Result Value Ref Range    Protime 20.0 (H) 9.1 - 12.3 sec    INR 2.0    Sedimentation Rate   Result Value Ref Range    Sed Rate 97 (H) 0 - 20 mm/Hr   SPECIMEN REJECTION   Result Value Ref Range    Specimen Source . BLOOD     Ordered Test BMPX CRP     Reason for Rejection Unable to perform testing: Specimen hemolyzed. Basic Metabolic Panel w/ Reflex to MG   Result Value Ref Range    Glucose 153 (H) 70 - 99 mg/dL    BUN 29 (H) 8 - 23 mg/dL    CREATININE 1.66 (H) 0.70 - 1.20 mg/dL    Calcium 10.0 8.6 - 10.4 mg/dL    Sodium 137 135 - 144 mmol/L    Potassium 3.9 3.7 - 5.3 mmol/L    Chloride 99 98 - 107 mmol/L    CO2 25 20 - 31 mmol/L    Anion Gap 13 9 - 17 mmol/L    GFR Non-African American 42 (L) >60 mL/min    GFR African American 50 (L) >60 mL/min    GFR Comment         C-Reactive Protein   Result Value Ref Range    CRP 36.7 (H) 0.0 - 5.0 mg/L   Magnesium   Result Value Ref Range    Magnesium 1.9 1.6 - 2.6 mg/dL       RADIOLOGY:  XR FOOT LEFT (MIN 3 VIEWS)    (Results Pending)       PROCEDURES:  None    CONSULTS:  IP CONSULT TO PODIATRY  IP CONSULT TO VASCULAR SURGERY  PHARMACY TO DOSE VANCOMYCIN  IP CONSULT TO INTERNAL MEDICINE  IP CONSULT TO INFECTIOUS DISEASES  IP CONSULT TO CASE MANAGEMENT  PHARMACY TO DOSE WARFARIN    MEDICAL DECISION MAKING:  Patient presenting with concerns for infection, sent here by wound care and podiatry at outside facility.   Patient denies any fever, chills or systemic signs of infection. While in the emergency department been under care of the emergency department physician, patient did not represent any SIRS criteria. Patient did have CBC, BMP, sed rate and CRP that were evaluated. Sed rate and CRP were noted to be slightly elevated. Patient demonstrated no leukocytosis. Patient does have known osteomyelitis. Patient was evaluated by podiatry here, they did not feel admission was necessary and wanted patient discharged on p.o. antibiotics. Patient was evaluated by vascular surgery, they do not plan to perform an angiogram any sooner for evaluation. Patient was noted to have an allergy to doxycycline, previously on Zyvox. Wound was cleaned out by podiatry, and well dressed. After further work-up and identification there is concern that patient does have worsening infection needs further evaluation by infectious disease and further treatment with IV antibiotics. Patient was started on  antibiotics here in the emergency department. Patient was admitted to internal medicine for further work-up and evaluation. There was concerns for SIRS criteria at 1715 after patient was admitted, sepsis work-up ordered see sepsis checklist.    Sepsis Times and Checklist  Vital Signs: BP: (!) 166/94  Heart Rate: 86  Resp: 20  Temp: 97.3 °F (36.3 °C) SpO2: 96 %  SIRS (>2)   Temp > 38.3C or < 36C   HR > 90   RR > 20   WBC > 12 or < 4 or >10% bands  SIRS (>2) and confirmed or suspected source of infection = Sepsis  Is Sepsis due to likely bacterial infection?: Yes     Sepsis Identified:  Date: 6/30/22 Time: 1715  Sepsis Orders:  ·  CBC(required): Yes  ·  CMP: Yes  ·  PT/PTT: Yes  ·  Blood Cultures x2(required): Yes  ·  Urinalysis and Urine Culture: No, known source of foot. ·  Lactate(required):  Yes  ·  Antibiotics Given (within 3 hours of sepsis identification, after blood cultures):  Yes    (If unable to obtain IV access for IV antibiotics within 3 hours of identification of sepsis, IM antibiotics is acceptable.)  ·             If lactate >2.0 MUST repeat within 6 hours, patient admitted for further work-up and evaluation of sepsis. 30 cc/kg fluid ordered at this time. CRITICAL CARE:  Please see attending note    FINAL IMPRESSION      1. 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    2. Other chronic osteomyelitis, unspecified site ([facility]         DISPOSITION / [address] Admitted 06/30/2022 06:03:48 PM      PATIENT REFERRED TO:  No follow-up provider specified.     DISCHARGE MEDICATIONS:  New Prescriptions    No medications on file       [facility], [address] [facility], DO  Emergency Medicine Resident    (Please note that portions of thisnote were completed with a voice recognition program.  Efforts were made to edit the dictations but occasionally words are mis-transcribed.)       Zulema Vicente DO  Resident  06/30/22 2011

## 2022-06-30 NOTE — ACP (ADVANCE CARE PLANNING)
Advance Care Planning     Advance Care Planning Clinical Specialist  Conversation Note      Date of ACP Conversation: 6/30/2022    [facility] Conducted with: Patient with Decision Making Capacity    ACP Clinical Specialist: Garland Guzman RN        Healthcare Decision Maker:   States does not currently have  Current Designated Healthcare Decision Maker:     Click here to [address] including section of the [facility] Relationship (ie \"Primary\")  Today we discussed Healthcare Decision Makers. The patient is considering options. Care Preferences    Hospitalization: \"If your health worsens and it becomes clear that your chance of recovery is unlikely, what would your preference be regarding hospitalization? \"    Choice:  [] The patient wants hospitalization. [] The patient prefers comfort-focused treatment without hospitalization. Ventilation: \"If you were in your present state of health and suddenly became very ill and were unable to breathe on your own, what would your preference be about the use of a ventilator (breathing machine) if it were available to you? \"      If the patient would desire the use of ventilator (breathing machine), answer \"yes\". If not, \"no\": yes    \"If your health worsens and it becomes clear that your chance of recovery is unlikely, what would your preference be about the use of a ventilator (breathing machine) if it were available to you? \"     Would the patient desire the use of ventilator (breathing machine)?: Yes      Resuscitation  \"CPR works best to restart the heart when there is a sudden event, like a heart attack, in someone who is otherwise healthy. Unfortunately, CPR does not typically restart the heart for people who have serious health conditions or who are very sick. \"    \"In the event your heart stopped as a result of an underlying serious health condition, would you want attempts to be made to restart your heart (answer \"yes\" for attempt to resuscitate) or would you prefer a natural death (answer \"no\" for do not attempt to resuscitate)? \" yes       [] Yes   [] No   Educated Patient / Beverley Staley regarding differences between Advance Directives and portable DNR orders.     Length of ACP Conversation in minutes:      Conversation Outcomes:  [] ACP discussion completed  [] Existing advance directive reviewed with patient; no changes to patient's previously recorded wishes  [] New Advance Directive completed  [] Portable Do Not Rescitate prepared for Provider review and signature  [] POLST/POST/MOLST/MOST prepared for Provider review and signature      Follow-up plan:    [] Schedule follow-up conversation to continue planning  [] Referred individual to Provider for additional questions/concerns   [] Advised patient/agent/surrogate to review completed ACP document and update if needed with changes in condition, patient preferences or care setting    [] This note routed to one or more involved healthcare providers

## 2022-06-30 NOTE — CONSULTS
Cancer in his father; Osteoarthritis in his mother; Other in his brother and maternal grandfather. Review of Systems:   General: Denies fever, chills, night sweats  Card: Denies chest pain  Pulm: Denies cough, shortness of breath  GI: denies history of constipation, diarrhea  Heme: h/o bleeding or clotting problems. Neuro: Denies h/o CVA, TIA  Skin: Denies rashes, reports wounds on lateral aspect of L foot and 2nd digit amputation site  Musculoskeletal: Denies muscle, joint, back pain. Allergies: Doxycycline, Pcn [penicillins], and Penicillin g    Current Meds:  Current Facility-Administered Medications:     ceFEPIme (MAXIPIME) 2,000 mg in sterile water 20 mL IV syringe, 2,000 mg, IntraVENous, Q12H, Nathan Gallardo DPM, 2,000 mg at 06/30/22 1404    sodium hypochlorite (DAKINS) 0.125 % external solution, , Irrigation, Daily, Dulce Miles DPM, Given at 06/30/22 1408    vancomycin (VANCOCIN) intermittent dosing (placeholder), , Other, RX Placeholder, Geoffrey Garcia, DPM    vancomycin (VANCOCIN) 1250 mg in sodium chloride 0.9% 250 mL IVPB, 1,250 mg, IntraVENous, Q24H, Geoffrey Garcia, DPM    Current Outpatient Medications:     gabapentin (NEURONTIN) 100 MG capsule, Take 1 capsule by mouth 3 times daily for 60 days. Intended supply: 30 days, Disp: 90 capsule, Rfl: 1    silver sulfADIAZINE (SILVADENE) 1 % cream, APPLY DAILY TO SKIN TO AFFECTED AREA EVERY DAY FOR 30 DAYS, Disp: , Rfl:     aspirin 81 MG EC tablet, Take 1 tablet by mouth daily, Disp: 30 tablet, Rfl: 3    isosorbide mononitrate (IMDUR) 60 MG extended release tablet, Take 1 tablet by mouth daily, Disp: 30 tablet, Rfl: 3    nitroGLYCERIN (NITROSTAT) 0.4 MG SL tablet, up to max of 3 total doses.  If no relief after 1 dose, call 911., Disp: 25 tablet, Rfl: 3    glipiZIDE (GLUCOTROL) 5 MG tablet, Take 1 tablet by mouth every morning (before breakfast), Disp: 60 tablet, Rfl: 3    hydrALAZINE (APRESOLINE) 50 MG tablet, Take 1 tablet by mouth Component Value Date/Time    INR 2.0 06/30/2022 12:19 PM       Imaging:  No results found. Impression:  Patient Active Problem List   Diagnosis    Diabetic foot infection ([facility]    Diabetic foot ulcer with osteomyelitis ([address]    Chronic osteomyelitis ([address]    Diabetic foot ulcer ([address]    PVD (peripheral vascular disease) ([facility]    Atherosclerosis of coronary artery without angina pectoris    Cardiomyopathy [facility])    Cardiac left ventricular ejection fraction 21-40 percent    Diabetes mellitus type 2 with peripheral artery disease ([address]    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 (HCC)    Chronic osteomyelitis of left foot (HCC)    Onychomycosis    Heart failure (HCC)    MRSA bacteremia    Gangrene of toe of left foot (HCC)    Elevated C-reactive protein (CRP)    Elevated erythrocyte sedimentation rate    Acute kidney injury ([address]    Allergy to penicillin    Ulcer of left foot, with necrosis of bone (HCC)    Acute osteomyelitis of left foot ([facility]    Cellulitis of left foot    Abscess of bursa of left foot    Diabetic polyneuropathy associated with type 2 diabetes mellitus ([address]       1. Nonhealing amputation wound 2nd toe L foot    Recommendation: 1. Discussed with podiatry, will defer dispo of patient to them  2. Will see patient 7/7/22 for elective arteriogram of LLE  3. Recommend diligent wound care per wound care team recommendations  4.  Discussed importance of mainitaning blood glucose levels less that 180 with patient    Electronically signed by Maria C Hernandez DO on 6/30/2022 at 2:57 PM

## 2022-06-30 NOTE — ED NOTES
The following labs labeled with pt sticker and tubed to lab:     [] Blue     [] True Segura   [] on ice  [] Green/yellow  [] Green/black [] on ice  [] Yellow  [] Red  [] Pink      [] COVID-19 swab    [] Rapid  [] PCR  [] Flu swab  [] Peds Viral Panel     [] Urine Sample  [] Pelvic Cultures  [x] Blood Cultures 2 of 2 sent            Umu Vale RN  06/30/22 5023

## 2022-06-30 NOTE — ED NOTES
Lab notified that green top tube was gross ely hemolyzed will redraw     Damir Blackwood RN  06/30/22 3001

## 2022-07-01 VITALS
TEMPERATURE: 97.5 F | RESPIRATION RATE: 16 BRPM | BODY MASS INDEX: 26.77 KG/M2 | HEIGHT: 70 IN | WEIGHT: 187 LBS | DIASTOLIC BLOOD PRESSURE: 80 MMHG | SYSTOLIC BLOOD PRESSURE: 156 MMHG | OXYGEN SATURATION: 99 % | HEART RATE: 99 BPM

## 2022-07-01 PROBLEM — I70.245 ATHEROSCLEROSIS OF NATIVE ARTERIES OF LEFT LEG WITH ULCERATION OF OTHER PART OF FOOT (HCC): Chronic | Status: ACTIVE | Noted: 2022-07-01

## 2022-07-01 PROBLEM — D64.9 NORMOCYTIC NORMOCHROMIC ANEMIA: Chronic | Status: ACTIVE | Noted: 2022-07-01

## 2022-07-01 PROBLEM — I70.245 ATHEROSCLEROSIS OF NATIVE ARTERIES OF LEFT LEG WITH ULCERATION OF OTHER PART OF FOOT (HCC): Status: ACTIVE | Noted: 2022-07-01

## 2022-07-01 PROBLEM — M86.9 OSTEOMYELITIS (HCC): Chronic | Status: ACTIVE | Noted: 2022-06-30

## 2022-07-01 PROBLEM — L03.90 CELLULITIS: Status: ACTIVE | Noted: 2022-07-01

## 2022-07-01 PROBLEM — D64.9 NORMOCYTIC NORMOCHROMIC ANEMIA: Status: ACTIVE | Noted: 2022-07-01

## 2022-07-01 LAB
ABSOLUTE EOS #: 0.1 K/UL (ref 0–0.44)
ABSOLUTE IMMATURE GRANULOCYTE: <0.03 K/UL (ref 0–0.3)
ABSOLUTE LYMPH #: 0.73 K/UL (ref 1.1–3.7)
ABSOLUTE MONO #: 0.47 K/UL (ref 0.1–1.2)
ANION GAP SERPL CALCULATED.3IONS-SCNC: 11 MMOL/L (ref 9–17)
BASOPHILS # BLD: 1 % (ref 0–2)
BASOPHILS ABSOLUTE: 0.06 K/UL (ref 0–0.2)
BUN BLDV-MCNC: 26 MG/DL (ref 8–23)
CALCIUM SERPL-MCNC: 9.2 MG/DL (ref 8.6–10.4)
CHLORIDE BLD-SCNC: 101 MMOL/L (ref 98–107)
CO2: 24 MMOL/L (ref 20–31)
CREAT SERPL-MCNC: 1.6 MG/DL (ref 0.7–1.2)
EOSINOPHILS RELATIVE PERCENT: 2 % (ref 1–4)
GFR AFRICAN AMERICAN: 53 ML/MIN
GFR NON-AFRICAN AMERICAN: 43 ML/MIN
GFR SERPL CREATININE-BSD FRML MDRD: ABNORMAL ML/MIN/{1.73_M2}
GLUCOSE BLD-MCNC: 163 MG/DL (ref 75–110)
GLUCOSE BLD-MCNC: 167 MG/DL (ref 70–99)
GLUCOSE BLD-MCNC: 185 MG/DL (ref 75–110)
HCT VFR BLD CALC: 28 % (ref 40.7–50.3)
HEMOGLOBIN: 8.8 G/DL (ref 13–17)
IMMATURE GRANULOCYTES: 0 %
INR BLD: 2
LYMPHOCYTES # BLD: 12 % (ref 24–43)
MCH RBC QN AUTO: 28.3 PG (ref 25.2–33.5)
MCHC RBC AUTO-ENTMCNC: 31.4 G/DL (ref 28.4–34.8)
MCV RBC AUTO: 90 FL (ref 82.6–102.9)
MONOCYTES # BLD: 8 % (ref 3–12)
NRBC AUTOMATED: 0 PER 100 WBC
PDW BLD-RTO: 15.9 % (ref 11.8–14.4)
PLATELET # BLD: 240 K/UL (ref 138–453)
PMV BLD AUTO: 10.6 FL (ref 8.1–13.5)
POTASSIUM SERPL-SCNC: 3.9 MMOL/L (ref 3.7–5.3)
PROTHROMBIN TIME: 20.1 SEC (ref 9.1–12.3)
RBC # BLD: 3.11 M/UL (ref 4.21–5.77)
RBC # BLD: ABNORMAL 10*6/UL
SEG NEUTROPHILS: 77 % (ref 36–65)
SEGMENTED NEUTROPHILS ABSOLUTE COUNT: 4.61 K/UL (ref 1.5–8.1)
SODIUM BLD-SCNC: 136 MMOL/L (ref 135–144)
SURGICAL PATHOLOGY REPORT: NORMAL
WBC # BLD: 6 K/UL (ref 3.5–11.3)

## 2022-07-01 PROCEDURE — 99239 HOSP IP/OBS DSCHRG MGMT >30: CPT | Performed by: INTERNAL MEDICINE

## 2022-07-01 PROCEDURE — 2580000003 HC RX 258

## 2022-07-01 PROCEDURE — 97162 PT EVAL MOD COMPLEX 30 MIN: CPT

## 2022-07-01 PROCEDURE — 6370000000 HC RX 637 (ALT 250 FOR IP)

## 2022-07-01 PROCEDURE — 2500000003 HC RX 250 WO HCPCS

## 2022-07-01 PROCEDURE — 36415 COLL VENOUS BLD VENIPUNCTURE: CPT

## 2022-07-01 PROCEDURE — 6360000002 HC RX W HCPCS

## 2022-07-01 PROCEDURE — 99222 1ST HOSP IP/OBS MODERATE 55: CPT | Performed by: INTERNAL MEDICINE

## 2022-07-01 PROCEDURE — 80048 BASIC METABOLIC PNL TOTAL CA: CPT

## 2022-07-01 PROCEDURE — 85025 COMPLETE CBC W/AUTO DIFF WBC: CPT

## 2022-07-01 PROCEDURE — 85610 PROTHROMBIN TIME: CPT

## 2022-07-01 PROCEDURE — 82947 ASSAY GLUCOSE BLOOD QUANT: CPT

## 2022-07-01 PROCEDURE — 97530 THERAPEUTIC ACTIVITIES: CPT

## 2022-07-01 PROCEDURE — 6370000000 HC RX 637 (ALT 250 FOR IP): Performed by: STUDENT IN AN ORGANIZED HEALTH CARE EDUCATION/TRAINING PROGRAM

## 2022-07-01 RX ORDER — MAGNESIUM SULFATE 1 G/100ML
1000 INJECTION INTRAVENOUS ONCE
Status: DISCONTINUED | OUTPATIENT
Start: 2022-07-01 | End: 2022-07-01

## 2022-07-01 RX ORDER — FUROSEMIDE 40 MG/1
80 TABLET ORAL DAILY
Status: DISCONTINUED | OUTPATIENT
Start: 2022-07-01 | End: 2022-07-01 | Stop reason: HOSPADM

## 2022-07-01 RX ORDER — LINEZOLID 600 MG/1
600 TABLET, FILM COATED ORAL EVERY 12 HOURS SCHEDULED
Status: DISCONTINUED | OUTPATIENT
Start: 2022-07-01 | End: 2022-07-01 | Stop reason: HOSPADM

## 2022-07-01 RX ORDER — LINEZOLID 600 MG/1
600 TABLET, FILM COATED ORAL EVERY 12 HOURS SCHEDULED
Qty: 28 TABLET | Refills: 0 | Status: SHIPPED | OUTPATIENT
Start: 2022-07-01 | End: 2022-07-15

## 2022-07-01 RX ORDER — LANOLIN ALCOHOL/MO/W.PET/CERES
400 CREAM (GRAM) TOPICAL ONCE
Status: COMPLETED | OUTPATIENT
Start: 2022-07-01 | End: 2022-07-01

## 2022-07-01 RX ORDER — POTASSIUM CHLORIDE 750 MG/1
10 CAPSULE, EXTENDED RELEASE ORAL ONCE
Status: COMPLETED | OUTPATIENT
Start: 2022-07-01 | End: 2022-07-01

## 2022-07-01 RX ADMIN — MAGNESIUM GLUCONATE 500 MG ORAL TABLET 400 MG: 500 TABLET ORAL at 13:16

## 2022-07-01 RX ADMIN — DAKIN'S SOLUTION 0.125% (QUARTER STRENGTH): 0.12 SOLUTION at 10:58

## 2022-07-01 RX ADMIN — Medication 81 MG: at 08:46

## 2022-07-01 RX ADMIN — SODIUM CHLORIDE, PRESERVATIVE FREE 10 ML: 5 INJECTION INTRAVENOUS at 08:46

## 2022-07-01 RX ADMIN — CEFEPIME HYDROCHLORIDE 2000 MG: 2 INJECTION, POWDER, FOR SOLUTION INTRAVENOUS at 02:14

## 2022-07-01 RX ADMIN — FUROSEMIDE 80 MG: 40 TABLET ORAL at 08:46

## 2022-07-01 RX ADMIN — POLYETHYLENE GLYCOL 3350 17 G: 17 POWDER, FOR SOLUTION ORAL at 13:20

## 2022-07-01 RX ADMIN — POTASSIUM CHLORIDE 10 MEQ: 750 CAPSULE, EXTENDED RELEASE ORAL at 08:46

## 2022-07-01 ASSESSMENT — ENCOUNTER SYMPTOMS
SHORTNESS OF BREATH: 0
APNEA: 0
PHOTOPHOBIA: 0
EYES NEGATIVE: 1
WHEEZING: 0
VOMITING: 0
ABDOMINAL PAIN: 0
EYE PAIN: 0
VOICE CHANGE: 0
BACK PAIN: 1
NAUSEA: 1
CHOKING: 0
DIARRHEA: 0
EYE DISCHARGE: 0
EYE ITCHING: 0
EYE REDNESS: 0
ABDOMINAL DISTENTION: 0
COUGH: 0
CONSTIPATION: 0
RESPIRATORY NEGATIVE: 1
COLOR CHANGE: 1
RHINORRHEA: 0
SINUS PAIN: 0
STRIDOR: 0
CHEST TIGHTNESS: 0
FACIAL SWELLING: 0
ALLERGIC/IMMUNOLOGIC NEGATIVE: 1

## 2022-07-01 NOTE — PROGRESS NOTES
Congestive Heart Failure Education completed and charted. CHF booklet given. Patient was receptive to education. Discussed the  importance of medication compliance. Discussed the importance of a heart healthy diet. Discussed 2000 mg sodium-restricted daily diet. Patient instructed to limit fluid intake to  1.5 to 2 liters per day. Patient instructed to weigh self at the same time of each day each morning, reinforced teaching to monitor for 3-5 lb weight increase over 1-2 days notify physician if change noted. Signs and symptoms of CHF discussed with patient, such as feeling more tired than normal, feeling short of breath, coughing that increases when lying down, sudden weight gain, swelling of the feet, legs or belly. Patient verbalized understanding to notify physician office if these symptoms occur.     EF 35-40%   Pt follows with [facility]

## 2022-07-01 NOTE — PROGRESS NOTES
Progress Note  Podiatric Medicine and Surgery     Subjective     CC: Left foot wound    Patient seen and examined at bedside. No acute events overnight. Afebrile, vital signs stable       HPI :  Asher presents for evaluation of left foot ulceration.  He underwent second toe amputation in March.  Has had a nonhealing wound since then. Evita Goins presents today in slip on shoes.  Has been working at [employer] part-time. Evita Goins saw vascular surgery recently who is planning for an angiogram .  Denies systemic sign of infection    ROS: Denies N/V/F/C/SOB/CP. Otherwise negative except at stated in the HPI.      Medications:  Scheduled Meds:   furosemide  80 mg Oral Daily    magnesium sulfate  1,000 mg IntraVENous Once    cefepime  2,000 mg IntraVENous Q12H    sodium hypochlorite   Irrigation Daily    vancomycin (VANCOCIN) intermittent dosing (placeholder)   Other RX Placeholder    vancomycin  1,250 mg IntraVENous Q24H    sodium chloride flush  5-40 mL IntraVENous 2 times per day    aspirin  81 mg Oral Daily    atorvastatin  40 mg Oral Daily    gabapentin  100 mg Oral TID    carvedilol  6.25 mg Oral BID WC    warfarin placeholder: dosing by pharmacy   Other RX Placeholder    insulin lispro  0-6 Units SubCUTAneous TID WC    insulin lispro  0-3 Units SubCUTAneous Nightly       Continuous Infusions:   sodium chloride      dextrose         PRN Meds:sodium chloride flush, sodium chloride, ondansetron **OR** ondansetron, polyethylene glycol, acetaminophen **OR** acetaminophen, glucose, dextrose bolus **OR** dextrose bolus, glucagon (rDNA), dextrose    Objective     Vitals:  Patient Vitals for the past 8 hrs:   BP Temp Temp src Pulse Resp SpO2   22 0640 119/68 99.1 °F (37.3 °C) Temporal 87 14 99 %   22 0309 138/76 97.6 °F (36.4 °C) Oral 92 16 95 %     Average, Min, and Max for last 24 hours Vitals:  TEMPERATURE:  Temp  Av °F (36.7 °C)  Min: 97.3 °F (36.3 °C)  Max: 99.1 °F (37.3 °C)    RESPIRATIONS RANGE: Resp  Av.1  Min: 14  Max: 30    PULSE RANGE: Pulse  Av.3  Min: 86  Max: 107    BLOOD PRESSURE RANGE:  Systolic (69PSR), XBB:447 , Min:119 , WBR:447   ; Diastolic (31HVY), RMI:68, Min:65, Max:108      PULSE OXIMETRY RANGE: SpO2  Av.1 %  Min: 95 %  Max: 99 %    I/O last 3 completed shifts: In: 850 [P.O.:600; IV Piggyback:250]  Out: 2500 [Urine:2500]    CBC:  Recent Labs     22  1219 22  1319 22  0550   WBC 8.5  --  6.0   HGB 9.3*  --  8.8*   HCT 29.3*  --  28.0*     --  240   CRP  --  36.7*  --         BMP:  Recent Labs     22  1319 22  0550    136   K 3.9 3.9   CL 99 101   CO2 25 24   BUN 29* 26*   CREATININE 1.66* 1.60*   GLUCOSE 153* 167*   CALCIUM 10.0 9.2        Coags:  Recent Labs     22  1219 22  0550   APTT 34.1*  --    INR 2.0 2.0       Lab Results   Component Value Date    SEDRATE 97 (H) 2022     Recent Labs     22  1319   CRP 36.7*       Lower Extremity Physical Exam:  Vascular: DP and PT pulses are nonpalpable. CFT <3 seconds to all pedal digits, bilaterally. Mild edema localized to the left forefoot. Hair growth is absent to the level of the lower leg, bilateral.       Neuro: Saph/sural/SP/DP/plantar sensation absent to light touch. Protective sensation is intact to 0/10 sites as tested with a 5.07g SWMF, Bilateral.     Musculoskeletal: EHL/FHL/GS/TA gross motor intact. Gross deformity is present, s/p multiple digit amputations bilateral foot.      Dermatologic: Open wound present to left 2nd toe amputation site, full thickness. Wound base is necrotic with purulent drainage and malodor extending to the 2nd metatarsal head.  Metatarsal head is loose secondary to pathologic fracture and easily removed. There is no erythema.  There is no fluctuance or crepitus.  Interdigital maceration absent, Bilateral.        Clinical Images:          Imaging:   XR FOOT LEFT (MIN 3 VIEWS)   Final Result   1.  Soft tissue edema and emphysema. 2. Osseous erosion/destruction in the 2nd metatarsal head, concerning for   osteomyelitis. 3. Age-indeterminate fracture of the base of the 3rd digit proximal phalanx. Cultures: 6/30/2022: Rare gram positive cocci in pairs    Assessment   Holli Crawford is a 78 y.o. male with   1. Osteomyelitis of left foot  2. Cellulitis of left  3. DMII w/ polyneuropathy  4. Full thickness ulceration down to level of bone, left foot  5. PVD    Principal Problem:    Osteomyelitis ([address]  Active Problems:    Diabetic polyneuropathy associated with type 2 diabetes mellitus ([address]    Cellulitis    Atherosclerosis of native arteries of left leg with ulceration of other part of foot (HCC)    Normocytic normochromic anemia    Diabetic foot infection (HCC)    Diabetic foot ulcer with osteomyelitis (HCC)    PVD (peripheral vascular disease) (HCC)    Cardiomyopathy ([address]    Diabetes mellitus type 2 with peripheral artery disease ([address]  Resolved Problems:    * No resolved hospital problems. *       Plan     · Patient examined and evaluated at bedside   · Treatment options discussed in detail with the patient including vascular intervention. Intervention planned by vascular on 7/7  · Wound cultures revealed rare gram positive cocci  · Blood cultures obtained - no growth noted in 16 hours  · Radiographs reviewed and discussed in detail with patient   · IV abx started 6/30/22 (vanco/cefepime)  · Dressing applied to left lower extremity: Dakins, dsd, packing, ACE  · Plan: monitor clinically  · Heel WB status to LLE.   · Discussed with Dr. Av Taylor, EMREM   Podiatric Medicine & Surgery   7/1/2022 at 10:59 AM

## 2022-07-01 NOTE — PROGRESS NOTES
Physical Therapy  Facility/Department: [address] STEPDOWN  Physical Therapy Initial Assessment    Name: Kia Alvarado  : 1955  MRN: [id number]  Date of Service: 2022  Chief Complaint   Patient presents with    Wound Check     2nd toe on L foot     Discharge Recommendations:  No therapy recommended at discharge      Patient Diagnosis(es): The primary encounter diagnosis was History of amputation of lesser toe of left foot ([facility] A diagnosis of Other chronic osteomyelitis, unspecified site [facility]) was also pertinent to this visit. Past Medical History:  has a past medical history of Atrial fibrillation ([facility] CAD (coronary artery disease), CHF (congestive heart failure) ([facility] Diabetes mellitus ([facility] [address] [facility] Hyperlipidemia, and Hypertension. Past Surgical History:  has a past surgical history that includes eye surgery (Bilateral); hernia repair; Knee arthroscopy (Right); Colonoscopy; Endoscopy, colon, diagnostic; pacemaker placement (); Cardiac surgery (); Toe amputation (Right); Wound debridement (Right, 2015); other surgical history (Right, 12 29 15); and Toe amputation (Left, 3/13/2022).     Assessment   Assessment: The pt ambulated safely without a device with heel WB L LE. HE ambulated without difficulty and needs no further PT intervention at this time  Therapy Prognosis: Good  Decision Making: Medium Complexity  Requires PT Follow-Up: No  Activity Tolerance  Activity Tolerance: Patient tolerated treatment well     Plan   Plan  Plan: Discharge with evaluation only  Safety Devices  Type of Devices: Nurse notified,Left in bed,Call light within reach     Restrictions      Subjective   General  Patient assessed for rehabilitation services?: Yes  Response To Previous Treatment: Not applicable  Family / Caregiver Present: No  Follows Commands: Within Functional Limits  Subjective  Subjective: Pt denies pain     Social/Functional History  Social/Functional History  Lives With: Parent  Type of Home: House  Home Layout: One level  Home Access: Stairs to enter with rails  Entrance Stairs - Number of Steps: 3  Entrance Stairs - Rails: Both  Bathroom Shower/Tub: Tub/Shower unit  Bathroom Toilet: Standard  Home Equipment:  (No DME at baseline)  ADL Assistance: [address]: [address] [facility] Responsibilities: Yes  Ambulation Assistance: Independent  Transfer Assistance: Independent  Active : Yes  Mode of Transportation: [address]  Occupation: Retired  Type of Occupation: Retired   Leisure & Hobbies: likes sprint car racing  Vision/Hearing  Vision  Vision: Within [address]  Hearing: Within functional limits    Cognition   Orientation  Overall Orientation Status: Within Functional Limits  Cognition  Overall Cognitive Status: WFL     Objective   Heart Rate: [address]: Monitor  BP: 119/68  BP Location: Left Arm  BP Method: Automatic  Patient Position: Semi fowlers  MAP (Calculated): 85  Resp: 14  SpO2: 99 %  O2 Device: None (Room air)       AROM RLE (degrees)  RLE AROM: WNL  AROM LLE (degrees)  LLE AROM : WFL  AROM RUE (degrees)  RUE AROM : WNL  AROM LUE (degrees)  LUE AROM : WNL  Strength RLE  Strength RLE: WNL  Strength LLE  Strength LLE: WFL  Strength RUE  Strength RUE: WNL  Strength LUE  Strength LUE: WNL        Bed mobility  Supine to Sit: Stand by assistance  Sit to Supine: Stand by assistance  Scooting: Stand by assistance  Transfers  Sit to Stand: Stand by assistance  Stand to sit: Stand by assistance  Ambulation  Surface: level tile  Device: No Device  Assistance: Stand by assistance  Distance: amb 35 ft without a device x SBA with heel WB L LE     Balance  Posture: Good  Sitting - Static: Good  Sitting - Dynamic: Good  Standing - Static: Good  Standing - Dynamic: Good     OutComes Score     AM-PAC Score  AM-PAC Inpatient Mobility Raw Score : 21 (07/01/22 1101)  AM-PAC Inpatient T-Scale Score : 50.25 (07/01/22 1101)  Mobility Inpatient CMS 0-100% Score: 28.97 (07/01/22 1101)  Mobility Inpatient CMS G-Code Modifier : CJ (07/01/22 1101)     Goals  Short Term Goals  Time Frame for Short term goals: No PT needs at this time     DC   PT     Education  Patient Education  Education Given To: Patient  Education Provided: Role of Therapy;Plan of Care  Education Method: Demonstration  Barriers to Learning: None  Education Outcome: Verbalized understanding    Therapy Time   Individual Concurrent Group Co-treatment   Time In [facility]         Time Out 0945         Minutes [address], PT

## 2022-07-01 NOTE — ED NOTES
Pt is going up in [facility] [address] by Autumn, report given, all questions answered     Yusuf Jones RN  06/30/22 2042       Yusuf Jones RN  06/30/22 2042

## 2022-07-01 NOTE — PLAN OF CARE
Problem: Discharge Planning  Goal: Discharge to home or other facility with appropriate resources  7/1/2022 1357 by Brandi Swenson RN  Outcome: Completed  7/1/2022 0233 by Jaylen Porter RN  Outcome: Progressing  Flowsheets  Taken 6/30/2022 2151  Discharge to home or other facility with appropriate resources: Identify barriers to discharge with patient and caregiver  Taken 6/30/2022 2100  Discharge to home or other facility with appropriate resources: Identify barriers to discharge with patient and caregiver     Problem: Safety - Adult  Goal: Free from fall injury  7/1/2022 1357 by Brandi Swenson RN  Outcome: Completed  7/1/2022 0233 by Jaylen Porter RN  Outcome: Progressing     Problem: ABCDS Injury Assessment  Goal: Absence of physical injury  7/1/2022 1357 by Brandi Swenson RN  Outcome: Completed  7/1/2022 0233 by Jaylen Porter RN  Outcome: Progressing     Problem: Chronic Conditions and Co-morbidities  Goal: Patient's chronic conditions and co-morbidity symptoms are monitored and maintained or improved  Outcome: Completed

## 2022-07-01 NOTE — DISCHARGE SUMMARY
[address]     Department of Internal Medicine - Staff Internal Medicine Teaching Service    INPATIENT DISCHARGE SUMMARY      Patient Identification:  Pro Samuels is a 78 y.o. male. :  1955  MRN: [id number]     Acct: [de-identified]   PCP: Ora Brady DO  Admit Date:  2022  Discharge date and time: No discharge date for patient encounter. Attending Provider: Scottie Henriquez, [facility] Problem Lists:  Principal Problem:    Osteomyelitis [facility])  Active Problems:    Diabetic polyneuropathy associated with type 2 diabetes mellitus ([facility]    Cellulitis    Atherosclerosis of native arteries of left leg with ulceration of other part of foot (HCC)    Normocytic normochromic anemia    Surgical wound, non healing    Diabetic foot infection ([facility]    Diabetic foot ulcer with osteomyelitis (HCC)    PVD (peripheral vascular disease) ([facility]    Cardiomyopathy ([facility]    Diabetes mellitus type 2 with peripheral artery disease ([facility]  Resolved Problems:    * No resolved hospital problems. [facility] STAY     Brief Inpatient course:   Pro Samuels is a 78 y.o. male who was admitted for the management of Osteomyelitis [facility]), presented to the emergency department with nonhealing wound of left lower second toe amputation site. He was sent from podiatry office. He underwent debridement. X-ray also suggestive of soft tissue edema and emphysema, likely secondary to debridement. Also concern of osteomyelitis in second metatarsal head. Started on IV cefepime and vancomycin on . Vascular surgery consulted, recommend no active surgical intervention. Followed by podiatry, wound cultures reveal gram positive cocci, will monitor clinically outpatient. Plan for arteriogram outpatient. Followed by Infectious diseases and placed on Doxycycline for gram positive wound culture.   Patient was managed for the following medical problems:  . Nonhealing wound on left second toe amputation site associated with osteomyelitis and cellulitis    -Arteriogram planned outpatient, tibial disease suspected. -Got vancomycin and cefepime as inpatient  -Discharged on Doxycycline     Peripheral vascular disease  -Arteriogram planned on 7/7/2022 outpatient     Type 2 diabetes mellitus associated with diabetic neuropathy  -resumed on home medications     Hx of CAD s/p CABG x3 with LIMA to LAD, SVG to diagonal, SVG to RCA in March 2020  Continue aspirin, statin, Coreg 6.25 mg twice daily     Ischemic cardiomyopathy  S/p AICD  Recent EF 40%. On lasix 80 mg daily at home      Permanent A. fib  On chronic warfarin therapy, target INR 2-3  Continue coreg      Hypertension  Continue coreg 6.25mg twice daily.    Amlodipine added because of high blood pressure.     Hyperlipidemia  Continue Lipitor 40 mg nightly     CKD stage IIIb  Baseline 1.5-1.7. On Lasix 80 mg at home.     Obesity  Further contributes and delayed recovery of the wound    Procedures/ Significant Interventions:        Consults:   Vascular surgery  Podiatry  Infectious diseases  Consults:     Final Specialist Recommendations/Findings:   IP CONSULT TO PODIATRY  IP CONSULT TO VASCULAR SURGERY  IP CONSULT TO INTERNAL MEDICINE  IP CONSULT TO INFECTIOUS DISEASES  IP CONSULT TO CASE MANAGEMENT  PHARMACY TO DOSE WARFARIN  IP CONSULT TO IV TEAM      Any Hospital Acquired Infections: none    Discharge Functional Status:  stable    DISCHARGE PLAN     Disposition: home    Patient Instructions:   Current Discharge Medication List      CONTINUE these medications which have NOT CHANGED    Details   gabapentin (NEURONTIN) 100 MG capsule Take 1 capsule by mouth 3 times daily for 60 days.  Intended supply: 30 days  Qty: 90 capsule, Refills: 1      silver sulfADIAZINE (SILVADENE) 1 % cream APPLY DAILY TO SKIN TO AFFECTED AREA EVERY DAY FOR 30 DAYS      aspirin 81 MG EC tablet Take 1 tablet by mouth daily  Qty: 30 tablet, Refills: 3      isosorbide mononitrate (IMDUR) 60 MG extended release tablet Take 1 tablet by mouth daily  Qty: 30 tablet, Refills: 3      nitroGLYCERIN (NITROSTAT) 0.4 MG SL tablet up to max of 3 total doses. If no relief after 1 dose, call 911. Qty: 25 tablet, Refills: 3      glipiZIDE (GLUCOTROL) 5 MG tablet Take 1 tablet by mouth every morning (before breakfast)  Qty: 60 tablet, Refills: 3      hydrALAZINE (APRESOLINE) 50 MG tablet Take 1 tablet by mouth every 8 hours  Qty: 90 tablet, Refills: 3      benazepril (LOTENSIN) 5 MG tablet Take 1 tablet by mouth daily  Qty: 30 tablet, Refills: 3      furosemide (LASIX) 80 MG tablet Take 1 tablet by mouth daily  Qty: 60 tablet, Refills: 3      carvedilol (COREG) 12.5 MG tablet Take 1 tablet by mouth 2 times daily (with meals)  Qty: 60 tablet, Refills: 3      warfarin (COUMADIN) 2 MG tablet Take 4 mg by mouth daily Per [facility]      atorvastatin (LIPITOR) 40 MG tablet Take 40 mg by mouth daily. Activity: activity as tolerated    Diet: regular diet    Follow-up:    Tahir Staley MD  [address] [place] [address],[facility] Floor [facility]  [phone]    In 1 week  After arteriogram is done. Maritza Toth DPM  [address] [facility]  [phone]    In 1 week  As needed, For wound re-check      Patient Instructions: Take antibiotics as advised  Follow up with vascular, podiatry and infectious diseases  Follow up with PCP in one week time  Follow up labs:    Follow up imaging:     Note that over 30 minutes was spent in preparing discharge papers, discussing discharge with patient, medication review, etc.      Chris Lynn MD, MD  Internal Medicine Resident, PGY-1  [address] [place], [place].  7/1/2022, 4:30 PM

## 2022-07-01 NOTE — PROGRESS NOTES
Physician Progress Note      PATIENT:               Mela Hansk  CSN #:                  [id number]  :                       1955  ADMIT DATE:       2022 11:45 AM  DISCH DATE:  RESPONDING  PROVIDER #:        Chelsea HERNANDEZ          QUERY TEXT:    Pt admitted with non healing wound on left second toe amputation site   associated with osteomyelitis and cellulitis noted past history of CHF and per   progress note ischemic cardiomyopathy . per Echo done on 3/22Left ventricular   systolic function is moderately reduced. Estimated LV EF 35-40 %. Global   hypokinesis. Home medication includes Coreg and Lasix. If possible, please   clarify one of the following    The medical record reflects the following:  Risk Factors: dm, a fib, ischemic cardiomyopathy s/p aicd, htn, hld, dm  Clinical Indicators: admitted with non healing wound on left second toe   amputation site associated with osteomyelitis and cellulitis noted past   history of CHF and per progress note ischemic cardiomyopathy . per Echo done   on 3/22Left ventricular systolic function is moderately reduced. Estimated LV   EF 35-40 %. Global hypokinesis. Home medication includes Coreg and Lasix  Treatment: lasix and Coreg, intake and output, weight monitoring    Thank Kendra Aguirre RN BSN  CCDS  Email [email or web address]  Cell [phone]  office hours M-F 6am to 2:30pm  Options provided:  -- Chronic Systolic CHF/HFrEF  -- Chronic Systolic and Diastolic CHF  -- Ischemic cardiomyopathy, CHF ruled out  -- Other - I will add my own diagnosis  -- Disagree - Not applicable / Not valid  -- Disagree - Clinically unable to determine / Unknown  -- Refer to Clinical Documentation Reviewer    PROVIDER RESPONSE TEXT:    This patient has chronic systolic CHF/HFrEF. Query created by:  Nalini Weston on 2022 9:10 AM      QUERY TEXT:    Pt admitted with non healing wound on left second toe amputation site   associated with osteomyelitis and cellulitis Pt noted to have DM type 2 . If   possible please clarify one of the following    The medical record reflects the following:  Risk Factors: age, pvd, dm toe amputation  Clinical Indicators: admitted with non healing wound on left second toe   amputation site associated with osteomyelitis and cellulitis Pt noted to have   DM type 2  Treatment: iv antibiotics, podiatry and vascular consult, wound care    Thank Davina Cobos RN BSN  CCDS  Email [email or web address]  Cell [phone]  office hours M-F 6am to 2:30pm  Options provided:  -- left second toe amputation site  osteomyelitis and cellulitis associated   with Diabetes  -- left second toe amputation site  osteomyelitis and cellulitis unrelated to   Diabetes  -- Other - I will add my own diagnosis  -- Disagree - Not applicable / Not valid  -- Disagree - Clinically unable to determine / Unknown  -- Refer to Clinical Documentation Reviewer    PROVIDER RESPONSE TEXT:    left second toe amputation site osteomyelitis and cellulitis associated with   Diabetes. Query created by:  Jade Hess on 7/1/2022 9:16 AM      Electronically signed by:  Castillo Simon 7/1/2022 10:46 AM

## 2022-07-01 NOTE — DISCHARGE SUMMARY
89 [facility]     Department of Internal Medicine - Staff Internal Medicine Teaching Service    INPATIENT DISCHARGE SUMMARY      Patient Identification:  Rodolfo Hernandez is a 78 y.o. male. :  1955  MRN: [id number]     Acct: [de-identified]   PCP: Marisol Vera DO  Admit Date:  2022  Discharge date and time: No discharge date for patient encounter. Attending Provider: Sandy Storey, [address] Problem Lists:  Principal Problem:    Osteomyelitis [facility])  Active Problems:    Diabetic polyneuropathy associated with type 2 diabetes mellitus ([facility]    Cellulitis    Atherosclerosis of native arteries of left leg with ulceration of other part of foot (HCC)    Normocytic normochromic anemia    Surgical wound, non healing    Diabetic foot infection ([facility]    Diabetic foot ulcer with osteomyelitis (HCC)    PVD (peripheral vascular disease) ([facility]    [facility]    Diabetes mellitus type 2 with peripheral artery disease ([facility]  Resolved Problems:    * No resolved hospital problems. [facility] STAY     Brief Inpatient course:   Rodolfo Hernandez is a 78 y.o. male who was admitted for the management of Osteomyelitis [facility]), presented to the emergency department with nonhealing wound of left lower second toe amputation site. He was sent from podiatry office. He underwent debridement. X-ray also suggestive of soft tissue edema and emphysema, likely secondary to debridement. Also concern of osteomyelitis in second metatarsal head. Started on IV cefepime and vancomycin on . Vascular surgery consulted, recommend no active surgical intervention. Followed by podiatry, wound cultures reveal gram positive cocci, will monitor clinically outpatient. Plan for arteriogram outpatient. Followed by Infectious diseases and placed on Doxycycline for gram positive wound culture.   Patient was managed for the following medical problems:  . Nonhealing wound on left second toe amputation site associated with osteomyelitis and cellulitis    -Arteriogram planned outpatient, tibial disease suspected. -Got vancomycin and cefepime as inpatient  -Discharged on Doxycycline     Peripheral vascular disease  -Arteriogram planned on 7/7/2022 outpatient     Type 2 diabetes mellitus associated with diabetic neuropathy  -resumed on home medications     Hx of CAD s/p CABG x3 with LIMA to LAD, SVG to diagonal, SVG to RCA in March 2020  Continue aspirin, statin, Coreg 6.25 mg twice daily     Ischemic cardiomyopathy  S/p AICD  Recent EF 40%. On lasix 80 mg daily at home      Permanent A. fib  On chronic warfarin therapy, target INR 2-3  Continue coreg      Hypertension  Continue coreg 6.25mg twice daily.    Amlodipine added because of high blood pressure.     Hyperlipidemia  Continue Lipitor 40 mg nightly     CKD stage IIIb  Baseline 1.5-1.7. On Lasix 80 mg at home.     Obesity  Further contributes and delayed recovery of the wound    Procedures/ Significant Interventions:        Consults:   Vascular surgery  Podiatry  Infectious diseases  Consults:     Final Specialist Recommendations/Findings:   IP CONSULT TO PODIATRY  IP CONSULT TO VASCULAR SURGERY  IP CONSULT TO INTERNAL MEDICINE  IP CONSULT TO INFECTIOUS DISEASES  IP CONSULT TO CASE MANAGEMENT  PHARMACY TO DOSE WARFARIN  IP CONSULT TO IV TEAM      Any Hospital Acquired Infections: none    Discharge Functional Status:  stable    DISCHARGE PLAN     Disposition: home    Patient Instructions:   Current Discharge Medication List      CONTINUE these medications which have NOT CHANGED    Details   gabapentin (NEURONTIN) 100 MG capsule Take 1 capsule by mouth 3 times daily for 60 days.  Intended supply: 30 days  Qty: 90 capsule, Refills: 1      silver sulfADIAZINE (SILVADENE) 1 % cream APPLY DAILY TO SKIN TO AFFECTED AREA EVERY DAY FOR 30 DAYS      aspirin 81 MG EC tablet Take 1 tablet by mouth daily  Qty: 30 tablet, Refills: 3      isosorbide mononitrate (IMDUR) 60 MG extended release tablet Take 1 tablet by mouth daily  Qty: 30 tablet, Refills: 3      nitroGLYCERIN (NITROSTAT) 0.4 MG SL tablet up to max of 3 total doses. If no relief after 1 dose, call 911. Qty: 25 tablet, Refills: 3      glipiZIDE (GLUCOTROL) 5 MG tablet Take 1 tablet by mouth every morning (before breakfast)  Qty: 60 tablet, Refills: 3      hydrALAZINE (APRESOLINE) 50 MG tablet Take 1 tablet by mouth every 8 hours  Qty: 90 tablet, Refills: 3      benazepril (LOTENSIN) 5 MG tablet Take 1 tablet by mouth daily  Qty: 30 tablet, Refills: 3      furosemide (LASIX) 80 MG tablet Take 1 tablet by mouth daily  Qty: 60 tablet, Refills: 3      carvedilol (COREG) 12.5 MG tablet Take 1 tablet by mouth 2 times daily (with meals)  Qty: 60 tablet, Refills: 3      warfarin (COUMADIN) 2 MG tablet Take 4 mg by mouth daily Per [facility]      atorvastatin (LIPITOR) 40 MG tablet Take 40 mg by mouth daily. Activity: activity as tolerated    Diet: regular diet    Follow-up:    No follow-up provider specified. Patient Instructions: Take antibiotics as advised  Follow up with vascular, podiatry and infectious diseases  Follow up with PCP in one week time  Follow up labs:    Follow up imaging:     Note that over 30 minutes was spent in preparing discharge papers, discussing discharge with patient, medication review, etc.      Chris Anton MD, MD  Internal Medicine Resident, PGY-1  [address], [place].  7/1/2022, 1:50 PM

## 2022-07-01 NOTE — PLAN OF CARE
Problem: Discharge Planning  Goal: Discharge to home or other facility with appropriate resources  Outcome: Progressing  Flowsheets  Taken 6/30/2022 2151  Discharge to home or other facility with appropriate resources: Identify barriers to discharge with patient and caregiver  Taken 6/30/2022 2100  Discharge to home or other facility with appropriate resources: Identify barriers to discharge with patient and caregiver     Problem: Safety - Adult  Goal: Free from fall injury  Outcome: Progressing     Problem: ABCDS Injury Assessment  Goal: Absence of physical injury  Outcome: Progressing

## 2022-07-01 NOTE — PROGRESS NOTES
[facility]  Internal Medicine Teaching Residency Program  Inpatient Daily Progress Note  ______________________________________________________________________________    Patient: Maira Gannon  YOB: 1955   MGC:[id number]    Acct: [de-identified]     Room: [facility]-  Admit date: 6/30/2022  Today's date: 07/01/22  Number of days in the hospital: 1    SUBJECTIVE   Admitting Diagnosis: Osteomyelitis ([address]  CC: non healing left leg toe amputation site. Pt seen and examined bedside . No acute events overnight. Labs and charts reviewed. Afebrile, hemodynamically stable, saturating well on room air. He feels much better. Denies any active complaints. He is ambulating well and tolerating oral intake well. ROS:  Constitutional:  negative for chills, fevers, sweats  Respiratory:  negative for cough, dyspnea on exertion, hemoptysis, shortness of breath, wheezing  Cardiovascular:  negative for chest pain, chest pressure/discomfort, lower extremity edema, palpitations  Gastrointestinal:  negative for abdominal pain, constipation, diarrhea, nausea, vomiting  Neurological:  negative for dizziness, headache  BRIEF HISTORY     A 56-year-old male with PMH of CABG, type 2 diabetes mellitus, peripheral vascular disease s/p bilateral great toe amputation, presented with nonhealing wound of left lower second toe amputation site. He was sent from podiatry office. He underwent debridement. X-ray also suggestive of soft tissue edema and emphysema, likely secondary to debridement. Also concern of osteomyelitis in second metatarsal head. Started on IV cefepime and vancomycin. Vascular surgery consulted, recommend no active surgical intervention. Plan for arteriogram outpatient. Admitted inpatient for IV antibiotics at this point.     OBJECTIVE     Vital Signs:  /68   Pulse 87   Temp 99.1 °F (37.3 °C) (Temporal)   Resp 14   Ht 5' 10\" (1.778 m)   Wt 187 lb (84.8 kg)   SpO2 99%   BMI 26.83 kg/m²     Temp (24hrs), Av °F (36.7 °C), Min:97.3 °F (36.3 °C), Max:99.1 °F (37.3 °C)    In: 850   Out: 2500 [Urine:2500]    Physical Exam:  Constitutional: This is a well developed, well nourished, 25-29.9 - Overweight 78y.o. year old male who is alert, oriented, cooperative and in no apparent distress. Head:normocephalic and atraumatic. EENT:  PERRLA. No conjunctival injections. Septum was midline, mucosa was without erythema, exudates or cobblestoning. No thrush was noted. Neck: Supple without thyromegaly. No elevated JVP. Trachea was midline. Respiratory: Chest was symmetrical without dullness to percussion. Breath sounds bilaterally were clear to auscultation. There were no wheezes, rhonchi or rales. There is no intercostal retraction or use of accessory muscles. No egophony noted. Cardiovascular: Regular without murmur, clicks, gallops or rubs. Abdomen: Slightly rounded and soft without organomegaly. No rebound, rigidity or guarding was appreciated. Lymphatic: No lymphadenopathy. Musculoskeletal: Normal curvature of the spine. No gross muscle weakness. Extremities:  No lower extremity edema, ulcerations, tenderness, varicosities or erythema. Muscle size, tone and strength are normal.  No involuntary movements are noted. Posterior tibial and dorsalis pedis pulses are not much palpable. Left lower extremity is edematous and erythematous. Open wound is present on left lower extremity, draining Purulent malodorous discharge. Skin:  Warm and dry. Good color, turgor and pigmentation.   No cyanosis or clubbing  Neurological/Psychiatric: The patient's general behavior, level of consciousness, thought content and emotional status is normal.        Medications:  Scheduled Medications:    furosemide  80 mg Oral Daily    magnesium sulfate  1,000 mg IntraVENous Once    cefepime  2,000 mg IntraVENous Q12H    sodium hypochlorite   Irrigation Daily    vancomycin (VANCOCIN) intermittent dosing (placeholder)   Other RX Placeholder    vancomycin  1,250 mg IntraVENous Q24H    sodium chloride flush  5-40 mL IntraVENous 2 times per day    aspirin  81 mg Oral Daily    atorvastatin  40 mg Oral Daily    gabapentin  100 mg Oral TID    carvedilol  6.25 mg Oral BID WC    warfarin placeholder: dosing by pharmacy   Other RX Placeholder    insulin lispro  0-6 Units SubCUTAneous TID WC    insulin lispro  0-3 Units SubCUTAneous Nightly     Continuous Infusions:    sodium chloride      dextrose       PRN Medicationssodium chloride flush, 5-40 mL, PRN  sodium chloride, , PRN  ondansetron, 4 mg, Q8H PRN   Or  ondansetron, 4 mg, Q6H PRN  polyethylene glycol, 17 g, Daily PRN  acetaminophen, 650 mg, Q6H PRN   Or  acetaminophen, 650 mg, Q6H PRN  glucose, 4 tablet, PRN  dextrose bolus, 125 mL, PRN   Or  dextrose bolus, 250 mL, PRN  glucagon (rDNA), 1 mg, PRN  dextrose, 100 mL/hr, PRN        Diagnostic Labs:  CBC:   Recent Labs     06/30/22  1219 07/01/22  0550   WBC 8.5 6.0   RBC 3.31* 3.11*   HGB 9.3* 8.8*   HCT 29.3* 28.0*   MCV 88.5 90.0   RDW 15.9* 15.9*    240     BMP:   Recent Labs     06/30/22  1319 07/01/22  0550    136   K 3.9 3.9   CL 99 101   CO2 25 24   BUN 29* 26*   CREATININE 1.66* 1.60*     BNP: No results for input(s): BNP in the last 72 hours. PT/INR:   Recent Labs     06/30/22  1219 07/01/22  0550   PROTIME 20.0* 20.1*   INR 2.0 2.0     APTT:   Recent Labs     06/30/22 1219   APTT 34.1*     CARDIAC ENZYMES: No results for input(s): CKMB, CKMBINDEX, TROPONINI in the last 72 hours. Invalid input(s): CKTOTAL;3  FASTING LIPID PANEL:  Lab Results   Component Value Date    CHOL 88 06/30/2021    HDL 27 (L) 06/30/2021    TRIG 141 06/30/2021     LIVER PROFILE: No results for input(s): AST, ALT, ALB, BILIDIR, BILITOT, ALKPHOS in the last 72 hours.    MICROBIOLOGY:   Lab Results   Component Value Date/Time    CULTURE NO GROWTH 16 HOURS 06/30/2022 01:15 PM       Imaging:    XR FOOT LEFT (MIN 3 VIEWS)    Result Date: 6/30/2022  1. Soft tissue edema and emphysema. 2. Osseous erosion/destruction in the 2nd metatarsal head, concerning for osteomyelitis. 3. Age-indeterminate fracture of the base of the 3rd digit proximal phalanx. ASSESSMENT & PLAN     IMPRESSION  This is a 78 y.o. male with PMH significant of MV-CAD, PVD, type II DM, HTN, HLD, recent second toe amputation, who presented with nonhealing of second toe amputation site. He is found to have features concerning of osteomyelitis and cellulitis. Patient admitted to inpatient status to evaluate and manage the same.     Principal Problem:    Osteomyelitis ([address]  Active Problems:    Cellulitis    Diabetic polyneuropathy associated with type 2 diabetes mellitus ([address]    Atherosclerosis of native arteries of left leg with ulceration of other part of foot (HCC)    Diabetic foot infection (HCC)    Diabetic foot ulcer with osteomyelitis (HCC)    PVD (peripheral vascular disease) ([address]    Cardiomyopathy ([address]    Diabetes mellitus type 2 with peripheral artery disease ([address]  Resolved Problems:    * No resolved hospital problems. *     Nonhealing wound on left second toe amputation site associated with osteomyelitis and cellulitis  -No acute surgical intervention planned by podiatry or vascular surgery. -Arteriogram planned outpatient, tibial disease suspected. -Continue vancomycin and cefepime. .  -We will follow up on cultures. -ID on board, appreciate recommendations.  -Local wound care daily.     Peripheral vascular disease  -Arteriogram planned on 7/7/2022 outpatient     Type 2 diabetes mellitus associated with diabetic neuropathy  -On glipizide 5 Mg at home. Will hold. Will start on medium dose sliding scale insulin. Hypoglycemia protocol in place.   POC glucose checks.  -Hemoglobin A1c 6.8     CAD   s/p CABG x3 with LIMA to LAD, SVG to diagonal, SVG to RCA in March 2020  Continue ''''''''''       MD EMPERATRIZ Cardoza [address], [address] [place] [address].    Phone [phone]   Fax: [phone]  Answering Service: [phone]

## 2022-07-01 NOTE — DISCHARGE INSTR - COC
Continuity of Care Form    Patient Name: Shalonda Mckeon   :  1955  MRN:  [id number]    6 [facility] date:  2022  Discharge date:  ***    Code Status Order: Full Code   Advance Directives:      Admitting Physician:  Winston Avendano MD  PCP: Brianda Ingram DO    Discharging Nurse: [facility] Unit/Room#: 2316/4861-78  Discharging Unit Phone Number: ***    Emergency Contact:   Extended Emergency Contact Information  Primary Emergency Contact: GaboPat  Address: [address] [facility] Phone: [phone]  Relation: Parent    Past Surgical History:  Past Surgical History:   Procedure Laterality Date    CARDIAC SURGERY      CABG X3    COLONOSCOPY      DEBRIDEMENT Right 2015    DEBRIDEMENT WOUND FOOT RIGHT W/ APPLICATION BILAT INTEG RAT GRAFT    ENDOSCOPY, COLON, DIAGNOSTIC      EYE SURGERY Bilateral     cataracts    HERNIA REPAIR      umbilical    KNEE ARTHROSCOPY Right     OTHER SURGICAL HISTORY Right 12 29 15    wound care graft procedure foot,appl of integra    PACEMAKER PLACEMENT      WITH DEFIBRILLATOR    TOE AMPUTATION Right     R great    TOE AMPUTATION Left 3/13/2022    TOE AMPUTATION--left 2nd digit performed by Shama Bishop DPM at [facility] OR       Immunization History: There is no immunization history on file for this patient.     Active Problems:  Patient Active Problem List   Diagnosis Code    Diabetic foot infection ([address] E11.628, L08.9    Diabetic foot ulcer with osteomyelitis ([address] E11.621, E11.69, L97.509, M86.9    Chronic osteomyelitis ([address] M86.60    Diabetic foot ulcer ([address] E11.621, L97.509    PVD (peripheral vascular disease) ([address] I73.9    Atherosclerosis of coronary artery without angina pectoris I25.10    Cardiomyopathy ([address] I42.9    Cardiac left ventricular ejection fraction 21-40 percent R94.30    Diabetes mellitus type 2 with peripheral artery disease ([address] E11.51    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 ([address] L97.514    Chronic osteomyelitis of left foot ([facility]) M86.672    Onychomycosis B35.1    Heart failure ([facility]) I50.9    MRSA bacteremia R78.81, B95.62    Gangrene of toe of left foot ([facility]) I96    Elevated C-reactive protein (CRP) R79.82    Elevated erythrocyte sedimentation rate R70.0    Acute kidney injury ([facility] N17.9    Allergy to penicillin Z88.0    Ulcer of left foot, with necrosis of bone ([facility]) L97.524    Acute osteomyelitis of left foot ([facility]) M86.172    Cellulitis of left foot L03.116    Abscess of bursa of left foot M71.072    Diabetic polyneuropathy associated with type 2 diabetes mellitus ([facility]) E11.42    Osteomyelitis ([facility]) M86.9    Cellulitis L03.90    Atherosclerosis of native arteries of left leg with ulceration of other part of foot ([facility]) I70.245    Normocytic normochromic anemia D64.9    Surgical wound, non healing T81.89XA       Isolation/Infection:   Isolation            Contact          Patient Infection Status       Infection Onset Added Last Indicated Last Indicated By Review Planned Expiration Resolved Resolved By    MRSA  09/15/15 04/28/22 Culture, Anaerobic and Aerobic        Foot 5/2022              Nurse Assessment:  Last Vital Signs: BP (!) 156/80   Pulse 99   Temp 97.5 °F (36.4 °C) (Temporal)   Resp 16   Ht 5' 10\" (1.778 m)   Wt 187 lb (84.8 kg)   SpO2 99%   BMI 26.83 kg/m²     Last documented pain score (0-10 scale): Pain Level:  (no)  Last Weight:   Wt Readings from Last 1 Encounters:   06/30/22 187 lb (84.8 kg)     Mental Status:  {IP PT MENTAL STATUS:20030}    IV Access:  {[facility] IV ACCESS:611773511}    Nursing Mobility/ADLs:  Walking   {[facility] DME UTIQ:286431945}  Transfer  {[facility] DME YRZI:811340054}  Bathing  {[facility] JPJI:472086407}  Dressing  {[facility] DME PGZX:177065544}  Toileting  {[facility] WMLX:871952425}  Feeding  {[facility] TBOX:476837174}  Med Admin  {[facility] DME CRFB:096885137}  Med Delivery   {[facility] MED Delivery:147106105}    Wound Care Documentation and Therapy:  Incision 09/09/15 Foot Right (Active)   Number of days: 2486       Incision 09/09/15 Toe (Comment  which one) Right;Upper (Active)   Number of days: 2486       Incision 11/19/15 Foot Right (Active)   Number of days: 2416       Incision 12/29/15 Foot Right;Distal (Active)   Number of days: 0053       Wound 06/30/22 Toe (Comment  which one) Anterior; Left wound #1 second toe (Active)   Wound Image   06/30/22 0931   Wound Etiology Diabetic 07/01/22 1100   Dressing Status New dressing applied 07/01/22 1100   Wound Cleansed Soap and water 07/01/22 1100   Dressing/Treatment Iodoform gauze; Roll gauze; Ace wrap;Pharmaceutical agent (see MAR) 07/01/22 1100   Wound Length (cm) 0.2 cm 06/30/22 0931   Wound Width (cm) 0.7 cm 06/30/22 0931   Wound Depth (cm) 0.3 cm 06/30/22 0931   Wound Surface Area (cm^2) 0.14 cm^2 06/30/22 0931   Wound Volume (cm^3) 0.042 cm^3 06/30/22 0931   Post-Procedure Length (cm) 0.2 cm 06/30/22 0931   Post-Procedure Width (cm) 0.7 cm 06/30/22 0931   Post-Procedure Depth (cm) 2.3 cm 06/30/22 0931   Post-Procedure Surface Area (cm^2) 0.14 cm^2 06/30/22 0931   Post-Procedure Volume (cm^3) 0.322 cm^3 06/30/22 0931   Wound Assessment Pink/red 07/01/22 1100   Drainage Amount Moderate 07/01/22 1100   Drainage Description Serosanguinous 07/01/22 1100   Odor Mild 07/01/22 1100   Keturah-wound Assessment Fragile; Excoriated;Ecchymosis 07/01/22 1100   Margins Unattached edges 06/30/22 0931   Number of days: 1       Wound 06/30/22 Anterior;Left;Lateral Wound #2 (Active)   Wound Image   06/30/22 0931   Wound Etiology Diabetic 07/01/22 1100   Dressing Status New dressing applied 07/01/22 1100   Wound Cleansed Soap and water 07/01/22 1100   Dressing/Treatment Ace wrap; Iodoform gauze; Pharmaceutical agent (see MAR); Roll gauze 07/01/22 1100   Wound Length (cm) 0.4 cm 06/30/22 0931   Wound Width (cm) 0.5 cm 06/30/22 0931   Wound Depth (cm) 0.3 cm 06/30/22 0931   Wound Surface Area (cm^2) 0.2 cm^2 06/30/22 0931   Wound Volume (cm^3) 0.06 cm^3 06/30/22    Post-Procedure Length (cm) 0.4 cm 22   Post-Procedure Width (cm) 0.5 cm 22   Post-Procedure Depth (cm) 0.3 cm 22   Post-Procedure Surface Area (cm^2) 0.2 cm^2 22   Post-Procedure Volume (cm^3) 0.06 cm^3 2231   Wound Assessment Pink/red 22 1100   Drainage Amount Moderate 22 1100   Drainage Description Serosanguinous 22 1100   Odor Mild 22 1100   Keturah-wound Assessment Ecchymosis; Excoriated;Fragile 22 1100   Number of days: 1        Elimination:  Continence: Bowel: {YES / BR:04395}  Bladder: {YES / OR:34501}  Urinary Catheter: {Urinary Catheter:642984798}   Colostomy/Ileostomy/Ileal Conduit: {YES / AO:92729}       Date of Last BM: ***    Intake/Output Summary (Last 24 hours) at 2022 1404  Last data filed at 2022 0000  Gross per 24 hour   Intake 850 ml   Output 2500 ml   Net -1650 ml     I/O last 3 completed shifts:   In: 65 [P.O.:600; IV Piggyback:250]  Out: 2500 [Urine:2500]    Safety Concerns:     [address] [employer] Safety Concerns:542141797}    Impairments/Disabilities:      [address] [employer] Impairments/Disabilities:340131204}    Nutrition Therapy:  Current Nutrition Therapy:   [address] [employer] Diet List:325394584}    Routes of Feeding: {CHP DME Other Feedings:119071526}  Liquids: {Slp liquid thickness:03678}  Daily Fluid Restriction: {CHP DME Yes amt example:458677999}  Last Modified Barium Swallow with Video (Video Swallowing Test): {Done Not Done TJ:758781377}    Treatments at the Time of Hospital Discharge:   Respiratory Treatments: ***  Oxygen Therapy:  {Therapy; copd oxygen:06507}  Ventilator:    { CC Vent REYMUNDO:336015964}    Rehab Therapies: {THERAPEUTIC INTERVENTION:4852081681}  Weight Bearing Status/Restrictions: [address] [employer] [employer]  Weight Bearin}  Other Medical Equipment (for information only, NOT a DME order):  {EQUIPMENT:526703635}  Other Treatments: ***    Patient's personal belongings (please select all that are sent with patient):  {CHP DME Belongings:304826433}    RN SIGNATURE:  {Esignature:679311434}    CASE MANAGEMENT/SOCIAL WORK SECTION    Inpatient Status Date: ***    Readmission Risk Assessment Score:  Readmission Risk              Risk of Unplanned Readmission:  16           Discharging to Facility/ Agency   Name:   Address:  Phone:  Fax:    Dialysis Facility (if applicable)   Name:  Address:  Dialysis Schedule:  Phone:  Fax:    / signature: {Esignature:748384072}    PHYSICIAN SECTION    Prognosis: {Prognosis:9331944913}    Condition at Discharge: [address] [place] [address] Patient Condition:115664048}    Rehab Potential (if transferring to Rehab): {Prognosis:8290759917}    Recommended Labs or Other Treatments After Discharge: ***    Physician Certification: I certify the above information and transfer of Pasha Campbell  is necessary for the continuing treatment of the diagnosis listed and that he requires {Admit to Appropriate Level of Care:69628} for {GREATER/LESS:415626920} 30 days.      Update Admission H&P: {CHP DME Changes in NTYZM:358962294}    PHYSICIAN SIGNATURE:  {Esignature:255969023}

## 2022-07-01 NOTE — H&P
[address]     Department of Internal Medicine - Staff Internal Medicine Teaching Service          ADMISSION NOTE/HISTORY AND PHYSICAL EXAMINATION   Date: 7/1/2022  Patient Name: Zena Saez  Date of admission: 6/30/2022 11:45 AM  YOB: 1955  PCP: Miriam Ott DO  History Obtained From:  patient, electronic medical record    CHIEF COMPLAINT     Chief complaint: non healing left leg wound    HISTORY OF PRESENTING ILLNESS     The patient is a pleasant 78 y.o. male with PMH significant of  -MV CAD s/p CABG  -Ischemic cardiomyopathy s/p AICD in place.   -Type 2 diabetes mellitus with neuropathy  -Peripheral vascular disease  -S/p bilateral great big toes amputation  -CKD stage IIIb (baseline creatinine 1.5-1.7)  -A. fib (on warfarin)  -HTN, HLD    is sent from the podiatry clinic for the evaluation of nonhealing left lower extremity wound. He underwent left second digit amputation in March 2022 and was treated with antibiotics. But the wound never completely healed. He underwent multiple debridement procedures, but has persistent redness, purulent and malodorous discharge from the wound. Therefore, he was sent to [facility] for initiation of IV antibiotics, ID consult and vascular surgery consult. Vascular surgery was consulted, recommended no active surgical intervention, to follow-up outpatient for elective arteriogram of left lower extremity. Lower extremity arterial Doppler in March 2022 showed dampened digital waveforms significant of bilateral microvascular disease    Today, x-ray left foot showed soft tissue edema and emphysema and osseous destruction second metatarsal head concerning for osteomyelitis. Review of Systems   Constitutional: Positive for activity change, diaphoresis and fatigue. Negative for appetite change, chills, fever and unexpected weight change. HENT: Negative.   Negative for congestion, facial swelling, hearing loss, cataracts    HERNIA REPAIR      umbilical    KNEE ARTHROSCOPY Right     OTHER SURGICAL HISTORY Right 12 29 15    wound care graft procedure foot,appl of integra    PACEMAKER PLACEMENT  2011    WITH DEFIBRILLATOR    TOE AMPUTATION Right     R great    TOE AMPUTATION Left 3/13/2022    TOE AMPUTATION--left 2nd digit performed by Shama Bishop DPM at [address]     Doxycycline, Pcn [penicillins], and Penicillin g    MEDICATIONS PRIOR TO ADMISSION     Prior to Admission medications    Medication Sig Start Date End Date Taking? Authorizing Provider   gabapentin (NEURONTIN) 100 MG capsule Take 1 capsule by mouth 3 times daily for 60 days. Intended supply: 30 days 6/7/22 8/6/22  Suzanne Casey DPM   silver sulfADIAZINE (SILVADENE) 1 % cream APPLY DAILY TO SKIN TO AFFECTED AREA EVERY DAY FOR 30 DAYS 5/19/22   Historical Provider, MD   aspirin 81 MG EC tablet Take 1 tablet by mouth daily 3/15/22   Narda Bullock, DO   isosorbide mononitrate (IMDUR) 60 MG extended release tablet Take 1 tablet by mouth daily 3/15/22   Lucas ORDOÑEZ Satya, DO   nitroGLYCERIN (NITROSTAT) 0.4 MG SL tablet up to max of 3 total doses.  If no relief after 1 dose, call 911. 3/15/22   Lucas ORDOÑEZ Satya, DO   glipiZIDE (GLUCOTROL) 5 MG tablet Take 1 tablet by mouth every morning (before breakfast) 3/16/22   Narda Bullock, DO   hydrALAZINE (APRESOLINE) 50 MG tablet Take 1 tablet by mouth every 8 hours 3/15/22   Zena Colder T Satya, DO   benazepril (LOTENSIN) 5 MG tablet Take 1 tablet by mouth daily 3/15/22   Narda Bullock, DO   furosemide (LASIX) 80 MG tablet Take 1 tablet by mouth daily 3/15/22   Lucas T Satya, DO   carvedilol (COREG) 12.5 MG tablet Take 1 tablet by mouth 2 times daily (with meals)  Patient taking differently: Take 6.25 mg by mouth 2 times daily (with meals)  2/5/21   Narda Bullock, DO   warfarin (COUMADIN) 2 MG tablet Take 4 mg by mouth daily Per [facility] 6/23/20   Historical Provider, MD   atorvastatin (LIPITOR) sounds: Normal heart sounds. No murmur heard. No friction rub. No gallop. Pulmonary:      Effort: Pulmonary effort is normal. No respiratory distress. Breath sounds: Normal breath sounds. No stridor. No wheezing, rhonchi or rales. Chest:      Chest wall: No tenderness. Abdominal:      General: Bowel sounds are normal. There is no distension. Palpations: Abdomen is soft. There is no mass. Tenderness: There is no abdominal tenderness. There is no right CVA tenderness, left CVA tenderness, guarding or rebound. Hernia: No hernia is present. Musculoskeletal:         General: No swelling, tenderness, deformity or signs of injury. Normal range of motion. Cervical back: Normal range of motion and neck supple. No rigidity or tenderness. Right lower leg: No edema. Left lower leg: No edema. Comments: Posterior tibial and dorsalis pedis pulses are not much palpable. Left lower extremity is edematous and erythematous. Open wound is present on left lower extremity, draining Purulent malodorous discharge. Lymphadenopathy:      Cervical: No cervical adenopathy. Skin:     General: Skin is warm and dry. Capillary Refill: Capillary refill takes less than 2 seconds. Coloration: Skin is not jaundiced or pale. Findings: No bruising, erythema, lesion or rash. Neurological:      General: No focal deficit present. Mental Status: He is alert and oriented to person, place, and time. Mental status is at baseline. Cranial Nerves: No cranial nerve deficit. Sensory: No sensory deficit. Motor: No weakness. Coordination: Coordination normal.      Gait: Gait normal.      Deep Tendon Reflexes: Reflexes normal.   Psychiatric:         Mood and Affect: Mood normal.         Behavior: Behavior normal.         Thought Content:  Thought content normal.         Judgment: Judgment normal.         INVESTIGATIONS     Laboratory Testing:     Recent Results (from the past 24 hour(s))   Culture, Anaerobic and Aerobic    Collection Time: 06/30/22 11:14 AM    Specimen: Tissue   Result Value Ref Range    Specimen Description . TISSUE L FOOT     Direct Exam NO NEUTROPHILS SEEN     Direct Exam MODERATE GRAM POSITIVE COCCI IN PAIRS (A)     Culture PENDING    Culture, Anaerobic and Aerobic    Collection Time: 06/30/22 11:14 AM    Specimen: Bone   Result Value Ref Range    Specimen Description . BONE L FOOT     Direct Exam RARE NEUTROPHILS (A)     Direct Exam RARE GRAM POSITIVE COCCI IN PAIRS (A)     Culture PENDING    CBC with Auto Differential    Collection Time: 06/30/22 12:19 PM   Result Value Ref Range    WBC 8.5 3.5 - 11.3 k/uL    RBC 3.31 (L) 4.21 - 5.77 m/uL    Hemoglobin 9.3 (L) 13.0 - 17.0 g/dL    Hematocrit 29.3 (L) 40.7 - 50.3 %    MCV 88.5 82.6 - 102.9 fL    MCH 28.1 25.2 - 33.5 pg    MCHC 31.7 28.4 - 34.8 g/dL    RDW 15.9 (H) 11.8 - 14.4 %    Platelets 418 159 - 382 k/uL    MPV 10.2 8.1 - 13.5 fL    NRBC Automated 0.0 0.0 per 100 WBC    Seg Neutrophils 80 (H) 36 - 65 %    Lymphocytes 9 (L) 24 - 43 %    Monocytes 8 3 - 12 %    Eosinophils % 1 1 - 4 %    Basophils 1 0 - 2 %    Immature Granulocytes 1 (H) 0 %    Segs Absolute 6.91 1.50 - 8.10 k/uL    Absolute Lymph # 0.72 (L) 1.10 - 3.70 k/uL    Absolute Mono # 0.64 0.10 - 1.20 k/uL    Absolute Eos # 0.09 0.00 - 0.44 k/uL    Basophils Absolute 0.07 0.00 - 0.20 k/uL    Absolute Immature Granulocyte 0.04 0.00 - 0.30 k/uL    RBC Morphology ANISOCYTOSIS PRESENT    APTT    Collection Time: 06/30/22 12:19 PM   Result Value Ref Range    PTT 34.1 (H) 20.5 - 30.5 sec   Protime-INR    Collection Time: 06/30/22 12:19 PM   Result Value Ref Range    Protime 20.0 (H) 9.1 - 12.3 sec    INR 2.0    Sedimentation Rate    Collection Time: 06/30/22 12:19 PM   Result Value Ref Range    Sed Rate 97 (H) 0 - 20 mm/Hr   SPECIMEN REJECTION    Collection Time: 06/30/22 12:19 PM   Result Value Ref Range    Specimen Source . BLOOD     Ordered Test BMPX CRP Reason for Rejection Unable to perform testing: Specimen hemolyzed. Culture, Blood 1    Collection Time: 06/30/22  1:00 PM    Specimen: Blood   Result Value Ref Range    Specimen Description . BLOOD     Special Requests L AC 20ML     Culture NO GROWTH <24 HRS    Culture, Blood 1    Collection Time: 06/30/22  1:15 PM    Specimen: Blood   Result Value Ref Range    Specimen Description . BLOOD     Special Requests L HAND 20MLIDE     Culture NO GROWTH <24 HRS    Basic Metabolic Panel w/ Reflex to MG    Collection Time: 06/30/22  1:19 PM   Result Value Ref Range    Glucose 153 (H) 70 - 99 mg/dL    BUN 29 (H) 8 - 23 mg/dL    CREATININE 1.66 (H) 0.70 - 1.20 mg/dL    Calcium 10.0 8.6 - 10.4 mg/dL    Sodium 137 135 - 144 mmol/L    Potassium 3.9 3.7 - 5.3 mmol/L    Chloride 99 98 - 107 mmol/L    CO2 25 20 - 31 mmol/L    Anion Gap 13 9 - 17 mmol/L    GFR Non-African American 42 (L) >60 mL/min    GFR African American 50 (L) >60 mL/min    GFR Comment         C-Reactive Protein    Collection Time: 06/30/22  1:19 PM   Result Value Ref Range    CRP 36.7 (H) 0.0 - 5.0 mg/L   Magnesium    Collection Time: 06/30/22  1:19 PM   Result Value Ref Range    Magnesium 1.9 1.6 - 2.6 mg/dL   Lactate, Sepsis    Collection Time: 06/30/22  8:19 PM   Result Value Ref Range    Lactic Acid, Sepsis, Whole Blood 1.1 0.5 - 1.9 mmol/L   Lactate, Sepsis    Collection Time: 06/30/22 10:11 PM   Result Value Ref Range    Lactic Acid, Sepsis, Whole Blood 1.5 0.5 - 1.9 mmol/L   POC Glucose Fingerstick    Collection Time: 06/30/22 11:11 PM   Result Value Ref Range    POC Glucose 169 (H) 75 - 110 mg/dL       Imaging:   XR FOOT LEFT (MIN 3 VIEWS)    Result Date: 6/30/2022  1. Soft tissue edema and emphysema. 2. Osseous erosion/destruction in the 2nd metatarsal head, concerning for osteomyelitis. 3. Age-indeterminate fracture of the base of the 3rd digit proximal phalanx.        ASSESSMENT & PLAN     ASSESSMENT / PLAN:     IMPRESSION  This is a 78 y.o. male with PMH significant of MV-CAD, PVD, type II DM, HTN, HLD, recent second toe amputation, who presented with nonhealing of second toe amputation site. He is found to have features concerning of osteomyelitis and cellulitis. Patient admitted to inpatient status to evaluate and manage the same. Principal Problem:    Osteomyelitis ([facility]  Active Problems:    Cellulitis    Diabetic polyneuropathy associated with type 2 diabetes mellitus ([facility]    Atherosclerosis of native arteries of left leg with ulceration of other part of foot (HCC)    Diabetic foot infection (HCC)    Diabetic foot ulcer with osteomyelitis (HCC)    PVD (peripheral vascular disease) ([facility]    Cardiomyopathy ([facility]    Diabetes mellitus type 2 with peripheral artery disease ([facility]  Resolved Problems:    * No resolved hospital problems. *      Principal Problem:    Nonhealing wound on left second toe amputation site associated with osteomyelitis and cellulitis  -No acute surgical intervention planned by podiatry or vascular surgery. -Arteriogram planned outpatient, tibial disease suspected. -Will start on vancomycin and cefepime.  -We will follow up on cultures. -ID on board, appreciate recommendations.  -Local wound care daily. Peripheral vascular disease  -Arteriogram planned on 7/7/2022 outpatient    Type 2 diabetes mellitus associated with diabetic neuropathy  -On glipizide 5 Mg at home. Will hold. Will start on medium dose sliding scale insulin. Hypoglycemia protocol in place. CAD   s/p CABG x3 with LIMA to LAD, SVG to diagonal, SVG to RCA in March 2020  Continue aspirin, statin, Coreg 6.25 mg twice daily    Ischemic cardiomyopathy  S/p AICD  Recent EF 40%. On lasix 80 mg daily at home     Permanent A. fib  On chronic warfarin therapy, target INR 2-3  Continue coreg     Hypertension  Continue coreg 6.25 MG twice daily. We will add amlodipine as appropriate.     Hyperlipidemia  Continue Lipitor 40 mg nightly    CKD stage IIIb  Baseline 1. 5-1.7. On Lasix 80 mg at home. Obesity  Further contributes and delayed recovery of the wound    DVT ppx:  -On warfarin    GI ppx:  -Not indicated    PT/OT/SW:  -Consulted    Discharge Planning:  - to assist in discharge planning. Likely discharge back home. Nitin Mclean MD  Internal Medicine Resident, PGY-1  [facility]; [place], [place]  6/30/2022, 9:22 PM    Attestation and add on       I have discussed the care of Mary Mercado , including pertinent history and exam findings,      6/30/22    with the resident. I have seen and examined the patient and the key elements of all parts of the encounter have been performed by me . I agree with the assessment, plan and orders as documented by the resident.     ---- ;     MD EMPERATRIZ Looney [address], [address] [place] [address].    Phone [phone]   Fax: [phone]  Answering Service: [phone]

## 2022-07-02 LAB
CULTURE: ABNORMAL
DIRECT EXAM: ABNORMAL
SPECIMEN DESCRIPTION: ABNORMAL
SPECIMEN DESCRIPTION: ABNORMAL

## 2022-07-03 NOTE — PROGRESS NOTES
CLINICAL PHARMACY NOTE: MEDS TO BEDS    Total # of Prescriptions Filled: 1   The following medications were delivered to the patient:  · linezolid    Additional Documentation:

## 2022-07-05 LAB
CULTURE: NORMAL
CULTURE: NORMAL
Lab: NORMAL
Lab: NORMAL
SPECIMEN DESCRIPTION: NORMAL
SPECIMEN DESCRIPTION: NORMAL

## 2022-07-07 ENCOUNTER — HOSPITAL ENCOUNTER (OUTPATIENT)
Age: 67
Setting detail: OUTPATIENT SURGERY
Discharge: HOME | End: 2022-07-07
Attending: SURGERY
Payer: MEDICARE

## 2022-07-07 LAB
GLUCOSE BLD-MCNC: 104 MG/DL (ref 75–110)
POC INR: 2.7
PROTHROMBIN TIME, POC: 31.4 SEC (ref 10.4–14.2)

## 2022-07-07 PROCEDURE — 85610 PROTHROMBIN TIME: CPT

## 2022-07-07 PROCEDURE — 82947 ASSAY GLUCOSE BLOOD QUANT: CPT

## 2022-07-07 PROCEDURE — 6360000004 HC RX CONTRAST MEDICATION

## 2022-07-07 RX ORDER — MIDAZOLAM HYDROCHLORIDE 1 MG/ML
INJECTION INTRAMUSCULAR; INTRAVENOUS
Status: DISCONTINUED
Start: 2022-07-07 | End: 2022-07-07 | Stop reason: HOSPADM

## 2022-07-07 RX ORDER — LIDOCAINE HYDROCHLORIDE 10 MG/ML
INJECTION, SOLUTION INFILTRATION; PERINEURAL
Status: DISCONTINUED
Start: 2022-07-07 | End: 2022-07-07 | Stop reason: WASHOUT

## 2022-07-07 RX ORDER — FENTANYL CITRATE 50 UG/ML
INJECTION, SOLUTION INTRAMUSCULAR; INTRAVENOUS
Status: DISCONTINUED
Start: 2022-07-07 | End: 2022-07-07 | Stop reason: HOSPADM

## 2022-07-07 RX ORDER — SODIUM CHLORIDE 9 MG/ML
INJECTION, SOLUTION INTRAVENOUS CONTINUOUS
Status: CANCELLED | OUTPATIENT
Start: 2022-07-07

## 2022-07-07 NOTE — PROGRESS NOTES
INR 2.7 Dr. Katelyn De Leon notified case cancelled office will call to reschedule and inform pt. When to stop coumadin.

## 2022-07-07 NOTE — H&P
Update History & Physical    The patient's History and Physical of 2022 was reviewed with the patient and I examined the patient. There was no change. The surgical site was confirmed by the patient and me. Gen:  A&Ox3, NAD  Chest: Symmetric rise with inhalation, no evidence of trauma  CVS: regular rate, a-fib  Resp: no stridor/wheeze    Plan: The risks, benefits, expected outcome, and alternative to the recommended procedure have been discussed with the patient. Patient understands and wants to proceed with the procedure. Electronically signed by Stephanie Bhandari DO on 2022 at 7:18 AM         [address] [facility]  [facility] [address]  Dept: [phone]      Patient: Psaha Campbell  : 1955  MRN: [id number]  DOS: 2022     Referring provider:  DIAMOND Palomino            HPI:  Pasha Campbell is a 78 y.o. male who comes to the office for the first time regarding left toe amputation site nonhealing wound. He had a first and second toe amputation approximately 3 months ago which is still having trouble with wound healing. He has been diabetic for approximately 30 years. He has diabetic neuropathy. He has been seen by vascular surgeon at least 10 years ago who had stated that there was nothing that can be done for his lower extremity arterial disease. He has not had recent arteriography and has had MONIQUE which is noncompressible. Digital waveforms are actually quite poor bilaterally. He does not smoke and quit sometime ago. He has had coronary artery disease for which she has had coronary artery bypass grafting quite a while back at least a decade ago. Ever since that procedure he explains that his leg has been swollen.   Past Medical History        Past Medical History:   Diagnosis Date    Atrial fibrillation ([address]      CAD (coronary artery disease)      CH[facility]est[facility] failure) [facility]      Diabetes mellitus ([facility]      Hyperlipidemia      Hypertension           Family History         Family History   Problem Relation Age of Onset    Cancer Father           pancreatic cancer    Other Brother           MI    Osteoarthritis Mother      Other Maternal Grandfather           MI         Social History               Socioeconomic History    Marital status: Single       Spouse name: Not on file    Number of children: Not on file    Years of education: Not on file    Highest education level: Not on file   Occupational History    Not on file   Tobacco Use    Smoking status: Never Smoker    Smokeless tobacco: Never Used   Substance and Sexual Activity    Alcohol use: Yes       Comment: SOCIAL on weekends    Drug use: No    Sexual activity: Not on file   Other Topics Concern    Not on file   Social History Narrative    Not on file      Social Determinants of Health          Financial Resource Strain:     Difficulty of Paying Living Expenses: Not on file   Food Insecurity:     Worried About Running Out of Food in the Last Year: Not on file    Tee of Food in the Last Year: Not on file   Transportation Needs:     Lack of Transportation (Medical): Not on file    Lack of Transportation (Non-Medical):  Not on file   Physical Activity:     Days of Exercise per Week: Not on file    Minutes of Exercise per Session: Not on file   Stress:     Feeling of Stress : Not on file   Social Connections:     Frequency of Communication with Friends and Family: Not on file    Frequency of Social Gatherings with Friends and Family: Not on file    Attends Confucianist Services: Not on file    Active Member of Clubs or Organizations: Not on file    Attends Club or Organization Meetings: Not on file    Marital Status: Not on file   Intimate Partner Violence:     Fear of Current or Ex-Partner: Not on file    Emotionally Abused: Not on file    Physically Abused: Not on file   [address] Sexually 96.9 °F (36.1 °C)   SpO2: 97%   Weight: 187 lb (84.8 kg)   Height: 5' 7\" (1.702 m)             Physical Exam  Constitutional:       General: He is not in acute distress. HENT:      Mouth/Throat:      Mouth: Mucous membranes are moist.      Pharynx: Oropharynx is clear. Eyes:      General: No scleral icterus. Extraocular Movements: Extraocular movements intact. Conjunctiva/sclera: Conjunctivae normal.   Neck:      Thyroid: No thyroid mass or thyromegaly. Cardiovascular:      Rate and Rhythm: Normal rate and regular rhythm. Heart sounds: No murmur heard. Comments: On examination he has palpable femoral and popliteal pulses bilaterally. He has a dorsalis pedis pulse on the right but not the left. There are no posterior tibial pulses in either lower extremity. His toe amputation site on the left is erythematous and still has an open wound. He does have edema in the left lower extremity which is mild to moderate. It is lymphatic in nature. He has no tenderness but he does have neuropathy which can alter his examination. He has no other ischemia. His heel is intact. He has no varicosities. Pulmonary:      Effort: No respiratory distress. Breath sounds: No rales. Abdominal:      General: There is no distension. Palpations: There is no mass. Tenderness: There is no abdominal tenderness. There is no guarding. Musculoskeletal:      Cervical back: No rigidity or tenderness. Lymphadenopathy:      Cervical: No cervical adenopathy. Skin:     Coloration: Skin is not jaundiced. Findings: No rash. Neurological:      General: No focal deficit present. Mental Status: He is alert and oriented to person, place, and time. Cranial Nerves: No cranial nerve deficit. Psychiatric:         Mood and Affect: Mood normal.            Assessment:  1. Atherosclerosis of native arteries of left leg with ulceration of other part of foot [facility])             Plan:   At this time I think his best option is arteriography of the left lower extremity via the right groin. I do not think his SFA and popliteal artery are problematic but I think he has significant tibial disease. If there is something that can be done it may be able to be identified and/or treated during this procedure. He understands that if nothing can be done I will back out and continue employ local wound care measures. He agrees to proceed and he understands the tenants of increasing the amount of oxygen delivery to the wound to get it to heal.  He will be seeing wound care in the near future and I think hyperbaric therapy is an option especially if we cannot revascularize. I think it can be utilized even if we do revascularized to accelerate wound healing. He also understands that there are risks to these procedures including not limited to bleeding, infection, hematoma, nerve damage, renal insufficiency or failure, limb loss, stroke, heart attack and death. We will be seeing him in the operating room for left lower extremity arteriography via the right groin in the near future.

## 2022-07-08 ENCOUNTER — HOSPITAL ENCOUNTER (OUTPATIENT)
Dept: WOUND CARE | Age: 67
Discharge: HOME | End: 2022-07-08
Payer: MEDICARE

## 2022-07-08 VITALS
BODY MASS INDEX: 26.77 KG/M2 | SYSTOLIC BLOOD PRESSURE: 168 MMHG | WEIGHT: 187 LBS | RESPIRATION RATE: 16 BRPM | DIASTOLIC BLOOD PRESSURE: 77 MMHG | HEART RATE: 87 BPM | HEIGHT: 70 IN | TEMPERATURE: 97.1 F

## 2022-07-08 DIAGNOSIS — L97.524 ULCER OF LEFT FOOT, WITH NECROSIS OF BONE (HCC): Primary | ICD-10-CM

## 2022-07-08 PROCEDURE — 11044 DBRDMT BONE 1ST 20 SQ CM/<: CPT

## 2022-07-08 RX ORDER — ALBUMIN, HUMAN INJ 5% 5 %
SOLUTION INTRAVENOUS
Status: DISCONTINUED
Start: 2022-07-08 | End: 2022-07-08 | Stop reason: HOSPADM

## 2022-07-08 RX ORDER — PROPOFOL 10 MG/ML
INJECTION, EMULSION INTRAVENOUS
Status: DISCONTINUED
Start: 2022-07-08 | End: 2022-07-08 | Stop reason: HOSPADM

## 2022-07-08 RX ORDER — PROTAMINE SULFATE 10 MG/ML
INJECTION, SOLUTION INTRAVENOUS
Status: DISCONTINUED
Start: 2022-07-08 | End: 2022-07-08 | Stop reason: HOSPADM

## 2022-07-08 RX ORDER — BETAMETHASONE DIPROPIONATE 0.05 %
OINTMENT (GRAM) TOPICAL ONCE
Status: CANCELLED | OUTPATIENT
Start: 2022-07-08 | End: 2022-07-08

## 2022-07-08 RX ORDER — LIDOCAINE HYDROCHLORIDE 40 MG/ML
SOLUTION TOPICAL ONCE
Status: CANCELLED | OUTPATIENT
Start: 2022-07-08 | End: 2022-07-08

## 2022-07-08 RX ORDER — GENTAMICIN SULFATE 1 MG/G
OINTMENT TOPICAL ONCE
Status: CANCELLED | OUTPATIENT
Start: 2022-07-08 | End: 2022-07-08

## 2022-07-08 RX ORDER — LIDOCAINE HYDROCHLORIDE 20 MG/ML
JELLY TOPICAL ONCE
Status: CANCELLED | OUTPATIENT
Start: 2022-07-08 | End: 2022-07-08

## 2022-07-08 RX ORDER — BACITRACIN, NEOMYCIN, POLYMYXIN B 400; 3.5; 5 [USP'U]/G; MG/G; [USP'U]/G
OINTMENT TOPICAL ONCE
Status: CANCELLED | OUTPATIENT
Start: 2022-07-08 | End: 2022-07-08

## 2022-07-08 RX ORDER — GINSENG 100 MG
CAPSULE ORAL ONCE
Status: CANCELLED | OUTPATIENT
Start: 2022-07-08 | End: 2022-07-08

## 2022-07-08 RX ORDER — CLOBETASOL PROPIONATE 0.5 MG/G
OINTMENT TOPICAL ONCE
Status: CANCELLED | OUTPATIENT
Start: 2022-07-08 | End: 2022-07-08

## 2022-07-08 RX ORDER — SODIUM HYPOCHLORITE 1.25 MG/ML
SOLUTION TOPICAL
Qty: 1000 ML | Refills: 1 | Status: SHIPPED | OUTPATIENT
Start: 2022-07-08 | End: 2022-08-23

## 2022-07-08 RX ORDER — BACITRACIN ZINC AND POLYMYXIN B SULFATE 500; 1000 [USP'U]/G; [USP'U]/G
OINTMENT TOPICAL ONCE
Status: CANCELLED | OUTPATIENT
Start: 2022-07-08 | End: 2022-07-08

## 2022-07-08 RX ORDER — LIDOCAINE HYDROCHLORIDE 40 MG/ML
SOLUTION TOPICAL ONCE
Status: COMPLETED | OUTPATIENT
Start: 2022-07-08 | End: 2022-07-08

## 2022-07-08 RX ORDER — LIDOCAINE 40 MG/G
CREAM TOPICAL ONCE
Status: CANCELLED | OUTPATIENT
Start: 2022-07-08 | End: 2022-07-08

## 2022-07-08 RX ORDER — LIDOCAINE 50 MG/G
OINTMENT TOPICAL ONCE
Status: CANCELLED | OUTPATIENT
Start: 2022-07-08 | End: 2022-07-08

## 2022-07-08 RX ORDER — TRANEXAMIC ACID 10 MG/ML
INJECTION, SOLUTION INTRAVENOUS
Status: DISCONTINUED
Start: 2022-07-08 | End: 2022-07-08 | Stop reason: HOSPADM

## 2022-07-08 RX ADMIN — LIDOCAINE HYDROCHLORIDE 10 ML: 40 SOLUTION TOPICAL at 08:41

## 2022-07-08 ASSESSMENT — ENCOUNTER SYMPTOMS
NAUSEA: 0
DIARRHEA: 0
VOMITING: 0

## 2022-07-08 ASSESSMENT — PAIN SCALES - GENERAL: PAINLEVEL_OUTOF10: 0

## 2022-07-08 NOTE — PROGRESS NOTES
[address]   Progress Note and Procedure Note      [address] [place] RECORD NUMBER:  [id number]  AGE: 78 y.o. GENDER: male  : 1955  EPISODE DATE:  2022    Subjective:     Chief Complaint   Patient presents with    Wound Check     Left foot         HISTORY of PRESENT ILLNESS HPI     Charity Menchaca is a 78 y.o. male who presents today for wound/ulcer evaluation. Interval history: Trini Galindo was admitted to [facility] after the last visit and he was seen by infectious disease and vascular surgery. He had a planned angiogram however this was canceled due to elevated INR. He is awaiting a call today from their office to reschedule. He was discharged on p.o. Zyvox per ID recommendations 14 days. He has been dressing the wound at home daily with iodoform packing. No worsening sign of infection. History of Wound Context:  Trini Galindo presents for evaluation of left foot ulceration. He underwent second toe amputation in March. Has had a nonhealing wound since then. He presents today in slip on shoes. Has been working at [employer]. He saw vascular surgery recently who is planning for an angiogram.  Denies systemic sign of infection.     Ulcer Identification:  Ulcer Type: diabetic, non-healing surgical and neuropathic  Contributing Factors: diabetes, poor glucose control, chronic pressure, shear force, arterial insufficiency and decreased tissue oxygenation          PAST MEDICAL HISTORY        Diagnosis Date    Atrial fibrillation (HCC)     CAD (coronary artery disease)     [facility]estive [facility] failure[facility]     Diabetes mellitus ([facility]     Hyperlipidemia     Hypertension        PAST SURGICAL HISTORY    Past Surgical History:   Procedure Laterality Date    CARDIAC SURGERY      CABG X3    COLONOSCOPY      DEBRIDEMENT Right 2015    DEBRIDEMENT WOUND FOOT RIGHT W/ APPLICATION BILAT INTEG RAT GRAFT    ENDOSCOPY, COLON, DIAGNOSTIC      EYE SURGERY Bilateral     cataracts    HERNIA REPAIR      umbilical    KNEE ARTHROSCOPY Right     OTHER SURGICAL HISTORY Right 12 29 15    wound care graft procedure foot,appl of integra    PACEMAKER PLACEMENT  2011    WITH DEFIBRILLATOR    TOE AMPUTATION Right     R great    TOE AMPUTATION Left 3/13/2022    TOE AMPUTATION--left 2nd digit performed by Eliz Palacios DPM at [address] [place] [address] Pkw[address] HISTORY    Family History   Problem Relation Age of Onset    Cancer Father         pancreatic cancer    Other Brother         MI    Osteoarthritis Mother     Other Maternal Grandfather         MI       SOCIAL HISTORY    Social History     Tobacco Use    Smoking status: Never Smoker    Smokeless tobacco: Never Used   Vaping Use    Vaping Use: Not on file   Substance Use Topics    Alcohol use: Yes     Comment: SOCIAL on weekends    Drug use: No       ALLERGIES    Allergies   Allergen Reactions    Doxycycline Other (See Comments)     Skin peeling    Pcn [Penicillins] Rash    Penicillin G Rash       MEDICATIONS    Current Facility-Administered Medications on File Prior to Encounter   Medication Dose Route Frequency Provider Last Rate Last Admin    protamine 10 MG/ML injection             protamine 10 MG/ML injection             tranexamic acid-NaCl 1000-0.7 MG/100ML-% IVPB premix             albumin human 5 % IV solution             protamine 10 MG/ML injection             propofol 1000 MG/100ML injection              Current Outpatient Medications on File Prior to Encounter   Medication Sig Dispense Refill    linezolid (ZYVOX) 600 MG tablet Take 1 tablet by mouth every 12 hours for 28 doses 28 tablet 0    silver sulfADIAZINE (SILVADENE) 1 % cream APPLY DAILY TO SKIN TO AFFECTED AREA EVERY DAY FOR 30 DAYS      aspirin 81 MG EC tablet Take 1 tablet by mouth daily 30 tablet 3    nitroGLYCERIN (NITROSTAT) 0.4 MG SL tablet up to max of 3 total doses.  If no relief after 1 dose, call [phone]. 25 tablet 3    glipiZIDE (GLUCOTROL) 5 MG tablet Take 1 tablet by mouth every morning (before breakfast) 60 tablet 3    hydrALAZINE (APRESOLINE) 50 MG tablet Take 1 tablet by mouth every 8 hours 90 tablet 3    benazepril (LOTENSIN) 5 MG tablet Take 1 tablet by mouth daily 30 tablet 3    furosemide (LASIX) 80 MG tablet Take 1 tablet by mouth daily 60 tablet 3    carvedilol (COREG) 12.5 MG tablet Take 1 tablet by mouth 2 times daily (with meals) 60 tablet 3    warfarin (COUMADIN) 2 MG tablet Take 4 mg by mouth daily Per [facility]      atorvastatin (LIPITOR) 40 MG tablet Take 40 mg by mouth daily. REVIEW OF SYSTEMS    Review of Systems   Constitutional: Negative for chills and fever. Gastrointestinal: Negative for diarrhea, nausea and vomiting. Skin: Positive for wound. Objective:      BP (!) 168/77   Pulse 87   Temp 97.1 °F (36.2 °C) (Tympanic)   Resp 16   Ht 5' 10\" (1.778 m)   Wt 187 lb (84.8 kg)   BMI 26.83 kg/m²     Wt Readings from Last 3 Encounters:   07/08/22 187 lb (84.8 kg)   06/30/22 187 lb (84.8 kg)   06/30/22 187 lb (84.8 kg)       Physical Exam:  General:  Alert and oriented x3. In no acute distress. Lower Extremity Physical Exam:    Vascular: DP pulses are not palpable, Bilateral. PT pulses are not palpable, Bilateral. CFT <3 seconds to all digits, Bilateral.   No edema. Hair growth is absent to the level of the lower leg, Bilateral.     Neuro: Saph/sural/SP/DP/plantar sensation absent to light touch. Protective sensation is intact to 0/10 sites as tested with a 5.07g SWMF, Bilateral.     Musculoskeletal: EHL/FHL/GS/TA gross motor intact. Gross deformity is present, s/p multiple digit amputations bilateral foot. Dermatologic: Open wound present to left 2nd toe amputation site as documented in detail below. Wound base is fibrotic with a large amount of slough and bioburden. The bone remains exposed with necrosis. Upon debridement the bone is bleeding.   There is no erythema. There is no purulent drainage. There is no fluctuance or crepitus. Interdigital maceration absent, Bilateral.       Assessment:      Active Hospital Problems    Diagnosis Date Noted    Atherosclerosis of native arteries of left leg with ulceration of other part of foot ([facility] [I70.245] 07/01/2022     Priority: Medium    Surgical wound, non healing [T81.89XA]      Priority: Medium    Diabetic polyneuropathy associated with type 2 diabetes mellitus ([facility] [E11.42] 06/30/2022     Priority: Medium    Acute osteomyelitis of left foot [facility]) [M86.172] 06/30/2022     Priority: Medium    Ulcer of left foot, with necrosis of bone ([facility] [L97.524]        Plan:     Treatment Note please see attached Discharge Instructions    Continue linezolid per ID recommendations     Rx Dakin's quarter strength  Apply Dakin's to packing strip and pack wound twice daily. Discussed possible hyperbaric oxygen therapy  Will have evaluation done in the next 2 weeks. He is encouraged to follow-up with the vascular surgeon for his angiogram to be completed    Cherie Briseno is high risk of limb loss secondary to medical co-morbidities, extent of infection, history of amputations. RTC in 1 week following discharge       Procedure Note  Indications:  Based on my examination of this patient's wound(s)/ulcer(s) today, debridement is required to promote healing and evaluate the wound base. Performed by: Chang Gamboa DPM    Consent obtained:  Yes    Time out taken:  Yes    Pain Control: Anesthetic  Anesthetic: 4% Lidocaine Liquid Topical Topical lidocaine 4% solution      Debridement: Excisional Debridement    Using curette, scissors, forceps and rongeur the wound(s)/ulcer(s) was/were sharply debrided down through and including the removal of subcutaneous tissue, muscle/fascia and bone.         Devitalized Tissue Debrided:  fibrin, biofilm, slough and necrotic/eschar    Pre Debridement Measurements:  Are located in the Wound/Ulcer Documentation Flow Sheet    Wound/Ulcer #: 1    Post Debridement Measurements:  Wound/Ulcer Descriptions are Pre Debridement except measurements:    Incision 09/09/15 Foot Right (Active)   Number of days: 1598       Incision 09/09/15 Toe (Comment  which one) Right;Upper (Active)   Number of days: 2909       Incision 11/19/15 Foot Right (Active)   Number of days: 0275       Incision 12/29/15 Foot Right;Distal (Active)   Number of days: 2382       Wound 06/30/22 Toe (Comment  which one) Anterior; Left wound #1 second toe (Active)   Wound Image   06/30/22 0931   Wound Etiology Diabetic [place] 3 07/08/22 2333   Dressing Status New drainage noted; Old drainage noted 07/08/22 0837   Wound Cleansed Soap and water 07/08/22 0837   Dressing/Treatment Iodoform gauze; Roll gauze; Ace wrap;Pharmaceutical agent (see MAR) 07/01/22 1100   Offloading for Diabetic Foot Ulcers Offloading ordered;Post op shoe 07/08/22 0837   Wound Length (cm) 1.8 cm 07/08/22 0837   Wound Width (cm) 0.7 cm 07/08/22 0837   Wound Depth (cm) 2.5 cm 07/08/22 0837   Wound Surface Area (cm^2) 1.26 cm^2 07/08/22 0837   Change in Wound Size % (l*w) -800 07/08/22 0837   Wound Volume (cm^3) 3.15 cm^3 07/08/22 0837   Wound Healing % -7400 07/08/22 0837   Post-Procedure Length (cm) 1.8 cm 07/08/22 0837   Post-Procedure Width (cm) 0.7 cm 07/08/22 0837   Post-Procedure Depth (cm) 2.5 cm 07/08/22 0837   Post-Procedure Surface Area (cm^2) 1.26 cm^2 07/08/22 0837   Post-Procedure Volume (cm^3) 3.15 cm^3 07/08/22 0837   Wound Assessment Slough;Pink/red 07/08/22 0837   Drainage Amount Large 07/08/22 0837   Drainage Description Serous; Yellow 07/08/22 0837   Odor None 07/08/22 0837   Keturah-wound Assessment Maceration 07/08/22 0837   Margins Defined edges 07/08/22 0837   Number of days: 7       Wound 06/30/22 Anterior;Left;Lateral Wound #2 (Active)   Wound Image   06/30/22 0931   Wound Etiology Diabetic 07/01/22 1100   Dressing Status Dry; Intact 07/08/22 0837   Wound Cleansed Soap and water 07/08/22 0837   Dressing/Treatment Ace wrap; Iodoform gauze; Pharmaceutical agent (see MAR); Roll gauze 07/01/22 1100   Wound Length (cm) 0 cm 07/08/22 0837   Wound Width (cm) 0 cm 07/08/22 0837   Wound Depth (cm) 0 cm 07/08/22 0837   Wound Surface Area (cm^2) 0 cm^2 07/08/22 0837   Change in Wound Size % (l*w) 100 07/08/22 0837   Wound Volume (cm^3) 0 cm^3 07/08/22 0837   Wound Healing % 100 07/08/22 0837   Post-Procedure Length (cm) 0.4 cm 06/30/22 0931   Post-Procedure Width (cm) 0.5 cm 06/30/22 0931   Post-Procedure Depth (cm) 0.3 cm 06/30/22 0931   Post-Procedure Surface Area (cm^2) 0.2 cm^2 06/30/22 0931   Post-Procedure Volume (cm^3) 0.06 cm^3 06/30/22 0931   Wound Assessment Dry;Fibrin 07/08/22 0837   Drainage Amount Moderate 07/01/22 1100   Drainage Description Serosanguinous 07/01/22 1100   Odor Mild 07/01/22 1100   Keturah-wound Assessment Ecchymosis; Excoriated;Fragile 07/01/22 1100   Number of days: 7          Percent of Wound(s)/Ulcer(s) Debrided: 100%    Total Surface Area Debrided:  1.26 sq cm     Diabetic/Pressure/Non Pressure Ulcers only:  Ulcer: Diabetic ulcer, bone     Estimated Blood Loss:  Estimated amount of blood loss is 5ml. Hemostasis Achieved:  by pressure    Response to treatment:  Well tolerated by patient. Written patient discharge instructions given to patient and signed by patient or POA.       Orders Placed This Encounter   Medications    lidocaine (XYLOCAINE) 4 % external solution    sodium hypochlorite (DAKINS) 0.125 % SOLN external solution     Sig: Apply to packing strip and pack wound daily     Dispense:  1000 mL     Refill:  1     Orders Placed This Encounter   Procedures    Initiate Outpatient Wound Care Protocol     Cleanse wound with saline    If wound contains bioburden or contamination cleanse with wound cleanser or antimicrobial solution     For normal periwound tissue without irritation nor maceration, apply topical skin protectant    For periwound tissue with irritation and/or maceration, apply zinc based product, topical steroid cream/ointment, or equivalent     For wounds with dry firm black eschar and/or without exudate, apply betadine and leave open to air      For wounds with scant/small to no exudate or drainage, apply wound gel, hydrocolloid, polymer, or equivalent and cover with secondary dressing/foam      For wounds with moderate/large exudate or drainage, apply alginate, hydrofiber, polymer, or equivalent and cover with secondary dressing/foam    For wounds with nonviable tissue requiring removal, apply chemical or mechanical debrider and cover with secondary dressing/foam    For wounds with tunneling, dead space, or cavity, fill or pack with strip/gauze/kerlex to fit and cover with secondary dressing/foam    For wounds with adequate granulation or epithelization, apply wound gel, hydrocolloid, polymer, collagen, or transparent film, and cover secondary dry dressing/foam    For wounds that need additional secondary dressing to help pad or control additional drainage/exudates, add foam, absorbent pad or hydrocolloid    For wounds with suspected or known infection, apply antimicrobial mesh and/or antimicrobial alginate/hydrofiber, or antimicrobial solution moistened gauze/kerlex, or equivalent and cover with secondary dressing/foam    Compression Management needed for edema control, apply multilayer compression or tubular garment or equivalent    Offloading Management needed for pressure relief, apply offloading shoe/boot or equivalent     Standing Status:   Standing     Number of Occurrences:   1          Discharge Instructions         [address] [facility]                                 Visit  Discharge Instructions / Physician Orders  DATE:7/8/22   Home Care:NONE     SUPPLIES ORDERED THRU:    NONE                 DATE LAST SUPPLIED     Wound Location:  Left foot 2nd toe and lateral foot     Cleanse with: have elected to receive;      []? After Visit Summary  [x]? Comprehensive Discharge Instruction        Patient signature______________________________________Date:________  Electronically signed by Susa Kawasaki, RN on 7/8/2022 at 9:13 AM  Electronically signed by Joe Koch DPM on 7/8/2022 at 8:24 AM          Electronically signed by Joe Koch DPM on 7/8/2022 at 9:21 AM

## 2022-07-11 ENCOUNTER — HOSPITAL ENCOUNTER (OUTPATIENT)
Dept: PREADMISSION TESTING | Age: 67
Discharge: HOME | End: 2022-07-15
Payer: MEDICARE

## 2022-07-11 VITALS — BODY MASS INDEX: 26.77 KG/M2 | WEIGHT: 187 LBS | HEIGHT: 70 IN

## 2022-07-11 NOTE — PROGRESS NOTES
Called and received last pacemaker interrogation from 8/2021 from [facility].

## 2022-07-11 NOTE — PROGRESS NOTES
Pre-op Instructions For Out-Patient Surgery    Medication Instructions:  · Please stop herbs and any supplements now (includes vitamins and minerals). · Please contact your surgeon and prescribing physician for pre-op instructions for any blood thinners. Stop Coumadin and Aspirin as directed per Dr. Sunil Garrett (pt relates he was never instructed by surgeon office, he did stop these meds 7/8/22 on his own) Relayed that office instructions state stop 3 days prior    · If you have inhalers/aerosol treatments at home, please use them the morning of your surgery and bring the inhalers with you to the hospital.    · Please take the following medications the morning of your surgery with a sip of water:    Benazepril    Surgery Instructions:  1. After midnight before surgery:  Do not eat or drink anything, including water, mints, gum, and hard candy. You may brush your teeth without swallowing. No smoking, chewing tobacco, or street drugs. 2. Please shower or bathe before surgery. If you were given Surgical Scrub Chlorhexidine Gluconate Liquid (CHG), please shower the night before and the morning of your surgery following the detailed instructions you received during your pre-admission visit. 3. Please do not wear any cologne, lotion, powder, deodorant, jewelry, piercings, perfume, makeup, nail polish, hair accessories, or hair spray on the day of surgery. Wear loose comfortable clothing. 4. Leave your valuables at home. Bring a storage case for any glasses/contacts. 5. An adult who is responsible for you MUST drive you home and should be with you for the first 24 hours after surgery. 6. If having out-patient knee and foot surgeries, please arrange for planned crutches, walker, or wheelchair before arriving to the hospital.    The Day of Surgery:  · Arrive at [facility] at the time directed by your surgeon and check in at the desk.      · If you have a living will or healthcare power of , please bring a copy. · You will be taken to the pre-op holding area where you will be prepared for surgery. A physical assessment will be performed by a nurse practitioner or house officer. Your IV will be started and you will meet your anesthesiologist.    · When you go to surgery, your family will be directed to the surgical waiting room, where the doctor should speak with them after your surgery. · After surgery, you will be taken to the recovery room then when you are awake and stable you will go to the short stay unit for preparation to be discharged. · If you use a Bi-PAP or C-PAP machine, please bring it with you and leave it in the car in case it is needed in recovery room. Reinforced to patient of NPO status and needing a ride home.

## 2022-07-13 ENCOUNTER — APPOINTMENT (OUTPATIENT)
Dept: GENERAL RADIOLOGY | Age: 67
End: 2022-07-13
Attending: SURGERY
Payer: MEDICARE

## 2022-07-13 ENCOUNTER — HOSPITAL ENCOUNTER (OUTPATIENT)
Dept: WOUND CARE | Age: 67
Discharge: HOME | End: 2022-07-13

## 2022-07-13 ENCOUNTER — HOSPITAL ENCOUNTER (OUTPATIENT)
Age: 67
Setting detail: OUTPATIENT SURGERY
Discharge: HOME | End: 2022-07-13
Attending: SURGERY | Admitting: SURGERY
Payer: MEDICARE

## 2022-07-13 VITALS
DIASTOLIC BLOOD PRESSURE: 68 MMHG | RESPIRATION RATE: 16 BRPM | SYSTOLIC BLOOD PRESSURE: 130 MMHG | WEIGHT: 187 LBS | OXYGEN SATURATION: 99 % | BODY MASS INDEX: 26.77 KG/M2 | TEMPERATURE: 96.8 F | HEART RATE: 76 BPM | HEIGHT: 70 IN

## 2022-07-13 LAB
ABSOLUTE EOS #: 0.3 K/UL (ref 0–0.4)
ABSOLUTE LYMPH #: 1.3 K/UL (ref 1–4.8)
ABSOLUTE MONO #: 0.3 K/UL (ref 0.1–1.3)
BASOPHILS # BLD: 1 % (ref 0–2)
BASOPHILS ABSOLUTE: 0.1 K/UL (ref 0–0.2)
EOSINOPHILS RELATIVE PERCENT: 4 % (ref 0–4)
GLUCOSE BLD-MCNC: 136 MG/DL (ref 75–110)
GLUCOSE BLD-MCNC: 139 MG/DL (ref 75–110)
HCT VFR BLD CALC: 32 % (ref 41–53)
HEMOGLOBIN: 10.7 G/DL (ref 13.5–17.5)
INR BLD: 1.5
LYMPHOCYTES # BLD: 18 % (ref 24–44)
MCH RBC QN AUTO: 27.7 PG (ref 26–34)
MCHC RBC AUTO-ENTMCNC: 33.5 G/DL (ref 31–37)
MCV RBC AUTO: 82.8 FL (ref 80–100)
MONOCYTES # BLD: 4 % (ref 1–7)
PDW BLD-RTO: 18.1 % (ref 11.5–14.9)
PLATELET # BLD: 187 K/UL (ref 150–450)
PMV BLD AUTO: 6.9 FL (ref 6–12)
PROTHROMBIN TIME: 17.7 SEC (ref 11.8–14.6)
RBC # BLD: 3.86 M/UL (ref 4.5–5.9)
SEG NEUTROPHILS: 73 % (ref 36–66)
SEGMENTED NEUTROPHILS ABSOLUTE COUNT: 5.2 K/UL (ref 1.3–9.1)
WBC # BLD: 7.1 K/UL (ref 3.5–11)

## 2022-07-13 PROCEDURE — 2580000003 HC RX 258: Performed by: SURGERY

## 2022-07-13 PROCEDURE — C1769 GUIDE WIRE: HCPCS | Performed by: SURGERY

## 2022-07-13 PROCEDURE — 36415 COLL VENOUS BLD VENIPUNCTURE: CPT

## 2022-07-13 PROCEDURE — 75774 ARTERY X-RAY EACH VESSEL: CPT | Performed by: SURGERY

## 2022-07-13 PROCEDURE — 2709999900 HC NON-CHARGEABLE SUPPLY: Performed by: SURGERY

## 2022-07-13 PROCEDURE — 6360000004 HC RX CONTRAST MEDICATION: Performed by: SURGERY

## 2022-07-13 PROCEDURE — 7100000010 HC PHASE II RECOVERY - FIRST 15 MIN: Performed by: SURGERY

## 2022-07-13 PROCEDURE — 99153 MOD SED SAME PHYS/QHP EA: CPT | Performed by: SURGERY

## 2022-07-13 PROCEDURE — 3209999900 FLUORO FOR SURGICAL PROCEDURES

## 2022-07-13 PROCEDURE — 7100000001 HC PACU RECOVERY - ADDTL 15 MIN: Performed by: SURGERY

## 2022-07-13 PROCEDURE — 75710 ARTERY X-RAYS ARM/LEG: CPT | Performed by: SURGERY

## 2022-07-13 PROCEDURE — 7100000011 HC PHASE II RECOVERY - ADDTL 15 MIN: Performed by: SURGERY

## 2022-07-13 PROCEDURE — 85610 PROTHROMBIN TIME: CPT

## 2022-07-13 PROCEDURE — C1887 CATHETER, GUIDING: HCPCS | Performed by: SURGERY

## 2022-07-13 PROCEDURE — 3600000012 HC SURGERY LEVEL 2 ADDTL 15MIN: Performed by: SURGERY

## 2022-07-13 PROCEDURE — C1894 INTRO/SHEATH, NON-LASER: HCPCS | Performed by: SURGERY

## 2022-07-13 PROCEDURE — 3600000002 HC SURGERY LEVEL 2 BASE: Performed by: SURGERY

## 2022-07-13 PROCEDURE — 85025 COMPLETE CBC W/AUTO DIFF WBC: CPT

## 2022-07-13 PROCEDURE — 2500000003 HC RX 250 WO HCPCS: Performed by: SURGERY

## 2022-07-13 PROCEDURE — 6360000002 HC RX W HCPCS: Performed by: SURGERY

## 2022-07-13 PROCEDURE — 7100000030 HC ASPR PHASE II RECOVERY - FIRST 15 MIN: Performed by: SURGERY

## 2022-07-13 PROCEDURE — 99152 MOD SED SAME PHYS/QHP 5/>YRS: CPT | Performed by: SURGERY

## 2022-07-13 PROCEDURE — 36246 INS CATH ABD/L-EXT ART 2ND: CPT | Performed by: SURGERY

## 2022-07-13 PROCEDURE — 36247 INS CATH ABD/L-EXT ART 3RD: CPT | Performed by: SURGERY

## 2022-07-13 PROCEDURE — 7100000000 HC PACU RECOVERY - FIRST 15 MIN: Performed by: SURGERY

## 2022-07-13 PROCEDURE — 7100000031 HC ASPR PHASE II RECOVERY - ADDTL 15 MIN: Performed by: SURGERY

## 2022-07-13 PROCEDURE — 82947 ASSAY GLUCOSE BLOOD QUANT: CPT

## 2022-07-13 RX ORDER — LIDOCAINE HYDROCHLORIDE 10 MG/ML
1 INJECTION, SOLUTION EPIDURAL; INFILTRATION; INTRACAUDAL; PERINEURAL ONCE
Status: DISCONTINUED | OUTPATIENT
Start: 2022-07-13 | End: 2022-07-13 | Stop reason: HOSPADM

## 2022-07-13 RX ORDER — LIDOCAINE HYDROCHLORIDE 10 MG/ML
INJECTION, SOLUTION EPIDURAL; INFILTRATION; INTRACAUDAL; PERINEURAL PRN
Status: DISCONTINUED | OUTPATIENT
Start: 2022-07-13 | End: 2022-07-13 | Stop reason: ALTCHOICE

## 2022-07-13 RX ORDER — HYDRALAZINE HYDROCHLORIDE 20 MG/ML
5 INJECTION INTRAMUSCULAR; INTRAVENOUS ONCE
Status: CANCELLED | OUTPATIENT
Start: 2022-07-13

## 2022-07-13 RX ORDER — FENTANYL CITRATE 50 UG/ML
INJECTION, SOLUTION INTRAMUSCULAR; INTRAVENOUS PRN
Status: DISCONTINUED | OUTPATIENT
Start: 2022-07-13 | End: 2022-07-13 | Stop reason: ALTCHOICE

## 2022-07-13 RX ORDER — SODIUM CHLORIDE 9 MG/ML
INJECTION, SOLUTION INTRAVENOUS CONTINUOUS
Status: DISCONTINUED | OUTPATIENT
Start: 2022-07-13 | End: 2022-07-13 | Stop reason: HOSPADM

## 2022-07-13 RX ORDER — HYDRALAZINE HYDROCHLORIDE 20 MG/ML
5 INJECTION INTRAMUSCULAR; INTRAVENOUS
Status: CANCELLED | OUTPATIENT
Start: 2022-07-13

## 2022-07-13 RX ORDER — HYDRALAZINE HYDROCHLORIDE 20 MG/ML
INJECTION INTRAMUSCULAR; INTRAVENOUS PRN
Status: DISCONTINUED | OUTPATIENT
Start: 2022-07-13 | End: 2022-07-13 | Stop reason: ALTCHOICE

## 2022-07-13 RX ORDER — MIDAZOLAM HYDROCHLORIDE 1 MG/ML
INJECTION INTRAMUSCULAR; INTRAVENOUS PRN
Status: DISCONTINUED | OUTPATIENT
Start: 2022-07-13 | End: 2022-07-13 | Stop reason: ALTCHOICE

## 2022-07-13 RX ADMIN — SODIUM CHLORIDE: 9 INJECTION, SOLUTION INTRAVENOUS at 07:38

## 2022-07-13 ASSESSMENT — ENCOUNTER SYMPTOMS
BACK PAIN: 0
APNEA: 0
SORE THROAT: 0
WHEEZING: 0
SHORTNESS OF BREATH: 0
ABDOMINAL PAIN: 0
VOMITING: 0
TROUBLE SWALLOWING: 0
CONSTIPATION: 0
ROS SKIN COMMENTS: 2ND TOE LEFT FOOT
DIARRHEA: 0
RHINORRHEA: 0
COUGH: 0
SINUS PAIN: 0
CHEST TIGHTNESS: 0
ABDOMINAL DISTENTION: 0
SINUS PRESSURE: 0
NAUSEA: 0

## 2022-07-13 ASSESSMENT — PAIN - FUNCTIONAL ASSESSMENT: PAIN_FUNCTIONAL_ASSESSMENT: 0-10

## 2022-07-13 NOTE — INTERVAL H&P NOTE
Update History & Physical    The patient's History and Physical of June 27,  was reviewed with the patient and I examined the patient. The patient did arrive at [facility] last week for arteriography however his INR was 2.7 and he did not stop his Coumadin. We therefore rescheduled for today. There was no change. The surgical site was confirmed by the patient and me. Plan: The risks, benefits, expected outcome, and alternative to the recommended procedure have been discussed with the patient. Patient understands and wants to proceed with the procedure.      Electronically signed by Dane Arora MD on 7/13/2022 at 7:11 AM

## 2022-07-13 NOTE — H&P
HISTORY and [facility] [address]       NAME:  Itz Arora  MRN: [id number]   YOB: 1955   Date: 7/13/2022   Age: 78 y.o. Gender: male       COMPLAINT AND PRESENT HISTORY:   Itz Arora  is a 78 y.o. male presenting today for ARTERIOGRAM LOWER EXTREMITY as r/t Atherosclerosis of artery of left lower extremity ([address] Pt was to undergo this procedure last week however his INR was too high. EKG 2/3/21  Narrative & Impression    Atrial fibrillation  Low voltage QRS  Cannot rule out Inferior infarct , age undetermined  Abnormal ECG  When compared with ECG of 23-AUG-2012 19:47,  Atrial fibrillation has replaced Sinus rhythm  Vent. rate has decreased BY  40 BPM  T wave inversion no longer evident in Inferior leads      ECHO 3/9/22  CONCLUSIONS     Summary  Contrast was utilized on this technically difficult study. Left ventricle is normal in size and wall thickness. Left ventricular systolic function is moderately reduced. Estimated LV EF  35-40 %. Global hypokinesis. Left atrium is mildly dilated. Right atrium is moderately dilated . Difficult visualization of the right ventricle. MPI and TAPSE values suggest  normal RV systolic function. Mild mitral regurgitation. Moderate tricuspid regurgitation. Mild pulmonary hypertension. Estimated right ventricular systolic pressure  is 19BFHA. No significant pericardial effusion is seen. Normal aortic root dimension. IVC appears normal size, difficult to assess respiratory variation  No obvious vegetation noted  If clinically indicated TAMARA is recommended     Vascular note 7/7/22 (inpatient)     HPI:  Ynes Ayon is a 78 y. o. male who comes to the office for the first time regarding left toe amputation site nonhealing wound. Levi Vail had a first and second toe amputation approximately 3 months ago which is still having trouble with wound healing.  He has been diabetic for approximately 27 years. Levi Vail has diabetic neuropathy. Reg Ash has been seen by vascular surgeon at least 10 years ago who had stated that there was nothing that can be done for his lower extremity arterial disease.  He has not had recent arteriography and has had MONIQUE which is noncompressible.  Digital waveforms are actually quite poor bilaterally.  He does not smoke and quit sometime ago. Reg Ash has had coronary artery disease for which she has had coronary artery bypass grafting quite a while back at least a decade ago. Earna Pack since that procedure he explains that his leg has been swollen. Pt has a PMHX significant for Afib-on coumadin, CAD, Cardiomyopathy, CHF, DM2, HLD, HTN,         NPO since midnight. Sip of water with benicar  Pt does  wear dentures. Pt has hx of MRSA infection  Pt on coumadin, last dose was Friday   Pt denies any personal or FHx of complications with anesthesia. Pt denies any acute symptoms of illness at this time including no SOB, CP, fever, URI or UTI symptoms. RECENT IMAGING    XR FOOT LEFT (MIN 3 VIEWS)    Result Date: 6/30/2022  1. Soft tissue edema and emphysema. 2. Osseous erosion/destruction in the 2nd metatarsal head, concerning for osteomyelitis. 3. Age-indeterminate fracture of the base of the 3rd digit proximal phalanx.         PAST MEDICAL HISTORY     Past Medical History:   Diagnosis Date    Atrial fibrillation ([address]     CAD (coronary artery disease)     Cardiomyopathy ([address]     Cellulitis     CHF (congestive heart failure) (HCC)     Diabetes mellitus ([address]     Foot infection     Heart failure ([address]     Hyperlipidemia     Hypertension     MRSA (methicillin resistant staph aureus) culture positive     Osteomyelitis ([address]     PVD (peripheral vascular disease) ([address]     Wound, open, foot     left foot       SURGICAL HISTORY       Past Surgical History:   Procedure Laterality Date    CARDIAC SURGERY  2010    CABG X3    COLONOSCOPY      DEBRIDEMENT Right 11/19/2015    DEBRIDEMENT WOUND FOOT RIGHT W/ APPLICATION BILAT INTEG RAT GRAFT    ENDOSCOPY, COLON, DIAGNOSTIC      EYE SURGERY Bilateral     cataracts    HERNIA REPAIR      umbilical    KNEE ARTHROSCOPY Right     OTHER SURGICAL HISTORY Right 12 29 15    wound care graft procedure foot,appl of integra    PACEMAKER PLACEMENT  2011    WITH DEFIBRILLATOR    TOE AMPUTATION Right     R great    TOE AMPUTATION Left 3/13/2022    TOE AMPUTATION--left 2nd digit performed by Yelitza Setwart DPM at [facility] HISTORY       Family History   Problem Relation Age of Onset    Cancer Father         pancreatic cancer    Other Brother         MI    Osteoarthritis Mother     Other Maternal Grandfather         MI       SOCIAL HISTORY       Social History     Socioeconomic History    Marital status: Single     Spouse name: Not on file    Number of children: Not on file    Years of education: Not on file    Highest education level: Not on file   Occupational History    Not on file   Tobacco Use    Smoking status: Never Smoker    Smokeless tobacco: Never Used   Vaping Use    Vaping Use: Not on file   Substance and Sexual Activity    Alcohol use: Yes     Comment: SOCIAL on weekends    Drug use: No    Sexual activity: Not Currently   Other Topics Concern    Not on file   Social History Narrative    Not on file     Social Determinants of Health     Financial Resource Strain:     Difficulty of Paying Living Expenses: Not on file   Food Insecurity:     Worried About [address] in the Last Year: Not on file    [address] in the Last Year: Not on file   Transportation Needs:     Lack of Transportation (Medical): Not on file    Lack of Transportation (Non-Medical):  Not on file   Physical Activity:     Days of Exercise per Week: Not on file    Minutes of Exercise per Session: Not on file   Stress:     Feeling of Stress : Not on file   Social Connections:     Frequency of Communication with Friends and Family: Not on file    Frequency of Social Gatherings with Friends and Family: Not on file    Attends [employer] Services: Not on file    Active Member of Clubs or Organizations: Not on file    Attends Club or Organization Meetings: Not on file    Marital Status: Not on file   Intimate Partner Violence:     Fear of Current or Ex-Partner: Not on file    Emotionally Abused: Not on file    Physically Abused: Not on file    Sexually Abused: Not on file   Housing Stability:     Unable to Pay for Housing in the Last Year: Not on file    Number of [place] in the Last Year: Not on file    Unstable Housing in the Last Year: Not on file           REVIEW OF SYSTEMS      Allergies   Allergen Reactions    Doxycycline Other (See Comments)     Skin peeling    Pcn [Penicillins] Rash    Penicillin G Rash       No current facility-administered medications on file prior to encounter. Current Outpatient Medications on File Prior to Encounter   Medication Sig Dispense Refill    sodium hypochlorite (DAKINS) 0.125 % SOLN external solution Apply to packing strip and pack wound daily 1000 mL 1    linezolid (ZYVOX) 600 MG tablet Take 1 tablet by mouth every 12 hours for 28 doses 28 tablet 0    silver sulfADIAZINE (SILVADENE) 1 % cream APPLY DAILY TO SKIN TO AFFECTED AREA EVERY DAY FOR 30 DAYS      aspirin 81 MG EC tablet Take 1 tablet by mouth daily 30 tablet 3    nitroGLYCERIN (NITROSTAT) 0.4 MG SL tablet up to max of 3 total doses.  If no relief after 1 dose, call 911. 25 tablet 3    glipiZIDE (GLUCOTROL) 5 MG tablet Take 1 tablet by mouth every morning (before breakfast) 60 tablet 3    hydrALAZINE (APRESOLINE) 50 MG tablet Take 1 tablet by mouth every 8 hours 90 tablet 3    benazepril (LOTENSIN) 5 MG tablet Take 1 tablet by mouth daily 30 tablet 3    furosemide (LASIX) 80 MG tablet Take 1 tablet by mouth daily 60 tablet 3    carvedilol (COREG) 12.5 MG tablet Take 1 tablet by mouth 2 times daily (with meals) 60 tablet 3    reactive to light. Cardiovascular:      Rate and Rhythm: Normal rate. Rhythm irregular. Pulses: Normal pulses. Heart sounds: Normal heart sounds. No murmur heard. No friction rub. No gallop. Comments: pacer  Pulmonary:      Effort: Pulmonary effort is normal.      Breath sounds: Normal breath sounds. No wheezing. Abdominal:      General: Bowel sounds are normal. There is distension. Palpations: Abdomen is soft. Tenderness: There is no abdominal tenderness. There is no guarding or rebound. Musculoskeletal:         General: Tenderness and deformity present. No swelling. Normal range of motion. Cervical back: Normal range of motion and neck supple. No rigidity or tenderness. Right lower leg: No edema. Left lower leg: No edema. Comments: S/p great and 2nd toe amp, left foot    Skin:     General: Skin is warm and dry. Coloration: Skin is pale. Findings: Bruising present. No erythema. Neurological:      General: No focal deficit present. Mental Status: He is alert and oriented to person, place, and time. Mental status is at baseline. Sensory: No sensory deficit. Gait: Gait abnormal.      Comments: In boot    Psychiatric:         Mood and Affect: Mood normal.         Behavior: Behavior normal.         Thought Content:  Thought content normal.         Judgment: Judgment normal.                                                                                         PROVISIONAL DIAGNOSES / SURGERY:      ARTERIOGRAM LOWER EXTREMITY     Atherosclerosis of artery of left lower extremity ([facility] [I70.202]    Patient Active Problem List    Diagnosis Date Noted    Cellulitis 07/01/2022    Atherosclerosis of native arteries of left leg with ulceration of other part of foot ([address] 07/01/2022    Normocytic normochromic anemia 07/01/2022    Surgical wound, non healing     Acute osteomyelitis of left foot ([address] 06/30/2022    Cellulitis of left foot 06/30/2022    Abscess of bursa of left foot 06/30/2022    Diabetic polyneuropathy associated with type 2 diabetes mellitus ([facility] 06/30/2022    Osteomyelitis ([facility] 06/30/2022    Ulcer of left foot, with necrosis of bone ([facility]     MRSA bacteremia     Gangrene of toe of left foot (HCC)     Elevated C-reactive protein (CRP)     Elevated erythrocyte sedimentation rate     Acute kidney injury ([address]     Allergy to penicillin     Heart failure ([address] 02/03/2021    Onychomycosis 08/02/2016    Chronic osteomyelitis of left foot ([facility] 02/24/2016    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 ([facility] 02/23/2016    Atherosclerosis of coronary artery without angina pectoris 02/19/2016    Cardiomyopathy ([facility] 02/19/2016    Cardiac left ventricular ejection fraction 21-40 percent 02/19/2016    Diabetes mellitus type 2 with peripheral artery disease ([facility] 02/19/2016    PVD (peripheral vascular disease) ([facility] 01/12/2016    Diabetic foot ulcer with osteomyelitis ([facility] 10/07/2015    Chronic osteomyelitis ([facility] 10/07/2015    Diabetic foot ulcer ([facility] 10/07/2015    Diabetic foot infection ([facility] 09/10/2015               KAYLENE Crook - CNP on 7/13/2022 at 6:08 AM

## 2022-07-13 NOTE — OP NOTE
Operative Note      Patient: Darlyn Jackson  YOB: 1955  MRN: [id number]    Date of Procedure: 7/13/2022    Pre-Op Diagnosis: Atherosclerosis of artery of left lower extremity ([address] [I70.202]    Post-Op Diagnosis: Same       Procedure:  1. Right common femoral arterial access under ultrasound guidance. 2.  Placement of catheter into distal aorta with distal aortography and pelvic runoff. 3.  Placement of catheter into left common femoral artery with left common femoral profundofemoral and proximal SFA arteriography. 4.  Placement of catheter into left mid SFA with SFA and popliteal arteriography. 5.  Placement of catheter into left distal popliteal artery with popliteal and tibioperoneal arteriography. Surgeon(s):  Elizabeth Boykin MD    Assistant:   None  Anesthesia: 3 of Versed and 75 of fentanyl with local    Estimated Blood Loss (mL): Minimal    Complications: None    Specimens:   None  Implants:  None  Drains: None    Findings: The aorta and iliac arteries as well as the common femoral arteries were patent. The left profunda and SFA as well as popliteal arteries are all patent without significant stenosis. The left peroneal artery is patent to the ankle with collateral runoff. The left posterior tibial artery is patent to the level of the ankle at which point it occludes and then reconstitutes as plantar arteries. The left anterior tibial artery is patent to the ankle at which point it occludes and does not reconstitute. There is brisk filling of collaterals in the foot. No intervention was done. The ulceration at the toe amputation site does have granulation tissue and is healing very slowly. Detailed Description of Procedure: The patient was brought to the operating room and placed in a supine position with his arms tucked at his sides. He was identified as Delorise Litter for left lower extremity arteriography with possible intervention.   His INR was 1.5 which is down from 2.7 last week. His creatinine is 1.6. He was given light sedation and his lower abdomen groins and upper thighs were prepped and draped in a sterile fashion. Lidocaine without epinephrine was infiltrated into the skin and subcutaneous tissue of the right common femoral artery after he was given 2 of Versed and 50 of fentanyl. An 18-gauge needle was then inserted into the right common femoral artery on its anterior surface under ultrasound guidance through which modified Seldinger technique was used to establish a 4 [place] sheath. A wire was placed into the distal aorta over which a contra catheter was placed. Distal aortography with pelvic runoff was obtained. Images were saved. The catheter was used to direct the wire into the contralateral iliac system and the wire was placed to the level of the common femoral artery on the left. The catheter followed to that level and the wire was removed. Left common femoral profundofemoral and proximal SFA arteriography was obtained. Images were saved. The wire was placed into the mid SFA and the catheter was placed into the mid SFA. SFA popliteal and proximal tibial peroneal arteriography was obtained. Images were saved. The wire was replaced and placed all the way to the peroneal artery. The catheter was removed and replaced with a Tania Cole cross catheter that was placed to the level of the distal popliteal artery on the left. Left distal popliteal, tibial peroneal and foot arteriography was obtained from that level. The catheter was removed and the sheath was removed and pressure was held on the right common femoral artery for no less than 20 minutes. A pressure dressing was applied. There was no bleeding or hematoma the conclusion of the case. He tolerated the procedure well. There were no complications. He did receive a total of 10 mg of hydralazine during the procedure.   He had high blood pressure at the beginning of the procedure but after sedation and hydralazine that settled to approximately 394 systolic. I discussed with him and his wife the findings as noted above. They understand that I will watch and wait and only perform plantar arch procedures if his ulcer or not to heal.  I think it is improving albeit slowly and we will continue to watch his foot in the office on a monthly basis.     Electronically signed by Blade Song MD on 7/13/2022 at 8:55 AM

## 2022-07-16 NOTE — DISCHARGE INSTRUCTIONS
[facility] [place] [facility]ENTER                                 Visit  Discharge Instructions / Physician Orders  DATE:7/20/22   Home Care:NONE     SUPPLIES ORDERED THRU:    NONE                 DATE LAST SUPPLIED     Wound Location:  Left foot 2nd toe and lateral foot     Cleanse with: Liquid antibacterial soap and water, rinse well      Dressing Orders:  Primary dressing   Dakins soaked plain 1/4\" packing     Secondary dressing  Dry Dressing                  secure with           x 30days     Frequency:  Change daily     Additional Orders: Increase protein to diet (meat, cheese, eggs, fish, peanut butter, nuts and beans)  Multivitamin daily     OFFLOADING [x] YES  TYPE:                  [] NA  surg shoe  Weekly wound care visits until determined otherwise. Antibiotic therapy-wound care related YES [x] NO [] NA[]   finish zyvox  MY CHART []     Smart Device  []      HYPERBARIC TREATMENT-                TREATMENT #                          [facility] in 1 week with Dr. Tiffanie Rankin                                                                                                     2 weeks with [facility]ren[facility]                                                                                              (Please note your next appointment above and if you are unable to keep, kindly give a 24 hour notice. Thank you.)  If more than 15 min late we cannot guarantee you will be seen due to clinician schedule  Per Policy, Excessive cancellation will call for dismissal from program.  If you experience any of the following, please call the [address] [place] [address] during business hours:  [phone]     * Increase in Pain  * Temperature over 101  * Increase in drainage from your wound  * Drainage with a foul odor  * Bleeding  * Increase in swelling  * Need for compression bandage changes due to slippage, breakthrough drainage.      If you need medical attention outside of the business hours of the [address] please contact your PCP or go to the nearest emergency room. The information contained in the After Visit Summary has been reviewed with me, the patient and/or responsible adult, by my health care provider(s). I had the opportunity to ask questions regarding this information.  I have elected to receive;      []After Visit Summary  [x]Comprehensive Discharge Instruction        Patient signature______________________________________Date:________  Electronically signed by Candida Cruz DPM on 7/20/2022 at 12:58 PM  Electronically signed by Lina Miller RN on 7/20/2022 at 1:28 PM

## 2022-07-20 ENCOUNTER — HOSPITAL ENCOUNTER (OUTPATIENT)
Dept: WOUND CARE | Age: 67
Discharge: HOME | End: 2022-07-20
Payer: MEDICARE

## 2022-07-20 VITALS
BODY MASS INDEX: 25.62 KG/M2 | RESPIRATION RATE: 18 BRPM | WEIGHT: 179 LBS | SYSTOLIC BLOOD PRESSURE: 176 MMHG | TEMPERATURE: 96.2 F | HEART RATE: 84 BPM | DIASTOLIC BLOOD PRESSURE: 80 MMHG | HEIGHT: 70 IN

## 2022-07-20 DIAGNOSIS — L97.524 ULCER OF LEFT FOOT, WITH NECROSIS OF BONE (HCC): Primary | ICD-10-CM

## 2022-07-20 PROCEDURE — 11043 DBRDMT MUSC&/FSCA 1ST 20/<: CPT

## 2022-07-20 RX ORDER — LIDOCAINE 50 MG/G
OINTMENT TOPICAL ONCE
Status: CANCELLED | OUTPATIENT
Start: 2022-07-20 | End: 2022-07-20

## 2022-07-20 RX ORDER — CLOBETASOL PROPIONATE 0.5 MG/G
OINTMENT TOPICAL ONCE
Status: CANCELLED | OUTPATIENT
Start: 2022-07-20 | End: 2022-07-20

## 2022-07-20 RX ORDER — LIDOCAINE HYDROCHLORIDE 20 MG/ML
JELLY TOPICAL ONCE
Status: CANCELLED | OUTPATIENT
Start: 2022-07-20 | End: 2022-07-20

## 2022-07-20 RX ORDER — DOXYCYCLINE HYCLATE 100 MG/1
100 CAPSULE ORAL 2 TIMES DAILY
COMMUNITY
End: 2022-07-28 | Stop reason: ALTCHOICE

## 2022-07-20 RX ORDER — GINSENG 100 MG
CAPSULE ORAL ONCE
Status: CANCELLED | OUTPATIENT
Start: 2022-07-20 | End: 2022-07-20

## 2022-07-20 RX ORDER — LIDOCAINE HYDROCHLORIDE 40 MG/ML
SOLUTION TOPICAL ONCE
Status: CANCELLED | OUTPATIENT
Start: 2022-07-20 | End: 2022-07-20

## 2022-07-20 RX ORDER — BACITRACIN ZINC AND POLYMYXIN B SULFATE 500; 1000 [USP'U]/G; [USP'U]/G
OINTMENT TOPICAL ONCE
Status: CANCELLED | OUTPATIENT
Start: 2022-07-20 | End: 2022-07-20

## 2022-07-20 RX ORDER — BETAMETHASONE DIPROPIONATE 0.05 %
OINTMENT (GRAM) TOPICAL ONCE
Status: CANCELLED | OUTPATIENT
Start: 2022-07-20 | End: 2022-07-20

## 2022-07-20 RX ORDER — LIDOCAINE HYDROCHLORIDE 40 MG/ML
SOLUTION TOPICAL ONCE
Status: COMPLETED | OUTPATIENT
Start: 2022-07-20 | End: 2022-07-20

## 2022-07-20 RX ORDER — LIDOCAINE 40 MG/G
CREAM TOPICAL ONCE
Status: CANCELLED | OUTPATIENT
Start: 2022-07-20 | End: 2022-07-20

## 2022-07-20 RX ORDER — GENTAMICIN SULFATE 1 MG/G
OINTMENT TOPICAL ONCE
Status: CANCELLED | OUTPATIENT
Start: 2022-07-20 | End: 2022-07-20

## 2022-07-20 RX ORDER — BACITRACIN, NEOMYCIN, POLYMYXIN B 400; 3.5; 5 [USP'U]/G; MG/G; [USP'U]/G
OINTMENT TOPICAL ONCE
Status: CANCELLED | OUTPATIENT
Start: 2022-07-20 | End: 2022-07-20

## 2022-07-20 RX ADMIN — LIDOCAINE HYDROCHLORIDE 10 ML: 40 SOLUTION TOPICAL at 13:13

## 2022-07-20 ASSESSMENT — ENCOUNTER SYMPTOMS
VOMITING: 0
NAUSEA: 0
DIARRHEA: 0

## 2022-07-20 NOTE — PROGRESS NOTES
[address]   Progress Note and Procedure Note      [address] [place] RECORD NUMBER:  [id number]  AGE: 78 y.o. GENDER: male  : 1955  EPISODE DATE:  2022    Subjective:     Chief Complaint   Patient presents with    Wound Check     Left foot         HISTORY of PRESENT ILLNESS ANNABEL Mercado is a 78 y.o. male who presents today for wound/ulcer evaluation. Current evaluation:  Hitesh Bird has been applying dakins soaked packing daily. Had the angio performed with no intervention. He has not followed up with ID yet. Completed the two week course of zyvox. Interval history: Hitesh Bird was admitted to [facility] after the last visit and he was seen by infectious disease and vascular surgery. He had a planned angiogram however this was canceled due to elevated INR. He is awaiting a call today from their office to reschedule. He was discharged on p.o. Zyvox per ID recommendations 14 days. He has been dressing the wound at home daily with iodoform packing. No worsening sign of infection. History of Wound Context:  Hitesh Bird presents for evaluation of left foot ulceration. He underwent second toe amputation in March. Has had a nonhealing wound since then. He presents today in slip on shoes. Has been working at [employer]. He saw vascular surgery recently who is planning for an angiogram.  Denies systemic sign of infection.     Ulcer Identification:  Ulcer Type: diabetic, non-healing surgical and neuropathic  Contributing Factors: diabetes, poor glucose control, chronic pressure, shear force, arterial insufficiency and decreased tissue oxygenation          PAST MEDICAL HISTORY        Diagnosis Date    Atrial fibrillation (HCC)     CAD (coronary artery disease)     Cardiomyopathy ([facility]     Cellulitis     CHF (congestive heart failure) ([facility]     Diabetes mellitus ([facility]     Foot infection     Heart failure (HCC)     Hyperlipidemia hyclate (VIBRAMYCIN) 100 MG capsule Take 100 mg by mouth in the morning and 100 mg before bedtime. sodium hypochlorite (DAKINS) 0.125 % SOLN external solution Apply to packing strip and pack wound daily 1000 mL 1    silver sulfADIAZINE (SILVADENE) 1 % cream APPLY DAILY TO SKIN TO AFFECTED AREA EVERY DAY FOR 30 DAYS (Patient not taking: Reported on 7/13/2022)      aspirin 81 MG EC tablet Take 1 tablet by mouth daily 30 tablet 3    nitroGLYCERIN (NITROSTAT) 0.4 MG SL tablet up to max of 3 total doses. If no relief after 1 dose, call 911. 25 tablet 3    glipiZIDE (GLUCOTROL) 5 MG tablet Take 1 tablet by mouth every morning (before breakfast) 60 tablet 3    benazepril (LOTENSIN) 5 MG tablet Take 1 tablet by mouth daily 30 tablet 3    furosemide (LASIX) 80 MG tablet Take 1 tablet by mouth daily 60 tablet 3    warfarin (COUMADIN) 2 MG tablet Take 4 mg by mouth daily Per [facility]      atorvastatin (LIPITOR) 40 MG tablet Take 40 mg by mouth daily. No current facility-administered medications on file prior to encounter. REVIEW OF SYSTEMS    Review of Systems   Constitutional:  Negative for chills and fever. Gastrointestinal:  Negative for diarrhea, nausea and vomiting. Skin:  Positive for wound. Objective:      BP (!) 176/80   Pulse 84   Temp (!) 96.2 °F (35.7 °C) (Tympanic)   Resp 18   Ht 5' 10\" (1.778 m)   Wt 179 lb (81.2 kg)   BMI 25.68 kg/m²     Wt Readings from Last 3 Encounters:   07/20/22 179 lb (81.2 kg)   07/11/22 187 lb (84.8 kg)   07/13/22 187 lb (84.8 kg)       Physical Exam:  General:  Alert and oriented x3. In no acute distress. Lower Extremity Physical Exam:    Vascular: DP pulses are not palpable, Bilateral. PT pulses are not palpable, Bilateral. CFT <3 seconds to all digits, Bilateral.   No edema. Hair growth is absent to the level of the lower leg, Bilateral.     Neuro: Saph/sural/SP/DP/plantar sensation absent to light touch.  Protective sensation is intact to  0 /10 sites as tested with a 5.07g SWMF, Bilateral.     Musculoskeletal: EHL/FHL/GS/TA gross motor intact. Gross deformity is present, s/p multiple digit amputations bilateral foot. Dermatologic: Open wound present to left 2nd toe amputation site as documented in detail below. Wound base extends into the fascial layer and is necrotic and fibrotic with a large amount of slough and bioburden. Bone has covered over. There is no erythema. There is no purulent drainage. There is no fluctuance or crepitus. Interdigital maceration absent, Bilateral.       Assessment:      Active Hospital Problems    Diagnosis Date Noted    Atherosclerosis of native arteries of left leg with ulceration of other part of foot ([facility] [I70.245] 07/01/2022     Priority: Medium    Surgical wound, non healing [T81.89XA]      Priority: Medium    Acute osteomyelitis of left foot [facility]) [M86.172] 06/30/2022     Priority: Medium    Diabetic polyneuropathy associated with type 2 diabetes mellitus ([facility] [E11.42] 06/30/2022     Priority: Medium    Ulcer of left foot, with necrosis of bone ([facility] [L97.524]        Plan:     Treatment Note please see attached Discharge Instructions    Will have patient follow up with ID here in clinic this coming week     Continue to apply Dakin's to packing strip and pack wound twice daily. Recommended NPWT for best chance at healing the wound. He states he will be going to work in two days and does not want the wound vac. Discussed possible hyperbaric oxygen therapy  Will have evaluation done in the next 2 weeks. Tony Kimble is high risk of limb loss secondary to medical co-morbidities, extent of infection, history of amputations. RTC in 1 week      Procedure Note  Indications:  Based on my examination of this patient's wound(s)/ulcer(s) today, debridement is required to promote healing and evaluate the wound base.     Performed by: Emi Catalan DPM    Consent obtained:  Yes    Time out taken: Yes    Pain Control: Anesthetic  Anesthetic: 4% Lidocaine Liquid Topical Topical lidocaine 4% solution      Debridement: Excisional Debridement    Using curette, scissors, forceps and rongeur the wound(s)/ulcer(s) was/were sharply debrided down through and including the removal of muscle/fascia. Devitalized Tissue Debrided:  fibrin, biofilm, slough and necrotic/eschar    Pre Debridement Measurements:  Are located in the [place]  Documentation Flow Sheet    Wound/Ulcer #: 1    Post Debridement Measurements:  Wound/Ulcer Descriptions are Pre Debridement except measurements:    Wound 06/30/22 Toe (Comment  which one) Anterior; Left wound #1 second toe (Active)   Wound Image   06/30/22 0931   Wound Etiology Diabetic [place] 3 07/20/22 1300   Dressing Status Old drainage noted;New drainage noted 07/20/22 1300   Wound Cleansed Cleansed with saline 07/20/22 1300   Dressing/Treatment Iodoform gauze; Roll gauze; Ace wrap;Pharmaceutical agent (see MAR) 07/01/22 1100   Offloading for Diabetic Foot Ulcers Offloading ordered;Post op shoe 07/08/22 0837   Wound Length (cm) 1.8 cm 07/20/22 1300   Wound Width (cm) 0.7 cm 07/20/22 1300   Wound Depth (cm) 2 cm 07/20/22 1300   Wound Surface Area (cm^2) 1.26 cm^2 07/20/22 1300   Change in Wound Size % (l*w) -800 07/20/22 1300   Wound Volume (cm^3) 2.52 cm^3 07/20/22 1300   Wound Healing % -5900 07/20/22 1300   Post-Procedure Length (cm) 1.8 cm 07/20/22 1300   Post-Procedure Width (cm) 0.7 cm 07/20/22 1300   Post-Procedure Depth (cm) 2 cm 07/20/22 1300   Post-Procedure Surface Area (cm^2) 1.26 cm^2 07/20/22 1300   Post-Procedure Volume (cm^3) 2.52 cm^3 07/20/22 1300   Wound Assessment Other (Comment) 07/13/22 1004   Drainage Amount Moderate 07/20/22 1300   Drainage Description Serosanguinous; Yellow 07/20/22 1300   Odor None 07/20/22 1300   [place]-wound Assessment Maceration 07/20/22 1300   Margins Attached edges 07/20/22 1300   Number of days: 20            Percent of Wound(s)/Ulcer(s) Debrided: 100%    Total Surface Area Debrided:  1.26 sq cm     Diabetic/Pressure/Non Pressure Ulcers only:  Ulcer:  Diabetic ulcer, fascial layer exposed      Estimated Blood Loss:  Estimated amount of blood loss is 5ml. Hemostasis Achieved:  by pressure    Response to treatment:  Well tolerated by patient. Written patient discharge instructions given to patient and signed by patient or POA.       Orders Placed This Encounter   Medications    lidocaine (XYLOCAINE) 4 % external solution     Orders Placed This Encounter   Procedures    Initiate Outpatient Wound Care Protocol     Cleanse wound with saline    If wound contains bioburden or contamination cleanse with wound cleanser or antimicrobial solution     For normal periwound tissue without irritation nor maceration, apply topical skin protectant    For periwound tissue with irritation and/or maceration, apply zinc based product, topical steroid cream/ointment, or equivalent     For wounds with dry firm black eschar and/or without exudate, apply betadine and leave open to air      For wounds with scant/small to no exudate or drainage, apply wound gel, hydrocolloid, polymer, or equivalent and cover with secondary dressing/foam      For wounds with moderate/large exudate or drainage, apply alginate, hydrofiber, polymer, or equivalent and cover with secondary dressing/foam    For wounds with nonviable tissue requiring removal, apply chemical or mechanical debrider and cover with secondary dressing/foam    For wounds with tunneling, dead space, or cavity, fill or pack with strip/gauze/kerlex to fit and cover with secondary dressing/foam    For wounds with adequate granulation or epithelization, apply wound gel, hydrocolloid, polymer, collagen, or transparent film, and cover secondary dry dressing/foam    For wounds that need additional secondary dressing to help pad or control additional drainage/exudates, add foam, absorbent pad or hydrocolloid    For wounds with suspected or known infection, apply antimicrobial mesh and/or antimicrobial alginate/hydrofiber, or antimicrobial solution moistened gauze/kerlex, or equivalent and cover with secondary dressing/foam    Compression Management needed for edema control, apply multilayer compression or tubular garment or equivalent    Offloading Management needed for pressure relief, apply offloading shoe/boot or equivalent     Standing Status:   Standing     Number of Occurrences:   1          Discharge Instructions           [address] [facility]                                 Visit  Discharge Instructions / Physician Orders  DATE:7/20/22   Home Care:NONE     SUPPLIES ORDERED THRU:    NONE                 DATE LAST SUPPLIED     Wound Location:  Left foot 2nd toe and lateral foot     Cleanse with: Liquid antibacterial soap and water, rinse well      Dressing Orders:  Primary dressing   Dakins soaked plain 1/4\" packing     Secondary dressing  Dry Dressing                  secure with           x 30days     Frequency:  Change daily     Additional Orders: Increase protein to diet (meat, cheese, eggs, fish, peanut butter, nuts and beans)  Multivitamin daily     OFFLOADING [x] YES  TYPE:                  [] NA  surg shoe  Weekly wound care visits until determined otherwise. Antibiotic therapy-wound care related YES [x] NO [] NA[]   finish zyvox  MY CHART []     Smart Device  []      HYPERBARIC TREATMENT-                TREATMENT #                          Your next appointment with the [address] [place] [address] is in 1 week with Dr. Carisa Guzman                                                                                                     2 weeks with Georgette Montejo                                                                                              (Please note your next appointment above and if you are unable to keep, kindly give a 24 hour notice.  Thank you.)  If more than 15 min

## 2022-07-24 NOTE — DISCHARGE INSTRUCTIONS
[address] [facility]                                 Visit  Discharge Instructions / Physician Orders  DATE:7/28/22   Home Care:NONE     SUPPLIES ORDERED THRU:    NONE                 DATE LAST SUPPLIED     Wound Location:  Left foot 2nd toe and lateral foot     Cleanse with: Liquid antibacterial soap and water, rinse well      Dressing Orders:  Primary dressing   Dakins soaked plain 1/4\" packing     Secondary dressing  Dry Dressing                  secure with           x 30days     Frequency:  Change daily     Additional Orders: Increase protein to diet (meat, cheese, eggs, fish, peanut butter, nuts and beans)  Multivitamin daily     CLINDAMYCIN AT YOUR PHARMACY     OFFLOADING [x] YES  TYPE:                  [] NA  surgical shoe  Weekly wound care visits until determined otherwise. Antibiotic therapy-wound care related YES [x] NO [] NA[]     MY CHART []     Smart Device  []                         Your next appointment with the [address] [place] [address] is in 1 week with Dr. Ana Good                                                                                              (Please note your next appointment above and if you are unable to keep, kindly give a 24 hour notice. Thank you.)  If more than 15 min late we cannot guarantee you will be seen due to clinician schedule  Per Policy, Excessive cancellation will call for dismissal from program.  If you experience any of the following, please call the [address] [place] [address] during business hours:  [phone]     * Increase in Pain  * Temperature over 101  * Increase in drainage from your wound  * Drainage with a foul odor  * Bleeding  * Increase in swelling  * Need for compression bandage changes due to slippage, breakthrough drainage. If you need medical attention outside of the business hours of the [address] [place] [address] please contact your PCP or go to the nearest emergency room.      The information contained in the After Visit Summary has been reviewed with me, the patient and/or responsible adult, by my health care provider(s). I had the opportunity to ask questions regarding this information.  I have elected to receive;      []After Visit Summary  [x]Comprehensive Discharge Instruction        Patient signature______________________________________Date:________  Electronically signed by Teo Roche DPM on 7/28/2022 at 8:49 AM  Electronically signed by Bella Bolanos RN on 7/28/2022 at 8:56 AM

## 2022-07-25 ENCOUNTER — OFFICE VISIT (OUTPATIENT)
Dept: VASCULAR SURGERY | Age: 67
End: 2022-07-25
Payer: MEDICARE

## 2022-07-25 VITALS
WEIGHT: 179 LBS | RESPIRATION RATE: 16 BRPM | BODY MASS INDEX: 25.62 KG/M2 | HEART RATE: 61 BPM | OXYGEN SATURATION: 98 % | SYSTOLIC BLOOD PRESSURE: 122 MMHG | DIASTOLIC BLOOD PRESSURE: 78 MMHG | TEMPERATURE: 98.6 F | HEIGHT: 70 IN

## 2022-07-25 DIAGNOSIS — I70.245 ATHEROSCLEROSIS OF NATIVE ARTERIES OF LEFT LEG WITH ULCERATION OF OTHER PART OF FOOT (HCC): Primary | Chronic | ICD-10-CM

## 2022-07-25 PROCEDURE — 1124F ACP DISCUSS-NO DSCNMKR DOCD: CPT | Performed by: SURGERY

## 2022-07-25 PROCEDURE — 99213 OFFICE O/P EST LOW 20 MIN: CPT | Performed by: SURGERY

## 2022-07-25 NOTE — PROGRESS NOTES
[address] AND VASCULAR  [address]  [facility] [address]  Dept: [phone]     Patient: Karissa Rodríguez  : 1955  MRN: [id number]  DOS: 2022            HPI:  Karissa Rodríguez is a 78 y.o. male who returns to the office regarding his left lower extremity toe amputation site. Recall that this was not healing well prompting arteriography. At the arteriography he had very distal posterior tibial disease with reconstitution of wispy plantar arteries. The anterior tibial artery is occluded. This does not reconstitute in the foot. The peroneal artery goes to the ankle and sends wispy collaterals around the ankle. Review of Systems    Vitals:    22 1047   BP: 122/78   Pulse: 61   Resp: 16   Temp: 98.6 °F (37 °C)   SpO2: 98%   Weight: 179 lb (81.2 kg)   Height: 5' 10\" (1.778 m)          Physical Exam  Cardiovascular:      Comments: On examination his toe amputation site continues to heal with granulation tissue. It cavitate still approximately 1 to 1-1/2 cm. It is approximately 1 cm in diameter. There is no surrounding cellulitis. There is no surrounding induration or erythema. The foot is warm and well-perfused. He has no distal pulses and there is no change in his vascular exam from previous. Assessment:  1. Atherosclerosis of native arteries of left leg with ulceration of other part of foot ([facility]          Plan: At this point I will continue to see him at 4-week intervals. I do not think that reconstruction of his lower extremity arterial system is warranted as he is healing his ulcer. This reconstruction would be difficult and also fraught with complication of either graft occlusion or posterior tibial occlusion. I would certainly try endovascular therapy prior to bypass if necessary. I would expect that endovascular therapy to fail but as long as it would heal the ulceration it would be warranted.   I must be clear that it is not necessary at this time because he is healing his toe amputation site and we will leave his arterial system alone for the time being. He understands and agrees and we will see him in a month.     Electronically signed by:  Blair Gamboa MD

## 2022-07-28 ENCOUNTER — HOSPITAL ENCOUNTER (OUTPATIENT)
Dept: WOUND CARE | Age: 67
Discharge: HOME | End: 2022-07-28
Payer: MEDICARE

## 2022-07-28 VITALS
TEMPERATURE: 96 F | HEIGHT: 70 IN | RESPIRATION RATE: 18 BRPM | BODY MASS INDEX: 25.62 KG/M2 | HEART RATE: 80 BPM | SYSTOLIC BLOOD PRESSURE: 162 MMHG | WEIGHT: 179 LBS | DIASTOLIC BLOOD PRESSURE: 90 MMHG

## 2022-07-28 DIAGNOSIS — L97.524 ULCER OF LEFT FOOT, WITH NECROSIS OF BONE (HCC): Primary | ICD-10-CM

## 2022-07-28 PROCEDURE — 87186 SC STD MICRODIL/AGAR DIL: CPT

## 2022-07-28 PROCEDURE — 87075 CULTR BACTERIA EXCEPT BLOOD: CPT

## 2022-07-28 PROCEDURE — 87070 CULTURE OTHR SPECIMN AEROBIC: CPT

## 2022-07-28 PROCEDURE — 87205 SMEAR GRAM STAIN: CPT

## 2022-07-28 PROCEDURE — 87076 CULTURE ANAEROBE IDENT EACH: CPT

## 2022-07-28 PROCEDURE — 87185 SC STD ENZYME DETCJ PER NZM: CPT

## 2022-07-28 PROCEDURE — 86403 PARTICLE AGGLUT ANTBDY SCRN: CPT

## 2022-07-28 PROCEDURE — 11043 DBRDMT MUSC&/FSCA 1ST 20/<: CPT

## 2022-07-28 RX ORDER — LIDOCAINE HYDROCHLORIDE 40 MG/ML
SOLUTION TOPICAL ONCE
Status: COMPLETED | OUTPATIENT
Start: 2022-07-28 | End: 2022-07-28

## 2022-07-28 RX ORDER — GINSENG 100 MG
CAPSULE ORAL ONCE
Status: CANCELLED | OUTPATIENT
Start: 2022-07-28 | End: 2022-07-28

## 2022-07-28 RX ORDER — LIDOCAINE HYDROCHLORIDE 40 MG/ML
SOLUTION TOPICAL ONCE
Status: CANCELLED | OUTPATIENT
Start: 2022-07-28 | End: 2022-07-28

## 2022-07-28 RX ORDER — LIDOCAINE 40 MG/G
CREAM TOPICAL ONCE
Status: CANCELLED | OUTPATIENT
Start: 2022-07-28 | End: 2022-07-28

## 2022-07-28 RX ORDER — LIDOCAINE HYDROCHLORIDE 20 MG/ML
JELLY TOPICAL ONCE
Status: CANCELLED | OUTPATIENT
Start: 2022-07-28 | End: 2022-07-28

## 2022-07-28 RX ORDER — BACITRACIN, NEOMYCIN, POLYMYXIN B 400; 3.5; 5 [USP'U]/G; MG/G; [USP'U]/G
OINTMENT TOPICAL ONCE
Status: CANCELLED | OUTPATIENT
Start: 2022-07-28 | End: 2022-07-28

## 2022-07-28 RX ORDER — LIDOCAINE 50 MG/G
OINTMENT TOPICAL ONCE
Status: CANCELLED | OUTPATIENT
Start: 2022-07-28 | End: 2022-07-28

## 2022-07-28 RX ORDER — CLINDAMYCIN HYDROCHLORIDE 300 MG/1
300 CAPSULE ORAL 3 TIMES DAILY
Qty: 30 CAPSULE | Refills: 0 | Status: SHIPPED | OUTPATIENT
Start: 2022-07-28 | End: 2022-08-07

## 2022-07-28 RX ORDER — BETAMETHASONE DIPROPIONATE 0.05 %
OINTMENT (GRAM) TOPICAL ONCE
Status: CANCELLED | OUTPATIENT
Start: 2022-07-28 | End: 2022-07-28

## 2022-07-28 RX ORDER — GENTAMICIN SULFATE 1 MG/G
OINTMENT TOPICAL ONCE
Status: CANCELLED | OUTPATIENT
Start: 2022-07-28 | End: 2022-07-28

## 2022-07-28 RX ORDER — BACITRACIN ZINC AND POLYMYXIN B SULFATE 500; 1000 [USP'U]/G; [USP'U]/G
OINTMENT TOPICAL ONCE
Status: CANCELLED | OUTPATIENT
Start: 2022-07-28 | End: 2022-07-28

## 2022-07-28 RX ORDER — CLOBETASOL PROPIONATE 0.5 MG/G
OINTMENT TOPICAL ONCE
Status: CANCELLED | OUTPATIENT
Start: 2022-07-28 | End: 2022-07-28

## 2022-07-28 RX ADMIN — LIDOCAINE HYDROCHLORIDE 10 ML: 40 SOLUTION TOPICAL at 08:43

## 2022-07-28 ASSESSMENT — ENCOUNTER SYMPTOMS
DIARRHEA: 0
NAUSEA: 0
VOMITING: 0

## 2022-07-28 NOTE — PROGRESS NOTES
[address]   Progress Note and Procedure Note      [address] [place] RECORD NUMBER:  [id number]  AGE: 78 y.o. GENDER: male  : 1955  EPISODE DATE:  2022    Subjective:     Chief Complaint   Patient presents with    Wound Check     Left second toe, amp site         HISTORY of PRESENT ILLNESS HPI     Barbra Barfield is a 78 y.o. male who presents today for wound/ulcer evaluation. Current evaluation:  Kyra Jones has been applying dakins soaked packing daily. Had the angio performed with no intervention. He has not followed up with ID yet. Completed the two week course of zyvox. Denies systemic sign of infection. Has returned to work against medical advice and is wearing a regular tennis shoe. Interval history: Kyra Jones was admitted to [facility] after the last visit and he was seen by infectious disease and vascular surgery. He had a planned angiogram however this was canceled due to elevated INR. He is awaiting a call today from their office to reschedule. He was discharged on p.o. Zyvox per ID recommendations 14 days. He has been dressing the wound at home daily with iodoform packing. No worsening sign of infection. History of Wound Context:  Kyra Jones presents for evaluation of left foot ulceration. He underwent second toe amputation in March. Has had a nonhealing wound since then. He presents today in slip on shoes. Has been working at [employer]. He saw vascular surgery recently who is planning for an angiogram.  Denies systemic sign of infection.     Ulcer Identification:  Ulcer Type: diabetic, non-healing surgical and neuropathic  Contributing Factors: diabetes, poor glucose control, chronic pressure, shear force, arterial insufficiency and decreased tissue oxygenation          PAST MEDICAL HISTORY        Diagnosis Date    Atrial fibrillation [facility])     CAD (coronary artery disease)     Cardiomyopathy (HCC)     Cellulitis     CHF (congestive heart failure) ([facility])     Diabetes mellitus ([facility]     Foot infection     Heart failure (HCC)     Hyperlipidemia     Hypertension     MRSA (methicillin resistant staph aureus) culture positive     Osteomyelitis ([facility])     PVD (peripheral vascular disease) ([facility])     Wound, open, foot     left foot       PAST SURGICAL HISTORY    Past Surgical History:   Procedure Laterality Date    CARDIAC SURGERY  2010    CABG X3    COLONOSCOPY      DEBRIDEMENT Right 11/19/2015    DEBRIDEMENT WOUND FOOT RIGHT W/ APPLICATION BILAT INTEG RAT GRAFT    ENDOSCOPY, COLON, DIAGNOSTIC      EYE SURGERY Bilateral     cataracts    HERNIA REPAIR      umbilical    KNEE ARTHROSCOPY Right     OTHER SURGICAL HISTORY Right 12 29 15    wound care graft procedure foot,appl of integra    PACEMAKER PLACEMENT  2011    WITH DEFIBRILLATOR    TOE AMPUTATION Right     R great    TOE AMPUTATION Left 3/13/2022    TOE AMPUTATION--left 2nd digit performed by Thien Romero DPM at [address] [place] [address] Left 7/13/2022    RIGHT COMMON FEMORAL ACCESS UNDER ULTRASOUND GUIDANCE DISTAL AORTOGRAPHY WITH PELVIC RUN OFF PLACEMENT OF CATHETER INTO LEFT COMMON FEMORAL ARTERY, PROFUNDA FEMORAL AND SFA  ARTERIOGRAPHY PLACEMENT OF CATHETER INTO LEFT SFA, WITH SFA POPLITEAL AND TIBIAL TIBIOPERONEAL ARTERIOGRAPHY INTO LEFT FOOT performed by Stan Womack MD at [address] [place] [address]    Family History   Problem Relation Age of Onset    Cancer Father         pancreatic cancer    Other Brother         MI    Osteoarthritis Mother     Other Maternal Grandfather         MI       SOCIAL HISTORY    Social History     Tobacco Use    Smoking status: Never    Smokeless tobacco: Never   Vaping Use    Vaping Use: Never used   Substance Use Topics    Alcohol use: Yes     Comment: SOCIAL on weekends    Drug use: No       ALLERGIES    Allergies   Allergen Reactions    Doxycycline Other (See Comments)     Skin peeling    Pcn [Penicillins] Rash    Penicillin G Rash MEDICATIONS    Current Outpatient Medications on File Prior to Encounter   Medication Sig Dispense Refill    sodium hypochlorite (DAKINS) 0.125 % SOLN external solution Apply to packing strip and pack wound daily 1000 mL 1    silver sulfADIAZINE (SILVADENE) 1 % cream APPLY DAILY TO SKIN TO AFFECTED AREA EVERY DAY FOR 30 DAYS (Patient not taking: No sig reported)      aspirin 81 MG EC tablet Take 1 tablet by mouth daily 30 tablet 3    nitroGLYCERIN (NITROSTAT) 0.4 MG SL tablet up to max of 3 total doses. If no relief after 1 dose, call 911. (Patient not taking: Reported on 7/25/2022) 25 tablet 3    glipiZIDE (GLUCOTROL) 5 MG tablet Take 1 tablet by mouth every morning (before breakfast) 60 tablet 3    benazepril (LOTENSIN) 5 MG tablet Take 1 tablet by mouth daily 30 tablet 3    furosemide (LASIX) 80 MG tablet Take 1 tablet by mouth daily 60 tablet 3    warfarin (COUMADIN) 2 MG tablet Take 4 mg by mouth daily Per [facility]      atorvastatin (LIPITOR) 40 MG tablet Take 40 mg by mouth daily. No current facility-administered medications on file prior to encounter. REVIEW OF SYSTEMS    Review of Systems   Constitutional:  Negative for chills and fever. Gastrointestinal:  Negative for diarrhea, nausea and vomiting. Skin:  Positive for wound. Objective:      BP (!) 162/90   Pulse 80   Temp (!) 96 °F (35.6 °C) (Tympanic)   Resp 18   Ht 5' 10\" (1.778 m)   Wt 179 lb (81.2 kg)   BMI 25.68 kg/m²     Wt Readings from Last 3 Encounters:   07/28/22 179 lb (81.2 kg)   07/25/22 179 lb (81.2 kg)   07/20/22 179 lb (81.2 kg)       Physical Exam:  General:  Alert and oriented x3. In no acute distress. Lower Extremity Physical Exam:    Vascular: DP pulses are not palpable, Bilateral. PT pulses are not palpable, Bilateral. CFT <3 seconds to all digits, Bilateral.   No edema.   Hair growth is absent to the level of the lower leg, Bilateral.     Neuro: Saph/sural/SP/DP/plantar sensation absent to light touch. Protective sensation is intact to  0 /10 sites as tested with a 5.07g SWMF, Bilateral.     Musculoskeletal: EHL/FHL/GS/TA gross motor intact. Gross deformity is present, s/p multiple digit amputations bilateral foot. Dermatologic: Open wound present to left 2nd toe amputation site as documented in detail below. Wound base extends into the fascial layer and is necrotic and fibrotic with a large amount of slough and bioburden. There is a small amount of yellow purulent drainage. Bone is not exposed. There is erythema localized to the fransisca-wound. There is no fluctuance or crepitus. Interdigital maceration absent, Bilateral.       Assessment:      Active Hospital Problems    Diagnosis Date Noted    Atherosclerosis of native arteries of left leg with ulceration of other part of foot ([facility] [I70.245] 07/01/2022     Priority: Medium    Surgical wound, non healing [T81.89XA]      Priority: Medium    Acute osteomyelitis of left foot [facility]) [M86.172] 06/30/2022     Priority: Medium    Diabetic polyneuropathy associated with type 2 diabetes mellitus ([facility] [E11.42] 06/30/2022     Priority: Medium    Cellulitis of left foot [L03.116] 06/30/2022     Priority: Medium    Ulcer of left foot, with necrosis of bone ([facility] [L97.524]        Plan:     Treatment Note please see attached Discharge Instructions    Culture obtained  Rx clindamycin 300 mg 3 times daily for 10 days due to previous culture with MRSA sensitive to clindamycin and Bacteroides. Will have patient follow up with ID here in clinic this coming week  with Dr. Terrilee Goldberg to apply Dakin's to packing strip and pack wound twice daily. Recommended NPWT for best chance at healing the wound. He states he will not use the wound vac. Discussed possible hyperbaric oxygen therapy  Will have evaluation done in the next 2 weeks.     Cindy Galan is high risk of limb loss secondary to medical co-morbidities, extent of infection, history of (Comment) 07/13/22 1004   Drainage Amount Moderate 07/28/22 0840   Drainage Description Serosanguinous; Yellow 07/28/22 0840   Odor None 07/28/22 0840   [place]-wound Assessment Maceration; Non-blanchable erythema 07/28/22 0840   Margins Attached edges 07/28/22 0840   Number of days: 27            Percent of Wound(s)/Ulcer(s) Debrided: 100%    Total Surface Area Debrided:  0.5 sq cm     Diabetic/Pressure/Non Pressure Ulcers only:  Ulcer:  Diabetic ulcer, fascial layer exposed      Estimated Blood Loss:  Estimated amount of blood loss is 5ml. Hemostasis Achieved:  by pressure    Response to treatment:  Well tolerated by patient. Written patient discharge instructions given to patient and signed by patient or POA. Orders Placed This Encounter   Medications    lidocaine (XYLOCAINE) 4 % external solution    clindamycin (CLEOCIN) 300 MG capsule     Sig: Take 1 capsule by mouth in the morning and 1 capsule at noon and 1 capsule before bedtime. Do all this for 10 days.      Dispense:  30 capsule     Refill:  0     Orders Placed This Encounter   Procedures    Initiate Outpatient Wound Care Protocol     Cleanse wound with saline    If wound contains bioburden or contamination cleanse with wound cleanser or antimicrobial solution     For normal periwound tissue without irritation nor maceration, apply topical skin protectant    For periwound tissue with irritation and/or maceration, apply zinc based product, topical steroid cream/ointment, or equivalent     For wounds with dry firm black eschar and/or without exudate, apply betadine and leave open to air      For wounds with scant/small to no exudate or drainage, apply wound gel, hydrocolloid, polymer, or equivalent and cover with secondary dressing/foam      For wounds with moderate/large exudate or drainage, apply alginate, hydrofiber, polymer, or equivalent and cover with secondary dressing/foam    For wounds with nonviable tissue requiring removal, apply chemical or mechanical debrider and cover with secondary dressing/foam    For wounds with tunneling, dead space, or cavity, fill or pack with strip/gauze/kerlex to fit and cover with secondary dressing/foam    For wounds with adequate granulation or epithelization, apply wound gel, hydrocolloid, polymer, collagen, or transparent film, and cover secondary dry dressing/foam    For wounds that need additional secondary dressing to help pad or control additional drainage/exudates, add foam, absorbent pad or hydrocolloid    For wounds with suspected or known infection, apply antimicrobial mesh and/or antimicrobial alginate/hydrofiber, or antimicrobial solution moistened gauze/kerlex, or equivalent and cover with secondary dressing/foam    Compression Management needed for edema control, apply multilayer compression or tubular garment or equivalent    Offloading Management needed for pressure relief, apply offloading shoe/boot or equivalent     Standing Status:   Standing     Number of Occurrences:   1    Culture, Anaerobic and Aerobic     Standing Status:   Standing     Number of Occurrences:   1          Discharge Instructions           [address] [facility]                                 Visit  Discharge Instructions / Physician Orders  DATE:7/28/22   Home Care:NONE     SUPPLIES ORDERED THRU:    NONE                 DATE LAST SUPPLIED     Wound Location:  Left foot 2nd toe and lateral foot     Cleanse with: Liquid antibacterial soap and water, rinse well      Dressing Orders:  Primary dressing   Dakins soaked plain 1/4\" packing     Secondary dressing  Dry Dressing                  secure with           x 30days     Frequency:  Change daily     Additional Orders: Increase protein to diet (meat, cheese, eggs, fish, peanut butter, nuts and beans)  Multivitamin daily     CLINDAMYCIN AT YOUR PHARMACY     OFFLOADING [x] YES  TYPE:                  [] NA  surgical shoe  Weekly wound care visits until determined otherwise. Antibiotic therapy-wound care related YES [x] NO [] NA[]     MY CHART []     Smart Device  []                         Your next appointment with the [address] [place] [address] is in 1 week with Dr. Jeison Eddy                                                                                              (Please note your next appointment above and if you are unable to keep, kindly give a 24 hour notice. Thank you.)  If more than 15 min late we cannot guarantee you will be seen due to clinician schedule  Per Policy, Excessive cancellation will call for dismissal from program.  If you experience any of the following, please call the [address] [place] [address] during business hours:  [phone]     * Increase in Pain  * Temperature over 101  * Increase in drainage from your wound  * Drainage with a foul odor  * Bleeding  * Increase in swelling  * Need for compression bandage changes due to slippage, breakthrough drainage. If you need medical attention outside of the business hours of the [address] [place] [address] please contact your PCP or go to the nearest emergency room. The information contained in the After Visit Summary has been reviewed with me, the patient and/or responsible adult, by my health care provider(s). I had the opportunity to ask questions regarding this information.  I have elected to receive;      []After Visit Summary  [x]Comprehensive Discharge Instruction        Patient signature______________________________________Date:________  Electronically signed by Analia Fuentes DPM on 7/28/2022 at 8:49 AM  Electronically signed by Caitlyn Kelley RN on 7/28/2022 at 8:56 AM         Electronically signed by Analia Fuentes DPM on 7/28/2022 at 8:59 AM

## 2022-07-30 LAB
CULTURE: ABNORMAL
DIRECT EXAM: ABNORMAL
DIRECT EXAM: ABNORMAL
Lab: ABNORMAL
SPECIMEN DESCRIPTION: ABNORMAL

## 2022-07-31 NOTE — DISCHARGE INSTRUCTIONS
[address] [facility]                                 Visit  Discharge Instructions / Physician Orders  [address] [facility]     SUPPLIES ORDERED THRU:    NONE                 DATE LAST SUPPLIED     Wound Location:  Left foot 2nd toe and lateral foot     Cleanse with: Liquid antibacterial soap and water, rinse well      Dressing Orders:  Primary dressing   Dakins soaked plain 1/4\" packing     Secondary dressing  Dry Dressing                  secure with           x 30days     Frequency:  Change daily     Additional Orders: Increase protein to diet (meat, cheese, eggs, fish, peanut butter, nuts and beans)  Multivitamin daily     Finish Clindamycin     OFFLOADING [x] YES  TYPE:                  [] NA  surgical shoe  Weekly wound care visits until determined otherwise. Antibiotic therapy-wound care related YES [x] NO [] NA[]     MY CHART []     Smart Device  []                         Your next appointment with the [address] [place] [address] is in 1 week with Dr. Laura Hernandez                                                                                              (Please note your next appointment above and if you are unable to keep, kindly give a 24 hour notice. Thank you.)  If more than 15 min late we cannot guarantee you will be seen due to clinician schedule  Per Policy, Excessive cancellation will call for dismissal from program.  If you experience any of the following, please call the [address] [place] [address] during business hours:  [phone]     * Increase in Pain  * Temperature over 101  * Increase in drainage from your wound  * Drainage with a foul odor  * Bleeding  * Increase in swelling  * Need for compression bandage changes due to slippage, breakthrough drainage. If you need medical attention outside of the business hours of the [address] [place] [address] please contact your PCP or go to the nearest emergency room.      The information contained in the After Visit Summary has been reviewed with me, the patient and/or responsible adult, by my health care provider(s). I had the opportunity to ask questions regarding this information.  I have elected to receive;      []After Visit Summary  [x]Comprehensive Discharge Instruction        Patient signature______________________________________Date:________  Electronically signed by Orlando Sadler RN on 8/1/2022 at 10:11 AM  Electronically signed by Sarah Beth Reyes MD on 8/1/2022 at 10:15 AM

## 2022-08-01 ENCOUNTER — HOSPITAL ENCOUNTER (OUTPATIENT)
Dept: WOUND CARE | Age: 67
Discharge: HOME | End: 2022-08-01
Payer: MEDICARE

## 2022-08-01 ENCOUNTER — HOSPITAL ENCOUNTER (OUTPATIENT)
Age: 67
Discharge: HOME | End: 2022-08-01
Payer: MEDICARE

## 2022-08-01 VITALS
DIASTOLIC BLOOD PRESSURE: 88 MMHG | SYSTOLIC BLOOD PRESSURE: 158 MMHG | RESPIRATION RATE: 18 BRPM | HEART RATE: 98 BPM | WEIGHT: 179 LBS | HEIGHT: 70 IN | BODY MASS INDEX: 25.62 KG/M2 | TEMPERATURE: 96 F

## 2022-08-01 DIAGNOSIS — M86.9 OSTEOMYELITIS, UNSPECIFIED SITE, UNSPECIFIED TYPE (HCC): Chronic | ICD-10-CM

## 2022-08-01 DIAGNOSIS — L97.524 ULCER OF LEFT FOOT, WITH NECROSIS OF BONE (HCC): Primary | ICD-10-CM

## 2022-08-01 LAB
BUN BLDV-MCNC: 27 MG/DL (ref 8–23)
C-REACTIVE PROTEIN: 12.7 MG/L (ref 0–5)
CREAT SERPL-MCNC: 1.52 MG/DL (ref 0.7–1.2)
GFR AFRICAN AMERICAN: 56 ML/MIN
GFR NON-AFRICAN AMERICAN: 46 ML/MIN
GFR SERPL CREATININE-BSD FRML MDRD: ABNORMAL ML/MIN/{1.73_M2}
HCT VFR BLD CALC: 27.6 % (ref 41–53)
HEMOGLOBIN: 9.1 G/DL (ref 13.5–17.5)
MCH RBC QN AUTO: 27.4 PG (ref 26–34)
MCHC RBC AUTO-ENTMCNC: 33.2 G/DL (ref 31–37)
MCV RBC AUTO: 82.6 FL (ref 80–100)
PDW BLD-RTO: 19.3 % (ref 11.5–14.9)
PLATELET # BLD: 344 K/UL (ref 150–450)
PMV BLD AUTO: 7.2 FL (ref 6–12)
RBC # BLD: 3.34 M/UL (ref 4.5–5.9)
SEDIMENTATION RATE, ERYTHROCYTE: 34 MM/HR (ref 0–20)
SEDIMENTATION RATE, ERYTHROCYTE: 34 MM/HR (ref 0–20)
WBC # BLD: 6.9 K/UL (ref 3.5–11)

## 2022-08-01 PROCEDURE — 11043 DBRDMT MUSC&/FSCA 1ST 20/<: CPT | Performed by: INTERNAL MEDICINE

## 2022-08-01 PROCEDURE — 36415 COLL VENOUS BLD VENIPUNCTURE: CPT

## 2022-08-01 PROCEDURE — 86140 C-REACTIVE PROTEIN: CPT

## 2022-08-01 PROCEDURE — 85652 RBC SED RATE AUTOMATED: CPT

## 2022-08-01 PROCEDURE — 82565 ASSAY OF CREATININE: CPT

## 2022-08-01 PROCEDURE — 11043 DBRDMT MUSC&/FSCA 1ST 20/<: CPT

## 2022-08-01 PROCEDURE — 84520 ASSAY OF UREA NITROGEN: CPT

## 2022-08-01 PROCEDURE — 85027 COMPLETE CBC AUTOMATED: CPT

## 2022-08-01 RX ORDER — CLOBETASOL PROPIONATE 0.5 MG/G
OINTMENT TOPICAL ONCE
Status: CANCELLED | OUTPATIENT
Start: 2022-08-01 | End: 2022-08-01

## 2022-08-01 RX ORDER — LIDOCAINE HYDROCHLORIDE 20 MG/ML
JELLY TOPICAL ONCE
Status: CANCELLED | OUTPATIENT
Start: 2022-08-01 | End: 2022-08-01

## 2022-08-01 RX ORDER — BACITRACIN, NEOMYCIN, POLYMYXIN B 400; 3.5; 5 [USP'U]/G; MG/G; [USP'U]/G
OINTMENT TOPICAL ONCE
Status: CANCELLED | OUTPATIENT
Start: 2022-08-01 | End: 2022-08-01

## 2022-08-01 RX ORDER — BACITRACIN ZINC AND POLYMYXIN B SULFATE 500; 1000 [USP'U]/G; [USP'U]/G
OINTMENT TOPICAL ONCE
Status: CANCELLED | OUTPATIENT
Start: 2022-08-01 | End: 2022-08-01

## 2022-08-01 RX ORDER — GINSENG 100 MG
CAPSULE ORAL ONCE
Status: CANCELLED | OUTPATIENT
Start: 2022-08-01 | End: 2022-08-01

## 2022-08-01 RX ORDER — GENTAMICIN SULFATE 1 MG/G
OINTMENT TOPICAL ONCE
Status: CANCELLED | OUTPATIENT
Start: 2022-08-01 | End: 2022-08-01

## 2022-08-01 RX ORDER — LIDOCAINE HYDROCHLORIDE 40 MG/ML
SOLUTION TOPICAL ONCE
Status: COMPLETED | OUTPATIENT
Start: 2022-08-01 | End: 2022-08-01

## 2022-08-01 RX ORDER — LIDOCAINE HYDROCHLORIDE 40 MG/ML
SOLUTION TOPICAL ONCE
Status: CANCELLED | OUTPATIENT
Start: 2022-08-01 | End: 2022-08-01

## 2022-08-01 RX ORDER — LIDOCAINE 40 MG/G
CREAM TOPICAL ONCE
Status: CANCELLED | OUTPATIENT
Start: 2022-08-01 | End: 2022-08-01

## 2022-08-01 RX ORDER — LIDOCAINE 50 MG/G
OINTMENT TOPICAL ONCE
Status: CANCELLED | OUTPATIENT
Start: 2022-08-01 | End: 2022-08-01

## 2022-08-01 RX ORDER — BETAMETHASONE DIPROPIONATE 0.05 %
OINTMENT (GRAM) TOPICAL ONCE
Status: CANCELLED | OUTPATIENT
Start: 2022-08-01 | End: 2022-08-01

## 2022-08-01 RX ADMIN — LIDOCAINE HYDROCHLORIDE 5 ML: 40 SOLUTION TOPICAL at 09:57

## 2022-08-01 ASSESSMENT — PAIN SCALES - GENERAL: PAINLEVEL_OUTOF10: 0

## 2022-08-01 NOTE — PROGRESS NOTES
[address]   Progress Note and Procedure Note      [address] RECORD NUMBER:  [id number]  AGE: 78 y.o. GENDER: male  : 1955  EPISODE DATE:  2022    Subjective:     Chief Complaint   Patient presents with    Wound Check     Left foot         HISTORY of PRESENT ILLNESS HPI     Darlyn Jackson is a 78 y.o. male who presents today for wound/ulcer evaluation. History of Wound Context the patient is here for follow-up on left foot nonhealing wound at second toe amputation site. He denied significant pain. Tissue culture was obtained on 2022 that grew MRSA and Bacteroides, was started on clindamycin that he is tolerating. He denied significant diarrhea, denied fever or chills, no other complaints. C-reactive protein was 36.7 and sedimentation rate was 97 on 2022, blood cultures were negative. Patient had angiogram done 2022 with no intervention.   Wound/Ulcer Pain Timing/Severity: none  The patient had MRSA bacteremia was suspected osteomyelitis to the second toe status post second toe amputation 3/13/2022  Showed bilateral great toe amputation    Ulcer Identification:  Ulcer Type: arterial    Contributing Factors: arterial insufficiency and malnutrition    The patient had AICD in place    PAST MEDICAL HISTORY        Diagnosis Date    Atrial fibrillation ([address]     CAD (coronary artery disease)     Cardiomyopathy ([address]     Cellulitis     CHF (congestive heart failure) (HCC)     Diabetes mellitus ([address]     Foot infection     Heart failure (HCC)     Hyperlipidemia     Hypertension     MRSA (methicillin resistant staph aureus) culture positive     Osteomyelitis ([address]     PVD (peripheral vascular disease) ([address]     Wound, open, foot     left foot       PAST SURGICAL HISTORY    Past Surgical History:   Procedure Laterality Date    CARDIAC SURGERY      CABG X3    COLONOSCOPY      DEBRIDEMENT Right 2015    DEBRIDEMENT WOUND FOOT RIGHT W/ APPLICATION BILAT INTEG RAT GRAFT    ENDOSCOPY, COLON, DIAGNOSTIC      EYE SURGERY Bilateral     cataracts    HERNIA REPAIR      umbilical    KNEE ARTHROSCOPY Right     OTHER SURGICAL HISTORY Right 12 29 15    wound care graft procedure foot,appl of integra    PACEMAKER PLACEMENT  2011    WITH DEFIBRILLATOR    TOE AMPUTATION Right     R great    TOE AMPUTATION Left 3/13/2022    TOE AMPUTATION--left 2nd digit performed by Shayne Leslie DPM at [address] [facility] Left 7/13/2022    RIGHT COMMON FEMORAL ACCESS UNDER ULTRASOUND GUIDANCE DISTAL AORTOGRAPHY WITH PELVIC RUN OFF PLACEMENT OF CATHETER INTO LEFT COMMON FEMORAL ARTERY, PROFUNDA FEMORAL AND SFA  ARTERIOGRAPHY PLACEMENT OF CATHETER INTO LEFT SFA, WITH SFA POPLITEAL AND TIBIAL TIBIOPERONEAL ARTERIOGRAPHY INTO LEFT FOOT performed by Josué Helton MD at [facility]    Family History   Problem Relation Age of Onset    Cancer Father         pancreatic cancer    Other Brother         MI    Osteoarthritis Mother     Other Maternal Grandfather         MI       SOCIAL HISTORY    Social History     Tobacco Use    Smoking status: Never    Smokeless tobacco: Never   Vaping Use    Vaping Use: Never used   Substance Use Topics    Alcohol use: Yes     Comment: SOCIAL on weekends    Drug use: No       ALLERGIES    Allergies   Allergen Reactions    Doxycycline Other (See Comments)     Skin peeling    Pcn [Penicillins] Rash    Penicillin G Rash       MEDICATIONS    Current Outpatient Medications on File Prior to Encounter   Medication Sig Dispense Refill    clindamycin (CLEOCIN) 300 MG capsule Take 1 capsule by mouth in the morning and 1 capsule at noon and 1 capsule before bedtime. Do all this for 10 days.  30 capsule 0    sodium hypochlorite (DAKINS) 0.125 % SOLN external solution Apply to packing strip and pack wound daily 1000 mL 1    silver sulfADIAZINE (SILVADENE) 1 % cream APPLY DAILY TO SKIN TO AFFECTED AREA EVERY DAY FOR 30 DAYS (Patient not taking: No sig reported)      aspirin 81 MG EC tablet Take 1 tablet by mouth daily 30 tablet 3    nitroGLYCERIN (NITROSTAT) 0.4 MG SL tablet up to max of 3 total doses. If no relief after 1 dose, call 911. (Patient not taking: Reported on 7/25/2022) 25 tablet 3    glipiZIDE (GLUCOTROL) 5 MG tablet Take 1 tablet by mouth every morning (before breakfast) 60 tablet 3    benazepril (LOTENSIN) 5 MG tablet Take 1 tablet by mouth daily 30 tablet 3    furosemide (LASIX) 80 MG tablet Take 1 tablet by mouth daily 60 tablet 3    warfarin (COUMADIN) 2 MG tablet Take 4 mg by mouth daily Per [facility]      atorvastatin (LIPITOR) 40 MG tablet Take 40 mg by mouth daily. No current facility-administered medications on file prior to encounter. REVIEW OF SYSTEMS  Review of Systems    Pertinent items are noted in HPI. Objective:      BP (!) 158/88   Pulse 98   Temp (!) 96 °F (35.6 °C) (Tympanic)   Resp 18   Ht 5' 10\" (1.778 m)   Wt 179 lb (81.2 kg)   BMI 25.68 kg/m²     Wt Readings from Last 3 Encounters:   08/01/22 179 lb (81.2 kg)   07/28/22 179 lb (81.2 kg)   07/25/22 179 lb (81.2 kg)       PHYSICAL EXAM  Physical Exam  Constitutional:       General: He is not in acute distress. HENT:      Right Ear: External ear normal.      Left Ear: External ear normal.   Eyes:      General: No scleral icterus. Conjunctiva/sclera: Conjunctivae normal.   Pulmonary:      Effort: Pulmonary effort is normal. No respiratory distress. Abdominal:      General: There is no distension. Palpations: Abdomen is soft. Musculoskeletal:      Cervical back: Neck supple. No rigidity. Skin:     General: Skin is warm. Coloration: Skin is not jaundiced. Neurological:      Mental Status: He is alert.          Assessment:        Problem List Items Addressed This Visit       Ulcer of left foot, with necrosis of bone ([address] - Primary    Relevant Orders    Initiate Outpatient Wound Care Protocol Procedure Note  Indications:  Based on my examination of this patient's wound(s)/ulcer(s) today, debridement is required to promote healing and evaluate the wound base. Performed by: Dewayne Rodriguez MD    Consent obtained:  Yes    Time out taken:  Yes    Pain Control: Anesthetic  Anesthetic: 4% Lidocaine Liquid Topical       Debridement: Excisional Debridement    Using curette the wound(s)/ulcer(s) was/were debrided down through and including the removal of subcutaneous tissue and muscle/fascia. Devitalized Tissue Debrided:  fibrin, biofilm, and slough    Pre Debridement Measurements:  Are located in the [place]  Documentation Flow Sheet    Diabetic/Pressure/Non Pressure Ulcers only:  Ulcer: N/A     Wound/Ulcer #: 1    Post Debridement Measurements:  Wound/Ulcer Descriptions are Pre Debridement except measurements:    Wound 06/30/22 Toe (Comment  which one) Anterior; Left wound #1 second toe (Active)   Wound Image   07/28/22 0840   Wound Etiology Diabetic Cortes 3 08/01/22 5953   Dressing Status New drainage noted; Old drainage noted 08/01/22 0951   Wound Cleansed Cleansed with saline 08/01/22 0951   Offloading for Diabetic Foot Ulcers Offloading ordered;Post op shoe 07/08/22 0837   Wound Length (cm) 2 cm 08/01/22 0951   Wound Width (cm) 0.7 cm 08/01/22 0951   Wound Depth (cm) 2.5 cm 08/01/22 0951   Wound Surface Area (cm^2) 1.4 cm^2 08/01/22 0951   Change in Wound Size % (l*w) -900 08/01/22 0951   Wound Volume (cm^3) 3.5 cm^3 08/01/22 0951   Wound Healing % -8233 08/01/22 0951   Post-Procedure Length (cm) 2 cm 08/01/22 0951   Post-Procedure Width (cm) 0.7 cm 08/01/22 0951   Post-Procedure Depth (cm) 2.5 cm 08/01/22 0951   Post-Procedure Surface Area (cm^2) 1.4 cm^2 08/01/22 0951   Post-Procedure Volume (cm^3) 3.5 cm^3 08/01/22 0951   Wound Assessment Other (Comment) 07/13/22 1004   Drainage Amount Moderate 08/01/22 0951   Drainage Description Serosanguinous; Yellow 08/01/22 0951   Odor None 08/01/22 7615   Keturah-wound Assessment Maceration; Non-blanchable erythema 08/01/22 0951   Margins Attached edges 08/01/22 0951   Number of days: 32          Total Surface Area Debrided:  1.4 sq cm     Estimated Blood Loss:  Minimal    Hemostasis Achieved:  by pressure    Procedural Pain:  0  / 10     Post Procedural Pain:  0 / 10     Response to treatment:  Well tolerated by patient. Plan:   Continue oral clindamycin for 1 week  Follow C-reactive protein and sedimentation rate  Consider CT of the left foot if persistent elevation of the inflammatory marker. Treatment Note please see attached Discharge Instructions    Written patient dismissal instructions given to patient and signed by patient or POA. Discharge Instructions           [address] [place] [place] and [address]                                 Visit  Discharge Instructions / Physician Orders  [address] [facility] [place] [facility]""     SUPPLIES ORDERED THRU:    NONE                 DATE LAST SUPPLIED     Wound Location:  Left foot 2nd toe and lateral foot     Cleanse with: Liquid antibacterial soap and water, rinse well      Dressing Orders:  Primary dressing   Dakins soaked plain 1/4\" packing     Secondary dressing  Dry Dressing                  secure with           x 30days     Frequency:  Change daily     Additional Orders: Increase protein to diet (meat, cheese, eggs, fish, peanut butter, nuts and beans)  Multivitamin daily      CLINDAMYCIN AT YOUR PHARMACY     OFFLOADING [x] YES  TYPE:                  [] NA  surgical shoe  Weekly wound care visits until determined otherwise.      Antibiotic therapy-wound care related YES [x] NO [] NA[]     MY CHART []     Smart Device  []                         Your next appointment with the [address] [place] [address] is in 1 week with Dr. Hamzah Burger                                                                                              (Please note your next appointment above and if you are unable to keep, kindly give a 24 hour notice. Thank you.)  If more than 15 min late we cannot guarantee you will be seen due to clinician schedule  Per Policy, Excessive cancellation will call for dismissal from program.  If you experience any of the following, please call the [facility] during business hours:  [phone]     * Increase in Pain  * Temperature over 101  * Increase in drainage from your wound  * Drainage with a foul odor  * Bleeding  * Increase in swelling  * Need for compression bandage changes due to slippage, breakthrough drainage. If you need medical attention outside of the business hours of the [facility] please contact your PCP or go to the nearest emergency room. The information contained in the After Visit Summary has been reviewed with me, the patient and/or responsible adult, by my health care provider(s). I had the opportunity to ask questions regarding this information.  I have elected to receive;      []After Visit Summary  [x]Comprehensive Discharge Instruction        Patient signature______________________________________Date:________        Electronically signed by Francoise Lamb MD on 8/1/2022 at 10:03 AM

## 2022-08-02 LAB
HCT VFR BLD CALC: NORMAL % (ref 41–53)
HEMOGLOBIN: NORMAL G/DL (ref 13.5–17.5)
MCH RBC QN AUTO: NORMAL PG (ref 26–34)
MCHC RBC AUTO-ENTMCNC: NORMAL G/DL (ref 31–37)
MCV RBC AUTO: NORMAL FL (ref 80–100)
PDW BLD-RTO: NORMAL % (ref 11.5–14.9)
PLATELET # BLD: NORMAL K/UL (ref 150–450)
PMV BLD AUTO: NORMAL FL (ref 6–12)
RBC # BLD: NORMAL M/UL (ref 4.5–5.9)
WBC # BLD: NORMAL K/UL (ref 3.5–11)

## 2022-08-05 NOTE — DISCHARGE INSTRUCTIONS
[address] [place] and [facility]                                 Visit  Discharge Instructions / Physician Orders  [address]     SUPPLIES ORDERED THRU:    NONE                 DATE LAST SUPPLIED     Wound Location:  Left foot 2nd toe and lateral foot     Cleanse with: Liquid antibacterial soap and water, rinse well      Dressing Orders:   Left foot Primary dressing   Dakins soaked plain 1/4\" packing     Secondary dressing  Dry Dressing       secure with           x 30days   weave dry gauze between toes 3,4 and 5  Frequency:  Change daily     Additional Orders: Increase protein to diet (meat, cheese, eggs, fish, peanut butter, nuts and beans)  Multivitamin daily     Start Zyvox Start 8/8/22  Start Flagyl-do not drink alcohol with this medicine it will make you sick Start 8/8/22    OFFLOADING [x] YES  TYPE:                  [] NA  surgical shoe  Weekly wound care visits until determined otherwise. Antibiotic therapy-wound care related YES [x] NO [] NA[]     MY CHART []     Smart Device  []                         Your next appointment with the [address] [place] [address] is in 1 week with Dr. Laura Hernandez                                                                                              (Please note your next appointment above and if you are unable to keep, kindly give a 24 hour notice. Thank you.)  If more than 15 min late we cannot guarantee you will be seen due to clinician schedule  Per Policy, Excessive cancellation will call for dismissal from program.  If you experience any of the following, please call the [address] [place] [address] during business hours:  [phone]     * Increase in Pain  * Temperature over 101  * Increase in drainage from your wound  * Drainage with a foul odor  * Bleeding  * Increase in swelling  * Need for compression bandage changes due to slippage, breakthrough drainage.      If you need medical attention outside of the business hours of the [facility] please contact your PCP or go to the nearest emergency room. The information contained in the After Visit Summary has been reviewed with me, the patient and/or responsible adult, by my health care provider(s). I had the opportunity to ask questions regarding this information.  I have elected to receive;      []After Visit Summary  [x]Comprehensive Discharge Instruction        Patient signature______________________________________Date:________  Electronically signed by Krista Crowley RN on 8/8/2022 at 10:44 AM  Electronically signed by Go Moore MD on 8/8/2022 at 10:46 AM

## 2022-08-08 ENCOUNTER — HOSPITAL ENCOUNTER (OUTPATIENT)
Dept: WOUND CARE | Age: 67
Discharge: HOME | End: 2022-08-08
Payer: MEDICARE

## 2022-08-08 ENCOUNTER — TELEPHONE (OUTPATIENT)
Dept: WOUND CARE | Age: 67
End: 2022-08-08

## 2022-08-08 VITALS
TEMPERATURE: 96.9 F | WEIGHT: 179 LBS | SYSTOLIC BLOOD PRESSURE: 154 MMHG | RESPIRATION RATE: 18 BRPM | BODY MASS INDEX: 25.68 KG/M2 | HEART RATE: 81 BPM | DIASTOLIC BLOOD PRESSURE: 83 MMHG

## 2022-08-08 DIAGNOSIS — L97.524 ULCER OF LEFT FOOT, WITH NECROSIS OF BONE (HCC): Primary | ICD-10-CM

## 2022-08-08 PROCEDURE — 11043 DBRDMT MUSC&/FSCA 1ST 20/<: CPT | Performed by: INTERNAL MEDICINE

## 2022-08-08 PROCEDURE — 11043 DBRDMT MUSC&/FSCA 1ST 20/<: CPT

## 2022-08-08 RX ORDER — BACITRACIN, NEOMYCIN, POLYMYXIN B 400; 3.5; 5 [USP'U]/G; MG/G; [USP'U]/G
OINTMENT TOPICAL ONCE
Status: CANCELLED | OUTPATIENT
Start: 2022-08-08 | End: 2022-08-08

## 2022-08-08 RX ORDER — LIDOCAINE 40 MG/G
CREAM TOPICAL ONCE
Status: CANCELLED | OUTPATIENT
Start: 2022-08-08 | End: 2022-08-08

## 2022-08-08 RX ORDER — GENTAMICIN SULFATE 1 MG/G
OINTMENT TOPICAL ONCE
Status: CANCELLED | OUTPATIENT
Start: 2022-08-08 | End: 2022-08-08

## 2022-08-08 RX ORDER — LINEZOLID 600 MG/1
600 TABLET, FILM COATED ORAL EVERY 12 HOURS SCHEDULED
Status: DISCONTINUED | OUTPATIENT
Start: 2022-08-08 | End: 2022-08-09 | Stop reason: HOSPADM

## 2022-08-08 RX ORDER — METRONIDAZOLE 500 MG/1
500 TABLET ORAL EVERY 8 HOURS SCHEDULED
Status: DISCONTINUED | OUTPATIENT
Start: 2022-08-08 | End: 2022-08-09 | Stop reason: HOSPADM

## 2022-08-08 RX ORDER — CLOBETASOL PROPIONATE 0.5 MG/G
OINTMENT TOPICAL ONCE
Status: CANCELLED | OUTPATIENT
Start: 2022-08-08 | End: 2022-08-08

## 2022-08-08 RX ORDER — LIDOCAINE HYDROCHLORIDE 20 MG/ML
JELLY TOPICAL ONCE
Status: CANCELLED | OUTPATIENT
Start: 2022-08-08 | End: 2022-08-08

## 2022-08-08 RX ORDER — GINSENG 100 MG
CAPSULE ORAL ONCE
Status: CANCELLED | OUTPATIENT
Start: 2022-08-08 | End: 2022-08-08

## 2022-08-08 RX ORDER — BACITRACIN ZINC AND POLYMYXIN B SULFATE 500; 1000 [USP'U]/G; [USP'U]/G
OINTMENT TOPICAL ONCE
Status: CANCELLED | OUTPATIENT
Start: 2022-08-08 | End: 2022-08-08

## 2022-08-08 RX ORDER — LIDOCAINE HYDROCHLORIDE 40 MG/ML
SOLUTION TOPICAL ONCE
Status: COMPLETED | OUTPATIENT
Start: 2022-08-08 | End: 2022-08-08

## 2022-08-08 RX ORDER — BETAMETHASONE DIPROPIONATE 0.05 %
OINTMENT (GRAM) TOPICAL ONCE
Status: CANCELLED | OUTPATIENT
Start: 2022-08-08 | End: 2022-08-08

## 2022-08-08 RX ORDER — LIDOCAINE HYDROCHLORIDE 40 MG/ML
SOLUTION TOPICAL ONCE
Status: CANCELLED | OUTPATIENT
Start: 2022-08-08 | End: 2022-08-08

## 2022-08-08 RX ORDER — LIDOCAINE 50 MG/G
OINTMENT TOPICAL ONCE
Status: CANCELLED | OUTPATIENT
Start: 2022-08-08 | End: 2022-08-08

## 2022-08-08 RX ADMIN — LIDOCAINE HYDROCHLORIDE 10 ML: 40 SOLUTION TOPICAL at 09:56

## 2022-08-08 ASSESSMENT — PAIN SCALES - GENERAL: PAINLEVEL_OUTOF10: 0

## 2022-08-08 NOTE — PROGRESS NOTES
[address]   Progress Note and Procedure Note      [address] [place] RECORD NUMBER:  [id number]  AGE: 78 y.o. GENDER: male  : 1955  EPISODE DATE:  2022    Subjective:     Chief Complaint   Patient presents with    Wound Check     Left foot         HISTORY of PRESENT ILLNESS HPI     Pro Samuels is a 78 y.o. male who presents today for wound/ulcer evaluation. History of Wound Context the patient is here for follow-up on left foot nonhealing wound at second toe amputation site. He denied significant pain. Tissue culture was obtained on 2022 that grew MRSA and Bacteroides, was started on clindamycin that he stopped taking 3 days ago due to abdominal pain and diarrhea. [facility] He is feeling well today, denied diarrhea or abdominal pain, denied fever or chills, no other complaints. 2022 C-reactive protein 12.7, sedimentation rate 34  C-reactive protein was 36.7 and sedimentation rate was 97 on 2022, blood cultures were negative. Patient had angiogram done 2022 with no intervention.   Wound/Ulcer Pain Timing/Severity: none  The patient had MRSA bacteremia was suspected osteomyelitis to the second toe status post second toe amputation 3/13/2022  Showed bilateral great toe amputation    Ulcer Identification:  Ulcer Type: arterial    Contributing Factors: arterial insufficiency and malnutrition    The patient had AICD in place    PAST MEDICAL HISTORY        Diagnosis Date    Atrial fibrillation (HCC)     CAD (coronary artery disease)     Cardiomyopathy [facility]     Cellulitis     CHF (congestive heart failure) (HCC)     Diabetes mellitus ([facility]     Foot infection     Heart failure (HCC)     Hyperlipidemia     Hypertension     MRSA (methicillin resistant staph aureus) culture positive     Osteomyelitis ([facility])     PVD (peripheral vascular disease) ([facility]     Wound, open, foot     left foot       PAST SURGICAL HISTORY    Past Surgical History:   Procedure Laterality Date    CARDIAC SURGERY  2010    CABG X3    COLONOSCOPY      DEBRIDEMENT Right 11/19/2015    DEBRIDEMENT WOUND FOOT RIGHT W/ APPLICATION BILAT INTEG RAT GRAFT    ENDOSCOPY, COLON, DIAGNOSTIC      EYE SURGERY Bilateral     cataracts    HERNIA REPAIR      umbilical    KNEE ARTHROSCOPY Right     OTHER SURGICAL HISTORY Right 12 29 15    wound care graft procedure foot,appl of integra    PACEMAKER PLACEMENT  2011    WITH DEFIBRILLATOR    TOE AMPUTATION Right     R great    TOE AMPUTATION Left 3/13/2022    TOE AMPUTATION--left 2nd digit performed by Yelitza Stewart DPM at [address] Left 7/13/2022    RIGHT COMMON FEMORAL ACCESS UNDER ULTRASOUND GUIDANCE DISTAL AORTOGRAPHY WITH PELVIC RUN OFF PLACEMENT OF CATHETER INTO LEFT COMMON FEMORAL ARTERY, PROFUNDA FEMORAL AND SFA  ARTERIOGRAPHY PLACEMENT OF CATHETER INTO LEFT SFA, WITH SFA POPLITEAL AND TIBIAL TIBIOPERONEAL ARTERIOGRAPHY INTO LEFT FOOT performed by Koko Harding MD at [facility]    Family History   Problem Relation Age of Onset    Cancer Father         pancreatic cancer    Other Brother         MI    Osteoarthritis Mother     Other Maternal Grandfather         MI       SOCIAL HISTORY    Social History     Tobacco Use    Smoking status: Never    Smokeless tobacco: Never   Vaping Use    Vaping Use: Never used   Substance Use Topics    Alcohol use: Yes     Comment: SOCIAL on weekends    Drug use: No       ALLERGIES    Allergies   Allergen Reactions    Doxycycline Other (See Comments)     Skin peeling    Pcn [Penicillins] Rash    Penicillin G Rash       MEDICATIONS    Current Outpatient Medications on File Prior to Encounter   Medication Sig Dispense Refill    sodium hypochlorite (DAKINS) 0.125 % SOLN external solution Apply to packing strip and pack wound daily 1000 mL 1    aspirin 81 MG EC tablet Take 1 tablet by mouth daily 30 tablet 3    nitroGLYCERIN (NITROSTAT) 0.4 MG SL tablet up to max of 3 total doses.  If no relief after 1 dose, call 911. (Patient not taking: Reported on 7/25/2022) 25 tablet 3    glipiZIDE (GLUCOTROL) 5 MG tablet Take 1 tablet by mouth every morning (before breakfast) 60 tablet 3    benazepril (LOTENSIN) 5 MG tablet Take 1 tablet by mouth daily 30 tablet 3    furosemide (LASIX) 80 MG tablet Take 1 tablet by mouth daily 60 tablet 3    warfarin (COUMADIN) 2 MG tablet Take 4 mg by mouth daily Per [facility]      atorvastatin (LIPITOR) 40 MG tablet Take 40 mg by mouth daily. No current facility-administered medications on file prior to encounter. REVIEW OF SYSTEMS  Review of Systems    Pertinent items are noted in HPI. Objective:      BP (!) 154/83   Pulse 81   Temp 96.9 °F (36.1 °C) (Tympanic)   Resp 18   Wt 179 lb (81.2 kg)   BMI 25.68 kg/m²     Wt Readings from Last 3 Encounters:   08/08/22 179 lb (81.2 kg)   08/01/22 179 lb (81.2 kg)   07/28/22 179 lb (81.2 kg)       PHYSICAL EXAM  Physical Exam  Constitutional:       General: He is not in acute distress. HENT:      Right Ear: External ear normal.      Left Ear: External ear normal.   Eyes:      General: No scleral icterus. Conjunctiva/sclera: Conjunctivae normal.   Pulmonary:      Effort: Pulmonary effort is normal. No respiratory distress. Abdominal:      General: There is no distension. Palpations: Abdomen is soft. Musculoskeletal:      Cervical back: Neck supple. No rigidity. Skin:     General: Skin is warm. Coloration: Skin is not jaundiced. Neurological:      Mental Status: He is alert. Assessment:        Problem List Items Addressed This Visit       Ulcer of left foot, with necrosis of bone ([facility] - Primary    Relevant Orders    Initiate Outpatient Wound Care Protocol        Procedure Note  Indications:  Based on my examination of this patient's wound(s)/ulcer(s) today, debridement is required to promote healing and evaluate the wound base.     Performed by: Pushpa Sandoval MD    Consent obtained:  Yes    Time out taken:  Yes    Pain Control: Anesthetic  Anesthetic: 4% Lidocaine Liquid Topical       Debridement: Excisional Debridement    Using curette the wound(s)/ulcer(s) was/were debrided down through and including the removal of subcutaneous tissue and muscle/fascia. Devitalized Tissue Debrided:  fibrin, biofilm, and slough    Pre Debridement Measurements:  Are located in the [place]  Documentation Flow Sheet    Diabetic/Pressure/Non Pressure Ulcers only:  Ulcer: N/A     Wound/Ulcer #: 1    Post Debridement Measurements:  Wound/Ulcer Descriptions are Pre Debridement except measurements:    Wound 06/30/22 Toe (Comment  which one) Anterior; Left wound #1 second toe (Active)   Wound Image   07/28/22 0840   Wound Etiology Diabetic Cortes 3 08/08/22 9650   Dressing Status New drainage noted; Old drainage noted 08/08/22 0943   Wound Cleansed Soap and water 08/08/22 0943   Offloading for Diabetic Foot Ulcers Offloading ordered;Post op shoe 08/08/22 0943   Wound Length (cm) 1.2 cm 08/08/22 0943   Wound Width (cm) 0.6 cm 08/08/22 0943   Wound Depth (cm) 2.7 cm 08/08/22 0943   Wound Surface Area (cm^2) 0.72 cm^2 08/08/22 0943   Change in Wound Size % (l*w) -414.29 08/08/22 0943   Wound Volume (cm^3) 1.944 cm^3 08/08/22 0943   Wound Healing % -4529 08/08/22 0943   Post-Procedure Length (cm) 1.2 cm 08/08/22 0943   Post-Procedure Width (cm) 0.6 cm 08/08/22 0943   Post-Procedure Depth (cm) 2.7 cm 08/08/22 0943   Post-Procedure Surface Area (cm^2) 0.72 cm^2 08/08/22 0943   Post-Procedure Volume (cm^3) 1.944 cm^3 08/08/22 0943   Wound Assessment Pink/red;Slough;Fibrin 08/08/22 0943   Drainage Amount Moderate 08/08/22 0943   Drainage Description Serosanguinous; Yellow 08/08/22 0943   Odor None 08/08/22 0943   Keturah-wound Assessment Blanchable erythema; Maceration 08/08/22 0943   Margins Defined edges 08/08/22 0943   Number of days: 39          Total Surface Area Debrided:  0.72sq cm Pain  * Temperature over 101  * Increase in drainage from your wound  * Drainage with a foul odor  * Bleeding  * Increase in swelling  * Need for compression bandage changes due to slippage, breakthrough drainage. If you need medical attention outside of the business hours of the [address] [place] [address] Road please contact your PCP or go to the nearest emergency room. The information contained in the After Visit Summary has been reviewed with me, the patient and/or responsible adult, by my health care provider(s). I had the opportunity to ask questions regarding this information.  I have elected to receive;      []After Visit Summary  [x]Comprehensive Discharge Instruction        Patient signature______________________________________Date:________        Electronically signed by Leanne Rivas MD on 8/8/2022 at 10:42 AM

## 2022-08-08 NOTE — TELEPHONE ENCOUNTER
Call to [facility] Zyvox,and Flagyl script called in. Escripting was attempted per  but there was a complication with sending them.

## 2022-08-14 NOTE — DISCHARGE INSTRUCTIONS
[address] [facility]                                 Visit  Discharge Instructions / Physician Orders  Izzy Teresa     SUPPLIES ORDERED THRU:    NONE                 DATE LAST SUPPLIED     Wound Location:  Left foot 2nd toe and lateral foot     Cleanse with: Liquid antibacterial soap and water, rinse well      Dressing Orders:   Left foot Primary dressing   Dakins soaked plain 1/4\" packing     Secondary dressing  Dry Dressing       secure with           x 30days   weave dry gauze between toes 3,4 and 5  Frequency:  Change daily   get labs done on Friday   Additional Orders: Increase protein to diet (meat, cheese, eggs, fish, peanut butter, nuts and beans)  Multivitamin daily   start Bactrim Ds 1 tablet 2 x per day (may crush and put in apple sauce) for 2 weeks  Start Flagyl-do not drink alcohol with this medicine it will make you sick Start 8/8/22     OFFLOADING [x] YES  TYPE:                  [] NA  surgical shoe  Weekly wound care visits until determined otherwise. Antibiotic therapy-wound care related YES [x] NO [] NA[]     MY CHART []     Smart Device  []                         Your next appointment with the [address] [place] [address] is in 2 week with Dr. Jayla Muñoz                                                                                                    1 week with Dr. Evangelista Almaraz                                                                                              (Please note your next appointment above and if you are unable to keep, kindly give a 24 hour notice.  Thank you.)  If more than 15 min late we cannot guarantee you will be seen due to clinician schedule  Per Policy, Excessive cancellation will call for dismissal from program.  If you experience any of the following, please call the [address] [place] [address] during business hours:  [phone]     * Increase in Pain  * Temperature over 101  * Increase in drainage from your wound  * Drainage with a foul odor  * Bleeding  * Increase in swelling  * Need for compression bandage changes due to slippage, breakthrough drainage. If you need medical attention outside of the business hours of the [address] [place] [address] Road please contact your PCP or go to the nearest emergency room. The information contained in the After Visit Summary has been reviewed with me, the patient and/or responsible adult, by my health care provider(s). I had the opportunity to ask questions regarding this information.  I have elected to receive;      []After Visit Summary  [x]Comprehensive Discharge Instruction        Patient signature______________________________________Date:________  Electronically signed by Katie Avila MD on 8/15/2022 at 9:52 AM   Electronically signed by Iwona Contreras RN on 8/15/2022 at 9:58 AM

## 2022-08-15 ENCOUNTER — PRE-PROCEDURE TELEPHONE (OUTPATIENT)
Dept: WOUND CARE | Age: 67
End: 2022-08-15

## 2022-08-15 ENCOUNTER — TELEPHONE (OUTPATIENT)
Dept: WOUND CARE | Age: 67
End: 2022-08-15

## 2022-08-15 ENCOUNTER — HOSPITAL ENCOUNTER (OUTPATIENT)
Dept: CT IMAGING | Age: 67
Discharge: HOME | End: 2022-08-17
Payer: MEDICARE

## 2022-08-15 ENCOUNTER — HOSPITAL ENCOUNTER (OUTPATIENT)
Dept: WOUND CARE | Age: 67
Discharge: HOME | End: 2022-08-15
Payer: MEDICARE

## 2022-08-15 VITALS
RESPIRATION RATE: 18 BRPM | DIASTOLIC BLOOD PRESSURE: 73 MMHG | HEART RATE: 84 BPM | WEIGHT: 179 LBS | TEMPERATURE: 96 F | SYSTOLIC BLOOD PRESSURE: 172 MMHG | BODY MASS INDEX: 25.62 KG/M2 | HEIGHT: 70 IN

## 2022-08-15 DIAGNOSIS — M86.9 OSTEOMYELITIS, UNSPECIFIED SITE, UNSPECIFIED TYPE (HCC): Chronic | ICD-10-CM

## 2022-08-15 DIAGNOSIS — L97.524 ULCER OF LEFT FOOT, WITH NECROSIS OF BONE (HCC): Primary | ICD-10-CM

## 2022-08-15 DIAGNOSIS — L97.524 ULCER OF LEFT FOOT, WITH NECROSIS OF BONE (HCC): ICD-10-CM

## 2022-08-15 PROCEDURE — 11043 DBRDMT MUSC&/FSCA 1ST 20/<: CPT | Performed by: INTERNAL MEDICINE

## 2022-08-15 PROCEDURE — 73700 CT LOWER EXTREMITY W/O DYE: CPT

## 2022-08-15 PROCEDURE — 11043 DBRDMT MUSC&/FSCA 1ST 20/<: CPT

## 2022-08-15 RX ORDER — LIDOCAINE HYDROCHLORIDE 20 MG/ML
JELLY TOPICAL ONCE
Status: CANCELLED | OUTPATIENT
Start: 2022-08-15 | End: 2022-08-15

## 2022-08-15 RX ORDER — BETAMETHASONE DIPROPIONATE 0.05 %
OINTMENT (GRAM) TOPICAL ONCE
Status: CANCELLED | OUTPATIENT
Start: 2022-08-15 | End: 2022-08-15

## 2022-08-15 RX ORDER — LIDOCAINE HYDROCHLORIDE 40 MG/ML
SOLUTION TOPICAL ONCE
Status: CANCELLED | OUTPATIENT
Start: 2022-08-15 | End: 2022-08-15

## 2022-08-15 RX ORDER — SULFAMETHOXAZOLE AND TRIMETHOPRIM 800; 160 MG/1; MG/1
1 TABLET ORAL 2 TIMES DAILY
COMMUNITY
End: 2022-08-22

## 2022-08-15 RX ORDER — LINEZOLID 600 MG/1
600 TABLET, FILM COATED ORAL 2 TIMES DAILY
COMMUNITY
End: 2022-08-15

## 2022-08-15 RX ORDER — SULFAMETHOXAZOLE AND TRIMETHOPRIM 800; 160 MG/1; MG/1
1 TABLET ORAL 2 TIMES DAILY
Qty: 28 TABLET | Refills: 0 | Status: SHIPPED | OUTPATIENT
Start: 2022-08-15 | End: 2022-08-23

## 2022-08-15 RX ORDER — BACITRACIN, NEOMYCIN, POLYMYXIN B 400; 3.5; 5 [USP'U]/G; MG/G; [USP'U]/G
OINTMENT TOPICAL ONCE
Status: CANCELLED | OUTPATIENT
Start: 2022-08-15 | End: 2022-08-15

## 2022-08-15 RX ORDER — BACITRACIN ZINC AND POLYMYXIN B SULFATE 500; 1000 [USP'U]/G; [USP'U]/G
OINTMENT TOPICAL ONCE
Status: CANCELLED | OUTPATIENT
Start: 2022-08-15 | End: 2022-08-15

## 2022-08-15 RX ORDER — LIDOCAINE 50 MG/G
OINTMENT TOPICAL ONCE
Status: CANCELLED | OUTPATIENT
Start: 2022-08-15 | End: 2022-08-15

## 2022-08-15 RX ORDER — METRONIDAZOLE 250 MG/1
250 TABLET ORAL 3 TIMES DAILY
COMMUNITY
End: 2022-08-23

## 2022-08-15 RX ORDER — GINSENG 100 MG
CAPSULE ORAL ONCE
Status: CANCELLED | OUTPATIENT
Start: 2022-08-15 | End: 2022-08-15

## 2022-08-15 RX ORDER — LIDOCAINE HYDROCHLORIDE 40 MG/ML
SOLUTION TOPICAL ONCE
Status: DISCONTINUED | OUTPATIENT
Start: 2022-08-15 | End: 2022-08-16 | Stop reason: HOSPADM

## 2022-08-15 RX ORDER — GENTAMICIN SULFATE 1 MG/G
OINTMENT TOPICAL ONCE
Status: CANCELLED | OUTPATIENT
Start: 2022-08-15 | End: 2022-08-15

## 2022-08-15 RX ORDER — CLOBETASOL PROPIONATE 0.5 MG/G
OINTMENT TOPICAL ONCE
Status: CANCELLED | OUTPATIENT
Start: 2022-08-15 | End: 2022-08-15

## 2022-08-15 RX ORDER — LIDOCAINE 40 MG/G
CREAM TOPICAL ONCE
Status: CANCELLED | OUTPATIENT
Start: 2022-08-15 | End: 2022-08-15

## 2022-08-15 ASSESSMENT — PAIN SCALES - GENERAL: PAINLEVEL_OUTOF10: 0

## 2022-08-15 NOTE — PROGRESS NOTES
Ran estimate for today's wound care appointment at [facility]. Patient owes $340. Patient is waiting for insurance to cover.

## 2022-08-15 NOTE — PROGRESS NOTES
[address]   Progress Note and Procedure Note      [address] [place] RECORD NUMBER:  [id number]  AGE: 78 y.o. GENDER: male  : 1955  EPISODE DATE:  8/15/2022    Subjective:     Chief Complaint   Patient presents with    Wound Check     Left foot second toe amp site         HISTORY of PRESENT ILLNESS HPI     Darlyn Jackson is a 78 y.o. male who presents today for wound/ulcer evaluation. History of Wound Context the patient is here for follow-up on left foot nonhealing wound at second toe amputation site. He denied significant pain. He denied fever or chills, denied nausea or vomiting, no diarrhea, no other complaints. Tissue culture was obtained on 2022 that grew MRSA and Bacteroides, was started on clindamycin that he stopped taking due to diarrhea. He was prescribed Zyvox and Flagyl last week  He is taking oral Flagyl but could not afford Zyvox. 2022 C-reactive protein 12.7, sedimentation rate 34  C-reactive protein was 36.7 and sedimentation rate was 97 on 2022, blood cultures were negative. Patient had angiogram done 2022 with no intervention.   Wound/Ulcer Pain Timing/Severity: none  The patient had MRSA bacteremia was suspected osteomyelitis to the second toe status post second toe amputation 3/13/2022  S/P bilateral great toe amputation    Ulcer Identification:  Ulcer Type: arterial    Contributing Factors: arterial insufficiency and malnutrition    The patient had AICD in place    PAST MEDICAL HISTORY        Diagnosis Date    Atrial fibrillation (HCC)     CAD (coronary artery disease)     Cardiomyopathy ([facility]     Cellulitis     CHF (congestive heart failure) ([facility])     Diabetes mellitus ([facility]     Foot infection     Heart failure ([facility])     Hyperlipidemia     Hypertension     MRSA (methicillin resistant staph aureus) culture positive     Osteomyelitis ([facility])     PVD (peripheral vascular disease) ([facility])     Wound, open, foot     left foot       PAST SURGICAL HISTORY    Past Surgical History:   Procedure Laterality Date    CARDIAC SURGERY  2010    CABG X3    COLONOSCOPY      DEBRIDEMENT Right 11/19/2015    DEBRIDEMENT WOUND FOOT RIGHT W/ APPLICATION BILAT INTEG RAT GRAFT    ENDOSCOPY, COLON, DIAGNOSTIC      EYE SURGERY Bilateral     cataracts    HERNIA REPAIR      umbilical    KNEE ARTHROSCOPY Right     OTHER SURGICAL HISTORY Right 12 29 15    wound care graft procedure foot,appl of integra    PACEMAKER PLACEMENT  2011    WITH DEFIBRILLATOR    TOE AMPUTATION Right     R great    TOE AMPUTATION Left 3/13/2022    TOE AMPUTATION--left 2nd digit performed by Shama Bishop DPM at [address] [place] [address] Left 7/13/2022    RIGHT COMMON FEMORAL ACCESS UNDER ULTRASOUND GUIDANCE DISTAL AORTOGRAPHY WITH PELVIC RUN OFF PLACEMENT OF CATHETER INTO LEFT COMMON FEMORAL ARTERY, PROFUNDA FEMORAL AND SFA  ARTERIOGRAPHY PLACEMENT OF CATHETER INTO LEFT SFA, WITH SFA POPLITEAL AND TIBIAL TIBIOPERONEAL ARTERIOGRAPHY INTO LEFT FOOT performed by Alta Dunlap MD at [address] [place] [address]    Family History   Problem Relation Age of Onset    Cancer Father         pancreatic cancer    Other Brother         MI    Osteoarthritis Mother     Other Maternal Grandfather         MI       SOCIAL HISTORY    Social History     Tobacco Use    Smoking status: Never    Smokeless tobacco: Never   Vaping Use    Vaping Use: Never used   Substance Use Topics    Alcohol use: Yes     Comment: SOCIAL on weekends    Drug use: No       ALLERGIES    Allergies   Allergen Reactions    Doxycycline Other (See Comments)     Skin peeling    Pcn [Penicillins] Rash    Penicillin G Rash       MEDICATIONS    Current Outpatient Medications on File Prior to Encounter   Medication Sig Dispense Refill    linezolid (ZYVOX) 600 MG tablet Take 600 mg by mouth in the morning and 600 mg before bedtime.       metroNIDAZOLE (FLAGYL) 250 MG tablet Take 250 mg by mouth in the morning and 250 mg at noon and 250 mg before bedtime. sodium hypochlorite (DAKINS) 0.125 % SOLN external solution Apply to packing strip and pack wound daily 1000 mL 1    aspirin 81 MG EC tablet Take 1 tablet by mouth daily 30 tablet 3    nitroGLYCERIN (NITROSTAT) 0.4 MG SL tablet up to max of 3 total doses. If no relief after 1 dose, call 911. (Patient not taking: Reported on 7/25/2022) 25 tablet 3    glipiZIDE (GLUCOTROL) 5 MG tablet Take 1 tablet by mouth every morning (before breakfast) 60 tablet 3    benazepril (LOTENSIN) 5 MG tablet Take 1 tablet by mouth daily 30 tablet 3    furosemide (LASIX) 80 MG tablet Take 1 tablet by mouth daily 60 tablet 3    warfarin (COUMADIN) 2 MG tablet Take 4 mg by mouth daily Per [facility]      atorvastatin (LIPITOR) 40 MG tablet Take 40 mg by mouth daily. No current facility-administered medications on file prior to encounter. REVIEW OF SYSTEMS  Review of Systems    Pertinent items are noted in HPI. Objective:      BP (!) 172/73   Pulse 84   Temp (!) 96 °F (35.6 °C) (Tympanic)   Resp 18   Ht 5' 10\" (1.778 m)   Wt 179 lb (81.2 kg)   BMI 25.68 kg/m²     Wt Readings from Last 3 Encounters:   08/15/22 179 lb (81.2 kg)   08/08/22 179 lb (81.2 kg)   08/01/22 179 lb (81.2 kg)       PHYSICAL EXAM  Physical Exam  Constitutional:       General: He is not in acute distress. HENT:      Right Ear: External ear normal.      Left Ear: External ear normal.   Eyes:      General: No scleral icterus. Conjunctiva/sclera: Conjunctivae normal.   Pulmonary:      Effort: Pulmonary effort is normal. No respiratory distress. Abdominal:      General: There is no distension. Palpations: Abdomen is soft. Musculoskeletal:      Cervical back: Neck supple. No rigidity. Skin:     General: Skin is warm. Coloration: Skin is not jaundiced. Neurological:      Mental Status: He is alert.          Assessment:        Problem List Items Addressed This Visit       Ulcer of left foot, with necrosis of bone (HCC) - Primary    Relevant Medications    lidocaine (XYLOCAINE) 4 % external solution    Other Relevant Orders    Initiate Outpatient Wound Care Protocol        Procedure Note  Indications:  Based on my examination of this patient's wound(s)/ulcer(s) today, debridement is required to promote healing and evaluate the wound base. Performed by: Blanca Guzman MD    Consent obtained:  Yes    Time out taken:  Yes    Pain Control: Anesthetic  Anesthetic: 4% Lidocaine Liquid Topical       Debridement: Excisional Debridement    Using curette the wound(s)/ulcer(s) was/were debrided down through and including the removal of subcutaneous tissue and muscle/fascia. Devitalized Tissue Debrided:  fibrin, biofilm, and slough    Pre Debridement Measurements:  Are located in the [facility]  Documentation Flow Sheet    Diabetic/Pressure/Non Pressure Ulcers only:  Ulcer: N/A     Wound/Ulcer #: 1    Post Debridement Measurements:  Wound/Ulcer Descriptions are Pre Debridement except measurements:    Wound 06/30/22 Toe (Comment  which one) Anterior; Left wound #1 second toe (Active)   Wound Image   07/28/22 0840   Wound Etiology Diabetic [place] 3 08/15/22 0914   Dressing Status New drainage noted; Old drainage noted 08/15/22 0914   Wound Cleansed Soap and water 08/15/22 0914   Offloading for Diabetic Foot Ulcers Offloading ordered;Post op shoe 08/15/22 0914   Wound Length (cm) 1.3 cm 08/15/22 0914   Wound Width (cm) 0.5 cm 08/15/22 0914   Wound Depth (cm) 1.8 cm 08/15/22 0914   Wound Surface Area (cm^2) 0.65 cm^2 08/15/22 0914   Change in Wound Size % (l*w) -364.29 08/15/22 0914   Wound Volume (cm^3) 1.17 cm^3 08/15/22 0914   Wound Healing % -2686 08/15/22 0914   Post-Procedure Length (cm) 1.3 cm 08/15/22 0914   Post-Procedure Width (cm) 0.5 cm 08/15/22 0914   Post-Procedure Depth (cm) 1.8 cm 08/15/22 0914   Post-Procedure Surface Area (cm^2) 0.65 cm^2 08/15/22 0914   Post-Procedure Volume (cm^3) 1.17 cm^3 08/15/22 0914   Wound Assessment Pink/red;Slough;Fibrin 08/15/22 0914   Drainage Amount Moderate 08/15/22 0914   Drainage Description Serosanguinous; Yellow 08/15/22 0914   Odor None 08/15/22 0914   [place]-wound Assessment Blanchable erythema; Maceration 08/15/22 0914   Margins Defined edges 08/15/22 0914   Number of days: 46          Total Surface Area Debrided:  0.65sq cm     Estimated Blood Loss:  Minimal    Hemostasis Achieved:  by pressure    Procedural Pain:  0  / 10     Post Procedural Pain:  0 / 10     Response to treatment:  Well tolerated by patient. Plan:   Continue p.o. Flagyl   P.o. Bactrim for 2 weeks  Follow BUN/creatinine  CT of the left foot without contrast will be done today    Written patient dismissal instructions given to patient and signed by patient or POA. Discharge Instructions           [address] [facility] [address]                                 Visit  Discharge Instructions / Physician Orders  [address]     SUPPLIES ORDERED THRU:    NONE                 DATE LAST SUPPLIED     Wound Location:  Left foot 2nd toe and lateral foot     Cleanse with: Liquid antibacterial soap and water, rinse well      Dressing Orders:   Left foot Primary dressing   Dakins soaked plain 1/4\" packing     Secondary dressing  Dry Dressing       secure with           x 30days   weave dry gauze between toes 3,4 and 5  Frequency:  Change daily     Additional Orders: Increase protein to diet (meat, cheese, eggs, fish, peanut butter, nuts and beans)  Multivitamin daily     Start Zyvox Start 8/8/22  Start Flagyl-do not drink alcohol with this medicine it will make you sick Start 8/8/22     OFFLOADING [x] YES  TYPE:                  [] NA  surgical shoe  Weekly wound care visits until determined otherwise.      Antibiotic therapy-wound care related YES [x] NO [] NA[]     MY CHART []     Smart Device  []                         Your next appointment with the [address] [place] [address] is in 1 week with Dr. Leah Travis                                                                                              (Please note your next appointment above and if you are unable to keep, kindly give a 24 hour notice. Thank you.)  If more than 15 min late we cannot guarantee you will be seen due to clinician schedule  Per Policy, Excessive cancellation will call for dismissal from program.  If you experience any of the following, please call the [address] [place] [address] [facility] during business hours:  [phone]     * Increase in Pain  * Temperature over 101  * Increase in drainage from your wound  * Drainage with a foul odor  * Bleeding  * Increase in swelling  * Need for compression bandage changes due to slippage, breakthrough drainage. If you need medical attention outside of the business hours of the [facility] please contact your PCP or go to the nearest emergency room. The information contained in the After Visit Summary has been reviewed with me, the patient and/or responsible adult, by my health care provider(s). I had the opportunity to ask questions regarding this information.  I have elected to receive;      []After Visit Summary  [x]Comprehensive Discharge Instruction        Patient signature______________________________________Date:________        Electronically signed by Melonie Ford MD on 8/15/2022 at 9:48 AM

## 2022-08-18 RX ORDER — SULFAMETHOXAZOLE AND TRIMETHOPRIM 800; 160 MG/1; MG/1
1 TABLET ORAL 2 TIMES DAILY
Qty: 20 TABLET | Refills: 0 | Status: SHIPPED | OUTPATIENT
Start: 2022-08-18 | End: 2022-08-23

## 2022-08-20 ENCOUNTER — HOSPITAL ENCOUNTER (OUTPATIENT)
Age: 67
Discharge: HOME | End: 2022-08-20
Payer: MEDICARE

## 2022-08-20 LAB
BUN BLDV-MCNC: 40 MG/DL (ref 8–23)
CREAT SERPL-MCNC: 1.91 MG/DL (ref 0.7–1.2)
GFR AFRICAN AMERICAN: 43 ML/MIN
GFR NON-AFRICAN AMERICAN: 35 ML/MIN
GFR SERPL CREATININE-BSD FRML MDRD: ABNORMAL ML/MIN/{1.73_M2}

## 2022-08-20 PROCEDURE — 36415 COLL VENOUS BLD VENIPUNCTURE: CPT

## 2022-08-20 PROCEDURE — 82565 ASSAY OF CREATININE: CPT

## 2022-08-20 PROCEDURE — 84520 ASSAY OF UREA NITROGEN: CPT

## 2022-08-20 NOTE — DISCHARGE INSTRUCTIONS
[address] [facility]                                 Visit  Discharge Instructions / Physician Orders  [address]     SUPPLIES ORDERED THRU:    NONE                 DATE LAST SUPPLIED     Wound Location:  Left foot 2nd toe and lateral foot     Cleanse with: Liquid antibacterial soap and water, rinse well      Dressing Orders:   Left foot Primary dressing   Dakins soaked plain 1/4\" packing     Secondary dressing  Dry Dressing       secure with           x 30days   weave dry gauze between toes 3,4 and 5  [place] for today  Frequency:  Change daily   get labs done on Friday   Additional Orders: Increase protein to diet (meat, cheese, eggs, fish, peanut butter, nuts and beans)  Multivitamin daily   start Bactrim Ds 1 tablet 2 x per day (may crush and put in apple sauce) for 2 weeks- patient is not taking bactrim  Start Flagyl-do not drink alcohol with this medicine it will make you sick Start 8/8/22   Patient to go to ER after Seeing Karina Montano at 11:00am    OFFLOADING [x] YES  TYPE:                  [] NA  surgical shoe  Weekly wound care visits until determined otherwise. Antibiotic therapy-wound care related YES [x] NO [] NA[]     MY CHART []     Smart Device  []                         Your next appointment with the [address] [place] [address] is in                                                                                                      1 week with Dr. Mindy Aldrich                                                                                              (Please note your next appointment above and if you are unable to keep, kindly give a 24 hour notice.  Thank you.)  If more than 15 min late we cannot guarantee you will be seen due to clinician schedule  Per Policy, Excessive cancellation will call for dismissal from program.  If you experience any of the following, please call the [address] [place] [address] during business hours:  [phone]     * Increase in Pain  * Temperature over 101  * Increase in drainage from your wound  * Drainage with a foul odor  * Bleeding  * Increase in swelling  * Need for compression bandage changes due to slippage, breakthrough drainage. If you need medical attention outside of the business hours of the [address] [place] [address] Road please contact your PCP or go to the nearest emergency room. The information contained in the After Visit Summary has been reviewed with me, the patient and/or responsible adult, by my health care provider(s). I had the opportunity to ask questions regarding this information.  I have elected to receive;      []After Visit Summary  [x]Comprehensive Discharge Instruction        Patient signature______________________________________Date:________  Electronically signed by Haseeb Russell RN on 8/22/2022 at 9:00 AM  Electronically signed by Jason Perry MD on 8/22/2022 at 9:07 AM

## 2022-08-22 ENCOUNTER — OFFICE VISIT (OUTPATIENT)
Dept: VASCULAR SURGERY | Age: 67
End: 2022-08-22
Payer: MEDICARE

## 2022-08-22 ENCOUNTER — HOSPITAL ENCOUNTER (OUTPATIENT)
Dept: WOUND CARE | Age: 67
Discharge: HOME | DRG: 617 | End: 2022-08-22
Payer: MEDICARE

## 2022-08-22 VITALS
HEIGHT: 70 IN | DIASTOLIC BLOOD PRESSURE: 82 MMHG | BODY MASS INDEX: 25.62 KG/M2 | TEMPERATURE: 96 F | RESPIRATION RATE: 16 BRPM | HEART RATE: 70 BPM | WEIGHT: 179 LBS | SYSTOLIC BLOOD PRESSURE: 164 MMHG

## 2022-08-22 VITALS
RESPIRATION RATE: 20 BRPM | DIASTOLIC BLOOD PRESSURE: 72 MMHG | OXYGEN SATURATION: 98 % | HEART RATE: 60 BPM | TEMPERATURE: 98.6 F | BODY MASS INDEX: 25.62 KG/M2 | SYSTOLIC BLOOD PRESSURE: 146 MMHG | WEIGHT: 179 LBS | HEIGHT: 70 IN

## 2022-08-22 DIAGNOSIS — L97.524 ULCER OF LEFT FOOT, WITH NECROSIS OF BONE (HCC): Primary | ICD-10-CM

## 2022-08-22 DIAGNOSIS — I70.245 ATHEROSCLEROSIS OF NATIVE ARTERIES OF LEFT LEG WITH ULCERATION OF OTHER PART OF FOOT (HCC): Primary | Chronic | ICD-10-CM

## 2022-08-22 PROCEDURE — 0KBW0ZZ EXCISION OF LEFT FOOT MUSCLE, OPEN APPROACH: ICD-10-PCS | Performed by: INTERNAL MEDICINE

## 2022-08-22 PROCEDURE — 11043 DBRDMT MUSC&/FSCA 1ST 20/<: CPT | Performed by: INTERNAL MEDICINE

## 2022-08-22 PROCEDURE — 11043 DBRDMT MUSC&/FSCA 1ST 20/<: CPT

## 2022-08-22 PROCEDURE — 99213 OFFICE O/P EST LOW 20 MIN: CPT | Performed by: SURGERY

## 2022-08-22 PROCEDURE — 1124F ACP DISCUSS-NO DSCNMKR DOCD: CPT | Performed by: SURGERY

## 2022-08-22 RX ORDER — LIDOCAINE HYDROCHLORIDE 40 MG/ML
SOLUTION TOPICAL ONCE
OUTPATIENT
Start: 2022-08-22 | End: 2022-08-22

## 2022-08-22 RX ORDER — LIDOCAINE 50 MG/G
OINTMENT TOPICAL ONCE
OUTPATIENT
Start: 2022-08-22 | End: 2022-08-22

## 2022-08-22 RX ORDER — LIDOCAINE 40 MG/G
CREAM TOPICAL ONCE
OUTPATIENT
Start: 2022-08-22 | End: 2022-08-22

## 2022-08-22 RX ORDER — BACITRACIN ZINC AND POLYMYXIN B SULFATE 500; 1000 [USP'U]/G; [USP'U]/G
OINTMENT TOPICAL ONCE
OUTPATIENT
Start: 2022-08-22 | End: 2022-08-22

## 2022-08-22 RX ORDER — LIDOCAINE HYDROCHLORIDE 20 MG/ML
JELLY TOPICAL ONCE
OUTPATIENT
Start: 2022-08-22 | End: 2022-08-22

## 2022-08-22 RX ORDER — GENTAMICIN SULFATE 1 MG/G
OINTMENT TOPICAL ONCE
OUTPATIENT
Start: 2022-08-22 | End: 2022-08-22

## 2022-08-22 RX ORDER — GINSENG 100 MG
CAPSULE ORAL ONCE
OUTPATIENT
Start: 2022-08-22 | End: 2022-08-22

## 2022-08-22 RX ORDER — BETAMETHASONE DIPROPIONATE 0.05 %
OINTMENT (GRAM) TOPICAL ONCE
OUTPATIENT
Start: 2022-08-22 | End: 2022-08-22

## 2022-08-22 RX ORDER — LIDOCAINE HYDROCHLORIDE 40 MG/ML
SOLUTION TOPICAL ONCE
Status: COMPLETED | OUTPATIENT
Start: 2022-08-22 | End: 2022-08-22

## 2022-08-22 RX ORDER — BACITRACIN, NEOMYCIN, POLYMYXIN B 400; 3.5; 5 [USP'U]/G; MG/G; [USP'U]/G
OINTMENT TOPICAL ONCE
OUTPATIENT
Start: 2022-08-22 | End: 2022-08-22

## 2022-08-22 RX ORDER — CLOBETASOL PROPIONATE 0.5 MG/G
OINTMENT TOPICAL ONCE
OUTPATIENT
Start: 2022-08-22 | End: 2022-08-22

## 2022-08-22 RX ADMIN — LIDOCAINE HYDROCHLORIDE 10 ML: 40 SOLUTION TOPICAL at 08:26

## 2022-08-22 ASSESSMENT — PAIN SCALES - GENERAL: PAINLEVEL_OUTOF10: 0

## 2022-08-22 NOTE — PROGRESS NOTES
[address] AND VASCULAR  [address] [facility] [address]  Dept: [phone]     Patient: Sivakumar Godinez  : 1955  MRN: [id number]  DOS: 2022    Referring provider:  No ref. provider found         HPI:  Sivakumar Godinez is a 78 y.o. male who returns to the office for left lower extremity ischemia with ulceration. Recall that he had his first and second toes removed on the left lower extremity and was seeing me for wound healing problems. We had performed arteriography revealing tibial peroneal arterial disease with very significant distal tibial peroneal arterial disease and very wispy collaterals into the foot. Unfortunately I do not think that the distal bypass would work for him. It may be possible to go ahead with angioplasty of the posterior tibial artery however I think this would fail eventually. Currently he is having problems with his third toe which I think has broken either due to infection or the joint space has been obliterated making a third toe upon mobilization appear to be fractured. He also has an ulcer on the base of the third toe as well. The remainder the foot is warm and he was at one time healing his ulceration of the second toe amputation site.   Past Medical History:   Diagnosis Date    Atrial fibrillation (HCC)     CAD (coronary artery disease)     Cardiomyopathy ([facility])     Cellulitis     CHF (congestive heart failure) ([facility])     Diabetes mellitus ([facility] [address]     Foot infection     Heart failure (HCC)     Hyperlipidemia     Hypertension     MRSA (methicillin resistant staph aureus) culture positive     Osteomyelitis (HCC)     PVD (peripheral vascular disease) ([facility])     Wound, open, foot     left foot     Family History   Problem Relation Age of Onset    Cancer Father         pancreatic cancer    Other Brother         MI    Osteoarthritis Mother     Other Maternal Grandfather         MI      Social History Socioeconomic History    Marital status: Single     Spouse name: Not on file    Number of children: Not on file    Years of education: Not on file    Highest education level: Not on file   Occupational History    Occupation:    Tobacco Use    Smoking status: Never    Smokeless tobacco: Never   Vaping Use    Vaping Use: Never used   Substance and Sexual Activity    Alcohol use: Not Currently     Comment: SOCIAL on weekends    Drug use: No    Sexual activity: Not Currently   Other Topics Concern    Not on file   Social History Narrative    Not on file     Social Determinants of Health     Financial Resource Strain: Not on file   Food Insecurity: Not on file   Transportation Needs: Not on file   Physical Activity: Not on file   Stress: Not on file   Social Connections: Not on file   Intimate Partner Violence: Not on file   Housing Stability: Not on file      Past Surgical History:   Procedure Laterality Date    CARDIAC SURGERY  2010    CABG X3    COLONOSCOPY      DEBRIDEMENT Right 11/19/2015    DEBRIDEMENT WOUND FOOT RIGHT W/ APPLICATION BILAT INTEG RAT GRAFT    ENDOSCOPY, COLON, DIAGNOSTIC      EYE SURGERY Bilateral     cataracts    HERNIA REPAIR      umbilical    KNEE ARTHROSCOPY Right     OTHER SURGICAL HISTORY Right 12 29 15    wound care graft procedure foot,appl of integra    PACEMAKER PLACEMENT  2011    WITH DEFIBRILLATOR    TOE AMPUTATION Right     R great    TOE AMPUTATION Left 3/13/2022    TOE AMPUTATION--left 2nd digit performed by Sussy Gómez DPM at [address] [place] [address] Left 7/13/2022    RIGHT COMMON FEMORAL ACCESS [address] WITH PELVIC RUN OFF PLACEMENT OF CATHETER INTO LEFT COMMON FEMORAL ARTERY, PROFUNDA FEMORAL AND SFA  ARTERIOGRAPHY PLACEMENT OF CATHETER INTO LEFT SFA, WITH SFA POPLITEAL AND TIBIAL TIBIOPERONEAL ARTERIOGRAPHY INTO LEFT FOOT performed by Brad Zapata MD at [address]:    08/22/22 1059 BP: (!) 146/72   Site: Right Upper Arm   Position: Sitting   Cuff Size: Medium Adult   Pulse: 60   Resp: 20   Temp: 98.6 °F (37 °C)   TempSrc: Temporal   SpO2: 98%   Weight: 179 lb (81.2 kg)   Height: 5' 10\" (1.778 m)          Physical Exam  Constitutional:       General: He is not in acute distress. HENT:      Mouth/Throat:      Mouth: Mucous membranes are moist.      Pharynx: Oropharynx is clear. Eyes:      General: No scleral icterus. Extraocular Movements: Extraocular movements intact. Conjunctiva/sclera: Conjunctivae normal.   Neck:      Thyroid: No thyroid mass or thyromegaly. Cardiovascular:      Rate and Rhythm: Normal rate and regular rhythm. Heart sounds: No murmur heard. Comments: On examination he has a palpable femoral pulse. I am unable to palpate a popliteal dorsalis pedis or posterior tibial pulse on the left. I think he has heavily calcified. His foot seems to be warm and adequately perfused. He does have cellulitis with some edema. There is a small ulcer that still persists at the second toe amputation site which seems to be encroaching on the third toe. The third toe has ulcerated at its base and is very mobile in the transverse direction making me believe that it is pathologically fractured. He has no pain. There is no berta purulence. There is no significant odor. Pulmonary:      Effort: No respiratory distress. Breath sounds: No rales. Abdominal:      General: There is no distension. Palpations: There is no mass. Tenderness: There is no abdominal tenderness. There is no guarding. Musculoskeletal:      Cervical back: No rigidity or tenderness. Lymphadenopathy:      Cervical: No cervical adenopathy. Skin:     Coloration: Skin is not jaundiced. Findings: No rash. Neurological:      General: No focal deficit present. Mental Status: He is alert and oriented to person, place, and time. Cranial Nerves: No cranial nerve deficit. Psychiatric:         Mood and Affect: Mood normal.       Assessment:  1. Atherosclerosis of native arteries of left leg with ulceration of other part of foot ([address]          Plan: At this point I think it would be wise to directly admit him and have him come to the operating room for removal of the third fourth and fifth toes to complete a transmetatarsal amputation. Removal of the third toe I think will be mandatory given the fact that I think it is pathologically fractured. Leaving the fourth and fifth toes would likely result in their fracture as well over time and back to the operating room for reamputation. Removing all of the toes would obviate that problem and hopefully accelerate the healing process once and for all. He does have significant distal tibial disease which I think is largely not unreconstructable. He did show signs of healing his second toe amputation and I think he would be able to heal a transmetatarsal amputation. The transmetatarsal amputation may or may not be able to be closed. We will work with him as best we can to improve his healing potential.  The other option is hyperbaric therapy after discharge.     Electronically signed by:  Argentina Hawkins MD

## 2022-08-23 ENCOUNTER — TELEPHONE (OUTPATIENT)
Dept: VASCULAR SURGERY | Age: 67
End: 2022-08-23

## 2022-08-23 ENCOUNTER — HOSPITAL ENCOUNTER (INPATIENT)
Age: 67
LOS: 3 days | Discharge: HOME | DRG: 617 | End: 2022-08-26
Attending: EMERGENCY MEDICINE | Admitting: INTERNAL MEDICINE
Payer: MEDICARE

## 2022-08-23 DIAGNOSIS — M86.9 OSTEOMYELITIS OF LEFT FOOT, UNSPECIFIED TYPE (HCC): Primary | ICD-10-CM

## 2022-08-23 DIAGNOSIS — T81.89XD NON-HEALING SURGICAL WOUND, SUBSEQUENT ENCOUNTER: Chronic | ICD-10-CM

## 2022-08-23 PROBLEM — R07.9 CHEST PAIN: Status: ACTIVE | Noted: 2022-08-23

## 2022-08-23 LAB
ABSOLUTE EOS #: 0.1 K/UL (ref 0–0.4)
ABSOLUTE LYMPH #: 1.2 K/UL (ref 1–4.8)
ABSOLUTE MONO #: 0.6 K/UL (ref 0.1–1.3)
ALBUMIN SERPL-MCNC: 4.4 G/DL (ref 3.5–5.2)
ALP BLD-CCNC: 126 U/L (ref 40–129)
ALT SERPL-CCNC: 22 U/L (ref 5–41)
ANION GAP SERPL CALCULATED.3IONS-SCNC: 12 MMOL/L (ref 9–17)
AST SERPL-CCNC: 24 U/L
BASOPHILS # BLD: 1 % (ref 0–2)
BASOPHILS ABSOLUTE: 0.1 K/UL (ref 0–0.2)
BILIRUB SERPL-MCNC: 0.44 MG/DL (ref 0.3–1.2)
BUN BLDV-MCNC: 32 MG/DL (ref 8–23)
C-REACTIVE PROTEIN: <3 MG/L (ref 0–5)
CALCIUM SERPL-MCNC: 9.9 MG/DL (ref 8.6–10.4)
CHLORIDE BLD-SCNC: 99 MMOL/L (ref 98–107)
CO2: 25 MMOL/L (ref 20–31)
CREAT SERPL-MCNC: 1.76 MG/DL (ref 0.7–1.2)
EOSINOPHILS RELATIVE PERCENT: 2 % (ref 0–4)
GFR AFRICAN AMERICAN: 47 ML/MIN
GFR NON-AFRICAN AMERICAN: 39 ML/MIN
GFR SERPL CREATININE-BSD FRML MDRD: ABNORMAL ML/MIN/{1.73_M2}
GLUCOSE BLD-MCNC: 120 MG/DL (ref 75–110)
GLUCOSE BLD-MCNC: 209 MG/DL (ref 70–99)
GLUCOSE BLD-MCNC: 315 MG/DL (ref 75–110)
HCT VFR BLD CALC: 34.6 % (ref 41–53)
HEMOGLOBIN: 11.8 G/DL (ref 13.5–17.5)
INR BLD: 2.6
LYMPHOCYTES # BLD: 14 % (ref 24–44)
MCH RBC QN AUTO: 28.4 PG (ref 26–34)
MCHC RBC AUTO-ENTMCNC: 34.2 G/DL (ref 31–37)
MCV RBC AUTO: 83.2 FL (ref 80–100)
MONOCYTES # BLD: 7 % (ref 1–7)
PDW BLD-RTO: 20.7 % (ref 11.5–14.9)
PLATELET # BLD: 296 K/UL (ref 150–450)
PMV BLD AUTO: 7.9 FL (ref 6–12)
POTASSIUM SERPL-SCNC: 3.8 MMOL/L (ref 3.7–5.3)
PROTHROMBIN TIME: 27.5 SEC (ref 11.8–14.6)
RBC # BLD: 4.16 M/UL (ref 4.5–5.9)
SEDIMENTATION RATE, ERYTHROCYTE: 72 MM/HR (ref 0–20)
SEG NEUTROPHILS: 76 % (ref 36–66)
SEGMENTED NEUTROPHILS ABSOLUTE COUNT: 6.5 K/UL (ref 1.3–9.1)
SODIUM BLD-SCNC: 136 MMOL/L (ref 135–144)
TOTAL PROTEIN: 8.3 G/DL (ref 6.4–8.3)
WBC # BLD: 8.5 K/UL (ref 3.5–11)

## 2022-08-23 PROCEDURE — 6370000000 HC RX 637 (ALT 250 FOR IP): Performed by: INTERNAL MEDICINE

## 2022-08-23 PROCEDURE — 85652 RBC SED RATE AUTOMATED: CPT

## 2022-08-23 PROCEDURE — 85025 COMPLETE CBC W/AUTO DIFF WBC: CPT

## 2022-08-23 PROCEDURE — 36415 COLL VENOUS BLD VENIPUNCTURE: CPT

## 2022-08-23 PROCEDURE — 6360000002 HC RX W HCPCS: Performed by: EMERGENCY MEDICINE

## 2022-08-23 PROCEDURE — 1200000000 HC SEMI PRIVATE

## 2022-08-23 PROCEDURE — 85610 PROTHROMBIN TIME: CPT

## 2022-08-23 PROCEDURE — 2580000003 HC RX 258: Performed by: EMERGENCY MEDICINE

## 2022-08-23 PROCEDURE — 86140 C-REACTIVE PROTEIN: CPT

## 2022-08-23 PROCEDURE — 80053 COMPREHEN METABOLIC PANEL: CPT

## 2022-08-23 PROCEDURE — 6370000000 HC RX 637 (ALT 250 FOR IP): Performed by: EMERGENCY MEDICINE

## 2022-08-23 PROCEDURE — 99285 EMERGENCY DEPT VISIT HI MDM: CPT

## 2022-08-23 PROCEDURE — 82947 ASSAY GLUCOSE BLOOD QUANT: CPT

## 2022-08-23 RX ORDER — LISINOPRIL 5 MG/1
5 TABLET ORAL DAILY
Status: DISCONTINUED | OUTPATIENT
Start: 2022-08-24 | End: 2022-08-26 | Stop reason: HOSPADM

## 2022-08-23 RX ORDER — ATORVASTATIN CALCIUM 40 MG/1
40 TABLET, FILM COATED ORAL NIGHTLY
Status: DISCONTINUED | OUTPATIENT
Start: 2022-08-23 | End: 2022-08-26 | Stop reason: HOSPADM

## 2022-08-23 RX ORDER — BENAZEPRIL HYDROCHLORIDE 5 MG/1
5 TABLET, FILM COATED ORAL 2 TIMES DAILY
COMMUNITY

## 2022-08-23 RX ORDER — FUROSEMIDE 40 MG/1
80 TABLET ORAL DAILY
Status: DISCONTINUED | OUTPATIENT
Start: 2022-08-24 | End: 2022-08-26 | Stop reason: HOSPADM

## 2022-08-23 RX ORDER — GLIPIZIDE 5 MG/1
2.5 TABLET ORAL DAILY
COMMUNITY

## 2022-08-23 RX ORDER — SULFAMETHOXAZOLE AND TRIMETHOPRIM 800; 160 MG/1; MG/1
1 TABLET ORAL 2 TIMES DAILY
Status: ON HOLD | COMMUNITY
End: 2022-08-26 | Stop reason: HOSPADM

## 2022-08-23 RX ORDER — INSULIN LISPRO 100 [IU]/ML
0-4 INJECTION, SOLUTION INTRAVENOUS; SUBCUTANEOUS NIGHTLY
Status: DISCONTINUED | OUTPATIENT
Start: 2022-08-23 | End: 2022-08-26 | Stop reason: HOSPADM

## 2022-08-23 RX ORDER — INSULIN LISPRO 100 [IU]/ML
0-4 INJECTION, SOLUTION INTRAVENOUS; SUBCUTANEOUS
Status: DISCONTINUED | OUTPATIENT
Start: 2022-08-23 | End: 2022-08-26 | Stop reason: HOSPADM

## 2022-08-23 RX ORDER — VITAMIN B COMPLEX
1000 TABLET ORAL DAILY
COMMUNITY

## 2022-08-23 RX ORDER — WARFARIN SODIUM 2 MG/1
2 TABLET ORAL
COMMUNITY

## 2022-08-23 RX ORDER — PHYTONADIONE 5 MG/1
2.5 TABLET ORAL ONCE
Status: COMPLETED | OUTPATIENT
Start: 2022-08-23 | End: 2022-08-23

## 2022-08-23 RX ADMIN — PHYTONADIONE 2.5 MG: 5 TABLET ORAL at 14:21

## 2022-08-23 RX ADMIN — VANCOMYCIN HYDROCHLORIDE 2000 MG: 1 INJECTION, POWDER, LYOPHILIZED, FOR SOLUTION INTRAVENOUS at 17:08

## 2022-08-23 RX ADMIN — INSULIN LISPRO 4 UNITS: 100 INJECTION, SOLUTION INTRAVENOUS; SUBCUTANEOUS at 20:56

## 2022-08-23 RX ADMIN — ATORVASTATIN CALCIUM 40 MG: 40 TABLET, FILM COATED ORAL at 20:56

## 2022-08-23 ASSESSMENT — ENCOUNTER SYMPTOMS
VOMITING: 0
ABDOMINAL PAIN: 0
SHORTNESS OF BREATH: 0
NAUSEA: 0

## 2022-08-23 ASSESSMENT — LIFESTYLE VARIABLES
HOW OFTEN DO YOU HAVE A DRINK CONTAINING ALCOHOL: 2-4 TIMES A MONTH
HOW MANY STANDARD DRINKS CONTAINING ALCOHOL DO YOU HAVE ON A TYPICAL DAY: 5 OR 6

## 2022-08-23 ASSESSMENT — PAIN SCALES - GENERAL: PAINLEVEL_OUTOF10: 0

## 2022-08-23 ASSESSMENT — PAIN - FUNCTIONAL ASSESSMENT: PAIN_FUNCTIONAL_ASSESSMENT: NONE - DENIES PAIN

## 2022-08-23 NOTE — ED TRIAGE NOTES
Pt had his 2nd toe on the L foot amputated about a month ago. Pt states now the 3rd toe appears broken  and is \"flipping up\" states he has feeling in his toe. States it is not painful though. Pt states he is to get and amputation on 3rd toe tomorrow, Dr. Doris Garner- was told to come to ED for admission. Pt does self wound care, some brown /clear drainage.

## 2022-08-23 NOTE — ED NOTES
Patient transported to room 2048 via wheelchair.  Hand off given to Stephania Lambert  08/23/22 8318

## 2022-08-23 NOTE — ED NOTES
Report given to Aliya Cruz from [facility]. Report method by agnieszka   The following was reviewed with receiving RN:   Current vital signs:  BP (!) 156/79   Pulse 84   Temp 97.4 °F (36.3 °C) (Oral)   Resp 20   Ht 5' 10\" (1.778 m)   Wt 187 lb (84.8 kg)   SpO2 100%   BMI 26.83 kg/m²                      Any medication or safety alerts were reviewed. Any pending diagnostics and notifications were also reviewed, as well as any safety concerns or issues, abnormal labs, abnormal imaging, and abnormal assessment findings. Questions were answered.           Hema Lim RN  08/23/22 Mark Anthony Najera RN  08/23/22 3971

## 2022-08-23 NOTE — TELEPHONE ENCOUNTER
LVM for the patient to inform him that they are still waiting on a bed and that he should go to the ED, patient should call back with confirmation that he received the VM

## 2022-08-23 NOTE — PROGRESS NOTES
Medication History completed:    New medications: None    Medications discontinued: Metronidazole, Dakins solution,     Changes to dosing:  Warfarin decreased to 2mg on Tuesdays, Thursdays, and Sundays. With 4mg on Mondays, Wednesday, Fridays, and Saturdays  Glipizide decreased to 2.5mg daily    Stated allergies: Doxycycline, penicillin    Other pertinent information: Confirmed medications with the patient and [facility]. Patient stated he had decreased his glipizide dose because it was making him dizzy when he took it. Patient has INR monitored at [facility] through [address] Last INR was on 8/15/22 and was at 3.0. They had decreased his dose because he is on bactrim and it was trending upward. Patient was also recently prescribed zyvox but has picked it up from the pharmacy due to cost. The patient is currently on a 10 day course of Bactrim DS that started on 8/18/22    Julian James  PharmD Candidate 2023, [facility]    Medication reconciliation was completed by my student and I reviewed the documentation.     Thank you,  Liz Yusuf, PharmD, BCPS  [phone]

## 2022-08-23 NOTE — PROGRESS NOTES
Vancomycin Dosing by Pharmacy - Initial Note   TODAY'S DATE:  8/23/2022  Patient name, age:  Itz Lopestingham, 78 y.o. Indication:  osteomyelitis  Additional antimicrobials:  none    Allergies:  Doxycycline, Pcn [penicillins], and Penicillin g   Actual Weight:    Wt Readings from Last 1 Encounters:   08/23/22 187 lb (84.8 kg)     Labs/Ancillary Data  Estimated Creatinine Clearance: 42 mL/min (A) (based on SCr of 1.76 mg/dL (H)). Recent Labs     08/23/22  1303   CREATININE 1.76*   BUN 32*   WBC 8.5     No results found for: PROCAL  No intake or output data in the 24 hours ending 08/23/22 1731  Temp: 97.3 F (36.3 C)    Recent vancomycin administrations                     vancomycin (VANCOCIN) 2,000 mg in dextrose 5 % 500 mL IVPB (mg) 2,000 mg New Bag 08/23/22 1708                  Culture Date / Source  /  Results  Pending    MRSA Nasal Swab: N/A. Non-respiratory infection. Pennie Johnson PLAN   Initial loading dose of 25mg/kg (max of 2,500 mg) = 2000  mg  x 1, then  vancomycin 1000 mg IV every 24 hours. Ensured BUN/sCr ordered at baseline and every 48 hours x at least 3 levels, then at least weekly. Vancomycin level ordered for 8/25/22 @ 0600. Will use bayesian method for dosing. This level will not be a trough. Target AUC/EUGENE: 400-600. Vancomycin Target Concentration Parameters  Treatment  Population Target AUC/EUGENE Target Trough   Invasive MRSA Infection (bacteremia, pneumonia, meningitis, endocarditis, osteomyelitis)  Sepsis (undifferentiated) 400-600 N/A   Infection due to non-MRSA pathogen  Empiric treatment of non-invasive MRSA infection  (SSTI, UTI) <500 10-15 mg/L   CrCl < 29 mL/min  Rapidly fluctuating serum creatinine   BRIANNA N/A < 15 mg/L     Renal replacement therapy is dosed by levels, per hospital protocol. Abbreviations  * Pauc: probability that AUC is >400 (efficacy); Pconc: probability that Ctrough is above 20 ?g/mL (toxicity);  Tox: Probability of nephrotoxicity, based on Antonio et al. Connie Alonzo Infect Dis 2009. Loading dose: N/A  Regimen: 1000 mg IV every 24 hours. Start time: 17:30 on 08/23/2022  Exposure target: AUC24 (range)400-600 mg/L.hr   AUC24,ss: 438 mg/L.hr  Probability of AUC24 > 400: 61 %  Ctrough,ss: 13.8 mg/L  Probability of Ctrough,ss > 20: 17 %  Probability of nephrotoxicity (Lodise NURYS 2009): 9 %    Thank you for the consult. Pharmacy will continue to follow.    Vincent Longoria, PratimaD, BCPS

## 2022-08-23 NOTE — ED PROVIDER NOTES
[address]      Pt Name: Lieutenant Hammer  MRN: [id number]  [place] 1955  Date of evaluation: 8/23/22      CHIEF COMPLAINT       Chief Complaint   Patient presents with    Toe Injury     HISTORY OF PRESENT ILLNESS   HPI 78 y.o. male presents with redness and swelling to the left middle toe. Pt reports that he was told to come to the emergency department by his surgeon for toe amputation. He states that he has an infection in his toe and he's been taking antiboitics over the last month and it hasn't gotten better. He denies any pain. Recent cultures from 7/28/22 grew MRSA and Bacteroides. Recent antibiotics have included doxycycline, clindamycin, linezolid, and most recently bactrim. REVIEW OF SYSTEMS       Review of Systems   Constitutional:  Negative for fever. HENT:  Negative for congestion. Eyes:  Negative for visual disturbance. Respiratory:  Negative for shortness of breath. Cardiovascular:  Negative for chest pain. Gastrointestinal:  Negative for abdominal pain, nausea and vomiting. Genitourinary:  Negative for difficulty urinating. Musculoskeletal:  Negative for arthralgias. Skin:  Positive for rash and wound. Neurological:  Positive for numbness (chronic in foot). Hematological:  Bruises/bleeds easily (on coumadin for afib.).      PAST MEDICAL HISTORY     Past Medical History:   Diagnosis Date    Atrial fibrillation ([address]     CAD (coronary artery disease)     Cardiomyopathy ([address]     Cellulitis     CHF (congestive heart failure) ([address]     Diabetes mellitus ([address]     Foot infection     Heart failure ([address]     Hyperlipidemia     Hypertension     MRSA (methicillin resistant staph aureus) culture positive     Osteomyelitis ([address]     PVD (peripheral vascular disease) ([address]     Wound, open, foot     left foot       SURGICAL HISTORY       Past Surgical History:   Procedure Laterality Date    CARDIAC SURGERY  2010    CABG X3    COLONOSCOPY DEBRIDEMENT Right 11/19/2015    DEBRIDEMENT WOUND FOOT RIGHT W/ APPLICATION BILAT INTEG RAT GRAFT    ENDOSCOPY, COLON, DIAGNOSTIC      EYE SURGERY Bilateral     cataracts    HERNIA REPAIR      umbilical    KNEE ARTHROSCOPY Right     OTHER SURGICAL HISTORY Right 12 29 15    wound care graft procedure foot,appl of integra    PACEMAKER PLACEMENT  2011    WITH DEFIBRILLATOR    TOE AMPUTATION Right     R great    TOE AMPUTATION Left 3/13/2022    TOE AMPUTATION--left 2nd digit performed by Shama Bishop DPM at [address] [place] [address] Left 7/13/2022    RIGHT COMMON FEMORAL ACCESS UNDER ULTRASOUND GUIDANCE DISTAL AORTOGRAPHY WITH PELVIC RUN OFF PLACEMENT OF CATHETER INTO LEFT COMMON FEMORAL ARTERY, PROFUNDA FEMORAL AND SFA  ARTERIOGRAPHY PLACEMENT OF CATHETER INTO LEFT SFA, WITH SFA POPLITEAL AND TIBIAL TIBIOPERONEAL ARTERIOGRAPHY INTO LEFT FOOT performed by Alta Dunlap MD at [facility]       Current Discharge Medication List        CONTINUE these medications which have NOT CHANGED    Details   benazepril (LOTENSIN) 5 MG tablet Take 5 mg by mouth in the morning and at bedtime      glipiZIDE (GLUCOTROL) 5 MG tablet Take 2.5 mg by mouth daily      Vitamin D (CHOLECALCIFEROL) 25 MCG (1000 UT) TABS tablet Take 1,000 Units by mouth daily      sulfamethoxazole-trimethoprim (BACTRIM DS) 800-160 MG per tablet Take 1 tablet by mouth 2 times daily For 10 days      !! warfarin (COUMADIN) 2 MG tablet Take 2 mg by mouth four times a week Indications: On Monday, Wednesday, Friday, and Saturday Patients INR is managed by [facility] management. aspirin 81 MG EC tablet Take 1 tablet by mouth daily  Qty: 30 tablet, Refills: 3      nitroGLYCERIN (NITROSTAT) 0.4 MG SL tablet up to max of 3 total doses. If no relief after 1 dose, call 911.   Qty: 25 tablet, Refills: 3      furosemide (LASIX) 80 MG tablet Take 1 tablet by mouth daily  Qty: 60 tablet, Refills: 3      !! warfarin (COUMADIN) 2 MG tablet Take 4 mg by mouth three times a week Indications: On Tuesday, Thursday, and Sunday Patients INR is managed by [facility] management. atorvastatin (LIPITOR) 40 MG tablet Take 40 mg by mouth daily. !! - Potential duplicate medications found. Please discuss with provider. ALLERGIES     is allergic to doxycycline, pcn [penicillins], and penicillin g. FAMILY HISTORY     He indicated that the status of his mother is unknown. He indicated that the status of his father is unknown. He indicated that the status of his brother is unknown. He indicated that the status of his maternal grandfather is unknown. SOCIAL HISTORY      reports that he has never smoked. He has never used smokeless tobacco. He reports current alcohol use. He reports that he does not use drugs. PHYSICAL EXAM     INITIAL VITALS: /71   Pulse 80   Temp 97.3 °F (36.3 °C) (Oral)   Resp 16   Ht 5' 10\" (1.778 m)   Wt 187 lb (84.8 kg)   SpO2 100%   BMI 26.83 kg/m²   Gen: nad  Head: Normocephalic, atraumatic  Eye: Pupils equal round reactive to light, no conjunctivitis  ENT: MMM  Neck: no JVD  Heart: Regular rate and rhythm no murmurs  Lungs: Clear to auscultation bilaterally, no respiratory distress  Chest wall: No crepitus, no tenderness palpation  Abdomen: Soft, nontender, nondistended, with no peritoneal signs  Neurologic: Patient is alert and oriented x3, motor and sensation is intact in all 4 extremities, fluent speech  Extremities: erythema and wamrth along with edema to the r. Middle toe. MEDICAL DECISION MAKING:     MDM  & Emergency Department course:  78 y.o. male with toe/foot osteomyelitis, on abx, with persistent symptoms. ESR and CRP are increasing. Recent ct of foot reviewed from 8/15/22, I do not see an indication to repeat imaging at this time. I spoke with vasculary surgeon Dr. Abhishek Ramos. Plan NPO after midnight, PO vitamin K now, plan or tomorrow. A dose of abx given.   Pt admitted to last after discussion with dr. Julita Lovell who is on call for the patient's primary care provider Dr. Jorge Pierce. DIAGNOSTIC RESULTS     RADIOLOGY:All plain film, CT, MRI, and formal ultrasound images (except ED bedside ultrasound) are read by the radiologist and the images and interpretations are directly viewed by the emergency physician. EXAMINATION:   CT OF THE LEFT FOOT WITHOUT CONTRAST 8/15/2022 2:10 pm       TECHNIQUE:   CT of the left foot was performed without the administration of intravenous   contrast.  Multiplanar reformatted images are provided for review. Automated   exposure control, iterative reconstruction, and/or weight based adjustment of   the mA/kV was utilized to reduce the radiation dose to as low as reasonably   achievable. 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 Postsurgical findings from prior amputation of   the 1st digit at the tarsometatarsal joint. Prior amputation of the 2nd   digit and 2nd metatarsal head. Mild periosteal reaction of the distal 2nd   metatarsal stump is seen. There is fragmentation, and mild displacement of   the 3rd metatarsal head with adjacent gas densities at the 3rd MTP joint. There is fragmentation and displacement of the base of the 3rd proximal   phalanx. Permeative changes of the dorsal cortex of the mid shaft of the 3rd   metatarsal are suspected (series 601, image 42. Faint periosteal reaction of   the 2nd proximal and middle phalanx is suggested. The tarsometatarsal   alignment appears anatomic on this nonweightbearing study. Weight:       Height:           The patient was given the following medications while in the emergency department:  Orders Placed This Encounter   Medications    phytonadione (VITAMIN K) tablet 2.5 mg    vancomycin (VANCOCIN) intermittent dosing (placeholder)     Order Specific Question:   Antimicrobial Indications     Answer:   Skin and Soft Tissue Infection     Order Specific Question:   Skin duration of therapy     Answer:   5 days    DISCONTD: vancomycin (VANCOCIN) 2,000 mg in dextrose 5 % 500 mL IVPB     Order Specific Question:   Antimicrobial Indications     Answer:   Skin and Soft Tissue Infection    vancomycin (VANCOCIN) 2,000 mg in dextrose 5 % 500 mL IVPB     Order Specific Question:   Antimicrobial Indications     Answer:   Bone and Joint Infection    insulin lispro (HUMALOG) injection vial 0-4 Units    insulin lispro (HUMALOG) injection vial 0-4 Units    atorvastatin (LIPITOR) tablet 40 mg    lisinopril (PRINIVIL;ZESTRIL) tablet 5 mg    furosemide (LASIX) tablet 80 mg    DISCONTD: vancomycin 1000 mg IVPB in 250 mL D5W addavial     Order Specific Question:   Antimicrobial Indications     Answer:   Bone and Joint Infection    vancomycin 1000 mg IVPB in 250 mL D5W addavial     Order Specific Question:   Antimicrobial Indications     Answer:   Bone and Joint Infection     -------------------------  CRITICAL CARE:   CONSULTS: IP CONSULT TO VASCULAR SURGERY  IP CONSULT TO PRIMARY CARE PROVIDER  PHARMACY TO DOSE VANCOMYCIN  PROCEDURES: Procedures     FINAL IMPRESSION      1. Osteomyelitis of left foot, unspecified type [facility])          DISPOSITION/PLAN   DISPOSITION Admitted 08/23/2022 02:44:45 PM      PATIENT REFERRED TO:  No follow-up provider specified.     DISCHARGE MEDICATIONS:  Current Discharge Medication List            Amairani Soni MD  Attending Emergency Physician                      Amairani Soni MD  08/23/22 2314

## 2022-08-24 ENCOUNTER — ANESTHESIA (OUTPATIENT)
Dept: OPERATING ROOM | Age: 67
DRG: 617 | End: 2022-08-24
Payer: MEDICARE

## 2022-08-24 ENCOUNTER — ANESTHESIA EVENT (OUTPATIENT)
Dept: OPERATING ROOM | Age: 67
DRG: 617 | End: 2022-08-24
Payer: MEDICARE

## 2022-08-24 LAB
ABO/RH: NORMAL
ANION GAP SERPL CALCULATED.3IONS-SCNC: 10 MMOL/L (ref 9–17)
ANTIBODY SCREEN: NEGATIVE
ARM BAND NUMBER: NORMAL
BLOOD BANK COMMENT: NORMAL
BUN BLDV-MCNC: 33 MG/DL (ref 8–23)
CALCIUM SERPL-MCNC: 9.4 MG/DL (ref 8.6–10.4)
CHLORIDE BLD-SCNC: 100 MMOL/L (ref 98–107)
CO2: 26 MMOL/L (ref 20–31)
CREAT SERPL-MCNC: 1.61 MG/DL (ref 0.7–1.2)
EXPIRATION DATE: NORMAL
GFR AFRICAN AMERICAN: 52 ML/MIN
GFR NON-AFRICAN AMERICAN: 43 ML/MIN
GFR SERPL CREATININE-BSD FRML MDRD: ABNORMAL ML/MIN/{1.73_M2}
GLUCOSE BLD-MCNC: 160 MG/DL (ref 70–99)
GLUCOSE BLD-MCNC: 164 MG/DL (ref 75–110)
GLUCOSE BLD-MCNC: 165 MG/DL (ref 75–110)
GLUCOSE BLD-MCNC: 175 MG/DL (ref 75–110)
GLUCOSE BLD-MCNC: 198 MG/DL (ref 75–110)
HCT VFR BLD CALC: 31.9 % (ref 41–53)
HEMOGLOBIN: 10.6 G/DL (ref 13.5–17.5)
INR BLD: 1.7
INR BLD: 2.1
MCH RBC QN AUTO: 27.6 PG (ref 26–34)
MCHC RBC AUTO-ENTMCNC: 33.1 G/DL (ref 31–37)
MCV RBC AUTO: 83.2 FL (ref 80–100)
PDW BLD-RTO: 20.6 % (ref 11.5–14.9)
PLATELET # BLD: 245 K/UL (ref 150–450)
PMV BLD AUTO: 7.8 FL (ref 6–12)
POTASSIUM SERPL-SCNC: 4 MMOL/L (ref 3.7–5.3)
PROTHROMBIN TIME: 19.6 SEC (ref 11.8–14.6)
PROTHROMBIN TIME: 23.2 SEC (ref 11.8–14.6)
RBC # BLD: 3.84 M/UL (ref 4.5–5.9)
SODIUM BLD-SCNC: 136 MMOL/L (ref 135–144)
WBC # BLD: 6.7 K/UL (ref 3.5–11)

## 2022-08-24 PROCEDURE — 88311 DECALCIFY TISSUE: CPT

## 2022-08-24 PROCEDURE — P9017 PLASMA 1 DONOR FRZ W/IN 8 HR: HCPCS

## 2022-08-24 PROCEDURE — 2580000003 HC RX 258: Performed by: SURGERY

## 2022-08-24 PROCEDURE — 2580000003 HC RX 258: Performed by: ANESTHESIOLOGY

## 2022-08-24 PROCEDURE — 0Y6N0ZC DETACHMENT AT LEFT FOOT, PARTIAL 3RD RAY, OPEN APPROACH: ICD-10-PCS | Performed by: SURGERY

## 2022-08-24 PROCEDURE — 3700000001 HC ADD 15 MINUTES (ANESTHESIA): Performed by: SURGERY

## 2022-08-24 PROCEDURE — 36415 COLL VENOUS BLD VENIPUNCTURE: CPT

## 2022-08-24 PROCEDURE — 28810 AMPUTATION TOE & METATARSAL: CPT | Performed by: SURGERY

## 2022-08-24 PROCEDURE — 6360000002 HC RX W HCPCS: Performed by: SURGERY

## 2022-08-24 PROCEDURE — 86850 RBC ANTIBODY SCREEN: CPT

## 2022-08-24 PROCEDURE — 3600000003 HC SURGERY LEVEL 3 BASE: Performed by: SURGERY

## 2022-08-24 PROCEDURE — 86901 BLOOD TYPING SEROLOGIC RH(D): CPT

## 2022-08-24 PROCEDURE — 1200000000 HC SEMI PRIVATE

## 2022-08-24 PROCEDURE — 86900 BLOOD TYPING SEROLOGIC ABO: CPT

## 2022-08-24 PROCEDURE — 3700000000 HC ANESTHESIA ATTENDED CARE: Performed by: SURGERY

## 2022-08-24 PROCEDURE — 7100000000 HC PACU RECOVERY - FIRST 15 MIN: Performed by: SURGERY

## 2022-08-24 PROCEDURE — 99221 1ST HOSP IP/OBS SF/LOW 40: CPT | Performed by: SURGERY

## 2022-08-24 PROCEDURE — 6370000000 HC RX 637 (ALT 250 FOR IP): Performed by: SURGERY

## 2022-08-24 PROCEDURE — 6360000002 HC RX W HCPCS: Performed by: ANESTHESIOLOGY

## 2022-08-24 PROCEDURE — 6370000000 HC RX 637 (ALT 250 FOR IP): Performed by: INTERNAL MEDICINE

## 2022-08-24 PROCEDURE — 2500000003 HC RX 250 WO HCPCS: Performed by: NURSE ANESTHETIST, CERTIFIED REGISTERED

## 2022-08-24 PROCEDURE — 7100000001 HC PACU RECOVERY - ADDTL 15 MIN: Performed by: SURGERY

## 2022-08-24 PROCEDURE — 82947 ASSAY GLUCOSE BLOOD QUANT: CPT

## 2022-08-24 PROCEDURE — 80048 BASIC METABOLIC PNL TOTAL CA: CPT

## 2022-08-24 PROCEDURE — 85027 COMPLETE CBC AUTOMATED: CPT

## 2022-08-24 PROCEDURE — 0Y6Y0Z0 DETACHMENT AT LEFT 5TH TOE, COMPLETE, OPEN APPROACH: ICD-10-PCS | Performed by: SURGERY

## 2022-08-24 PROCEDURE — 85610 PROTHROMBIN TIME: CPT

## 2022-08-24 PROCEDURE — 88305 TISSUE EXAM BY PATHOLOGIST: CPT

## 2022-08-24 PROCEDURE — 6360000002 HC RX W HCPCS: Performed by: NURSE ANESTHETIST, CERTIFIED REGISTERED

## 2022-08-24 PROCEDURE — 3600000013 HC SURGERY LEVEL 3 ADDTL 15MIN: Performed by: SURGERY

## 2022-08-24 PROCEDURE — 36430 TRANSFUSION BLD/BLD COMPNT: CPT

## 2022-08-24 PROCEDURE — 86927 PLASMA FRESH FROZEN: CPT

## 2022-08-24 PROCEDURE — 2709999900 HC NON-CHARGEABLE SUPPLY: Performed by: SURGERY

## 2022-08-24 PROCEDURE — 0Y6N0ZD DETACHMENT AT LEFT FOOT, PARTIAL 4TH RAY, OPEN APPROACH: ICD-10-PCS | Performed by: SURGERY

## 2022-08-24 RX ORDER — SODIUM CHLORIDE 9 MG/ML
INJECTION, SOLUTION INTRAVENOUS PRN
Status: DISCONTINUED | OUTPATIENT
Start: 2022-08-24 | End: 2022-08-26 | Stop reason: HOSPADM

## 2022-08-24 RX ORDER — SODIUM CHLORIDE 9 MG/ML
25 INJECTION, SOLUTION INTRAVENOUS PRN
Status: DISCONTINUED | OUTPATIENT
Start: 2022-08-24 | End: 2022-08-24 | Stop reason: HOSPADM

## 2022-08-24 RX ORDER — FENTANYL CITRATE 50 UG/ML
INJECTION, SOLUTION INTRAMUSCULAR; INTRAVENOUS PRN
Status: DISCONTINUED | OUTPATIENT
Start: 2022-08-24 | End: 2022-08-24 | Stop reason: SDUPTHER

## 2022-08-24 RX ORDER — OXYCODONE HYDROCHLORIDE AND ACETAMINOPHEN 5; 325 MG/1; MG/1
2 TABLET ORAL EVERY 6 HOURS PRN
Status: DISCONTINUED | OUTPATIENT
Start: 2022-08-24 | End: 2022-08-26 | Stop reason: HOSPADM

## 2022-08-24 RX ORDER — DIPHENHYDRAMINE HYDROCHLORIDE 50 MG/ML
12.5 INJECTION INTRAMUSCULAR; INTRAVENOUS
Status: COMPLETED | OUTPATIENT
Start: 2022-08-24 | End: 2022-08-24

## 2022-08-24 RX ORDER — KETAMINE HYDROCHLORIDE 10 MG/ML
INJECTION, SOLUTION INTRAMUSCULAR; INTRAVENOUS PRN
Status: DISCONTINUED | OUTPATIENT
Start: 2022-08-24 | End: 2022-08-24 | Stop reason: SDUPTHER

## 2022-08-24 RX ORDER — MIDAZOLAM HYDROCHLORIDE 1 MG/ML
INJECTION INTRAMUSCULAR; INTRAVENOUS PRN
Status: DISCONTINUED | OUTPATIENT
Start: 2022-08-24 | End: 2022-08-24 | Stop reason: SDUPTHER

## 2022-08-24 RX ORDER — SODIUM CHLORIDE 9 MG/ML
INJECTION, SOLUTION INTRAVENOUS CONTINUOUS
Status: DISCONTINUED | OUTPATIENT
Start: 2022-08-24 | End: 2022-08-24 | Stop reason: HOSPADM

## 2022-08-24 RX ORDER — METOCLOPRAMIDE HYDROCHLORIDE 5 MG/ML
10 INJECTION INTRAMUSCULAR; INTRAVENOUS
Status: DISCONTINUED | OUTPATIENT
Start: 2022-08-24 | End: 2022-08-24 | Stop reason: HOSPADM

## 2022-08-24 RX ORDER — DEXTROSE MONOHYDRATE 100 MG/ML
INJECTION, SOLUTION INTRAVENOUS CONTINUOUS PRN
Status: DISCONTINUED | OUTPATIENT
Start: 2022-08-24 | End: 2022-08-26 | Stop reason: HOSPADM

## 2022-08-24 RX ORDER — SODIUM CHLORIDE 0.9 % (FLUSH) 0.9 %
5-40 SYRINGE (ML) INJECTION EVERY 12 HOURS SCHEDULED
Status: DISCONTINUED | OUTPATIENT
Start: 2022-08-24 | End: 2022-08-24 | Stop reason: HOSPADM

## 2022-08-24 RX ORDER — FENTANYL CITRATE 50 UG/ML
25 INJECTION, SOLUTION INTRAMUSCULAR; INTRAVENOUS EVERY 5 MIN PRN
Status: DISCONTINUED | OUTPATIENT
Start: 2022-08-24 | End: 2022-08-24 | Stop reason: HOSPADM

## 2022-08-24 RX ORDER — LABETALOL HYDROCHLORIDE 5 MG/ML
10 INJECTION, SOLUTION INTRAVENOUS
Status: DISCONTINUED | OUTPATIENT
Start: 2022-08-24 | End: 2022-08-24 | Stop reason: HOSPADM

## 2022-08-24 RX ORDER — SODIUM CHLORIDE 0.9 % (FLUSH) 0.9 %
5-40 SYRINGE (ML) INJECTION PRN
Status: DISCONTINUED | OUTPATIENT
Start: 2022-08-24 | End: 2022-08-24 | Stop reason: HOSPADM

## 2022-08-24 RX ORDER — ONDANSETRON 2 MG/ML
4 INJECTION INTRAMUSCULAR; INTRAVENOUS
Status: DISCONTINUED | OUTPATIENT
Start: 2022-08-24 | End: 2022-08-24 | Stop reason: HOSPADM

## 2022-08-24 RX ORDER — SODIUM HYPOCHLORITE 1.25 MG/ML
SOLUTION TOPICAL ONCE
Status: DISCONTINUED | OUTPATIENT
Start: 2022-08-24 | End: 2022-08-26 | Stop reason: HOSPADM

## 2022-08-24 RX ORDER — HYDRALAZINE HYDROCHLORIDE 20 MG/ML
10 INJECTION INTRAMUSCULAR; INTRAVENOUS
Status: DISCONTINUED | OUTPATIENT
Start: 2022-08-24 | End: 2022-08-24 | Stop reason: HOSPADM

## 2022-08-24 RX ORDER — PROPOFOL 10 MG/ML
INJECTION, EMULSION INTRAVENOUS CONTINUOUS PRN
Status: DISCONTINUED | OUTPATIENT
Start: 2022-08-24 | End: 2022-08-24 | Stop reason: SDUPTHER

## 2022-08-24 RX ADMIN — LISINOPRIL 5 MG: 5 TABLET ORAL at 10:01

## 2022-08-24 RX ADMIN — SODIUM CHLORIDE: 9 INJECTION, SOLUTION INTRAVENOUS at 13:35

## 2022-08-24 RX ADMIN — HYDROMORPHONE HYDROCHLORIDE 0.5 MG: 1 INJECTION, SOLUTION INTRAMUSCULAR; INTRAVENOUS; SUBCUTANEOUS at 16:19

## 2022-08-24 RX ADMIN — DIPHENHYDRAMINE HYDROCHLORIDE 12.5 MG: 50 INJECTION, SOLUTION INTRAMUSCULAR; INTRAVENOUS at 16:22

## 2022-08-24 RX ADMIN — FENTANYL CITRATE 25 MCG: 50 INJECTION, SOLUTION INTRAMUSCULAR; INTRAVENOUS at 15:21

## 2022-08-24 RX ADMIN — PHYTONADIONE 1 MG: 10 INJECTION, EMULSION INTRAMUSCULAR; INTRAVENOUS; SUBCUTANEOUS at 10:09

## 2022-08-24 RX ADMIN — PROPOFOL 100 MCG/KG/MIN: 10 INJECTION, EMULSION INTRAVENOUS at 15:21

## 2022-08-24 RX ADMIN — FENTANYL CITRATE 25 MCG: 50 INJECTION, SOLUTION INTRAMUSCULAR; INTRAVENOUS at 15:30

## 2022-08-24 RX ADMIN — FENTANYL CITRATE 25 MCG: 50 INJECTION, SOLUTION INTRAMUSCULAR; INTRAVENOUS at 15:40

## 2022-08-24 RX ADMIN — OXYCODONE HYDROCHLORIDE AND ACETAMINOPHEN 2 TABLET: 5; 325 TABLET ORAL at 19:23

## 2022-08-24 RX ADMIN — ATORVASTATIN CALCIUM 40 MG: 40 TABLET, FILM COATED ORAL at 21:58

## 2022-08-24 RX ADMIN — MIDAZOLAM 2 MG: 1 INJECTION INTRAMUSCULAR; INTRAVENOUS at 15:09

## 2022-08-24 RX ADMIN — KETAMINE HYDROCHLORIDE 50 MG: 10 INJECTION INTRAMUSCULAR; INTRAVENOUS at 15:21

## 2022-08-24 RX ADMIN — VANCOMYCIN HYDROCHLORIDE 1000 MG: 1 INJECTION, POWDER, LYOPHILIZED, FOR SOLUTION INTRAVENOUS at 18:46

## 2022-08-24 RX ADMIN — HYDROMORPHONE HYDROCHLORIDE 0.5 MG: 1 INJECTION, SOLUTION INTRAMUSCULAR; INTRAVENOUS; SUBCUTANEOUS at 16:26

## 2022-08-24 ASSESSMENT — PAIN DESCRIPTION - LOCATION
LOCATION: FOOT
LOCATION: INCISION;FOOT
LOCATION: INCISION;FOOT

## 2022-08-24 ASSESSMENT — PAIN DESCRIPTION - ORIENTATION
ORIENTATION: LEFT

## 2022-08-24 ASSESSMENT — PAIN SCALES - GENERAL
PAINLEVEL_OUTOF10: 0
PAINLEVEL_OUTOF10: 6
PAINLEVEL_OUTOF10: 10
PAINLEVEL_OUTOF10: 7

## 2022-08-24 ASSESSMENT — PAIN DESCRIPTION - DESCRIPTORS: DESCRIPTORS: ACHING;THROBBING

## 2022-08-24 ASSESSMENT — PAIN DESCRIPTION - PAIN TYPE
TYPE: SURGICAL PAIN
TYPE: SURGICAL PAIN

## 2022-08-24 ASSESSMENT — PAIN - FUNCTIONAL ASSESSMENT: PAIN_FUNCTIONAL_ASSESSMENT: 0-10

## 2022-08-24 NOTE — H&P
[facility]    HISTORY AND PHYSICAL EXAMINATION            Date:   8/24/2022  Patient name:  Mary Mercado  Date of admission:  8/23/2022 12:32 PM  MRN:   [id number]  Account:  [de-identified]  YOB: 1955  PCP:    Adam Soliz DO  Room:   [facility] OR Pool/NONE  Code Status:    Prior    Chief Complaint:     Chief Complaint   Patient presents with    Toe Injury       History Obtained From:     patient, electronic medical record    History of Present Illness:     Mary Mercado is a 78 y.o. Non- / non  male who presents with Toe Injury   and is admitted to the hospital for the management of Chest pain. Location-left toe pain  Worse with-movement  Relieved with-pain medications  Quality-sharp  Severity-moderate  Duration-chronic  Timing-continuous  Patient has past medical history of CAD, congestive heart failure, AFib, diabetes. Currently denies any chest pain, shortness of breath, dizziness, palpitation, nausea, vomiting or any other symptoms.     Patient has been evaluated by vascular surgery and is scheduled for transmetatarsal amputation of the  lower extremity today    Past Medical History:     Past Medical History:   Diagnosis Date    Atrial fibrillation ([address]     CAD (coronary artery disease)     Cardiomyopathy ([address]     Cellulitis     CHF (congestive heart failure) ([address]     Diabetes mellitus ([address]     Foot infection     Heart failure ([address]     Hyperlipidemia     Hypertension     MRSA (methicillin resistant staph aureus) culture positive     Osteomyelitis ([address]     PVD (peripheral vascular disease) ([address]     Wound, open, foot     left foot        Past Surgical History:     Past Surgical History:   Procedure Laterality Date    CARDIAC SURGERY  2010    CABG X3    COLONOSCOPY      DEBRIDEMENT Right 11/19/2015    DEBRIDEMENT WOUND FOOT RIGHT W/ APPLICATION BILAT INTEG RAT GRAFT    ENDOSCOPY, COLON, DIAGNOSTIC      EYE SURGERY Bilateral     cataracts    HERNIA REPAIR      umbilical    KNEE ARTHROSCOPY Right     OTHER SURGICAL HISTORY Right 12 29 15    wound care graft procedure foot,appl of integra    PACEMAKER PLACEMENT  2011    WITH DEFIBRILLATOR    TOE AMPUTATION Right     R great    TOE AMPUTATION Left 3/13/2022    TOE AMPUTATION--left 2nd digit performed by Melanie Cobb DPM at [address] Left 7/13/2022    RIGHT COMMON FEMORAL ACCESS UNDER ULTRASOUND GUIDANCE DISTAL AORTOGRAPHY WITH PELVIC RUN OFF PLACEMENT OF CATHETER INTO LEFT COMMON FEMORAL ARTERY, PROFUNDA FEMORAL AND SFA  ARTERIOGRAPHY PLACEMENT OF CATHETER INTO LEFT SFA, WITH SFA POPLITEAL AND TIBIAL TIBIOPERONEAL ARTERIOGRAPHY INTO LEFT FOOT performed by Ángel Arellano MD at [address]        Medications Prior to Admission:     Prior to Admission medications    Medication Sig Start Date End Date Taking? Authorizing Provider   benazepril (LOTENSIN) 5 MG tablet Take 5 mg by mouth in the morning and at bedtime   Yes Historical Provider, MD   glipiZIDE (GLUCOTROL) 5 MG tablet Take 2.5 mg by mouth daily   Yes Historical Provider, MD   Vitamin D (CHOLECALCIFEROL) 25 MCG (1000 UT) TABS tablet Take 1,000 Units by mouth daily   Yes Historical Provider, MD   sulfamethoxazole-trimethoprim (BACTRIM DS) 800-160 MG per tablet Take 1 tablet by mouth 2 times daily For 10 days   Yes Historical Provider, MD   warfarin (COUMADIN) 2 MG tablet Take 2 mg by mouth four times a week Indications: On Monday, Wednesday, Friday, and Saturday Patients INR is managed by [facility] med management. Yes Historical Provider, MD   aspirin 81 MG EC tablet Take 1 tablet by mouth daily 3/15/22   Lucas Hernadez, DO   nitroGLYCERIN (NITROSTAT) 0.4 MG SL tablet up to max of 3 total doses.  If no relief after 1 dose, call 911. 3/15/22   Kristine Martinez,    furosemide (LASIX) 80 MG tablet Take 1 tablet by mouth daily 3/15/22   Kristine Martinez, DO   warfarin (COUMADIN) 2 MG tablet °C), Max:98.2 °F (36.8 °C)    Recent Labs     08/23/22 2024 08/24/22  0607 08/24/22  1105 08/24/22  1600   POCGLU 315* 165* 175* 164*       Intake/Output Summary (Last 24 hours) at 8/24/2022 1720  Last data filed at 8/24/2022 1655  Gross per 24 hour   Intake 1161 ml   Output --   Net 1161 ml       General Appearance: alert, well appearing, and in no acute distress  Mental status: oriented to person, place, and time  Head: normocephalic, atraumatic  Eye: no icterus, redness, pupils equal and reactive, extraocular eye movements intact, conjunctiva clear  Ear: normal external ear, no discharge, hearing intact  Nose: no drainage noted  Mouth: mucous membranes moist  Neck: supple, no carotid bruits, thyroid not palpable  Lungs: Bilateral equal air entry, clear to ausculation, no wheezing, rales or rhonchi, normal effort  Cardiovascular: normal rate, regular rhythm,   Abdomen: Soft, nontender, nondistended, normal bowel sounds  Neurologic: There are no new focal motor or sensory deficits,  Extremities: left foot, bandage present    Investigations:      Laboratory Testing:  Recent Results (from the past 24 hour(s))   POC Glucose Fingerstick    Collection Time: 08/23/22  8:24 PM   Result Value Ref Range    POC Glucose 315 (H) 75 - 110 mg/dL   Protime-INR    Collection Time: 08/24/22  6:03 AM   Result Value Ref Range    Protime 23.2 (H) 11.8 - 14.6 sec    INR 2.1    CBC    Collection Time: 08/24/22  6:03 AM   Result Value Ref Range    WBC 6.7 3.5 - 11.0 k/uL    RBC 3.84 (L) 4.5 - 5.9 m/uL    Hemoglobin 10.6 (L) 13.5 - 17.5 g/dL    Hematocrit 31.9 (L) 41 - 53 %    MCV 83.2 80 - 100 fL    MCH 27.6 26 - 34 pg    MCHC 33.1 31 - 37 g/dL    RDW 20.6 (H) 11.5 - 14.9 %    Platelets 808 458 - 940 k/uL    MPV 7.8 6.0 - 12.0 fL   Basic Metabolic Panel    Collection Time: 08/24/22  6:03 AM   Result Value Ref Range    Glucose 160 (H) 70 - 99 mg/dL    BUN 33 (H) 8 - 23 mg/dL    Creatinine 1.61 (H) 0.70 - 1.20 mg/dL    Calcium 9.4 8.6 - 10.4 mg/dL    Sodium 136 135 - 144 mmol/L    Potassium 4.0 3.7 - 5.3 mmol/L    Chloride 100 98 - 107 mmol/L    CO2 26 20 - 31 mmol/L    Anion Gap 10 9 - 17 mmol/L    GFR Non-African American 43 (L) >60 mL/min    GFR  52 (L) >60 mL/min    GFR Comment         POC Glucose Fingerstick    Collection Time: 08/24/22  6:07 AM   Result Value Ref Range    POC Glucose 165 (H) 75 - 110 mg/dL   PREPARE PLASMA, 2 Units    Collection Time: 08/24/22  9:15 AM   Result Value Ref Range    Unit Number [id number]     Product Code FRESH PLASMA     Unit Divison 00     Dispense Status ISSUED     Unit Issue Date/Time [id number]     Product Code Blood Bank S8244I14     Blood Bank Unit Type and Rh B POS     Blood Bank ISBT Product Blood Type 7300     Blood Bank Blood Product Expiration Date 201015552617     Transfusion Status OK TO TRANSFUSE     Unit Number [id number]     Product Code FRESH PLASMA     Unit Divison 00     Dispense Status ISSUED     Unit Issue Date/Time 599738464869     Product Code Blood Bank [id number]     Blood Bank Unit Type and Rh B POS     Blood Bank ISBT Product Blood Type 7300     Blood Bank Blood Product Expiration Date 727825273213     Transfusion Status OK TO TRANSFUSE    TYPE AND SCREEN    Collection Time: 08/24/22  9:56 AM   Result Value Ref Range    Expiration Date 08/27/2022,2359     Arm Band Number [id number]     ABO/Rh B POSITIVE     Antibody Screen NEGATIVE     Blood Bank Comment Patient ABO/Rh confirmed as: B Positive [043]. POC Glucose Fingerstick    Collection Time: 08/24/22 11:05 AM   Result Value Ref Range    POC Glucose 175 (H) 75 - 110 mg/dL   Protime-INR    Collection Time: 08/24/22 12:12 PM   Result Value Ref Range    Protime 19.6 (H) 11.8 - 14.6 sec    INR 1.7    POC Glucose Fingerstick    Collection Time: 08/24/22  4:00 PM   Result Value Ref Range    POC Glucose 164 (H) 75 - 110 mg/dL       Imaging/Diagnostics:  No results found.     Assessment :      Hospital Problems Last Modified POA    * (Principal) Chest pain 8/23/2022 Yes       Plan:     Patient status inpatient in the Med/Surge    Scheduled for  transmetatarsal amputation today   History of AFib on warfarin, received vitamin K yesterday, INR 1.7, received FFP   Also received 1 dose of vancomycin   Hypertension, history of congestive heart failure, Lasix, lisinopril continued   Hyperlipidemia-  Lipitor 40 mg   Insulin sliding scale    Consultations:   IP CONSULT TO VASCULAR SURGERY  IP CONSULT TO PRIMARY CARE PROVIDER  PHARMACY TO DOSE VANCOMYCIN    Patient is admitted as inpatient status because of co-morbidities listed above, severity of signs and symptoms as outlined, requirement for current medical therapies and most importantly because of direct risk to patient if care not provided in a hospital setting. Expected length of stay > 48 hours.     Henrique Mathews MD  8/24/2022  5:20 PM    Copy sent to Dr. Ernestina Medina DO

## 2022-08-24 NOTE — CARE COORDINATION
CASE MANAGEMENT NOTE:    Admission Date:  8/23/2022 Glory [address] is a 78 y.o.  male    Admitted for : Chest pain [R07.9]  Osteomyelitis of left foot, unspecified type ([facility] [M86.9]    Met with:  Patient    PCP:  [address]:  Anum Late      Is patient alert and oriented at time of discussion:  Yes    Current Residence/ Living Arrangements:  independently at home with mother             Current Services PTA:  Yes - [facility] wound care and [address] [place] [address] Coumadin clinic    Does patient go to outpatient dialysis: No  If yes, location and chair time: NA  Who is their nephrologist? NA    Is patient agreeable to VNS: no    Freedom of choice provided:  No    List of [address] provided: No    VNS chosen:  No    DME:  straight cane and walker (they are his mothers)    Home Oxygen: No    Nebulizer: No    CPAP/BIPAP: No    Supplier: N/A    Potential Assistance Needed: No    SNF needed: No    Freedom of choice and list provided: No    Pharmacy:  [facility]       Is patient currently receiving oral anticoagulation therapy? Yes- Coumadin , follows at [facility]     Is the Patient an [facility] with Readmission Risk Score greater than 14%? No  If yes, pt needs a follow up appointment made within 7 days. Family Members/Caregivers that pt would like involved in their care:    Yes    If yes, list name here: Mother ritesh     Transportation Provider:  Family             Discharge Plan:  8/24/22 Marylaurence Hare from home with mother DME: cane, walker VNS: none Follows with [facility] coumadin clinic and [facility] Wound care clinic. Pt to go to surgery today for amputation of toes. IV vanco ,npo, INR 2.1 getting FFP. Pt refusing VNS.  Following for needs//JF                          Electronically signed by: Ciara Hurtado RN on 8/24/2022 at 10:12 AM

## 2022-08-24 NOTE — PROGRESS NOTES
Vancomycin Dosing by Pharmacy - Daily Note   Vancomycin Therapy Day:  2  Indication: osteomyelitis    Allergies:  Doxycycline, Pcn [penicillins], and Penicillin g   Actual Weight:    Wt Readings from Last 1 Encounters:   08/23/22 187 lb (84.8 kg)       Labs/Ancillary Data  Estimated Creatinine Clearance: 46 mL/min (A) (based on SCr of 1.61 mg/dL (H)). Recent Labs     08/23/22  1303 08/24/22  0603   CREATININE 1.76* 1.61*   BUN 32* 33*   WBC 8.5 6.7     No results found for: PROCAL  No intake or output data in the 24 hours ending 08/24/22 0757  Temp: 98.2    Culture Date / Source  /  Results  See micro  Recent vancomycin administrations                     vancomycin (VANCOCIN) 2,000 mg in dextrose 5 % 500 mL IVPB (mg) 2,000 mg New Bag 08/23/22 1708                    Vancomycin Concentrations:   TROUGH:  No results for input(s): VANCOTROUGH in the last 72 hours. RANDOM:  No results for input(s): VANCORANDOM in the last 72 hours. MRSA Nasal Swab: N/A. Non-respiratory infection. Satnam Vniod PLAN     Continue current dose of 1000 mg q24h IV  Ensured BUN/sCr ordered at baseline and every 48 hours x at least 3 levels, then at least weekly. Repeat vancomycin concentration ordered for 08/25 @ 0600   Pharmacy will continue to monitor patient and adjust therapy as indicated      Vancomycin Target Concentration Parameters  Treatment  Population Target AUC/EUGENE Target Trough   Invasive MRSA Infection (bacteremia, pneumonia, meningitis, endocarditis, osteomyelitis)  Sepsis (undifferentiated) 400-600 N/A   Infection due to non-MRSA pathogen  Empiric treatment of non-invasive MRSA infection  (SSTI, UTI) <500 10-15 mg/L   CrCl < 29 mL/min  Rapidly fluctuating serum creatinine   BRIANNA N/A < 15 mg/L     Renal replacement therapy is dosed by levels, per hospital protocol. Abbreviations  * Pauc: probability that AUC is >400 (efficacy); Pconc: probability that Ctrough is above 20 ?g/mL (toxicity);  Tox: Probability of nephrotoxicity, based

## 2022-08-24 NOTE — ANESTHESIA PRE PROCEDURE
Department of Anesthesiology  Preprocedure Note       Name:  Bria Xie   Age:  78 y.o.  :  1955                                          MRN:  [id number]         Date:  2022      Surgeon: Noris Whalen):  Dayanna Taylor MD    Procedure: Procedure(s):  TRANSMETATARSAL AMPUTATION OF ALL DIGITS    Medications prior to admission:   Prior to Admission medications    Medication Sig Start Date End Date Taking? Authorizing Provider   benazepril (LOTENSIN) 5 MG tablet Take 5 mg by mouth in the morning and at bedtime   Yes Historical Provider, MD   glipiZIDE (GLUCOTROL) 5 MG tablet Take 2.5 mg by mouth daily   Yes Historical Provider, MD   Vitamin D (CHOLECALCIFEROL) 25 MCG (1000 UT) TABS tablet Take 1,000 Units by mouth daily   Yes Historical Provider, MD   sulfamethoxazole-trimethoprim (BACTRIM DS) 800-160 MG per tablet Take 1 tablet by mouth 2 times daily For 10 days   Yes Historical Provider, MD   warfarin (COUMADIN) 2 MG tablet Take 2 mg by mouth four times a week Indications: On Monday, Wednesday, Friday, and Saturday Patients INR is managed by [facility] med management. Yes Historical Provider, MD   aspirin 81 MG EC tablet Take 1 tablet by mouth daily 3/15/22   Lucas T Satya, DO   nitroGLYCERIN (NITROSTAT) 0.4 MG SL tablet up to max of 3 total doses. If no relief after 1 dose, call 911. 3/15/22   Mahad Post, DO   furosemide (LASIX) 80 MG tablet Take 1 tablet by mouth daily 3/15/22   Mahad Post, DO   warfarin (COUMADIN) 2 MG tablet Take 4 mg by mouth three times a week Indications: On Tuesday, Thursday, and  Patients INR is managed by [facility] med management. 20   Historical Provider, MD   atorvastatin (LIPITOR) 40 MG tablet Take 40 mg by mouth daily.     Historical Provider, MD       Current medications:    Current Facility-Administered Medications   Medication Dose Route Frequency Provider Last Rate Last Admin    0.9 % sodium chloride infusion   IntraVENous PRN Alina Pradhan MD Bonnie        glucose chewable tablet 16 g  4 tablet Oral PRN Lucas T Satya, DO        dextrose bolus 10% 125 mL  125 mL IntraVENous PRN Lucas T Satya, DO        Or    dextrose bolus 10% 250 mL  250 mL IntraVENous PRN Lucas T Satya, DO        glucagon (rDNA) injection 1 mg  1 mg SubCUTAneous PRN Lucas T Satya, DO        dextrose 10 % infusion   IntraVENous Continuous PRN Lucas T Satya, DO        vancomycin (VANCOCIN) intermittent dosing (placeholder)   Other RX Placeholder Carole Meredith MD        insulin lispro (HUMALOG) injection vial 0-4 Units  0-4 Units SubCUTAneous TID WC Shelbie Batista MD        insulin lispro (HUMALOG) injection vial 0-4 Units  0-4 Units SubCUTAneous Nightly Shelbie Batista MD   4 Units at 08/23/22 2056    atorvastatin (LIPITOR) tablet 40 mg  40 mg Oral Nightly Shelbie Batista MD   40 mg at 08/23/22 2056    lisinopril (PRINIVIL;ZESTRIL) tablet 5 mg  5 mg Oral Daily Shelbie Batista MD   5 mg at 08/24/22 1001    furosemide (LASIX) tablet 80 mg  80 mg Oral Daily Shelbie Batista MD        vancomycin 1000 mg IVPB in 250 mL D5W addavial  1,000 mg IntraVENous Q24H Carole Meredith MD           Allergies:     Allergies   Allergen Reactions    Doxycycline Other (See Comments)     Skin peeling    Pcn [Penicillins] Rash    Penicillin G Rash       Problem List:    Patient Active Problem List   Diagnosis Code    Diabetic foot infection ([facility] E11.628, L08.9    Diabetic foot ulcer with osteomyelitis ([facility] E11.621, E11.69, L97.509, M86.9    Chronic osteomyelitis ([facility] M86.60    PVD (peripheral vascular disease) ([facility] I73.9    Atherosclerosis of coronary artery without angina pectoris I25.10    Cardiomyopathy ([facility] I42.9    Cardiac left ventricular ejection fraction 21-40 percent R94.30    Diabetes mellitus type 2 with peripheral artery disease ([facility] E11.51    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 (HCC) L97.514    Chronic osteomyelitis of left foot ([facility] M86.672    Onychomycosis B35.1    Heart failure ([facility]) I50.9    MRSA bacteremia R78.81, B95.62    Gangrene of toe of left foot ([facility]) I96    Elevated C-reactive protein (CRP) R79.82    Elevated erythrocyte sedimentation rate R70.0    Acute kidney injury ([facility] N17.9    Allergy to penicillin Z88.0    Ulcer of left foot, with necrosis of bone ([facility]) L97.524    Acute osteomyelitis of left foot ([facility]) M86.172    Cellulitis of left foot L03. 116    Abscess of bursa of left foot M71.072    Diabetic polyneuropathy associated with type 2 diabetes mellitus ([facility]) E11.42    Osteomyelitis ([facility]) M86.9    Cellulitis L03.90    Atherosclerosis of native arteries of left leg with ulceration of other part of foot ([facility]) I70.245    Normocytic normochromic anemia D64.9    Surgical wound, non healing T81.89XA    Chest pain R07.9       Past Medical History:        Diagnosis Date    Atrial fibrillation ([facility])     CAD (coronary artery disease)     Cardiomyopathy ([facility]     Cellulitis     CHF (congestive heart failure) ([facility])     Diabetes mellitus ([facility]     Foot infection     Heart failure ([facility])     Hyperlipidemia     Hypertension     MRSA (methicillin resistant staph aureus) culture positive     Osteomyelitis ([facility]     PVD (peripheral vascular disease) ([facility]     Wound, open, foot     left foot       Past Surgical History:        Procedure Laterality Date    CARDIAC SURGERY  2010    CABG X3    COLONOSCOPY      DEBRIDEMENT Right 11/19/2015    DEBRIDEMENT WOUND FOOT RIGHT W/ APPLICATION BILAT INTEG RAT GRAFT    ENDOSCOPY, COLON, DIAGNOSTIC      EYE SURGERY Bilateral     cataracts    HERNIA REPAIR      umbilical    KNEE ARTHROSCOPY Right     OTHER SURGICAL HISTORY Right 12 29 15    wound care graft procedure foot,appl of integra    PACEMAKER PLACEMENT  2011    WITH DEFIBRILLATOR    TOE AMPUTATION Right     R great    TOE AMPUTATION Left 3/13/2022    TOE AMPUTATION--left 2nd digit performed by Kellen Carbajal DPM at [address] Left 7/13/2022    RIGHT COMMON FEMORAL ACCESS UNDER ULTRASOUND GUIDANCE DISTAL AORTOGRAPHY WITH PELVIC RUN OFF PLACEMENT OF CATHETER INTO LEFT COMMON FEMORAL ARTERY, PROFUNDA FEMORAL AND SFA  ARTERIOGRAPHY PLACEMENT OF CATHETER INTO LEFT SFA, WITH SFA POPLITEAL AND TIBIAL TIBIOPERONEAL ARTERIOGRAPHY INTO LEFT FOOT performed by Elizabeth Boykin MD at [address] History:    Social History     Tobacco Use    Smoking status: Never    Smokeless tobacco: Never   Substance Use Topics    Alcohol use: Yes     Comment: SOCIAL on weekends                                Counseling given: Not Answered      Vital Signs (Current):   Vitals:    08/24/22 1129 08/24/22 1134 08/24/22 1201 08/24/22 1205   BP: (!) 153/82  (!) 160/87 (!) 154/89   Pulse: 81  73    Resp: 18  18    Temp: 97.5 °F (36.4 °C)  97.5 °F (36.4 °C)    TempSrc: Oral  Oral    SpO2: 100% 100% 100%    Weight:       Height:                                                  BP Readings from Last 3 Encounters:   08/24/22 (!) 154/89   08/22/22 (!) 164/82   08/22/22 (!) 146/72       NPO Status:                                                                                 BMI:   Wt Readings from Last 3 Encounters:   08/23/22 187 lb (84.8 kg)   08/22/22 179 lb (81.2 kg)   08/22/22 179 lb (81.2 kg)     Body mass index is 26.83 kg/m².     CBC:   Lab Results   Component Value Date/Time    WBC 6.7 08/24/2022 06:03 AM    RBC 3.84 08/24/2022 06:03 AM    RBC 4.43 12/28/2011 10:34 AM    HGB 10.6 08/24/2022 06:03 AM    HCT 31.9 08/24/2022 06:03 AM    MCV 83.2 08/24/2022 06:03 AM    RDW 20.6 08/24/2022 06:03 AM     08/24/2022 06:03 AM     12/28/2011 10:34 AM       CMP:   Lab Results   Component Value Date/Time     08/24/2022 06:03 AM    K 4.0 08/24/2022 06:03 AM     08/24/2022 06:03 AM    CO2 26 08/24/2022 06:03 AM    BUN 33 08/24/2022 06:03 AM    CREATININE 1.61 08/24/2022 06:03 AM GFRAA 52 08/24/2022 06:03 AM    LABGLOM 43 08/24/2022 06:03 AM    GLUCOSE 160 08/24/2022 06:03 AM    GLUCOSE 335 12/28/2011 10:34 AM    PROT 8.3 08/23/2022 01:03 PM    CALCIUM 9.4 08/24/2022 06:03 AM    BILITOT 0.44 08/23/2022 01:03 PM    ALKPHOS 126 08/23/2022 01:03 PM    AST 24 08/23/2022 01:03 PM    ALT 22 08/23/2022 01:03 PM       POC Tests:   Recent Labs     08/24/22  1105   POCGLU 175*       Coags:   Lab Results   Component Value Date/Time    PROTIME 23.2 08/24/2022 06:03 AM    PROTIME 31.4 07/07/2022 07:41 AM    INR 2.1 08/24/2022 06:03 AM    APTT 34.1 06/30/2022 12:19 PM       HCG (If Applicable): No results found for: PREGTESTUR, PREGSERUM, HCG, HCGQUANT     ABGs:   Lab Results   Component Value Date/Time    PHART 7.473 10/23/2015 11:32 AM    PO2ART 83.2 10/23/2015 11:32 AM    WUY4ISQ 38.1 10/23/2015 11:32 AM    SWS0BZM 28.0 10/23/2015 11:32 AM    R0VJHKGT 95.8 10/23/2015 11:32 AM        Type & Screen (If Applicable):  No results found for: LABABO, LABRH    Drug/Infectious Status (If Applicable):  No results found for: HIV, HEPCAB    COVID-19 Screening (If Applicable):   Lab Results   Component Value Date/Time    COVID19 Not Detected 03/12/2022 10:05 PM           Anesthesia Evaluation  Patient summary reviewed and Nursing notes reviewed no history of anesthetic complications:   Airway: Mallampati: III  TM distance: >3 FB   Neck ROM: full  Mouth opening: > = 3 FB   Dental:    (+) edentulous      Pulmonary:Negative Pulmonary ROS and normal exam  breath sounds clear to auscultation                             Cardiovascular:    (+) hypertension:, valvular problems/murmurs (moderate TR):, pacemaker ([employer], no shocks ever received.,): AICD, CAD:, CABG/stent (CABG x 3):, dysrhythmias: atrial fibrillation, CHF:, hyperlipidemia      ECG reviewed  Rhythm: regular  Rate: normal  Echocardiogram reviewed         Beta Blocker:  Not on Beta Blocker      ROS comment: Ischemic Cardiomyopathy   Left ventricular systolic function is moderately reduced. Estimated LV EF 35-40 %. (3/2022)     Neuro/Psych:   (+) neuromuscular disease:,              ROS comment: Diabetic Polyneuropathy GI/Hepatic/Renal:   (+) renal disease: CRI,           Endo/Other:    (+) DiabetesType II DM, , blood dyscrasia (last dose of Coumadin 8/21/22): anticoagulation therapy and anemia:., .                  ROS comment: Diabetic Foot infection and Ulcer  Chronic osteomyelitis Abdominal:             Vascular:   + PVD, aortic or cerebral (PVOD), . Other Findings:           Anesthesia Plan      general     ASA 4       Induction: intravenous. MIPS: Postoperative opioids intended and Prophylactic antiemetics administered. Anesthetic plan and risks discussed with patient. Plan discussed with CRNA. FFP and Vitamin K ordered on the floor.       Jt Hawk MD   8/24/2022

## 2022-08-24 NOTE — H&P
Patient was seen and examined per Dr. Jenny Dubois MD on 8/24/22, consult note was reviewed and contains all elements of a surgical H&P. The patient was not seen personally by this writer, consult note per surgeon is copied below. PROVISIONAL DIAGNOSES / SURGERY:   OSTEOMYELITIS LEFT FOOT  IP RM -ER  TRANSMETATARSAL AMPUTATION OF ALL DIGITS    Electronically signed by KAYLENE Grande CNP on 8/24/2022 at 8:25 AM          Date:8/24/2022        Patient Name:Asher Wills     YOB: 1955     Age:66 y.o. Consults       Chief Complaint       Chief Complaint  Patient presents with   Toe Injury             History Obtained From  patient       History of Present Illness  The patient was seen in the office on Monday. He has what I think he has a fractured third toe on the left lower extremity with ulceration around the base of the toe and generalized cellulitis in the forefoot. Either the proximal phalanges is fractured or he has a septic joint. I think it is probably the latter or both. He has had a second toe amputation within the past several months which I was following for wound healing. We did do an arteriogram revealing distal tibial disease. I do not think there is any reconstructive open surgery that would be a potential to improve the wound healing. He was healing his wound actually quite well until recently when this problem started. We will be bringing him to the operating room for it completion transmetatarsal amputation.   If that does not heal then I would go ahead with arteriography once again to see if I could get the posterior tibial open to some very wispy collaterals for healing potential.       Past Medical History       Past Medical History  Past Medical History:  Diagnosis Date   Atrial fibrillation ([address]     CAD (coronary artery disease)     Cardiomyopathy ([facility]     Cellulitis     CHF (congestive heart failure) ([address]     Diabetes mellitus ([address]     Foot infection     Heart failure (HCC)     Hyperlipidemia     Hypertension     MRSA (methicillin resistant staph aureus) culture positive     Osteomyelitis (HCC)     PVD (peripheral vascular disease) ([facility]     Wound, open, foot      left foot            Past Surgical History       Past Surgical History  Past Surgical History:  Procedure Laterality Date   CARDIAC SURGERY   2010    CABG X3   COLONOSCOPY       DEBRIDEMENT Right 11/19/2015    DEBRIDEMENT WOUND FOOT RIGHT W/ APPLICATION BILAT INTEG RAT GRAFT   ENDOSCOPY, COLON, DIAGNOSTIC       EYE SURGERY Bilateral      cataracts   HERNIA REPAIR        umbilical   KNEE ARTHROSCOPY Right     OTHER SURGICAL HISTORY Right 12 29 15    wound care graft procedure foot,appl of integra   PACEMAKER PLACEMENT   2011    WITH DEFIBRILLATOR   TOE AMPUTATION Right      R great   TOE AMPUTATION Left 3/13/2022    TOE AMPUTATION--left 2nd digit performed by Wes Kan DPM at [facility] Left 7/13/2022    RIGHT COMMON FEMORAL ACCESS UNDER ULTRASOUND GUIDANCE DISTAL AORTOGRAPHY WITH PELVIC RUN OFF PLACEMENT OF CATHETER INTO LEFT COMMON FEMORAL ARTERY, PROFUNDA FEMORAL AND SFA  ARTERIOGRAPHY PLACEMENT OF CATHETER INTO LEFT SFA, WITH SFA POPLITEAL AND TIBIAL TIBIOPERONEAL ARTERIOGRAPHY INTO LEFT FOOT performed by Yesika Singer MD at [address] [place] [address] Medications  Prior to Admission medications   Medication Sig Start Date End Date Taking?  Authorizing Provider  benazepril (LOTENSIN) 5 MG tablet Take 5 mg by mouth in the morning and at bedtime     Yes Historical Provider, MD  glipiZIDE (GLUCOTROL) 5 MG tablet Take 2.5 mg by mouth daily     Yes Historical Provider, MD  Vitamin D (CHOLECALCIFEROL) 25 MCG (1000 UT) TABS tablet Take 1,000 Units by mouth daily     Yes Historical Provider, MD  sulfamethoxazole-trimethoprim (BACTRIM DS) 800-160 MG per tablet Take 1 tablet by mouth 2 times daily For 10 days     Yes Historical Provider, MD  warfarin (COUMADIN) 2 MG tablet Take 2 mg by mouth four times a week Indications: On Monday, Wednesday, Friday, and Saturday Patients INR is managed by [facility]. Yes Historical Provider, MD  aspirin 81 MG EC tablet Take 1 tablet by mouth daily 3/15/22     Lucas Hernadez, DO  nitroGLYCERIN (NITROSTAT) 0.4 MG SL tablet up to max of 3 total doses. If no relief after 1 dose, call 911. 3/15/22     Tony Carias, DO  furosemide (LASIX) 80 MG tablet Take 1 tablet by mouth daily 3/15/22     Tony Carias, DO  warfarin (COUMADIN) 2 MG tablet Take 4 mg by mouth three times a week Indications: On Tuesday, Thursday, and Sunday Patients INR is managed by [facility]. 6/23/20     Historical Provider, MD  atorvastatin (LIPITOR) 40 MG tablet Take 40 mg by mouth daily.        Historical Provider, MD              Current Meds Link used for Sign Out Report  vancomycin (VANCOCIN) intermittent dosing (placeholder), RX Placeholder  insulin lispro (HUMALOG) injection vial 0-4 Units, TID WC  insulin lispro (HUMALOG) injection vial 0-4 Units, Nightly  atorvastatin (LIPITOR) tablet 40 mg, Nightly  lisinopril (PRINIVIL;ZESTRIL) tablet 5 mg, Daily  furosemide (LASIX) tablet 80 mg, Daily  vancomycin 1000 mg IVPB in 250 mL D5W addavial, Q24H             Allergies  Doxycycline, Pcn [penicillins], and Penicillin g       Social History       Social History            Tobacco History          Smoking Status  Never         Smokeless Tobacco Use  Never                     Alcohol History          Alcohol Use Status  Yes Comment  SOCIAL on weekends                     Drug Use          Drug Use Status  No                     Sexual Activity          Sexually Active  Not Currently                            Family History       Family History  Family History  Problem Relation Age of Onset   Cancer Father          pancreatic cancer   Other Brother          MI   Osteoarthritis Mother     Other Maternal Grandfather          MI he has good healing potential of this wound. Hyperbaric therapy might be a viable option as an outpatient.      Electronically signed by Rina Aschoff, MD on 8/24/22 at 7:41 AM EDT

## 2022-08-24 NOTE — CONSULTS
BILAT INTEG RAT GRAFT    ENDOSCOPY, COLON, DIAGNOSTIC      EYE SURGERY Bilateral     cataracts    HERNIA REPAIR      umbilical    KNEE ARTHROSCOPY Right     OTHER SURGICAL HISTORY Right 12 29 15    wound care graft procedure foot,appl of integra    PACEMAKER PLACEMENT  2011    WITH DEFIBRILLATOR    TOE AMPUTATION Right     R great    TOE AMPUTATION Left 3/13/2022    TOE AMPUTATION--left 2nd digit performed by Tabitha Hernandez DPM at [address] [facility] Left 7/13/2022    RIGHT COMMON FEMORAL ACCESS UNDER ULTRASOUND GUIDANCE DISTAL AORTOGRAPHY WITH PELVIC RUN OFF PLACEMENT OF CATHETER INTO LEFT COMMON FEMORAL ARTERY, PROFUNDA FEMORAL AND SFA  ARTERIOGRAPHY PLACEMENT OF CATHETER INTO LEFT SFA, WITH SFA POPLITEAL AND TIBIAL TIBIOPERONEAL ARTERIOGRAPHY INTO LEFT FOOT performed by Blade Song MD at [address]        Medications     Prior to Admission medications    Medication Sig Start Date End Date Taking? Authorizing Provider   benazepril (LOTENSIN) 5 MG tablet Take 5 mg by mouth in the morning and at bedtime   Yes Historical Provider, MD   glipiZIDE (GLUCOTROL) 5 MG tablet Take 2.5 mg by mouth daily   Yes Historical Provider, MD   Vitamin D (CHOLECALCIFEROL) 25 MCG (1000 UT) TABS tablet Take 1,000 Units by mouth daily   Yes Historical Provider, MD   sulfamethoxazole-trimethoprim (BACTRIM DS) 800-160 MG per tablet Take 1 tablet by mouth 2 times daily For 10 days   Yes Historical Provider, MD   warfarin (COUMADIN) 2 MG tablet Take 2 mg by mouth four times a week Indications: On Monday, Wednesday, Friday, and Saturday Patients INR is managed by [facility]. Yes Historical Provider, MD   aspirin 81 MG EC tablet Take 1 tablet by mouth daily 3/15/22   Lucas Hernadez, DO   nitroGLYCERIN (NITROSTAT) 0.4 MG SL tablet up to max of 3 total doses.  If no relief after 1 dose, call 911. 3/15/22   Michael Kim, DO   furosemide (LASIX) 80 MG tablet Take 1 tablet by mouth daily 3/15/22   Shirlie Bearalex, DO warfarin (COUMADIN) 2 MG tablet Take 4 mg by mouth three times a week Indications: On Tuesday, Thursday, and Sunday Patients INR is managed by [facility]. 6/23/20   Historical Provider, MD   atorvastatin (LIPITOR) 40 MG tablet Take 40 mg by mouth daily. Historical Provider, MD        vancomycin (VANCOCIN) intermittent dosing (placeholder), RX Placeholder  insulin lispro (HUMALOG) injection vial 0-4 Units, TID WC  insulin lispro (HUMALOG) injection vial 0-4 Units, Nightly  atorvastatin (LIPITOR) tablet 40 mg, Nightly  lisinopril (PRINIVIL;ZESTRIL) tablet 5 mg, Daily  furosemide (LASIX) tablet 80 mg, Daily  vancomycin 1000 mg IVPB in 250 mL D5W addavial, Q24H        Allergies   Doxycycline, Pcn [penicillins], and Penicillin g    Social History     Social History       Tobacco History       Smoking Status  Never      Smokeless Tobacco Use  Never              Alcohol History       Alcohol Use Status  Yes Comment  SOCIAL on weekends              Drug Use       Drug Use Status  No              Sexual Activity       Sexually Active  Not Currently                    Family History     Family History   Problem Relation Age of Onset    Cancer Father         pancreatic cancer    Other Brother         MI    Osteoarthritis Mother     Other Maternal Grandfather         MI       Review of Systems   Review of Systems     Physical Exam   /72   Pulse 69   Temp 98.2 °F (36.8 °C)   Resp 18   Ht 5' 10\" (1.778 m)   Wt 187 lb (84.8 kg)   SpO2 95%   BMI 26.83 kg/m²      Physical Exam  Cardiovascular:      Comments: His foot remains warm and seemingly adequately perfused. He does have a new cellulitis with some edema and induration. This is confined to the forefoot. He has no pain. The third toe is very mobile consistent with fracture or pathologic fracture from infection. There are no distal pulses. He has a palpable popliteal pulse and a palpable femoral pulse.       Labs    CBC:  Recent Labs 08/23/22  1303 08/24/22  0603   WBC 8.5 6.7   RBC 4.16* 3.84*   HGB 11.8* 10.6*   HCT 34.6* 31.9*   MCV 83.2 83.2   RDW 20.7* 20.6*    245     CHEMISTRIES:  Recent Labs     08/23/22  1303 08/24/22  0603    136   K 3.8 4.0   CL 99 100   CO2 25 26   BUN 32* 33*   CREATININE 1.76* 1.61*   GLUCOSE 209* 160*     PT/INR:  Recent Labs     08/23/22  1303 08/24/22  0603   PROTIME 27.5* 23.2*   INR 2.6 2.1     APTT:No results for input(s): APTT in the last 72 hours. LIVER PROFILE:  Recent Labs     08/23/22  1303   AST 24   ALT 22   BILITOT 0.44   ALKPHOS 126       Imaging/Diagnostics   No results found. Assessment      Hospital Problems             Last Modified POA    * (Principal) Chest pain 8/23/2022 Yes       Plan   1. At this point we will be bringing him to the operating room today for transmetatarsal amputation on the left lower extremity. Hopefully we can get his wounds to heal.  I may have to leave this open. He will need a platform Darco cut out shoe for home use and he will be touchdown weightbearing only with a walker. He understands and agrees with these plans and understands that other procedures may be necessary including even removal of the entirety of the foot with a below-knee amputation. I do not think that he is at that point currently and I think that he has good healing potential of this wound. Hyperbaric therapy might be a viable option as an outpatient.     Electronically signed by Julia Gilbert MD on 8/24/22 at 7:41 AM EDT

## 2022-08-24 NOTE — PLAN OF CARE
Problem: Discharge Planning  Goal: Discharge to home or other facility with appropriate resources  8/24/2022 1701 by Eugenio Hill RN  Outcome: Progressing  8/24/2022 0342 by Imani Jaimes RN  Outcome: Progressing  Flowsheets (Taken 8/23/2022 1915)  Discharge to home or other facility with appropriate resources:   Identify barriers to discharge with patient and caregiver   Arrange for needed discharge resources and transportation as appropriate   Identify discharge learning needs (meds, wound care, etc)     Problem: ABCDS Injury Assessment  Goal: Absence of physical injury  8/24/2022 1701 by Eugenio Hill RN  Outcome: Progressing  Flowsheets (Taken 8/24/2022 1657)  Absence of Physical Injury: Implement safety measures based on patient assessment  8/24/2022 0342 by Imani Jaimes RN  Outcome: Progressing  Flowsheets (Taken 8/23/2022 1900)  Absence of Physical Injury: Implement safety measures based on patient assessment     Problem: Safety - Adult  Goal: Free from fall injury  8/24/2022 1701 by Eugenio Hill RN  Outcome: Progressing  8/24/2022 0342 by Imani Jaimes RN  Outcome: Progressing  Flowsheets (Taken 8/23/2022 1900)  Free From Fall Injury: Instruct family/caregiver on patient safety     Problem: Chronic Conditions and Co-morbidities  Goal: Patient's chronic conditions and co-morbidity symptoms are monitored and maintained or improved  Outcome: Progressing     Problem: Pain  Goal: Verbalizes/displays adequate comfort level or baseline comfort level  Outcome: Progressing

## 2022-08-24 NOTE — ANESTHESIA POSTPROCEDURE EVALUATION
Department of Anesthesiology  Postprocedure Note    Patient: Shanae Orlando  MRN: [id number]  YOB: 1955  Date of evaluation: 8/24/2022      Procedure Summary     Date: 08/24/22 Room / Location: [address] [place] [address] / [facility]: GRISELDA CRUZ    Anesthesia Start: 4659 Anesthesia Stop: 1600    Procedure: TRANSMETATARSAL AMPUTATION OF ALL DIGITS (Left: Foot) Diagnosis:       Osteomyelitis of left foot, unspecified type ([address]      (OSTEOMYELITIS LEFT FOOT)      (IP RM -ER)    Surgeons: Blair Gamboa MD Responsible Provider: Diana Christine MD    Anesthesia Type: general ASA Status: 4          Anesthesia Type: No value filed.     Maxwell Phase I: Maxwell Score: 10    Maxwell Phase II:        Anesthesia Post Evaluation    Comments: POST- ANESTHESIA EVALUATION       Pt Name: Shanae Orlando  MRN: [id number]  YOB: 1955  Date of evaluation: 8/24/2022  Time:  6:54 PM      BP (!) 144/74   Pulse 86   Temp 97.5 °F (36.4 °C)   Resp 15   Ht 5' 10\" (1.778 m)   Wt 187 lb (84.8 kg)   SpO2 97%   BMI 26.83 kg/m²      Consciousness Level  Awake  Cardiopulmonary Status  Stable  Pain Adequately Treated YES  Nausea / Vomiting  NO  Adequate Hydration  YES  Anesthesia Related Complications NONE      Electronically signed by Diana Christine MD on 8/24/2022 at 6:54 PM

## 2022-08-24 NOTE — PROGRESS NOTES
Pt INR 2.1 this AM called Dr. Danielle Theodore received orders for 1mg vitamin k and 2 units FFP. One unit to be given now and one unit to be held for OR. FFP and vitamin k given. Redraw of INR now 1.7. Dr. Danielle Theodore notified and stated he was okay with this value. Surgery notified.

## 2022-08-24 NOTE — PLAN OF CARE
Problem: Discharge Planning  Goal: Discharge to home or other facility with appropriate resources  Outcome: Progressing  Flowsheets (Taken 8/23/2022 1915)  Discharge to home or other facility with appropriate resources:   Identify barriers to discharge with patient and caregiver   Arrange for needed discharge resources and transportation as appropriate   Identify discharge learning needs (meds, wound care, etc)     Problem: ABCDS Injury Assessment  Goal: Absence of physical injury  Outcome: Progressing  Flowsheets (Taken 8/23/2022 1900)  Absence of Physical Injury: Implement safety measures based on patient assessment     Problem: Safety - Adult  Goal: Free from fall injury  Outcome: Progressing  Flowsheets (Taken 8/23/2022 1900)  Free From Fall Injury: Instruct family/caregiver on patient safety

## 2022-08-25 LAB
ANION GAP SERPL CALCULATED.3IONS-SCNC: 7 MMOL/L (ref 9–17)
BUN BLDV-MCNC: 24 MG/DL (ref 8–23)
C-REACTIVE PROTEIN: <3 MG/L (ref 0–5)
CALCIUM SERPL-MCNC: 9.7 MG/DL (ref 8.6–10.4)
CHLORIDE BLD-SCNC: 102 MMOL/L (ref 98–107)
CO2: 28 MMOL/L (ref 20–31)
CREAT SERPL-MCNC: 1.49 MG/DL (ref 0.7–1.2)
GFR AFRICAN AMERICAN: 57 ML/MIN
GFR NON-AFRICAN AMERICAN: 47 ML/MIN
GFR SERPL CREATININE-BSD FRML MDRD: ABNORMAL ML/MIN/{1.73_M2}
GLUCOSE BLD-MCNC: 164 MG/DL (ref 75–110)
GLUCOSE BLD-MCNC: 165 MG/DL (ref 70–99)
GLUCOSE BLD-MCNC: 201 MG/DL (ref 75–110)
GLUCOSE BLD-MCNC: 258 MG/DL (ref 75–110)
HCT VFR BLD CALC: 32.9 % (ref 41–53)
HEMOGLOBIN: 10.7 G/DL (ref 13.5–17.5)
INR BLD: 1.3
MCH RBC QN AUTO: 27.7 PG (ref 26–34)
MCHC RBC AUTO-ENTMCNC: 32.6 G/DL (ref 31–37)
MCV RBC AUTO: 84.8 FL (ref 80–100)
PDW BLD-RTO: 20.1 % (ref 11.5–14.9)
PLATELET # BLD: 241 K/UL (ref 150–450)
PMV BLD AUTO: 7.7 FL (ref 6–12)
POTASSIUM SERPL-SCNC: 4.8 MMOL/L (ref 3.7–5.3)
PROTHROMBIN TIME: 16.6 SEC (ref 11.8–14.6)
RBC # BLD: 3.88 M/UL (ref 4.5–5.9)
SODIUM BLD-SCNC: 137 MMOL/L (ref 135–144)
VANCOMYCIN RANDOM DATE LAST DOSE: NORMAL
VANCOMYCIN RANDOM DOSE AMOUNT: NORMAL
VANCOMYCIN RANDOM TIME LAST DOSE: 1846
VANCOMYCIN RANDOM: 18 UG/ML
WBC # BLD: 7.3 K/UL (ref 3.5–11)

## 2022-08-25 PROCEDURE — 82947 ASSAY GLUCOSE BLOOD QUANT: CPT

## 2022-08-25 PROCEDURE — 2580000003 HC RX 258: Performed by: INTERNAL MEDICINE

## 2022-08-25 PROCEDURE — 6370000000 HC RX 637 (ALT 250 FOR IP): Performed by: SURGERY

## 2022-08-25 PROCEDURE — 85027 COMPLETE CBC AUTOMATED: CPT

## 2022-08-25 PROCEDURE — 85610 PROTHROMBIN TIME: CPT

## 2022-08-25 PROCEDURE — 36415 COLL VENOUS BLD VENIPUNCTURE: CPT

## 2022-08-25 PROCEDURE — 86140 C-REACTIVE PROTEIN: CPT

## 2022-08-25 PROCEDURE — 1200000000 HC SEMI PRIVATE

## 2022-08-25 PROCEDURE — P9017 PLASMA 1 DONOR FRZ W/IN 8 HR: HCPCS

## 2022-08-25 PROCEDURE — 80202 ASSAY OF VANCOMYCIN: CPT

## 2022-08-25 PROCEDURE — 86927 PLASMA FRESH FROZEN: CPT

## 2022-08-25 PROCEDURE — 6360000002 HC RX W HCPCS: Performed by: INTERNAL MEDICINE

## 2022-08-25 PROCEDURE — 80048 BASIC METABOLIC PNL TOTAL CA: CPT

## 2022-08-25 RX ORDER — WARFARIN SODIUM 5 MG/1
5 TABLET ORAL
Status: COMPLETED | OUTPATIENT
Start: 2022-08-25 | End: 2022-08-25

## 2022-08-25 RX ORDER — CIPROFLOXACIN 500 MG/1
500 TABLET, FILM COATED ORAL 2 TIMES DAILY
Qty: 20 TABLET | Refills: 0 | Status: SHIPPED | OUTPATIENT
Start: 2022-08-25 | End: 2022-09-04

## 2022-08-25 RX ADMIN — VANCOMYCIN HYDROCHLORIDE 1250 MG: 1.25 INJECTION, POWDER, LYOPHILIZED, FOR SOLUTION INTRAVENOUS at 18:07

## 2022-08-25 RX ADMIN — FUROSEMIDE 80 MG: 40 TABLET ORAL at 08:59

## 2022-08-25 RX ADMIN — LISINOPRIL 5 MG: 5 TABLET ORAL at 08:59

## 2022-08-25 RX ADMIN — OXYCODONE HYDROCHLORIDE AND ACETAMINOPHEN 2 TABLET: 5; 325 TABLET ORAL at 16:09

## 2022-08-25 RX ADMIN — INSULIN LISPRO 1 UNITS: 100 INJECTION, SOLUTION INTRAVENOUS; SUBCUTANEOUS at 12:41

## 2022-08-25 RX ADMIN — ATORVASTATIN CALCIUM 40 MG: 40 TABLET, FILM COATED ORAL at 20:45

## 2022-08-25 RX ADMIN — WARFARIN SODIUM 5 MG: 5 TABLET ORAL at 18:01

## 2022-08-25 RX ADMIN — INSULIN LISPRO 1 UNITS: 100 INJECTION, SOLUTION INTRAVENOUS; SUBCUTANEOUS at 18:07

## 2022-08-25 RX ADMIN — OXYCODONE HYDROCHLORIDE AND ACETAMINOPHEN 2 TABLET: 5; 325 TABLET ORAL at 02:31

## 2022-08-25 ASSESSMENT — PAIN SCALES - GENERAL
PAINLEVEL_OUTOF10: 2
PAINLEVEL_OUTOF10: 0
PAINLEVEL_OUTOF10: 0
PAINLEVEL_OUTOF10: 8

## 2022-08-25 NOTE — PLAN OF CARE
Problem: Discharge Planning  Goal: Discharge to home or other facility with appropriate resources  Outcome: Progressing     Problem: Chronic Conditions and Co-morbidities  Goal: Patient's chronic conditions and co-morbidity symptoms are monitored and maintained or improved  Outcome: Progressing     Problem: Pain  Goal: Verbalizes/displays adequate comfort level or baseline comfort level  Outcome: Progressing

## 2022-08-25 NOTE — PROGRESS NOTES
dextrose bolus, glucagon (rDNA), dextrose, oxyCODONE-acetaminophen    Data:     Past Medical History:   has a past medical history of Atrial fibrillation ([facility] CAD (coronary artery disease), Cardiomyopathy ([facility] Cellulitis, CHF (congestive heart failure) ([facility] Diabetes mellitus ([facility] Foot infection, Heart failure ([facility] Hyperlipidemia, Hypertension, MRSA (methicillin resistant staph aureus) culture positive, Osteomyelitis ([facility] PVD (peripheral vascular disease) ([facility] and Wound, open, foot. Social History:   reports that he has never smoked. He has never used smokeless tobacco. He reports current alcohol use. He reports that he does not use drugs. Family History:   Family History   Problem Relation Age of Onset    Cancer Father         pancreatic cancer    Other Brother         MI    Osteoarthritis Mother     Other Maternal Grandfather         MI       Vitals:  /72   Pulse 77   Temp 97.5 °F (36.4 °C)   Resp 18   Ht 5' 10\" (1.778 m)   Wt 187 lb (84.8 kg)   SpO2 99%   BMI 26.83 kg/m²   Temp (24hrs), Av.5 °F (36.4 °C), Min:96.9 °F (36.1 °C), Max:97.8 °F (36.6 °C)    Recent Labs     22  1105 22  1600 22  2009 22  0634   POCGLU 175* 164* 198* 164*       I/O (24Hr):     Intake/Output Summary (Last 24 hours) at 2022 0902  Last data filed at 2022 0418  Gross per 24 hour   Intake 1161 ml   Output 600 ml   Net 561 ml       Labs:  Hematology:  Recent Labs     22  1303 22  0603 22  1212 22  0638   WBC 8.5 6.7  --  7.3   RBC 4.16* 3.84*  --  3.88*   HGB 11.8* 10.6*  --  10.7*   HCT 34.6* 31.9*  --  32.9*   MCV 83.2 83.2  --  84.8   MCH 28.4 27.6  --  27.7   MCHC 34.2 33.1  --  32.6   RDW 20.7* 20.6*  --  20.1*    245  --  241   MPV 7.9 7.8  --  7.7   SEDRATE 72*  --   --   --    CRP <3.0  --   --   --    INR 2.6 2.1 1.7 1.3     Chemistry:  Recent Labs     22  1303 22  0603 22  0638    136 137   K 3.8 4.0 4.8 CL 99 100 102   CO2 25 26 28   GLUCOSE 209* 160* 165*   BUN 32* 33* 24*   CREATININE 1.76* 1.61* 1.49*   ANIONGAP 12 10 7*   LABGLOM 39* 43* 47*   GFRAA 47* 52* 57*   CALCIUM 9.9 9.4 9.7     Recent Labs     08/23/22  1303 08/23/22  1632 08/23/22 2024 08/24/22  0607 08/24/22  1105 08/24/22  1600 08/24/22 2009 08/25/22  0634   PROT 8.3  --   --   --   --   --   --   --    LABALBU 4.4  --   --   --   --   --   --   --    AST 24  --   --   --   --   --   --   --    ALT 22  --   --   --   --   --   --   --    ALKPHOS 126  --   --   --   --   --   --   --    BILITOT 0.44  --   --   --   --   --   --   --    POCGLU  --    < > 315* 165* 175* 164* 198* 164*    < > = values in this interval not displayed. ABG:  Lab Results   Component Value Date/Time    PHART 7.473 10/23/2015 11:32 AM    PFC2ZGH 38.1 10/23/2015 11:32 AM    PO2ART 83.2 10/23/2015 11:32 AM    RIX0VGD 28.0 10/23/2015 11:32 AM    NBEA NOT REPORTED 10/23/2015 11:32 AM    PBEA 4.3 10/23/2015 11:32 AM    U6FVCACS 95.8 10/23/2015 11:32 AM     Lab Results   Component Value Date/Time    SPECIAL LT FOOT 2ND TOE 07/28/2022 09:00 AM     Lab Results   Component Value Date/Time    CULTURE (A) 07/28/2022 09:00 AM     METHICILLIN RESISTANT STAPHYLOCOCCUS AUREUS MODERATE GROWTH    CULTURE NORMAL SKIN KENA 07/28/2022 09:00 AM    CULTURE (A) 07/28/2022 09:00 AM     BACTEROIDES THETAIOTAOMICRON BETA LACTAMASE POSITIVE LIGHT GROWTH       Radiology:  No results found.     Physical Examination:        General appearance:  alert, cooperative and no distress  Mental Status:  oriented to person, place and time and normal affect  Lungs:  clear to auscultation bilaterally, normal effort  Heart:  regular rate and rhythm, no murmur  Abdomen:  soft, nontender, nondistended, normal bowel sounds,   Extremities:   bandage present left foot    Assessment:        Hospital Problems             Last Modified POA    * (Principal) Chest pain 8/23/2022 Yes       Plan:        S/p transmetatarsal amputation    History of AFib on warfarin, received vitamin K  and FFP prior to surgery INR 1.3 today   Also on  vancomycin.  Will check CRP   Hypertension, history of congestive heart failure, Lasix, lisinopril continued   Hyperlipidemia-  Lipitor 40 mg   Insulin sliding scale   Discharge once okay with vascular surgery      Mynor Light MD  8/25/2022  9:02 AM

## 2022-08-25 NOTE — PROGRESS NOTES
Call to central supply for Darco shoe. They will look to see what they have available. Darco shoe delivered to room.

## 2022-08-25 NOTE — DISCHARGE INSTR - OTHER ORDERS
Change dressing to left foot twice a day and as needed when dressing soiled, until follow-up appointment with Dr. Elayne Nguyễn. Dressing changes are to be done by patient and/or family. Dressing change orders: Dakins-soaked fluff packed in wound, cover with 4x4's, wrap with kerlix, secure with tape.

## 2022-08-25 NOTE — PROGRESS NOTES
Patients mother at bedside for shift dressing change. Writer explained & showed patients mother how to properly do dressing change at home as patient adamantly refuses home care. During the dressing change, patient states, \"I don't think I can go home until this is healed. My mom can't do this\". Writer explained to patient that wouldn't be an option. Writer asked patient if he would be agreeable to home care to help assist with dressing changes if vascular physician okay with this (as night shift stated that vascular physician did NOT want [facility]. nurse to do dressing change). Writer agrees to [facility]. but states, \"they better only be coming to do my dressing change. I do not want any other services\".  Will pass along to next shift to ensure CM made aware tomorrow AM.

## 2022-08-25 NOTE — DISCHARGE INSTR - COC
Continuity of Care Form    Patient Name: Leilani Staples   :  1955  MRN:  [id number]    Admit date:  2022  Discharge date:  22    Code Status Order: Prior   Advance Directives:   [facility] Documentation       Date/Time Healthcare Directive Type of Healthcare Directive Copy in [facility] [address] Agent's Name Healthcare Agent's Phone Number    22 1302 No, patient does not have an advance directive for healthcare treatment  declined info -- -- -- -- --            Admitting Physician:  Robert Foster MD  PCP: Jhony Salinas DO    Discharging Nurse:   [address] Unit/Room#: 2658/1338-56  Discharging Unit Phone Number: [phone]    Emergency Contact:   Extended Emergency Contact Information  Primary Emergency Contact: Pat Ayon  Address: [facility] [address] [facility] Phone: [phone]  Relation: Parent    Past Surgical History:  Past Surgical History:   Procedure Laterality Date    CARDIAC SURGERY  2010    CABG X3    COLONOSCOPY      DEBRIDEMENT Right 2015    DEBRIDEMENT WOUND FOOT RIGHT W/ APPLICATION BILAT INTEG RAT GRAFT    ENDOSCOPY, COLON, DIAGNOSTIC      EYE SURGERY Bilateral     cataracts    HERNIA REPAIR      umbilical    KNEE ARTHROSCOPY Right     OTHER SURGICAL HISTORY Right 12 29 15    wound care graft procedure foot,appl of integra    PACEMAKER PLACEMENT      WITH DEFIBRILLATOR    TOE AMPUTATION Right     R great    TOE AMPUTATION Left 3/13/2022    TOE AMPUTATION--left 2nd digit performed by Loreto Lozano DPM at [address] [facility] Left 2022    RIGHT COMMON FEMORAL ACCESS [address] [facility] WITH PELVIC RUN OFF PLACEMENT OF CATHETER INTO LEFT COMMON FEMORAL ARTERY, PROFUNDA FEMORAL AND SFA  ARTERIOGRAPHY PLACEMENT OF CATHETER INTO LEFT SFA, WITH SFA POPLITEAL AND TIBIAL TIBIOPERONEAL ARTERIOGRAPHY INTO LEFT FOOT performed by Argentina Hawkins MD at [address] Immunization History: There is no immunization history on file for this patient.     Active Problems:  Patient Active Problem List   Diagnosis Code    Diabetic foot infection ([facility] E11.628, L08.9    Diabetic foot ulcer with osteomyelitis ([facility] E11.621, E11.69, L97.509, M86.9    Chronic osteomyelitis ([facility] M86.60    PVD (peripheral vascular disease) ([facility] I73.9    Atherosclerosis of coronary artery without angina pectoris I25.10    Cardiomyopathy ([facility] I42.9    Cardiac left ventricular ejection fraction 21-40 percent R94.30    Diabetes mellitus type 2 with peripheral artery disease ([facility]) E11.51    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 ([facility]) L97.514    Chronic osteomyelitis of left foot ([facility]) M86.672    Onychomycosis B35.1    Heart failure ([facility]) I50.9    MRSA bacteremia R78.81, B95.62    Gangrene of toe of left foot ([facility]) I96    Elevated C-reactive protein (CRP) R79.82    Elevated erythrocyte sedimentation rate R70.0    Acute kidney injury ([facility] N17.9    Allergy to penicillin Z88.0    Ulcer of left foot, with necrosis of bone ([facility]) L97.524    Acute osteomyelitis of left foot ([facility]) M86.172    Cellulitis of left foot L03.116    Abscess of bursa of left foot M71.072    Diabetic polyneuropathy associated with type 2 diabetes mellitus ([facility]) E11.42    Osteomyelitis ([facility]) M86.9    Cellulitis L03.90    Atherosclerosis of native arteries of left leg with ulceration of other part of foot ([facility]) I70.245    Normocytic normochromic anemia D64.9    Surgical wound, non healing T81.89XA    Chest pain R07.9       Isolation/Infection:   Isolation            Contact          Patient Infection Status       Infection Onset Added Last Indicated Last Indicated By Review Planned Expiration Resolved Resolved By    MRSA  09/15/15 07/28/22 Culture, Anaerobic and Aerobic        Foot 5/2022              Nurse Assessment:  Last Vital Signs: /72   Pulse 77   Temp 97.5 °F (36.4 °C)   Resp 18   Ht 5' 10\" (1.778 m)   Wt 187 lb (84.8 kg)   SpO2 99%   BMI 26.83 kg/m²     Last documented pain score (0-10 scale): Pain Level: 8  Last Weight:   Wt Readings from Last 1 Encounters:   08/23/22 187 lb (84.8 kg)     Mental Status:  oriented, alert, coherent, logical, thought processes intact, and able to concentrate and follow conversation    IV Access:  - None    Nursing Mobility/ADLs:  Walking   Assisted  Transfer  Assisted  Bathing  Independent  Dressing  Assisted  Toileting  Independent  Feeding  Independent  Med [address]  Assisted  Med Delivery   whole    Wound Care Documentation and Therapy:  Wound 06/30/22 Toe (Comment  which one) Anterior; Left wound #1 second toe (Active)   Wound Image   07/28/22 0840   Wound Etiology Diabetic Cortes 3 08/22/22 4289   Dressing Status New drainage noted; Old drainage noted 08/22/22 0821   Wound Cleansed Soap and water 08/22/22 0821   Dressing/Treatment Other (comment) 08/22/22 0908   Offloading for Diabetic Foot Ulcers Offloading ordered;Post op shoe 08/22/22 0908   Wound Length (cm) 0.8 cm 08/22/22 0821   Wound Width (cm) 0.5 cm 08/22/22 0821   Wound Depth (cm) 2.8 cm 08/22/22 0821   Wound Surface Area (cm^2) 0.4 cm^2 08/22/22 0821   Change in Wound Size % (l*w) -185.71 08/22/22 0821   Wound Volume (cm^3) 1.12 cm^3 08/22/22 0821   Wound Healing % -2567 08/22/22 0821   Post-Procedure Length (cm) 0.8 cm 08/22/22 0821   Post-Procedure Width (cm) 0.5 cm 08/22/22 0821   Post-Procedure Depth (cm) 2.8 cm 08/22/22 0821   Post-Procedure Surface Area (cm^2) 0.4 cm^2 08/22/22 0821   Post-Procedure Volume (cm^3) 1.12 cm^3 08/22/22 0821   Wound Assessment Pink/red;Fibrin 08/22/22 0821   Drainage Amount Moderate 08/22/22 0821   Drainage Description Serosanguinous 08/22/22 0821   Odor None 08/22/22 0821   Keturah-wound Assessment Blanchable erythema 08/22/22 0821   Margins Defined edges 08/22/22 0821   Number of days: 55       Wound 08/22/22 Toe (Comment  which one) Left;Plantar #2 left 2nd toe plantar (Active)   Wound Image   08/22/22 0821   Dressing Status Clean;Dry; Intact 08/24/22 1655   Wound Cleansed Cleansed with saline 08/23/22 1630   Dressing/Treatment Adhesive bandage 08/24/22 1655   Offloading for Diabetic Foot Ulcers Offloading ordered;Post op shoe 08/22/22 0908   Wound Length (cm) 1.4 cm 08/22/22 0821   Wound Width (cm) 2 cm 08/22/22 0821   Wound Depth (cm) 0.4 cm 08/22/22 0821   Wound Surface Area (cm^2) 2.8 cm^2 08/22/22 0821   Wound Volume (cm^3) 1.12 cm^3 08/22/22 0821   Post-Procedure Length (cm) 1.4 cm 08/22/22 0821   Post-Procedure Width (cm) 2 cm 08/22/22 0821   Post-Procedure Depth (cm) 0.4 cm 08/22/22 0821   Post-Procedure Surface Area (cm^2) 2.8 cm^2 08/22/22 0821   Post-Procedure Volume (cm^3) 1.12 cm^3 08/22/22 0821   Wound Assessment Other (Comment) 08/24/22 1655   Drainage Amount None 08/24/22 1655   Drainage Description Serosanguinous; Yellow 08/23/22 1630   Odor None 08/22/22 0821   Keturah-wound Assessment Blanchable erythema 08/22/22 0821   Margins Undefined edges 08/22/22 0821   Number of days: 2       Incision 08/24/22 Toe (Comment  which one) (Active)   Dressing Status New dressing applied 08/25/22 0242   Incision Cleansed Cleansed with saline 08/25/22 0242   Dressing/Treatment Other (comment) 08/24/22 1611   Closure Sutures 08/24/22 1611   Drainage Amount Moderate 08/25/22 0242   Drainage Description Sanguinous 08/25/22 0242   Odor None 08/25/22 0242   Keturah-incision Assessment Intact 08/25/22 0242   Number of days: 0        Elimination:  Continence: Bowel: Yes  Bladder: Yes  Urinary Catheter: None   Colostomy/Ileostomy/Ileal Conduit: No       Date of Last BM: 8/25/22    Intake/Output Summary (Last 24 hours) at 8/25/2022 0719  Last data filed at 8/25/2022 0418  Gross per 24 hour   Intake 1161 ml   Output 600 ml   Net 561 ml     I/O last 3 completed shifts:   In: 5443 [I.V.:410; Blood:751]  Out: 600 [Urine:600]    Safety Concerns:     None    Impairments/Disabilities:      Amputation - toe amputations on L/R foot    Nutrition Therapy:  Current Nutrition Therapy:   - Oral Diet:  General    Routes of Feeding: Oral  Liquids: Thin Liquids  Daily Fluid Restriction: no  Last Modified Barium Swallow with Video (Video Swallowing Test): not done    Treatments at the Time of Hospital Discharge:   Respiratory Treatments: n/a  Oxygen Therapy:  is not on home oxygen therapy. Ventilator:    - No ventilator support    Rehab Therapies: Physical Therapy and Occupational Therapy  Weight Bearing Status/Restrictions: No weight bearing restrictions  Other Medical Equipment (for information only, NOT a DME order): Other Treatments: skilled nursing assessment, medication education and monitoring  Dressing change to left foot twice a day and as needed when soiled. Dakins-soaked fluff packed in wound, cover with 4x4's, wrap with kerlix, secure with tape. Patient's personal belongings (please select all that are sent with patient): All belongings returned to patient    RN SIGNATURE:  Electronically signed by Mary Palacio RN on 8/26/22 at 3:32 PM EDT    CASE MANAGEMENT/SOCIAL WORK SECTION    Inpatient Status Date: 8/23/22    Readmission Risk Assessment Score:  Readmission Risk              Risk of Unplanned Readmission:  18           Discharging to [facility]/ [facility] [address] [place], [place]  Phone:  [phone]  Fax:  [phone]    / signature: Electronically signed by Mary Palacio RN on 8/26/22 at 11:51 AM EDT    PHYSICIAN SECTION    Prognosis: Fair    Condition at Discharge: Stable    Rehab Potential (if transferring to Rehab): Fair    Recommended Labs or Other Treatments After Discharge: see above    Physician Certification: I certify the above information and transfer of Lisa Ramon  is necessary for the continuing treatment of the diagnosis listed and that he requires Home Care for less 30 days.      Update Admission H&P: No change in H&P    PHYSICIAN SIGNATURE:  Electronically signed by Taj Crockett MD on 8/26/22 at 12:35 PM EDT

## 2022-08-26 VITALS
TEMPERATURE: 97.9 F | RESPIRATION RATE: 18 BRPM | HEIGHT: 70 IN | WEIGHT: 187 LBS | HEART RATE: 60 BPM | DIASTOLIC BLOOD PRESSURE: 76 MMHG | OXYGEN SATURATION: 97 % | BODY MASS INDEX: 26.77 KG/M2 | SYSTOLIC BLOOD PRESSURE: 150 MMHG

## 2022-08-26 LAB
ANION GAP SERPL CALCULATED.3IONS-SCNC: 10 MMOL/L (ref 9–17)
BLD PROD TYP BPU: NORMAL
BLD PROD TYP BPU: NORMAL
BLOOD BANK BLOOD PRODUCT EXPIRATION DATE: NORMAL
BLOOD BANK BLOOD PRODUCT EXPIRATION DATE: NORMAL
BLOOD BANK ISBT PRODUCT BLOOD TYPE: 7300
BLOOD BANK ISBT PRODUCT BLOOD TYPE: 7300
BLOOD BANK PRODUCT CODE: NORMAL
BLOOD BANK UNIT TYPE AND RH: NORMAL
BLOOD BANK UNIT TYPE AND RH: NORMAL
BPU ID: NORMAL
BPU ID: NORMAL
BUN BLDV-MCNC: 25 MG/DL (ref 8–23)
CALCIUM SERPL-MCNC: 9.8 MG/DL (ref 8.6–10.4)
CHLORIDE BLD-SCNC: 101 MMOL/L (ref 98–107)
CO2: 27 MMOL/L (ref 20–31)
CREAT SERPL-MCNC: 1.52 MG/DL (ref 0.7–1.2)
DISPENSE STATUS BLOOD BANK: NORMAL
DISPENSE STATUS BLOOD BANK: NORMAL
GFR AFRICAN AMERICAN: 56 ML/MIN
GFR NON-AFRICAN AMERICAN: 46 ML/MIN
GFR SERPL CREATININE-BSD FRML MDRD: ABNORMAL ML/MIN/{1.73_M2}
GLUCOSE BLD-MCNC: 167 MG/DL (ref 75–110)
GLUCOSE BLD-MCNC: 174 MG/DL (ref 70–99)
GLUCOSE BLD-MCNC: 211 MG/DL (ref 75–110)
GLUCOSE BLD-MCNC: 255 MG/DL (ref 75–110)
HCT VFR BLD CALC: 31.8 % (ref 41–53)
HEMOGLOBIN: 10.9 G/DL (ref 13.5–17.5)
INR BLD: 1.3
MCH RBC QN AUTO: 28.6 PG (ref 26–34)
MCHC RBC AUTO-ENTMCNC: 34.2 G/DL (ref 31–37)
MCV RBC AUTO: 83.4 FL (ref 80–100)
PDW BLD-RTO: 20.5 % (ref 11.5–14.9)
PLATELET # BLD: 229 K/UL (ref 150–450)
PMV BLD AUTO: 7.7 FL (ref 6–12)
POTASSIUM SERPL-SCNC: 4.4 MMOL/L (ref 3.7–5.3)
PROTHROMBIN TIME: 16.1 SEC (ref 11.8–14.6)
RBC # BLD: 3.81 M/UL (ref 4.5–5.9)
SODIUM BLD-SCNC: 138 MMOL/L (ref 135–144)
TRANSFUSION STATUS: NORMAL
TRANSFUSION STATUS: NORMAL
UNIT DIVISION: 0
UNIT DIVISION: 0
UNIT ISSUE DATE/TIME: NORMAL
UNIT ISSUE DATE/TIME: NORMAL
WBC # BLD: 6.6 K/UL (ref 3.5–11)

## 2022-08-26 PROCEDURE — 36415 COLL VENOUS BLD VENIPUNCTURE: CPT

## 2022-08-26 PROCEDURE — 85610 PROTHROMBIN TIME: CPT

## 2022-08-26 PROCEDURE — 85027 COMPLETE CBC AUTOMATED: CPT

## 2022-08-26 PROCEDURE — 6370000000 HC RX 637 (ALT 250 FOR IP): Performed by: SURGERY

## 2022-08-26 PROCEDURE — 80048 BASIC METABOLIC PNL TOTAL CA: CPT

## 2022-08-26 PROCEDURE — 82947 ASSAY GLUCOSE BLOOD QUANT: CPT

## 2022-08-26 RX ORDER — SODIUM HYPOCHLORITE 1.25 MG/ML
SOLUTION TOPICAL
Qty: 1000 ML | Refills: 1 | Status: SHIPPED | OUTPATIENT
Start: 2022-08-26

## 2022-08-26 RX ORDER — OXYCODONE HYDROCHLORIDE AND ACETAMINOPHEN 5; 325 MG/1; MG/1
1 TABLET ORAL EVERY 6 HOURS PRN
Qty: 15 TABLET | Refills: 0 | Status: SHIPPED | OUTPATIENT
Start: 2022-08-26 | End: 2022-08-31

## 2022-08-26 RX ORDER — OXYCODONE HYDROCHLORIDE AND ACETAMINOPHEN 5; 325 MG/1; MG/1
1 TABLET ORAL EVERY 6 HOURS PRN
Qty: 15 TABLET | Refills: 0 | Status: SHIPPED | OUTPATIENT
Start: 2022-08-26 | End: 2022-08-26 | Stop reason: SDUPTHER

## 2022-08-26 RX ADMIN — LISINOPRIL 5 MG: 5 TABLET ORAL at 07:59

## 2022-08-26 RX ADMIN — OXYCODONE HYDROCHLORIDE AND ACETAMINOPHEN 2 TABLET: 5; 325 TABLET ORAL at 05:57

## 2022-08-26 RX ADMIN — INSULIN LISPRO 2 UNITS: 100 INJECTION, SOLUTION INTRAVENOUS; SUBCUTANEOUS at 12:35

## 2022-08-26 RX ADMIN — FUROSEMIDE 80 MG: 40 TABLET ORAL at 07:59

## 2022-08-26 ASSESSMENT — PAIN DESCRIPTION - LOCATION: LOCATION: FOOT

## 2022-08-26 ASSESSMENT — PAIN DESCRIPTION - DESCRIPTORS: DESCRIPTORS: ACHING

## 2022-08-26 ASSESSMENT — PAIN SCALES - GENERAL: PAINLEVEL_OUTOF10: 5

## 2022-08-26 ASSESSMENT — PAIN DESCRIPTION - ORIENTATION: ORIENTATION: LEFT

## 2022-08-26 NOTE — CARE COORDINATION
ONGOING DISCHARGE PLAN:    Patient is alert and oriented x4. Spoke with patient regarding discharge plan and patient confirms that plan is still home. He is now agreeable to [facility]. [place] of choice provided, agency selected - [facility]. Referral faxed. Pt is willing to learn how to do wound care. 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 Active order for IV Vanco.    Will continue to follow for additional discharge needs. Electronically signed by Vito Lewis RN on 8/26/2022 at 11:14 AM    Spoke with Rogelio Galloway from [address] [place] [address]. Pt has been accepted for [facility].     Electronically signed by Vito Lewis RN on 8/26/2022 at 11:48 AM

## 2022-08-26 NOTE — PLAN OF CARE
Problem: Discharge Planning  Goal: Discharge to home or other facility with appropriate resources  8/26/2022 0405 by Kole Watson RN  Outcome: Progressing  Flowsheets (Taken 8/24/2022 2016 by Armando Peoples RN)  Discharge to home or other facility with appropriate resources:   Identify barriers to discharge with patient and caregiver   Identify discharge learning needs (meds, wound care, etc)  8/25/2022 1541 by Lorraine Dubon RN  Outcome: Progressing     Problem: ABCDS Injury Assessment  Goal: Absence of physical injury  Outcome: Progressing  Flowsheets (Taken 8/24/2022 2016 by Armando Peoples RN)  Absence of Physical Injury: Implement safety measures based on patient assessment     Problem: Safety - Adult  Goal: Free from fall injury  Outcome: Progressing  Flowsheets (Taken 8/24/2022 2016 by Armando Peoples RN)  Free From Fall Injury: Instruct family/caregiver on patient safety     Problem: Chronic Conditions and Co-morbidities  Goal: Patient's chronic conditions and co-morbidity symptoms are monitored and maintained or improved  8/26/2022 0405 by Kole Watson RN  Outcome: Progressing  Flowsheets (Taken 8/24/2022 2016 by Armando Peoples RN)  Care Plan - Patient's Chronic Conditions and Co-Morbidity Symptoms are Monitored and Maintained or Improved: Monitor and assess patient's chronic conditions and comorbid symptoms for stability, deterioration, or improvement  8/25/2022 1541 by Lorraine Dubon RN  Outcome: Progressing     Problem: Pain  Goal: Verbalizes/displays adequate comfort level or baseline comfort level  8/26/2022 0405 by Kole Watson RN  Outcome: Progressing  Flowsheets (Taken 8/26/2022 0405)  Verbalizes/displays adequate comfort level or baseline comfort level:   Encourage patient to monitor pain and request assistance   Assess pain using appropriate pain scale   Administer analgesics based on type and severity of pain and evaluate response   Implement non-pharmacological measures as appropriate and evaluate response  8/25/2022 1541 by Fortino Crook RN  Outcome: Progressing   Care plan reviewed with patient. Patient verbalize understanding of the plan of care and contribute to goal setting.

## 2022-08-26 NOTE — CARE COORDINATION
Continuity of Care Form    Patient Name: Adela Howe   :  1955  MRN:  [id number]    Admit date:  2022  Discharge date:  22    Code Status Order: Prior   Advance Directives:   [facility] Documentation       Date/Time Healthcare Directive Type of Healthcare Directive Copy in [facility] [address] Agent's Name Healthcare Agent's Phone Number    22 [phone] No, patient does not have an advance directive for healthcare treatment  declined info -- -- -- -- --            Admitting Physician:  Whit Lima MD  PCP: Brennan Maher DO    Discharging Nurse:   [address] Unit/Room#: 1789/5881-50  Discharging Unit Phone Number: [phone]    Emergency Contact:   Extended Emergency Contact Information  Primary Emergency Contact: Pat Ayon  Address: [facility] [address] [facility] Phone: [phone]  Relation: Parent    Past Surgical History:  Past Surgical History:   Procedure Laterality Date    CARDIAC SURGERY  2010    CABG X3    COLONOSCOPY      DEBRIDEMENT Right 2015    DEBRIDEMENT WOUND FOOT RIGHT W/ APPLICATION BILAT INTEG RAT GRAFT    ENDOSCOPY, COLON, DIAGNOSTIC      EYE SURGERY Bilateral     cataracts    HERNIA REPAIR      umbilical    KNEE ARTHROSCOPY Right     OTHER SURGICAL HISTORY Right 12 29 15    wound care graft procedure foot,appl of integra    PACEMAKER PLACEMENT      WITH DEFIBRILLATOR    TOE AMPUTATION Right     R great    TOE AMPUTATION Left 3/13/2022    TOE AMPUTATION--left 2nd digit performed by Wes Kan DPM at [address] [place] [address] Left 2022    RIGHT COMMON FEMORAL ACCESS [address] WITH PELVIC RUN OFF PLACEMENT OF CATHETER INTO LEFT COMMON FEMORAL ARTERY, PROFUNDA FEMORAL AND SFA  ARTERIOGRAPHY PLACEMENT OF CATHETER INTO LEFT SFA, WITH SFA POPLITEAL AND TIBIAL TIBIOPERONEAL ARTERIOGRAPHY INTO LEFT FOOT performed by Yesika iSnger MD at [address] [facility]  Immunization History: There is no immunization history on file for this patient.     Active Problems:  Patient Active Problem List   Diagnosis Code    Diabetic foot infection ([facility] E11.628, L08.9    Diabetic foot ulcer with osteomyelitis ([facility] E11.621, E11.69, L97.509, M86.9    Chronic osteomyelitis ([facility] M86.60    PVD (peripheral vascular disease) ([facility] I73.9    Atherosclerosis of coronary artery without angina pectoris I25.10    Cardiomyopathy ([facility] I42.9    Cardiac left ventricular ejection fraction 21-40 percent R94.30    Diabetes mellitus type 2 with peripheral artery disease ([facility]) E11.51    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 ([facility]) L97.514    Chronic osteomyelitis of left foot ([facility]) M86.672    Onychomycosis B35.1    Heart failure ([facility]) I50.9    MRSA bacteremia R78.81, B95.62    Gangrene of toe of left foot ([facility]) I96    Elevated C-reactive protein (CRP) R79.82    Elevated erythrocyte sedimentation rate R70.0    Acute kidney injury ([facility] N17.9    Allergy to penicillin Z88.0    Ulcer of left foot, with necrosis of bone ([facility]) L97.524    Acute osteomyelitis of left foot ([facility]) M86.172    Cellulitis of left foot L03.116    Abscess of bursa of left foot M71.072    Diabetic polyneuropathy associated with type 2 diabetes mellitus ([facility]) E11.42    Osteomyelitis ([facility]) M86.9    Cellulitis L03.90    Atherosclerosis of native arteries of left leg with ulceration of other part of foot ([facility]) I70.245    Normocytic normochromic anemia D64.9    Surgical wound, non healing T81.89XA    Chest pain R07.9       Isolation/Infection:   Isolation            Contact          Patient Infection Status       Infection Onset Added Last Indicated Last Indicated By Review Planned Expiration Resolved Resolved By    MRSA  09/15/15 07/28/22 Culture, Anaerobic and Aerobic        Foot 5/2022              Nurse Assessment:  Last Vital Signs: /72   Pulse 77   Temp 97.5 °F (36.4 °C)   Resp 18   Ht 5' 10\" (1.778 m)   Wt 187 lb (84.8 kg)   SpO2 99%   BMI 26.83 kg/m²     Last documented pain score (0-10 scale): Pain Level: 8  Last Weight:   Wt Readings from Last 1 Encounters:   08/23/22 187 lb (84.8 kg)     Mental Status:  oriented, alert, coherent, logical, thought processes intact, and able to concentrate and follow conversation    IV Access:  - None    Nursing Mobility/ADLs:  Walking   Assisted  Transfer  Assisted  Bathing  Independent  Dressing  Assisted  Toileting  Independent  Feeding  Independent  Med Rodolen Brunt  Assisted  Med Delivery   whole    Wound Care Documentation and Therapy:  Wound 06/30/22 Toe (Comment  which one) Anterior; Left wound #1 second toe (Active)   Wound Image   07/28/22 0840   Wound Etiology Diabetic Cortes 3 08/22/22 9197   Dressing Status New drainage noted; Old drainage noted 08/22/22 0821   Wound Cleansed Soap and water 08/22/22 0821   Dressing/Treatment Other (comment) 08/22/22 0908   Offloading for Diabetic Foot Ulcers Offloading ordered;Post op shoe 08/22/22 0908   Wound Length (cm) 0.8 cm 08/22/22 0821   Wound Width (cm) 0.5 cm 08/22/22 0821   Wound Depth (cm) 2.8 cm 08/22/22 0821   Wound Surface Area (cm^2) 0.4 cm^2 08/22/22 0821   Change in Wound Size % (l*w) -185.71 08/22/22 0821   Wound Volume (cm^3) 1.12 cm^3 08/22/22 0821   Wound Healing % -2567 08/22/22 0821   Post-Procedure Length (cm) 0.8 cm 08/22/22 0821   Post-Procedure Width (cm) 0.5 cm 08/22/22 0821   Post-Procedure Depth (cm) 2.8 cm 08/22/22 0821   Post-Procedure Surface Area (cm^2) 0.4 cm^2 08/22/22 0821   Post-Procedure Volume (cm^3) 1.12 cm^3 08/22/22 0821   Wound Assessment Pink/red;Fibrin 08/22/22 0821   Drainage Amount Moderate 08/22/22 0821   Drainage Description Serosanguinous 08/22/22 0821   Odor None 08/22/22 0821   Keturah-wound Assessment Blanchable erythema 08/22/22 0821   Margins Defined edges 08/22/22 0821   Number of days: 55       Wound 08/22/22 Toe (Comment  which one) Left;Plantar #2 left 2nd toe plantar (Active)   Wound Image   08/22/22 0821   Dressing Status Clean;Dry; Intact 08/24/22 1655   Wound Cleansed Cleansed with saline 08/23/22 1630   Dressing/Treatment Adhesive bandage 08/24/22 1655   Offloading for Diabetic Foot Ulcers Offloading ordered;Post op shoe 08/22/22 0908   Wound Length (cm) 1.4 cm 08/22/22 0821   Wound Width (cm) 2 cm 08/22/22 0821   Wound Depth (cm) 0.4 cm 08/22/22 0821   Wound Surface Area (cm^2) 2.8 cm^2 08/22/22 0821   Wound Volume (cm^3) 1.12 cm^3 08/22/22 0821   Post-Procedure Length (cm) 1.4 cm 08/22/22 0821   Post-Procedure Width (cm) 2 cm 08/22/22 0821   Post-Procedure Depth (cm) 0.4 cm 08/22/22 0821   Post-Procedure Surface Area (cm^2) 2.8 cm^2 08/22/22 0821   Post-Procedure Volume (cm^3) 1.12 cm^3 08/22/22 0821   Wound Assessment Other (Comment) 08/24/22 1655   Drainage Amount None 08/24/22 1655   Drainage Description Serosanguinous; Yellow 08/23/22 1630   Odor None 08/22/22 0821   Keturah-wound Assessment Blanchable erythema 08/22/22 0821   Margins Undefined edges 08/22/22 0821   Number of days: 2       Incision 08/24/22 Toe (Comment  which one) (Active)   Dressing Status New dressing applied 08/25/22 0242   Incision Cleansed Cleansed with saline 08/25/22 0242   Dressing/Treatment Other (comment) 08/24/22 1611   Closure Sutures 08/24/22 1611   Drainage Amount Moderate 08/25/22 0242   Drainage Description Sanguinous 08/25/22 0242   Odor None 08/25/22 0242   Keturah-incision Assessment Intact 08/25/22 0242   Number of days: 0        Elimination:  Continence: Bowel: Yes  Bladder: Yes  Urinary Catheter: None   Colostomy/Ileostomy/Ileal Conduit: No       Date of Last BM: 8/25/22    Intake/Output Summary (Last 24 hours) at 8/25/2022 0719  Last data filed at 8/25/2022 0418  Gross per 24 hour   Intake 1161 ml   Output 600 ml   Net 561 ml     I/O last 3 completed shifts:   In: 5542 [I.V.:410; Blood:751]  Out: 600 [Urine:600]    Safety Concerns:     None    Impairments/Disabilities:      Amputation - toe amputations on L/R foot    Nutrition Therapy:  Current Nutrition Therapy:   - Oral Diet:  General    Routes of Feeding: Oral  Liquids: Thin Liquids  Daily Fluid Restriction: no  Last Modified Barium Swallow with Video (Video Swallowing Test): not done    Treatments at the Time of Hospital Discharge:   Respiratory Treatments: n/a  Oxygen Therapy:  is not on home oxygen therapy. Ventilator:    - No ventilator support    Rehab Therapies: Physical Therapy and Occupational Therapy  Weight Bearing Status/Restrictions: No weight bearing restrictions  Other Medical Equipment (for information only, NOT a DME order): Other Treatments: skilled nursing assessment, medication education and monitoring  Dressing change to left foot twice a day and as needed when soiled. Dakins-soaked fluff packed in wound, cover with 4x4's, wrap with kerlix, secure with tape. Patient's personal belongings (please select all that are sent with patient): All belongings returned to patient    RN SIGNATURE:  Electronically signed by Caryle Slate, RN on 8/26/22 at 3:32 PM EDT    CASE MANAGEMENT/SOCIAL WORK SECTION    Inpatient Status Date: 8/23/22    Readmission Risk Assessment Score:  Readmission Risk              Risk of Unplanned Readmission:  18           Discharging to [facility]/ [facility] [address] [place], [place]  Phone:  [phone]  Fax:  [phone]    / signature: Electronically signed by Caryle Slate, RN on 8/26/22 at 11:51 AM EDT    PHYSICIAN SECTION    Prognosis: Fair    Condition at Discharge: Stable    Rehab Potential (if transferring to Rehab): Fair    Recommended Labs or Other Treatments After Discharge: see above    Physician Certification: I certify the above information and transfer of Shane Murray  is necessary for the continuing treatment of the diagnosis listed and that he requires Home Care for less 30 days.      Update Admission H&P: No change in tablet  Commonly known as: NITROSTAT   Where to Get Your Medications      These medications were sent to [address] [facility], [address] [facility] [address]  Phone: [phone]       ciprofloxacin 500 MG tablet      These medications were sent to [facility] [address], [address] [facility] [address]  Phone: [phone]       sodium hypochlorite 0.125 % Soln external solution      You can get these medications from any pharmacy    Bring a paper prescription for each of these medications      oxyCODONE-acetaminophen 5-325 MG per tablet

## 2022-08-26 NOTE — PROGRESS NOTES
Pt is discharged;writer went over discharge instructions with pt & his mother; questions & concerns addressed;PIV removed without any complications;catheter tip intact;writer paged Dr. Hunter Yost in regards to pt prescription not being signed & needed for O/P pharmacy

## 2022-08-26 NOTE — PROGRESS NOTES
[address] [facility]    Progress Note    8/26/2022    7:46 AM    Name:   Mary Mercado  MRN:     [id number]     Acct:      [de-identified]   Room:   2048/2048-01   Day:  3  Admit Date:  8/23/2022 12:32 PM    PCP:   Adam Soliz DO  Code Status:  Prior    Subjective:     C/C:   Chief Complaint   Patient presents with    Toe Injury     Interval History Status: improved. Patient underwent transmetatarsal amputation of left 3rd 4th and 5th toe, patient tolerated the procedure well, states that his pain is controlled. Continues to do well, denies any new complaints  Afebrile  BP stable  Respiratory status stable on room air  No leucocytosis  Hemoglobin stable  Platelets stable      Review of Systems:     Constitutional:  negative for chills, fevers, sweats  Respiratory:  negative for cough, dyspnea on exertion, shortness of breath, wheezing  Cardiovascular:  negative for chest pain, chest pressure/discomfort, palpitations  Gastrointestinal:  negative for abdominal pain, constipation, diarrhea, nausea, vomiting  Neurological:  negative for dizziness, headache    Medications: Allergies:     Allergies   Allergen Reactions    Doxycycline Other (See Comments)     Skin peeling    Pcn [Penicillins] Rash    Penicillin G Rash       Current Meds:   Scheduled Meds:    warfarin placeholder: dosing by provider   Other RX Placeholder    vancomycin  1,250 mg IntraVENous Q24H    sodium hypochlorite   Irrigation Once    vancomycin (VANCOCIN) intermittent dosing (placeholder)   Other RX Placeholder    insulin lispro  0-4 Units SubCUTAneous TID     insulin lispro  0-4 Units SubCUTAneous Nightly    atorvastatin  40 mg Oral Nightly    lisinopril  5 mg Oral Daily    furosemide  80 mg Oral Daily     Continuous Infusions:    sodium chloride      dextrose       PRN Meds: sodium chloride, glucose, dextrose bolus **OR** dextrose bolus, glucagon (rDNA), dextrose, oxyCODONE-acetaminophen    Data:     Past Medical History:   has a past medical history of Atrial fibrillation ([facility] CAD (coronary artery disease), Cardiomyopathy ([facility] Cellulitis, CHF (congestive heart failure) ([facility] Diabetes mellitus ([facility] Foot infection, Heart failure ([facility] Hyperlipidemia, Hypertension, MRSA (methicillin resistant staph aureus) culture positive, Osteomyelitis ([facility] PVD (peripheral vascular disease) ([facility] and Wound, open, foot. Social History:   reports that he has never smoked. He has never used smokeless tobacco. He reports current alcohol use. He reports that he does not use drugs. Family History:   Family History   Problem Relation Age of Onset    Cancer Father         pancreatic cancer    Other Brother         MI    Osteoarthritis Mother     Other Maternal Grandfather         MI       Vitals:  BP (!) 150/76   Pulse 60   Temp 97.9 °F (36.6 °C)   Resp 18   Ht 5' 10\" (1.778 m)   Wt 187 lb (84.8 kg)   SpO2 97%   BMI 26.83 kg/m²   Temp (24hrs), Av.9 °F (36.6 °C), Min:97.8 °F (36.6 °C), Max:97.9 °F (36.6 °C)    Recent Labs     22  1134 22  1618 22  0612   POCGLU 201* 211* 258* 167*       I/O (24Hr):     Intake/Output Summary (Last 24 hours) at 2022 0746  Last data filed at 2022 2125  Gross per 24 hour   Intake 800 ml   Output 1850 ml   Net -1050 ml       Labs:  Hematology:  Recent Labs     22  1303 22  0603 22  1212 22  0638 22  0549   WBC 8.5 6.7  --  7.3 6.6   RBC 4.16* 3.84*  --  3.88* 3.81*   HGB 11.8* 10.6*  --  10.7* 10.9*   HCT 34.6* 31.9*  --  32.9* 31.8*   MCV 83.2 83.2  --  84.8 83.4   MCH 28.4 27.6  --  27.7 28.6   MCHC 34.2 33.1  --  32.6 34.2   RDW 20.7* 20.6*  --  20.1* 20.5*    245  --  241 229   MPV 7.9 7.8  --  7.7 7.7   SEDRATE 72*  --   --   --   --    CRP <3.0  --   --  <3.0  --    INR 2.6 2.1 1.7 1.3 1.3     Chemistry:  Recent Labs     22  0603 22  2493 08/26/22  0549    137 138   K 4.0 4.8 4.4    102 101   CO2 26 28 27   GLUCOSE 160* 165* 174*   BUN 33* 24* 25*   CREATININE 1.61* 1.49* 1.52*   ANIONGAP 10 7* 10   LABGLOM 43* 47* 46*   GFRAA 52* 57* 56*   CALCIUM 9.4 9.7 9.8     Recent Labs     08/23/22  1303 08/23/22  1632 08/24/22 2009 08/25/22  0634 08/25/22  1134 08/25/22  1618 08/25/22 2008 08/26/22  0612   PROT 8.3  --   --   --   --   --   --   --    LABALBU 4.4  --   --   --   --   --   --   --    AST 24  --   --   --   --   --   --   --    ALT 22  --   --   --   --   --   --   --    ALKPHOS 126  --   --   --   --   --   --   --    BILITOT 0.44  --   --   --   --   --   --   --    POCGLU  --    < > 198* 164* 201* 211* 258* 167*    < > = values in this interval not displayed. ABG:  Lab Results   Component Value Date/Time    PHART 7.473 10/23/2015 11:32 AM    YES3BKB 38.1 10/23/2015 11:32 AM    PO2ART 83.2 10/23/2015 11:32 AM    XFU3BQA 28.0 10/23/2015 11:32 AM    NBEA NOT REPORTED 10/23/2015 11:32 AM    PBEA 4.3 10/23/2015 11:32 AM    O5CZWAJS 95.8 10/23/2015 11:32 AM     Lab Results   Component Value Date/Time    SPECIAL LT FOOT 2ND TOE 07/28/2022 09:00 AM     Lab Results   Component Value Date/Time    CULTURE (A) 07/28/2022 09:00 AM     METHICILLIN RESISTANT STAPHYLOCOCCUS AUREUS MODERATE GROWTH    CULTURE NORMAL SKIN KENA 07/28/2022 09:00 AM    CULTURE (A) 07/28/2022 09:00 AM     BACTEROIDES THETAIOTAOMICRON BETA LACTAMASE POSITIVE LIGHT GROWTH       Radiology:  No results found.     Physical Examination:        General appearance:  alert, cooperative and no distress  Mental Status:  oriented to person, place and time and normal affect  Lungs:  clear to auscultation bilaterally, normal effort  Heart:  regular rate and rhythm, no murmur  Abdomen:  soft, nontender, nondistended, normal bowel sounds,   Extremities:   bandage present left foot    Assessment:        Hospital Problems             Last Modified POA    * (Principal) Chest pain 8/23/2022 Yes       Plan:        S/p  transmetatarsal amputation    History of AFib on warfarin, received vitamin K  and FFP prior to surgery    Also on  vancomycin. CRP neg.  Will discontinued   Hypertension, history of congestive heart failure, Lasix, lisinopril continued   Hyperlipidemia-  Lipitor 40 mg   Insulin sliding scale   Discharge home with home care today      Shelbie Batista MD  8/26/2022  7:46 AM

## 2022-08-26 NOTE — PROGRESS NOTES
Vancomycin Dosing by Pharmacy - Daily Note   Vancomycin Therapy Day:  4  Indication: OM, s/p amputation    Allergies:  Doxycycline, Pcn [penicillins], and Penicillin g   Actual Weight:    Wt Readings from Last 1 Encounters:   08/23/22 187 lb (84.8 kg)       Labs/Ancillary Data  Estimated Creatinine Clearance: 49 mL/min (A) (based on SCr of 1.52 mg/dL (H)). Recent Labs     08/24/22  0603 08/25/22  0638 08/26/22  0549   CREATININE 1.61* 1.49* 1.52*   BUN 33* 24* 25*   WBC 6.7 7.3 6.6     No results found for: PROCAL    Intake/Output Summary (Last 24 hours) at 8/26/2022 0718  Last data filed at 8/25/2022 2125  Gross per 24 hour   Intake 800 ml   Output 1850 ml   Net -1050 ml     Temp: 97.9    Culture Date / Source  /  Results  See micro  Recent vancomycin administrations                     vancomycin (VANCOCIN) 1,250 mg in dextrose 5 % 250 mL IVPB (ADDAVIAL) (mg) 1,250 mg New Bag 08/25/22 1807    vancomycin 1000 mg IVPB in 250 mL D5W addavial (mg) 1,000 mg New Bag 08/24/22 1846    vancomycin (VANCOCIN) 2,000 mg in dextrose 5 % 500 mL IVPB (mg) 2,000 mg New Bag 08/23/22 1708                    Vancomycin Concentrations:   TROUGH:  No results for input(s): VANCOTROUGH in the last 72 hours. RANDOM:    Recent Labs     08/25/22  0638   VANCORANDOM 18.0       MRSA Nasal Swab: N/A. Non-respiratory infection. Charlene Shi PLAN     Continue current dose of 1250 mg q24h IV  Ensured BUN/sCr ordered at baseline and every 48 hours x at least 3 levels, then at least weekly.   Repeat vancomycin concentration ordered for 08/27 @ 0600   Pharmacy will continue to monitor patient and adjust therapy as indicated      Vancomycin Target Concentration Parameters  Treatment  Population Target AUC/EUGENE Target Trough   Invasive MRSA Infection (bacteremia, pneumonia, meningitis, endocarditis, osteomyelitis)  Sepsis (undifferentiated) 400-600 N/A   Infection due to non-MRSA pathogen  Empiric treatment of non-invasive MRSA infection  (SSTI, UTI) <500 10-15 mg/L   CrCl < 29 mL/min  Rapidly fluctuating serum creatinine   BRIANNA N/A < 15 mg/L     Renal replacement therapy is dosed by levels, per hospital protocol. Abbreviations  * Pauc: probability that AUC is >400 (efficacy); Pconc: probability that Ctrough is above 20 ?g/mL (toxicity); Tox: Probability of nephrotoxicity, based on Antonio et al. Clin Infect Dis 2009. Loading dose: N/A  Regimen: 1250 mg IV every 24 hours. Start time: 18:07 on 08/26/2022  Exposure target: AUC24 (range)400-600 mg/L.hr   AUC24,ss: 544 mg/L.hr  Probability of AUC24 > 400: 93 %  Ctrough,ss: 16.6 mg/L  Probability of Ctrough,ss > 20: 27 %  Probability of nephrotoxicity (Antonio NURYS 2009): 12 %    Thank you for the consult. Pharmacy will continue to follow.     Margueritte Hatchet, PratimaD, BCSCP  8/26/2022   7:20 AM

## 2022-08-26 NOTE — PROGRESS NOTES
Dr. Deirdre Bernal contacted writer & stated that prescription has been filed & sent electronically to [facility]

## 2022-08-26 NOTE — PROGRESS NOTES
CLINICAL PHARMACY NOTE: MEDS TO BEDS    Total # of Prescriptions Filled: 1   The following medications were delivered to the patient:  Percocet 5/325    Additional Documentation:  Delivered Medication to Patients Room     OTC(s) Not Covered + Profiled Rx(s)   - Dakins

## 2022-08-26 NOTE — PLAN OF CARE
Problem: Discharge Planning  Goal: Discharge to home or other facility with appropriate resources  8/26/2022 1000 by Ofelia Hodge RN  Outcome: Progressing     Problem: Safety - Adult  Goal: Free from fall injury  8/26/2022 1000 by Ofelia Hodge RN  Outcome: Progressing     Problem: Pain  Goal: Verbalizes/displays adequate comfort level or baseline comfort level  8/26/2022 1000 by Ofelia Hodge RN  Outcome: Progressing

## 2022-08-29 LAB — SURGICAL PATHOLOGY REPORT: NORMAL

## 2022-09-09 ENCOUNTER — OFFICE VISIT (OUTPATIENT)
Dept: VASCULAR SURGERY | Age: 67
End: 2022-09-09

## 2022-09-09 VITALS
HEART RATE: 87 BPM | WEIGHT: 185 LBS | BODY MASS INDEX: 26.48 KG/M2 | HEIGHT: 70 IN | SYSTOLIC BLOOD PRESSURE: 186 MMHG | DIASTOLIC BLOOD PRESSURE: 99 MMHG

## 2022-09-09 DIAGNOSIS — I70.245 ATHEROSCLEROSIS OF NATIVE ARTERIES OF LEFT LEG WITH ULCERATION OF OTHER PART OF FOOT (HCC): Primary | ICD-10-CM

## 2022-09-09 PROCEDURE — 99024 POSTOP FOLLOW-UP VISIT: CPT | Performed by: SURGERY

## 2022-09-09 ASSESSMENT — ENCOUNTER SYMPTOMS
COUGH: 0
SHORTNESS OF BREATH: 0
SINUS PAIN: 0
CHEST TIGHTNESS: 0
PHOTOPHOBIA: 0
ABDOMINAL PAIN: 0
EYE REDNESS: 0
DIARRHEA: 0
NAUSEA: 0
COLOR CHANGE: 0
BACK PAIN: 0

## 2022-10-03 ENCOUNTER — OFFICE VISIT (OUTPATIENT)
Dept: VASCULAR SURGERY | Age: 67
End: 2022-10-03

## 2022-10-03 VITALS
HEIGHT: 70 IN | WEIGHT: 185 LBS | SYSTOLIC BLOOD PRESSURE: 150 MMHG | OXYGEN SATURATION: 98 % | BODY MASS INDEX: 26.48 KG/M2 | HEART RATE: 79 BPM | TEMPERATURE: 98.2 F | RESPIRATION RATE: 20 BRPM | DIASTOLIC BLOOD PRESSURE: 82 MMHG

## 2022-10-03 DIAGNOSIS — I70.245 ATHEROSCLEROSIS OF NATIVE ARTERIES OF LEFT LEG WITH ULCERATION OF OTHER PART OF FOOT (HCC): Primary | Chronic | ICD-10-CM

## 2022-10-03 PROCEDURE — 99024 POSTOP FOLLOW-UP VISIT: CPT | Performed by: SURGERY

## 2022-10-03 NOTE — PROGRESS NOTES
[address] AND VASCULAR  [address] [facility] [address]  Dept: [phone]     Patient: Kyle Albright  : 1955  MRN: [id number]  DOS: 10/3/2022            HPI:  Kyle Albright is a 78 y.o. male who returns to the office regarding his third fourth and fifth metatarsal amputation on the left. His wound was left open due to infectious reasons and he is healing slowly. He has fibrinous exudate on the plantar flap which is not infected. It is minimally colonized. He is changing the dressing 2-3 times per day at my request which I am very pleased to hear. His wound has improved in terms of size and depth quite significantly. Review of Systems    Vitals:    10/03/22 0903 10/03/22 0908   BP: (!) 145/70 (!) 150/82   Site:  Left Wrist   Position: Sitting Sitting   Pulse: 79    Resp: 20    Temp: 98.2 °F (36.8 °C)    TempSrc: Temporal    SpO2: 98%    Weight: 185 lb (83.9 kg)    Height: 5' 10\" (1.778 m)           Physical Exam  On examination his foot is warm and adequately perfused. His granulation tissue is present in the base of the wound with fibrinous exudate on the plantar flap. The length of the wound measures approximately 5 cm and the width measures approximately 2 to 2-1/2 cm. This accounts for approximately 8 to 10 cm². There is no purulence. There is no odor. Assessment:  1. Atherosclerosis of native arteries of left leg with ulceration of other part of foot ([facility]          Plan: At this point I am pleased with his progress. We will continue to see him at 1 month intervals for now. Hopefully he will continue to contract the wound size and if debridement is needed we can do that at one time or another. He understands and agrees with these plans and we will see him back to the office in 1 month.     Electronically signed by:  Polo Spencer MD

## 2022-11-07 ENCOUNTER — OFFICE VISIT (OUTPATIENT)
Dept: VASCULAR SURGERY | Age: 67
End: 2022-11-07

## 2022-11-07 VITALS
DIASTOLIC BLOOD PRESSURE: 70 MMHG | WEIGHT: 185 LBS | HEART RATE: 66 BPM | BODY MASS INDEX: 26.48 KG/M2 | SYSTOLIC BLOOD PRESSURE: 138 MMHG | TEMPERATURE: 98.6 F | HEIGHT: 70 IN | OXYGEN SATURATION: 98 % | RESPIRATION RATE: 20 BRPM

## 2022-11-07 DIAGNOSIS — I70.245 ATHEROSCLEROSIS OF NATIVE ARTERIES OF LEFT LEG WITH ULCERATION OF OTHER PART OF FOOT (HCC): Primary | ICD-10-CM

## 2022-11-07 PROCEDURE — 99024 POSTOP FOLLOW-UP VISIT: CPT | Performed by: SURGERY

## 2022-11-07 PROCEDURE — 1124F ACP DISCUSS-NO DSCNMKR DOCD: CPT | Performed by: SURGERY

## 2022-11-07 NOTE — PROGRESS NOTES
[address] AND VASCULAR  [address]  [facility] [address]  Dept: [phone]     Patient: Guerline Davis  : 1955  MRN: [id number]  DOS: 2022            HPI:  Guerline Davis is a 75 y.o. male who returns to the office regarding his left lower extremity ulceration from a toe amputation. He had a fourth and fifth toe amputation through the metatarsals for pathologic fracture. I left this open for infectious reasons. He remains with an ulceration approximately 4 cm in width and 2 cm in length. By with a mean transverse direction and length a mean along the axis of the foot. There is some fibrinous exudate but good granulation tissue. The skin edge has been somewhat stagnant in terms of further ingrowth of new skin. He has no odor over the ulceration. Review of Systems    Vitals:    22 0834   BP: 138/70   Site: Left Upper Arm   Position: Sitting   Cuff Size: Medium Adult   Pulse: 66   Resp: 20   Temp: 98.6 °F (37 °C)   TempSrc: Temporal   SpO2: 98%   Weight: 185 lb (83.9 kg)   Height: 5' 10\" (1.778 m)          Physical Exam  He continues to have no distal pulses due to calcified vessels. Recall that we had once performed arteriography to investigate his tibial systems and the posterior tibial occludes at the ankle and the anterior tibial occludes and does not reconstitute. His foot is warm and adequately perfused. He has minimal edema. There is no odor in the ulcer. There is no purulence. Assessment:  1. Atherosclerosis of native arteries of left leg with ulceration of other part of foot ([address]          Plan: At this time I think continued dressing changes would be optimal.  Other options include epi fix versus Integra products that would increase the granulation tissue in preparation for split-thickness skin graft.   Split-thickness skin graft would require him to be nonweightbearing on the lower extremity for at least a week if not to so that shear stress could be kept to an absolute minimum. 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 We will continue with dressing changes and I will explore the other options of epi fix versus the Integra products.     Electronically signed by:  Scott Quinones MD

## 2022-12-19 ENCOUNTER — OFFICE VISIT (OUTPATIENT)
Dept: VASCULAR SURGERY | Age: 67
End: 2022-12-19
Payer: MEDICARE

## 2022-12-19 VITALS
HEIGHT: 70 IN | RESPIRATION RATE: 16 BRPM | BODY MASS INDEX: 26.77 KG/M2 | HEART RATE: 51 BPM | SYSTOLIC BLOOD PRESSURE: 122 MMHG | WEIGHT: 187 LBS | OXYGEN SATURATION: 99 % | DIASTOLIC BLOOD PRESSURE: 74 MMHG

## 2022-12-19 DIAGNOSIS — I70.245 ATHEROSCLEROSIS OF NATIVE ARTERIES OF LEFT LEG WITH ULCERATION OF OTHER PART OF FOOT (HCC): Primary | ICD-10-CM

## 2022-12-19 PROCEDURE — 99213 OFFICE O/P EST LOW 20 MIN: CPT | Performed by: SURGERY

## 2022-12-19 PROCEDURE — 1124F ACP DISCUSS-NO DSCNMKR DOCD: CPT | Performed by: SURGERY

## 2022-12-19 NOTE — PROGRESS NOTES
[address] AND VASCULAR  [address]  [facility] [address]  Dept: [phone]     Patient: Yuly Deras  : 1955  MRN: [id number]  DOS: 2022            HPI:  Yuly Deras is a 75 y.o. male who returns to the office regarding his left lower extremity foot ulceration at the amputation site. The ulceration is improving slowly. It is approximately 1 cm x 2-1/2 cm in its dimensions. It does have granulation tissue in the base. He has no signs of infection. He continues to work and walk without problems. Review of Systems    Vitals:    22 1000   BP: 122/74   Site: Left Upper Arm   Position: Sitting   Cuff Size: Large Adult   Pulse: 51   Resp: 16   SpO2: 99%   Weight: 187 lb (84.8 kg)   Height: 5' 10\" (1.778 m)          Physical Exam  On examination the ulcer has the above-mentioned dimensions with granulation tissue in the base. He has some minimal dependent rubor but the skin is healthy. His foot is warm and well-perfused. I cannot palpate dorsalis pedis or posterior tibial pulses in the left lower extremity. Assessment:  1. Atherosclerosis of native arteries of left leg with ulceration of other part of foot ([address]          Plan: We will see him back to the office in 3 months with MONIQUE and PVR on his next visit to investigate the ulcer and investigate his arterial system once again. He understands and agrees and we can see him at that time.     Electronically signed by:  Diana Hampton MD

## 2023-02-20 ENCOUNTER — TELEPHONE (OUTPATIENT)
Dept: PODIATRY | Age: 68
End: 2023-02-20

## 2023-02-20 NOTE — TELEPHONE ENCOUNTER
Pt called in stating that he was seen by his pc and was told her had an infected toe and needs to f/u with podiatry asap.  Pt is scheduled for 02/22/23

## 2023-02-22 ENCOUNTER — OFFICE VISIT (OUTPATIENT)
Dept: PODIATRY | Age: 68
End: 2023-02-22
Payer: MEDICARE

## 2023-02-22 VITALS — BODY MASS INDEX: 26.77 KG/M2 | WEIGHT: 187 LBS | HEIGHT: 70 IN

## 2023-02-22 DIAGNOSIS — Z89.412 HISTORY OF AMPUTATION OF GREAT TOE OF BOTH FEET (HCC): ICD-10-CM

## 2023-02-22 DIAGNOSIS — Z89.411 HISTORY OF AMPUTATION OF GREAT TOE OF BOTH FEET (HCC): ICD-10-CM

## 2023-02-22 DIAGNOSIS — L97.511 RIGHT FOOT ULCER, LIMITED TO BREAKDOWN OF SKIN (HCC): Primary | ICD-10-CM

## 2023-02-22 DIAGNOSIS — Z89.422 HISTORY OF AMPUTATION OF LESSER TOE OF LEFT FOOT (HCC): ICD-10-CM

## 2023-02-22 PROCEDURE — 99214 OFFICE O/P EST MOD 30 MIN: CPT | Performed by: PODIATRIST

## 2023-02-22 PROCEDURE — 1124F ACP DISCUSS-NO DSCNMKR DOCD: CPT | Performed by: PODIATRIST

## 2023-02-22 RX ORDER — CIPROFLOXACIN 500 MG/1
500 TABLET, FILM COATED ORAL 2 TIMES DAILY
Status: ON HOLD | COMMUNITY
Start: 2023-02-27 | End: 2023-03-05 | Stop reason: HOSPADM

## 2023-02-22 RX ORDER — METRONIDAZOLE 500 MG/1
500 TABLET ORAL 3 TIMES DAILY
Status: ON HOLD | COMMUNITY
Start: 2023-02-20 | End: 2023-03-04

## 2023-02-22 RX ORDER — CARVEDILOL 3.12 MG/1
3.12 TABLET ORAL 2 TIMES DAILY
COMMUNITY
Start: 2023-01-21

## 2023-02-22 NOTE — PROGRESS NOTES
[address]  Dept: [phone]    RETURN PATIENT PROGRESS NOTE  Date of patient's visit: 2/22/2023  Patient's Name:  Horace Hussein YOB: 1955            Patient Care Team:  Alonso Das DO as PCP - General       Horace Hussein 75 y.o. male that presents for follow-up of   Chief Complaint   Patient presents with    Wound Check     2nd toe right foot x 2 weeks     Pt's primary care physician is Alonso Das DO last seen September 26 2022  Symptoms began 2 week(s) ago and are increased . Patient relates pain is Absent . Pain is rated 0 out of 10 and is described as none. Treatments prior to today's visit include: neosporin. Currently denies F/C/N/V. Pt was cutting his own toenails and ended up cutting himself and the toe is becoming red. Pt states he does not feel it     Allergies   Allergen Reactions    Doxycycline Other (See Comments)     Skin peeling    Pcn [Penicillins] Rash    Penicillin G Rash       Past Medical History:   Diagnosis Date    Atrial fibrillation (HCC)     CAD (coronary artery disease)     Cardiomyopathy (HCC)     Cellulitis     CHF (congestive heart failure) ([facility])     Diabetes mellitus ([facility]     Foot infection     Heart failure ([facility])     Hyperlipidemia     Hypertension     MRSA (methicillin resistant staph aureus) culture positive     Osteomyelitis ([facility])     PVD [facility]peripheral vascular disease) [facility]     Wound, open, foot     left foot       Prior to Admission medications    Medication Sig Start Date End Date Taking?  Authorizing Provider   carvedilol (COREG) 3.125 MG tablet TAKE 1 TABLET BY MOUTH IN THE MORNING AND 1 TABLET BEFORE BEDTIME. 1/21/23  Yes Historical Provider, MD   ciprofloxacin (CIPRO) 500 MG tablet  2/20/23  Yes Historical Provider, MD   metroNIDAZOLE (FLAGYL) 500 MG tablet  2/20/23  Yes Historical Provider, MD   benazepril (LOTENSIN) 5 MG tablet Take 5 mg by mouth in the morning and at bedtime   Yes Historical Provider, MD   glipiZIDE (GLUCOTROL) 5 MG tablet Take 2.5 mg by mouth daily   Yes Historical Provider, MD   warfarin (COUMADIN) 2 MG tablet Take 2 mg by mouth four times a week Indications: On Monday, Wednesday, Friday, and Saturday Patients INR is managed by [facility] med management. Yes Historical Provider, MD   aspirin 81 MG EC tablet Take 1 tablet by mouth daily 3/15/22  Yes Lucas Hernadez, DO   furosemide (LASIX) 80 MG tablet Take 1 tablet by mouth daily 3/15/22  Yes Lucas Hernadez, DO   atorvastatin (LIPITOR) 40 MG tablet Take 40 mg by mouth daily   Yes Historical Provider, MD   Vitamin D (CHOLECALCIFEROL) 25 MCG (1000 UT) TABS tablet Take 1,000 Units by mouth daily  Patient not taking: Reported on 2/22/2023    Historical Provider, MD   nitroGLYCERIN (NITROSTAT) 0.4 MG SL tablet up to max of 3 total doses. If no relief after 1 dose, call 911. 3/15/22 8/26/22  Keisha Nolan DO       Review of Systems    Review of Systems:  History obtained from chart review and the patient  General ROS: negative for - chills, fatigue, fever, night sweats or weight gain  Constitutional: Negative for chills, diaphoresis, fatigue, fever and unexpected weight change. Musculoskeletal: Positive for arthralgias, gait problem and joint swelling. Neurological ROS: negative for - behavioral changes, confusion, headaches or seizures. Negative for weakness and numbness. Dermatological ROS: negative for - mole changes, rash  Cardiovascular: Negative for leg swelling. Gastrointestinal: Negative for constipation, diarrhea, nausea and vomiting. Lower Extremity Physical Examination:     Vitals: There were no vitals filed for this visit. General: AAO x 3 in NAD. Wound #:   1    diabetic foot ulcer   right 2nd toe     Wound measurements:  #1  5 mm by 9 mm  by   1 mm        Wound Depth: other (comment) unable to tell do to eschar .     Drainage: minimal Type:clear  Wound Bed status:   Granulation Tissue: 0%   Necrotic 100%    Epithelialization 0%   Hypergranular 0%   Periwound hyperkeratosis:  absent  Erythema absent    Odor:no  Maceration:no  Undermining/tract/tunneling:no  Culture taken:   no           Asessment: Patient is a 75 y.o. male with:    Diagnosis Orders   1. Right foot ulcer, limited to breakdown of skin ([facility]        2. History of amputation of great toe of both feet ([facility]        3. History of amputation of lesser toe of left foot ([facility]              Plan: Patient examined and evaluated. Current condition and treatment options discussed in detail. Advised pt to his conditio. No debridmenet done today as pt is getting his PVRs done in one week. Pt will need wound care and possibley HBO therapy to help heel. I do not have a great pulse on his foot and pt may need another amputation   will set him up with wound care . Verbal and written instructions given to patient. Contact office with any questions/problems/concerns. No orders of the defined types were placed in this encounter. No orders of the defined types were placed in this encounter. Pt to contineu to take his ABX   RTC in 2week(s).    2/22/2023    All labs were reviewed and all imagining including the above findings were reviewed PRIOR to the patients arrival and with the patient today. Previous patient encounter was reviewed. Encounters from the patients other medical providers were reviewed and noted. Time was spent educating the patient and their families/caregivers on proper care of the feet and ankles. All the above diagnosis were addressed at todays visit and all questions were answered.   A total of 30 minutes was spent with this patients encounter which included charting after the patients visit      Electronically signed by Chris Troncoso DPM on 2/22/2023 at 10:52 AM  2/22/2023

## 2023-02-27 NOTE — DISCHARGE INSTRUCTIONS
[address] [place] and [facility]      Visit  Discharge Instructions / Physician Orders  DATE:3/2/23     Home Care: None     SUPPLIES ORDERED THRU:    none-patient going to ER                 DATE LAST SUPPLIED NA     Wound Location:  Left anterior foot, Right 2nd toe     Cleanse with: Liquid antibacterial soap and water, rinse well      Dressing Orders:  Primary dressing    silvercel                   Secondary dressing          Dry guaze, kerlix                      Frequency:  Daily     Additional Orders: Increase protein to diet (meat, cheese, eggs, fish, peanut butter, nuts and beans)  Multivitamin daily     recommends you go to the Emergency Room today for bone infection in Right 2nd toe    OFFLOADING [x] YES  TYPE:   Diabetic shoes               [] NA    Weekly wound care visits until determined otherwise. Antibiotic therapy-wound care related YES [x] NO [] NA[]    MY CHART []     Smart Device  []                         Your next appointment with the [address] [place] [address] is [facility]                                                                                                   (Please note your next appointment above and if you are unable to keep, kindly give a 24 hour notice. Thank you.)  If more than 15 min late we cannot guarantee you will be seen due to clinician schedule  Per Policy, Excessive cancellation will call for dismissal from program.  If you experience any of the following, please call the [address] [place] [address] during business hours:  [phone]     * Increase in Pain  * Temperature over 101  * Increase in drainage from your wound  * Drainage with a foul odor  * Bleeding  * Increase in swelling  * Need for compression bandage changes due to slippage, breakthrough drainage. If you need medical attention outside of the business hours of the [address] [place] [address] please contact your PCP or go to the nearest emergency room.      The information contained in the After Visit Summary has been reviewed with me, the patient and/or responsible adult, by my health care provider(s). I had the opportunity to ask questions regarding this information.  I have elected to receive;      []After Visit Summary  [x]Comprehensive Discharge Instruction      Patient signature______________________________________Date:________  Electronically signed by Jenniffer Dodge RN on 3/2/2023 at 10:01 AM   Electronically signed by Janet Meehan DPM on 3/2/2023 at 9:19 AM

## 2023-02-28 ENCOUNTER — HOSPITAL ENCOUNTER (OUTPATIENT)
Dept: VASCULAR LAB | Age: 68
Discharge: HOME | End: 2023-02-28
Payer: MEDICARE

## 2023-02-28 DIAGNOSIS — I70.245 ATHEROSCLEROSIS OF NATIVE ARTERIES OF LEFT LEG WITH ULCERATION OF OTHER PART OF FOOT (HCC): ICD-10-CM

## 2023-02-28 PROCEDURE — 93923 UPR/LXTR ART STDY 3+ LVLS: CPT

## 2023-03-02 ENCOUNTER — APPOINTMENT (OUTPATIENT)
Dept: GENERAL RADIOLOGY | Age: 68
DRG: 617 | End: 2023-03-02
Payer: MEDICARE

## 2023-03-02 ENCOUNTER — HOSPITAL ENCOUNTER (INPATIENT)
Age: 68
LOS: 1 days | Discharge: HOME | DRG: 617 | End: 2023-03-05
Attending: EMERGENCY MEDICINE | Admitting: PODIATRIST
Payer: MEDICARE

## 2023-03-02 ENCOUNTER — HOSPITAL ENCOUNTER (OUTPATIENT)
Dept: WOUND CARE | Age: 68
Discharge: HOME | End: 2023-03-02
Payer: MEDICARE

## 2023-03-02 ENCOUNTER — APPOINTMENT (OUTPATIENT)
Dept: CT IMAGING | Age: 68
DRG: 617 | End: 2023-03-02
Payer: MEDICARE

## 2023-03-02 VITALS
HEIGHT: 70 IN | HEART RATE: 72 BPM | RESPIRATION RATE: 20 BRPM | WEIGHT: 187 LBS | BODY MASS INDEX: 26.77 KG/M2 | SYSTOLIC BLOOD PRESSURE: 177 MMHG | TEMPERATURE: 98.2 F | DIASTOLIC BLOOD PRESSURE: 95 MMHG

## 2023-03-02 DIAGNOSIS — E11.628 DIABETIC FOOT INFECTION (HCC): Primary | ICD-10-CM

## 2023-03-02 DIAGNOSIS — L97.514 ULCER OF TOE OF RIGHT FOOT, WITH NECROSIS OF BONE (HCC): ICD-10-CM

## 2023-03-02 DIAGNOSIS — L97.522 ULCER OF LEFT FOOT, WITH FAT LAYER EXPOSED (HCC): Primary | ICD-10-CM

## 2023-03-02 DIAGNOSIS — L08.9 DIABETIC INFECTION OF RIGHT FOOT (HCC): ICD-10-CM

## 2023-03-02 DIAGNOSIS — E11.628 DIABETIC INFECTION OF RIGHT FOOT (HCC): ICD-10-CM

## 2023-03-02 DIAGNOSIS — L08.9 DIABETIC FOOT INFECTION (HCC): Primary | ICD-10-CM

## 2023-03-02 DIAGNOSIS — G89.18 POST-OP PAIN: ICD-10-CM

## 2023-03-02 PROBLEM — M86.9 OSTEOMYELITIS OF SECOND TOE OF RIGHT FOOT (HCC): Status: ACTIVE | Noted: 2022-06-30

## 2023-03-02 LAB
ABSOLUTE EOS #: 0.2 K/UL (ref 0–0.4)
ABSOLUTE LYMPH #: 1.2 K/UL (ref 1–4.8)
ABSOLUTE MONO #: 0.6 K/UL (ref 0.1–1.3)
ANION GAP SERPL CALCULATED.3IONS-SCNC: 12 MMOL/L (ref 9–17)
BASOPHILS # BLD: 1 % (ref 0–2)
BASOPHILS ABSOLUTE: 0 K/UL (ref 0–0.2)
BUN SERPL-MCNC: 25 MG/DL (ref 8–23)
CALCIUM SERPL-MCNC: 9.4 MG/DL (ref 8.6–10.4)
CHLORIDE SERPL-SCNC: 100 MMOL/L (ref 98–107)
CO2 SERPL-SCNC: 24 MMOL/L (ref 20–31)
CREAT SERPL-MCNC: 1.78 MG/DL (ref 0.7–1.2)
CRP SERPL HS-MCNC: 4.8 MG/L (ref 0–5)
EOSINOPHILS RELATIVE PERCENT: 2 % (ref 0–4)
ERYTHROCYTE [SEDIMENTATION RATE] IN BLOOD BY WESTERGREN METHOD: 19 MM/HR (ref 0–20)
GFR SERPL CREATININE-BSD FRML MDRD: 41 ML/MIN/1.73M2
GLUCOSE BLD-MCNC: 204 MG/DL (ref 75–110)
GLUCOSE SERPL-MCNC: 350 MG/DL (ref 70–99)
HCT VFR BLD AUTO: 38.3 % (ref 41–53)
HGB BLD-MCNC: 13.5 G/DL (ref 13.5–17.5)
INR PPP: 3.2
LYMPHOCYTES # BLD: 13 % (ref 24–44)
MCH RBC QN AUTO: 31.3 PG (ref 26–34)
MCHC RBC AUTO-ENTMCNC: 35.2 G/DL (ref 31–37)
MCV RBC AUTO: 88.9 FL (ref 80–100)
MONOCYTES # BLD: 7 % (ref 1–7)
PDW BLD-RTO: 15.3 % (ref 11.5–14.9)
PLATELET # BLD AUTO: 225 K/UL (ref 150–450)
PMV BLD AUTO: 8.6 FL (ref 6–12)
POTASSIUM SERPL-SCNC: 3.8 MMOL/L (ref 3.7–5.3)
PROTHROMBIN TIME: 33.7 SEC (ref 11.8–14.6)
RBC # BLD: 4.3 M/UL (ref 4.5–5.9)
SEG NEUTROPHILS: 77 % (ref 36–66)
SEGMENTED NEUTROPHILS ABSOLUTE COUNT: 7.1 K/UL (ref 1.3–9.1)
SODIUM SERPL-SCNC: 136 MMOL/L (ref 135–144)
WBC # BLD AUTO: 9.1 K/UL (ref 3.5–11)

## 2023-03-02 PROCEDURE — 87070 CULTURE OTHR SPECIMN AEROBIC: CPT

## 2023-03-02 PROCEDURE — 2580000003 HC RX 258: Performed by: FAMILY MEDICINE

## 2023-03-02 PROCEDURE — 11044 DBRDMT BONE 1ST 20 SQ CM/<: CPT

## 2023-03-02 PROCEDURE — 6370000000 HC RX 637 (ALT 250 FOR IP): Performed by: FAMILY MEDICINE

## 2023-03-02 PROCEDURE — 85610 PROTHROMBIN TIME: CPT

## 2023-03-02 PROCEDURE — G0378 HOSPITAL OBSERVATION PER HR: HCPCS

## 2023-03-02 PROCEDURE — 2580000003 HC RX 258: Performed by: EMERGENCY MEDICINE

## 2023-03-02 PROCEDURE — 85025 COMPLETE CBC W/AUTO DIFF WBC: CPT

## 2023-03-02 PROCEDURE — 99285 EMERGENCY DEPT VISIT HI MDM: CPT

## 2023-03-02 PROCEDURE — 11042 DBRDMT SUBQ TIS 1ST 20SQCM/<: CPT

## 2023-03-02 PROCEDURE — 80048 BASIC METABOLIC PNL TOTAL CA: CPT

## 2023-03-02 PROCEDURE — 99213 OFFICE O/P EST LOW 20 MIN: CPT

## 2023-03-02 PROCEDURE — 73630 X-RAY EXAM OF FOOT: CPT

## 2023-03-02 PROCEDURE — 96366 THER/PROPH/DIAG IV INF ADDON: CPT

## 2023-03-02 PROCEDURE — 96367 TX/PROPH/DG ADDL SEQ IV INF: CPT

## 2023-03-02 PROCEDURE — 87205 SMEAR GRAM STAIN: CPT

## 2023-03-02 PROCEDURE — 85652 RBC SED RATE AUTOMATED: CPT

## 2023-03-02 PROCEDURE — 86140 C-REACTIVE PROTEIN: CPT

## 2023-03-02 PROCEDURE — 6360000002 HC RX W HCPCS: Performed by: EMERGENCY MEDICINE

## 2023-03-02 PROCEDURE — 87075 CULTR BACTERIA EXCEPT BLOOD: CPT

## 2023-03-02 PROCEDURE — 73700 CT LOWER EXTREMITY W/O DYE: CPT

## 2023-03-02 PROCEDURE — 87176 TISSUE HOMOGENIZATION CULTR: CPT

## 2023-03-02 PROCEDURE — 82947 ASSAY GLUCOSE BLOOD QUANT: CPT

## 2023-03-02 PROCEDURE — 96365 THER/PROPH/DIAG IV INF INIT: CPT

## 2023-03-02 PROCEDURE — 36415 COLL VENOUS BLD VENIPUNCTURE: CPT

## 2023-03-02 RX ORDER — WARFARIN SODIUM 2 MG/1
4 TABLET ORAL
COMMUNITY

## 2023-03-02 RX ORDER — INSULIN LISPRO 100 [IU]/ML
0-4 INJECTION, SOLUTION INTRAVENOUS; SUBCUTANEOUS NIGHTLY
Status: DISCONTINUED | OUTPATIENT
Start: 2023-03-02 | End: 2023-03-05 | Stop reason: HOSPADM

## 2023-03-02 RX ORDER — LINEZOLID 2 MG/ML
600 INJECTION, SOLUTION INTRAVENOUS EVERY 12 HOURS
Status: DISCONTINUED | OUTPATIENT
Start: 2023-03-03 | End: 2023-03-05 | Stop reason: HOSPADM

## 2023-03-02 RX ORDER — GINSENG 100 MG
CAPSULE ORAL ONCE
OUTPATIENT
Start: 2023-03-02 | End: 2023-03-02

## 2023-03-02 RX ORDER — LIDOCAINE HYDROCHLORIDE 20 MG/ML
JELLY TOPICAL ONCE
OUTPATIENT
Start: 2023-03-02 | End: 2023-03-02

## 2023-03-02 RX ORDER — LIDOCAINE HYDROCHLORIDE 40 MG/ML
SOLUTION TOPICAL ONCE
OUTPATIENT
Start: 2023-03-02 | End: 2023-03-02

## 2023-03-02 RX ORDER — CARVEDILOL 3.12 MG/1
3.12 TABLET ORAL 2 TIMES DAILY WITH MEALS
Status: DISCONTINUED | OUTPATIENT
Start: 2023-03-03 | End: 2023-03-05 | Stop reason: HOSPADM

## 2023-03-02 RX ORDER — LIDOCAINE 40 MG/G
CREAM TOPICAL ONCE
Status: CANCELLED | OUTPATIENT
Start: 2023-03-02 | End: 2023-03-02

## 2023-03-02 RX ORDER — SODIUM CHLORIDE 9 MG/ML
INJECTION, SOLUTION INTRAVENOUS CONTINUOUS
Status: DISCONTINUED | OUTPATIENT
Start: 2023-03-02 | End: 2023-03-05 | Stop reason: HOSPADM

## 2023-03-02 RX ORDER — PANTOPRAZOLE SODIUM 40 MG/1
40 TABLET, DELAYED RELEASE ORAL DAILY
Status: DISCONTINUED | OUTPATIENT
Start: 2023-03-03 | End: 2023-03-05 | Stop reason: HOSPADM

## 2023-03-02 RX ORDER — BACITRACIN ZINC AND POLYMYXIN B SULFATE 500; 1000 [USP'U]/G; [USP'U]/G
OINTMENT TOPICAL ONCE
OUTPATIENT
Start: 2023-03-02 | End: 2023-03-02

## 2023-03-02 RX ORDER — DEXTROSE MONOHYDRATE 100 MG/ML
INJECTION, SOLUTION INTRAVENOUS CONTINUOUS PRN
Status: DISCONTINUED | OUTPATIENT
Start: 2023-03-02 | End: 2023-03-05 | Stop reason: HOSPADM

## 2023-03-02 RX ORDER — LIDOCAINE 40 MG/G
CREAM TOPICAL ONCE
OUTPATIENT
Start: 2023-03-02 | End: 2023-03-02

## 2023-03-02 RX ORDER — BACITRACIN, NEOMYCIN, POLYMYXIN B 400; 3.5; 5 [USP'U]/G; MG/G; [USP'U]/G
OINTMENT TOPICAL ONCE
Status: CANCELLED | OUTPATIENT
Start: 2023-03-02 | End: 2023-03-02

## 2023-03-02 RX ORDER — BETAMETHASONE DIPROPIONATE 0.05 %
OINTMENT (GRAM) TOPICAL ONCE
OUTPATIENT
Start: 2023-03-02 | End: 2023-03-02

## 2023-03-02 RX ORDER — CALCIUM CARBONATE-CHOLECALCIFEROL TAB 250 MG-125 UNIT 250-125 MG-UNIT
1 TAB ORAL DAILY
Status: DISCONTINUED | OUTPATIENT
Start: 2023-03-03 | End: 2023-03-05 | Stop reason: HOSPADM

## 2023-03-02 RX ORDER — BACITRACIN ZINC AND POLYMYXIN B SULFATE 500; 1000 [USP'U]/G; [USP'U]/G
OINTMENT TOPICAL ONCE
Status: CANCELLED | OUTPATIENT
Start: 2023-03-02 | End: 2023-03-02

## 2023-03-02 RX ORDER — GLIPIZIDE 5 MG/1
2.5 TABLET ORAL
Status: DISCONTINUED | OUTPATIENT
Start: 2023-03-03 | End: 2023-03-03

## 2023-03-02 RX ORDER — BENAZEPRIL HYDROCHLORIDE 5 MG/1
5 TABLET, FILM COATED ORAL 2 TIMES DAILY
Status: DISCONTINUED | OUTPATIENT
Start: 2023-03-02 | End: 2023-03-02 | Stop reason: CLARIF

## 2023-03-02 RX ORDER — LIDOCAINE 50 MG/G
OINTMENT TOPICAL ONCE
OUTPATIENT
Start: 2023-03-02 | End: 2023-03-02

## 2023-03-02 RX ORDER — CLOBETASOL PROPIONATE 0.5 MG/G
OINTMENT TOPICAL ONCE
Status: CANCELLED | OUTPATIENT
Start: 2023-03-02 | End: 2023-03-02

## 2023-03-02 RX ORDER — LIDOCAINE 50 MG/G
OINTMENT TOPICAL ONCE
Status: CANCELLED | OUTPATIENT
Start: 2023-03-02 | End: 2023-03-02

## 2023-03-02 RX ORDER — BETAMETHASONE DIPROPIONATE 0.05 %
OINTMENT (GRAM) TOPICAL ONCE
Status: CANCELLED | OUTPATIENT
Start: 2023-03-02 | End: 2023-03-02

## 2023-03-02 RX ORDER — GINSENG 100 MG
CAPSULE ORAL ONCE
Status: CANCELLED | OUTPATIENT
Start: 2023-03-02 | End: 2023-03-02

## 2023-03-02 RX ORDER — LIDOCAINE HYDROCHLORIDE 20 MG/ML
JELLY TOPICAL ONCE
Status: CANCELLED | OUTPATIENT
Start: 2023-03-02 | End: 2023-03-02

## 2023-03-02 RX ORDER — GENTAMICIN SULFATE 1 MG/G
OINTMENT TOPICAL ONCE
Status: CANCELLED | OUTPATIENT
Start: 2023-03-02 | End: 2023-03-02

## 2023-03-02 RX ORDER — INSULIN LISPRO 100 [IU]/ML
0-16 INJECTION, SOLUTION INTRAVENOUS; SUBCUTANEOUS
Status: DISCONTINUED | OUTPATIENT
Start: 2023-03-03 | End: 2023-03-05 | Stop reason: HOSPADM

## 2023-03-02 RX ORDER — GENTAMICIN SULFATE 1 MG/G
OINTMENT TOPICAL ONCE
OUTPATIENT
Start: 2023-03-02 | End: 2023-03-02

## 2023-03-02 RX ORDER — CLOBETASOL PROPIONATE 0.5 MG/G
OINTMENT TOPICAL ONCE
OUTPATIENT
Start: 2023-03-02 | End: 2023-03-02

## 2023-03-02 RX ORDER — LISINOPRIL 5 MG/1
5 TABLET ORAL DAILY
Status: DISCONTINUED | OUTPATIENT
Start: 2023-03-03 | End: 2023-03-05 | Stop reason: HOSPADM

## 2023-03-02 RX ORDER — BACITRACIN, NEOMYCIN, POLYMYXIN B 400; 3.5; 5 [USP'U]/G; MG/G; [USP'U]/G
OINTMENT TOPICAL ONCE
OUTPATIENT
Start: 2023-03-02 | End: 2023-03-02

## 2023-03-02 RX ORDER — ATORVASTATIN CALCIUM 40 MG/1
40 TABLET, FILM COATED ORAL NIGHTLY
Status: DISCONTINUED | OUTPATIENT
Start: 2023-03-02 | End: 2023-03-05 | Stop reason: HOSPADM

## 2023-03-02 RX ORDER — CIPROFLOXACIN 2 MG/ML
400 INJECTION, SOLUTION INTRAVENOUS ONCE
Status: COMPLETED | OUTPATIENT
Start: 2023-03-02 | End: 2023-03-02

## 2023-03-02 RX ORDER — FUROSEMIDE 40 MG/1
80 TABLET ORAL DAILY
Status: DISCONTINUED | OUTPATIENT
Start: 2023-03-03 | End: 2023-03-05 | Stop reason: HOSPADM

## 2023-03-02 RX ORDER — LIDOCAINE HYDROCHLORIDE 40 MG/ML
SOLUTION TOPICAL ONCE
Status: COMPLETED | OUTPATIENT
Start: 2023-03-02 | End: 2023-03-02

## 2023-03-02 RX ADMIN — VANCOMYCIN HYDROCHLORIDE 2250 MG: 1.25 INJECTION, POWDER, LYOPHILIZED, FOR SOLUTION INTRAVENOUS at 14:58

## 2023-03-02 RX ADMIN — LIDOCAINE HYDROCHLORIDE 5 ML: 40 SOLUTION TOPICAL at 09:30

## 2023-03-02 RX ADMIN — ATORVASTATIN CALCIUM 40 MG: 40 TABLET, FILM COATED ORAL at 22:09

## 2023-03-02 RX ADMIN — SODIUM CHLORIDE: 9 INJECTION, SOLUTION INTRAVENOUS at 22:14

## 2023-03-02 RX ADMIN — CIPROFLOXACIN 400 MG: 2 INJECTION, SOLUTION INTRAVENOUS at 13:51

## 2023-03-02 ASSESSMENT — ENCOUNTER SYMPTOMS
VOMITING: 0
DIARRHEA: 0
SORE THROAT: 0
COUGH: 0
VOMITING: 0
DIARRHEA: 0
CHEST TIGHTNESS: 0
SHORTNESS OF BREATH: 0
BACK PAIN: 0
NAUSEA: 0
ABDOMINAL PAIN: 0
CONSTIPATION: 0
EYE PAIN: 0
NAUSEA: 0
COLOR CHANGE: 1

## 2023-03-02 ASSESSMENT — LIFESTYLE VARIABLES
HOW OFTEN DO YOU HAVE A DRINK CONTAINING ALCOHOL: NEVER
HOW MANY STANDARD DRINKS CONTAINING ALCOHOL DO YOU HAVE ON A TYPICAL DAY: PATIENT DOES NOT DRINK

## 2023-03-02 ASSESSMENT — PAIN - FUNCTIONAL ASSESSMENT: PAIN_FUNCTIONAL_ASSESSMENT: NONE - DENIES PAIN

## 2023-03-02 ASSESSMENT — PAIN SCALES - GENERAL: PAINLEVEL_OUTOF10: 0

## 2023-03-02 NOTE — PROGRESS NOTES
[address]   Progress Note and Procedure Note      [address] RECORD NUMBER:  [id number]  AGE: 75 y.o. GENDER: male  : 1955  EPISODE DATE:  3/2/2023    Subjective:     Chief Complaint   Patient presents with    Wound Check     Right third toe         HISTORY of PRESENT ILLNESS HPI     Yaneth Vasquez is a 75 y.o. male who presents today for wound/ulcer evaluation. History of Wound Context:  Aleshia Mead presents for evaluation of left foot ulceration and right second toe ulceration. He has been lost to follow-up for some time now. He has undergone amputation of the toes of the left foot and there is a nonhealing wound to the distal aspect of the left foot. The right second toe has an ulceration with exposed bone. States that it was red and swollen however he is taking antibiotics which has improved this. No systemic sign of infection. Presents in regular tennis shoes today. He is not using his diabetic shoes.     Ulcer Identification:  Ulcer Type: diabetic, non-healing surgical and neuropathic  Contributing Factors: diabetes, poor glucose control, chronic pressure, shear force, arterial insufficiency and decreased tissue oxygenation          PAST MEDICAL HISTORY        Diagnosis Date    Atrial fibrillation (HCC)     CAD (coronary artery disease)     Cardiomyopathy (HCC)     Cellulitis     CHF (congestive heart failure) (HCC)     Diabetes mellitus ([address]     Foot infection     Heart failure (HCC)     Hyperlipidemia     Hypertension     MRSA (methicillin resistant staph aureus) culture positive     Osteomyelitis [facility]     PVD (peripheral vascular disease) ([facility]     Wound, open, foot     left foot       PAST SURGICAL HISTORY    Past Surgical History:   Procedure Laterality Date    CARDIAC SURGERY      CABG X3    COLONOSCOPY      DEBRIDEMENT Right 2015    DEBRIDEMENT WOUND FOOT RIGHT W/ APPLICATION BILAT INTEG RAT GRAFT    ENDOSCOPY, COLON, DIAGNOSTIC      EYE SURGERY Bilateral     cataracts    HERNIA REPAIR      umbilical    KNEE ARTHROSCOPY Right     OTHER SURGICAL HISTORY Right 12 29 15    wound care graft procedure foot,appl of integra    PACEMAKER PLACEMENT  2011    WITH DEFIBRILLATOR    TOE AMPUTATION Right     R great    TOE AMPUTATION Left 3/13/2022    TOE AMPUTATION--left 2nd digit performed by Savi Lion DPM at [facility] Left 8/24/2022    Left third fourth and fifth toe amputation through the metatarsal for completion transmetatarsal amputation performed by Ria Ring MD at [address] [facility] Left 7/13/2022    RIGHT COMMON FEMORAL ACCESS UNDER ULTRASOUND GUIDANCE DISTAL AORTOGRAPHY WITH PELVIC RUN OFF PLACEMENT OF CATHETER INTO LEFT COMMON FEMORAL ARTERY, PROFUNDA FEMORAL AND SFA  ARTERIOGRAPHY PLACEMENT OF CATHETER INTO LEFT SFA, WITH SFA POPLITEAL AND TIBIAL TIBIOPERONEAL ARTERIOGRAPHY INTO LEFT FOOT performed by Ria Ring MD at [address]    Family History   Problem Relation Age of Onset    Cancer Father         pancreatic cancer    Other Brother         MI    Osteoarthritis Mother     Other Maternal Grandfather         MI       SOCIAL HISTORY    Social History     Tobacco Use    Smoking status: Never    Smokeless tobacco: Never   Vaping Use    Vaping Use: Never used   Substance Use Topics    Alcohol use: Yes     Comment: SOCIAL on weekends    Drug use: No       ALLERGIES    Allergies   Allergen Reactions    Doxycycline Other (See Comments)     Skin peeling    Pcn [Penicillins] Rash    Penicillin G Rash       MEDICATIONS    Current Outpatient Medications on File Prior to Encounter   Medication Sig Dispense Refill    carvedilol (COREG) 3.125 MG tablet TAKE 1 TABLET BY MOUTH IN THE MORNING AND 1 TABLET BEFORE BEDTIME.      ciprofloxacin (CIPRO) 500 MG tablet  (Patient not taking: Reported on 3/2/2023)      metroNIDAZOLE (FLAGYL) 500 MG tablet       benazepril (LOTENSIN) 5 MG tablet Take 5 mg by mouth in the morning and at bedtime      glipiZIDE (GLUCOTROL) 5 MG tablet Take 2.5 mg by mouth daily      Vitamin D (CHOLECALCIFEROL) 25 MCG (1000 UT) TABS tablet Take 1,000 Units by mouth daily      warfarin (COUMADIN) 2 MG tablet Take 2 mg by mouth four times a week Indications: On Monday, Wednesday, Friday, and Saturday Patients INR is managed by [facility] med management. aspirin 81 MG EC tablet Take 1 tablet by mouth daily 30 tablet 3    furosemide (LASIX) 80 MG tablet Take 1 tablet by mouth daily 60 tablet 3    [DISCONTINUED] nitroGLYCERIN (NITROSTAT) 0.4 MG SL tablet up to max of 3 total doses. If no relief after 1 dose, call 911. 25 tablet 3    atorvastatin (LIPITOR) 40 MG tablet Take 40 mg by mouth daily       No current facility-administered medications on file prior to encounter. REVIEW OF SYSTEMS    Review of Systems   Constitutional:  Negative for chills and fever. Gastrointestinal:  Negative for diarrhea, nausea and vomiting. Skin:  Positive for wound. Objective:      BP (!) 177/95   Pulse 72   Temp 98.2 °F (36.8 °C) (Tympanic)   Resp 20   Ht 5' 10\" (1.778 m)   Wt 187 lb (84.8 kg)   BMI 26.83 kg/m²     Wt Readings from Last 3 Encounters:   03/02/23 187 lb (84.8 kg)   02/22/23 187 lb (84.8 kg)   12/19/22 187 lb (84.8 kg)       Physical Exam:  General:  Alert and oriented x3. In no acute distress. Lower Extremity Physical Exam:    Vascular: DP pulses are not palpable, Bilateral. PT pulses are not palpable, Bilateral. CFT <3 seconds to all digits, Bilateral.   Pitting +1 edema bilateral.  Hair growth is absent to the level of the lower leg, Bilateral.     Neuro: Saph/sural/SP/DP/plantar sensation absent to light touch. Protective sensation is intact to  0 /10 sites as tested with a 5.07g SWMF, Bilateral.     Musculoskeletal: EHL/FHL/GS/TA gross motor intact. Gross deformity is present, s/p multiple digit amputations bilateral foot.      Dermatologic: Open wound present to right 2nd toe as documented in detail below. Wound base extends into exposed bone which is necrotic and fibrotic. There is erythema localized to the fransisca-wound. There is no fluctuance or crepitus. Interdigital maceration absent, Bilateral.   The left foot distal aspect is with full-thickness ulceration limited to the subcutaneous layer. Base is with fibrin. No erythema. Negative probe to bone. No sign of infection. Assessment:      Active Hospital Problems    Diagnosis Date Noted    Diabetic polyneuropathy associated with type 2 diabetes mellitus ([facility] [E11.42] 06/30/2022     Priority: Medium    Osteomyelitis of second toe of right foot ([address] [M86.9] 06/30/2022     Priority: Medium    Ulcer of left foot, with fat layer exposed ([address] [L97.522]     Ulcer of toe of right foot, with necrosis of bone ([address] [L97.514] 02/23/2016    PVD (peripheral vascular disease) ([facility] [I73.9] 01/12/2016       Plan:     Treatment Note please see attached Discharge Instructions    Discussed treatment options regarding osteomyelitis of the right foot. Commended admission to the hospital for ID consultation, initiation of IV antibiotics, amputation of the right second toe. Patient agrees and will present to the hospital for admission today. Will notify podiatry resident on call. Continue oral abx until admission     Bone culture obtained and sent     Wes Rodriguez is high risk of limb loss secondary to medical co-morbidities, extent of infection, history of amputations. RTC on discharge for wound care needs at that time       Procedure 1:  Wound Location: right 2nd toe    Indications:  Based on my examination of this patient's wound(s)/ulcer(s) today, debridement is required to promote healing and evaluate the wound base. Lidocaine gel soaked gauze was applied at beginning of wound evaluation.   The wound(s) was excisionally debrided sharply of fibrin tissue, including a layer of surrounding healthy tissue using curette and rongeur. The wound was debrided down through and including bone. Percent of Wound Debrided: 50%    Total Surface Area Debrided:  3 sq cm    Bleeding: Estimated amount of blood loss is 2ml. Hemostasis was obtained with direct pressure. Patient tolerated procedure well and was given proper instruction. Procedure Note 2  Indications:  Based on my examination of this patient's wound(s)/ulcer(s) today, debridement is required to promote healing and evaluate the wound base. Performed by: Vito Mckinnon DPM    Consent obtained:  Yes    Time out taken:  Yes    Pain Control: Anesthetic  Anesthetic: 4% Lidocaine Liquid Topical      Debridement: Excisional Debridement    Using curette the wound(s)/ulcer(s) was/were sharply debrided down through and including the removal of subcutaneous. Devitalized Tissue Debrided:      Pre Debridement Measurements:  Are located in the Needles  Documentation Flow Sheet    Wound/Ulcer #: 1    Post Debridement Measurements:  Wound/Ulcer Descriptions are Pre Debridement except measurements:    Wound 03/02/23 Foot Left;Distal wound #2 (Active)   Wound Image   03/02/23 0933   Dressing Status New drainage noted; Old drainage noted 03/02/23 0933   Wound Cleansed Cleansed with saline 03/02/23 0933   Wound Length (cm) 0.4 cm 03/02/23 0933   Wound Width (cm) 1.8 cm 03/02/23 0933   Wound Depth (cm) 0.2 cm 03/02/23 0933   Wound Surface Area (cm^2) 0.72 cm^2 03/02/23 0933   Wound Volume (cm^3) 0.144 cm^3 03/02/23 0933   Post-Procedure Length (cm) 0.4 cm 03/02/23 0933   Post-Procedure Width (cm) 1.8 cm 03/02/23 0933   Post-Procedure Depth (cm) 0.2 cm 03/02/23 0933   Post-Procedure Surface Area (cm^2) 0.72 cm^2 03/02/23 0933   Post-Procedure Volume (cm^3) 0.144 cm^3 03/02/23 0933   Wound Assessment [place]/red;Slough 03/02/23 0933   Drainage Amount Moderate 03/02/23 0933   Drainage Description Serosanguinous 03/02/23 0933   Odor None 03/02/23 0933 [place]-wound Assessment Blanchable erythema 03/02/23 0933   Margins Defined edges 03/02/23 0933   Number of days: 0       Wound 03/02/23 Toe (Comment  which one) Anterior;Right wound #1 right third toe (Active)   Wound Image    03/02/23 0933   Dressing Status Old drainage noted;New drainage noted 03/02/23 0933   Wound Cleansed Cleansed with saline 03/02/23 0933   Wound Length (cm) 2.8 cm 03/02/23 0933   Wound Width (cm) 2.2 cm 03/02/23 0933   Wound Depth (cm) 0.1 cm 03/02/23 0933   Wound Surface Area (cm^2) 6.16 cm^2 03/02/23 0933   Wound Volume (cm^3) 0.616 cm^3 03/02/23 0933   Post-Procedure Length (cm) 2.8 cm 03/02/23 0933   Post-Procedure Width (cm) 2.2 cm 03/02/23 0933   Post-Procedure Depth (cm) 0.1 cm 03/02/23 0933   Post-Procedure Surface Area (cm^2) 6.16 cm^2 03/02/23 0933   Post-Procedure Volume (cm^3) 0.616 cm^3 03/02/23 0933   Wound Assessment Eschar dry;Eschar moist;Devitalized tissue;Pink/red 03/02/23 0933   Drainage Amount Moderate 03/02/23 0933   Drainage Description Serosanguinous; Yellow 03/02/23 0933   Odor None 03/02/23 0933   [place]-wound Assessment Maceration;Denuded 03/02/23 0933   Margins Defined edges 03/02/23 0933   Number of days: 0            Percent of Wound(s)/Ulcer(s) Debrided: 100%    Total Surface Area Debrided:  0.72 sq cm     Diabetic/Pressure/Non Pressure Ulcers only:  Ulcer:  Diabetic ulcer, subcutaneous layer exposed      Estimated Blood Loss:  Estimated amount of blood loss is 2ml. Hemostasis Achieved:  by pressure    Response to treatment:  Well tolerated by patient. Written patient discharge instructions given to patient and signed by patient or POA. No orders of the defined types were placed in this encounter.     Orders Placed This Encounter   Procedures    Culture, Anaerobic and Aerobic     Standing Status:   Standing     Number of Occurrences:   1          Discharge Instructions           [facility] [place], [place] and [address] [place] [address]      Visit  Discharge Instructions / Physician Orders  DATE:3/2/23     Home Care: None     SUPPLIES ORDERED THRU:    none-patient going to ER                 DATE LAST SUPPLIED NA     Wound Location:  Left anterior foot, Right 2nd toe     Cleanse with: Liquid antibacterial soap and water, rinse well      Dressing Orders:  Primary dressing    silvercel                   Secondary dressing          Dry guaze, kerlix                      Frequency:  Daily     Additional Orders: Increase protein to diet (meat, cheese, eggs, fish, peanut butter, nuts and beans)  Multivitamin daily     recommends you go to the Emergency Room today for bone infection in Right 2nd toe    OFFLOADING [x] YES  TYPE:   Diabetic shoes               [] NA    Weekly wound care visits until determined otherwise. Antibiotic therapy-wound care related YES [x] NO [] NA[]    MY CHART []     Smart Device  []                         Your next appointment with the [address] [place] [address] is [facility]                                                                                                   (Please note your next appointment above and if you are unable to keep, kindly give a 24 hour notice. Thank you.)  If more than 15 min late we cannot guarantee you will be seen due to clinician schedule  Per Policy, Excessive cancellation will call for dismissal from program.  If you experience any of the following, please call the [address] [place] [address] during business hours:  [phone]     * Increase in Pain  * Temperature over 101  * Increase in drainage from your wound  * Drainage with a foul odor  * Bleeding  * Increase in swelling  * Need for compression bandage changes due to slippage, breakthrough drainage. If you need medical attention outside of the business hours of the [address] [place] [address] please contact your PCP or go to the nearest emergency room.      The information contained in the After Visit Summary has been reviewed with me, the patient and/or responsible adult, by my health care provider(s). I had the opportunity to ask questions regarding this information.  I have elected to receive;      []After Visit Summary  [x]Comprehensive Discharge Instruction      Patient signature______________________________________Date:________  Electronically signed by Cecille Villa RN on 3/2/2023 at 10:01 AM   Electronically signed by Huey Mai DPM on 3/2/2023 at 9:19 AM          Electronically signed by Huey Mai DPM on 3/2/2023 at 10:12 AM

## 2023-03-02 NOTE — CONSULTS
Consultation Note  Podiatric Medicine and Surgery     Subjective     Chief Complaint: right 2nd toe wound    HPI:  Adela Howe is a 75 y.o. male seen at [facility] ED for chronic, nonhealing wound of right 2nd digit. Patient was at wound care appointment earlier today and was advised to present to ED for worsening wound. Pt denies any constitutional symptoms. States he follows Dr. Belen Dumont outpatient for podiatry routine care and Dr. Florecita Horn at wound care clinic. He states he underwent left foot TMA in August of 2022 by vascular team. No significant changes since then. Denies any further pedal complaints at this time. PCP is Vincent Xiong DO    ROS:   Review of Systems   Constitutional:  Negative for chills and fever. Respiratory:  Negative for chest tightness and shortness of breath. Cardiovascular:  Negative for chest pain and leg swelling. Gastrointestinal:  Negative for abdominal pain, constipation, diarrhea, nausea and vomiting. Musculoskeletal:  Negative for arthralgias, gait problem, joint swelling and myalgias. Skin:  Positive for color change and wound. Neurological:  Negative for weakness and numbness. Past Medical History   has a past medical history of Atrial fibrillation ([address] CAD (coronary artery disease), Cardiomyopathy ([address] Cellulitis, CHF (congestive heart failure) ([address] Diabetes mellitus ([address] Foot infection, Heart failure ([address] Hyperlipidemia, Hypertension, MRSA (methicillin resistant staph aureus) culture positive, Osteomyelitis ([address] PVD (peripheral vascular disease) ([address] and Wound, open, foot. Past Surgical History   has a past surgical history that includes eye surgery (Bilateral); hernia repair; Knee arthroscopy (Right); Colonoscopy; Endoscopy, colon, diagnostic; pacemaker placement (2011); Cardiac surgery (2010); Toe amputation (Right); Wound debridement (Right, 11/19/2015); other surgical history (Right, 12 29 15);  Toe amputation (Left, 3/13/2022); vascular surgery (Left, 7/13/2022); and Toe amputation (Left, 8/24/2022). Medications  Prior to Admission medications    Medication Sig Start Date End Date Taking? Authorizing Provider   carvedilol (COREG) 3.125 MG tablet TAKE 1 TABLET BY MOUTH IN THE MORNING AND 1 TABLET BEFORE BEDTIME. 1/21/23   Historical Provider, MD   ciprofloxacin (CIPRO) 500 MG tablet  2/20/23   Historical Provider, MD   metroNIDAZOLE (FLAGYL) 500 MG tablet  2/20/23   Historical Provider, MD   benazepril (LOTENSIN) 5 MG tablet Take 5 mg by mouth in the morning and at bedtime    Historical Provider, MD   glipiZIDE (GLUCOTROL) 5 MG tablet Take 2.5 mg by mouth daily    Historical Provider, MD   Vitamin D (CHOLECALCIFEROL) 25 MCG (1000 UT) TABS tablet Take 1,000 Units by mouth daily    Historical Provider, MD   warfarin (COUMADIN) 2 MG tablet Take 2 mg by mouth four times a week Indications: On Monday, Wednesday, Friday, and Saturday Patients INR is managed by [facility] med management. Historical Provider, MD   aspirin 81 MG EC tablet Take 1 tablet by mouth daily 3/15/22   Narda Bullock,    furosemide (LASIX) 80 MG tablet Take 1 tablet by mouth daily 3/15/22   Lucas Hernadez, DO   nitroGLYCERIN (NITROSTAT) 0.4 MG SL tablet up to max of 3 total doses. If no relief after 1 dose, call 911. 3/15/22 8/26/22  Lucas Hernadez, DO   atorvastatin (LIPITOR) 40 MG tablet Take 40 mg by mouth daily    Historical Provider, MD    Scheduled Meds:  Continuous Infusions:  PRN Meds:. Allergies  is allergic to doxycycline, pcn [penicillins], and penicillin g. Family History  family history includes Cancer in his father; Osteoarthritis in his mother; Other in his brother and maternal grandfather. Social History   reports that he has never smoked. He has never used smokeless tobacco.   reports current alcohol use. reports no history of drug use.     Objective     Vitals:  Patient Vitals for the past 8 hrs:   BP Temp Pulse Resp SpO2 Height Weight 23 1300 (!) 166/70 98 °F (36.7 °C) 98 16 97 % 5' 10\" (1.778 m) 198 lb (89.8 kg)     Average, Min, and Max for last 24 hours Vitals:  TEMPERATURE:  Temp  Av.1 °F (36.7 °C)  Min: 98 °F (36.7 °C)  Max: 98.2 °F (36.8 °C)    RESPIRATIONS RANGE: Resp  Av  Min: 16  Max: 20    PULSE RANGE: Pulse  Av  Min: 72  Max: 98    BLOOD PRESSURE RANGE:  Systolic (04YKI), JQS:433 , Min:166 , UZN:733   ; Diastolic (55JON), YKR:53, Min:70, Max:95      PULSE OXIMETRY RANGE: SpO2  Av %  Min: 97 %  Max: 97 %  I&O:  No intake/output data recorded. CBC:  No results for input(s): WBC, HGB, HCT, PLT, CRP in the last 72 hours. Invalid input(s):  ESR     BMP:  No results for input(s): NA, K, CL, CO2, BUN, CREATININE, GLUCOSE, CALCIUM in the last 72 hours. Coags:  No results for input(s): APTT, PROT, INR in the last 72 hours. Lab Results   Component Value Date    LABA1C 6.8 (H) 2022     Lab Results   Component Value Date    SEDRATE 72 (H) 2022     Lab Results   Component Value Date    CRP <3.0 2022         Lower Extremity Physical Exam:  Vascular: DP and PT pulses are faintly palpable bilaterall. CFT <4 seconds to all digits. Hair growth is absent to the level of the digits. No edema. Neuro: Saph/sural/SP/DP/plantar sensation intact to light touch. Musculoskeletal: Muscle strength is adequate ROM, adequate strength to all lower extremity muscle groups. Gross deformity is s/p right 2nd digit amputation, hallux amputation. Left foot TMA    Dermatologic: Full thickness ulcer #1 located distal 2nd digit down to level of subcutaneous layer and measures approximately 1.0 cm x 1.0 cm x 0.3 cm. Base is fibrogranular. Periwound skin is hyperkeratotic. No drainage noted with no associated mal odor. Erythema localized to distal 2nd digit with no associated increase in warmth. Does not probe to bone, sinus track, or undermine. No fluctuance, crepitus, or induration.    Interdigital maceration absent. Clinical Images:            Imaging:   XR FOOT RIGHT (MIN 3 VIEWS)    (Results Pending)       Assessment     Shalonda Mckeon is a 75 y.o. male with   Nonhealing full thickness ulceration distal 2nd digit, right foot  Type 2 diabetes mellitus   PVD    Active Problems:    * No active hospital problems. *  Resolved Problems:    * No resolved hospital problems. *        Plan     Patient examined and evaluated at bedside   Treatment options discussed in detail with the patient  Radiographs and CT reviewed right foot. No acute osseous abnormalities or soft tissue abnormalities       Patient admitted due to noncompliance with antibiotic medications in recent history. Due to lack of clinical signs and symptoms, not concerned for acute infection or osteomyelitis at this time.  Will admit for IV antibiotic treatment for couple days, likely plan to discharge on oral abx and follow up outpatient for surgery to be done outpatient     Dressing applied to Right foot: betadine, gauze, tape  WBAT to Right lower extremity  Will discuss with Dr. Barrera Biggs DPM   Podiatric Medicine & Surgery   3/2/2023 at 1:41 PM

## 2023-03-02 NOTE — PROGRESS NOTES
Medication History completed:    New medications: none    Medications discontinued: none    Medications flagged for review: none    Changes to dosing:   Warfarin changed to 2 mg Mon and 4 mg (two 2 mg tablets) all other days    Stated allergies: As listed    Other pertinent information: [facility]. The patient's most recent outpatient INR was 2.4 on 1/24/23. The patient started a 10 day course of metronidazole on 2/20/23. It appears the patient was prescribed clindamycin 300 mg three times daily on 2/24/23 for 10 day and ciprofloxacin 500 mg twice daily on 2/27/23 got 10 days, but he is uncertain if he's taking either medication.      Thank you,  Sarbjit Hurtado PharmD, BCPS  [phone]

## 2023-03-02 NOTE — ED NOTES
Mode of arrival (squad #, walk in, police, etc) : walk in      Chief complaint(s): wound check      Arrival Note (brief scenario, treatment PTA, etc). : Pt arrives to ED after being sent over from wound care. Pt states he was sent over because wound care told him he needs his toe amputated. Pt is A&Ox4, eupneic, PWD. GCS=15. Call light in reach. C= \"Have you ever felt that you should Cut down on your drinking? \"  No  A= \"Have people Annoyed you by criticizing your drinking? \"  No  G= \"Have you ever felt bad or Guilty about your drinking? \"  No  E= \"Have you ever had a drink as an Eye-opener first thing in the morning to steady your nerves or to help a hangover? \"  No      Deferred []      Reason for deferring: N/A    *If yes to two or more: probable alcohol abuse. Lakesha Shine RN  03/02/23 5263

## 2023-03-02 NOTE — PROGRESS NOTES
Vancomycin Dosing by Pharmacy - Initial Note   TODAY'S DATE:  3/2/2023  Patient name, age:  Barbra Barfield, 75 y.o. Indication: SSTI, MRSA suspected. Additional antimicrobials:  cipro    Allergies:  Doxycycline, Pcn [penicillins], and Penicillin g   Actual Weight:    Wt Readings from Last 1 Encounters:   03/02/23 198 lb (89.8 kg)     Labs/Ancillary Data  Estimated Creatinine Clearance: 45 mL/min (A) (based on SCr of 1.78 mg/dL (H)). Recent Labs     03/02/23  1344   CREATININE 1.78*   BUN 25*   WBC 9.1     No results found for: PROCAL  No intake or output data in the 24 hours ending 03/02/23 1446  Temp: 98    Recent vancomycin administrations        No vancomycin IV orders with administrations found. Orders not given:            vancomycin (VANCOCIN) 2,250 mg in sodium chloride 0.9 % 500 mL IVPB    vancomycin (VANCOCIN) intermittent dosing (placeholder)    vancomycin (VANCOCIN) 500 mg in sodium chloride 0.9 % 100 mL IVPB (mini-bag)                  Culture Date / Source  /  Results  See micro    MRSA Nasal Swab: N/A. Non-respiratory infection. Vearl Pippins PLAN   Initial loading dose of 25mg/kg (max of 2,500 mg) = 2250  mg  x 1, then  vancomycin 500 mg IV every 12 hours. Ensured BUN/sCr ordered at baseline and every 48 hours x at least 3 levels, then at least weekly. Vancomycin level ordered for 3/3 @ 1200. Will use bayesian method for dosing. This level will not be a trough. Target AUC/EUGENE: 400-600.       Vancomycin Target Concentration Parameters  Treatment  Population Target AUC/EUGENE Target Trough   Invasive MRSA Infection (bacteremia, pneumonia, meningitis, endocarditis, osteomyelitis)  Sepsis (undifferentiated) 400-600 N/A   Infection due to non-MRSA pathogen  Empiric treatment of non-invasive MRSA infection  (SSTI, UTI) <500 10-15 mg/L   CrCl < 29 mL/min  Rapidly fluctuating serum creatinine   BRIANNA N/A < 15 mg/L     Renal replacement therapy is dosed by levels, per hospital protocol. Abbreviations  * Pauc: probability that AUC is >400 (efficacy); Pconc: probability that Ctrough is above 20 ?g/mL (toxicity); Tox: Probability of nephrotoxicity, based on Loderick et al. Clin Infect Dis 2009. Loading dose: 2250 mg at 15:00 03/02/2023. Regimen: 500 mg IV every 12 hours. Start time: 14:43 on 03/02/2023  Exposure target: AUC24 (range)400-600 mg/L.hr   AUC24,ss: 445 mg/L.hr  Probability of AUC24 > 400: 61 %  Ctrough,ss: 16 mg/L  Probability of Ctrough,ss > 20: 29 %  Probability of nephrotoxicity (Antonio NURYS 2009): 11 %      Thank you for the consult. Pharmacy will continue to follow.    Chris Blake RPh  3/2/2023  2:47 PM

## 2023-03-02 NOTE — PROGRESS NOTES
1030 instructions given regarding wound care. Patient lianet does not wear diabetic shoes due to no difference in them then his shoes. Patient advised to go to ED for evaluation per Dr. Milagros Gillespie. Patient lianet will go this afternoon. Patient refused to go at this time.

## 2023-03-03 LAB
ABSOLUTE EOS #: 0.2 K/UL (ref 0–0.4)
ABSOLUTE LYMPH #: 0.9 K/UL (ref 1–4.8)
ABSOLUTE MONO #: 0.6 K/UL (ref 0.1–1.3)
ANION GAP SERPL CALCULATED.3IONS-SCNC: 10 MMOL/L (ref 9–17)
BASOPHILS # BLD: 1 % (ref 0–2)
BASOPHILS ABSOLUTE: 0.1 K/UL (ref 0–0.2)
BUN SERPL-MCNC: 22 MG/DL (ref 8–23)
CALCIUM SERPL-MCNC: 8.6 MG/DL (ref 8.6–10.4)
CHLORIDE SERPL-SCNC: 102 MMOL/L (ref 98–107)
CO2 SERPL-SCNC: 23 MMOL/L (ref 20–31)
CREAT SERPL-MCNC: 1.66 MG/DL (ref 0.7–1.2)
EOSINOPHILS RELATIVE PERCENT: 2 % (ref 0–4)
GFR SERPL CREATININE-BSD FRML MDRD: 45 ML/MIN/1.73M2
GLUCOSE BLD-MCNC: 124 MG/DL (ref 75–110)
GLUCOSE BLD-MCNC: 202 MG/DL (ref 75–110)
GLUCOSE BLD-MCNC: 222 MG/DL (ref 75–110)
GLUCOSE BLD-MCNC: 268 MG/DL (ref 75–110)
GLUCOSE SERPL-MCNC: 260 MG/DL (ref 70–99)
HCT VFR BLD AUTO: 36.5 % (ref 41–53)
HGB BLD-MCNC: 12.5 G/DL (ref 13.5–17.5)
INR PPP: 3.2
LYMPHOCYTES # BLD: 14 % (ref 24–44)
MCH RBC QN AUTO: 30.3 PG (ref 26–34)
MCHC RBC AUTO-ENTMCNC: 34.2 G/DL (ref 31–37)
MCV RBC AUTO: 88.6 FL (ref 80–100)
MONOCYTES # BLD: 8 % (ref 1–7)
PDW BLD-RTO: 15.1 % (ref 11.5–14.9)
PLATELET # BLD AUTO: 203 K/UL (ref 150–450)
PMV BLD AUTO: 8.4 FL (ref 6–12)
POTASSIUM SERPL-SCNC: 3.7 MMOL/L (ref 3.7–5.3)
PROTHROMBIN TIME: 33.2 SEC (ref 11.8–14.6)
RBC # BLD: 4.12 M/UL (ref 4.5–5.9)
SEG NEUTROPHILS: 75 % (ref 36–66)
SEGMENTED NEUTROPHILS ABSOLUTE COUNT: 5.2 K/UL (ref 1.3–9.1)
SODIUM SERPL-SCNC: 135 MMOL/L (ref 135–144)
WBC # BLD AUTO: 7 K/UL (ref 3.5–11)

## 2023-03-03 PROCEDURE — 6370000000 HC RX 637 (ALT 250 FOR IP): Performed by: FAMILY MEDICINE

## 2023-03-03 PROCEDURE — 6360000002 HC RX W HCPCS: Performed by: FAMILY MEDICINE

## 2023-03-03 PROCEDURE — 82947 ASSAY GLUCOSE BLOOD QUANT: CPT

## 2023-03-03 PROCEDURE — 80048 BASIC METABOLIC PNL TOTAL CA: CPT

## 2023-03-03 PROCEDURE — 96367 TX/PROPH/DG ADDL SEQ IV INF: CPT

## 2023-03-03 PROCEDURE — 36415 COLL VENOUS BLD VENIPUNCTURE: CPT

## 2023-03-03 PROCEDURE — 96366 THER/PROPH/DIAG IV INF ADDON: CPT

## 2023-03-03 PROCEDURE — 2580000003 HC RX 258: Performed by: FAMILY MEDICINE

## 2023-03-03 PROCEDURE — G0378 HOSPITAL OBSERVATION PER HR: HCPCS

## 2023-03-03 PROCEDURE — 85610 PROTHROMBIN TIME: CPT

## 2023-03-03 PROCEDURE — 85025 COMPLETE CBC W/AUTO DIFF WBC: CPT

## 2023-03-03 RX ORDER — GLIPIZIDE 5 MG/1
2.5 TABLET ORAL
Status: DISCONTINUED | OUTPATIENT
Start: 2023-03-04 | End: 2023-03-05 | Stop reason: HOSPADM

## 2023-03-03 RX ADMIN — CARVEDILOL 3.12 MG: 3.12 TABLET, FILM COATED ORAL at 17:30

## 2023-03-03 RX ADMIN — Medication 1 TABLET: at 09:40

## 2023-03-03 RX ADMIN — INSULIN LISPRO 8 UNITS: 100 INJECTION, SOLUTION INTRAVENOUS; SUBCUTANEOUS at 09:30

## 2023-03-03 RX ADMIN — LINEZOLID 600 MG: 600 INJECTION, SOLUTION INTRAVENOUS at 03:06

## 2023-03-03 RX ADMIN — PANTOPRAZOLE SODIUM 40 MG: 40 TABLET, DELAYED RELEASE ORAL at 06:10

## 2023-03-03 RX ADMIN — INSULIN LISPRO 4 UNITS: 100 INJECTION, SOLUTION INTRAVENOUS; SUBCUTANEOUS at 17:31

## 2023-03-03 RX ADMIN — SODIUM CHLORIDE: 9 INJECTION, SOLUTION INTRAVENOUS at 23:40

## 2023-03-03 RX ADMIN — ATORVASTATIN CALCIUM 40 MG: 40 TABLET, FILM COATED ORAL at 21:37

## 2023-03-03 RX ADMIN — GLIPIZIDE 2.5 MG: 5 TABLET ORAL at 06:10

## 2023-03-03 RX ADMIN — LINEZOLID 600 MG: 600 INJECTION, SOLUTION INTRAVENOUS at 14:35

## 2023-03-03 RX ADMIN — FUROSEMIDE 80 MG: 40 TABLET ORAL at 09:39

## 2023-03-03 RX ADMIN — LISINOPRIL 5 MG: 5 TABLET ORAL at 09:39

## 2023-03-03 RX ADMIN — CARVEDILOL 3.12 MG: 3.12 TABLET, FILM COATED ORAL at 09:39

## 2023-03-03 ASSESSMENT — ENCOUNTER SYMPTOMS
COLOR CHANGE: 1
DIARRHEA: 0
CONSTIPATION: 0
NAUSEA: 0
VOMITING: 0
CHEST TIGHTNESS: 0
SHORTNESS OF BREATH: 0
ABDOMINAL PAIN: 0

## 2023-03-03 NOTE — ED PROVIDER NOTES
[facility]  eMERGENCY dEPARTMENT eNCOUnter    Pt Name: Holli Crawford  MRN: [id number]  [place] 1955  Date of evaluation: 3/2/23  CHIEF COMPLAINT       Chief Complaint   Patient presents with    Wound Check     HISTORY OF PRESENT ILLNESS   HPI  Patient with PMH of DM, CAD, multiple previous toe amputations. Sent from wound care for admission for IV abx and toe amputation, seen by Dr Lisa Dacosta. States he noticed the wound about 2 weeks ago. His PCP Dr Moises Engel has prescribed cipro/flagyl and clindamycin but the patient has not taken any of these because he says the pills were too large and the liquid tasted bad. Patient denies fevers. The wound is insensate. REVIEW OF SYSTEMS     Review of Systems   Constitutional:  Negative for chills and fever. HENT:  Negative for congestion, ear pain and sore throat. Eyes:  Negative for pain and visual disturbance. Respiratory:  Negative for cough, chest tightness and shortness of breath. Cardiovascular:  Negative for chest pain and palpitations. Gastrointestinal:  Negative for abdominal pain, constipation, diarrhea, nausea and vomiting. Genitourinary:  Negative for dysuria and testicular pain. Musculoskeletal:  Negative for back pain. Skin:  Positive for wound. Negative for rash. Neurological:  Negative for seizures, syncope and headaches.    PASTMEDICAL HISTORY     Past Medical History:   Diagnosis Date    Atrial fibrillation (HCC)     CAD (coronary artery disease)     Cardiomyopathy ([address] [address] [address]     Cellulitis     CHF (congestive heart failure) (HCC)     Diabetes mellitus ([facility] [address] [address]     Foot infection     Heart failure (HCC)     Hyperlipidemia     Hypertension     MRSA (methicillin resistant staph aureus) culture positive     Osteomyelitis ([facility] [address] [address]     PVD (peripheral vascular disease) ([facility] [address] [address]     Wound, open, foot     left foot     SURGICAL HISTORY       Past Surgical History:   Procedure Laterality Date    CARDIAC SURGERY  2010    CABG X3    COLONOSCOPY DEBRIDEMENT Right 11/19/2015    DEBRIDEMENT WOUND FOOT RIGHT W/ APPLICATION BILAT INTEG RAT GRAFT    ENDOSCOPY, COLON, DIAGNOSTIC      EYE SURGERY Bilateral     cataracts    HERNIA REPAIR      umbilical    KNEE ARTHROSCOPY Right     OTHER SURGICAL HISTORY Right 12 29 15    wound care graft procedure foot,appl of integra    PACEMAKER PLACEMENT  2011    WITH DEFIBRILLATOR    TOE AMPUTATION Right     R great    TOE AMPUTATION Left 3/13/2022    TOE AMPUTATION--left 2nd digit performed by Maldonado Jean DPM at [address] [facility] Left 8/24/2022    Left third fourth and fifth toe amputation through the metatarsal for completion transmetatarsal amputation performed by Jesi Mehta MD at [address] [facility] Left 7/13/2022    RIGHT COMMON FEMORAL ACCESS UNDER ULTRASOUND GUIDANCE DISTAL AORTOGRAPHY WITH PELVIC RUN OFF PLACEMENT OF CATHETER INTO LEFT COMMON FEMORAL ARTERY, PROFUNDA FEMORAL AND SFA  ARTERIOGRAPHY PLACEMENT OF CATHETER INTO LEFT SFA, WITH SFA POPLITEAL AND TIBIAL TIBIOPERONEAL ARTERIOGRAPHY INTO LEFT FOOT performed by Jesi Mehta MD at [address] [place] [address]       Current Discharge Medication List        CONTINUE these medications which have NOT CHANGED    Details   !! warfarin (COUMADIN) 2 MG tablet Take 4 mg by mouth Six times weekly Indications:  All days except Monday Patient's INR is managed by [facility]      carvedilol (COREG) 3.125 MG tablet Take 3.125 mg by mouth 2 times daily      ciprofloxacin (CIPRO) 500 MG tablet Take 500 mg by mouth 2 times daily For 10 days starting 2/27/23      metroNIDAZOLE (FLAGYL) 500 MG tablet Take 500 mg by mouth 3 times daily For 10 days starting 2/20/23      benazepril (LOTENSIN) 5 MG tablet Take 5 mg by mouth in the morning and at bedtime      glipiZIDE (GLUCOTROL) 5 MG tablet Take 2.5 mg by mouth at bedtime      Vitamin D (CHOLECALCIFEROL) 25 MCG (1000 UT) TABS tablet Take 1,000 Units by mouth daily !! warfarin (COUMADIN) 2 MG tablet Take 2 mg by mouth once a week Indications: Mondays Patient's INR is managed by [facility]. aspirin 81 MG EC tablet Take 1 tablet by mouth daily  Qty: 30 tablet, Refills: 3      furosemide (LASIX) 80 MG tablet Take 1 tablet by mouth daily  Qty: 60 tablet, Refills: 3      atorvastatin (LIPITOR) 40 MG tablet Take 40 mg by mouth at bedtime       !! - Potential duplicate medications found. Please discuss with provider. ALLERGIES     is allergic to doxycycline, pcn [penicillins], and penicillin g. FAMILY HISTORY     He indicated that the status of his mother is unknown. He indicated that the status of his father is unknown. He indicated that the status of his brother is unknown. He indicated that the status of his maternal grandfather is unknown. SOCIALHISTORY      reports that he has never smoked. He has never used smokeless tobacco. He reports current alcohol use. He reports that he does not use drugs. PHYSICAL EXAM     INITIAL VITALS: BP (!) 184/90   Pulse 97   Temp 97.3 °F (36.3 °C) (Oral)   Resp 18   Ht 5' 10\" (1.778 m)   Wt 198 lb (89.8 kg)   SpO2 96%   BMI 28.41 kg/m²    Physical Exam  Vitals and nursing note reviewed. Constitutional:       Appearance: He is well-developed. HENT:      Head: Normocephalic and atraumatic. Right Ear: External ear normal.      Left Ear: External ear normal.   Eyes:      Conjunctiva/sclera: Conjunctivae normal.      Pupils: Pupils are equal, round, and reactive to light. Neck:      Vascular: No JVD. Trachea: No tracheal deviation. Cardiovascular:      Rate and Rhythm: Normal rate and regular rhythm. Heart sounds: Normal heart sounds. Pulmonary:      Effort: Pulmonary effort is normal. No respiratory distress. Breath sounds: Normal breath sounds. No stridor. Abdominal:      General: Bowel sounds are normal. There is no distension. Palpations: Abdomen is soft. Tenderness: There is no abdominal tenderness. Musculoskeletal:      Cervical back: Normal range of motion and neck supple. Skin:     General: Skin is warm and dry. Neurological:      Mental Status: He is alert and oriented to person, place, and time. Cranial Nerves: No cranial nerve deficit. Coordination: Coordination normal.             MEDICAL DECISION MAKING:   Patient presenting with diabetic foot wound, sent by Dr Carlos Reid at wound care for admission for IV abx, ID consult, amputation. Labs reassuring. Evaluated by podiatry resident. IV abx started. Accepted for admission by Dr Marco Pham. Dr Nathaniel Albarran will see the patient in consultation for medical management. Procedures    DIAGNOSTIC RESULTS   EKG: All EKG's are interpreted by the Emergency Department Physician who either signs or Co-signs this chart inthe absence of a cardiologist.      RADIOLOGY:All plain film, CT, MRI, and formal ultrasound images (except ED bedside ultrasound) are read by the radiologist, see reports below, unless otherwise noted in MDM or here. CT FOOT RIGHT WO CONTRAST   Final Result   1. Diffuse soft tissue edema. No organized drainable fluid collection   identified. No subcutaneous gas. 2. No definitive CT evidence of osteomyelitis. XR FOOT RIGHT (MIN 3 VIEWS)   Final Result   1. Soft tissue irregularity at the distal 3rd toe. No definite radiographic   evidence of osteomyelitis. If there is persistent clinical concern, consider   MRI. 2.  Remote amputation of the great toe and mid 2nd toe. LABS: All lab results were reviewed by myself, and all abnormals are listed below.   Labs Reviewed   BASIC METABOLIC PANEL - Abnormal; Notable for the following components:       Result Value    Glucose 350 (*)     BUN 25 (*)     Creatinine 1.78 (*)     Est, Glom Filt Rate 41 (*)     All other components within normal limits   CBC WITH AUTO DIFFERENTIAL - Abnormal; Notable for the following components: RBC 4.30 (*)     Hematocrit 38.3 (*)     RDW 15.3 (*)     Seg Neutrophils 77 (*)     Lymphocytes 13 (*)     All other components within normal limits   PROTIME-INR - Abnormal; Notable for the following components:    Protime 33.7 (*)     All other components within normal limits   SEDIMENTATION RATE   C-REACTIVE PROTEIN     EMERGENCY DEPARTMENT COURSE:   Vitals:    Vitals:    03/02/23 1615 03/02/23 1700 03/02/23 1745 03/02/23 1904   BP: (!) 168/90 (!) 165/95 (!) 173/79 (!) 184/90   Pulse:   78 97   Resp:   18 18   Temp:   97.7 °F (36.5 °C) 97.3 °F (36.3 °C)   TempSrc:   Oral Oral   SpO2:   96% 96%   Weight:       Height:           The patient was given the following medications while in the emergency department:  Orders Placed This Encounter   Medications    ciprofloxacin (CIPRO) IVPB 400 mg     Order Specific Question:   Antimicrobial Indications     Answer:   Skin and Soft Tissue Infection    vancomycin (VANCOCIN) 2,250 mg in sodium chloride 0.9 % 500 mL IVPB     Order Specific Question:   Antimicrobial Indications     Answer:   Skin and Soft Tissue Infection     Order Specific Question:   Antimicrobial Indications     Answer:   Bone and Joint Infection    vancomycin (VANCOCIN) intermittent dosing (placeholder)     Order Specific Question:   Antimicrobial Indications     Answer:   Skin and Soft Tissue Infection     Order Specific Question:   Antimicrobial Indications     Answer:   Bone and Joint Infection     Order Specific Question:   Skin duration of therapy     Answer:   7 days    vancomycin (VANCOCIN) 500 mg in sodium chloride 0.9 % 100 mL IVPB (mini-bag)     Order Specific Question:   Antimicrobial Indications     Answer:   Skin and Soft Tissue Infection     Order Specific Question:   Antimicrobial Indications     Answer:   Bone and Joint Infection     CONSULTS:  IP CONSULT TO PODIATRY  PHARMACY TO DOSE VANCOMYCIN  IP CONSULT TO FAMILY MEDICINE    FINAL IMPRESSION      1.  Diabetic foot infection ([address] DISPOSITION/PLAN   DISPOSITION Admitted 03/02/2023 05:45:52 PM      PATIENT REFERRED TO:  No follow-up provider specified.   DISCHARGE MEDICATIONS:  Current Discharge Medication List        Janette West MD  AttendingEmergency Physician                        Johnie Elise MD  03/02/23 9849

## 2023-03-03 NOTE — PROGRESS NOTES
Writer received a call from Dr. Sarai Zelaya order received and placed. Per Dr. Sarai Zelaya, he want warfarin to be held until INR is redraw tomorrow morning.

## 2023-03-03 NOTE — PROGRESS NOTES
Pt. Arrived to floor and placed in room 2064. Admission question and assessment completed. Pt is Alert x 4. Call light and bedside table in reach. Pt. resting comfortable in room.

## 2023-03-03 NOTE — PROGRESS NOTES
Progress note  Podiatric Medicine and Surgery     Subjective     Chief Complaint: right third digit wound    Interval history:  Patient seen at bedside this afternoon sitting up in chair. Patient has no new complaints. Patient has been tolerating IV antibiotics well. Discussed with patient undergoing amputation of right third digit while in hospital or as outpatient. Patient states he would prefer to do it while in house. No further complaints at this time. Vital signs stable. Patient remains hypertensive. HPI:  Lisa Ramon is a 75 y.o. male seen [facility] ED for chronic, nonhealing wound of right 2nd digit. Patient was at wound care appointment earlier today and was advised to present to ED for worsening wound. Pt denies any constitutional symptoms. States he follows Dr. Murry Landau outpatient for podiatry routine care and Dr. Colt Salazar at wound care clinic. He states he underwent left foot TMA in August of 2022 by vascular team. No significant changes since then. Denies any further pedal complaints at this time. PCP is Vincent Xiong DO    ROS:   Review of Systems   Constitutional:  Negative for chills and fever. Respiratory:  Negative for chest tightness and shortness of breath. Cardiovascular:  Negative for chest pain and leg swelling. Gastrointestinal:  Negative for abdominal pain, constipation, diarrhea, nausea and vomiting. Musculoskeletal:  Negative for arthralgias, gait problem, joint swelling and myalgias. Skin:  Positive for color change and wound. Neurological:  Negative for weakness and numbness.      Past Medical History   has a past medical history of Atrial fibrillation ([address] CAD (coronary artery disease), Cardiomyopathy ([address] Cellulitis, CHF (congestive heart failure) ([address] Diabetes mellitus ([address] Foot infection, Heart failure ([address] Hyperlipidemia, Hypertension, MRSA (methicillin resistant staph aureus) culture positive, Osteomyelitis ([address] PVD (peripheral vascular disease) ([facility] and Wound, open, foot. Past Surgical History   has a past surgical history that includes eye surgery (Bilateral); hernia repair; Knee arthroscopy (Right); Colonoscopy; Endoscopy, colon, diagnostic; pacemaker placement (2011); Cardiac surgery (2010); Toe amputation (Right); Wound debridement (Right, 11/19/2015); other surgical history (Right, 12 29 15); Toe amputation (Left, 3/13/2022); vascular surgery (Left, 7/13/2022); and Toe amputation (Left, 8/24/2022). Medications  Prior to Admission medications    Medication Sig Start Date End Date Taking? Authorizing Provider   warfarin (COUMADIN) 2 MG tablet Take 4 mg by mouth Six times weekly Indications: All days except Monday Patient's INR is managed by [facility]   Yes Historical Provider, MD   carvedilol (COREG) 3.125 MG tablet Take 3.125 mg by mouth 2 times daily 1/21/23   Historical Provider, MD   ciprofloxacin (CIPRO) 500 MG tablet Take 500 mg by mouth 2 times daily For 10 days starting 2/27/23  Patient not taking: Reported on 3/2/2023 2/27/23 3/9/23  Historical Provider, MD   benazepril (LOTENSIN) 5 MG tablet Take 5 mg by mouth in the morning and at bedtime    Historical Provider, MD   glipiZIDE (GLUCOTROL) 5 MG tablet Take 2.5 mg by mouth at bedtime    Historical Provider, MD   Vitamin D (CHOLECALCIFEROL) 25 MCG (1000 UT) TABS tablet Take 1,000 Units by mouth daily    Historical Provider, MD   warfarin (COUMADIN) 2 MG tablet Take 2 mg by mouth once a week Indications: Mondays Patient's INR is managed by [facility]. Historical Provider, MD   aspirin 81 MG EC tablet Take 1 tablet by mouth daily 3/15/22   Michael Kim, DO   furosemide (LASIX) 80 MG tablet Take 1 tablet by mouth daily 3/15/22   Lucas Hernadez, DO   nitroGLYCERIN (NITROSTAT) 0.4 MG SL tablet up to max of 3 total doses.  If no relief after 1 dose, call 911. 3/15/22 8/26/22  Lucas Hernadez, DO   atorvastatin (LIPITOR) 40 MG tablet Take 40 mg by mouth at bedtime    Historical Provider, MD    Scheduled Meds:  Continuous Infusions:  PRN Meds:. Allergies  is allergic to doxycycline, pcn [penicillins], and penicillin g. Family History  family history includes Cancer in his father; Osteoarthritis in his mother; Other in his brother and maternal grandfather. Social History   reports that he has never smoked. He has never used smokeless tobacco.   reports current alcohol use. reports no history of drug use. Objective     Vitals:  No data found. Average, Min, and Max for last 24 hours Vitals:  TEMPERATURE:  Temp  Av.7 °F (36.5 °C)  Min: 97.3 °F (36.3 °C)  Max: 98 °F (36.7 °C)    RESPIRATIONS RANGE: Resp  Av.3  Min: 16  Max: 18    PULSE RANGE: Pulse  Av.7  Min: 73  Max: 97    BLOOD PRESSURE RANGE:  Systolic (77GNG), GYT:420 , Min:152 , EXV:275   ; Diastolic (35OSN), DLX:51, Min:79, Max:95      PULSE OXIMETRY RANGE: SpO2  Av %  Min: 96 %  Max: 99 %  I&O:  I/O last 3 completed shifts: In: 200 [IV Piggyback:200]  Out: 700 [Urine:700]    CBC:  Recent Labs     23  1344 23  0637   WBC 9.1 7.0   HGB 13.5 12.5*   HCT 38.3* 36.5*    203   CRP 4.8  --           BMP:  Recent Labs     23  1344 23  0637    135   K 3.8 3.7    102   CO2 24 23   BUN 25* 22   CREATININE 1.78* 1.66*   GLUCOSE 350* 260*   CALCIUM 9.4 8.6          Coags:  Recent Labs     23  1344 23  0637   INR 3.2 3.2       Lab Results   Component Value Date    LABA1C 6.8 (H) 2022     Lab Results   Component Value Date    SEDRATE 19 2023     Lab Results   Component Value Date    CRP 4.8 2023         Lower Extremity Physical Exam:  Vascular: DP and PT pulses are faintly palpable bilaterall. CFT <4 seconds to all digits. Hair growth is absent to the level of the digits. No edema. Neuro: Saph/sural/SP/DP/plantar sensation intact to light touch.     Musculoskeletal: Muscle strength is adequate ROM, adequate strength to all lower extremity muscle groups. Gross deformity is s/p right 2nd digit amputation, hallux amputation. Left foot TMA    Dermatologic: Full thickness ulcer #1 located distal 2nd digit down to level of subcutaneous layer and measures approximately 1.0 cm x 1.0 cm x 0.3 cm. Base is fibrogranular. Periwound skin is hyperkeratotic. No drainage noted with no associated mal odor. Erythema localized to distal 2nd digit with no associated increase in warmth. Does not probe to bone, sinus track, or undermine. No fluctuance, crepitus, or induration. Interdigital maceration absent. Clinical Images: From 3/3/2023            Imaging:   CT FOOT RIGHT WO CONTRAST   Final Result   1. Diffuse soft tissue edema. No organized drainable fluid collection   identified. No subcutaneous gas. 2. No definitive CT evidence of osteomyelitis. XR FOOT RIGHT (MIN 3 VIEWS)   Final Result   1. Soft tissue irregularity at the distal 3rd toe. No definite radiographic   evidence of osteomyelitis. If there is persistent clinical concern, consider   MRI. 2.  Remote amputation of the great toe and mid 2nd toe. Assessment     Jc Justice is a 75 y.o. male with   Nonhealing full thickness ulceration distal third digit, right foot  Cellulitis third digit, right  S/p right hallux amputation  Type 2 diabetes mellitus   PVD    Principal Problem:    Diabetic foot infection ([address]  Resolved Problems:    * No resolved hospital problems. *        Plan     Patient examined and evaluated at bedside   Treatment options discussed in detail with the patient  Radiographs and CT reviewed right foot. No acute osseous abnormalities or soft tissue abnormalities       Patient admitted due to noncompliance with antibiotic medications in recent history.    Since patient is in-house for IV antibiotics it was discussed with patient that he would most likely need an amputation of right third digit anyway. Patient states he would prefer to do it while in house and on antibiotics rather than coming back for outpatient surgery.   Plan for right third digit amputation tomorrow 3/4/2023 at 9 AM  Consent signed, witnessed, placed in chart  NPO at midnight  Hold AC's midnight  Patient will most likely be discharged with oral antibiotics after surgery   Dressing applied to Right foot: betadine, gauze, tape  WBAT to Right lower extremity  Will discuss with Dr. Molly Pablo, EMREM   Podiatric Medicine & Surgery   3/3/2023 at 4:40 PM

## 2023-03-03 NOTE — PLAN OF CARE
Problem: Discharge Planning  Goal: Discharge to home or other facility with appropriate resources  3/3/2023 1049 by Mary Love RN  Outcome: Progressing  3/3/2023 0332 by Caprice Sepulveda RN  Outcome: Progressing  Flowsheets  Taken 3/3/2023 9901  Discharge to home or other facility with appropriate resources: --  Taken 3/2/2023 2000  Discharge to home or other facility with appropriate resources:   Identify discharge learning needs (meds, wound care, etc)   Identify barriers to discharge with patient and caregiver   Refer to discharge planning if patient needs post-hospital services based on physician order or complex needs related to functional status, cognitive ability or social support system   Arrange for needed discharge resources and transportation as appropriate  Note: Inform pt. Of discharge teaching and planned. Instructed pt. To inform me if further teaching need to be done. Problem: Safety - Adult  Goal: Free from fall injury  3/3/2023 1049 by Mary Love RN  Outcome: Progressing  Note: Patient remains free of falls and injuries throughout shift. Bed remains in the lowest position, wheels locked, call light and bedside table are within reach. 3/3/2023 0332 by Caprice Sepulveda RN  Outcome: Progressing  Flowsheets (Taken 3/3/2023 8835)  Free From Fall Injury:   Instruct family/caregiver on patient safety   Based on caregiver fall risk screen, instruct family/caregiver to ask for assistance with transferring infant if caregiver noted to have fall risk factors  Note: Made sure call light was in reach, a clear pathway and adequate lighting was provided. Also made sure that the pt. Was wearing non-slip socks.        Problem: Chronic Conditions and Co-morbidities  Goal: Patient's chronic conditions and co-morbidity symptoms are monitored and maintained or improved  Outcome: Progressing

## 2023-03-03 NOTE — CARE COORDINATION
Case Management Assessment  Initial Evaluation    Date/Time of Evaluation: 3/3/2023 12:22 PM  Assessment Completed by: Lazarus oSlomon RN    If patient is discharged prior to next notation, then this note serves as note for discharge by case management. Patient Name: Jes Burns                   YOB: 1955  Diagnosis: Diabetic foot infection ([facility] [address] [E11.628, L08.9]                   Date / Time: 3/2/2023 12:56 PM    Patient Admission Status: Observation   Readmission Risk (Low < 19, Mod (19-27), High > 27): Readmission Risk Score: 17.8    Current PCP: Neeraj Back, DO  PCP verified by CM? Yes    Chart Reviewed: Yes      History Provided by: Patient  Patient Orientation: Alert and Oriented    Patient Cognition: Alert    Hospitalization in the last 30 days (Readmission):  No    If yes, Readmission Assessment in CM Navigator will be completed. Advance Directives:      Code Status: Prior   Patient's Primary Decision Maker is: Legal Next of Kin      Discharge Planning:    Patient lives with: Parent Type of Home: House  Primary Care Giver: Self  Patient Support Systems include: Parent   Current Financial resources: Medicare  Current community resources: None  Current services prior to admission: Other (Comment) (Glucometer)            Current DME:              Type of Home Care services:  None    ADLS  Prior functional level: Independent in ADLs/IADLs  Current functional level: Independent in ADLs/IADLs    PT AM-PAC:   /24  OT AM-PAC:   /24    Family can provide assistance at DC: Yes  Would you like Case Management to discuss the discharge plan with any other family members/significant others, and if so, who? Yes  Plans to Return to Present Housing: Yes  Other Identified Issues/Barriers to RETURNING to current housing: No barriers.    Potential Assistance needed at discharge: N/A            Potential DME:  NA  Patient expects to discharge to: [address] [place] [address] for transportation at discharge: Self    Financial    Payor: Mirna Spence / Plan: Monisha Muniz PPO / Product Type: Medicare /     Does insurance require precert for SNF: Yes    Potential assistance Purchasing Medications: No  Meds-to-Beds request:        [address] [place] [address]  Phone: [phone] Fax: [phone] - [address] [place] - [address] [address]  Phone: [phone] Fax: [phone] - [place], [place] [address] [place] [address] [phone] [facility] [phone]  [address] [place] [address]  Phone: [phone] Fax: [phone]      Notes:    Factors facilitating achievement of predicted outcomes: Family support, Motivated, and Cooperative    Barriers to discharge: Wound Care    Additional Case Management Notes: The patient is from home with mother, independent, drives. DME: Glucometer. VNS: Denies. IV Zyvox. Will follow for needs.     The Plan for Transition of Care is related to the following treatment goals of [facility] ([facility] [C30.956, L08.9]    Ron Sharpe RN  Case Management Department  Ph: [phone] Fax: [phone]

## 2023-03-03 NOTE — PLAN OF CARE
Problem: Discharge Planning  Goal: Discharge to home or other facility with appropriate resources  Outcome: Progressing  Flowsheets  Taken 3/3/2023 0332  Discharge to home or other facility with appropriate resources:   Identify barriers to discharge with patient and caregiver   Arrange for needed discharge resources and transportation as appropriate   Identify discharge learning needs (meds, wound care, etc)   Refer to discharge planning if patient needs post-hospital services based on physician order or complex needs related to functional status, cognitive ability or social support system  Taken 3/2/2023 2000  Discharge to home or other facility with appropriate resources:   Identify discharge learning needs (meds, wound care, etc)   Identify barriers to discharge with patient and caregiver   Refer to discharge planning if patient needs post-hospital services based on physician order or complex needs related to functional status, cognitive ability or social support system   Arrange for needed discharge resources and transportation as appropriate  Note: Inform pt. Of discharge teaching and planned. Instructed pt. To inform me if further teaching need to be done. Problem: Safety - Adult  Goal: Free from fall injury  Outcome: Progressing  Flowsheets (Taken 3/3/2023 0332)  Free From Fall Injury:   Instruct family/caregiver on patient safety   Based on caregiver fall risk screen, instruct family/caregiver to ask for assistance with transferring infant if caregiver noted to have fall risk factors  Note: Made sure call light was in reach, a clear pathway and adequate lighting was provided. Also made sure that the pt. Was wearing non-slip socks.

## 2023-03-03 NOTE — H&P
Dr. Bermudez Led        History and Physical Examination   Admission Note    CHIEF COMPLAINT:   Chief Complaint   Patient presents with    Wound Check       Reason for Admission: Foot infection-toe    History Obtained From:  Patient     HISTORY OF PRESENT ILLNESS:      The patient is a pleasant 75 y.o. male with significant medical history of diabetes mellitus hypertension A-fib coronary disease and previous history of osteomyelitis and amputations to his toes. Patient went to wound care my understanding yesterday and they sent him to the ER patient was seen by his podiatrist Dr. Vitaliy Chirinos , who saw him couple days ago he states patient was taking metronidazole he was on Cipro before as well. And send him to be seen at the wound care and as mentioned in the presenting to the emergency room. Patient is known to me I saw him about 3 weeks ago when his PCP  was out of town for his toe infection at the time we placed him on the Cipro and Flagyl since he said infectious disease in the past put him on Cipro which took a swab from his toenail patient has lost his toenail at the time his toe was red did work-up for sed rate and CRP and CBC which was normal but after couple days his culture came back showing MRSA he was switched to Doxy but he reported to us that he is allergic to it so we put him on clindamycin and Cipro was resistant but patient tells me he cannot take those pills so he is never took them. Patient tells me that he thinks he still looks better he has neuropathy and he does not feel any pain and is not complaining of any pain. Yesterday his sed rate CRP were normal as well and had a CT of his foot does not show any evidence of osteomyelitis. Patient was admitted under podiatry for managing his medical issues. He is denying any chest pain or fevers or chills, he is up sitting up eating his breakfast this morning.   Patient was given Vanco in the emergency room but his creatinine was 1.6 with switching to Zyvox 600 IV twice a day second to his MRSA culture few weeks ago and because of his kidney function    Past Medical History:    Past Medical History:   Diagnosis Date    Atrial fibrillation ([facility])     CAD (coronary artery disease)     Cardiomyopathy ([facility]     Cellulitis     CHF (congestive heart failure) ([facility])     Diabetes mellitus ([facility]     Foot infection     Heart failure ([facility])     Hyperlipidemia     Hypertension     MRSA (methicillin resistant staph aureus) culture positive     Osteomyelitis ([facility])     PVD (peripheral vascular disease) ([facility]     Wound, open, foot     left foot     Patient Active Problem List   Diagnosis Code    Diabetic foot infection ([place] [address] E11.628, L08.9    Diabetic foot ulcer with osteomyelitis ([place] [address] E11.621, E11.69, L97.509, M86.9    Chronic osteomyelitis ([place] [address] M86.60    PVD (peripheral vascular disease) ([place] [address] I73.9    Atherosclerosis of coronary artery without angina pectoris I25.10    Cardiomyopathy ([place] [address] I42.9    Cardiac left ventricular ejection fraction 21-40 percent R94.30    Diabetes mellitus type 2 with peripheral artery disease ([facility]) E11.51    Ulcer of toe of right foot, with necrosis of bone ([facility]) L97.514    Chronic osteomyelitis of left foot ([facility]) M86.672    Onychomycosis B35.1    Heart failure ([facility]) I50.9    MRSA bacteremia R78.81, B95.62    Gangrene of toe of left foot ([facility]) I96    Elevated C-reactive protein (CRP) R79.82    Elevated erythrocyte sedimentation rate R70.0    Acute kidney injury ([facility] N17.9    Allergy to penicillin Z88.0    Ulcer of left foot, with fat layer exposed ([facility] L97.522    Osteomyelitis of second toe of right foot ([facility]) M86.9    Cellulitis of left foot L03.116    Abscess of bursa of left foot M71.072    Diabetic polyneuropathy associated with type 2 diabetes mellitus ([facility]) E11.42    Osteomyelitis ([facility]) M86.9    Cellulitis L03.90    Atherosclerosis of native arteries of left leg with ulceration of other part of foot ([facility]) I70.245    Normocytic normochromic anemia D64.9    Surgical wound, non healing T81.89XA    Chest pain R07.9       Past Surgical History:       Past Surgical History:   Procedure Laterality Date    CARDIAC SURGERY  2010    CABG X3    COLONOSCOPY      DEBRIDEMENT Right 11/19/2015    DEBRIDEMENT WOUND FOOT RIGHT W/ APPLICATION BILAT INTEG RAT GRAFT    ENDOSCOPY, COLON, DIAGNOSTIC      EYE SURGERY Bilateral     cataracts    HERNIA REPAIR      umbilical    KNEE ARTHROSCOPY Right     OTHER SURGICAL HISTORY Right 12 29 15    wound care graft procedure foot,appl of integra    PACEMAKER PLACEMENT  2011    WITH DEFIBRILLATOR    TOE AMPUTATION Right     R great    TOE AMPUTATION Left 3/13/2022    TOE AMPUTATION--left 2nd digit [facility] 8/24/2022    Left third fourth and fifth toe amputation through the metatarsal for completion transmetatarsal amputation performed by Ángel Arellano MD at [address] [facility] Left 7/13/2022    RIGHT COMMON FEMORAL ACCESS UNDER ULTRASOUND GUIDANCE DISTAL AORTOGRAPHY WITH PELVIC RUN OFF PLACEMENT OF CATHETER INTO LEFT COMMON FEMORAL ARTERY, PROFUNDA FEMORAL AND SFA  ARTERIOGRAPHY PLACEMENT OF CATHETER INTO LEFT SFA, WITH SFA POPLITEAL AND TIBIAL TIBIOPERONEAL ARTERIOGRAPHY INTO LEFT FOOT performed by Ángel Arellano MD at [address]       Current Medications:    Current Facility-Administered Medications   Medication Dose Route Frequency Provider Last Rate Last Admin    [START ON 3/4/2023] glipiZIDE (GLUCOTROL) tablet 2.5 mg  2.5 mg Oral QHS Lucas T Satya, DO        linezolid (ZYVOX) IVPB 600 mg  600 mg IntraVENous Q12H Lucas T Satya, DO   Stopped at 03/03/23 0417    pantoprazole (PROTONIX) tablet 40 mg  40 mg Oral Daily Lucas T Satya, DO   40 mg at 03/03/23 0610    0.9 % sodium chloride infusion   IntraVENous Continuous Lucas T Satya, DO 75 mL/hr at 03/02/23 2214 New Bag at 03/02/23 2214    glucose chewable tablet 16 g  4 tablet Oral PRN Lucas T Satya, DO        dextrose bolus 10% 125 mL  125 mL IntraVENous PRN Lucas T Satya, DO        Or    dextrose bolus 10% 250 mL  250 mL IntraVENous PRN Lucas T Satya, DO        glucagon (rDNA) injection 1 mg  1 mg SubCUTAneous PRN Lucas T Satya, DO        dextrose 10 % infusion   IntraVENous Continuous PRN Lucas T Satya, DO        insulin lispro (HUMALOG) injection vial 0-16 Units  0-16 Units SubCUTAneous TID  Lucas T Satya, DO        insulin lispro (HUMALOG) injection vial 0-4 Units  0-4 Units SubCUTAneous Nightly Lucas T Satya, DO        atorvastatin (LIPITOR) tablet 40 mg  40 mg Oral Nightly Lucas T Satya, DO   40 mg at 03/02/23 2209    carvedilol (COREG) tablet 3.125 mg  3.125 mg Oral BID  Lucas T Satya, DO        furosemide (LASIX) tablet 80 mg  80 mg Oral Daily Lucas T Satya, DO        calcium carb-cholecalciferol 250-3. 125 MG-MCG per tab 1 tablet  1 tablet Oral Daily Lucas T Satya, DO        lisinopril (PRINIVIL;ZESTRIL) tablet 5 mg  5 mg Oral Daily Lucas T Satya, DO           Allergies:  Doxycycline, Pcn [penicillins], and Penicillin g    Social History:    reports that he has never smoked. He has never used smokeless tobacco. He reports current alcohol use. He reports that he does not use drugs.     Family History:   Family History   Problem Relation Age of Onset    Cancer Father         pancreatic cancer    Other Brother         MI    Osteoarthritis Mother     Other Maternal Grandfather         MI       REVIEW OF SYSTEMS:  RESPIRATORY:  negative for  dry cough, dyspnea, wheezing and chest pain positive for  =dyspnea  CARDIOVASCULAR:  negative for  chest pain, dyspnea, palpitations, orthopnea, exertional chest pressure/discomfort, fatigue, edema, syncope positive for  htn and cad  GASTROINTESTINAL:  negative for nausea, vomiting, change in bowel habits, diarrhea, constipation, abdominal pain and reflux positive for   GENITOURINARY:  negative for frequency, dysuria, nocturia, urinary incontinence and hesitancy positive for bph  HEMATOLOGIC/LYMPHATIC:  negative for easy bruising, bleeding and swelling/edemapositive for neg  ENDOCRINE:  negative for weight changes, change in bowel habits and diabetic symptoms including neither polyuria nor polydipsia nor blurred vision nor foot ulcerations nor neuropathypositive for dm  MUSCULOSKELETAL:  negative for  myalgias, arthralgias, pain, joint swelling, stiff joints and muscle weakness positive for  osteomylitis amputated toe  NEUROLOGICAL:  negative for headaches, dizziness, memory problems, speech problems, visual disturbance, gait problems, weakness and numbness positive for     Vitals:  BP (!) 152/81   Pulse 73   Temp 98 °F (36.7 °C)   Resp 16   Ht 5' 10\" (1.778 m)   Wt 198 lb (89.8 kg)   SpO2 99%   BMI 28.41 kg/m²     PHYSICAL EXAM:    CONSTITUTIONAL:  awake, alert, cooperative, no apparent distress, and appears stated age  EYES:  Lids and lashes normal, pupils equal, round and reactive to light, extra ocular muscles intact, sclera clear, conjunctiva normal    ENT:  Normocephalic, without obvious abnormality, atraumatic, sinuses nontender on palpation, external ears without lesions, oral pharynx with moist mucus membranes, tonsils without erythema or exudates,    NECK:  Supple, symmetrical, trachea midline, no adenopathy, thyroid symmetric, not enlarged and no tenderness, skin normal    HEMATOLOGIC/LYMPHATICS:  no cervical lymphadenopathy   BACK:  Symmetric, no curvature, spinous processes are non-tender on palpation, paraspinous muscles are non-tender on palpation, no costal vertebral tenderness    LUNGS:  No increased work of breathing, good air exchange, clear to auscultation bilaterally, no crackles or wheezing   CARDIOVASCULAR:  Normal apical impulse, riregular rate and rhythm, normal S1 and S2, no S3 or S4, and no murmur noted     ABDOMEN:  No scars, normal bowel sounds, soft, non-distended, non-tender, no masses palpated, no hepatosplenomegally GENITAL/URINARY:  wnl  MUSCULOSKELETAL:  There is no redness, warmth, or swelling of the joints. Full range of motion noted. Motor strength is 5 out of 5 all extremities bilaterally. Tone is normal.   NEUROLOGIC:  Awake, alert, oriented to name, place and time. Cranial nerves II-XII are grossly intact. Motor is 5 out of 5 bilaterally. Sensory is intact. gait is normal.       SKIN:  no bruising or bleeding, normal skin color, texture, turgor, no redness, warmth, or swelling and no rashes     RECENT DATA:  No results found for: CBC, BMP    DATA:   Component Value Units   CBC with Auto Differential [[id number]] (Abnormal)    Collected: 03/03/23 0637    Updated: 03/03/23 0701    Specimen Source: Blood     WBC 7.0 k/uL    RBC 4.12 Low  m/uL    Hemoglobin 12.5 Low  g/dL    Hematocrit 36.5 Low  %    MCV 88.6 fL    MCH 30.3 pg    MCHC 34.2 g/dL    RDW 15.1 High  %    Platelets 599 k/uL    MPV 8.4 fL    Seg Neutrophils 75 High  %    Lymphocytes 14 Low  %    Monocytes 8 High  %    Eosinophils % 2 %    Basophils 1 %    Segs Absolute 5.20 k/uL    Absolute Lymph # 0.90 Low  k/uL    Absolute Mono # 0.60 k/uL    Absolute Eos # 0.20 k/uL    Basophils Absolute 0.10 k/uL   Basic Metabolic Panel [[id number]]    Collected: 03/03/23 0637    Updated: 03/03/23 0637    Specimen Source: Blood    Protime-INR [[id number]]    Collected: 03/03/23 0637    Updated: 03/03/23 0637    Specimen Source: Blood    POC Glucose Fingerstick [[id number]] (Abnormal)    Collected: 03/03/23 0619    Updated: 03/03/23 0628     POC Glucose 268 High  mg/dL   POC Glucose Fingerstick [[id number]] (Abnormal)    Collected: 03/02/23 2222    Updated: 03/02/23 2228     POC Glucose 204 High  mg/dL   Protime-INR [[id number]] (Abnormal)    Collected: 03/02/23 1344    Updated: 03/02/23 1642     Protime 33.7 High  sec    INR 3.2    Comment:        Therapeutic Range:     Moderate Anticoagulant Intensity: INR = 2.0-3.0    High Anticoagulant Intensity: INR = 2.5-3.5 XR FOOT RIGHT (MIN 3 VIEWS) [[id number]]    Collected: 03/02/23 1335    Updated: 03/02/23 1641    Narrative:     EXAMINATION:   THREE XRAY VIEWS OF THE RIGHT FOOT     3/2/2023 1:21 pm     COMPARISON:   02/02/2016     HISTORY:   ORDERING SYSTEM PROVIDED HISTORY: wound to distal 3rd toe   TECHNOLOGIST PROVIDED HISTORY:   wound to distal 3rd toe   Reason for Exam: wound to distal 3rd toe x 1 month     FINDINGS:   There has been previous amputation of the 1st phalanges and amputation at the   2nd toe level of the middle phalanx. Amputation stumps are well-defined   without focal osseous destructive change. There is soft tissue irregularity   at the distal 3rd toe. No definite osseous destructive change visualized. No acute fracture or dislocation. Moderate degenerative changes in the   midfoot. Small plantar calcaneal spur. Scattered vascular calcifications. Impression:     1. Soft tissue irregularity at the distal 3rd toe. No definite radiographic   evidence of osteomyelitis. If there is persistent clinical concern, consider   MRI. 2.  Remote amputation of the great toe and mid 2nd toe. CT FOOT RIGHT WO CONTRAST [[id number]]    Collected: 03/02/23 1547    Updated: 03/02/23 1610    Narrative:     EXAMINATION:   CT OF THE RIGHT FOOT WITHOUT CONTRAST 3/2/2023 3:07 pm     TECHNIQUE:   CT of the right foot was performed without the administration of intravenous   contrast.  Multiplanar reformatted images are provided for review. Automated   exposure control, iterative reconstruction, and/or weight based adjustment of   the mA/kV was utilized to reduce the radiation dose to as low as reasonably   achievable.      COMPARISON:   Right foot radiograph March 2, 2023     HISTORY   ORDERING SYSTEM PROVIDED HISTORY: Rule out abscess or osteo   TECHNOLOGIST PROVIDED HISTORY:   Rule out abscess or osteo   Decision Support Exception - unselect if not a suspected or confirmed   emergency medical condition->Emergency Medical Condition (MA)   Reason for Exam: osteo vs r/o abscess/ infection rt foot     FINDINGS:   Bones: Well corticated ossicle seen adjacent to the medial malleolus and   lateral aspect of the calcaneus suggesting sequela of remote trauma. No   acute fracture identified. Postoperative changes of great toe amputation   redemonstrated. Partial amputation of the distal 2nd toe. Small posterior   and plantar calcaneal enthesophytes. No definitive CT evidence of   osteomyelitis. Soft Tissue: Soft tissue edema throughout the foot and ankle. No   subcutaneous gas is identified. No organized fluid collections. Severe   atherosclerotic disease. Joint: Mild hindfoot and midfoot osteoarthritis. Impression:     1. Diffuse soft tissue edema. No organized drainable fluid collection   identified. No subcutaneous gas. 2. No definitive CT evidence of osteomyelitis. C-Reactive Protein [[id number]]    Collected: 03/02/23 1344    Updated: 03/02/23 1420    Specimen Source: Blood     CRP 4.8 mg/L   Basic Metabolic Panel [[id number]] (Abnormal)    Collected: 03/02/23 1344    Updated: 03/02/23 1406    Specimen Source: Blood     Glucose 350 High  mg/dL    BUN 25 High  mg/dL    Creatinine 1.78 High  mg/dL    Est, Glom Filt Rate 41 Low  mL/min/1.73m2    Comment:        These results are not intended for use in patients <25years of age. eGFR results are calculated without a race factor using the 2021 CKD-EPI equation. Careful clinical correlation is recommended, particularly when comparing to results   calculated using previous equations. The CKD-EPI equation is less accurate in patients with extremes of muscle mass, extra-renal   metabolism of creatine, excessive creatine ingestion, or following therapy that affects   renal tubular secretion.         Calcium 9.4 mg/dL    Sodium 136 mmol/L    Potassium 3.8 mmol/L    Chloride 100 mmol/L    CO2 24 mmol/L    Anion Gap 12 mmol/L   CBC with Auto Differential [[id number]] (Abnormal)    Collected: 03/02/23 1344    Updated: 03/02/23 1400    Specimen Source: Blood     WBC 9.1 k/uL    RBC 4.30 Low  m/uL    Hemoglobin 13.5 g/dL    Hematocrit 38.3 Low  %    MCV 88.9 fL    MCH 31.3 pg    MCHC 35.2 g/dL    RDW 15.3 High  %    Platelets 236 k/uL    MPV 8.6 fL    Seg Neutrophils 77 High  %    Lymphocytes 13 Low  %    Monocytes 7 %    Eosinophils % 2 %    Basophils 1 %    Segs Absolute 7.10 k/uL    Absolute Lymph # 1.20 k/uL    Absolute Mono # 0.60 k/uL    Absolute Eos # 0.20 k/uL    Basophils Absolute 0.00 k/uL   Sedimentation Rate [[id number]]    Collected: 03/02/23 1344    Updated: 03/02/23 1358    Specimen Type: Blood     Sed Rate 19            ASSESSMENT:  Patient Active Problem List   Diagnosis Code    Diabetic foot infection ([facility]) E11.628, L08.9    Diabetic foot ulcer with osteomyelitis ([facility] E11.621, E11.69, L97.509, M86.9    Chronic osteomyelitis ([facility]) M86.60    PVD (peripheral vascular disease) ([facility] I73.9    Atherosclerosis of coronary artery without angina pectoris I25.10    Cardiomyopathy ([facility] I42.9    Cardiac left ventricular ejection fraction 21-40 percent R94.30    Diabetes mellitus type 2 with peripheral artery disease ([facility]) E11.51    Ulcer of toe of right foot, with necrosis of bone ([facility]) L97.514    Chronic osteomyelitis of left foot ([facility]) M86.672    Onychomycosis B35.1    Heart failure ([facility]) I50.9    MRSA bacteremia R78.81, B95.62    Gangrene of toe of left foot ([facility]) I96    Elevated C-reactive protein (CRP) R79.82    Elevated erythrocyte sedimentation rate R70.0    Acute kidney injury ([facility] N17.9    Allergy to penicillin Z88.0    Ulcer of left foot, with fat layer exposed ([facility] L97.522    Osteomyelitis of second toe of right foot ([facility]) M86.9    Cellulitis of left foot L03.116    Abscess of bursa of left foot M71.072    Diabetic polyneuropathy associated with type 2 diabetes mellitus ([facility]) E11.42    Osteomyelitis ([facility]) M86.9    Cellulitis L03.90    Atherosclerosis of native arteries of left leg with ulceration of other part of foot ([facility]) I70.245    Normocytic normochromic anemia D64.9    Surgical wound, non healing T81.89XA    Chest pain R07.9     Right 3rd toe infection with foot cellulitis actually looks better than it was 3 weeks ago no drainage from it  History of big toe amputation and left toes amputation but no evidence of osteomyelitis  MRSA on recent toe culture was resistant to Cipro  Elevated INR we will keep holding his Coumadin enteroscopy in 2-3 and then will restart him  Check labs daily check INR in the morning  Depending what podiatry is planning for him we will continue on IV Zyvox till he goes home we will switch him to p.o.  2 chronic kidney failure      PLAN:  Continue with Zyvox for now    Monitor his INR    Home meds resumed except Coumadin    Await podiatry input    If no plan for any surgical procedure patient probably can go home if not today tomorrow to continue p.o. intake and hopefully follow-up with his podiatrist and wound care        Electronically signed by JOSE Drummond on 3/3/2023 at 7:07 [address] [facility], [address]

## 2023-03-04 ENCOUNTER — ANESTHESIA EVENT (OUTPATIENT)
Dept: OPERATING ROOM | Age: 68
End: 2023-03-04
Payer: MEDICARE

## 2023-03-04 ENCOUNTER — ANESTHESIA (OUTPATIENT)
Dept: OPERATING ROOM | Age: 68
End: 2023-03-04
Payer: MEDICARE

## 2023-03-04 ENCOUNTER — APPOINTMENT (OUTPATIENT)
Dept: GENERAL RADIOLOGY | Age: 68
DRG: 617 | End: 2023-03-04
Payer: MEDICARE

## 2023-03-04 LAB
ABSOLUTE EOS #: 0.2 K/UL (ref 0–0.4)
ABSOLUTE LYMPH #: 0.9 K/UL (ref 1–4.8)
ABSOLUTE MONO #: 0.6 K/UL (ref 0.1–1.3)
ANION GAP SERPL CALCULATED.3IONS-SCNC: 10 MMOL/L (ref 9–17)
BASOPHILS # BLD: 1 % (ref 0–2)
BASOPHILS ABSOLUTE: 0.1 K/UL (ref 0–0.2)
BUN SERPL-MCNC: 21 MG/DL (ref 8–23)
CALCIUM SERPL-MCNC: 8.9 MG/DL (ref 8.6–10.4)
CHLORIDE SERPL-SCNC: 100 MMOL/L (ref 98–107)
CO2 SERPL-SCNC: 24 MMOL/L (ref 20–31)
CREAT SERPL-MCNC: 1.73 MG/DL (ref 0.7–1.2)
EOSINOPHILS RELATIVE PERCENT: 2 % (ref 0–4)
GFR SERPL CREATININE-BSD FRML MDRD: 42 ML/MIN/1.73M2
GLUCOSE BLD-MCNC: 187 MG/DL (ref 75–110)
GLUCOSE BLD-MCNC: 188 MG/DL (ref 75–110)
GLUCOSE BLD-MCNC: 217 MG/DL (ref 75–110)
GLUCOSE BLD-MCNC: 244 MG/DL (ref 75–110)
GLUCOSE BLD-MCNC: 305 MG/DL (ref 75–110)
GLUCOSE SERPL-MCNC: 259 MG/DL (ref 70–99)
HCT VFR BLD AUTO: 36.5 % (ref 41–53)
HGB BLD-MCNC: 12.4 G/DL (ref 13.5–17.5)
INR PPP: 2.9
LYMPHOCYTES # BLD: 14 % (ref 24–44)
MCH RBC QN AUTO: 30.3 PG (ref 26–34)
MCHC RBC AUTO-ENTMCNC: 34 G/DL (ref 31–37)
MCV RBC AUTO: 89.3 FL (ref 80–100)
MONOCYTES # BLD: 9 % (ref 1–7)
PDW BLD-RTO: 15 % (ref 11.5–14.9)
PLATELET # BLD AUTO: 204 K/UL (ref 150–450)
PMV BLD AUTO: 8.2 FL (ref 6–12)
POTASSIUM SERPL-SCNC: 3.7 MMOL/L (ref 3.7–5.3)
PROTHROMBIN TIME: 30.9 SEC (ref 11.8–14.6)
RBC # BLD: 4.09 M/UL (ref 4.5–5.9)
SEG NEUTROPHILS: 74 % (ref 36–66)
SEGMENTED NEUTROPHILS ABSOLUTE COUNT: 4.9 K/UL (ref 1.3–9.1)
SODIUM SERPL-SCNC: 134 MMOL/L (ref 135–144)
WBC # BLD AUTO: 6.6 K/UL (ref 3.5–11)

## 2023-03-04 PROCEDURE — 2500000003 HC RX 250 WO HCPCS: Performed by: PODIATRIST

## 2023-03-04 PROCEDURE — G0378 HOSPITAL OBSERVATION PER HR: HCPCS

## 2023-03-04 PROCEDURE — 6370000000 HC RX 637 (ALT 250 FOR IP): Performed by: PODIATRIST

## 2023-03-04 PROCEDURE — 3700000001 HC ADD 15 MINUTES (ANESTHESIA): Performed by: PODIATRIST

## 2023-03-04 PROCEDURE — 2580000003 HC RX 258: Performed by: ANESTHESIOLOGY

## 2023-03-04 PROCEDURE — 82947 ASSAY GLUCOSE BLOOD QUANT: CPT

## 2023-03-04 PROCEDURE — 6360000002 HC RX W HCPCS: Performed by: FAMILY MEDICINE

## 2023-03-04 PROCEDURE — 6370000000 HC RX 637 (ALT 250 FOR IP): Performed by: FAMILY MEDICINE

## 2023-03-04 PROCEDURE — 3700000000 HC ANESTHESIA ATTENDED CARE: Performed by: PODIATRIST

## 2023-03-04 PROCEDURE — 6370000000 HC RX 637 (ALT 250 FOR IP)

## 2023-03-04 PROCEDURE — 3600000012 HC SURGERY LEVEL 2 ADDTL 15MIN: Performed by: PODIATRIST

## 2023-03-04 PROCEDURE — 85025 COMPLETE CBC W/AUTO DIFF WBC: CPT

## 2023-03-04 PROCEDURE — 0Y6T0Z0 DETACHMENT AT RIGHT 3RD TOE, COMPLETE, OPEN APPROACH: ICD-10-PCS | Performed by: PODIATRIST

## 2023-03-04 PROCEDURE — 88311 DECALCIFY TISSUE: CPT

## 2023-03-04 PROCEDURE — 7100000001 HC PACU RECOVERY - ADDTL 15 MIN: Performed by: PODIATRIST

## 2023-03-04 PROCEDURE — 2580000003 HC RX 258

## 2023-03-04 PROCEDURE — 2709999900 HC NON-CHARGEABLE SUPPLY: Performed by: PODIATRIST

## 2023-03-04 PROCEDURE — 88305 TISSUE EXAM BY PATHOLOGIST: CPT

## 2023-03-04 PROCEDURE — 1200000000 HC SEMI PRIVATE

## 2023-03-04 PROCEDURE — 96366 THER/PROPH/DIAG IV INF ADDON: CPT

## 2023-03-04 PROCEDURE — 3600000002 HC SURGERY LEVEL 2 BASE: Performed by: PODIATRIST

## 2023-03-04 PROCEDURE — 73630 X-RAY EXAM OF FOOT: CPT

## 2023-03-04 PROCEDURE — 7100000000 HC PACU RECOVERY - FIRST 15 MIN: Performed by: PODIATRIST

## 2023-03-04 PROCEDURE — 2500000003 HC RX 250 WO HCPCS: Performed by: ANESTHESIOLOGY

## 2023-03-04 PROCEDURE — 80048 BASIC METABOLIC PNL TOTAL CA: CPT

## 2023-03-04 PROCEDURE — 6360000002 HC RX W HCPCS: Performed by: ANESTHESIOLOGY

## 2023-03-04 PROCEDURE — 6360000002 HC RX W HCPCS

## 2023-03-04 PROCEDURE — 36415 COLL VENOUS BLD VENIPUNCTURE: CPT

## 2023-03-04 PROCEDURE — 85610 PROTHROMBIN TIME: CPT

## 2023-03-04 RX ORDER — PROPOFOL 10 MG/ML
INJECTION, EMULSION INTRAVENOUS PRN
Status: DISCONTINUED | OUTPATIENT
Start: 2023-03-04 | End: 2023-03-04 | Stop reason: SDUPTHER

## 2023-03-04 RX ORDER — LIDOCAINE HYDROCHLORIDE 10 MG/ML
INJECTION, SOLUTION INFILTRATION; PERINEURAL PRN
Status: DISCONTINUED | OUTPATIENT
Start: 2023-03-04 | End: 2023-03-04 | Stop reason: ALTCHOICE

## 2023-03-04 RX ORDER — SODIUM CHLORIDE 0.9 % (FLUSH) 0.9 %
5-40 SYRINGE (ML) INJECTION EVERY 12 HOURS SCHEDULED
Status: DISCONTINUED | OUTPATIENT
Start: 2023-03-04 | End: 2023-03-04 | Stop reason: HOSPADM

## 2023-03-04 RX ORDER — ONDANSETRON 2 MG/ML
INJECTION INTRAMUSCULAR; INTRAVENOUS PRN
Status: DISCONTINUED | OUTPATIENT
Start: 2023-03-04 | End: 2023-03-04 | Stop reason: SDUPTHER

## 2023-03-04 RX ORDER — WARFARIN SODIUM 2 MG/1
2 TABLET ORAL
Status: DISCONTINUED | OUTPATIENT
Start: 2023-03-04 | End: 2023-03-05 | Stop reason: HOSPADM

## 2023-03-04 RX ORDER — OXYCODONE HYDROCHLORIDE 5 MG/1
5 TABLET ORAL EVERY 4 HOURS PRN
Status: DISCONTINUED | OUTPATIENT
Start: 2023-03-04 | End: 2023-03-05 | Stop reason: HOSPADM

## 2023-03-04 RX ORDER — ONDANSETRON 2 MG/ML
4 INJECTION INTRAMUSCULAR; INTRAVENOUS
Status: DISCONTINUED | OUTPATIENT
Start: 2023-03-04 | End: 2023-03-04 | Stop reason: HOSPADM

## 2023-03-04 RX ORDER — WARFARIN SODIUM 1 MG/1
1 TABLET ORAL
Status: DISCONTINUED | OUTPATIENT
Start: 2023-03-06 | End: 2023-03-05 | Stop reason: HOSPADM

## 2023-03-04 RX ORDER — FENTANYL CITRATE 50 UG/ML
INJECTION, SOLUTION INTRAMUSCULAR; INTRAVENOUS PRN
Status: DISCONTINUED | OUTPATIENT
Start: 2023-03-04 | End: 2023-03-04 | Stop reason: SDUPTHER

## 2023-03-04 RX ORDER — LIDOCAINE HYDROCHLORIDE 10 MG/ML
INJECTION, SOLUTION EPIDURAL; INFILTRATION; INTRACAUDAL; PERINEURAL PRN
Status: DISCONTINUED | OUTPATIENT
Start: 2023-03-04 | End: 2023-03-04 | Stop reason: SDUPTHER

## 2023-03-04 RX ORDER — MIDAZOLAM HYDROCHLORIDE 1 MG/ML
INJECTION INTRAMUSCULAR; INTRAVENOUS PRN
Status: DISCONTINUED | OUTPATIENT
Start: 2023-03-04 | End: 2023-03-04 | Stop reason: SDUPTHER

## 2023-03-04 RX ORDER — METRONIDAZOLE 500 MG/1
500 TABLET ORAL 3 TIMES DAILY
Qty: 30 TABLET | Refills: 0 | Status: SHIPPED | OUTPATIENT
Start: 2023-03-04 | End: 2023-03-05 | Stop reason: HOSPADM

## 2023-03-04 RX ORDER — SODIUM CHLORIDE 9 MG/ML
INJECTION, SOLUTION INTRAVENOUS CONTINUOUS PRN
Status: DISCONTINUED | OUTPATIENT
Start: 2023-03-04 | End: 2023-03-04 | Stop reason: SDUPTHER

## 2023-03-04 RX ORDER — MORPHINE SULFATE 2 MG/ML
2 INJECTION, SOLUTION INTRAMUSCULAR; INTRAVENOUS
Status: DISCONTINUED | OUTPATIENT
Start: 2023-03-04 | End: 2023-03-05 | Stop reason: HOSPADM

## 2023-03-04 RX ORDER — MORPHINE SULFATE 4 MG/ML
4 INJECTION, SOLUTION INTRAMUSCULAR; INTRAVENOUS
Status: DISCONTINUED | OUTPATIENT
Start: 2023-03-04 | End: 2023-03-05 | Stop reason: HOSPADM

## 2023-03-04 RX ORDER — OXYCODONE HYDROCHLORIDE AND ACETAMINOPHEN 5; 325 MG/1; MG/1
1 TABLET ORAL EVERY 4 HOURS PRN
Status: DISCONTINUED | OUTPATIENT
Start: 2023-03-04 | End: 2023-03-05 | Stop reason: HOSPADM

## 2023-03-04 RX ORDER — HYDROCODONE BITARTRATE AND ACETAMINOPHEN 5; 325 MG/1; MG/1
1 TABLET ORAL EVERY 6 HOURS PRN
Qty: 20 TABLET | Refills: 0 | Status: SHIPPED | OUTPATIENT
Start: 2023-03-04 | End: 2023-03-09

## 2023-03-04 RX ORDER — SODIUM CHLORIDE 0.9 % (FLUSH) 0.9 %
5-40 SYRINGE (ML) INJECTION PRN
Status: DISCONTINUED | OUTPATIENT
Start: 2023-03-04 | End: 2023-03-04 | Stop reason: HOSPADM

## 2023-03-04 RX ORDER — ACETAMINOPHEN 325 MG/1
650 TABLET ORAL EVERY 6 HOURS PRN
Status: DISCONTINUED | OUTPATIENT
Start: 2023-03-04 | End: 2023-03-05 | Stop reason: HOSPADM

## 2023-03-04 RX ORDER — SODIUM CHLORIDE 9 MG/ML
INJECTION, SOLUTION INTRAVENOUS PRN
Status: DISCONTINUED | OUTPATIENT
Start: 2023-03-04 | End: 2023-03-04 | Stop reason: HOSPADM

## 2023-03-04 RX ORDER — DIPHENHYDRAMINE HYDROCHLORIDE 50 MG/ML
12.5 INJECTION INTRAMUSCULAR; INTRAVENOUS
Status: DISCONTINUED | OUTPATIENT
Start: 2023-03-04 | End: 2023-03-04 | Stop reason: HOSPADM

## 2023-03-04 RX ORDER — WARFARIN SODIUM 2 MG/1
2 TABLET ORAL WEEKLY
Status: DISCONTINUED | OUTPATIENT
Start: 2023-03-04 | End: 2023-03-04

## 2023-03-04 RX ORDER — LINEZOLID 600 MG/1
600 TABLET, FILM COATED ORAL 2 TIMES DAILY
Qty: 28 TABLET | Refills: 0 | Status: SHIPPED | OUTPATIENT
Start: 2023-03-04 | End: 2023-03-05 | Stop reason: SDUPTHER

## 2023-03-04 RX ADMIN — LINEZOLID 600 MG: 600 INJECTION, SOLUTION INTRAVENOUS at 14:17

## 2023-03-04 RX ADMIN — SODIUM CHLORIDE: 9 INJECTION, SOLUTION INTRAVENOUS at 12:32

## 2023-03-04 RX ADMIN — INSULIN LISPRO 4 UNITS: 100 INJECTION, SOLUTION INTRAVENOUS; SUBCUTANEOUS at 07:53

## 2023-03-04 RX ADMIN — GLIPIZIDE 2.5 MG: 5 TABLET ORAL at 21:21

## 2023-03-04 RX ADMIN — CARVEDILOL 3.12 MG: 3.12 TABLET, FILM COATED ORAL at 06:38

## 2023-03-04 RX ADMIN — LINEZOLID 600 MG: 600 INJECTION, SOLUTION INTRAVENOUS at 04:20

## 2023-03-04 RX ADMIN — FENTANYL CITRATE 25 MCG: 50 INJECTION, SOLUTION INTRAMUSCULAR; INTRAVENOUS at 09:29

## 2023-03-04 RX ADMIN — ONDANSETRON 4 MG: 2 INJECTION, SOLUTION INTRAMUSCULAR; INTRAVENOUS at 09:15

## 2023-03-04 RX ADMIN — PROPOFOL 20 MG: 10 INJECTION, EMULSION INTRAVENOUS at 09:31

## 2023-03-04 RX ADMIN — PROPOFOL 10 MG: 10 INJECTION, EMULSION INTRAVENOUS at 09:18

## 2023-03-04 RX ADMIN — SODIUM CHLORIDE: 9 INJECTION, SOLUTION INTRAVENOUS at 08:55

## 2023-03-04 RX ADMIN — MIDAZOLAM 1 MG: 1 INJECTION INTRAMUSCULAR; INTRAVENOUS at 08:57

## 2023-03-04 RX ADMIN — ACETAMINOPHEN 650 MG: 325 TABLET ORAL at 22:07

## 2023-03-04 RX ADMIN — MIDAZOLAM 1 MG: 1 INJECTION INTRAMUSCULAR; INTRAVENOUS at 08:59

## 2023-03-04 RX ADMIN — ATORVASTATIN CALCIUM 40 MG: 40 TABLET, FILM COATED ORAL at 21:21

## 2023-03-04 RX ADMIN — CARVEDILOL 3.12 MG: 3.12 TABLET, FILM COATED ORAL at 16:25

## 2023-03-04 RX ADMIN — PROPOFOL 100 MG: 10 INJECTION, EMULSION INTRAVENOUS at 08:59

## 2023-03-04 RX ADMIN — PROPOFOL 10 MG: 10 INJECTION, EMULSION INTRAVENOUS at 09:29

## 2023-03-04 RX ADMIN — WARFARIN SODIUM 2 MG: 2 TABLET ORAL at 18:21

## 2023-03-04 RX ADMIN — FENTANYL CITRATE 25 MCG: 50 INJECTION, SOLUTION INTRAMUSCULAR; INTRAVENOUS at 09:31

## 2023-03-04 RX ADMIN — INSULIN LISPRO 12 UNITS: 100 INJECTION, SOLUTION INTRAVENOUS; SUBCUTANEOUS at 16:25

## 2023-03-04 RX ADMIN — FENTANYL CITRATE 50 MCG: 50 INJECTION, SOLUTION INTRAMUSCULAR; INTRAVENOUS at 08:59

## 2023-03-04 RX ADMIN — LIDOCAINE HYDROCHLORIDE 50 MG: 10 INJECTION INFILTRATION at 08:59

## 2023-03-04 RX ADMIN — PROPOFOL 20 MG: 10 INJECTION, EMULSION INTRAVENOUS at 09:12

## 2023-03-04 ASSESSMENT — PAIN SCALES - GENERAL
PAINLEVEL_OUTOF10: 0
PAINLEVEL_OUTOF10: 3
PAINLEVEL_OUTOF10: 0

## 2023-03-04 ASSESSMENT — ENCOUNTER SYMPTOMS
NAUSEA: 0
SHORTNESS OF BREATH: 0
SHORTNESS OF BREATH: 1
CHEST TIGHTNESS: 0
CONSTIPATION: 0
DIARRHEA: 0
ABDOMINAL PAIN: 0
COLOR CHANGE: 1
VOMITING: 0

## 2023-03-04 ASSESSMENT — PAIN DESCRIPTION - DESCRIPTORS: DESCRIPTORS: THROBBING

## 2023-03-04 ASSESSMENT — PAIN DESCRIPTION - ORIENTATION: ORIENTATION: RIGHT

## 2023-03-04 ASSESSMENT — PAIN DESCRIPTION - LOCATION: LOCATION: FOOT

## 2023-03-04 NOTE — PLAN OF CARE
Problem: Discharge Planning  Goal: Discharge to home or other facility with appropriate resources  3/4/2023 1637 by Jean-Claude Salas RN  Outcome: Progressing  3/4/2023 0759 by Robyn L Schober, RN  Outcome: Progressing  Flowsheets (Taken 3/3/2023 2100)  Discharge to home or other facility with appropriate resources: Identify barriers to discharge with patient and caregiver     Problem: Safety - Adult  Goal: Free from fall injury  3/4/2023 1637 by Jean-Claude Salas RN  Outcome: Progressing  Note: Patient remains free of falls and injuries throughout shift. Bed remains in the lowest position, wheels locked, call light and bedside table are within reach.   3/4/2023 0759 by Robyn L Schober, RN  Outcome: Progressing     Problem: Chronic Conditions and Co-morbidities  Goal: Patient's chronic conditions and co-morbidity symptoms are monitored and maintained or improved  3/4/2023 1637 by Jean-Claude Salas RN  Outcome: Progressing  3/4/2023 0759 by Robyn L Schober, RN  Outcome: Progressing  Flowsheets (Taken 3/3/2023 2100)  Care Plan - Patient's Chronic Conditions and Co-Morbidity Symptoms are Monitored and Maintained or Improved: Monitor and assess patient's chronic conditions and comorbid symptoms for stability, deterioration, or improvement

## 2023-03-04 NOTE — ANESTHESIA POSTPROCEDURE EVALUATION
Department of Anesthesiology  Postprocedure Note    Patient: Daryle President  MRN: [id number]  YOB: 1955  Date of evaluation: 3/4/2023      Procedure Summary     Date: 03/04/23 Room / Location: [facility]: GRISELDA CRUZ    Anesthesia Start: 7248 Anesthesia Stop: 5152    Procedure: 3RD DIGIT AMPUTATION FOOT (Right: Toes) Diagnosis:       Diabetic infection of right foot ([address]      (Diabetic infection of right foot ([address] [C36.599, L08.9])    Surgeons: Ramiro Nelson DPM Responsible Provider: Milton Irizarry MD    Anesthesia Type: general ASA Status: 4          Anesthesia Type: No value filed.     Maxwell Phase I: Maxwell Score: 9    Maxwell Phase II:        Anesthesia Post Evaluation    Comments: POST- ANESTHESIA EVALUATION       Pt Name: Daryle President  MRN: [id number]  [place]: 1955  Date of evaluation: 3/4/2023  Time:  10:45 AM      /74   Pulse 71   Temp 97.5 °F (36.4 °C) (Infrared)   Resp 20   Ht 5' 10\" (1.778 m)   Wt 198 lb (89.8 kg)   SpO2 94%   BMI 28.41 kg/m²      Consciousness Level  Awake  Cardiopulmonary Status  Stable  Pain Adequately Treated YES  Nausea / Vomiting  NO  Adequate Hydration  YES  Anesthesia Related Complications NONE      Electronically signed by Milton Irizarry MD on 3/4/2023 at 10:45 AM

## 2023-03-04 NOTE — ANESTHESIA PRE PROCEDURE
Department of Anesthesiology  Preprocedure Note       Name:  Diana Velazco   Age:  75 y.o.  :  1955                                          MRN:  [id number]         Date:  3/4/2023      Surgeon: Juancarlos Diehl):  Jason Chao DPM    Procedure: Procedure(s):  3RD DIGIT AMPUTATION FOOT    Medications prior to admission:   Prior to Admission medications    Medication Sig Start Date End Date Taking? Authorizing Provider   warfarin (COUMADIN) 2 MG tablet Take 4 mg by mouth Six times weekly Indications: All days except Monday Patient's INR is managed by [facility]    Historical Provider, MD   carvedilol (COREG) 3.125 MG tablet Take 3.125 mg by mouth 2 times daily 23   Historical Provider, MD   ciprofloxacin (CIPRO) 500 MG tablet Take 500 mg by mouth 2 times daily For 10 days starting 23  Patient not taking: Reported on 3/2/2023 2/27/23 3/9/23  Historical Provider, MD   benazepril (LOTENSIN) 5 MG tablet Take 5 mg by mouth in the morning and at bedtime    Historical Provider, MD   glipiZIDE (GLUCOTROL) 5 MG tablet Take 2.5 mg by mouth at bedtime    Historical Provider, MD   Vitamin D (CHOLECALCIFEROL) 25 MCG (1000 UT) TABS tablet Take 1,000 Units by mouth daily    Historical Provider, MD   warfarin (COUMADIN) 2 MG tablet Take 2 mg by mouth once a week Indications:  Patient's INR is managed by [facility]. Historical Provider, MD   aspirin 81 MG EC tablet Take 1 tablet by mouth daily 3/15/22   Kait Civil, DO   furosemide (LASIX) 80 MG tablet Take 1 tablet by mouth daily 3/15/22   Lucas Hernadez,    nitroGLYCERIN (NITROSTAT) 0.4 MG SL tablet up to max of 3 total doses. If no relief after 1 dose, call 911. 3/15/22 8/26/22  Lucas Hernadez, DO   atorvastatin (LIPITOR) 40 MG tablet Take 40 mg by mouth at bedtime    Historical Provider, MD       Current medications:    No current facility-administered medications for this visit.      No current outpatient medications on file. Facility-Administered Medications Ordered in Other Visits   Medication Dose Route Frequency Provider Last Rate Last Admin    glipiZIDE (GLUCOTROL) tablet 2.5 mg  2.5 mg Oral QHS Lucas T Satya, DO        linezolid (ZYVOX) IVPB 600 mg  600 mg IntraVENous Q12H Lucas T Satya, DO   Stopped at 03/04/23 0620    pantoprazole (PROTONIX) tablet 40 mg  40 mg Oral Daily Lucas T Satya, DO   40 mg at 03/03/23 0610    0.9 % sodium chloride infusion   IntraVENous Continuous Lucas T Satya, DO 75 mL/hr at 03/03/23 2340 New Bag at 03/03/23 2340    glucose chewable tablet 16 g  4 tablet Oral PRN Lucas T Satya, DO        dextrose bolus 10% 125 mL  125 mL IntraVENous PRN Lucas T Satya, DO        Or    dextrose bolus 10% 250 mL  250 mL IntraVENous PRN Lucas T Satya, DO        glucagon (rDNA) injection 1 mg  1 mg SubCUTAneous PRN Lucas T Satya, DO        dextrose 10 % infusion   IntraVENous Continuous PRN Lucas T Satya, DO        insulin lispro (HUMALOG) injection vial 0-16 Units  0-16 Units SubCUTAneous TID  Lucas T Satya, DO   4 Units at 03/04/23 0753    insulin lispro (HUMALOG) injection vial 0-4 Units  0-4 Units SubCUTAneous Nightly Lucas T Satya, DO        atorvastatin (LIPITOR) tablet 40 mg  40 mg Oral Nightly Lucas T Satya, DO   40 mg at 03/03/23 2137    carvedilol (COREG) tablet 3.125 mg  3.125 mg Oral BID  Lucas T Satya, DO   3.125 mg at 03/04/23 8206    furosemide (LASIX) tablet 80 mg  80 mg Oral Daily Lucas T Satya, DO   80 mg at 03/03/23 0939    calcium carb-cholecalciferol 250-3. 125 MG-MCG per tab 1 tablet  1 tablet Oral Daily Lucas T Satya, DO   1 tablet at 03/03/23 0940    lisinopril (PRINIVIL;ZESTRIL) tablet 5 mg  5 mg Oral Daily Lucas  Satya, DO   5 mg at 03/03/23 5061       Allergies:     Allergies   Allergen Reactions    Doxycycline Other (See Comments)     Skin peeling    Pcn [Penicillins] Rash    Penicillin G Rash       Problem List:    Patient Active Problem List   Diagnosis Code    Diabetic foot infection ([facility] E11.628, L08.9    Diabetic foot ulcer with osteomyelitis ([facility] E11.621, E11.69, L97.509, M86.9    Chronic osteomyelitis ([facility] M86.60    PVD (peripheral vascular disease) ([facility] I73.9    Atherosclerosis of coronary artery without angina pectoris I25.10    Cardiomyopathy ([facility] I42.9    Cardiac left ventricular ejection fraction 21-40 percent R94.30    Diabetes mellitus type 2 with peripheral artery disease ([facility] E11.51    Ulcer of toe of right foot, with necrosis of bone ([facility]) L97.514    Chronic osteomyelitis of left foot ([facility]) M86.672    Onychomycosis B35.1    Heart failure ([facility]) I50.9    MRSA bacteremia R78.81, B95.62    Gangrene of toe of left foot ([facility]) I96    Elevated C-reactive protein (CRP) R79.82    Elevated erythrocyte sedimentation rate R70.0    Acute kidney injury ([facility] N17.9    Allergy to penicillin Z88.0    Ulcer of left foot, with fat layer exposed ([facility] L97.522    Osteomyelitis of second toe of right foot ([facility]) M86.9    Cellulitis of left foot L03. 116    Abscess of bursa of left foot M71.072    Diabetic polyneuropathy associated with type 2 diabetes mellitus ([facility]) E11.42    Osteomyelitis ([facility]) M86.9    Cellulitis L03.90    Atherosclerosis of native arteries of left leg with ulceration of other part of foot ([facility]) I70.245    Normocytic normochromic anemia D64.9    Surgical wound, non healing T81.89XA    Chest pain R07.9       Past Medical History:        Diagnosis Date    Atrial fibrillation ([facility]     CAD (coronary artery disease)     Cardiomyopathy ([facility]     Cellulitis     CHF (congestive heart failure) ([facility]     [facility]     Foot infection     Heart failure ([facility]     Hyperlipidemia     Hypertension     MRSA (methicillin resistant staph aureus) culture positive     Osteomyelitis ([facility]     PVD (peripheral vascular disease) ([facility]     Wound, open, foot     left foot       Past Surgical History: Procedure Laterality Date    CARDIAC SURGERY  2010    CABG X3    COLONOSCOPY      DEBRIDEMENT Right 11/19/2015    DEBRIDEMENT WOUND FOOT RIGHT W/ APPLICATION BILAT INTEG RAT GRAFT    ENDOSCOPY, COLON, DIAGNOSTIC      EYE SURGERY Bilateral     cataracts    HERNIA REPAIR      umbilical    KNEE ARTHROSCOPY Right     OTHER SURGICAL HISTORY Right 12 29 15    wound care graft procedure foot,appl of integra    PACEMAKER PLACEMENT  2011    WITH DEFIBRILLATOR    TOE AMPUTATION Right     R great    TOE AMPUTATION Left 3/13/2022    TOE AMPUTATION--left 2nd digit performed by Mejia Magaña DPM at [address] TOE AMPUTATION Left 8/24/2022    Left third fourth and fifth toe amputation through the metatarsal for completion transmetatarsal amputation performed by Marco Antonio Amaya MD at [id number] Left 7/13/2022    RIGHT COMMON FEMORAL ACCESS UNDER ULTRASOUND GUIDANCE DISTAL AORTOGRAPHY WITH PELVIC RUN OFF PLACEMENT OF CATHETER INTO LEFT COMMON FEMORAL ARTERY, PROFUNDA FEMORAL AND SFA  ARTERIOGRAPHY PLACEMENT OF CATHETER INTO LEFT SFA, WITH SFA POPLITEAL AND TIBIAL TIBIOPERONEAL ARTERIOGRAPHY INTO LEFT FOOT performed by Marco Antonio Amaya MD at [address] History:    Social History     Tobacco Use    Smoking status: Never    Smokeless tobacco: Never   Substance Use Topics    Alcohol use: Yes     Comment: SOCIAL on weekends                                Counseling given: Not Answered      Vital Signs (Current): There were no vitals filed for this visit.                                            BP Readings from Last 3 Encounters:   03/04/23 (!) 158/69   03/02/23 (!) 177/95   12/19/22 122/74       NPO Status:                                                                                 BMI:   Wt Readings from Last 3 Encounters:   03/02/23 198 lb (89.8 kg)   03/02/23 187 lb (84.8 kg)   02/22/23 187 lb (84.8 kg)     There is no height or weight on file to calculate BMI.    CBC:   Lab Results   Component Value Date/Time    WBC 6.6 03/04/2023 06:07 AM    RBC 4.09 03/04/2023 06:07 AM    RBC 4.43 12/28/2011 10:34 AM    HGB 12.4 03/04/2023 06:07 AM    HCT 36.5 03/04/2023 06:07 AM    MCV 89.3 03/04/2023 06:07 AM    RDW 15.0 03/04/2023 06:07 AM     03/04/2023 06:07 AM     12/28/2011 10:34 AM       CMP:   Lab Results   Component Value Date/Time     03/04/2023 06:07 AM    K 3.7 03/04/2023 06:07 AM     03/04/2023 06:07 AM    CO2 24 03/04/2023 06:07 AM    BUN 21 03/04/2023 06:07 AM    CREATININE 1.73 03/04/2023 06:07 AM    GFRAA 56 08/26/2022 05:49 AM    LABGLOM 42 03/04/2023 06:07 AM    GLUCOSE 259 03/04/2023 06:07 AM    GLUCOSE 335 12/28/2011 10:34 AM    PROT 8.3 08/23/2022 01:03 PM    CALCIUM 8.9 03/04/2023 06:07 AM    BILITOT 0.44 08/23/2022 01:03 PM    ALKPHOS 126 08/23/2022 01:03 PM    AST 24 08/23/2022 01:03 PM    ALT 22 08/23/2022 01:03 PM       POC Tests:   Recent Labs     03/04/23  0620   POCGLU 244*       Coags:   Lab Results   Component Value Date/Time    PROTIME 30.9 03/04/2023 06:07 AM    PROTIME 31.4 07/07/2022 07:41 AM    INR 2.9 03/04/2023 06:07 AM    APTT 34.1 06/30/2022 12:19 PM       HCG (If Applicable): No results found for: PREGTESTUR, PREGSERUM, HCG, HCGQUANT     ABGs:   Lab Results   Component Value Date/Time    PHART 7.473 10/23/2015 11:32 AM    PO2ART 83.2 10/23/2015 11:32 AM    ZMC5TBY 38.1 10/23/2015 11:32 AM    TBF9ONK 28.0 10/23/2015 11:32 AM    G4ZSSGJT 95.8 10/23/2015 11:32 AM        Type & Screen (If Applicable):  No results found for: LABABO, LABRH    Drug/Infectious Status (If Applicable):  No results found for: HIV, HEPCAB    COVID-19 Screening (If Applicable):   Lab Results   Component Value Date/Time    COVID19 Not Detected 03/12/2022 10:05 PM           Anesthesia Evaluation  Patient summary reviewed and Nursing notes reviewed no history of anesthetic complications:   Airway: Mallampati: III  TM distance: >3 FB   Neck ROM: full  Mouth opening: > = 3 FB   Dental:    (+) edentulous      Pulmonary:Negative Pulmonary ROS and normal exam  breath sounds clear to auscultation  (+) shortness of breath:                             Cardiovascular:    (+) hypertension:, valvular problems/murmurs (moderate TR):, pacemaker ([employer], no shocks ever received.,): AICD and pacemaker, CAD:, CABG/stent (CABG x 3):, dysrhythmias: atrial fibrillation, CHF:, hyperlipidemia      ECG reviewed  Rhythm: irregular  Rate: normal  Echocardiogram reviewed         Beta Blocker:  Not on Beta Blocker      ROS comment: Ischemic Cardiomyopathy   Left ventricular systolic function is moderately reduced. Estimated LV EF 35-40 %. (3/2022)     Neuro/Psych:   (+) neuromuscular disease:,              ROS comment: Diabetic Polyneuropathy GI/Hepatic/Renal:   (+) renal disease: CRI,           Endo/Other:    (+) DiabetesType II DM, , blood dyscrasia: anticoagulation therapy and anemia:., .                  ROS comment: Diabetic Foot infection and Ulcer  Chronic osteomyelitis Abdominal:             Vascular:   + PVD, aortic or cerebral (PVOD), . Other Findings:             Anesthesia Plan      general     ASA 4       Induction: intravenous. MIPS: Postoperative opioids intended and Prophylactic antiemetics administered. Anesthetic plan and risks discussed with patient. Plan discussed with CRNA. FFP and Vitamin K ordered on the floor.       Bruce Gerardo MD   3/4/2023

## 2023-03-04 NOTE — PLAN OF CARE
Problem: Discharge Planning  Goal: Discharge to home or other facility with appropriate resources  Outcome: Progressing  Flowsheets (Taken 3/3/2023 2100)  Discharge to home or other facility with appropriate resources: Identify barriers to discharge with patient and caregiver     Problem: Safety - Adult  Goal: Free from fall injury  Outcome: Progressing

## 2023-03-04 NOTE — PROGRESS NOTES
[id number]      PROGRESS NOTE        Patient:  Jorden Munoz  YOB: 1955    MRN: [id number]     Acct: [de-identified]     Admit date: 3/2/2023    Pt seen and Chart reviewed. Consultant notes reviewed and care evaluated. Subjective: Patient is status post amputation of the third toe he tells me they put sutures in it he has dressing on either try to look around it looks okay from just what I could see from the dressing he is having no pain but he does have a history of neuropathy but he denies any fevers or chills. Patient also states last time he had amputation he went home and took care of it himself asking about VNS he does not want VNS neither. Diet:  ADULT DIET; Regular; 3 carb choices (45 gm/meal)      Medications:Current Inpatient    Scheduled Meds:   glipiZIDE  2.5 mg Oral QHS    linezolid  600 mg IntraVENous Q12H    pantoprazole  40 mg Oral Daily    insulin lispro  0-16 Units SubCUTAneous TID WC    insulin lispro  0-4 Units SubCUTAneous Nightly    atorvastatin  40 mg Oral Nightly    carvedilol  3.125 mg Oral BID WC    furosemide  80 mg Oral Daily    calcium carb-cholecalciferol  1 tablet Oral Daily    lisinopril  5 mg Oral Daily     Continuous Infusions:   sodium chloride 75 mL/hr at 03/03/23 2340    dextrose       PRN Meds:glucose, dextrose bolus **OR** dextrose bolus, glucagon (rDNA), dextrose        Physical Exam:  Vitals: BP (!) 153/78   Pulse 63   Temp 98.2 °F (36.8 °C) (Oral)   Resp 15   Ht 5' 10\" (1.778 m)   Wt 198 lb (89.8 kg)   SpO2 97%   BMI 28.41 kg/m²   24 hour intake/output:  Intake/Output Summary (Last 24 hours) at 3/4/2023 1232  Last data filed at 3/4/2023 1020  Gross per 24 hour   Intake 3088 ml   Output --   Net 3088 ml     Last 3 weights:   Wt Readings from Last 3 Encounters:   03/02/23 198 lb (89.8 kg)   03/02/23 187 lb (84.8 kg)   02/22/23 187 lb (84.8 kg)       Physical Examination:   General appearance - alert, well appearing, and in no distress  Mental status - alert, oriented to person, place, and time  oropharynx clear, no lesions  Chest - clear to auscultation, no wheezes, rales or rhonchi, symmetric air entry  Heart - normal rate, regular rhythm, normal S1, S2, no murmurs, rubs, clicks or gallops  Abdomen - soft, nontender, nondistended, no masses or organomegaly  Neurological - alert, oriented, normal speech, no focal findings or movement disorder noted}  Extremities - peripheral pulses normal, right foot is in dressing looking around the tissue looks intact. A little bit of blood on the dressing but no active bleeding. Patient just had amputation done this morning  Skin - normal coloration and turgor, no rashes, no suspicious skin lesions noted      Component Value Units   XR FOOT RIGHT (MIN 3 VIEWS) [[id number]]    Collected: 03/04/23 1029    Updated: 03/04/23 1143    Narrative:     EXAMINATION:   THREE XRAY VIEWS OF THE RIGHT FOOT     3/4/2023 10:14 am     COMPARISON:   03/02/2023     HISTORY:   ORDERING SYSTEM PROVIDED HISTORY: post op 3rd digit amp   TECHNOLOGIST PROVIDED HISTORY:   post op 3rd digit amp   Reason for Exam: post op     FINDINGS:   Status post amputation of the 3rd toe. Previous amputation of the 1st toe. Previous amputation of the distal phalanx of the 2nd toe. Vascular   calcifications. No other change identified.     Impression:     Satisfactory appearance status post amputation of the 3rd toe    POC Glucose Fingerstick [[id number]] (Abnormal)    Collected: 03/04/23 1127    Updated: 03/04/23 1135     POC Glucose 187 High  mg/dL   POC Glucose Fingerstick [[id number]] (Abnormal)    Collected: 03/04/23 0954    Updated: 03/04/23 1001     POC Glucose 217 High  mg/dL   POC Glucose Fingerstick [[id number]] (Abnormal)    Collected: 03/04/23 0620    Updated: 03/04/23 0727     POC Glucose 244 High  mg/dL   Protime-INR [[id number]] (Abnormal)    Collected: 03/04/23 0360 Updated: 03/04/23 0704    Specimen Source: Blood     Protime 30.9 High  sec    INR 2.9    Comment:        Therapeutic Range: Moderate Anticoagulant Intensity: INR = 2.0-3.0    High Anticoagulant Intensity: INR = 2.5-3.5       Basic Metabolic Panel [[id number]] (Abnormal)    Collected: 03/04/23 1558    Updated: 03/04/23 4203    Specimen Source: Blood     Glucose 259 High  mg/dL    BUN 21 mg/dL    Creatinine 1.73 High  mg/dL    Est, Glom Filt Rate 42 Low  mL/min/1.73m2    Comment:        These results are not intended for use in patients <25years of age. eGFR results are calculated without a race factor using the 2021 CKD-EPI equation. Careful clinical correlation is recommended, particularly when comparing to results   calculated using previous equations. The CKD-EPI equation is less accurate in patients with extremes of muscle mass, extra-renal   metabolism of creatine, excessive creatine ingestion, or following therapy that affects   renal tubular secretion.         Calcium 8.9 mg/dL    Sodium 134 Low  mmol/L    Potassium 3.7 mmol/L    Chloride 100 mmol/L    CO2 24 mmol/L    Anion Gap 10 mmol/L   CBC with Auto Differential [[id number]] (Abnormal)    Collected: 03/04/23 0607    Updated: 03/04/23 0619    Specimen Source: Blood     WBC 6.6 k/uL    RBC 4.09 Low  m/uL    Hemoglobin 12.4 Low  g/dL    Hematocrit 36.5 Low  %    MCV 89.3 fL    MCH 30.3 pg    MCHC 34.0 g/dL    RDW 15.0 High  %    Platelets 451 k/uL    MPV 8.2 fL    Seg Neutrophils 74 High  %    Lymphocytes 14 Low  %    Monocytes 9 High  %    Eosinophils % 2 %    Basophils 1 %    Segs Absolute 4.90 k/uL    Absolute Lymph # 0.90 Low  k/uL    Absolute Mono # 0.60 k/uL    Absolute Eos # 0.20 k/uL    Basophils Absolute 0.10 k/uL   POC Glucose Fingerstick [[id number]] (Abnormal)    Collected: 03/03/23 1958    Updated: 03/03/23 2006     POC Glucose 222 High  mg/dL   POC Glucose Fingerstick [[id number]] (Abnormal)    Collected: 03/03/23 1611 Updated: 03/03/23 1619     POC Glucose 202 High         Assessment:  Principal Problem:    Diabetic foot infection ([facility]  Active Problems:    Diabetic infection of right foot ([facility]  Resolved Problems:    * No resolved hospital problems.  *   Diabetic foot infection ([facility]) E11.628, L08.9    Diabetic foot ulcer [facility] osteomyelitis ([facility] E11.621, E11.69, L97.509, M86.9    Chronic osteomyelitis ([facility] M86.60    PVD (peripheral vascular disease) ([facility]) I73.9    Atherosclerosis of coronary artery without angina pectoris I25.10    Cardiomyopathy ([facility] I42.9    Cardiac left ventricular ejection fraction 21-40 percent R94.30    Diabetes mellitus type 2 with peripheral artery disease ([facility]) E11.51    Ulcer of toe of right foot, with necrosis of bone ([facility]) L97.514    Chronic osteomyelitis of left foot ([facility]) M86.672    Onychomycosis B35.1    Heart failure ([facility]) I50.9    MRSA bacteremia R78.81, B95.62    Gangrene of toe of left foot ([facility]) I96    Elevated C-reactive protein (CRP) R79.82    Elevated erythrocyte sedimentation rate R70.0    Acute kidney injury ([facility] N17.9    Allergy to penicillin Z88.0    Ulcer of left foot, with fat layer exposed ([facility] L97.522    Osteomyelitis of second toe of right foot ([facility]) M86.9    Cellulitis of left foot L03.116    Abscess of bursa of left foot M71.072    Diabetic polyneuropathy associated with type 2 diabetes mellitus ([facility]) E11.42    Osteomyelitis ([facility]) M86.9    Cellulitis L03.90    Atherosclerosis of native arteries of left leg with ulceration of other part of foot ([facility]) I70.245    Normocytic normochromic anemia D64.9    Surgical wound, non healing T81.89XA    Chest pain R07.9      Right 3rd toe infection with foot cellulitis actually looks better than it was 3 weeks ago no drainage from it  History of big toe amputation and left toes amputation but no evidence of osteomyelitis  MRSA on recent toe culture was resistant to Cipro  Elevated INR we will keep holding his Coumadin enteroscopy in 2-3 and then will restart him  Check labs daily check INR in the morning  Depending what podiatry is planning for him we will continue on IV Zyvox till he goes home we will switch him to p.o.  2 chronic kidney failure  Status post amputation right third toe      Plan:   We will continue with the same antibiotics for now    Patient might be able to be discharged tomorrow at least to make sure he has arrangement and plan for how to take care of his wound  Patient INR is 2.9 he takes Coumadin 2 mg every day except for Monday he takes 1 mg we will get him back on his dose and check INR tomorrow as long as he is still in the hospital  Most likely he will go home on Zyvox p.o. twice daily    JOSE Funes             3/4/2023, 12:32 PM

## 2023-03-04 NOTE — DISCHARGE INSTRUCTIONS
Podiatric Post Operative Instructions: You have had a surgical procedure on your right foot. Fluids and Diet:  Begin with clear liquids, broth, dry toast, and crackers. If not nauseated then resume your regular pre-operative diet when you are ready    Medications: Take your prescriptions as directed  You are receiving new prescriptions for Norco.  If your pain is not severe then you may take the non-prescription medication that you normally take for aches and pains  You may resume your regularly scheduled medications (unless otherwise directed)  If any side effects or adverse reactions occur, discontinue the medication and contact your doctor. Review the patient drug information that is provided before you take any medication    Ambulation and Activity:  You are advised to go directly home from the hospital  Use none, wheelchair, crutches, or scooter as needed  You may put weight on the operated foot, recommend to the heel only. You should wear the surgical shoe on right foot at all times when awake. Avoid stairs if possible. Do not lift or move heavy objects  Do not drive until cleared by your physician    Bandage and Wound Care Instructions:  Keep bandage clean and dry  Do not shower or bathe the operative extremity  Do not remove the bandage (unless otherwise directed)   Do not attempt to put anything between the cast or dressing and your skin, some itching is normal.    Ice and Elevation:  Elevate operative extremity as much as possible to reduce swelling and discomfort. Elevate with 2 pillows at or above the level of the heart for the first 72 hours. Ice:  [facility] dispensed insulated ice bag over the bandage 20 minutes of every hour while awake for the first 72 hours. You may ice behind the knee as well. Special Instructions: Call your doctor immediately if you develop any of the following.   Fever over 100.4 degrees Fahrenheit by mouth - take your temperature daily until your first follow up visit. Pain not relieved by medication ordered  Swelling, increased redness, warmth, or hardness around operative area. Numb, tingling or cold toes. Toe(s) become white or bluish  Bandage becomes wet, soiled, or blood soaked (small amount of bleeding may be normal)  Increased or progressive drainage from surgical area. Follow up instructions: You will need to follow up with Dr. Zaheer Reynoso DPM   Call when you get home to schedule or confirm your appointment. Call your Podiatrist office if you have any questions or concerns.

## 2023-03-04 NOTE — BRIEF OP NOTE
Brief Postoperative Note      Patient: Glory Way  YOB: 1955  MRN: [id number]    Date of Procedure: 3/4/2023    Pre-Op Diagnosis: Diabetic infection of right foot ([facility] [E11.628, L08.9]    Post-Op Diagnosis: Same       Procedures:  Fillet of toe of third digit, right    Surgeon(s):  Governor Srinivasan DPM    Assistant:  Resident: Chauncey Adams DPM    Anesthesia: Monitor Anesthesia Care, 1:1 mix of 0.5% marcaine plain and 1% lidocaine plain    Hemostasis: Ankle tourniquet was applied to right ankle but left deflated throughout entirety of procedure. Direct pressure used for hemostasis    Injectables: None     Estimated Blood Loss (mL): Minimal    Complications: None    Specimens:   ID Type Source Tests Collected by Time Destination   A : RIGHT FOOT THIRD DIGIT Tissue Toe SURGICAL PATHOLOGY [place], [place] 3/4/2023 0905        Implants:  * No implants in log *      Drains: * No LDAs found *    Findings: Right third digit was removed to its entirety. Distal phalanx was noted to be necrotic and nonviable. Metatarsal head remaining appeared viable with normal-appearing articular cartilage. No purulent drainage, abscess or malodor appreciated throughout procedure. Specimen sent to pathology. Minimal bleeding was noted even with doubt a tourniquet. Lateral flap was created and rotated to close down amputation site. See op note for further details.     Electronically signed by Chauncey Adams DPM on 3/4/2023 at 9:55 AM

## 2023-03-04 NOTE — DISCHARGE INSTR - COC
Continuity of Care Form    Patient Name: Lisa Ramon   :  1955  MRN:  [id number]    Admit date:  3/2/2023  Discharge date:  ***    Code Status Order: Prior   Advance Directives:     Admitting Physician:  Maldonado Jean DPM  PCP: Thomasine Gitelman, DO    Discharging Nurse: [facility] Unit/Room#: [facility]  Discharging Unit Phone Number: ***    Emergency Contact:   Extended Emergency Contact Information  Primary Emergency Contact: Pat Ayon  Address: [address]  Home Phone: [phone]  Relation: Parent    Past Surgical History:  Past Surgical History:   Procedure Laterality Date    CARDIAC SURGERY  2010    CABG X3    COLONOSCOPY      DEBRIDEMENT Right 2015    DEBRIDEMENT WOUND FOOT RIGHT W/ APPLICATION BILAT INTEG RAT GRAFT    ENDOSCOPY, COLON, DIAGNOSTIC      EYE SURGERY Bilateral     cataracts    HERNIA REPAIR      umbilical    KNEE ARTHROSCOPY Right     OTHER SURGICAL HISTORY Right 12 29 15    wound care graft procedure foot,appl of integra    PACEMAKER PLACEMENT      WITH DEFIBRILLATOR    TOE AMPUTATION Right     R great    TOE AMPUTATION Left 3/13/2022    TOE AMPUTATION--left 2nd digit performed by Maldonado Jean DPM at [address] [facility] Left 2022    Left third fourth and fifth toe amputation through the metatarsal for completion transmetatarsal amputation performed by Jesi Mehta MD at [address] [facility] Left 2022    RIGHT COMMON FEMORAL ACCESS UNDER ULTRASOUND GUIDANCE DISTAL AORTOGRAPHY WITH PELVIC RUN OFF PLACEMENT OF CATHETER INTO LEFT COMMON FEMORAL ARTERY, PROFUNDA FEMORAL AND SFA  ARTERIOGRAPHY PLACEMENT OF CATHETER INTO LEFT SFA, WITH SFA POPLITEAL AND TIBIAL TIBIOPERONEAL ARTERIOGRAPHY INTO LEFT FOOT performed by Jesi Mehta MD at [address] [place] [address]       Immunization History: There is no immunization history on file for this patient.     Active Problems:  Patient Active Problem List Diagnosis Code    Diabetic foot infection ([facility] E11.628, L08.9    Diabetic foot ulcer with osteomyelitis ([facility] E11.621, E11.69, L97.509, M86.9    Chronic osteomyelitis ([facility] M86.60    PVD (peripheral vascular disease) ([facility] I73.9    Atherosclerosis of coronary artery without angina pectoris I25.10    Cardiomyopathy ([facility] I42.9    Cardiac left ventricular ejection fraction 21-40 percent R94.30    Diabetic infection of right foot ([facility]) E11.628, L08.9    Ulcer of toe of right foot, with necrosis of bone ([facility]) L97.514    Chronic osteomyelitis of left foot ([facility]) M86.672    Onychomycosis B35.1    Heart failure ([facility]) I50.9    MRSA bacteremia R78.81, B95.62    Gangrene of toe of left foot ([facility]) I96    Elevated C-reactive protein (CRP) R79.82    Elevated erythrocyte sedimentation rate R70.0    Acute kidney injury ([facility] N17.9    Allergy to penicillin Z88.0    Ulcer of left foot, with fat layer exposed ([facility] L97.522    Osteomyelitis of second toe of right foot ([facility]) M86.9    Cellulitis of left foot L03.116    Abscess of bursa of left foot M71.072    Diabetic polyneuropathy associated with type 2 diabetes mellitus ([facility]) E11.42    Osteomyelitis ([facility]) M86.9    Cellulitis L03.90    Atherosclerosis of native arteries of left leg with ulceration of other part of foot ([facility]) I70.245    Normocytic normochromic anemia D64.9    Surgical wound, non healing T81.89XA    Chest pain R07.9       Isolation/Infection:   Isolation            Contact          Patient Infection Status       Infection Onset Added Last Indicated Last Indicated By Review Planned Expiration Resolved Resolved By    MRSA  09/15/15 07/28/22 Culture, Anaerobic and Aerobic        Foot 5/2022              Nurse Assessment:  Last Vital Signs: /78   Pulse 69   Temp 97.5 °F (36.4 °C) (Infrared)   Resp 15   Ht 5' 10\" (1.778 m)   Wt 198 lb (89.8 kg)   SpO2 100%   BMI 28.41 kg/m²     Last documented pain score (0-10 scale):    Last Weight:   Wt Readings from Last 1 Encounters:   23 198 lb (89.8 kg)     Mental Status:  {IP PT MENTAL STATUS:19625}    IV Access:  { KARINA IV ACCESS:972245837}    Nursing Mobility/ADLs:  Walking   {CHP DME PVD}  Transfer  {CHP DME HUIQ:905868095}  Bathing  {CHP DME PJOC:394392097}  Dressing  {CHP DME PRNS:838272303}  Toileting  {CHP DME PGNV:736054692}  Feeding  {CHP DME AETN:436576256}  Med Admin  {CHP DME JSAE:690805333}  Med Delivery   { KARINA MED Delivery:680305576}    Wound Care Documentation and Therapy:  Wound 23 Foot Left;Distal wound #2 (Active)   Wound Image   23   Wound Etiology Diabetic 23 2340   Dressing Status Clean;Dry; Intact 23 0726   Wound Cleansed Soap and water 23 234   Dressing/Treatment Gauze dressing/dressing sponge 23 0726   Offloading for Diabetic Foot Ulcers Offloading ordered;Diabetic shoes/inserts 23 2340   Wound Length (cm) 0.4 cm 2333   Wound Width (cm) 1.8 cm 2333   Wound Depth (cm) 0.2 cm 23   Wound Surface Area (cm^2) 0.72 cm^2 23   Wound Volume (cm^3) 0.144 cm^3 2333   Post-Procedure Length (cm) 0.4 cm 2333   Post-Procedure Width (cm) 1.8 cm 2333   Post-Procedure Depth (cm) 0.2 cm 2333   Post-Procedure Surface Area (cm^2) 0.72 cm^2 2333   Post-Procedure Volume (cm^3) 0.144 cm^3 23   Wound Assessment Glasco/red;Slough 23   Drainage Amount None 23   Drainage Description Serosanguinous 23   Odor None 23 0726   Keturah-wound Assessment Blanchable erythema 23 0726   Margins Defined edges 23 0933   Number of days: 2       Incision 23 Toe (Comment  which one) Anterior;Right (Active)   Dressing Status Clean;Dry; Intact 23 0940   Dressing/Treatment Ace wrap;Dry dressing 23 0959   Closure Sutures 23 0959   Incision Assessment Other (Comment) 23 0940   Drainage Amount None 23 0940   Number of days: 0        Elimination:  Continence: Bowel: {YES / BL:54918}  Bladder: {YES / Z}  Urinary Catheter: {Urinary Catheter:791578261}   Colostomy/Ileostomy/Ileal Conduit: {YES / SO:52176}       Date of Last BM: ***    Intake/Output Summary (Last 24 hours) at 3/4/2023 1000  Last data filed at 3/4/2023 0943  Gross per 24 hour   Intake 2388 ml   Output --   Net 2388 ml     I/O last 3 completed shifts:   In: 2238 [I.V.:2238]  Out: 700 [Urine:700]    Safety Concerns:     [address] Safety Concerns:774716566}    Impairments/Disabilities:      [address] Impairments/Disabilities:961701873}    Nutrition Therapy:  Current Nutrition Therapy:   [address] [employer] Diet List:041721776}    Routes of Feeding: {CHP DME Other Feedings:282900107}  Liquids: {Slp liquid thickness:55817}  Daily Fluid Restriction: {CHP DME Yes amt example:857197117}  Last Modified Barium Swallow with Video (Video Swallowing Test): {Done Not Done EUPW:097201650}    Treatments at the Time of Hospital Discharge:   Respiratory Treatments: ***  Oxygen Therapy:  {Therapy; copd oxygen:37535}  Ventilator:    { CC Vent KKSU:206521266}    Rehab Therapies: {THERAPEUTIC INTERVENTION:8308240995}  Weight Bearing Status/Restrictions: [facility] Weight Bearin}  Other Medical Equipment (for information only, NOT a DME order):  {EQUIPMENT:500603772}  Other Treatments: ***    Patient's personal belongings (please select all that are sent with patient):  {CHP DME Belongings:712307748}    RN SIGNATURE:  {Esignature:170469418}    CASE MANAGEMENT/SOCIAL WORK SECTION    Inpatient Status Date: ***    Readmission Risk Assessment Score:  Readmission Risk              Risk of Unplanned Readmission:  0           Discharging to Facility/ Agency   Name:   Address:  Phone:  Fax:    Dialysis Facility (if applicable)   Name:  Address:  Dialysis Schedule:  Phone:  Fax:    / signature: {Esignature:856286868}    PHYSICIAN SECTION    Prognosis: Good    Condition at Discharge: Stable    Rehab Potential (if transferring to Rehab): Good    Recommended Labs or Other Treatments After Discharge: None    Physician Certification: I certify the above information and transfer of Shanae Orlando  is necessary for the continuing treatment of the diagnosis listed and that he does not require assistive services. May return home.     Update Admission H&P: No change in H&P    PHYSICIAN SIGNATURE:  Electronically signed by Terry Coyle DPM on 3/4/23 at 11:30 AM EST

## 2023-03-04 NOTE — PROGRESS NOTES
Progress note  Podiatric Medicine and Surgery     Subjective     Chief Complaint: right third digit wound    Interval history:  Patient seen at bedside. Patient has no new complaints. Patient has been tolerating IV antibiotics well. Patient undergoing amputation of right third digit this morning. Patient has been NPO since midnight. No further complaints at this time. Vital signs stable. Patient remains hypertensive. HPI:  Shanae Orlando is a 75 y.o. male seen [facility] ED for chronic, nonhealing wound of right 2nd digit. Patient was at wound care appointment earlier today and was advised to present to ED for worsening wound. Pt denies any constitutional symptoms. States he follows Dr. Sulema Benavides outpatient for podiatry routine care and Dr. Serina Becerril at wound care clinic. He states he underwent left foot TMA in August of 2022 by vascular team. No significant changes since then. Denies any further pedal complaints at this time. PCP is Vincent Xiong DO    ROS:   Review of Systems   Constitutional:  Negative for chills and fever. Respiratory:  Negative for chest tightness and shortness of breath. Cardiovascular:  Negative for chest pain and leg swelling. Gastrointestinal:  Negative for abdominal pain, constipation, diarrhea, nausea and vomiting. Musculoskeletal:  Negative for arthralgias, gait problem, joint swelling and myalgias. Skin:  Positive for color change and wound. Neurological:  Negative for weakness and numbness. Past Medical History   has a past medical history of Atrial fibrillation ([address] CAD (coronary artery disease), Cardiomyopathy ([address] Cellulitis, CHF (congestive heart failure) ([address] Diabetes mellitus ([address] Foot infection, Heart failure ([address] Hyperlipidemia, Hypertension, MRSA (methicillin resistant staph aureus) culture positive, Osteomyelitis ([address] PVD (peripheral vascular disease) ([address] and Wound, open, foot.     Past Surgical History   has a past surgical history that includes eye surgery (Bilateral); hernia repair; Knee arthroscopy (Right); Colonoscopy; Endoscopy, colon, diagnostic; pacemaker placement (2011); Cardiac surgery (2010); Toe amputation (Right); Wound debridement (Right, 11/19/2015); other surgical history (Right, 12 29 15); Toe amputation (Left, 3/13/2022); vascular surgery (Left, 7/13/2022); and Toe amputation (Left, 8/24/2022). Medications  Prior to Admission medications    Medication Sig Start Date End Date Taking? Authorizing Provider   warfarin (COUMADIN) 2 MG tablet Take 4 mg by mouth Six times weekly Indications: All days except Monday Patient's INR is managed by [facility]   Yes Historical Provider, MD   carvedilol (COREG) 3.125 MG tablet Take 3.125 mg by mouth 2 times daily 1/21/23   Historical Provider, MD   ciprofloxacin (CIPRO) 500 MG tablet Take 500 mg by mouth 2 times daily For 10 days starting 2/27/23  Patient not taking: Reported on 3/2/2023 2/27/23 3/9/23  Historical Provider, MD   benazepril (LOTENSIN) 5 MG tablet Take 5 mg by mouth in the morning and at bedtime    Historical Provider, MD   glipiZIDE (GLUCOTROL) 5 MG tablet Take 2.5 mg by mouth at bedtime    Historical Provider, MD   Vitamin D (CHOLECALCIFEROL) 25 MCG (1000 UT) TABS tablet Take 1,000 Units by mouth daily    Historical Provider, MD   warfarin (COUMADIN) 2 MG tablet Take 2 mg by mouth once a week Indications: Mondays Patient's INR is managed by [facility]. Historical Provider, MD   aspirin 81 MG EC tablet Take 1 tablet by mouth daily 3/15/22   Sandra Hart,    furosemide (LASIX) 80 MG tablet Take 1 tablet by mouth daily 3/15/22   Lcuas T Satya, DO   nitroGLYCERIN (NITROSTAT) 0.4 MG SL tablet up to max of 3 total doses.  If no relief after 1 dose, call 911. 3/15/22 8/26/22  Lucas T Satya, DO   atorvastatin (LIPITOR) 40 MG tablet Take 40 mg by mouth at bedtime    Historical Provider, MD    Scheduled Meds:  Continuous Infusions:  PRN Meds:. Allergies  is allergic to doxycycline, pcn [penicillins], and penicillin g. Family History  family history includes Cancer in his father; Osteoarthritis in his mother; Other in his brother and maternal grandfather. Social History   reports that he has never smoked. He has never used smokeless tobacco.   reports current alcohol use. reports no history of drug use. Objective     Vitals:  Patient Vitals for the past 8 hrs:   BP Temp Pulse Resp SpO2   23 0638 (!) 158/69 -- 73 -- --   23 0614 (!) 158/69 97.5 °F (36.4 °C) 73 18 98 %       Average, Min, and Max for last 24 hours Vitals:  TEMPERATURE:  Temp  Av.4 °F (36.3 °C)  Min: 97.3 °F (36.3 °C)  Max: 97.5 °F (36.4 °C)    RESPIRATIONS RANGE: Resp  Av.3  Min: 16  Max: 18    PULSE RANGE: Pulse  Av.8  Min: 69  Max: 73    BLOOD PRESSURE RANGE:  Systolic (57KJP), MTY:106 , Min:154 , ANTHONY:184   ; Diastolic (04SVQ), VVX:11, Min:69, Max:87      PULSE OXIMETRY RANGE: SpO2  Av.7 %  Min: 98 %  Max: 100 %  I&O:  I/O last 3 completed shifts: In: 2238 [I.V.:8]  Out: 700 [Urine:700]    CBC:  Recent Labs     23  1344 23  0637 23  0607   WBC 9.1 7.0 6.6   HGB 13.5 12.5* 12.4*   HCT 38.3* 36.5* 36.5*    203 204   CRP 4.8  --   --           BMP:  Recent Labs     23  1344 23  0637 23  0607    135 134*   K 3.8 3.7 3.7    102 100   CO2 24 23 24   BUN 25* 22 21   CREATININE 1.78* 1.66* 1.73*   GLUCOSE 350* 260* 259*   CALCIUM 9.4 8.6 8.9          Coags:  Recent Labs     23  1344 23  0637 23  0607   INR 3.2 3.2 2.9         Lab Results   Component Value Date    LABA1C 6.8 (H) 2022     Lab Results   Component Value Date    SEDRATE 19 2023     Lab Results   Component Value Date    CRP 4.8 2023         Lower Extremity Physical Exam:  Vascular: DP and PT pulses are faintly palpable bilaterall. CFT <4 seconds to all digits.   Hair growth is absent to the level of the digits. No edema. Neuro: Saph/sural/SP/DP/plantar sensation intact to light touch. Musculoskeletal: Muscle strength is adequate ROM, adequate strength to all lower extremity muscle groups. Gross deformity is s/p right 2nd digit amputation, hallux amputation. Left foot TMA    Dermatologic: Full thickness ulcer #1 located distal 2nd digit down to level of subcutaneous layer and measures approximately 1.0 cm x 1.0 cm x 0.3 cm. Base is fibrogranular. Periwound skin is hyperkeratotic. No drainage noted with no associated mal odor. Erythema localized to distal 2nd digit with no associated increase in warmth. Does not probe to bone, sinus track, or undermine. No fluctuance, crepitus, or induration. Interdigital maceration absent. Clinical Images: From 3/3/2023            Imaging:   CT FOOT RIGHT WO CONTRAST   Final Result   1. Diffuse soft tissue edema. No organized drainable fluid collection   identified. No subcutaneous gas. 2. No definitive CT evidence of osteomyelitis. XR FOOT RIGHT (MIN 3 VIEWS)   Final Result   1. Soft tissue irregularity at the distal 3rd toe. No definite radiographic   evidence of osteomyelitis. If there is persistent clinical concern, consider   MRI. 2.  Remote amputation of the great toe and mid 2nd toe. Assessment     Betito Govea is a 75 y.o. male with   S/p right third digit amputation  Nonhealing full thickness ulceration distal third digit, right foot  Cellulitis third digit, right  S/p right hallux amputation  Type 2 diabetes mellitus   PVD    Principal Problem:    Diabetic foot infection ([address]  Resolved Problems:    * No resolved hospital problems. *        Plan     Patient examined and evaluated at bedside   Treatment options discussed in detail with the patient  Radiographs and CT reviewed right foot.  No acute osseous abnormalities or soft tissue abnormalities       Patient admitted due to noncompliance with antibiotic medications in recent history. Right third digit amputation this morning. Clean margins created, no remaining signs of infection. From podiatry standpoint patient can be discharged home after being seen by PT/OT. Rx pain medications and antibiotics. Patient to follow-up with Dr. Antonia De La Paz in his clinic in 1 week. Dressing applied to Right foot:  Adaptic, gauze, Kerlix, light Ace  WBAT to Right lower extremity  Will discuss with Dr. Tej Kruger DPM   Podiatric Medicine & Surgery   3/4/2023 at 8:45 AM

## 2023-03-04 NOTE — CARE COORDINATION
ONGOING DISCHARGE PLAN:    Unable to speak to pt. At this time. In surgery for Rt 3rd toe amp w/ Podiatry. Per Notes, likely DC on Oral Antibiotics. Will follow for any post op needs. Will continue to follow for additional discharge needs.     Electronically signed by Onesimo Rosa RN on 3/4/2023 at 12:00 PM

## 2023-03-04 NOTE — CONSULTS
Consultation Note  Podiatric Medicine and Surgery     Subjective     Chief Complaint: right 2nd toe wound    HPI:  Robert Ruiz is a 75 y.o. male seen at [facility] ED for chronic, nonhealing wound of right 2nd digit. Patient was at wound care appointment earlier today and was advised to present to ED for worsening wound. Pt denies any constitutional symptoms. States he follows Dr. Davonte Barnhart outpatient for podiatry routine care and Dr. Diana Romano at wound care clinic. He states he underwent left foot TMA in August of 2022 by vascular team. No significant changes since then. Denies any further pedal complaints at this time. PCP is Vincent Xiong DO    ROS:   Review of Systems   Constitutional:  Negative for chills and fever. Respiratory:  Negative for chest tightness and shortness of breath. Cardiovascular:  Negative for chest pain and leg swelling. Gastrointestinal:  Negative for abdominal pain, constipation, diarrhea, nausea and vomiting. Musculoskeletal:  Negative for arthralgias, gait problem, joint swelling and myalgias. Skin:  Positive for color change and wound. Neurological:  Negative for weakness and numbness. Past Medical History   has a past medical history of Atrial fibrillation ([address] CAD (coronary artery disease), Cardiomyopathy ([address] Cellulitis, CHF (congestive heart failure) ([address] Diabetes mellitus ([address] Foot infection, Heart failure ([address] Hyperlipidemia, Hypertension, MRSA (methicillin resistant staph aureus) culture positive, Osteomyelitis ([address] PVD (peripheral vascular disease) ([address] and Wound, open, foot. Past Surgical History   has a past surgical history that includes eye surgery (Bilateral); hernia repair; Knee arthroscopy (Right); Colonoscopy; Endoscopy, colon, diagnostic; pacemaker placement (2011); Cardiac surgery (2010); Toe amputation (Right); Wound debridement (Right, 11/19/2015); other surgical history (Right, 12 29 15);  Toe amputation (Left, 3/13/2022); vascular surgery (Left, 7/13/2022); and Toe amputation (Left, 8/24/2022). Medications  Prior to Admission medications    Medication Sig Start Date End Date Taking? Authorizing Provider   warfarin (COUMADIN) 2 MG tablet Take 4 mg by mouth Six times weekly Indications: All days except Monday Patient's INR is managed by [facility]   Yes Historical Provider, MD   carvedilol (COREG) 3.125 MG tablet Take 3.125 mg by mouth 2 times daily 1/21/23   Historical Provider, MD   ciprofloxacin (CIPRO) 500 MG tablet Take 500 mg by mouth 2 times daily For 10 days starting 2/27/23  Patient not taking: Reported on 3/2/2023 2/27/23 3/9/23  Historical Provider, MD   benazepril (LOTENSIN) 5 MG tablet Take 5 mg by mouth in the morning and at bedtime    Historical Provider, MD   glipiZIDE (GLUCOTROL) 5 MG tablet Take 2.5 mg by mouth at bedtime    Historical Provider, MD   Vitamin D (CHOLECALCIFEROL) 25 MCG (1000 UT) TABS tablet Take 1,000 Units by mouth daily    Historical Provider, MD   warfarin (COUMADIN) 2 MG tablet Take 2 mg by mouth once a week Indications: Mondays Patient's INR is managed by [facility]. Historical Provider, MD   aspirin 81 MG EC tablet Take 1 tablet by mouth daily 3/15/22   [facility], DO   furosemide (LASIX) 80 MG tablet Take 1 tablet by mouth daily 3/15/22   Lucas ORDOÑEZ Odeh, DO   nitroGLYCERIN (NITROSTAT) 0.4 MG SL tablet up to max of 3 total doses. If no relief after 1 dose, call 911. 3/15/22 8/26/22  Lucas [facility], DO   atorvastatin (LIPITOR) 40 MG tablet Take 40 mg by mouth at bedtime    Historical Provider, MD    Scheduled Meds:  Continuous Infusions:  PRN Meds:. Allergies  is allergic to doxycycline, pcn [penicillins], and penicillin g. Family History  family history includes Cancer in his father; Osteoarthritis in his mother; Other in his brother and maternal grandfather. Social History   reports that he has never smoked.  He has never used smokeless tobacco.   reports current alcohol use.   reports no history of drug use.    Objective     Vitals:  Patient Vitals for the past 8 hrs:   BP Temp Temp src Pulse Resp SpO2   23 0940 111/78 97.5 °F (36.4 °C) Infrared 69 15 100 %   23 0638 (!) 158/69 -- -- 73 -- --   23 0614 (!) 158/69 97.5 °F (36.4 °C) -- 73 18 98 %       Average, Min, and Max for last 24 hours Vitals:  TEMPERATURE:  Temp  Av.4 °F (36.3 °C)  Min: 97.3 °F (36.3 °C)  Max: 97.5 °F (36.4 °C)    RESPIRATIONS RANGE: Resp  Av.8  Min: 15  Max: 18    PULSE RANGE: Pulse  Av.2  Min: 69  Max: 73    BLOOD PRESSURE RANGE:  Systolic (24hrs), Av , Min:111 , Max:166   ; Diastolic (24hrs), Av, Min:69, Max:87      PULSE OXIMETRY RANGE: SpO2  Av %  Min: 98 %  Max: 100 %  I&O:  I/O last 3 completed shifts:  In: 2238 [I.V.:2238]  Out: 700 [Urine:700]    CBC:  Recent Labs     23  1344 23  0637 23  0607   WBC 9.1 7.0 6.6   HGB 13.5 12.5* 12.4*   HCT 38.3* 36.5* 36.5*    203 204   CRP 4.8  --   --           BMP:  Recent Labs     23  1344 23  0637 23  0607    135 134*   K 3.8 3.7 3.7    102 100   CO2 24 23 24   BUN 25* 22 21   CREATININE 1.78* 1.66* 1.73*   GLUCOSE 350* 260* 259*   CALCIUM 9.4 8.6 8.9          Coags:  Recent Labs     23  1344 23  0637 23  0607   INR 3.2 3.2 2.9       Lab Results   Component Value Date    LABA1C 6.8 (H) 2022     Lab Results   Component Value Date    SEDRATE 19 2023     Lab Results   Component Value Date    CRP 4.8 2023         Lower Extremity Physical Exam:  Vascular: DP and PT pulses are faintly palpable bilaterall. CFT <4 seconds to all digits.  Hair growth is absent to the level of the digits.  No edema.      Neuro: Saph/sural/SP/DP/plantar sensation intact to light touch.    Musculoskeletal: Muscle strength is adequate ROM, adequate strength to all lower extremity muscle groups. Gross deformity  is s/p right 2nd digit amputation, hallux amputation. Left foot TMA    Dermatologic: Full thickness ulcer #1 located distal 2nd digit down to level of subcutaneous layer and measures approximately 1.0 cm x 1.0 cm x 0.3 cm. Base is fibrogranular. Periwound skin is hyperkeratotic. No drainage noted with no associated mal odor. Erythema localized to distal 2nd digit with no associated increase in warmth. Does not probe to bone, sinus track, or undermine. No fluctuance, crepitus, or induration. Interdigital maceration absent. Clinical Images:            Imaging:   CT FOOT RIGHT WO CONTRAST   Final Result   1. Diffuse soft tissue edema. No organized drainable fluid collection   identified. No subcutaneous gas. 2. No definitive CT evidence of osteomyelitis. XR FOOT RIGHT (MIN 3 VIEWS)   Final Result   1. Soft tissue irregularity at the distal 3rd toe. No definite radiographic   evidence of osteomyelitis. If there is persistent clinical concern, consider   MRI. 2.  Remote amputation of the great toe and mid 2nd toe. Assessment     Zena Saez is a 75 y.o. male with   Nonhealing full thickness ulceration distal 2nd digit, right foot  Type 2 diabetes mellitus   PVD    Principal Problem:    Diabetic foot infection ([address]  Active Problems:    Diabetic infection of right foot ([address]  Resolved Problems:    * No resolved hospital problems. *        Plan     Patient examined and evaluated at bedside   Treatment options discussed in detail with the patient  Radiographs and CT reviewed right foot. No acute osseous abnormalities or soft tissue abnormalities       Patient admitted due to noncompliance with antibiotic medications in recent history. Due to lack of clinical signs and symptoms, not concerned for acute infection or osteomyelitis at this time.  Will admit for IV antibiotic treatment for couple days, likely plan to discharge on oral abx and follow up outpatient for surgery to be done outpatient     Dressing applied to Right foot: betadine, gauze, tape  WBAT to Right lower extremity  Will discuss with Dr. Misa Mattson    I performed a history and physical examination of the patient and discussed management with the resident. I reviewed the residents note and agree with the documented findings and plan of care. Any areas of disagreement are noted on the chart. I was personally present for the key portions of any procedures. I have documented in the chart those procedures where I was not present during the key portions. I have reviewed the Podiatry Resident progress note. I agree with the chief complaint, past medical history, past surgical history, allergies, medications, social and family history as documented unless otherwise noted below. Documentation of the HPI, Physical Exam and Medical Decision Making performed by medical students or scribes is based on my personal performance of the HPI, PE and MDM. I have personally evaluated this patient and have completed at least one if not all key elements of the E/M (history, physical exam, and MDM). Additional findings are as noted.      Electronically signed by Ember Glaser DPM on 3/2/2023

## 2023-03-05 VITALS
SYSTOLIC BLOOD PRESSURE: 152 MMHG | HEART RATE: 71 BPM | HEIGHT: 70 IN | WEIGHT: 198 LBS | DIASTOLIC BLOOD PRESSURE: 81 MMHG | OXYGEN SATURATION: 96 % | BODY MASS INDEX: 28.35 KG/M2 | TEMPERATURE: 97.4 F | RESPIRATION RATE: 18 BRPM

## 2023-03-05 LAB
ABSOLUTE EOS #: 0.2 K/UL (ref 0–0.4)
ABSOLUTE LYMPH #: 0.9 K/UL (ref 1–4.8)
ABSOLUTE MONO #: 0.6 K/UL (ref 0.1–1.3)
ANION GAP SERPL CALCULATED.3IONS-SCNC: 8 MMOL/L (ref 9–17)
BASOPHILS # BLD: 0 % (ref 0–2)
BASOPHILS ABSOLUTE: 0 K/UL (ref 0–0.2)
BUN SERPL-MCNC: 20 MG/DL (ref 8–23)
CALCIUM SERPL-MCNC: 8.7 MG/DL (ref 8.6–10.4)
CHLORIDE SERPL-SCNC: 101 MMOL/L (ref 98–107)
CO2 SERPL-SCNC: 25 MMOL/L (ref 20–31)
CREAT SERPL-MCNC: 1.73 MG/DL (ref 0.7–1.2)
EOSINOPHILS RELATIVE PERCENT: 2 % (ref 0–4)
GFR SERPL CREATININE-BSD FRML MDRD: 42 ML/MIN/1.73M2
GLUCOSE BLD-MCNC: 138 MG/DL (ref 75–110)
GLUCOSE BLD-MCNC: 202 MG/DL (ref 75–110)
GLUCOSE BLD-MCNC: 250 MG/DL (ref 75–110)
GLUCOSE SERPL-MCNC: 148 MG/DL (ref 70–99)
HCT VFR BLD AUTO: 34.4 % (ref 41–53)
HGB BLD-MCNC: 12 G/DL (ref 13.5–17.5)
INR PPP: 2.6
LYMPHOCYTES # BLD: 12 % (ref 24–44)
MCH RBC QN AUTO: 31 PG (ref 26–34)
MCHC RBC AUTO-ENTMCNC: 34.9 G/DL (ref 31–37)
MCV RBC AUTO: 88.9 FL (ref 80–100)
MICROORGANISM SPEC CULT: ABNORMAL
MICROORGANISM SPEC CULT: ABNORMAL
MICROORGANISM/AGENT SPEC: ABNORMAL
MICROORGANISM/AGENT SPEC: ABNORMAL
MONOCYTES # BLD: 8 % (ref 1–7)
PDW BLD-RTO: 15.1 % (ref 11.5–14.9)
PLATELET # BLD AUTO: 198 K/UL (ref 150–450)
PMV BLD AUTO: 8.4 FL (ref 6–12)
POTASSIUM SERPL-SCNC: 4 MMOL/L (ref 3.7–5.3)
PROTHROMBIN TIME: 28.5 SEC (ref 11.8–14.6)
RBC # BLD: 3.87 M/UL (ref 4.5–5.9)
SEG NEUTROPHILS: 78 % (ref 36–66)
SEGMENTED NEUTROPHILS ABSOLUTE COUNT: 5.9 K/UL (ref 1.3–9.1)
SODIUM SERPL-SCNC: 134 MMOL/L (ref 135–144)
SPECIMEN DESCRIPTION: ABNORMAL
WBC # BLD AUTO: 7.6 K/UL (ref 3.5–11)

## 2023-03-05 PROCEDURE — 80048 BASIC METABOLIC PNL TOTAL CA: CPT

## 2023-03-05 PROCEDURE — 6360000002 HC RX W HCPCS

## 2023-03-05 PROCEDURE — 85025 COMPLETE CBC W/AUTO DIFF WBC: CPT

## 2023-03-05 PROCEDURE — 97116 GAIT TRAINING THERAPY: CPT

## 2023-03-05 PROCEDURE — 85610 PROTHROMBIN TIME: CPT

## 2023-03-05 PROCEDURE — 36415 COLL VENOUS BLD VENIPUNCTURE: CPT

## 2023-03-05 PROCEDURE — 97165 OT EVAL LOW COMPLEX 30 MIN: CPT

## 2023-03-05 PROCEDURE — 2580000003 HC RX 258

## 2023-03-05 PROCEDURE — 6370000000 HC RX 637 (ALT 250 FOR IP): Performed by: PODIATRIST

## 2023-03-05 PROCEDURE — 82947 ASSAY GLUCOSE BLOOD QUANT: CPT

## 2023-03-05 PROCEDURE — 97162 PT EVAL MOD COMPLEX 30 MIN: CPT

## 2023-03-05 PROCEDURE — 6370000000 HC RX 637 (ALT 250 FOR IP): Performed by: FAMILY MEDICINE

## 2023-03-05 PROCEDURE — 6370000000 HC RX 637 (ALT 250 FOR IP)

## 2023-03-05 RX ORDER — LINEZOLID 600 MG/1
600 TABLET, FILM COATED ORAL 2 TIMES DAILY
Qty: 16 TABLET | Refills: 0 | Status: SHIPPED | OUTPATIENT
Start: 2023-03-05 | End: 2023-03-13

## 2023-03-05 RX ORDER — LINEZOLID 600 MG/1
600 TABLET, FILM COATED ORAL ONCE
Status: COMPLETED | OUTPATIENT
Start: 2023-03-05 | End: 2023-03-05

## 2023-03-05 RX ADMIN — ACETAMINOPHEN 650 MG: 325 TABLET ORAL at 12:02

## 2023-03-05 RX ADMIN — LISINOPRIL 5 MG: 5 TABLET ORAL at 09:06

## 2023-03-05 RX ADMIN — SODIUM CHLORIDE: 9 INJECTION, SOLUTION INTRAVENOUS at 04:06

## 2023-03-05 RX ADMIN — Medication 1 TABLET: at 09:06

## 2023-03-05 RX ADMIN — INSULIN LISPRO 8 UNITS: 100 INJECTION, SOLUTION INTRAVENOUS; SUBCUTANEOUS at 16:30

## 2023-03-05 RX ADMIN — LINEZOLID 600 MG: 600 TABLET ORAL at 17:23

## 2023-03-05 RX ADMIN — LINEZOLID 600 MG: 600 INJECTION, SOLUTION INTRAVENOUS at 14:43

## 2023-03-05 RX ADMIN — PANTOPRAZOLE SODIUM 40 MG: 40 TABLET, DELAYED RELEASE ORAL at 05:16

## 2023-03-05 RX ADMIN — CARVEDILOL 3.12 MG: 3.12 TABLET, FILM COATED ORAL at 16:32

## 2023-03-05 RX ADMIN — CARVEDILOL 3.12 MG: 3.12 TABLET, FILM COATED ORAL at 09:06

## 2023-03-05 RX ADMIN — LINEZOLID 600 MG: 600 INJECTION, SOLUTION INTRAVENOUS at 02:29

## 2023-03-05 RX ADMIN — INSULIN LISPRO 4 UNITS: 100 INJECTION, SOLUTION INTRAVENOUS; SUBCUTANEOUS at 11:58

## 2023-03-05 RX ADMIN — FUROSEMIDE 80 MG: 40 TABLET ORAL at 09:06

## 2023-03-05 ASSESSMENT — ENCOUNTER SYMPTOMS
VOMITING: 0
SHORTNESS OF BREATH: 0
DIARRHEA: 0
COLOR CHANGE: 1
NAUSEA: 0
CONSTIPATION: 0
ABDOMINAL PAIN: 0
CHEST TIGHTNESS: 0

## 2023-03-05 ASSESSMENT — PAIN SCALES - GENERAL: PAINLEVEL_OUTOF10: 3

## 2023-03-05 NOTE — PROGRESS NOTES
Writer receives call from [facility] lab reporting MRSA growth on culture from 2nd toe on right foot, taken March 2nd. Dr. Cathleen Livingston notified, no new orders at this time.

## 2023-03-05 NOTE — PROGRESS NOTES
[id number] [address] [place] [facility]      PROGRESS NOTE        Patient:  Gaurav Gamboa  YOB: 1955    MRN: [id number]     Acct: [de-identified]     Admit date: 3/2/2023    Pt seen and Chart reviewed. Consultant notes reviewed and care evaluated. Subjective: Sitting up in the chair he feels okay he denies any pain now or chest pain or shortness of breath. He feels okay to go home as mentioned yesterday does not want any VNS or to go to rehab he stated he took care of his amputation last time at home meanwhile he had limited pain yesterday in the toe he does have neuropathy but he said it was throbbing little bit Tylenol seems to help he does not want anything else since Tylenol helped him he says. Diet:  ADULT DIET;  Regular; 3 carb choices (45 gm/meal)      Medications:Current Inpatient    Scheduled Meds:   warfarin  2 mg Oral Once per day on Sun Tue Wed Thu Fri Sat    warfarin placeholder: dosing by provider   Other RX Placeholder    [START ON 3/6/2023] warfarin  1 mg Oral Once per day on Mon    glipiZIDE  2.5 mg Oral QHS    linezolid  600 mg IntraVENous Q12H    pantoprazole  40 mg Oral Daily    insulin lispro  0-16 Units SubCUTAneous TID WC    insulin lispro  0-4 Units SubCUTAneous Nightly    atorvastatin  40 mg Oral Nightly    carvedilol  3.125 mg Oral BID WC    furosemide  80 mg Oral Daily    calcium carb-cholecalciferol  1 tablet Oral Daily    lisinopril  5 mg Oral Daily     Continuous Infusions:   sodium chloride 75 mL/hr at 03/05/23 0406    dextrose       PRN Meds:acetaminophen, morphine **OR** morphine, oxyCODONE-acetaminophen **AND** oxyCODONE, glucose, dextrose bolus **OR** dextrose bolus, glucagon (rDNA), dextrose        Physical Exam:  Vitals: BP (!) 147/90   Pulse 76   Temp 97.6 °F (36.4 °C) (Oral)   Resp 18   Ht 5' 10\" (1.778 m)   Wt 198 lb (89.8 kg)   SpO2 95%   BMI 28.41 kg/m²   24 hour intake/output:  Intake/Output Summary (Last 24 hours) at 3/5/2023 1130  Last data filed at 3/5/2023 5373  Gross per 24 hour   Intake 1258.21 ml   Output 1250 ml   Net 8.21 ml     Last 3 weights: Wt Readings from Last 3 Encounters:   03/02/23 198 lb (89.8 kg)   03/02/23 187 lb (84.8 kg)   02/22/23 187 lb (84.8 kg)       Physical Examination:   General appearance - alert, well appearing, and in no distress  Mental status - alert, oriented to person, place, and time  sclera clear, anicteric  Chest - clear to auscultation, no wheezes, rales or rhonchi, symmetric air entry  Heart - normal rate, irregular rhythm, normal S1, S2, no murmurs, rubs, clicks or gallops  Abdomen - soft, nontender, nondistended, no masses or organomegaly  Neurological - alert, oriented, normal speech, no focal findings or movement disorder noted  Extremities - peripheral pulses normal, no pedal edema, no clubbing or cyanosis his right foot still have a dressing but the amputation site seems to be intact no bleeding noted. Skin - normal coloration and turgor, no rashes, no suspicious skin lesions noted     Basic Metabolic Panel [[id number]] (Abnormal)    Collected: 03/05/23 0621    Updated: 03/05/23 0656    Specimen Source: Blood     Glucose 148 High  mg/dL    BUN 20 mg/dL    Creatinine 1.73 High  mg/dL    Est, Glom Filt Rate 42 Low  mL/min/1.73m2    Comment:        These results are not intended for use in patients <25years of age. eGFR results are calculated without a race factor using the 2021 CKD-EPI equation. Careful clinical correlation is recommended, particularly when comparing to results   calculated using previous equations. The CKD-EPI equation is less accurate in patients with extremes of muscle mass, extra-renal   metabolism of creatine, excessive creatine ingestion, or following therapy that affects   renal tubular secretion.         Calcium 8.7 mg/dL    Sodium 134 Low  mmol/L    Potassium 4.0 mmol/L    Chloride 101 mmol/L    CO2 25 mmol/L    Anion Gap 8 Low  mmol/L Protime-INR [[id number]] (Abnormal)    Collected: 03/05/23 0621    Updated: 03/05/23 2861    Specimen Source: Blood     Protime 28.5 High  sec    INR 2.6    Comment:        Therapeutic Range: Moderate Anticoagulant Intensity: INR = 2.0-3.0    High Anticoagulant Intensity: INR = 2.5-3.5       CBC with Auto Differential [[id number]] (Abnormal)    Collected: 03/05/23 0621    Updated: 03/05/23 0651    Specimen Source: Blood     WBC 7.6 k/uL    RBC 3.87 Low  m/uL    Hemoglobin 12.0 Low  g/dL    Hematocrit 34.4 Low  %    MCV 88.9 fL    MCH 31.0 pg    MCHC 34.9 g/dL    RDW 15.1 High  %    Platelets 202 k/uL    MPV 8.4 fL    Seg Neutrophils 78 High  %    Lymphocytes 12 Low  %    Monocytes 8 High  %    Eosinophils % 2 %    Basophils 0 %    Segs Absolute 5.90 k/uL    Absolute Lymph # 0.90 Low  k/uL    Absolute Mono # 0.60 k/uL    Absolute Eos # 0.20 k/uL    Basophils Absolute 0.00 k/uL   POC Glucose Fingerstick [[id number]] (Abnormal)    Collected: 03/05/23 0534    Updated: 03/05/23 0627     POC Glucose 138 High  mg/dL   POC Glucose Fingerstick [[id number]] (Abnormal)    Collected: 03/04/23 1947    Updated: 03/04/23 1953     POC Glucose 188 High  mg/dL   POC Glucose Fingerstick [[id number]] (Abnormal)    Collected: 03/04/23 1614    Updated: 03/04/23 1621     POC Glucose 305 High       INR still therapeutic  Assessment:  Principal Problem:    Diabetic foot infection ([facility]  Active Problems:    Cellulitis    Diabetic infection of right foot ([facility]  Resolved Problems:    * No resolved hospital problems.  *   Diabetic foot infection (HCC) E11.628, L08.9    Diabetic foot ulcer with osteomyelitis [facility] E11.621, E11.69, L97.509, M86.9    Chronic osteomyelitis ([facility] M86.60    PVD (peripheral vascular disease) ([facility]) I73.9    Atherosclerosis of coronary artery without angina pectoris I25.10    Cardiomyopathy ([facility] I42.9    Cardiac left ventricular ejection fraction 21-40 percent R94.30    Diabetes mellitus type 2 with peripheral artery disease ([facility]) E11.51    Ulcer of toe of right foot, with necrosis of bone ([facility]) L97.514    Chronic osteomyelitis of left foot ([facility]) M86.672    Onychomycosis B35.1    Heart failure ([facility]) I50.9    MRSA bacteremia R78.81, B95.62    Gangrene of toe of left foot ([facility]) I96    Elevated C-reactive protein (CRP) R79.82    Elevated erythrocyte sedimentation rate R70.0    Acute kidney injury ([address] N17.9    Allergy to penicillin Z88.0    Ulcer of left foot, with fat layer exposed ([facility] L97.522    Osteomyelitis of second toe of right foot ([facility]) M86.9    Cellulitis of left foot L03.116    Abscess of bursa of left foot M71.072    Diabetic polyneuropathy associated with type 2 diabetes mellitus ([facility]) E11.42    Osteomyelitis ([facility]) M86.9    Cellulitis L03.90    Atherosclerosis of native arteries of left leg with ulceration of other part of foot ([facility]) I70.245    Normocytic normochromic anemia D64.9    Surgical wound, non healing T81.89XA    Chest pain R07.9      Right 3rd toe infection with foot cellulitis actually looks better than it was 3 weeks ago no drainage from it  History of big toe amputation and left toes amputation but no evidence of osteomyelitis  MRSA on recent toe culture was resistant to Cipro  Elevated INR we will keep holding his Coumadin enteroscopy in 2-3 and then will restart him  Check labs daily check INR in the morning  Depending what podiatry is planning for him we will continue on IV Zyvox till he goes home we will switch him to p.o.  2 chronic kidney failure  Status post amputation right third toe       Plan:  Patient can be discharged today from medical standpoint if being discharged by podiatry  Patient resume his Coumadin dose at home and follow-up with the Coumadin clinic if that is where he gets his INR checked    We will keep him on Zyvox 600 mg p.o. twice daily for at least 8 days    He is to follow-up with podiatry outpatient and his PCP and if he is referred to wound care by podiatry continue follow-up with them as well.     He is to continue with his home meds monitor his blood sugar    DO CAROLINA Gonzalez            3/5/2023, 11:30 AM

## 2023-03-05 NOTE — CARE COORDINATION
Writer called Pt's Pharmacy, [address] [place] [address] in [facility], at [phone], they are currently closed.

## 2023-03-05 NOTE — PROGRESS NOTES
[facility]   Occupational Therapy Evaluation  Date: 3/5/23  Patient Name: Asher Ayon       Room:   MRN: [id number]  Account: [id number]   : 1955  (67 y.o.) Gender: male     Discharge Recommendations:  The patient's needs are being met with no further Occupational Therapy recommended at discharge.     OT Equipment Recommendations  Equipment Needed: No  Other: pt notes no need for raised toilet seat or grab bars.       Diagnosis: Rt 3rd toe amputation w/ Podiatry, 3-4-23 Additional Pertinent Hx:  transmetatarsal amputation LLE, pt notes still needing to cover this wound to shower.         Past Medical History:  has a past medical history of Atrial fibrillation ([facility]), CAD (coronary artery disease), Cardiomyopathy ([facility]), Cellulitis, CHF (congestive heart failure) ([facility]), Diabetes mellitus ([facility]), Foot infection, Heart failure ([facility]), Hyperlipidemia, Hypertension, MRSA (methicillin resistant staph aureus) culture positive, Osteomyelitis ([facility]), PVD (peripheral vascular disease) ([facility]), and Wound, open, foot.    Past Surgical History:   has a past surgical history that includes eye surgery (Bilateral); hernia repair; Knee arthroscopy (Right); Colonoscopy; Endoscopy, colon, diagnostic; pacemaker placement (); Cardiac surgery (); Toe amputation (Right); Wound debridement (Right, 2015); other surgical history (Right, 12 29 15); Toe amputation (Left, 3/13/2022); vascular surgery (Left, 2022); Toe amputation (Left, 2022); and Toe amputation (Right, 3/4/2023).    Restrictions  Restrictions/Precautions  Restrictions/Precautions: Weight Bearing  Required Braces or Orthoses?: Yes  Required Braces or Orthoses  Right Lower Extremity Brace: Boot  RLE Brace Type: Surgical Boot  Lower Extremity Weight Bearing Restrictions  Right Lower Extremity Weight Bearing:  (Recommend weight only to heel)  Partial Weight Bearing Percentage Or Pounds: Recommend weight only  to heel, with surgical boot  Position Activity Restriction  Other position/activity restrictions: S/P 3RD DIGIT AMPUTATION FOOT (Right: Toes) 3/4/23      Vitals  Vitals  Heart Rate: 71  Heart Rate Source: Monitor  BP: (!) 152/81  BP Location: Right upper arm  BP Method: Automatic  Patient Position: Sitting  MAP (Calculated): 105  Resp: 18  SpO2: 96 %     Subjective  Subjective: \"I won't use them. \"  walker, toilet equipment  Comments: \"I usually decline therapy. \"         Social/Functional History  Social/Functional History  Lives With: Other (comment) (Mother)  Type of Home: House  Home Layout: One level  Home Access: Stairs to enter with rails  Entrance Stairs - Number of Steps: 3  Entrance Stairs - Rails: Both (too wide)  Bathroom Shower/Tub: Tub/Shower unit, Curtain, Shower chair with back  Bathroom Toilet: Standard  Bathroom Equipment: None (Sink by the toilet)  Bathroom Accessibility: Accessible  Home Equipment: Rollator (Rollator belongs [employer])  Receives Help From: Family  ADL Assistance: Independent  Homemaking Assistance: Independent  Homemaking Responsibilities: Yes  Meal Prep Responsibility: Primary  Laundry Responsibility: Primary  Cleaning Responsibility: Primary  Bill Paying/Finance Responsibility: Primary  Shopping Responsibility: Primary  Dependent Care Responsibility: Primary  Health Care Management: Primary  Ambulation Assistance: Independent  Transfer Assistance: Independent  Active : Yes  Mode of Transportation: SUV  Occupation: Retired  IADL Comments: Sleeps in a regular flat bed at home  Additional Comments: Mother does grocery shopping, cleaning. Pt and mother share cooking, pt does own laundry      Objective  Orientation  Overall Orientation Status: Within Normal Limits  Cognition  Overall Cognitive Status: WFL   Sensation  Overall Sensation Status: Impaired  Additional Comments: No numbness and tingling reported .     ADL  Feeding: Independent  Grooming: Setup  UE Bathing: Setup  LE Bathing: Setup  UE Dressing: Setup  LE Dressing: Setup  Toileting: Independent  Additional Comments: once pt's medical boot recommended by podiatrist arrives, pt will be safe to indep amb to obtain set up while maintaining RLE precautions.    Gross Assessment  AROM: Within functional limits  Strength: Within functional limits  Coordination: Within functional limits  Tone: Normal    UE Function           WFL                  Fine Motor Skills/Coordination       WFL          Bed Mobility  Bed mobility  Supine to Sit: Independent  Sit to Supine: Independent  Scooting: Independent    Balance      WFL    Transfers  Transfers  Sit to stand: Independent  Stand to sit: Independent  Transfer Comments: pt has been indep amb in room and to bathroom, no boot in room currently,  currently pt demo safe with balance to amb in room without equipment but unable to tell for certain visually that he is not putting weight onto R toes, pt states he is not. pt completed sit to stand from room chair, amb forward 4 feet, turn and back to sit on room chair.  ed pt and RN re podiatry recommendations .  Toilet Transfers  Toilet Transfer: Modified independent         Assessment  Assessment  No Skilled OT:  (once pt's medical boot recommended by podiatrist arrives, pt will be safe to indep amb to obtain set up while maintaining RLE precautions.)         Safety Devices  Type of Devices: Call light within reach, Left in bed    Patient Education  Patient Education  Education Given To: Patient  Education Provided: ADL Adaptive Strategies, Transfer Training, Equipment  Education Provided Comments: ed re safer tub transfer: rather than stand and step into tub (needs to waterproof BLE)  sit onto his mom's tub seat in tub , then turn and pivot to bring BLE into tub.  Education Method: Verbal, Demonstration  Barriers to Learning: None  Education Outcome: Verbalized understanding      Functional Outcome Measures  AM-PAC Daily Activity -  Inpatient   How much help is needed for putting on and taking off regular lower body clothing?: A Little  How much help is needed for bathing (which includes washing, rinsing, drying)?: A Little  How much help is needed for toileting (which includes using toilet, bedpan, or urinal)?: None  How much help is needed for putting on and taking off regular upper body clothing?: A Little  How much help is needed for taking care of personal grooming?: A Little  How much help for eating meals?: None  AM-[facility] Inpatient Daily Activity Raw Score: 20  AM-PAC Inpatient ADL T-Scale Score : 42.03  ADL Inpatient CMS 0-100% Score: 38.32  ADL Inpatient CMS G-Code Modifier : CJ       once pt's medical boot recommended by podiatrist arrives, pt will be safe to indep amb to obtain set up while maintaining RLE precautions.                     OT Individual Minutes  OT Individual Minutes  Time In: 4628  Time Out: 2317  Minutes: 19           Electronically signed by Marky Boyd OT on 3/5/23 at 4:53 PM EST

## 2023-03-05 NOTE — PROGRESS NOTES
Progress note  Podiatric Medicine and Surgery     Subjective     Chief Complaint: right third digit wound    Interval history:  Patient seen at bedside. Working with PT. Patient has no new complaints. Patient is POD 1 right third digit amp. HPI:  Mary Mercado is a 75 y.o. male seen [facility] ED for chronic, nonhealing wound of right 2nd digit. Patient was at wound care appointment earlier today and was advised to present to ED for worsening wound. Pt denies any constitutional symptoms. States he follows Dr. Valiente Dad outpatient for podiatry routine care and Dr. Barbra Haji at wound care clinic. He states he underwent left foot TMA in August of 2022 by vascular team. No significant changes since then. Denies any further pedal complaints at this time. PCP is Vincent Xiong DO    ROS:   Review of Systems   Constitutional:  Negative for chills and fever. Respiratory:  Negative for chest tightness and shortness of breath. Cardiovascular:  Negative for chest pain and leg swelling. Gastrointestinal:  Negative for abdominal pain, constipation, diarrhea, nausea and vomiting. Musculoskeletal:  Negative for arthralgias, gait problem, joint swelling and myalgias. Skin:  Positive for color change and wound. Neurological:  Negative for weakness and numbness. Past Medical History   has a past medical history of Atrial fibrillation ([address] CAD (coronary artery disease), Cardiomyopathy ([address] Cellulitis, CHF (congestive heart failure) ([address] Diabetes mellitus ([address] Foot infection, Heart failure ([address] Hyperlipidemia, Hypertension, MRSA (methicillin resistant staph aureus) culture positive, Osteomyelitis ([address] PVD (peripheral vascular disease) ([address] and Wound, open, foot. Past Surgical History   has a past surgical history that includes eye surgery (Bilateral); hernia repair; Knee arthroscopy (Right); Colonoscopy; Endoscopy, colon, diagnostic; pacemaker placement (2011);  Cardiac surgery (2010); Toe amputation (Right); Wound debridement (Right, 11/19/2015); other surgical history (Right, 12 29 15); Toe amputation (Left, 3/13/2022); vascular surgery (Left, 7/13/2022); Toe amputation (Left, 8/24/2022); and Toe amputation (Right, 3/4/2023). Medications  Prior to Admission medications    Medication Sig Start Date End Date Taking? Authorizing Provider   linezolid (ZYVOX) 600 MG tablet Take 1 tablet by mouth 2 times daily for 8 days 3/5/23 3/13/23 Yes Lucas Hernadez, DO   HYDROcodone-acetaminophen (NORCO) 5-325 MG per tablet Take 1 tablet by mouth every 6 hours as needed for Pain for up to 5 days. Intended supply: 5 days. Take lowest dose possible to manage pain Max Daily Amount: 4 tablets 3/4/23 3/9/23 Yes Cindy Alcantara DPM   warfarin (COUMADIN) 2 MG tablet Take 4 mg by mouth Six times weekly Indications: All days except Monday Patient's INR is managed by [facility]   Yes Historical Provider, MD   carvedilol (COREG) 3.125 MG tablet Take 3.125 mg by mouth 2 times daily 1/21/23   Historical Provider, MD   benazepril (LOTENSIN) 5 MG tablet Take 5 mg by mouth in the morning and at bedtime    Historical Provider, MD   glipiZIDE (GLUCOTROL) 5 MG tablet Take 2.5 mg by mouth at bedtime    Historical Provider, MD   Vitamin D (CHOLECALCIFEROL) 25 MCG (1000 UT) TABS tablet Take 1,000 Units by mouth daily    Historical Provider, MD   warfarin (COUMADIN) 2 MG tablet Take 2 mg by mouth once a week Indications: Mondays Patient's INR is managed by [facility]. Historical Provider, MD   aspirin 81 MG EC tablet Take 1 tablet by mouth daily 3/15/22   Feroz Pod, DO   furosemide (LASIX) 80 MG tablet Take 1 tablet by mouth daily 3/15/22   Lucas Hernadez, DO   nitroGLYCERIN (NITROSTAT) 0.4 MG SL tablet up to max of 3 total doses.  If no relief after 1 dose, call 911. 3/15/22 8/26/22  Lucas Hernadez, DO   atorvastatin (LIPITOR) 40 MG tablet Take 40 mg by mouth at bedtime Historical Provider, MD    Scheduled Meds:  Continuous Infusions:  PRN Meds:.    Allergies  is allergic to doxycycline, pcn [penicillins], and penicillin g.    Family History  family history includes Cancer in his father; Osteoarthritis in his mother; Other in his brother and maternal grandfather.    Social History   reports that he has never smoked. He has never used smokeless tobacco.   reports current alcohol use.   reports no history of drug use.    Objective     Vitals:  Patient Vitals for the past 8 hrs:   BP Temp Temp src Pulse Resp SpO2   23 1536 (!) 152/81 97.4 °F (36.3 °C) Oral 71 18 96 %   23 0900 (!) 147/90 97.6 °F (36.4 °C) Oral 76 18 95 %       Average, Min, and Max for last 24 hours Vitals:  TEMPERATURE:  Temp  Av.5 °F (36.4 °C)  Min: 97.4 °F (36.3 °C)  Max: 97.7 °F (36.5 °C)    RESPIRATIONS RANGE: Resp  Av  Min: 18  Max: 18    PULSE RANGE: Pulse  Av.5  Min: 67  Max: 76    BLOOD PRESSURE RANGE:  Systolic (24hrs), Av , Min:123 , Max:152   ; Diastolic (24hrs), Av, Min:67, Max:90      PULSE OXIMETRY RANGE: SpO2  Av.4 %  Min: 95 %  Max: 98 %  I&O:  I/O last 3 completed shifts:  In: 4346.2 [I.V.:3150; IV Piggyback:1196.2]  Out: 1250 [Urine:1250]    CBC:  Recent Labs     23  0637 23  0607 23  06   WBC 7.0 6.6 7.6   HGB 12.5* 12.4* 12.0*   HCT 36.5* 36.5* 34.4*    204 198          BMP:  Recent Labs     23  0637 23  0607 23  06    134* 134*   K 3.7 3.7 4.0    100 101   CO2 23 24 25   BUN 22 21 20   CREATININE 1.66* 1.73* 1.73*   GLUCOSE 260* 259* 148*   CALCIUM 8.6 8.9 8.7          Coags:  Recent Labs     23  0637 23  0607 23  0621   INR 3.2 2.9 2.6         Lab Results   Component Value Date    LABA1C 6.8 (H) 2022     Lab Results   Component Value Date    SEDRATE 19 2023     Lab Results   Component Value Date    CRP 4.8 2023         Lower Extremity Physical Exam:  Dressings  left intact: Examination below from 3/3/2023    Vascular: DP and PT pulses are faintly palpable bilaterall. CFT <4 seconds to all digits. Hair growth is absent to the level of the digits. No edema. Neuro: Saph/sural/SP/DP/plantar sensation intact to light touch. Musculoskeletal: Muscle strength is adequate ROM, adequate strength to all lower extremity muscle groups. Gross deformity is s/p right 2nd digit amputation, hallux amputation. Left foot TMA    Dermatologic: Full thickness ulcer #1 located distal 2nd digit down to level of subcutaneous layer and measures approximately 1.0 cm x 1.0 cm x 0.3 cm. Base is fibrogranular. Periwound skin is hyperkeratotic. No drainage noted with no associated mal odor. Erythema localized to distal 2nd digit with no associated increase in warmth. Does not probe to bone, sinus track, or undermine. No fluctuance, crepitus, or induration. Interdigital maceration absent. Patient is s/p third digit amputation which was disarticulated at the MTPJ. Bandaging left over incision site which is closed with Prolene sutures. To remain intact until follow-up. Clinical Images: From 3/3/2023            Imaging:   XR FOOT RIGHT (MIN 3 VIEWS)   Final Result   Satisfactory appearance status post amputation of the 3rd toe         CT FOOT RIGHT WO CONTRAST   Final Result   1. Diffuse soft tissue edema. No organized drainable fluid collection   identified. No subcutaneous gas. 2. No definitive CT evidence of osteomyelitis. XR FOOT RIGHT (MIN 3 VIEWS)   Final Result   1. Soft tissue irregularity at the distal 3rd toe. No definite radiographic   evidence of osteomyelitis. If there is persistent clinical concern, consider   MRI. 2.  Remote amputation of the great toe and mid 2nd toe.              Assessment     Leilani Staples is a 75 y.o. male with   S/p right third digit amputation  Nonhealing full thickness ulceration distal third digit, right foot  Cellulitis third digit, right  S/p right hallux amputation  Type 2 diabetes mellitus   PVD    Principal Problem:    Diabetic foot infection ([facility]  Active Problems:    Cellulitis    Diabetic infection of right foot ([facility]  Resolved Problems:    * No resolved hospital problems. *        Plan     Patient examined and evaluated at bedside   Treatment options discussed in detail with the patient  Radiographs and CT reviewed right foot. No acute osseous abnormalities or soft tissue abnormalities       Patient admitted due to noncompliance with antibiotic medications in recent history. Right third digit amputation POD 1. Patient seen by PT/OT today. From podiatry standpoint patient can be discharged home today. Rx pain medications and antibiotics. Patient to follow-up with Dr. Gianluca Burnham in his clinic in 1 week. Dressing applied to Right foot:  Adaptic, gauze, Kerlix, light Ace  WBAT to Right lower extremity  Will discuss with Dr. Hang Ling DPM   Podiatric Medicine & Surgery   3/5/2023 at 4:09 PM

## 2023-03-05 NOTE — CARE COORDINATION
Writer spoke to pt. About his [facility] being closed today. Pt. Has not used another pharmacy in over 2 years. Unable to get cost of Zyvox. Writer gave [employer] cards for [employer] [facility], that have the cheapest prices, around $27.00- $35.00 dollars, for pt. To use if needed. Also, informed pt. To go to [facility] w/ [facility], to check cost w/ His Insurance, for the 16 pills. Pt. States understanding. Nurse, Deny Schultz, will get in touch w/ Dr. Chastity Mahan, to get a 1 X oral Dose of Zyvox, prior to DC today.

## 2023-03-05 NOTE — PLAN OF CARE
Problem: Discharge Planning  Goal: Discharge to home or other facility with appropriate resources  Outcome: Adequate for Discharge     Problem: Safety - Adult  Goal: Free from fall injury  Outcome: Adequate for Discharge     Problem: Chronic Conditions and Co-morbidities  Goal: Patient's chronic conditions and co-morbidity symptoms are monitored and maintained or improved  Outcome: Adequate for Discharge     Problem: Neurosensory - Adult  Goal: Achieves stable or improved neurological status  Outcome: Adequate for Discharge  Goal: Achieves maximal functionality and self care  Outcome: Adequate for Discharge     Problem: Respiratory - Adult  Goal: Achieves optimal ventilation and oxygenation  Outcome: Adequate for Discharge     Problem: Cardiovascular - Adult  Goal: Maintains optimal cardiac output and hemodynamic stability  Outcome: Adequate for Discharge  Goal: Absence of cardiac dysrhythmias or at baseline  Outcome: Adequate for Discharge     Problem: Skin/Tissue Integrity - Adult  Goal: Skin integrity remains intact  Outcome: Adequate for Discharge  Goal: Incisions, wounds, or drain sites healing without S/S of infection  Outcome: Adequate for Discharge  Goal: Oral mucous membranes remain intact  Outcome: Adequate for Discharge     Problem: Musculoskeletal - Adult  Goal: Return mobility to safest level of function  Outcome: Adequate for Discharge  Goal: Maintain proper alignment of affected body part  Outcome: Adequate for Discharge  Goal: Return ADL status to a safe level of function  Outcome: Adequate for Discharge     Problem: Gastrointestinal - Adult  Goal: Minimal or absence of nausea and vomiting  Outcome: Adequate for Discharge  Goal: Maintains or returns to baseline bowel function  Outcome: Adequate for Discharge  Goal: Maintains adequate nutritional intake  Outcome: Adequate for Discharge     Problem: Genitourinary - Adult  Goal: Absence of urinary retention  Outcome: Adequate for Discharge Problem: Infection - Adult  Goal: Absence of infection at discharge  Outcome: Adequate for Discharge  Goal: Absence of infection during hospitalization  Outcome: Adequate for Discharge     Problem: Metabolic/Fluid and Electrolytes - Adult  Goal: Electrolytes maintained within normal limits  Outcome: Adequate for Discharge  Goal: Hemodynamic stability and optimal renal function maintained  Outcome: Adequate for Discharge  Goal: Glucose maintained within prescribed range  Outcome: Adequate for Discharge     Problem: Hematologic - Adult  Goal: Maintains hematologic stability  Outcome: Adequate for Discharge     Problem: Skin/Tissue Integrity  Goal: Absence of new skin breakdown  Description: 1. Monitor for areas of redness and/or skin breakdown  2. Assess vascular access sites hourly  3. Every 4-6 hours minimum:  Change oxygen saturation probe site  4. Every 4-6 hours:  If on nasal continuous positive airway pressure, respiratory therapy assess nares and determine need for appliance change or resting period.   Outcome: Adequate for Discharge

## 2023-03-05 NOTE — PROGRESS NOTES
Physical Therapy  Facility/Department: [facility] MED SURG  Physical Therapy Initial Assessment    Name: Rodolfo Hernandez  : 1955  MRN: [id number]  Date of Service: 3/5/2023    Discharge Recommendations:  Home with assist PRN          Patient Diagnosis(es): The primary encounter diagnosis was Diabetic foot infection ([address] Diagnoses of Diabetic infection of right foot ([address] and Post-op pain were also pertinent to this visit. Past Medical History:  has a past medical history of Atrial fibrillation ([address] CAD (coronary artery disease), Cardiomyopathy ([address] Cellulitis, CHF (congestive heart failure) ([address] Diabetes mellitus ([address] Foot infection, Heart failure ([address] Hyperlipidemia, Hypertension, MRSA (methicillin resistant staph aureus) culture positive, Osteomyelitis ([address] PVD (peripheral vascular disease) ([address] and Wound, open, foot. Past Surgical History:  has a past surgical history that includes eye surgery (Bilateral); hernia repair; Knee arthroscopy (Right); Colonoscopy; Endoscopy, colon, diagnostic; pacemaker placement (); Cardiac surgery (); Toe amputation (Right); Wound debridement (Right, 2015); other surgical history (Right, 12 29 15); Toe amputation (Left, 3/13/2022); vascular surgery (Left, 2022); Toe amputation (Left, 2022); and Toe amputation (Right, 3/4/2023). Assessment   Assessment: Pt demonstrates understanding of R LE WB restricitons, able to ambulate household distance without difficulty. Discussed importance of getting diabetic shoes for B foot with inserts/orthosis for L foot 2* TMA. Podiatrist Resident also in the the room, and re-inforces with pt for proper foot wear. Pt to follow with his podiatrist as outpatient and request for new shoes when R foot heals. Pt will be discharge home today most likely. No need for skilled physical therapy at this tiem.   Decision Making: Medium Complexity        Plan   Safety Devices  Type of Devices: Call light within reach, Left in bed     Restrictions  Restrictions/Precautions  Restrictions/Precautions: Weight Bearing  Required Braces or Orthoses?: Yes  Lower Extremity Weight Bearing Restrictions  Right Lower Extremity Weight Bearing:  (Recommend weight only to heel)  Partial Weight Bearing Percentage Or Pounds: Recommend weight only to heel, with surgical boot  Required Braces or Orthoses  Right Lower Extremity Brace: Boot  RLE Brace Type: Surgical Boot  Position Activity Restriction  Other position/activity restrictions: S/P 3RD DIGIT AMPUTATION FOOT (Right: Toes) 3/4/23     Subjective   Pain: No pain reported  General  Patient assessed for rehabilitation services?: Yes  Additional Pertinent Hx: HPI  Patient with PMH of DM, CAD, multiple previous toe amputations. Sent from wound care for admission for IV abx and toe amputation, seen by Dr Jose Ba. States he noticed the wound about 2 weeks ago. His PCP Dr Raulito Wong has prescribed cipro/flagyl and clindamycin but the patient has not taken any of these because he says the pills were too large and the liquid tasted bad. He states he underwent left foot TMA in August of 2022 by vascular team.  Referral Date : 03/04/23  Diagnosis: Diabetic foot infection  Follows Commands: Within Functional Limits  Subjective  Subjective: Podiatry resident in to see the pt, discusssing WB precautions, use of surgical shoe. Pt does not have a surgical shoe. Therapist obtained surgical shoe for pt and gave it to pt.          Social/Functional History  Social/Functional History  Lives With: Other (comment) (Mother)  Type of Home: House  Home Layout: One level  Home Access: Stairs to enter with rails  Entrance Stairs - Number of Steps: 3  Entrance Stairs - Rails: Both (too wide)  Bathroom Shower/Tub: Tub/Shower unit, Curtain, Shower chair with back  Bathroom Toilet: Standard  Bathroom Equipment: None (Sink by the toilet)  Bathroom Accessibility: Accessible  Home Equipment: Rollator (Rollator belongs ruddy pt's bernarda)  Receives Help From: Family  ADL Assistance: Independent  Homemaking Assistance: Independent  Homemaking Responsibilities: Yes  Meal Prep Responsibility: Primary  Laundry Responsibility: Primary  Cleaning Responsibility: Primary  Bill Paying/Finance Responsibility: Primary  Shopping Responsibility: Primary  Dependent Care Responsibility: Primary  Health Care Management: Primary  Ambulation Assistance: Independent  Transfer Assistance: Independent  Active : Yes  Mode of Transportation: [facility]  Occupation: Retired  IADL Comments: Sleeps in a regular flat bed at home  Additional Comments: Mother does grocery shopping, cleaning. Pt and mother share cooking, pt does own laundry  Vision/Hearing  Vision  Vision: Impaired  Vision Exceptions: Wears glasses for reading  Hearing  Hearing: Within functional limits    Cognition   Orientation  Overall Orientation Status: Within Normal Limits  Cognition  Overall Cognitive Status: WFL     Objective   Heart Rate: 76  Heart Rate Source: Monitor  BP: (!) 147/90  BP Location: Right upper arm  MAP (Calculated): 109  Resp: 18  SpO2: 95 %              AROM RLE (degrees)  RLE General AROM: Hip/knee WFL, foot NT- has dressing on foot  AROM LLE (degrees)  LLE General AROM: Hip/knee WFL, L TMA  Strength RLE  Comment: Foot NT, Hip/knee WFL  Strength LLE  Strength LLE: WFL     Sensation  Overall Sensation Status: Impaired  Additional Comments: No numbness and tingling reported . Bed mobility  Supine to Sit: Independent  Sit to Supine: Independent  Scooting: Independent  Transfers  Sit to Stand: Independent  Stand to Sit: Independent  Bed to Chair: Independent  Ambulation  WB Status: Heel WB R Foot with surgical shoe  Ambulation  Surface: Level tile  Device: No Device  Assistance: Modified Independent  Quality of Gait: Decreased step length R LE, steady , no LOB.   Distance: 35 ft  Stairs/Curb  Stairs?: Yes  Stairs  # Steps : 3  Stairs Height: 6\"  Rails:  (1 UE support.)  Assistance: Modified independent      Balance  Posture: Good  Sitting - Static: Good  Sitting - Dynamic: Good  Standing - Static: Good  Standing - Dynamic: Fair     AM-PAC Score  AM-PAC Inpatient Mobility Raw Score : 24 (03/05/23 1523)  AM-PAC Inpatient T-Scale Score : 61.14 (03/05/23 1523)  Mobility Inpatient CMS 0-100% Score: 0 (03/05/23 1523)  Mobility Inpatient CMS G-Code Modifier : [address] (03/05/23 1523)          Tinneti Score       Goals: NA          Education  Patient Education  Education Given To: Patient  Education Provided: Role of Therapy;Plan of Care;Precautions;Transfer Training; Fall Prevention Strategies (R Heel WB with surgical shoe)  Education Method: Demonstration;Verbal;Teach Back  Barriers to Learning: None  Education Outcome: Verbalized understanding;Demonstrated understanding      Therapy Time   Individual Concurrent Group Co-treatment   Time In 1408         Time Out 1432         Minutes 24         Timed Code Treatment Minutes: 10 Minutes       Jade Kauffman, PT

## 2023-03-05 NOTE — OP NOTE
Operative Note      Patient: Brandy Desai  YOB: 1955  MRN: [id number]     Date of Procedure: 3/4/2023     Pre-Op Diagnosis: Diabetic infection of right foot ([facility] [E11.628, L08.9]     Post-Op Diagnosis: Same       Procedures:  Fillet of toe of third digit, right     Surgeon(s):  Benjamin Nelson DPM     Assistant:  Resident: Idalia Rose DPM     Anesthesia: Monitor Anesthesia Care, 1:1 mix of 0.5% marcaine plain and 1% lidocaine plain     Hemostasis: Ankle tourniquet was applied to right ankle but left deflated throughout entirety of procedure. Direct pressure used for hemostasis     Injectables: None      Estimated Blood Loss (mL): Minimal     Complications: None     Specimens:   ID Type Source Tests Collected by Time Destination   A : RIGHT FOOT THIRD DIGIT Tissue Toe SURGICAL PATHOLOGY Benjamin Leslie, [address] 3/4/2023 0905           Implants:  * No implants in log *      Drains: * No LDAs found *     Findings: Right third digit was removed to its entirety. Distal phalanx was noted to be necrotic and nonviable. Metatarsal head remaining appeared viable with normal-appearing articular cartilage. No purulent drainage, abscess or malodor appreciated throughout procedure. Specimen sent to pathology. Minimal bleeding was noted even with doubt a tourniquet. Lateral flap was created and rotated to close down amputation site. See below for further details. Detailed Description of Procedure:   INDICATIONS FOR PROCEDURE: Cassia Sahu has a right third digit foot infection and has failed conservative treatment. Dr. Boy Garcia has recommended surgical treatment to which the patient is amenable. In pre-op all risks and rewards of the aforementioned procedure were discussed at length prior to the patient signing the consent form which was witnessed by a nurse and placed in the patient's chart.  The right foot was marked, abx given on the floor, labs and images reviewed which showed osteomyelitis of the distal third digit, NPO status confirmed. Patient has undergone previous amputations. No guarantees were given or implied. PROCEDURE IN DETAIL: The patient was brought to the operating room and placed on the operating table in the supine position with a safety strap across the lap. A well-padded pneumatic tourniquet was applied to the right ankle but not inflated. After adequate sedation was given by the anesthesia team, a local block of 10 cc of 1:1 mixture of 1% lidocaine plain and 0.5% Marcaine plain was injected to the right foot preoperatively. The surgical site was then scrubbed, prepped, and draped in the usual aseptic manner. A timeout was performed confirming the patient's identity, correct site, correct procedure, allergies, and preoperative antibiotics. Attention was directed to the right 3rd digit. There was noted to be a wound to the distal aspect of the third digit. A #15 blade was used to create two converging semieliptical incisions around the proximal phalanx base. A long flap was left along the lateral aspect of the third digit. The 3rd digit was disarticulated at the MTPJ and passed from the table as specimen. The metatarsal head appeared to be healthy. Any non-viable soft tissue was excised. Copious amounts of normal saline was then used to irrigate the surgical site. The flap was rotated over the void and the surgical site was then coapted sequentially in layers with monocryl and proline. Poor bleeding was noted throughout the procedure. The incision was dressed with adaptic and covered with sterile compressive dressing such as 4x4s, kerlix, and light ACE. The tourniquet was then deflated and immediate hyperemia returned to all digits. The patient tolerated the procedure well and was transferred to the PACU with all vital signs stable and vascular status intact to the right foot and ankle.  Following a period of postoperative monitoring, the patient was transferred back to the floor. Will follow up daily with patient. Patient will be PWB to heel touch. Instructed to keep dressing C/D/I.      Electronically signed by Nakita Clemens DPM on 3/4/2023 at 8:59 PM

## 2023-03-07 LAB — SURGICAL PATHOLOGY REPORT: NORMAL

## 2023-03-08 ENCOUNTER — OFFICE VISIT (OUTPATIENT)
Dept: PODIATRY | Age: 68
End: 2023-03-08

## 2023-03-08 VITALS — HEIGHT: 70 IN | BODY MASS INDEX: 28.35 KG/M2 | WEIGHT: 198 LBS

## 2023-03-08 DIAGNOSIS — Z98.890 POST-OPERATIVE STATE: Primary | ICD-10-CM

## 2023-03-08 PROCEDURE — 99024 POSTOP FOLLOW-UP VISIT: CPT | Performed by: PODIATRIST

## 2023-03-08 NOTE — PROGRESS NOTES
[address]  Dept: [phone]    POST-OP PROGRESS NOTE  Date of patient's visit: 3/8/2023  Patient's Name:  Kyle Albright YOB: 1955            Patient Care Team:  Mahogany Clark DO as PCP - General        Chief Complaint   Patient presents with    Post-Op Check     Post op x 4 days       Pt's primary care physician is Mahogany Clark DO last seen February 28 2023     Subjective: Kyle Albright is a 75 y.o. male who presents to the office today 4 day(s)  S/P 3RD DIGIT AMPUTATION for correction of Diabetic infection of right foot   Problem List Items Addressed This Visit    None  Visit Diagnoses       Post-operative state    -  Primary        . Patient relates pain is Absent  and IMPROVED. Pain is rated 0 out of 10 and is described as none. Currently denies F/C/N/V. Is patient taking pain medications as prescribed and is controlling pain no    Physical Examination:  Incision is coapted, sutures/steri-strips are intact. Minimal bleeding post operatively. Edema present. No erythema. No Pus. Operative correction is satisfactory. Radiographs: none      Assessment: Kyle Albright is status post 3RD DIGIT AMPUTATION  Normal post operative course. Doing well          ICD-10-CM    1. Post-operative state  Z98.890             Plan:  Patient examined and evaluated. Current condition and treatment options discussed in detail. Advised pt to cotninue to be weightbaering to the heel only. New dsd applied. Pt to keep new dressings clean dry and intact . Verbal and written instructions given to patient. Orders: No orders of the defined types were placed in this encounter. Contact office with any questions/problems/concerns.   RTC in 2week(s) for suture removal .     Electronically signed by Norm Stephens DPM on 3/8/2023 at 9:18 AM  3/8/2023

## 2023-03-16 ENCOUNTER — OFFICE VISIT (OUTPATIENT)
Dept: PODIATRY | Age: 68
End: 2023-03-16

## 2023-03-16 VITALS — HEIGHT: 70 IN | WEIGHT: 198 LBS | BODY MASS INDEX: 28.35 KG/M2

## 2023-03-16 DIAGNOSIS — Z98.890 POST-OPERATIVE STATE: Primary | ICD-10-CM

## 2023-03-16 NOTE — PROGRESS NOTES
[address]  Dept: [phone]    POST-OP PROGRESS NOTE  Date of patient's visit: 3/16/2023  Patient's Name:  Kit Johnson YOB: 1955            Patient Care Team:  Charlie Marrero DO as PCP - General        Chief Complaint   Patient presents with    Post-Op Check     Post op x 2 weeks       Pt's primary care physician is Charlie Marrero DO last seen February 28 2023     Subjective: Kit Johnson is a 75 y.o. male who presents to the office today 12 day(s)  S/P 3RD DIGIT AMPUTATION for correction of Diabetic infection of right foot. Patient presents to the office today on an emergency basis due to his foot bleeding. Problem List Items Addressed This Visit    None  Visit Diagnoses       Post-operative state    -  Primary        . Patient relates pain is Absent  and IMPROVED. Pain is rated 0 out of 10 and is described as none. Currently denies F/C/N/V. Is patient taking pain medications as prescribed and is controlling pain no    Physical Examination:  Incision is coapted, sutures/steri-strips are intact. Minimal bleeding post operatively. Edema present. No erythema. No Pus. Operative correction is satisfactory. Radiographs: none      Assessment: Kit Johnson is status post 3RD DIGIT AMPUTATION  Normal post operative course. Doing well          ICD-10-CM    1. Post-operative state  Z98.890             Plan:  Patient examined and evaluated. Current condition and treatment options discussed in detail. Advised pt to cotinue to be weightbearing to the heel only. New dsd applied. Pt to keep new dressings clean dry and intact . Verbal and written instructions given to patient. Plan for suture removal at next visit. Orders: No orders of the defined types were placed in this encounter. Contact office with any questions/problems/concerns.   RTC in 1 week     Electronically signed by Betsy Banks DPM on 3/16/2023 at 9:41 AM  3/16/2023

## 2023-03-20 ENCOUNTER — OFFICE VISIT (OUTPATIENT)
Dept: PODIATRY | Age: 68
End: 2023-03-20

## 2023-03-20 ENCOUNTER — OFFICE VISIT (OUTPATIENT)
Dept: VASCULAR SURGERY | Age: 68
End: 2023-03-20
Payer: MEDICARE

## 2023-03-20 VITALS
TEMPERATURE: 97.3 F | DIASTOLIC BLOOD PRESSURE: 86 MMHG | RESPIRATION RATE: 16 BRPM | SYSTOLIC BLOOD PRESSURE: 142 MMHG | WEIGHT: 208.9 LBS | OXYGEN SATURATION: 99 % | BODY MASS INDEX: 29.91 KG/M2 | HEART RATE: 76 BPM | HEIGHT: 70 IN

## 2023-03-20 VITALS — BODY MASS INDEX: 29.78 KG/M2 | HEIGHT: 70 IN | WEIGHT: 208 LBS

## 2023-03-20 DIAGNOSIS — I70.235 ATHEROSCLEROSIS OF NATIVE ARTERIES OF RIGHT LEG WITH ULCERATION OF OTHER PART OF FOOT (HCC): ICD-10-CM

## 2023-03-20 DIAGNOSIS — I70.245 ATHEROSCLEROSIS OF NATIVE ARTERIES OF LEFT LEG WITH ULCERATION OF OTHER PART OF FOOT (HCC): Primary | ICD-10-CM

## 2023-03-20 DIAGNOSIS — Z98.890 POST-OPERATIVE STATE: Primary | ICD-10-CM

## 2023-03-20 PROCEDURE — 99213 OFFICE O/P EST LOW 20 MIN: CPT | Performed by: SURGERY

## 2023-03-20 PROCEDURE — 1123F ACP DISCUSS/DSCN MKR DOCD: CPT | Performed by: SURGERY

## 2023-03-20 PROCEDURE — 99024 POSTOP FOLLOW-UP VISIT: CPT | Performed by: PODIATRIST

## 2023-03-20 NOTE — PROGRESS NOTES
Atherosclerosis of native arteries of right leg with ulceration of other part of foot ([address]          Plan: At this point I would like to follow him in another month to make sure that the left and right lower extremities are still doing well. If the right lower extremity has continued problems then it does warrant arteriography. He understands and agrees with these plans and we will see him in a month.     Electronically signed by:  Avelina Drummond MD

## 2023-03-20 NOTE — PROGRESS NOTES
[address]  Dept: [phone]    POST-OP PROGRESS NOTE  Date of patient's visit: 3/20/2023  Patient's Name:  Leilani Staples YOB: 1955            Patient Care Team:  Jhony Salinas DO as PCP - General        Chief Complaint   Patient presents with    Post-Op Check     Post op x 2.5 weeks       Pt's primary care physician is Jhony Salinas DO last seen February 28 2023     Subjective: Leilani Staples is a 75 y.o. male who presents to the office today 2.5 weeks  S/P 3RD DIGIT AMPUTATION for correction of Diabetic infection of right foot. Problem List Items Addressed This Visit    None  Visit Diagnoses       Post-operative state    -  Primary        Patient relates pain is Absent  and IMPROVED. Pain is rated 0 out of 10 and is described as none. Currently denies F/C/N/V. Is patient taking pain medications as prescribed and is controlling pain no    Physical Examination:  Incision is coapted, sutures/steri-strips are intact. Minimal bleeding post operatively. Edema present. No erythema. No Pus. Operative correction is satisfactory. Radiographs: none      Assessment: Leilani Staples is status post 3RD DIGIT AMPUTATION  Normal post operative course. Doing well          ICD-10-CM    1. Post-operative state  Z98.890             Plan:  Patient examined and evaluated. Current condition and treatment options discussed in detail. Advised pt to cotinue to be weightbearing to the heel only. New dsd applied. Pt to keep new dressings clean dry and intact Verbal and written instructions given to patient. Patient presents for suture removal. The wound is well healed without signs of infection. The sutures are removed and the steri strips applied followed by DSD consisting of 4x4, Kerlix and Ace Wrap. . Wound care and activity instructions given. .      Orders:  No

## 2023-04-10 ENCOUNTER — OFFICE VISIT (OUTPATIENT)
Dept: PODIATRY | Age: 68
End: 2023-04-10

## 2023-04-10 VITALS — HEIGHT: 70 IN | WEIGHT: 208 LBS | BODY MASS INDEX: 29.78 KG/M2

## 2023-04-10 DIAGNOSIS — Z98.890 POST-OPERATIVE STATE: Primary | ICD-10-CM

## 2023-04-10 PROCEDURE — 99024 POSTOP FOLLOW-UP VISIT: CPT | Performed by: PODIATRIST

## 2023-04-10 RX ORDER — GLIMEPIRIDE 2 MG/1
TABLET ORAL
COMMUNITY
Start: 2023-03-28

## 2023-04-17 ENCOUNTER — OFFICE VISIT (OUTPATIENT)
Dept: VASCULAR SURGERY | Age: 68
End: 2023-04-17
Payer: MEDICARE

## 2023-04-17 VITALS
TEMPERATURE: 97.2 F | HEART RATE: 98 BPM | HEIGHT: 70 IN | RESPIRATION RATE: 20 BRPM | WEIGHT: 210 LBS | OXYGEN SATURATION: 98 % | BODY MASS INDEX: 30.06 KG/M2 | SYSTOLIC BLOOD PRESSURE: 138 MMHG | DIASTOLIC BLOOD PRESSURE: 82 MMHG

## 2023-04-17 DIAGNOSIS — I70.245 ATHEROSCLEROSIS OF NATIVE ARTERIES OF LEFT LEG WITH ULCERATION OF OTHER PART OF FOOT (HCC): ICD-10-CM

## 2023-04-17 DIAGNOSIS — I70.235 ATHEROSCLEROSIS OF NATIVE ARTERIES OF RIGHT LEG WITH ULCERATION OF OTHER PART OF FOOT (HCC): Primary | ICD-10-CM

## 2023-04-17 PROCEDURE — 99213 OFFICE O/P EST LOW 20 MIN: CPT | Performed by: SURGERY

## 2023-04-17 PROCEDURE — 1123F ACP DISCUSS/DSCN MKR DOCD: CPT | Performed by: SURGERY

## 2023-04-17 NOTE — PROGRESS NOTES
[address] [facility]  [address]  [facility] [address]  Dept: [phone]     Patient: Destinee Salinas  : 1955  MRN: [id number]  DOS: 2023            HPI:  Destinee Salinas is a 75 y.o. male who returns to the office regarding bilateral lower extremity ulceration. 1 ulcers on the right side and the third toe amputation site. The other is at the transmetatarsal amputation site on the left. The transmetatarsal amputation site continues to heal.  The toe amputation site is nearly healed with a small scab. 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 The right has yet to be examined with arteriography. Review of Systems    Vitals:    23 0900   BP: 138/82   Site: Left Upper Arm   Position: Sitting   Cuff Size: Medium Adult   Pulse: 98   Resp: 20   Temp: 97.2 °F (36.2 °C)   TempSrc: Temporal   SpO2: 98%   Weight: 210 lb (95.3 kg)   Height: 5' 10\" (1.778 m)          Physical Exam  His exam is as stated above. The right third toe amputation site is nearly healed. The left transmetatarsal amputation site is also nearly healed. His feet are warm and well perfused without other ulceration or gangrene. Assessment:  1. Atherosclerosis of native arteries of right leg with ulceration of other part of foot ([address]    2. Atherosclerosis of native arteries of left leg with ulceration of other part of foot ([address]          Plan: At this point we will follow him at 3-month intervals with examinations. I see no reason to get MONIQUE and PVR as his MONIQUE is unreliable with noncompressibility. He understands that if he has any problems in the meantime he is encouraged to come back sooner rather than later.     Electronically signed by:  Nas Horn MD

## 2023-04-28 NOTE — DISCHARGE SUMMARY
[facility]    Discharge Summary     Patient ID: Mary Mercado  :  1955   MRN: [id number]     ACCOUNT:  [de-identified]   Patient's PCP: Adam Soliz DO  Admit Date: 2022   Discharge Date: 2022     Length of Stay: 3  Code Status:  Prior  Admitting Physician: Ayaka An MD  Discharge Physician: Ayaka An MD     Active Discharge Diagnoses:     Hospital Problem Lists:  Principal Problem:    Chest pain  Resolved Problems:    * No resolved hospital problems. *      Admission Condition:  poor     Discharged Condition: good    Hospital Stay:     Hospital Course:  Mary Mercado is a 78 y.o. male who was admitted for the management of Toe osteomyelitis , presented to ER with Toe Injury   Patient was started on IV vancomycin, patient underwent transmetatarsal amputation of left 3rd 4th and 5th toe. Patient tolerated the procedure well. Pain was controlled. Patient did not have much help at home with wound care hence he was discharged home with home care on oral Cipro. Significant Diagnostic Studies:   Radiology:  No results found. Consultations:    Consults:     Final Specialist Recommendations/Findings:   IP CONSULT TO VASCULAR SURGERY  IP CONSULT TO PRIMARY CARE PROVIDER  PHARMACY TO DOSE VANCOMYCIN      The patient was seen and examined on day of discharge and this discharge summary is in conjunction with any daily progress note from day of discharge.     Discharge plan:     Disposition: Home    Physician Follow Up:     [address] [place] [address],[address]  [address] [place] [address] [id number] [phone]  Follow up on 2022  Follow up with them after you see Surgeon    Koko Harding MD  [address] [place] [address] Dr Matthew [address] [place], [place] [phone]    Follow up on 2022  @10:45 am for post op follow up    [facility]  [phone]    they will call you to setup Rest at home.  Drink plenty fluids.  Take the antibiotics as prescribed.  Follow-up with family doctor in 1 week.  Return to the ER for any change or worsening symptoms especially develop increasing pain, development of a fever greater than 101, rectal bleeding, or any other concerns issues that may arise.   a time to come out to your house         Activity: As tolerated    Discharge Medications:      Medication List        START taking these medications      ciprofloxacin 500 MG tablet  Commonly known as: CIPRO  Take 1 tablet by mouth 2 times daily for 10 days     oxyCODONE-acetaminophen 5-325 MG per tablet  Commonly known as: PERCOCET  Take 1 tablet by mouth every 6 hours as needed for Pain for up to 5 days. CONTINUE taking these medications      aspirin 81 MG EC tablet  Take 1 tablet by mouth daily     atorvastatin 40 MG tablet  Commonly known as: LIPITOR     benazepril 5 MG tablet  Commonly known as: LOTENSIN     furosemide 80 MG tablet  Commonly known as: LASIX  Take 1 tablet by mouth daily     glipiZIDE 5 MG tablet  Commonly known as: GLUCOTROL     sodium hypochlorite 0.125 % Soln external solution  Commonly known as: DAKINS  Apply to packing strip and pack wound daily     Vitamin D 25 MCG (1000 UT) Tabs tablet  Commonly known as: CHOLECALCIFEROL     * warfarin 2 MG tablet  Commonly known as: COUMADIN     * warfarin 2 MG tablet  Commonly known as: COUMADIN           * This list has 2 medication(s) that are the same as other medications prescribed for you. Read the directions carefully, and ask your doctor or other care provider to review them with you.                 STOP taking these medications      Bactrim -160 MG per tablet  Generic drug: sulfamethoxazole-trimethoprim     nitroGLYCERIN 0.4 MG SL tablet  Commonly known as: NITROSTAT               Where to Get Your Medications        These medications were sent to [address] [place] [id number]      Phone: [phone]   ciprofloxacin 500 MG tablet       These medications were sent to [facility] [address] [facility] [address]      Phone: [phone] sodium hypochlorite 0.125 % Soln external solution       You can get these medications from any pharmacy    Bring a paper prescription for each of these medications  oxyCODONE-acetaminophen 5-325 MG per tablet         No discharge procedures on file. Time Spent on discharge is  32 mins in patient examination, evaluation, counseling as well as medication reconciliation, prescriptions for required medications, discharge plan and follow up. Electronically signed by   Juan Guillen MD  8/26/2022  6:16 PM      Thank you Dr. Krzysztof Monzon DO for the opportunity to be involved in this patient's care.

## 2023-07-06 ENCOUNTER — TELEPHONE (OUTPATIENT)
Dept: ADMINISTRATIVE | Age: 68
End: 2023-07-06

## 2023-07-06 NOTE — TELEPHONE ENCOUNTER
Patient wants to r/s his 07/10/2023, appointment, patient states it is for wound care, unsure how far patient can be scheduled out for this, please call patient at [phone], unable to reach office psc/sc

## 2023-07-11 ENCOUNTER — TELEPHONE (OUTPATIENT)
Dept: VASCULAR SURGERY | Age: 68
End: 2023-07-11

## 2023-07-11 NOTE — TELEPHONE ENCOUNTER
LVM for the patient to call the office back to be aakash due to the provider being out of the office

## 2023-07-17 ENCOUNTER — OFFICE VISIT (OUTPATIENT)
Dept: PODIATRY | Age: 68
End: 2023-07-17
Payer: MEDICARE

## 2023-07-17 VITALS — WEIGHT: 210 LBS | HEIGHT: 70 IN | BODY MASS INDEX: 30.06 KG/M2

## 2023-07-17 DIAGNOSIS — B35.1 DERMATOPHYTOSIS OF NAIL: Primary | ICD-10-CM

## 2023-07-17 DIAGNOSIS — E11.51 TYPE II DIABETES MELLITUS WITH PERIPHERAL CIRCULATORY DISORDER (HCC): ICD-10-CM

## 2023-07-17 DIAGNOSIS — I73.9 PVD (PERIPHERAL VASCULAR DISEASE) (HCC): ICD-10-CM

## 2023-07-17 DIAGNOSIS — Z89.419 HISTORY OF AMPUTATION OF GREAT TOE (HCC): ICD-10-CM

## 2023-07-17 DIAGNOSIS — M79.671 PAIN IN RIGHT FOOT: ICD-10-CM

## 2023-07-17 PROCEDURE — 99999 PR OFFICE/OUTPT VISIT,PROCEDURE ONLY: CPT | Performed by: PODIATRIST

## 2023-07-17 PROCEDURE — 11720 DEBRIDE NAIL 1-5: CPT | Performed by: PODIATRIST

## 2023-07-17 NOTE — PROGRESS NOTES
[address] [address]  Dept: [phone]    DIABETIC PROGRESS NOTE  Date of patient's visit: 7/17/2023  Patient's Name:  Lisa Huitron YOB: 1955            Patient Care Team:  Sharon Awad DO as PCP - General          Chief Complaint   Patient presents with    Diabetes     Diabetic foot care  Bs 250    Peripheral Neuropathy     Bl feet       Subjective:   Lisa Huitron comes to clinic for Diabetes (Diabetic foot care/Bs 250) and Peripheral Neuropathy (Bl feet)    he is a diabetic and states that he checks his feet daily . Pt currently has complaint of thickened, elongated nails that they cannot manage by themselves. Pt's primary care physician is Sharon Awad DO last seen July 11 2023   Pt's last blood sugar was 250 . Pt has a new complaint of none today . Lab Results   Component Value Date    LABA1C 6.8 (H) 03/11/2022      Complains of numbness in the feet bilat. Past Medical History:   Diagnosis Date    Atrial fibrillation (HCC)     CAD (coronary artery disease)     Cardiomyopathy ([address])     Cellulitis     CHF (congestive heart failure) ([facility])     Diabetes mellitus ([address])     Foot infection     Heart failure ([facility])     Hyperlipidemia     Hypertension     MRSA (methicillin resistant staph aureus) culture positive     Osteomyelitis ([facility])     PVD (peripheral vascular disease) ([facility])     Wound, open, foot     left foot       Allergies   Allergen Reactions    Doxycycline Other (See Comments)     Skin peeling    Pcn [Penicillins] Rash    Penicillin G Rash     Current Outpatient Medications on File Prior to Visit   Medication Sig Dispense Refill    glimepiride (AMARYL) 2 MG tablet TAKE 1 TABLET BY MOUTH TWICE A DAY FOR 30 DAYS      warfarin (COUMADIN) 2 MG tablet Take 2 tablets by mouth Six times weekly Indications:  All days except Monday Patient's INR is managed by [facility]

## 2023-07-31 ENCOUNTER — OFFICE VISIT (OUTPATIENT)
Dept: VASCULAR SURGERY | Age: 68
End: 2023-07-31
Payer: MEDICARE

## 2023-07-31 VITALS
HEIGHT: 70 IN | RESPIRATION RATE: 16 BRPM | HEART RATE: 80 BPM | OXYGEN SATURATION: 98 % | BODY MASS INDEX: 30.21 KG/M2 | WEIGHT: 211 LBS | SYSTOLIC BLOOD PRESSURE: 130 MMHG | DIASTOLIC BLOOD PRESSURE: 80 MMHG

## 2023-07-31 DIAGNOSIS — I70.213 ATHEROSCLER OF NATIVE ARTERY OF BOTH LEGS WITH INTERMIT CLAUDICATION (HCC): Primary | ICD-10-CM

## 2023-07-31 PROCEDURE — 99213 OFFICE O/P EST LOW 20 MIN: CPT | Performed by: SURGERY

## 2023-07-31 PROCEDURE — 1123F ACP DISCUSS/DSCN MKR DOCD: CPT | Performed by: SURGERY

## 2023-07-31 NOTE — PROGRESS NOTES
[address]  AND VASCULAR  [address] [facility] [address]  Dept: [phone]     Patient: Jay River  : 1955  MRN: [id number]  DOS: 2023            HPI:  Jay River is a 75 y.o. male who returns to the office regarding his transmetatarsal amputation. All of his wounds have healed. He is here for inspection of his foot. His transmetatarsal amputation was on the left. Unfortunately at that time we investigated the left lower extremity for possibilities of revascularization. There were none. His right foot has yet to be examined with arteriography however there is no reason to examine with arteriogram.    Review of Systems    Vitals:    23 1356   BP: 130/80   Site: Left Upper Arm   Position: Sitting   Cuff Size: Medium Adult   Pulse: 80   Resp: 16   SpO2: 98%   Weight: 211 lb (95.7 kg)   Height: 5' 10\" (1.778 m)          Physical Exam  On examination transmetatarsal amputation has healed. There is a scab of hyper keratinaceous tissue in the depth of the wound but there are no open wounds. There are no other ulcerations. Assessment:  1. Atheroscler of native artery of both legs with intermit claudication ([address])          Plan: At this point we can continue to see him at 6-month intervals. He knows if he has any pressing concerns or problems he is more than welcome back at any time. We will see him in 6 months.     Electronically signed by:  Afshin Jerome MD

## 2023-09-26 DIAGNOSIS — E11.51 TYPE II DIABETES MELLITUS WITH PERIPHERAL CIRCULATORY DISORDER (HCC): Primary | ICD-10-CM

## 2023-10-16 ENCOUNTER — OFFICE VISIT (OUTPATIENT)
Dept: PODIATRY | Age: 68
End: 2023-10-16
Payer: MEDICARE

## 2023-10-16 VITALS — HEIGHT: 70 IN | WEIGHT: 211 LBS | BODY MASS INDEX: 30.21 KG/M2

## 2023-10-16 DIAGNOSIS — E11.621 DIABETIC FOOT ULCER WITH OSTEOMYELITIS (HCC): ICD-10-CM

## 2023-10-16 DIAGNOSIS — E11.69 DIABETIC FOOT ULCER WITH OSTEOMYELITIS (HCC): ICD-10-CM

## 2023-10-16 DIAGNOSIS — L97.513 RIGHT FOOT ULCER, WITH NECROSIS OF MUSCLE (HCC): ICD-10-CM

## 2023-10-16 DIAGNOSIS — Z89.419 HISTORY OF AMPUTATION OF GREAT TOE (HCC): Primary | ICD-10-CM

## 2023-10-16 DIAGNOSIS — I73.9 PVD (PERIPHERAL VASCULAR DISEASE) (HCC): ICD-10-CM

## 2023-10-16 DIAGNOSIS — M86.9 DIABETIC FOOT ULCER WITH OSTEOMYELITIS (HCC): ICD-10-CM

## 2023-10-16 DIAGNOSIS — E11.51 TYPE II DIABETES MELLITUS WITH PERIPHERAL CIRCULATORY DISORDER (HCC): ICD-10-CM

## 2023-10-16 DIAGNOSIS — L97.509 DIABETIC FOOT ULCER WITH OSTEOMYELITIS (HCC): ICD-10-CM

## 2023-10-16 PROCEDURE — 99214 OFFICE O/P EST MOD 30 MIN: CPT | Performed by: PODIATRIST

## 2023-10-16 PROCEDURE — 1123F ACP DISCUSS/DSCN MKR DOCD: CPT | Performed by: PODIATRIST

## 2023-10-16 PROCEDURE — 11043 DBRDMT MUSC&/FSCA 1ST 20/<: CPT | Performed by: PODIATRIST

## 2023-10-16 NOTE — PROGRESS NOTES
Vitals: There were no vitals filed for this visit. General: AAO x 3 in NAD. Wound #:   1    diabetic foot ulcer   Right foot     Wound measurements:  #1  8 mm by 2 cm  by   3 mm        Wound Depth: muscle. Drainage:    minimal, serosanguinous Type:serosanguinous  Wound Bed status:   Granulation Tissue: 80%   Necrotic 20%  Type:   Epithelialization 0%   Hypergranular 0%   Periwound hyperkeratosis:  absent  Erythema absent    Odor:no  Maceration:no  Undermining/tract/tunneling:no  Culture taken:   yes           Asessment: Patient is a 75 y.o. male with:    Diagnosis Orders   1. History of amputation of great toe (HCC)  NJ DEBRIDEMENT MUSCLE & FASCIA 20 SQ CM/<      2. PVD (peripheral vascular disease) ([facility])  NJ DEBRIDEMENT MUSCLE & FASCIA 20 SQ CM/<      3. Type II diabetes mellitus with peripheral circulatory disorder (HCC)  NJ DEBRIDEMENT MUSCLE & FASCIA 20 SQ CM/<      4. Diabetic foot ulcer with osteomyelitis (HCC)  NJ DEBRIDEMENT MUSCLE & FASCIA 20 SQ CM/<      5. Right foot ulcer, with necrosis of muscle (HCC)  NJ DEBRIDEMENT MUSCLE & FASCIA 20 SQ CM/<            Plan: Patient examined and evaluated. Current condition and treatment options discussed in detail. Advised pt to his condition      With the patient in supine position, Lidocaine soaked gauze was applied per physician order at beginning of wound evaluation. It was subsequently removed. Using a #15 blade scalpel and Pick-up, sharp excisional debridement of the wound was performed down to and included the removal of the following tissue:     [] epidermis, [] dermis, []  subcutaneous tissue,  [x] muscle/fascia tissue,   and/or  [] bone     Also debrided was all  [] fibrin, [] biofilm, [] slough,  [x] necrotic,  [x] hypergranulation tissue, and/or  [x] hyperkeratotic tissue. Wound was copiously irrigated with normal saline solution. Bleeding:  Minimal.  Hemostasis:  pressure     100%  of wound debrided.     Patient tolerated

## 2023-11-15 ENCOUNTER — OFFICE VISIT (OUTPATIENT)
Dept: PODIATRY | Age: 68
End: 2023-11-15
Payer: MEDICARE

## 2023-11-15 VITALS — BODY MASS INDEX: 30.21 KG/M2 | HEIGHT: 70 IN | WEIGHT: 211 LBS

## 2023-11-15 DIAGNOSIS — E11.621 DIABETIC FOOT ULCER WITH OSTEOMYELITIS (HCC): ICD-10-CM

## 2023-11-15 DIAGNOSIS — M86.9 DIABETIC FOOT ULCER WITH OSTEOMYELITIS (HCC): ICD-10-CM

## 2023-11-15 DIAGNOSIS — M79.671 RIGHT FOOT PAIN: ICD-10-CM

## 2023-11-15 DIAGNOSIS — L60.0 INGROWING NAIL: Primary | ICD-10-CM

## 2023-11-15 DIAGNOSIS — E11.69 DIABETIC FOOT ULCER WITH OSTEOMYELITIS (HCC): ICD-10-CM

## 2023-11-15 DIAGNOSIS — L97.513 RIGHT FOOT ULCER, WITH NECROSIS OF MUSCLE (HCC): ICD-10-CM

## 2023-11-15 DIAGNOSIS — Z89.419 HISTORY OF AMPUTATION OF GREAT TOE (HCC): ICD-10-CM

## 2023-11-15 DIAGNOSIS — E11.51 TYPE II DIABETES MELLITUS WITH PERIPHERAL CIRCULATORY DISORDER (HCC): ICD-10-CM

## 2023-11-15 DIAGNOSIS — L97.509 DIABETIC FOOT ULCER WITH OSTEOMYELITIS (HCC): ICD-10-CM

## 2023-11-15 DIAGNOSIS — I73.9 PVD (PERIPHERAL VASCULAR DISEASE) (HCC): ICD-10-CM

## 2023-11-15 PROCEDURE — 11730 AVULSION NAIL PLATE SIMPLE 1: CPT | Performed by: PODIATRIST

## 2023-11-15 PROCEDURE — 99214 OFFICE O/P EST MOD 30 MIN: CPT | Performed by: PODIATRIST

## 2023-11-15 PROCEDURE — 11043 DBRDMT MUSC&/FSCA 1ST 20/<: CPT | Performed by: PODIATRIST

## 2023-11-15 PROCEDURE — 1123F ACP DISCUSS/DSCN MKR DOCD: CPT | Performed by: PODIATRIST

## 2023-11-15 NOTE — PROGRESS NOTES
[address] [facility]  Dept: [phone]    RETURN PATIENT PROGRESS NOTE  Date of patient's visit: 11/15/2023  Patient's Name:  Ganesh Youssef YOB: 1955            Patient Care Team:  Sirisha Gomez DO as PCP - General       Ganesh Youssef 70 y.o. male that presents for follow-up of   Chief Complaint   Patient presents with    Wound Check     Right foot     Pt's primary care physician is Sirisha Gomez DO last seen July 11 2023  Symptoms began 2 month(s) ago and are increased . Patient relates pain is Absent . Pain is rated 1 out of 10 and is described as none due to Diabetic neuropathy   Treatments prior to today's visit include: putting a bandaid on it and some neosporin. Pt is using peroxide to the area . Currently denies F/C/N/V. He has had this for one month and has been putting a bandaid on it. Allergies   Allergen Reactions    Doxycycline Other (See Comments)     Skin peeling    Pcn [Penicillins] Rash    Penicillin G Rash       Past Medical History:   Diagnosis Date    Atrial fibrillation (HCC)     CAD (coronary artery disease)     Cardiomyopathy (HCC)     Cellulitis     CHF (congestive heart failure) (HCC)     Diabetes mellitus ([address]     Foot infection     Heart failure (HCC)     Hyperlipidemia     Hypertension     MRSA (methicillin resistant staph aureus) culture positive     Osteomyelitis (HCC)     PVD (peripheral vascular disease) ([address])     Wound, open, foot     left foot       Prior to Admission medications    Medication Sig Start Date End Date Taking? Authorizing Provider   Misc.  Devices MISC 1 PAIR OF DIABETIC SHOES (1 LEFT/ 1 RIGHT)  1-3 PAIRS OF INSERTS (LEFT/ RIGHT) 9/26/23  Yes Oley Gottron, DPM   glimepiride (AMARYL) 2 MG tablet TAKE 1 TABLET BY MOUTH TWICE A DAY FOR 30 DAYS 3/28/23  Yes Provider, MD Pratibha   warfarin (COUMADIN) 2 MG tablet

## 2023-12-11 ENCOUNTER — OFFICE VISIT (OUTPATIENT)
Dept: PODIATRY | Age: 68
End: 2023-12-11
Payer: MEDICARE

## 2023-12-11 VITALS — BODY MASS INDEX: 30.21 KG/M2 | WEIGHT: 211 LBS | HEIGHT: 70 IN

## 2023-12-11 DIAGNOSIS — L97.513 RIGHT FOOT ULCER, WITH NECROSIS OF MUSCLE (HCC): ICD-10-CM

## 2023-12-11 DIAGNOSIS — E11.621 DIABETIC FOOT ULCER WITH OSTEOMYELITIS (HCC): ICD-10-CM

## 2023-12-11 DIAGNOSIS — M86.9 DIABETIC FOOT ULCER WITH OSTEOMYELITIS (HCC): ICD-10-CM

## 2023-12-11 DIAGNOSIS — L97.509 DIABETIC FOOT ULCER WITH OSTEOMYELITIS (HCC): ICD-10-CM

## 2023-12-11 DIAGNOSIS — E11.51 TYPE II DIABETES MELLITUS WITH PERIPHERAL CIRCULATORY DISORDER (HCC): ICD-10-CM

## 2023-12-11 DIAGNOSIS — I73.9 PVD (PERIPHERAL VASCULAR DISEASE) (HCC): ICD-10-CM

## 2023-12-11 DIAGNOSIS — M79.671 RIGHT FOOT PAIN: Primary | ICD-10-CM

## 2023-12-11 DIAGNOSIS — Z89.419 HISTORY OF AMPUTATION OF GREAT TOE (HCC): ICD-10-CM

## 2023-12-11 DIAGNOSIS — E11.69 DIABETIC FOOT ULCER WITH OSTEOMYELITIS (HCC): ICD-10-CM

## 2023-12-11 PROCEDURE — 99999 PR OFFICE/OUTPT VISIT,PROCEDURE ONLY: CPT | Performed by: PODIATRIST

## 2023-12-11 PROCEDURE — 11043 DBRDMT MUSC&/FSCA 1ST 20/<: CPT | Performed by: PODIATRIST

## 2023-12-11 NOTE — PROGRESS NOTES
Slight purulence upon expression     Wound #:   1    diabetic foot ulcer   Right foot     Wound measurements:  #1   8 mm by  3  mm  by   4  mm        Wound Depth: muscle. Drainage:    minimal, serosanguinous Type:serosanguinous  Wound Bed status:   Granulation Tissue: 80%   Necrotic 20%  Type:   Epithelialization 0%   Hypergranular 0%   Periwound hyperkeratosis:  absent  Erythema absent    Odor:no  Maceration:no  Undermining/tract/tunneling:no  Culture taken: not today  but previously           Asessment: Patient is a 70 y.o. male with:    Diagnosis Orders   1. Right foot pain   DIABETES FOOT EXAM    IL DEBRIDEMENT MUSCLE & FASCIA 20 SQ CM/<      2. Right foot ulcer, with necrosis of muscle ([facility])   DIABETES FOOT EXAM    IL DEBRIDEMENT MUSCLE & FASCIA 20 SQ CM/<      3. Diabetic foot ulcer with osteomyelitis ([facility])   DIABETES FOOT EXAM    IL DEBRIDEMENT MUSCLE & FASCIA 20 SQ CM/<      4. PVD (peripheral vascular disease) ([facility])   DIABETES FOOT EXAM    IL DEBRIDEMENT MUSCLE & FASCIA 20 SQ CM/<      5. Type II diabetes mellitus with peripheral circulatory disorder ([facility])   DIABETES FOOT EXAM    IL DEBRIDEMENT MUSCLE & FASCIA 20 SQ CM/<      6. History of amputation of great toe ([facility])   DIABETES FOOT EXAM    IL DEBRIDEMENT MUSCLE & FASCIA 20 SQ CM/<                    Plan: Patient examined and evaluated. Current condition and treatment options discussed in detail. Advised pt to his condition          Next attention was directed to the wound   With the patient in supine position, Lidocaine soaked gauze was applied per physician order at beginning of wound evaluation. It was subsequently removed.     Using a #15 blade scalpel and Pick-up, sharp excisional debridement of the wound was performed down to and included the removal of the following tissue:     [] epidermis, [] dermis, []  subcutaneous tissue,  [x] muscle/fascia tissue,   and/or  [] bone     Also debrided was all  [] fibrin, [] biofilm, []

## 2024-01-08 ENCOUNTER — OFFICE VISIT (OUTPATIENT)
Dept: PODIATRY | Age: 69
End: 2024-01-08
Payer: MEDICARE

## 2024-01-08 VITALS — HEIGHT: 70 IN | BODY MASS INDEX: 30.21 KG/M2 | WEIGHT: 211 LBS

## 2024-01-08 DIAGNOSIS — I73.9 PVD (PERIPHERAL VASCULAR DISEASE) (HCC): ICD-10-CM

## 2024-01-08 DIAGNOSIS — E11.51 TYPE II DIABETES MELLITUS WITH PERIPHERAL CIRCULATORY DISORDER (HCC): ICD-10-CM

## 2024-01-08 DIAGNOSIS — E11.621 DIABETIC FOOT ULCER WITH OSTEOMYELITIS (HCC): ICD-10-CM

## 2024-01-08 DIAGNOSIS — L97.513 RIGHT FOOT ULCER, WITH NECROSIS OF MUSCLE (HCC): ICD-10-CM

## 2024-01-08 DIAGNOSIS — Z89.419 HISTORY OF AMPUTATION OF GREAT TOE (HCC): ICD-10-CM

## 2024-01-08 DIAGNOSIS — L97.509 DIABETIC FOOT ULCER WITH OSTEOMYELITIS (HCC): ICD-10-CM

## 2024-01-08 DIAGNOSIS — M79.671 RIGHT FOOT PAIN: Primary | ICD-10-CM

## 2024-01-08 DIAGNOSIS — E11.69 DIABETIC FOOT ULCER WITH OSTEOMYELITIS (HCC): ICD-10-CM

## 2024-01-08 DIAGNOSIS — M86.9 DIABETIC FOOT ULCER WITH OSTEOMYELITIS (HCC): ICD-10-CM

## 2024-01-08 PROCEDURE — 1123F ACP DISCUSS/DSCN MKR DOCD: CPT | Performed by: PODIATRIST

## 2024-01-08 PROCEDURE — 11043 DBRDMT MUSC&/FSCA 1ST 20/<: CPT | Performed by: PODIATRIST

## 2024-01-08 PROCEDURE — 99213 OFFICE O/P EST LOW 20 MIN: CPT | Performed by: PODIATRIST

## 2024-01-08 NOTE — PROGRESS NOTES
days except Monday Patient's INR is managed by [facility]   Yes Pratibha Levy MD   carvedilol (COREG) 3.125 MG tablet Take 1 tablet by mouth 2 times daily 1/21/23  Yes Pratibha Levy MD   benazepril (LOTENSIN) 5 MG tablet Take 1 tablet by mouth in the morning and at bedtime   Yes Pratibha Levy MD   glipiZIDE (GLUCOTROL) 5 MG tablet Take 0.5 tablets by mouth at bedtime   Yes ProviderPratibha MD   Vitamin D (CHOLECALCIFEROL) 25 MCG (1000 UT) TABS tablet Take 1 tablet by mouth daily   Yes Pratibha Levy MD   warfarin (COUMADIN) 2 MG tablet Take 1 tablet by mouth once a week Indications: Mondays Patient's INR is managed by [facility].   Yes Pratibha Levy MD   aspirin 81 MG EC tablet Take 1 tablet by mouth daily 3/15/22  Yes Lucas Hernadez DO   furosemide (LASIX) 80 MG tablet Take 1 tablet by mouth daily 3/15/22  Yes Lucas Hernadez DO   atorvastatin (LIPITOR) 40 MG tablet Take 1 tablet by mouth at bedtime   Yes Pratibha Levy MD   nitroGLYCERIN (NITROSTAT) 0.4 MG SL tablet up to max of 3 total doses. If no relief after 1 dose, call 911. 3/15/22 8/26/22  Lucas Hernadez DO       Review of Systems    Review of Systems:  History obtained from chart review and the patient  General ROS: negative for - chills, fatigue, fever, night sweats or weight gain  Constitutional: Negative for chills, diaphoresis, fatigue, fever and unexpected weight change.  Musculoskeletal: Positive for arthralgias, gait problem and joint swelling.  Neurological ROS: negative for - behavioral changes, confusion, headaches or seizures. Negative for weakness and numbness.   Dermatological ROS: negative for - mole changes, rash  Cardiovascular: Negative for leg swelling.   Gastrointestinal: Negative for constipation, diarrhea, nausea and vomiting.         Lower Extremity Physical Examination:     Vitals: There were no vitals filed for this visit.  General:

## 2024-02-05 ENCOUNTER — OFFICE VISIT (OUTPATIENT)
Dept: VASCULAR SURGERY | Age: 69
End: 2024-02-05
Payer: MEDICARE

## 2024-02-05 ENCOUNTER — OFFICE VISIT (OUTPATIENT)
Dept: PODIATRY | Age: 69
End: 2024-02-05
Payer: MEDICARE

## 2024-02-05 VITALS
BODY MASS INDEX: 31.5 KG/M2 | HEIGHT: 70 IN | HEART RATE: 70 BPM | WEIGHT: 220 LBS | SYSTOLIC BLOOD PRESSURE: 177 MMHG | OXYGEN SATURATION: 96 % | RESPIRATION RATE: 16 BRPM | DIASTOLIC BLOOD PRESSURE: 98 MMHG

## 2024-02-05 VITALS — HEIGHT: 70 IN | WEIGHT: 220 LBS | BODY MASS INDEX: 31.5 KG/M2

## 2024-02-05 DIAGNOSIS — L97.514 ULCER OF TOE OF RIGHT FOOT, WITH NECROSIS OF BONE (HCC): ICD-10-CM

## 2024-02-05 DIAGNOSIS — E11.69 DIABETIC FOOT ULCER WITH OSTEOMYELITIS (HCC): Primary | ICD-10-CM

## 2024-02-05 DIAGNOSIS — M79.671 RIGHT FOOT PAIN: ICD-10-CM

## 2024-02-05 DIAGNOSIS — E11.621 DIABETIC FOOT ULCER WITH OSTEOMYELITIS (HCC): Primary | ICD-10-CM

## 2024-02-05 DIAGNOSIS — I70.213 ATHEROSCLER OF NATIVE ARTERY OF BOTH LEGS WITH INTERMIT CLAUDICATION (HCC): Primary | ICD-10-CM

## 2024-02-05 DIAGNOSIS — Z89.419 HISTORY OF AMPUTATION OF GREAT TOE (HCC): ICD-10-CM

## 2024-02-05 DIAGNOSIS — I73.9 PVD (PERIPHERAL VASCULAR DISEASE) (HCC): ICD-10-CM

## 2024-02-05 DIAGNOSIS — L97.509 DIABETIC FOOT ULCER WITH OSTEOMYELITIS (HCC): Primary | ICD-10-CM

## 2024-02-05 DIAGNOSIS — L97.513 RIGHT FOOT ULCER, WITH NECROSIS OF MUSCLE (HCC): ICD-10-CM

## 2024-02-05 DIAGNOSIS — E11.51 TYPE II DIABETES MELLITUS WITH PERIPHERAL CIRCULATORY DISORDER (HCC): ICD-10-CM

## 2024-02-05 DIAGNOSIS — M86.9 DIABETIC FOOT ULCER WITH OSTEOMYELITIS (HCC): Primary | ICD-10-CM

## 2024-02-05 PROCEDURE — 11043 DBRDMT MUSC&/FSCA 1ST 20/<: CPT | Performed by: PODIATRIST

## 2024-02-05 PROCEDURE — 99214 OFFICE O/P EST MOD 30 MIN: CPT | Performed by: PODIATRIST

## 2024-02-05 PROCEDURE — 1123F ACP DISCUSS/DSCN MKR DOCD: CPT | Performed by: PODIATRIST

## 2024-02-05 PROCEDURE — 99213 OFFICE O/P EST LOW 20 MIN: CPT | Performed by: SURGERY

## 2024-02-05 PROCEDURE — 11044 DBRDMT BONE 1ST 20 SQ CM/<: CPT | Performed by: PODIATRIST

## 2024-02-05 PROCEDURE — 1123F ACP DISCUSS/DSCN MKR DOCD: CPT | Performed by: SURGERY

## 2024-02-05 NOTE — PROGRESS NOTES
[facility] PHYSICIANS [facility] PODIATRY  [address] [place], [place] [address]  Dept: [phone]    RETURN PATIENT PROGRESS NOTE  Date of patient's visit: 2/5/2024  Patient's Name:  Asher Ayon YOB: 1955            Patient Care Team:  Vincent Xiong DO as PCP - General       Asher Ayon 68 y.o. male that presents for follow-up of   Chief Complaint   Patient presents with    Wound Check     4th toe right foot  Pt has a new sore on the bottom of right foot that has been present for less than a week per pt. He states he had new inserts made for his shoes and they rubbed a sore on the bottom of his foot.     Pt's primary care physician is Vincent Xiong DO last seen July 11 2023  Symptoms began 2 month(s) ago and are increased .  Patient relates pain is absent SECONDARY TO NERUOPATHY .  Pain is rated 0 out of 10 and is described as none due to Diabetic neuropathy. Treatments prior to today's visit include: previous podiatry treatment.  Pt stthat he has a new sore on the bottom of the right foot and it is caused by his new shoe inserts     Allergies   Allergen Reactions    Doxycycline Other (See Comments)     Skin peeling    Pcn [Penicillins] Rash    Penicillin G Rash       Past Medical History:   Diagnosis Date    Atrial fibrillation (HCC)     CAD (coronary artery disease)     Cardiomyopathy (HCC)     Cellulitis     CHF (congestive heart failure) ([facility])     Diabetes mellitus (HCC)     Foot infection     Heart failure ([facility])     Hyperlipidemia     Hypertension     MRSA (methicillin resistant staph aureus) culture positive     Osteomyelitis ([facility])     PVD (peripheral vascular disease) ([facility])     Wound, open, foot     left foot       Prior to Admission medications    Medication Sig Start Date End Date Taking? Authorizing Provider   Misc. Devices MISC 1 PAIR OF DIABETIC SHOES (1 LEFT/ 1 RIGHT)  1-3 PAIRS OF INSERTS (LEFT/ RIGHT) 9/26/23  Yes

## 2024-02-05 NOTE — PROGRESS NOTES
[facility], [facility] HEART AND VASCULAR  [address] [address]  Dept: [phone]     Patient: Asher Ayon  : 1955  MRN: [id number]  DOS: 2024            HPI:  Asher Ayon is a 68 y.o. male who returns to the office regarding his left transmetatarsal amputation and tibioperoneal arterial disease bilaterally.  His lower extremities are stable.  He does have a little bit more edema than usual but he admits to missing his Lasix over several days.  His transmetatarsal amputation site has healed completely and has no further ulceration.  The right lower extremity has no ulcers.    Review of Systems    Vitals:    24 0935   BP: (!) 177/98   Site: Right Upper Arm   Position: Sitting   Cuff Size: Large Adult   Pulse: 70   Resp: 16   SpO2: 96%   Weight: 99.8 kg (220 lb)   Height: 1.778 m (5' 10\")          Physical Exam  On examination he continues to have bilateral palpable femoral and popliteal pulses but no dorsalis pedis or posterior tibial pulses.  The feet are warm and well-perfused without ulceration or gangrene.  Assessment:  1. 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  I do not see a reason to go ahead with imaging studies as I know he has tibioperoneal disease and as long as the popliteals are palpable I do not think there will be a whole lot of change.  We will see and inspect his feet at 6 months.    Electronically signed by:  Abdias Nicole MD

## 2024-02-12 ENCOUNTER — OFFICE VISIT (OUTPATIENT)
Dept: PODIATRY | Age: 69
End: 2024-02-12

## 2024-02-12 VITALS — HEIGHT: 70 IN | BODY MASS INDEX: 31.5 KG/M2 | WEIGHT: 220 LBS

## 2024-02-12 DIAGNOSIS — M86.9 DIABETIC FOOT ULCER WITH OSTEOMYELITIS (HCC): Primary | ICD-10-CM

## 2024-02-12 DIAGNOSIS — E11.621 DIABETIC FOOT ULCER WITH OSTEOMYELITIS (HCC): Primary | ICD-10-CM

## 2024-02-12 DIAGNOSIS — E11.69 DIABETIC FOOT ULCER WITH OSTEOMYELITIS (HCC): Primary | ICD-10-CM

## 2024-02-12 DIAGNOSIS — E11.51 TYPE II DIABETES MELLITUS WITH PERIPHERAL CIRCULATORY DISORDER (HCC): ICD-10-CM

## 2024-02-12 DIAGNOSIS — M79.671 RIGHT FOOT PAIN: ICD-10-CM

## 2024-02-12 DIAGNOSIS — I73.9 PVD (PERIPHERAL VASCULAR DISEASE) (HCC): ICD-10-CM

## 2024-02-12 DIAGNOSIS — L97.514 ULCER OF TOE OF RIGHT FOOT, WITH NECROSIS OF BONE (HCC): ICD-10-CM

## 2024-02-12 DIAGNOSIS — Z89.419 HISTORY OF AMPUTATION OF GREAT TOE (HCC): ICD-10-CM

## 2024-02-12 DIAGNOSIS — L97.509 DIABETIC FOOT ULCER WITH OSTEOMYELITIS (HCC): Primary | ICD-10-CM

## 2024-02-12 NOTE — PROGRESS NOTES
[facility] PHYSICIANS [facility] PODIATRY  [address] [place], [place] [address]  Dept: [phone]    RETURN PATIENT PROGRESS NOTE  Date of patient's visit: 2/12/2024  Patient's Name:  Asher Ayon YOB: 1955            Patient Care Team:  Vincent Xiong DO as PCP - General       Asher Ayon 68 y.o. male that presents for follow-up of   Chief Complaint   Patient presents with    Wound Check     1 week right foot 4th toe      Pt's primary care physician is Vincent Xiong DO last seen July 11 2023  Symptoms began 2 month(s) ago and are increased .  Patient relates pain is absent SECONDARY TO NEUROPATHY .  Pain is rated 0 out of 10 and is described as none due to Diabetic neuropathy. Treatments prior to today's visit include: previous podiatry treatment.   The right foot 4th toe is getting worse and its drainaing more.  Pt is not taking any time off of work right now and is using his work boots.  He is keeping it covered with a bandaid and bactroban     Allergies   Allergen Reactions    Doxycycline Other (See Comments)     Skin peeling    Pcn [Penicillins] Rash    Penicillin G Rash       Past Medical History:   Diagnosis Date    Atrial fibrillation (HCC)     CAD (coronary artery disease)     Cardiomyopathy (HCC)     Cellulitis     CHF (congestive heart failure) (HCC)     Diabetes mellitus (HCC)     Foot infection     Heart failure (HCC)     Hyperlipidemia     Hypertension     MRSA (methicillin resistant staph aureus) culture positive     Osteomyelitis ([facility])     PVD (peripheral vascular disease) ([facility])     Wound, open, foot     left foot       Prior to Admission medications    Medication Sig Start Date End Date Taking? Authorizing Provider   mupirocin (BACTROBAN) 2 % ointment Apply topically 3 times daily. 2/12/24 2/19/24 Yes Eris Xiong DPM   Misc. Devices MISC 1 PAIR OF DIABETIC SHOES (1 LEFT/ 1 RIGHT)  1-3 PAIRS OF INSERTS (LEFT/

## 2024-02-13 ENCOUNTER — TELEPHONE (OUTPATIENT)
Dept: PODIATRY | Age: 69
End: 2024-02-13

## 2024-02-13 NOTE — TELEPHONE ENCOUNTER
Spoke to patient to confirm sx: at [facility] on 2/23/2024 at. 1:30 pm and to arrive at 11:30 am, Mailed informations to the patient .

## 2024-02-21 ENCOUNTER — HOSPITAL ENCOUNTER (OUTPATIENT)
Age: 69
Discharge: HOME | End: 2024-02-21
Payer: MEDICARE

## 2024-02-21 DIAGNOSIS — Z01.818 PRE-OP TESTING: ICD-10-CM

## 2024-02-21 LAB
ANION GAP SERPL CALCULATED.3IONS-SCNC: 13 MMOL/L (ref 9–17)
BASOPHILS # BLD: 0.06 K/UL (ref 0–0.2)
BASOPHILS NFR BLD: 1 % (ref 0–2)
BUN SERPL-MCNC: 25 MG/DL (ref 8–23)
CALCIUM SERPL-MCNC: 9.5 MG/DL (ref 8.6–10.4)
CHLORIDE SERPL-SCNC: 95 MMOL/L (ref 98–107)
CO2 SERPL-SCNC: 22 MMOL/L (ref 20–31)
CREAT SERPL-MCNC: 1.5 MG/DL (ref 0.7–1.2)
EOSINOPHIL # BLD: 0.14 K/UL (ref 0–0.44)
EOSINOPHILS RELATIVE PERCENT: 2 % (ref 1–4)
ERYTHROCYTE [DISTWIDTH] IN BLOOD BY AUTOMATED COUNT: 14.1 % (ref 11.8–14.4)
GFR SERPL CREATININE-BSD FRML MDRD: 50 ML/MIN/1.73M2
GLUCOSE SERPL-MCNC: 340 MG/DL (ref 70–99)
HCT VFR BLD AUTO: 40.1 % (ref 40.7–50.3)
HGB BLD-MCNC: 13.4 G/DL (ref 13–17)
IMM GRANULOCYTES # BLD AUTO: <0.03 K/UL (ref 0–0.3)
IMM GRANULOCYTES NFR BLD: 0 %
LYMPHOCYTES NFR BLD: 1.09 K/UL (ref 1.1–3.7)
LYMPHOCYTES RELATIVE PERCENT: 15 % (ref 24–43)
MCH RBC QN AUTO: 31.5 PG (ref 25.2–33.5)
MCHC RBC AUTO-ENTMCNC: 33.4 G/DL (ref 28.4–34.8)
MCV RBC AUTO: 94.4 FL (ref 82.6–102.9)
MONOCYTES NFR BLD: 0.54 K/UL (ref 0.1–1.2)
MONOCYTES NFR BLD: 7 % (ref 3–12)
NEUTROPHILS NFR BLD: 75 % (ref 36–65)
NEUTS SEG NFR BLD: 5.45 K/UL (ref 1.5–8.1)
NRBC BLD-RTO: 0 PER 100 WBC
PLATELET # BLD AUTO: 177 K/UL (ref 138–453)
PMV BLD AUTO: 11 FL (ref 8.1–13.5)
POTASSIUM SERPL-SCNC: 4.1 MMOL/L (ref 3.7–5.3)
RBC # BLD AUTO: 4.25 M/UL (ref 4.21–5.77)
SODIUM SERPL-SCNC: 130 MMOL/L (ref 135–144)
WBC OTHER # BLD: 7.3 K/UL (ref 3.5–11.3)

## 2024-02-21 PROCEDURE — 85025 COMPLETE CBC W/AUTO DIFF WBC: CPT

## 2024-02-21 PROCEDURE — 80048 BASIC METABOLIC PNL TOTAL CA: CPT

## 2024-02-21 PROCEDURE — 93005 ELECTROCARDIOGRAM TRACING: CPT | Performed by: ANESTHESIOLOGY

## 2024-02-21 PROCEDURE — 36415 COLL VENOUS BLD VENIPUNCTURE: CPT

## 2024-02-23 ENCOUNTER — TELEPHONE (OUTPATIENT)
Dept: PODIATRY | Age: 69
End: 2024-02-23

## 2024-02-23 LAB
EKG ATRIAL RATE: 214 BPM
EKG Q-T INTERVAL: 386 MS
EKG QRS DURATION: 96 MS
EKG QTC CALCULATION (BAZETT): 434 MS
EKG R AXIS: 66 DEGREES
EKG T AXIS: 40 DEGREES
EKG VENTRICULAR RATE: 76 BPM

## 2024-02-23 NOTE — TELEPHONE ENCOUNTER
Spoke to patient stating that I received a call from the [facility]. Stating that surgery for today (2/23/2023) will need to be cancelled due to being high risk even under local anesthesia,per the Anesthesiologist.

## 2024-02-23 NOTE — TELEPHONE ENCOUNTER
Received a call from the [facility]. Stating that surgery for today (2/23/2023) will need to be cancelled due to being high risk  even under local  anesthesia,per the Anesthesiologist.

## 2024-02-26 ENCOUNTER — TELEPHONE (OUTPATIENT)
Dept: PODIATRY | Age: 69
End: 2024-02-26

## 2024-02-26 NOTE — TELEPHONE ENCOUNTER
Spoke to patient to confirm surgery at [facility] on 3/1/2024 at 10:40 am and to arrive at 8:40 am.

## 2024-03-01 ENCOUNTER — APPOINTMENT (OUTPATIENT)
Dept: GENERAL RADIOLOGY | Age: 69
End: 2024-03-01
Attending: PODIATRIST
Payer: MEDICARE

## 2024-03-01 ENCOUNTER — HOSPITAL ENCOUNTER (OUTPATIENT)
Age: 69
Setting detail: OUTPATIENT SURGERY
Discharge: HOME | End: 2024-03-01
Attending: PODIATRIST | Admitting: PODIATRIST
Payer: MEDICARE

## 2024-03-01 VITALS
WEIGHT: 198 LBS | DIASTOLIC BLOOD PRESSURE: 97 MMHG | HEIGHT: 71 IN | OXYGEN SATURATION: 100 % | SYSTOLIC BLOOD PRESSURE: 179 MMHG | BODY MASS INDEX: 27.72 KG/M2 | HEART RATE: 76 BPM | RESPIRATION RATE: 15 BRPM | TEMPERATURE: 97.5 F

## 2024-03-01 DIAGNOSIS — E11.621 DIABETIC FOOT ULCER WITH OSTEOMYELITIS (HCC): ICD-10-CM

## 2024-03-01 DIAGNOSIS — G89.18 POST-OP PAIN: Primary | ICD-10-CM

## 2024-03-01 DIAGNOSIS — L97.509 DIABETIC FOOT ULCER WITH OSTEOMYELITIS (HCC): ICD-10-CM

## 2024-03-01 DIAGNOSIS — E11.69 DIABETIC FOOT ULCER WITH OSTEOMYELITIS (HCC): ICD-10-CM

## 2024-03-01 DIAGNOSIS — L97.514 ULCER OF TOE OF RIGHT FOOT, WITH NECROSIS OF BONE (HCC): ICD-10-CM

## 2024-03-01 DIAGNOSIS — M86.9 DIABETIC FOOT ULCER WITH OSTEOMYELITIS (HCC): ICD-10-CM

## 2024-03-01 PROCEDURE — 7100000011 HC PHASE II RECOVERY - ADDTL 15 MIN: Performed by: PODIATRIST

## 2024-03-01 PROCEDURE — 14350 FILLETED FINGER/TOE FLAP: CPT | Performed by: PODIATRIST

## 2024-03-01 PROCEDURE — 2720000010 HC SURG SUPPLY STERILE: Performed by: PODIATRIST

## 2024-03-01 PROCEDURE — 28820 AMPUTATION OF TOE: CPT | Performed by: PODIATRIST

## 2024-03-01 PROCEDURE — 2709999900 HC NON-CHARGEABLE SUPPLY: Performed by: PODIATRIST

## 2024-03-01 PROCEDURE — 3600000002 HC SURGERY LEVEL 2 BASE: Performed by: PODIATRIST

## 2024-03-01 PROCEDURE — 73630 X-RAY EXAM OF FOOT: CPT

## 2024-03-01 PROCEDURE — 3600000012 HC SURGERY LEVEL 2 ADDTL 15MIN: Performed by: PODIATRIST

## 2024-03-01 PROCEDURE — 88305 TISSUE EXAM BY PATHOLOGIST: CPT

## 2024-03-01 PROCEDURE — 88311 DECALCIFY TISSUE: CPT

## 2024-03-01 PROCEDURE — 7100000010 HC PHASE II RECOVERY - FIRST 15 MIN: Performed by: PODIATRIST

## 2024-03-01 PROCEDURE — 2500000003 HC RX 250 WO HCPCS: Performed by: PODIATRIST

## 2024-03-01 RX ORDER — BUPIVACAINE HYDROCHLORIDE 5 MG/ML
INJECTION, SOLUTION EPIDURAL; INTRACAUDAL
Status: DISCONTINUED
Start: 2024-03-01 | End: 2024-03-01 | Stop reason: HOSPADM

## 2024-03-01 RX ORDER — HYDROCODONE BITARTRATE AND ACETAMINOPHEN 5; 325 MG/1; MG/1
1 TABLET ORAL EVERY 6 HOURS PRN
Qty: 20 TABLET | Refills: 0 | Status: SHIPPED | OUTPATIENT
Start: 2024-03-01 | End: 2024-03-06

## 2024-03-01 RX ORDER — LIDOCAINE HYDROCHLORIDE 10 MG/ML
INJECTION, SOLUTION INFILTRATION; PERINEURAL
Status: DISCONTINUED
Start: 2024-03-01 | End: 2024-03-01 | Stop reason: HOSPADM

## 2024-03-01 RX ORDER — LIDOCAINE HYDROCHLORIDE 10 MG/ML
INJECTION, SOLUTION EPIDURAL; INFILTRATION; INTRACAUDAL; PERINEURAL PRN
Status: DISCONTINUED | OUTPATIENT
Start: 2024-03-01 | End: 2024-03-01 | Stop reason: ALTCHOICE

## 2024-03-01 ASSESSMENT — PAIN - FUNCTIONAL ASSESSMENT
PAIN_FUNCTIONAL_ASSESSMENT: 0-10
PAIN_FUNCTIONAL_ASSESSMENT: NONE - DENIES PAIN

## 2024-03-01 NOTE — BRIEF OP NOTE
PODIATRY BRIEF OP NOTE    PATIENT NAME: Asher Ayon  YOB: 1955  -  68 y.o. male  MRN: [id number]  DATE: 3/1/2024  BILLING #: [id number]    Surgeon(s):  Eris Xiong DPM     ASSISTANTS: Vitaliy James DPM     PRE-OP DIAGNOSIS:   Chronic osteomyelitis of fourth digit, right foot  Diabetic polyneuropathy associate with type 2 diabetes mellitus    Peripheral arterial disease  Diabetic infection  Heart failure  Gangrene    POST-OP DIAGNOSIS: Same as above.    PROCEDURE:   Fillet of digit, right foot  Amputation of digit at the level of the metatarsal phalangeal joint, right foot    ANESTHESIA:   10 ml of a 1:1 racemic mixture of 0.5% marcaine plain and 1% lidocaine plain      HEMOSTASIS: Direct pressure    ESTIMATED BLOOD LOSS: Less than 20 cc.    MATERIALS:   * No implants in log *    INJECTABLES: Local anesthesia    SPECIMEN:   ID Type Source Tests Collected by Time Destination   A : OSTEOMYLITIS DISTAL 4TH TOE RIGHT FOOT Tissue Toe SURGICAL PATHOLOGY Eris Xiong DPM 3/1/2024 1133        COMPLICATIONS: None    FINDINGS: No purulent drainage, chronic osteomyelitis changes to this distal phalanx of the fourth digit      Vitaliy James DPM   Department of Foot and Ankle Surgery  3/1/2024 at 12:02 PM

## 2024-03-01 NOTE — PROGRESS NOTES
Dr. Xiong informed that patient took coumadin last on Sunday. He does not need a PT/INR level on patient today.

## 2024-03-01 NOTE — H&P
AMPUTATION Right     R great    TOE AMPUTATION Left 03/13/2022    TOE AMPUTATION--left 2nd digit performed by Eris Xiong DPM at [facility] OR    TOE AMPUTATION Left 08/24/2022    Left third fourth and fifth toe amputation through the metatarsal for completion transmetatarsal amputation performed by Abdias Nicole MD at [facility] OR    TOE AMPUTATION Right 03/04/2023    3RD DIGIT AMPUTATION performed by Eris Xiong DPM at [facility] OR    VASCULAR SURGERY Left 07/13/2022    RIGHT COMMON FEMORAL ACCESS UNDER ULTRASOUND GUIDANCE DISTAL AORTOGRAPHY WITH PELVIC RUN OFF PLACEMENT OF CATHETER INTO LEFT COMMON FEMORAL ARTERY, PROFUNDA FEMORAL AND SFA  ARTERIOGRAPHY PLACEMENT OF CATHETER INTO LEFT SFA, WITH SFA POPLITEAL AND TIBIAL TIBIOPERONEAL ARTERIOGRAPHY INTO LEFT FOOT performed by Abdias Nicole MD at [facility] OR        Social History:     Tobacco:    reports that he has never smoked. He has never used smokeless tobacco.  Alcohol:      reports current alcohol use.  Drug Use:  reports no history of drug use.    Family History:     Family History   Problem Relation Age of Onset    Cancer Father         pancreatic cancer    Other Brother         MI    Osteoarthritis Mother     Other Maternal Grandfather         MI         Review of Systems:   Pertinent findings in the HPI above.  A comprehensive review of systems was essentially negative Denies exertional chest pain, dyspnea, and gastrointestinal symptoms or behavioral/psych issues. No LMP for male patient.  No obstetric history on file.      Physical Exam:       This is a 68 y.o. male who is pleasant, cooperative, alert and oriented x3, in no acute distress.    General Appearance:  alert, well appearing, and in no acute distress  Lungs: Bilateral equal air entry with normal effort,  clear to auscultation without wheezing, rales or rhonchi, unlabored at rest w/o use of accessory muscles,  Cardiovascular: HR 84 Irregular rate and regular rhythm, no murmur appreciated.  (LIPITOR) 40 MG tablet Take 1 tablet by mouth at bedtime   Yes Provider, MD Pratibha   nitroGLYCERIN (NITROSTAT) 0.4 MG SL tablet up to max of 3 total doses. If no relief after 1 dose, call 911. 3/15/22 8/26/22  Lucas Hernadez DO       Review of Systems    Review of Systems:  History obtained from chart review and the patient  General ROS: negative for - chills, fatigue, fever, night sweats or weight gain  Constitutional: Negative for chills, diaphoresis, fatigue, fever and unexpected weight change.  Musculoskeletal: Positive for arthralgias, gait problem and joint swelling.  Neurological ROS: negative for - behavioral changes, confusion, headaches or seizures. Negative for weakness and numbness.   Dermatological ROS: negative for - mole changes, rash  Cardiovascular: Negative for leg swelling.   Gastrointestinal: Negative for constipation, diarrhea, nausea and vomiting.         Lower Extremity Physical Examination:     Vitals: There were no vitals filed for this visit.  General: AAO x 3 in NAD.       Wound #:   1    diabetic foot ulcer   Right foot 4 toe     Wound measurements:  #1    mm by    mm  by     mm        Wound Depth: bone.    Drainage:    minimal, serosanguinous Type:serosanguinous  Wound Bed status:   Granulation Tissue: 0%   Necrotic 20%  Type:   Epithelialization 0%   Hypergranular 0%   Periwound hyperkeratosis:  absent  Erythema absent    Odor:no  Maceration:no  Undermining/tract/tunneling:no  Culture taken: not today  but previously             Wound #:   2    diabetic foot ulcer   right plantar foot at the 1st metatarsal head     Wound measurements:  #1   cm by  cm  by    mm        Wound Depth: muscle.    Drainage:    minimal, serosanguinous Type:serosanguinous  Wound Bed status:   Granulation Tissue: 90%   Necrotic 10%  Type:   Epithelialization 0%   Hypergranular 0%   Periwound hyperkeratosis:  absent  Erythema absent    Odor:no  Maceration:no  Undermining/tract/tunneling:no  Culture taken:

## 2024-03-01 NOTE — OP NOTE
Attention was directed to the  fourth digit of the right foot.  There was ulceration to the distal aspect approximately 0.5 cm superficial.  There was no purulent drainage present. There was complete loss of distal skin of the digit that extended to the interphalangeal joint. We felt that traditional incision placement would result in inadequate soft tissue for closure due to the soft tissue defect from the degree of infection and the size of the ulceration.      We proceeded with fillet of flap to the fourth digit. Incision planning was mapped out to ensure incorporation of the medial and lateral digital arteries and nerves with creation of a medial digital flap. A bilateral longitudinal incision was made along the fourth digit utilizing  #15 blade.  The incision was deepened to the level of bone. Meticulous dissection was carried out to raise a full thickness flap from the dorsal aspect of the digit. The medial and lateral neurovascular bundles were identified and harvested into the flap. The flap included skin, dermis, subcutaneous, and fascial tissues. The flap measured 1.3 x 1.8. Total surface area was 2.34 cm².  Hemostasis was controlled throughout the dissection process using electrocautery.         Next we proceed with amputation of the fourth digit at the level of the MPJ. A #15 blade was utilized to create 2 converging incisions used to create a full-thickness circumferential incision around the fourth digit.  The incision was deepened to the level of bone.  The distal phalanx as well as the proximal phalanx was noted to be soft, discolored, necrotic upon bone quality testing consistent with osteomyelitis.  The fourth digit was disarticulated at the level of the metatarsal phalangeal joint and passed from the table specimen.  All nonviable, necrotic, infected tissue was sharply excised utilizing a combination of scalpel and rongeur.  Some of the tissue excised was sent for culture.  The proximal phalanx  was split on the back table and sent for microbiology and pathology.  There was adequate bleeding appreciated.  The underlying fourth metatarsal head appeared to be healthy and white without signs of infection or necrosis.  Next the surgical site was irrigated with copious amounts of sterile saline and chlorhexidine solution, Irrisept.      Status post digit amputation incision edges were provisionally approximated utilizing tactile manipulation.  There was found to be adequate soft tissue for closure with minimal tension across flap edges. The fillet flap was then advanced laterally . The flap was inset into the recipient site. Flap edges were viable with good capillary refill time and without signs of venous congestion at this time. The surgical site was closed in layers utilizing 3-0 Monocryl for deep closure, 3-0 Monocryl for subcutaneous closure, and 3-0 Prolene for skin closure.       Dressings consisted of xeroform, 4 x 4s, ABDs, Kerlix, ACE were applied. The patient tolerated the above procedure and anesthesia well without complications. The patient was transported from the operating room to the PACU with vital signs stable and vascular status intact.     Post operative plan: No further surgical intervention planned this admission. Patient is good for discharge. No need for dressing changes to the right foot. WB to the heel of the right foot with use of surgical shoe. Follow up in office within 1 week of discharge.      Vitaliy James DPM   Department of Foot and Ankle Surgery  3/1/2024 at 12:19 PM

## 2024-03-01 NOTE — DISCHARGE INSTRUCTIONS
Foot and Ankle Surgery Post Operative Instructions:  You have had a surgical procedure on your right foot.      Fluids and Diet:  Begin with clear liquids, broth, dry toast, and crackers.  If not nauseated then resume your regular pre-operative diet when you are ready  With your history of diabetes, please lower your intake sugar content food as this will negatively impact surgery.    Medications:  Take your prescriptions as directed  You are receiving new prescriptions for Norco  You received nerve block to the foot-this should manage your pain for the first 18hrs post operatively  If your pain is not severe then you may take the non-prescription medication that you normally take for aches and pains ie Tylenol and Ibuprofen (alternating), or if severe pain occurs these will serve as additional medication   You may resume your regularly scheduled medications (unless otherwise directed)  If any side effects or adverse reactions occur, discontinue the medication and contact your doctor.  Review the patient drug information that is provided before you take any medication    Ambulation and Activity:  You are advised to go directly home from the hospital  Use assistance device with either crutches, walker, or knee scooter  We recommend knee scooter if possible, you can also obtain these on [employer] for rather affordable and quickly obtainable.  You may put weight on the operated foot.  You should wear the surgical shoe  at all times when awake. Do not excessively walk on the right foot.  Avoid stairs.  Do not lift or move heavy objects  Do not drive until cleared by your physician    Bandage and Wound Care Instructions:  Keep bandage clean and dry  Do NOT remove dressing/ splint  DO NOT get wet  Please use shower cover around leg if you do shower so that dressing does not get wet  Do not shower or bathe the operative extremity  Do not remove the bandage (unless otherwise directed)   Do not attempt to put anything

## 2024-03-05 PROBLEM — G89.18 POST-OP PAIN: Status: ACTIVE | Noted: 2024-03-05

## 2024-03-06 LAB — SURGICAL PATHOLOGY REPORT: NORMAL

## 2024-03-13 ENCOUNTER — OFFICE VISIT (OUTPATIENT)
Dept: PODIATRY | Age: 69
End: 2024-03-13

## 2024-03-13 VITALS — HEIGHT: 71 IN | WEIGHT: 198 LBS | BODY MASS INDEX: 27.72 KG/M2

## 2024-03-13 DIAGNOSIS — E11.51 TYPE II DIABETES MELLITUS WITH PERIPHERAL CIRCULATORY DISORDER (HCC): Primary | ICD-10-CM

## 2024-03-13 PROCEDURE — 99024 POSTOP FOLLOW-UP VISIT: CPT | Performed by: PODIATRIST

## 2024-03-13 NOTE — PROGRESS NOTES
[facility] PODIATRY  [address] [place], [place] [address]  Dept: [phone]    POST-OP PROGRESS NOTE  Date of patient's visit: 3/13/2024  Patient's Name:  Asher Ayon YOB: 1955            Patient Care Team:  Vincent Xiong DO as PCP - General  Eris Xiong DPM as Physician (Podiatry)        Chief Complaint   Patient presents with    Post-Op Check     Post op x 2 weeks       Pt's primary care physician is Vincent Xiong DO last seen July 11 2023     Subjective: Asher Ayon is a 68 y.o. male who presents to the office today 2week(s)  S/P RIGHT FOURTH TOE AMPUTATAION AT MTP, RIGHT FILLET OF FOURTH TOE  for correction of Diabetic foot ulcer with osteomyelitis   Problem List Items Addressed This Visit    None  . Patient relates pain is Absent  and IMPROVED.  Pain is rated 0 out of 10 and is described as none. Currently denies F/C/N/V.  Is patient taking pain medications as prescribed and is controlling pain yes    Physical Examination:  Incision is coapted, sutures/steri-strips are intact. Minimal bleeding post operatively. Edema present. No erythema. No Pus.   Operative correction is satisfactory.      Radiographs: none      Assessment: Asher Ayon is status post RIGHT FOURTH TOE AMPUTATAION AT MTP, RIGHT FILLET OF FOURTH TOE   Normal post operative course.  Doing well         Diagnosis Orders   1. Type II diabetes mellitus with peripheral circulatory disorder (HCC)              Plan:  Patient examined and evaluated.  Current condition and treatment options discussed in detail.  Advised pt to continue to be nonweibhtbearing.  Pt wants to go back to work and has to use work boots for this.  I do not advise this as his incision may open up .  Verbal and written instructions given to patient.  Orders: No orders of the defined types were placed in this encounter.     Contact office with any

## 2024-04-10 ENCOUNTER — OFFICE VISIT (OUTPATIENT)
Dept: PODIATRY | Age: 69
End: 2024-04-10

## 2024-04-10 VITALS — BODY MASS INDEX: 27.72 KG/M2 | HEIGHT: 71 IN | WEIGHT: 198 LBS

## 2024-04-10 DIAGNOSIS — Z98.890 POST-OPERATIVE STATE: Primary | ICD-10-CM

## 2024-04-10 PROCEDURE — 99024 POSTOP FOLLOW-UP VISIT: CPT | Performed by: PODIATRIST

## 2024-04-10 NOTE — PROGRESS NOTES
[facility] PODIATRY  [address] [place], [place] [address]  Dept: [phone]    POST-OP PROGRESS NOTE  Date of patient's visit: 4/10/2024  Patient's Name:  Asher Ayon YOB: 1955            Patient Care Team:  Vincent Xiong DO as PCP - General  Eris Xiong DPM as Physician (Podiatry)        Chief Complaint   Patient presents with    Post-Op Check     Post op x 6 weeks       Pt's primary care physician is Vincent Xiong DO last seen July 11 2023     Subjective: Asher Ayon is a 68 y.o. male who presents to the office today 6 week(s)  S/P RIGHT FOURTH TOE AMPUTATAION AT MTP, RIGHT FILLET OF FOURTH TOE  for correction of Diabetic foot ulcer with osteomyelitis   Problem List Items Addressed This Visit    None  Visit Diagnoses       Post-operative state    -  Primary        . Patient relates pain is Absent  and IMPROVED.  Pain is rated 0 out of 10 and is described as none. Currently denies F/C/N/V.  Is patient taking pain medications as prescribed and is controlling pain yes    Physical Examination:  Incision is coapted, sutures/steri-strips are intact. Minimal bleeding post operatively. Edema present. No erythema. No Pus.   Operative correction is satisfactory.      Radiographs: none      Assessment: Asher Ayon is status post RIGHT FOURTH TOE AMPUTATAION AT MTP, RIGHT FILLET OF FOURTH TOE   Normal post operative course.  Doing well         Diagnosis Orders   1. Post-operative state              Plan:  Patient examined and evaluated.  Current condition and treatment options discussed in detail. Verbal and written instructions given to patient.  Orders: No orders of the defined types were placed in this encounter.     Contact office with any questions/problems/concerns.  RTC in 3 weeks     Electronically signed by Eris Xiong DPM on 4/10/2024 at 10:18 AM  4/10/2024

## 2024-07-08 ENCOUNTER — OFFICE VISIT (OUTPATIENT)
Dept: PODIATRY | Age: 69
End: 2024-07-08
Payer: MEDICARE

## 2024-07-08 VITALS — BODY MASS INDEX: 27.72 KG/M2 | HEIGHT: 71 IN | WEIGHT: 198 LBS

## 2024-07-08 DIAGNOSIS — E11.621 DIABETIC FOOT ULCER WITH OSTEOMYELITIS (HCC): ICD-10-CM

## 2024-07-08 DIAGNOSIS — E11.69 DIABETIC FOOT ULCER WITH OSTEOMYELITIS (HCC): ICD-10-CM

## 2024-07-08 DIAGNOSIS — L97.514 ULCER OF TOE OF RIGHT FOOT, WITH NECROSIS OF BONE (HCC): ICD-10-CM

## 2024-07-08 DIAGNOSIS — M79.671 RIGHT FOOT PAIN: ICD-10-CM

## 2024-07-08 DIAGNOSIS — L97.509 DIABETIC FOOT ULCER WITH OSTEOMYELITIS (HCC): ICD-10-CM

## 2024-07-08 DIAGNOSIS — E11.51 TYPE II DIABETES MELLITUS WITH PERIPHERAL CIRCULATORY DISORDER (HCC): Primary | ICD-10-CM

## 2024-07-08 DIAGNOSIS — I73.9 PVD (PERIPHERAL VASCULAR DISEASE) (HCC): ICD-10-CM

## 2024-07-08 DIAGNOSIS — M86.9 DIABETIC FOOT ULCER WITH OSTEOMYELITIS (HCC): ICD-10-CM

## 2024-07-08 PROCEDURE — 1123F ACP DISCUSS/DSCN MKR DOCD: CPT | Performed by: PODIATRIST

## 2024-07-08 PROCEDURE — 99214 OFFICE O/P EST MOD 30 MIN: CPT | Performed by: PODIATRIST

## 2024-07-08 PROCEDURE — 11043 DBRDMT MUSC&/FSCA 1ST 20/<: CPT | Performed by: PODIATRIST

## 2024-07-08 NOTE — PROGRESS NOTES
[facility] PODIATRY  [address] [place], [place] [address]  Dept: [phone]    RETURN PATIENT PROGRESS NOTE  Date of patient's visit: 7/8/2024  Patient's Name:  Asher Ayon YOB: 1955            Patient Care Team:  Vincent Xiong DO as PCP - General  Eris Xiong DPM as Physician (Podiatry)  Eris Xiong DPM as Consulting Physician (PODIATRIST FOOT AND ANKLE SURGERY)       Asher Ayon 68 y.o. male that presents for follow-up of   Chief Complaint   Patient presents with    Diabetes     Diabetic foot care  Bs 176 on June 27 2024    Peripheral Neuropathy     Bl feet     Pt's primary care physician is Vincent Xiong DO last seen may 16 2024  Symptoms began 3 month(s) ago and are decreased .  Patient relates pain is Present.  Pain is rated 3 out of 10 and is described as intermittent.  Treatments prior to today's visit include: previous podiatry treatment.  Currently denies F/C/N/V.     Allergies   Allergen Reactions    Doxycycline Other (See Comments)     Skin peeling    Pcn [Penicillins] Rash    Penicillin G Rash       Past Medical History:   Diagnosis Date    Atrial fibrillation (HCC)     CAD (coronary artery disease) 2010    s/p CABG- no stents    Cardiomyopathy (HCC)     Cellulitis     CHF (congestive heart failure) (HCC)     CKD (chronic kidney disease)     Diabetes mellitus (HCC)     Foot infection     Heart failure (HCC)     decreased EF    Hyperlipidemia     Hypertension     MRSA (methicillin resistant staph aureus) culture positive     foot    Osteomyelitis (HCC)     PVD (peripheral vascular disease) ([facility])     Wound, open, foot     left foot       Prior to Admission medications    Medication Sig Start Date End Date Taking? Authorizing Provider   Misc. Devices MISC 1 PAIR OF DIABETIC SHOES (1 LEFT/ 1 RIGHT)  1-3 PAIRS OF INSERTS (LEFT/ RIGHT) 9/26/23  Yes Eris Xiong DPM   glimepiride

## 2024-07-29 ENCOUNTER — OFFICE VISIT (OUTPATIENT)
Dept: PODIATRY | Age: 69
End: 2024-07-29

## 2024-07-29 VITALS — BODY MASS INDEX: 27.72 KG/M2 | HEIGHT: 71 IN | WEIGHT: 198 LBS

## 2024-07-29 DIAGNOSIS — L97.509 DIABETIC FOOT ULCER WITH OSTEOMYELITIS (HCC): ICD-10-CM

## 2024-07-29 DIAGNOSIS — E11.69 DIABETIC FOOT ULCER WITH OSTEOMYELITIS (HCC): ICD-10-CM

## 2024-07-29 DIAGNOSIS — E11.621 DIABETIC FOOT ULCER WITH OSTEOMYELITIS (HCC): ICD-10-CM

## 2024-07-29 DIAGNOSIS — I73.9 PVD (PERIPHERAL VASCULAR DISEASE) (HCC): ICD-10-CM

## 2024-07-29 DIAGNOSIS — Z89.419 HISTORY OF AMPUTATION OF GREAT TOE (HCC): ICD-10-CM

## 2024-07-29 DIAGNOSIS — M79.671 PAIN IN RIGHT FOOT: ICD-10-CM

## 2024-07-29 DIAGNOSIS — L97.513 RIGHT FOOT ULCER, WITH NECROSIS OF MUSCLE (HCC): ICD-10-CM

## 2024-07-29 DIAGNOSIS — M86.9 DIABETIC FOOT ULCER WITH OSTEOMYELITIS (HCC): ICD-10-CM

## 2024-07-29 DIAGNOSIS — E11.51 TYPE II DIABETES MELLITUS WITH PERIPHERAL CIRCULATORY DISORDER (HCC): Primary | ICD-10-CM

## 2024-07-29 NOTE — PROGRESS NOTES
changes, rash  Cardiovascular: Negative for leg swelling.   Gastrointestinal: Negative for constipation, diarrhea, nausea and vomiting.         Lower Extremity Physical Examination:     Vitals: There were no vitals filed for this visit.  General: AAO x 3 in NAD.     Wound 1  diabetic foot ulcer   Right foot 4 toe     Wound measurements:  #1  4   mm by    3 mm  by   3  mm        Wound Depth: Muscle .    Drainage:    minimal, serosanguinous Type:serosanguinous  Wound Bed status:   Granulation Tissue: 0%   Necrotic 20%  Type:   Epithelialization 0%   Hypergranular 0%   Periwound hyperkeratosis:  absent  Erythema absent    Odor:no  Maceration:no  Undermining/tract/tunneling:no  Culture taken: not today  but previously             Wound #:   2    diabetic foot ulcer   right plantar foot at the 1st metatarsal head     Wound measurements: healed    Asessment: Patient is a 68 y.o. male with:    Diagnosis Orders   1. Type II diabetes mellitus with peripheral circulatory disorder (HCC)  CT DEBRIDEMENT MUSCLE & FASCIA 20 SQ CM/<      2. Diabetic foot ulcer with osteomyelitis (HCC)  CT DEBRIDEMENT MUSCLE & FASCIA 20 SQ CM/<      3. PVD (peripheral vascular disease) ([facility])  CT DEBRIDEMENT MUSCLE & FASCIA 20 SQ CM/<      4. History of amputation of great toe (HCC)  CT DEBRIDEMENT MUSCLE & FASCIA 20 SQ CM/<      5. Right foot ulcer, with necrosis of muscle (HCC)  CT DEBRIDEMENT MUSCLE & FASCIA 20 SQ CM/<      6. Pain in right foot  CT DEBRIDEMENT MUSCLE & FASCIA 20 SQ CM/<              Plan: Patient examined and evaluated.  Current condition and treatment options discussed in detail.   Advised pt to his conditon    With the patient in supine position, Lidocaine soaked gauze was applied per physician order at beginning of wound evaluation.  It was subsequently removed.    Using a #15 blade scalpel and Pick-up, sharp excisional debridement of the wound was performed down to and included the removal of the following tissue:     []

## 2024-08-13 ENCOUNTER — HOSPITAL ENCOUNTER (INPATIENT)
Age: 69
LOS: 3 days | Discharge: HOME | End: 2024-08-16
Attending: EMERGENCY MEDICINE | Admitting: FAMILY MEDICINE
Payer: MEDICARE

## 2024-08-13 ENCOUNTER — APPOINTMENT (OUTPATIENT)
Dept: GENERAL RADIOLOGY | Age: 69
End: 2024-08-13
Attending: EMERGENCY MEDICINE
Payer: MEDICARE

## 2024-08-13 ENCOUNTER — APPOINTMENT (OUTPATIENT)
Dept: CT IMAGING | Age: 69
End: 2024-08-13
Payer: MEDICARE

## 2024-08-13 ENCOUNTER — APPOINTMENT (OUTPATIENT)
Dept: MRI IMAGING | Age: 69
End: 2024-08-13
Payer: MEDICARE

## 2024-08-13 DIAGNOSIS — I63.9 ACUTE CVA (CEREBROVASCULAR ACCIDENT) (HCC): ICD-10-CM

## 2024-08-13 DIAGNOSIS — I63.30 CEREBROVASCULAR ACCIDENT (CVA) DUE TO THROMBOSIS OF CEREBRAL ARTERY (HCC): ICD-10-CM

## 2024-08-13 DIAGNOSIS — I63.9 CEREBROVASCULAR ACCIDENT (CVA), UNSPECIFIED MECHANISM (HCC): Primary | ICD-10-CM

## 2024-08-13 LAB
ALBUMIN SERPL-MCNC: 4.1 G/DL (ref 3.5–5.2)
ALP SERPL-CCNC: 149 U/L (ref 40–129)
ALT SERPL-CCNC: 31 U/L (ref 5–41)
ANION GAP SERPL CALCULATED.3IONS-SCNC: 12 MMOL/L (ref 9–17)
ANION GAP SERPL CALCULATED.3IONS-SCNC: 13 MMOL/L (ref 9–17)
ANTI-XA UNFRAC HEPARIN: 0.32 IU/L (ref 0.3–0.7)
AST SERPL-CCNC: 27 U/L
BASOPHILS # BLD: 0.1 K/UL (ref 0–0.2)
BASOPHILS NFR BLD: 1 % (ref 0–2)
BILIRUB SERPL-MCNC: 0.7 MG/DL (ref 0.3–1.2)
BUN SERPL-MCNC: 32 MG/DL (ref 8–23)
BUN SERPL-MCNC: 32 MG/DL (ref 8–23)
CALCIUM SERPL-MCNC: 9.1 MG/DL (ref 8.6–10.4)
CALCIUM SERPL-MCNC: 9.3 MG/DL (ref 8.6–10.4)
CHLORIDE SERPL-SCNC: 100 MMOL/L (ref 98–107)
CHLORIDE SERPL-SCNC: 100 MMOL/L (ref 98–107)
CHOLEST SERPL-MCNC: 133 MG/DL
CHOLESTEROL/HDL RATIO: 3.8
CO2 SERPL-SCNC: 23 MMOL/L (ref 20–31)
CO2 SERPL-SCNC: 23 MMOL/L (ref 20–31)
CREAT SERPL-MCNC: 1.6 MG/DL (ref 0.7–1.2)
CREAT SERPL-MCNC: 1.7 MG/DL (ref 0.7–1.2)
EOSINOPHIL # BLD: 0.2 K/UL (ref 0–0.4)
EOSINOPHILS RELATIVE PERCENT: 3 % (ref 0–4)
ERYTHROCYTE [DISTWIDTH] IN BLOOD BY AUTOMATED COUNT: 14.3 % (ref 11.5–14.9)
ERYTHROCYTE [DISTWIDTH] IN BLOOD BY AUTOMATED COUNT: 14.4 % (ref 11.5–14.9)
GFR, ESTIMATED: 43 ML/MIN/1.73M2
GFR, ESTIMATED: 46 ML/MIN/1.73M2
GLUCOSE BLD-MCNC: 145 MG/DL (ref 75–110)
GLUCOSE BLD-MCNC: 184 MG/DL (ref 75–110)
GLUCOSE BLD-MCNC: 239 MG/DL (ref 75–110)
GLUCOSE SERPL-MCNC: 155 MG/DL (ref 70–99)
GLUCOSE SERPL-MCNC: 259 MG/DL (ref 70–99)
HCT VFR BLD AUTO: 40.7 % (ref 41–53)
HCT VFR BLD AUTO: 42.4 % (ref 41–53)
HDLC SERPL-MCNC: 35 MG/DL
HGB BLD-MCNC: 14.1 G/DL (ref 13.5–17.5)
HGB BLD-MCNC: 14.6 G/DL (ref 13.5–17.5)
INR PPP: 1.7
LDLC SERPL CALC-MCNC: 45 MG/DL (ref 0–130)
LYMPHOCYTES NFR BLD: 1.1 K/UL (ref 1–4.8)
LYMPHOCYTES RELATIVE PERCENT: 17 % (ref 24–44)
MCH RBC QN AUTO: 33.5 PG (ref 26–34)
MCH RBC QN AUTO: 33.8 PG (ref 26–34)
MCHC RBC AUTO-ENTMCNC: 34.5 G/DL (ref 31–37)
MCHC RBC AUTO-ENTMCNC: 34.6 G/DL (ref 31–37)
MCV RBC AUTO: 96.7 FL (ref 80–100)
MCV RBC AUTO: 98 FL (ref 80–100)
MONOCYTES NFR BLD: 0.5 K/UL (ref 0.1–1.3)
MONOCYTES NFR BLD: 8 % (ref 1–7)
NEUTROPHILS NFR BLD: 71 % (ref 36–66)
NEUTS SEG NFR BLD: 4.5 K/UL (ref 1.3–9.1)
PARTIAL THROMBOPLASTIN TIME: 38.9 SEC (ref 24–36)
PLATELET # BLD AUTO: 197 K/UL (ref 150–450)
PLATELET # BLD AUTO: 200 K/UL (ref 150–450)
PMV BLD AUTO: 8 FL (ref 6–12)
PMV BLD AUTO: 8.1 FL (ref 6–12)
POTASSIUM SERPL-SCNC: 4.1 MMOL/L (ref 3.7–5.3)
POTASSIUM SERPL-SCNC: 4.1 MMOL/L (ref 3.7–5.3)
PROT SERPL-MCNC: 7.6 G/DL (ref 6.4–8.3)
PROTHROMBIN TIME: 20.1 SEC (ref 11.8–14.6)
RBC # BLD AUTO: 4.22 M/UL (ref 4.5–5.9)
RBC # BLD AUTO: 4.32 M/UL (ref 4.5–5.9)
SODIUM SERPL-SCNC: 135 MMOL/L (ref 135–144)
SODIUM SERPL-SCNC: 136 MMOL/L (ref 135–144)
TRIGL SERPL-MCNC: 265 MG/DL
TROPONIN I SERPL HS-MCNC: 25 NG/L (ref 0–22)
WBC OTHER # BLD: 6.4 K/UL (ref 3.5–11)
WBC OTHER # BLD: 7.9 K/UL (ref 3.5–11)

## 2024-08-13 PROCEDURE — 99285 EMERGENCY DEPT VISIT HI MDM: CPT

## 2024-08-13 PROCEDURE — 2060000000 HC ICU INTERMEDIATE R&B

## 2024-08-13 PROCEDURE — 2580000003 HC RX 258: Performed by: EMERGENCY MEDICINE

## 2024-08-13 PROCEDURE — 80048 BASIC METABOLIC PNL TOTAL CA: CPT

## 2024-08-13 PROCEDURE — 6370000000 HC RX 637 (ALT 250 FOR IP): Performed by: PSYCHIATRY & NEUROLOGY

## 2024-08-13 PROCEDURE — 84484 ASSAY OF TROPONIN QUANT: CPT

## 2024-08-13 PROCEDURE — 80061 LIPID PANEL: CPT

## 2024-08-13 PROCEDURE — 70450 CT HEAD/BRAIN W/O DYE: CPT

## 2024-08-13 PROCEDURE — 4A03X5D MEASUREMENT OF ARTERIAL FLOW, INTRACRANIAL, EXTERNAL APPROACH: ICD-10-PCS | Performed by: RADIOLOGY

## 2024-08-13 PROCEDURE — 85520 HEPARIN ASSAY: CPT

## 2024-08-13 PROCEDURE — 6360000002 HC RX W HCPCS: Performed by: EMERGENCY MEDICINE

## 2024-08-13 PROCEDURE — 71045 X-RAY EXAM CHEST 1 VIEW: CPT

## 2024-08-13 PROCEDURE — 85025 COMPLETE CBC W/AUTO DIFF WBC: CPT

## 2024-08-13 PROCEDURE — 6370000000 HC RX 637 (ALT 250 FOR IP): Performed by: FAMILY MEDICINE

## 2024-08-13 PROCEDURE — 85730 THROMBOPLASTIN TIME PARTIAL: CPT

## 2024-08-13 PROCEDURE — 85610 PROTHROMBIN TIME: CPT

## 2024-08-13 PROCEDURE — 93005 ELECTROCARDIOGRAM TRACING: CPT | Performed by: EMERGENCY MEDICINE

## 2024-08-13 PROCEDURE — 36415 COLL VENOUS BLD VENIPUNCTURE: CPT

## 2024-08-13 PROCEDURE — 85027 COMPLETE CBC AUTOMATED: CPT

## 2024-08-13 PROCEDURE — 80053 COMPREHEN METABOLIC PANEL: CPT

## 2024-08-13 PROCEDURE — 70496 CT ANGIOGRAPHY HEAD: CPT

## 2024-08-13 PROCEDURE — 6370000000 HC RX 637 (ALT 250 FOR IP): Performed by: EMERGENCY MEDICINE

## 2024-08-13 PROCEDURE — 82947 ASSAY GLUCOSE BLOOD QUANT: CPT

## 2024-08-13 PROCEDURE — 6360000004 HC RX CONTRAST MEDICATION: Performed by: EMERGENCY MEDICINE

## 2024-08-13 RX ORDER — HYDRALAZINE HYDROCHLORIDE 20 MG/ML
10 INJECTION INTRAMUSCULAR; INTRAVENOUS EVERY 6 HOURS PRN
Status: DISCONTINUED | OUTPATIENT
Start: 2024-08-13 | End: 2024-08-13

## 2024-08-13 RX ORDER — FUROSEMIDE 40 MG/1
80 TABLET ORAL DAILY
Status: DISCONTINUED | OUTPATIENT
Start: 2024-08-14 | End: 2024-08-16 | Stop reason: HOSPADM

## 2024-08-13 RX ORDER — SODIUM CHLORIDE 9 MG/ML
INJECTION, SOLUTION INTRAVENOUS PRN
Status: DISCONTINUED | OUTPATIENT
Start: 2024-08-13 | End: 2024-08-16 | Stop reason: HOSPADM

## 2024-08-13 RX ORDER — 0.9 % SODIUM CHLORIDE 0.9 %
80 INTRAVENOUS SOLUTION INTRAVENOUS ONCE
Status: COMPLETED | OUTPATIENT
Start: 2024-08-13 | End: 2024-08-13

## 2024-08-13 RX ORDER — ASPIRIN 81 MG/1
81 TABLET, CHEWABLE ORAL DAILY
Status: DISCONTINUED | OUTPATIENT
Start: 2024-08-14 | End: 2024-08-16 | Stop reason: HOSPADM

## 2024-08-13 RX ORDER — CARVEDILOL 6.25 MG/1
6.25 TABLET ORAL ONCE
Status: DISCONTINUED | OUTPATIENT
Start: 2024-08-13 | End: 2024-08-13

## 2024-08-13 RX ORDER — LISINOPRIL 5 MG/1
5 TABLET ORAL DAILY
Status: DISCONTINUED | OUTPATIENT
Start: 2024-08-14 | End: 2024-08-14

## 2024-08-13 RX ORDER — ATORVASTATIN CALCIUM 40 MG/1
40 TABLET, FILM COATED ORAL NIGHTLY
Status: DISCONTINUED | OUTPATIENT
Start: 2024-08-13 | End: 2024-08-16 | Stop reason: HOSPADM

## 2024-08-13 RX ORDER — CARVEDILOL 3.12 MG/1
3.12 TABLET ORAL 2 TIMES DAILY WITH MEALS
Status: DISCONTINUED | OUTPATIENT
Start: 2024-08-14 | End: 2024-08-14

## 2024-08-13 RX ORDER — ERGOCALCIFEROL 1.25 MG/1
50000 CAPSULE ORAL WEEKLY
Status: DISCONTINUED | OUTPATIENT
Start: 2024-08-13 | End: 2024-08-13

## 2024-08-13 RX ORDER — SODIUM CHLORIDE 0.9 % (FLUSH) 0.9 %
5-40 SYRINGE (ML) INJECTION PRN
Status: DISCONTINUED | OUTPATIENT
Start: 2024-08-13 | End: 2024-08-16 | Stop reason: HOSPADM

## 2024-08-13 RX ORDER — ATORVASTATIN CALCIUM 40 MG/1
40 TABLET, FILM COATED ORAL NIGHTLY
Status: DISCONTINUED | OUTPATIENT
Start: 2024-08-13 | End: 2024-08-13

## 2024-08-13 RX ORDER — ASPIRIN 81 MG/1
81 TABLET, CHEWABLE ORAL ONCE
Status: COMPLETED | OUTPATIENT
Start: 2024-08-13 | End: 2024-08-13

## 2024-08-13 RX ORDER — POLYETHYLENE GLYCOL 3350 17 G/17G
17 POWDER, FOR SOLUTION ORAL DAILY PRN
Status: DISCONTINUED | OUTPATIENT
Start: 2024-08-13 | End: 2024-08-16 | Stop reason: HOSPADM

## 2024-08-13 RX ORDER — HEPARIN SODIUM 1000 [USP'U]/ML
4000 INJECTION, SOLUTION INTRAVENOUS; SUBCUTANEOUS PRN
Status: DISCONTINUED | OUTPATIENT
Start: 2024-08-13 | End: 2024-08-16

## 2024-08-13 RX ORDER — SODIUM CHLORIDE 0.9 % (FLUSH) 0.9 %
5-40 SYRINGE (ML) INJECTION EVERY 12 HOURS SCHEDULED
Status: DISCONTINUED | OUTPATIENT
Start: 2024-08-13 | End: 2024-08-16 | Stop reason: HOSPADM

## 2024-08-13 RX ORDER — LABETALOL HYDROCHLORIDE 5 MG/ML
10 INJECTION, SOLUTION INTRAVENOUS EVERY 4 HOURS PRN
Status: DISCONTINUED | OUTPATIENT
Start: 2024-08-13 | End: 2024-08-14

## 2024-08-13 RX ORDER — HEPARIN SODIUM 10000 [USP'U]/100ML
5-30 INJECTION, SOLUTION INTRAVENOUS CONTINUOUS
Status: DISCONTINUED | OUTPATIENT
Start: 2024-08-13 | End: 2024-08-16

## 2024-08-13 RX ORDER — HEPARIN SODIUM 1000 [USP'U]/ML
4000 INJECTION, SOLUTION INTRAVENOUS; SUBCUTANEOUS ONCE
Status: COMPLETED | OUTPATIENT
Start: 2024-08-13 | End: 2024-08-13

## 2024-08-13 RX ORDER — VITAMIN B COMPLEX
1000 TABLET ORAL DAILY
Status: DISCONTINUED | OUTPATIENT
Start: 2024-08-14 | End: 2024-08-16 | Stop reason: HOSPADM

## 2024-08-13 RX ORDER — SODIUM CHLORIDE 0.9 % (FLUSH) 0.9 %
10 SYRINGE (ML) INJECTION PRN
Status: DISCONTINUED | OUTPATIENT
Start: 2024-08-13 | End: 2024-08-16 | Stop reason: HOSPADM

## 2024-08-13 RX ORDER — HYDRALAZINE HYDROCHLORIDE 20 MG/ML
10 INJECTION INTRAMUSCULAR; INTRAVENOUS EVERY 6 HOURS PRN
Status: DISCONTINUED | OUTPATIENT
Start: 2024-08-13 | End: 2024-08-14

## 2024-08-13 RX ORDER — HEPARIN SODIUM 1000 [USP'U]/ML
2000 INJECTION, SOLUTION INTRAVENOUS; SUBCUTANEOUS PRN
Status: DISCONTINUED | OUTPATIENT
Start: 2024-08-13 | End: 2024-08-16

## 2024-08-13 RX ORDER — WARFARIN SODIUM 2 MG/1
2 TABLET ORAL
Status: COMPLETED | OUTPATIENT
Start: 2024-08-13 | End: 2024-08-13

## 2024-08-13 RX ORDER — GLIPIZIDE 5 MG/1
5 TABLET ORAL
Status: DISCONTINUED | OUTPATIENT
Start: 2024-08-14 | End: 2024-08-16 | Stop reason: HOSPADM

## 2024-08-13 RX ADMIN — ATORVASTATIN CALCIUM 40 MG: 40 TABLET, FILM COATED ORAL at 20:03

## 2024-08-13 RX ADMIN — IOPAMIDOL 75 ML: 755 INJECTION, SOLUTION INTRAVENOUS at 12:08

## 2024-08-13 RX ADMIN — HEPARIN SODIUM 4000 UNITS: 1000 INJECTION INTRAVENOUS; SUBCUTANEOUS at 14:01

## 2024-08-13 RX ADMIN — HEPARIN SODIUM 10 UNITS/KG/HR: 10000 INJECTION, SOLUTION INTRAVENOUS at 14:04

## 2024-08-13 RX ADMIN — WARFARIN SODIUM 2 MG: 2 TABLET ORAL at 18:57

## 2024-08-13 RX ADMIN — SODIUM CHLORIDE, PRESERVATIVE FREE 10 ML: 5 INJECTION INTRAVENOUS at 12:09

## 2024-08-13 RX ADMIN — SODIUM CHLORIDE 100 ML: 9 INJECTION, SOLUTION INTRAVENOUS at 12:10

## 2024-08-13 RX ADMIN — ASPIRIN 81 MG 81 MG: 81 TABLET ORAL at 13:55

## 2024-08-13 ASSESSMENT — ENCOUNTER SYMPTOMS
COUGH: 0
CHEST TIGHTNESS: 0
COLOR CHANGE: 0
SORE THROAT: 0
ABDOMINAL PAIN: 0
VOMITING: 0
EYE PAIN: 0
EYE DISCHARGE: 0
EYE REDNESS: 0
SHORTNESS OF BREATH: 0
CONSTIPATION: 0
RHINORRHEA: 0
NAUSEA: 0
DIARRHEA: 0
FACIAL SWELLING: 0
WHEEZING: 0
BLOOD IN STOOL: 0
SINUS PRESSURE: 0
TROUBLE SWALLOWING: 0
BACK PAIN: 0

## 2024-08-13 ASSESSMENT — PAIN - FUNCTIONAL ASSESSMENT: PAIN_FUNCTIONAL_ASSESSMENT: NONE - DENIES PAIN

## 2024-08-13 NOTE — PROGRESS NOTES
Pharmacy Medication History Note      List of current medications patient is taking is complete.    Source of information: Patient, [employer], OARRS    Changes made to medication list:  Medications removed (include reason, ex. therapy complete or physician discontinued, noncompliance):  Glipizide - old prescription  Nitroglycerin - never used   Warfarin - duplicate     Medications flagged for provider review:  None    Medications added/doses adjusted:  Benazepril 5 mg by mouth once daily     Other notes (ex. Recent course of antibiotics, Coumadin dosing):  Patient has warfarin managed by [facility]. Last visit on 7/30 showed an INR of 2.8 (goal 2-3). Had a procedure on 8/6 where warfarin was held for 4 days. Restarted on 8/8 at regular schedule: 2 mg three times weekly on Mon, Wed, Fri and 4 mg on all other days.   OARRS negative       Current Home Medication List at Time of Admission:  Prior to Admission medications    Medication Sig   glimepiride (AMARYL) 2 MG tablet TAKE 1 TABLET BY MOUTH TWICE A DAY FOR 30 DAYS   carvedilol (COREG) 3.125 MG tablet Take 1 tablet by mouth 2 times daily   benazepril (LOTENSIN) 5 MG tablet Take 1 tablet by mouth daily   Vitamin D (CHOLECALCIFEROL) 25 MCG (1000 UT) TABS tablet Take 1 tablet by mouth daily   aspirin 81 MG EC tablet Take 1 tablet by mouth daily   furosemide (LASIX) 80 MG tablet Take 1 tablet by mouth daily   atorvastatin (LIPITOR) 40 MG tablet Take 1 tablet by mouth at bedtime   warfarin (COUMADIN) 2 MG tablet Take 2 mg three times weekly on Mon, Wed, and Fri. Take 4 mg on all other days. Patient's INR is managed by [facility]         Please let me know if you have any questions about this encounter. Thank you!    Electronically signed by Nelly Trujillo on 8/13/2024 at 1:43 PM

## 2024-08-13 NOTE — ED PROVIDER NOTES
items in the order listed. Record performance in each category after each subscale exam. Do not go back and change scores. Follow directions provided for each exam technique. Scores should reflect what the patient does, not what the clinician thinks the patient can do. The clinician should record answers while administering the exam and work quickly. Except where indicated, the patient should not be coached (i.e., repeated requests to patient to make a special effort).     1a.  Level of consciousness:  0 - alert; keenly responsive  1b.  Level of consciousness questions:  0 - answers both questions correctly  1c.  Level of consciousness questions:  0 - performs both tasks correctly  2.    Best Gaze:  0 - normal  3.    Visual:  0 - no visual loss  4.    Facial Palsy:  3 - complete paralysis of one or both sides (absence of facial movement in the upper and lower face)  5a.  Motor left arm:  0 - no drift, limb holds 90 (or 45) degrees for full 10 seconds  5b.  Motor right arm:  0 - no drift, limb holds 90 (or 45) degrees for full 10 seconds  6a.  Motor left le - no drift; leg holds 30 degree position for full 5 seconds  6b.  Motor right le - no drift; leg holds 30 degree position for full 5 seconds  7.    Limb Ataxia:  0 - absent  8.    Sensory:  0 - normal; no sensory loss  9.    Best Language:  0 - no aphasia, normal  10.  Dysarthria:  1 - mild to moderate, patient slurs at least some words and at worst, can be understood with some difficulty  11.  Extinction and Inattention:  0 - no abnormality    TOTAL:  4      EKG Interpretation:  EKG Interpretation    Interpreted by emergency department physician    Rhythm: atrial fibrillation - controlled  Rate: normal  Axis: normal  Ectopy: none  Conduction: normal  ST Segments: nonspecific changes  T Waves: non specific changes  Q Waves: none    Clinical Impression: EKG: nonspecific ST and T waves changes, atrial fibrillation, rate 77.      Tera Hu,  HEPARIN   ANTI-XA, UNFRACTIONATED HEPARIN   POCT GLUCOSE       Vitals Reviewed:    Vitals:    08/13/24 1204 08/13/24 1230 08/13/24 1245   BP: (!) 188/122 (!) 198/120 (!) 192/88   Pulse: 93 83 80   Resp: 17 17 15   TempSrc: Oral     SpO2: 95% 97% 96%   Weight: 94.3 kg (208 lb)     Height: 1.778 m (5' 10\")       MEDICATIONS GIVEN TO PATIENT THIS ENCOUNTER:  Orders Placed This Encounter   Medications    iopamidol (ISOVUE-370) 76 % injection 75 mL    sodium chloride 0.9 % bolus 80 mL    sodium chloride flush 0.9 % injection 10 mL    aspirin chewable tablet 81 mg    heparin (porcine) injection 4,000 Units    heparin (porcine) injection 4,000 Units    heparin (porcine) injection 2,000 Units    heparin 25,000 units in dextrose 5% 250 mL (premix) infusion     DISCHARGE PRESCRIPTIONS:  New Prescriptions    No medications on file     PHYSICIAN CONSULTS ORDERED THIS ENCOUNTER:  IP CONSULT TO PRIMARY CARE PROVIDER    FINAL IMPRESSION      1. Cerebrovascular accident (CVA), unspecified mechanism (HCC)          DISPOSITION/PLAN   DISPOSITION Admitted 08/13/2024 01:47:30 PM      PATIENT REFERREDTO:  No follow-up provider specified.    DISCHARGEMEDICATIONS:  New Prescriptions    No medications on file       (Please note that portions of this note were completed with a voice recognition program.  Efforts were made to edit thedictations but occasionally words are mis-transcribed.)    Tera Hu MD  Attending Emergency Physician                        Tera Hu MD  08/13/24 6590

## 2024-08-13 NOTE — PROGRESS NOTES
Pharmacy monitoring of Heparin infusion  Heparin Infusion New Order    Patient on heparin for:  atrial fibrillation    Was patient on previous oral anticoagulant/dose?:  yes Coumadin, 8/13/24 INR 1.7, Confirmed with RN/Pt/Pts family/Surescripts?: yes    Factor Xa inhibitor/LMWH use within the past 72 hours? NO      Recent Labs     08/13/24  1208   HGB 14.1   INR 1.7          Heparin to be monitored using anti-Xa for duration of therapy.(aPTT should be used for patients who have received a DOAC in the past 72 hours)?      Have admin instructions been updated to reflect appropriate protocol? YES    Have appropriate labs been ordered for corresponding protocol? YES   *Discontinue anti-Xa lab monitoring if using aPTT protocol    Is the starting rate/last rate adjustment appropriate? yes    Concurrent anticoagulants: none  (Note it is not appropriate to be on most anticoagulants while on a heparin drip)    Review platelets from the past few days.  Any concerns?: No    Lindsay Schaefer PharmD, BCPS 8/13/2024 2:12 PM

## 2024-08-13 NOTE — ED NOTES
The RN, Humza Llamas, MADALYN, and ARLENE Neri met patient and EMS in ambulance bay on arrival. Pt direct to CT.

## 2024-08-13 NOTE — PROGRESS NOTES
08/13/24 1428   Encounter Summary   Encounter Overview/Reason Crisis   Service Provided For Patient not available   Referral/Consult From Multi-disciplinary team   Support System Parent;Friends/neighbors   Last Encounter  08/13/24   Complexity of Encounter Low   Crisis   Type Code Stroke   Assessment/Intervention/Outcome   Assessment Unable to assess   Intervention Prayer (assurance of)/[place]

## 2024-08-13 NOTE — VIRTUAL HEALTH
Asher OCTAVIO Ayon, was evaluated through a synchronous (real-time) audio-video encounter. The patient (and/or guardian if applicable) is aware that this is a billable service, which includes applicable co-pays. This virtual visit was conducted with patient's (and/or legal guardian's) consent. Patient identification was verified, and a caregiver was present when appropriate.  The patient was located at Facility (Appt Department): [facility]  [address] [address]  Loc: [phone]  The provider was located at Facility (Login Dept): [facility]  [address] [facility] [address] [id number] [phone]  Confirm you are appropriately licensed, registered, or certified to deliver care in the state where the patient is located as indicated above. If you are not or unsure, please re-schedule the visit: Yes, I confirm.   Consults     Total time spent on this encounter:  40m    --Jack Vee MD on 8/13/2024 at 1:08 PM    An electronic signature was used to authenticate this note.      This is a 68-year-old gentleman who presented with sudden onset slurred speech and right facial droop.  NIH is 4.  He has history of A-fib on Coumadin.  Patient was off his Coumadin last week for heart cath.  Do not have INR yet.  Last known well 11 AM.  TNK was not offered and the symptoms are mild and nondisabling and the patient is on Coumadin with INR 1.7.     CT head unremarkable    CTA head and neck    IMPRESSION:  1. 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  2. Atherosclerosis contributes to moderate stenosis at the origin of the left  vertebral artery.  3. Otherwise, no significant stenosis seen of the cervical internal  carotid/vertebral arteries.  4. Atherosclerosis contributes to stenosis involving the V4 segments of the  intracranial vertebral

## 2024-08-13 NOTE — ED NOTES
Pt is brought to this ER by [facility] as a stroke alert from home after he had a sudden onset of slurred speech, rt facial droop, and lt arm et leg weakness.  Pt states he waited approximately 45 minutes before calling 911.  [facility] reports a RACE score of 4.  EMS also reports the pt's BP = 200/78.    Pt has a hx of a-fib, and he is on Coumadin.  Pt was off his Coumadin last week for a heart cath.

## 2024-08-13 NOTE — PROGRESS NOTES
Pt has [employer] pacemaker. Pt has MRI unsafe RV lead per [employer] verification and [employer] rep Fan. Pt is unable to have MRI. Please call [phone] with any questions

## 2024-08-14 ENCOUNTER — APPOINTMENT (OUTPATIENT)
Age: 69
End: 2024-08-14
Attending: FAMILY MEDICINE
Payer: MEDICARE

## 2024-08-14 ENCOUNTER — APPOINTMENT (OUTPATIENT)
Dept: CT IMAGING | Age: 69
End: 2024-08-14
Payer: MEDICARE

## 2024-08-14 ENCOUNTER — APPOINTMENT (OUTPATIENT)
Dept: VASCULAR LAB | Age: 69
End: 2024-08-14
Attending: FAMILY MEDICINE
Payer: MEDICARE

## 2024-08-14 PROBLEM — I65.21 STENOSIS OF RIGHT CAROTID ARTERY: Status: ACTIVE | Noted: 2024-08-14

## 2024-08-14 PROBLEM — R29.810 FACIAL DROOP: Status: ACTIVE | Noted: 2024-08-14

## 2024-08-14 PROBLEM — R47.1 DYSARTHRIA: Status: ACTIVE | Noted: 2024-08-14

## 2024-08-14 PROBLEM — I67.2 INTRACRANIAL ATHEROSCLEROSIS: Status: ACTIVE | Noted: 2024-08-14

## 2024-08-14 LAB
ANTI-XA UNFRAC HEPARIN: 0.22 IU/L (ref 0.3–0.7)
ANTI-XA UNFRAC HEPARIN: 0.27 IU/L (ref 0.3–0.7)
ANTI-XA UNFRAC HEPARIN: 0.49 IU/L (ref 0.3–0.7)
ANTI-XA UNFRAC HEPARIN: 0.55 IU/L (ref 0.3–0.7)
BASOPHILS # BLD: 0.1 K/UL (ref 0–0.2)
BASOPHILS NFR BLD: 1 % (ref 0–2)
ECHO AO ROOT DIAM: 3.1 CM
ECHO AO ROOT INDEX: 1.46 CM/M2
ECHO AV AREA PEAK VELOCITY: 0.7 CM2
ECHO AV AREA VTI: 0.7 CM2
ECHO AV AREA/BSA PEAK VELOCITY: 0.3 CM2/M2
ECHO AV AREA/BSA VTI: 0.3 CM2/M2
ECHO AV MEAN GRADIENT: 3 MMHG
ECHO AV MEAN VELOCITY: 0.8 M/S
ECHO AV PEAK GRADIENT: 7 MMHG
ECHO AV PEAK VELOCITY: 1.3 M/S
ECHO AV VELOCITY RATIO: 0.31
ECHO AV VTI: 27.6 CM
ECHO BSA: 2.16 M2
ECHO EST RA PRESSURE: 3 MMHG
ECHO LA AREA 2C: 27.4 CM2
ECHO LA AREA 4C: 32.9 CM2
ECHO LA DIAMETER INDEX: 2.08 CM/M2
ECHO LA DIAMETER: 4.4 CM
ECHO LA MAJOR AXIS: 7.3 CM
ECHO LA MINOR AXIS: 6.9 CM
ECHO LA TO AORTIC ROOT RATIO: 1.42
ECHO LA VOL BP: 110 ML (ref 18–58)
ECHO LA VOL MOD A2C: 93 ML (ref 18–58)
ECHO LA VOL MOD A4C: 123 ML (ref 18–58)
ECHO LA VOL/BSA BIPLANE: 52 ML/M2 (ref 16–34)
ECHO LA VOLUME INDEX MOD A2C: 44 ML/M2 (ref 16–34)
ECHO LA VOLUME INDEX MOD A4C: 58 ML/M2 (ref 16–34)
ECHO LV E' LATERAL VELOCITY: 7 CM/S
ECHO LV E' SEPTAL VELOCITY: 6 CM/S
ECHO LV FRACTIONAL SHORTENING: 38 % (ref 28–44)
ECHO LV INTERNAL DIMENSION DIASTOLE INDEX: 2.64 CM/M2
ECHO LV INTERNAL DIMENSION DIASTOLIC: 5.6 CM (ref 4.2–5.9)
ECHO LV INTERNAL DIMENSION SYSTOLIC INDEX: 1.65 CM/M2
ECHO LV INTERNAL DIMENSION SYSTOLIC: 3.5 CM
ECHO LV IVSD: 1.1 CM (ref 0.6–1)
ECHO LV MASS 2D: 249.3 G (ref 88–224)
ECHO LV MASS INDEX 2D: 117.6 G/M2 (ref 49–115)
ECHO LV POSTERIOR WALL DIASTOLIC: 1.1 CM (ref 0.6–1)
ECHO LV RELATIVE WALL THICKNESS RATIO: 0.39
ECHO LVOT AREA: 2.3 CM2
ECHO LVOT AV VTI INDEX: 0.3
ECHO LVOT DIAM: 1.7 CM
ECHO LVOT MEAN GRADIENT: 0 MMHG
ECHO LVOT PEAK GRADIENT: 1 MMHG
ECHO LVOT PEAK VELOCITY: 0.4 M/S
ECHO LVOT STROKE VOLUME INDEX: 9 ML/M2
ECHO LVOT SV: 19.1 ML
ECHO LVOT VTI: 8.4 CM
ECHO MV A VELOCITY: 0.3 M/S
ECHO MV AREA VTI: 0.8 CM2
ECHO MV E DECELERATION TIME (DT): 224 MS
ECHO MV E VELOCITY: 0.77 M/S
ECHO MV E/A RATIO: 2.57
ECHO MV E/E' LATERAL: 11
ECHO MV E/E' RATIO (AVERAGED): 11.92
ECHO MV E/E' SEPTAL: 12.83
ECHO MV LVOT VTI INDEX: 2.95
ECHO MV MAX VELOCITY: 0.8 M/S
ECHO MV MEAN GRADIENT: 1 MMHG
ECHO MV MEAN VELOCITY: 0.6 M/S
ECHO MV PEAK GRADIENT: 2 MMHG
ECHO MV VTI: 24.8 CM
ECHO RA AREA 4C: 18.4 CM2
ECHO RA END SYSTOLIC VOLUME APICAL 4 CHAMBER INDEX BSA: 26 ML/M2
ECHO RA VOLUME: 55 ML
ECHO RIGHT VENTRICULAR SYSTOLIC PRESSURE (RVSP): 41 MMHG
ECHO RV TAPSE: 1.8 CM (ref 1.7–?)
ECHO TV REGURGITANT MAX VELOCITY: 3.1 M/S
ECHO TV REGURGITANT PEAK GRADIENT: 31 MMHG
EKG Q-T INTERVAL: 392 MS
EKG QRS DURATION: 96 MS
EKG QTC CALCULATION (BAZETT): 443 MS
EKG R AXIS: 60 DEGREES
EKG T AXIS: 47 DEGREES
EKG VENTRICULAR RATE: 77 BPM
EOSINOPHIL # BLD: 0.1 K/UL (ref 0–0.4)
EOSINOPHILS RELATIVE PERCENT: 2 % (ref 0–4)
ERYTHROCYTE [DISTWIDTH] IN BLOOD BY AUTOMATED COUNT: 14.5 % (ref 11.5–14.9)
EST. AVERAGE GLUCOSE BLD GHB EST-MCNC: 171 MG/DL
GLUCOSE BLD-MCNC: 139 MG/DL (ref 75–110)
GLUCOSE BLD-MCNC: 258 MG/DL (ref 75–110)
HBA1C MFR BLD: 7.6 % (ref 4–6)
HCT VFR BLD AUTO: 40.7 % (ref 41–53)
HGB BLD-MCNC: 14.3 G/DL (ref 13.5–17.5)
INR PPP: 1.8
LYMPHOCYTES NFR BLD: 1.1 K/UL (ref 1–4.8)
LYMPHOCYTES RELATIVE PERCENT: 15 % (ref 24–44)
MCH RBC QN AUTO: 34 PG (ref 26–34)
MCHC RBC AUTO-ENTMCNC: 35 G/DL (ref 31–37)
MCV RBC AUTO: 97.2 FL (ref 80–100)
MONOCYTES NFR BLD: 0.6 K/UL (ref 0.1–1.3)
MONOCYTES NFR BLD: 9 % (ref 1–7)
NEUTROPHILS NFR BLD: 73 % (ref 36–66)
NEUTS SEG NFR BLD: 5 K/UL (ref 1.3–9.1)
PLATELET # BLD AUTO: 202 K/UL (ref 150–450)
PMV BLD AUTO: 8.1 FL (ref 6–12)
PROTHROMBIN TIME: 20.7 SEC (ref 11.8–14.6)
RBC # BLD AUTO: 4.19 M/UL (ref 4.5–5.9)
TROPONIN I SERPL HS-MCNC: 35 NG/L (ref 0–22)
WBC OTHER # BLD: 6.9 K/UL (ref 3.5–11)

## 2024-08-14 PROCEDURE — 85610 PROTHROMBIN TIME: CPT

## 2024-08-14 PROCEDURE — 92523 SPEECH SOUND LANG COMPREHEN: CPT

## 2024-08-14 PROCEDURE — 82947 ASSAY GLUCOSE BLOOD QUANT: CPT

## 2024-08-14 PROCEDURE — 6360000002 HC RX W HCPCS: Performed by: EMERGENCY MEDICINE

## 2024-08-14 PROCEDURE — 83036 HEMOGLOBIN GLYCOSYLATED A1C: CPT

## 2024-08-14 PROCEDURE — 85520 HEPARIN ASSAY: CPT

## 2024-08-14 PROCEDURE — 36415 COLL VENOUS BLD VENIPUNCTURE: CPT

## 2024-08-14 PROCEDURE — 6370000000 HC RX 637 (ALT 250 FOR IP): Performed by: FAMILY MEDICINE

## 2024-08-14 PROCEDURE — 2580000003 HC RX 258: Performed by: FAMILY MEDICINE

## 2024-08-14 PROCEDURE — 93306 TTE W/DOPPLER COMPLETE: CPT

## 2024-08-14 PROCEDURE — 99223 1ST HOSP IP/OBS HIGH 75: CPT | Performed by: PSYCHIATRY & NEUROLOGY

## 2024-08-14 PROCEDURE — 92610 EVALUATE SWALLOWING FUNCTION: CPT

## 2024-08-14 PROCEDURE — 84484 ASSAY OF TROPONIN QUANT: CPT

## 2024-08-14 PROCEDURE — 85025 COMPLETE CBC W/AUTO DIFF WBC: CPT

## 2024-08-14 PROCEDURE — 6370000000 HC RX 637 (ALT 250 FOR IP): Performed by: PSYCHIATRY & NEUROLOGY

## 2024-08-14 PROCEDURE — 97166 OT EVAL MOD COMPLEX 45 MIN: CPT

## 2024-08-14 PROCEDURE — 93306 TTE W/DOPPLER COMPLETE: CPT | Performed by: INTERNAL MEDICINE

## 2024-08-14 PROCEDURE — 2060000000 HC ICU INTERMEDIATE R&B

## 2024-08-14 PROCEDURE — 97162 PT EVAL MOD COMPLEX 30 MIN: CPT

## 2024-08-14 PROCEDURE — 70450 CT HEAD/BRAIN W/O DYE: CPT

## 2024-08-14 RX ORDER — SODIUM CHLORIDE 9 MG/ML
INJECTION, SOLUTION INTRAVENOUS CONTINUOUS
Status: DISCONTINUED | OUTPATIENT
Start: 2024-08-14 | End: 2024-08-16

## 2024-08-14 RX ORDER — LISINOPRIL 5 MG/1
5 TABLET ORAL DAILY
Status: DISCONTINUED | OUTPATIENT
Start: 2024-08-14 | End: 2024-08-16 | Stop reason: HOSPADM

## 2024-08-14 RX ORDER — HYDRALAZINE HYDROCHLORIDE 20 MG/ML
10 INJECTION INTRAMUSCULAR; INTRAVENOUS EVERY 6 HOURS PRN
Status: DISCONTINUED | OUTPATIENT
Start: 2024-08-14 | End: 2024-08-16 | Stop reason: HOSPADM

## 2024-08-14 RX ORDER — CARVEDILOL 3.12 MG/1
3.12 TABLET ORAL 2 TIMES DAILY WITH MEALS
Status: DISCONTINUED | OUTPATIENT
Start: 2024-08-14 | End: 2024-08-16 | Stop reason: HOSPADM

## 2024-08-14 RX ORDER — LABETALOL HYDROCHLORIDE 5 MG/ML
10 INJECTION, SOLUTION INTRAVENOUS EVERY 4 HOURS PRN
Status: DISCONTINUED | OUTPATIENT
Start: 2024-08-14 | End: 2024-08-16 | Stop reason: HOSPADM

## 2024-08-14 RX ORDER — WARFARIN SODIUM 5 MG/1
5 TABLET ORAL
Status: COMPLETED | OUTPATIENT
Start: 2024-08-14 | End: 2024-08-14

## 2024-08-14 RX ADMIN — Medication 1000 UNITS: at 08:44

## 2024-08-14 RX ADMIN — HEPARIN SODIUM 2000 UNITS: 1000 INJECTION INTRAVENOUS; SUBCUTANEOUS at 02:48

## 2024-08-14 RX ADMIN — TOBRAMYCIN 0.5 INCH: 3 OINTMENT OPHTHALMIC at 21:01

## 2024-08-14 RX ADMIN — TOBRAMYCIN 0.5 INCH: 3 OINTMENT OPHTHALMIC at 13:27

## 2024-08-14 RX ADMIN — SODIUM CHLORIDE: 9 INJECTION, SOLUTION INTRAVENOUS at 08:57

## 2024-08-14 RX ADMIN — ASPIRIN 81 MG: 81 TABLET, CHEWABLE ORAL at 08:44

## 2024-08-14 RX ADMIN — HEPARIN SODIUM 14 UNITS/KG/HR: 10000 INJECTION, SOLUTION INTRAVENOUS at 15:58

## 2024-08-14 RX ADMIN — HEPARIN SODIUM 2000 UNITS: 1000 INJECTION INTRAVENOUS; SUBCUTANEOUS at 15:54

## 2024-08-14 RX ADMIN — GLIPIZIDE 5 MG: 5 TABLET ORAL at 08:44

## 2024-08-14 RX ADMIN — WARFARIN SODIUM 5 MG: 5 TABLET ORAL at 17:56

## 2024-08-14 RX ADMIN — CARVEDILOL 3.12 MG: 3.12 TABLET, FILM COATED ORAL at 11:29

## 2024-08-14 RX ADMIN — LISINOPRIL 5 MG: 5 TABLET ORAL at 11:28

## 2024-08-14 RX ADMIN — HEPARIN SODIUM 12 UNITS/KG/HR: 10000 INJECTION, SOLUTION INTRAVENOUS at 13:40

## 2024-08-14 RX ADMIN — CARVEDILOL 3.12 MG: 3.12 TABLET, FILM COATED ORAL at 17:44

## 2024-08-14 RX ADMIN — FUROSEMIDE 80 MG: 40 TABLET ORAL at 08:44

## 2024-08-14 RX ADMIN — ATORVASTATIN CALCIUM 40 MG: 40 TABLET, FILM COATED ORAL at 21:00

## 2024-08-14 NOTE — PLAN OF CARE
Problem: Discharge Planning  Goal: Discharge to home or other facility with appropriate resources  Outcome: Progressing     Problem: Safety - Adult  Goal: Free from fall injury  8/14/2024 0303 by Eileen Kilpatrick, RN  Outcome: Progressing  Flowsheets (Taken 8/14/2024 0303)  Free From Fall Injury: Instruct family/caregiver on patient safety  Note: Pt assessed as a fall risk this shift. Remains free from falls and accidental injury at this time. Fall precautions in place, including falling star sign and fall risk band on pt. Floor free from obstacles, and bed is locked and in lowest position. Adequate lighting provided.  Pt encouraged to call  for any need.  Bed alarm activated. Will continue to monitor needs during hourly rounding, and reinforce education on use of call light.     Problem: ABCDS Injury Assessment  Goal: Absence of physical injury  8/14/2024 0303 by Eileen Kilpatrick, RN  Outcome: Progressing     Problem: Neurosensory - Adult  Goal: Achieves stable or improved neurological status  Outcome: Progressing  Flowsheets (Taken 8/14/2024 0303)  Achieves stable or improved neurological status: Assess for and report changes in neurological status  Note: Pt NIH remained at a 4 during shift

## 2024-08-14 NOTE — FLOWSHEET NOTE
08/14/24 1030   Treatment Team Notification   Reason for Communication Critical results   Type of Critical Result Laboratory   Critical Lab Result Type Troponin   Treatment Team Role Attending Provider   Method of Communication Secure Message   Response Waiting for response   Notification Time 1031     troponin increased from 25 to 35

## 2024-08-14 NOTE — H&P
Dr. Lucas Hernadez        History and Physical Examination   Admission Note    CHIEF COMPLAINT:   Chief Complaint   Patient presents with    Facial Droop     Rt facial droop    Aphasia    Extremity Weakness     Lt arm et leg weakness       Reason for Admission: Right-sided droop and dysarthria    History Obtained From:  Patient     HISTORY OF PRESENT ILLNESS:      The patient is a pleasant 68 y.o. male with significant medical history of severe coronary disease cardiomyopathy A-fib defibrillator pacemaker diabetes hypertension hyperlipidemia with previous history of TIA presented with with acute onset of dysarthria and drooping to his right sided face patient states that he went out to have breakfast yesterday he drove he drove back home he was feeling okay he sat down to watch elevated TV decided to get a go to bathroom he said on the way back he was trying to talk and he felt his speech was off and felt his right side and was not feeling right to him he had no arm numbness or weakness or leg numbness or weakness or any difficulty with his gait or ambulation or any dysphagia type symptoms.  No headaches no dizziness no syncopal episode no chest pain or increased shortness of breath.  Patient he knew some of the symptoms of could be strokelike and he called 911 was brought into the emergency room patient workup in the emergency room including CTA brain and neck were negative except for chronic occlusion of his right side which patient knew about he has seen vascular surgeon through [facility] in the past he says.  Patient has a chronic A-fib he was taken off Coumadin about a week ago to have a cardiac cath at [facility] which she did please see report below and patient went back on his Coumadin dose but he was about 1.7 when he presented yesterday to the emergency room and had a acute episode yesterday.  Also patient tells me his blood pressure usually does not run that high but was very high yesterday with  Lidocaine and Benzocaine spray. The patient was sedated under moderate sedation. Sedation performed by cardiologist. The patient was sedated with 100 mcg of Fentanyl and 5 mg of Versed . There were no complications during the procedure.  All Measurements    Exam End: 06/27/24 16:01 Last Resulted: 06/28/24 20:58   Received From: [employer]  Result Received: 07/29/24 10:14    View Encounter    Full Result    Document Retrieve    This document has not yet been retrieved from an outside organization. To retrieve, click the link below.  Report on 6/27/2024 4:01 PM EDT: Echo TAMARA W/[employer] [facility]     ASSESSMENT:  Patient Active Problem List   Diagnosis Code    Diabetic foot infection ([facility]) E11.628, L08.9    Diabetic foot ulcer with osteomyelitis ([facility]) E11.621, E11.69, L97.509, M86.9    Chronic osteomyelitis ([facility]) M86.60    PVD (peripheral vascular disease) ([facility]) I73.9    Atherosclerosis of coronary artery without angina pectoris I25.10    Cardiomyopathy ([facility]) I42.9    Cardiac left ventricular ejection fraction 21-40 percent R94.30    Type II diabetes mellitus with peripheral circulatory disorder ([facility]) E11.51    Ulcer of toe of right foot, with necrosis of bone ([facility]) L97.514    Chronic osteomyelitis of left foot ([facility]) M86.672    Onychomycosis B35.1    Heart failure ([facility]) I50.9    MRSA bacteremia R78.81, B95.62    Gangrene of toe of left foot ([facility]) I96    Elevated C-reactive protein (CRP) R79.82    Elevated erythrocyte sedimentation rate R70.0    Acute kidney injury ([facility]) N17.9    Allergy to penicillin Z88.0    Ulcer of left foot, with fat layer exposed ([facility]) L97.522    Osteomyelitis of second toe of right foot ([facility]) M86.9    Cellulitis of left foot L03.116    Abscess of bursa of left foot M71.072    Diabetic polyneuropathy associated with type 2 diabetes mellitus ([facility]) E11.42    Osteomyelitis ([facility]) M86.9    Cellulitis L03.90    Atherosclerosis of native arteries of left leg with ulceration of other

## 2024-08-14 NOTE — PROGRESS NOTES
functional limits    Cognition   Orientation  Overall Orientation Status: Within Functional Limits  Orientation Level: Oriented X4  Cognition  Overall Cognitive Status: WFL     Objective   O2 Device: None (Room air)       Observation/Palpation  Observation: Contact precautions-MRSA, R forearm IV, L transmetatarsal amp, R 4th toe amp.  Gross Assessment  Sensation: Intact (denies)     AROM RLE (degrees)  RLE AROM: WFL  RLE General AROM: L transmetatarsal amp, R 4th toe amp.  AROM LLE (degrees)  LLE AROM : WFL  LLE General AROM: L transmetatarsal amp, R 4th toe amp.  AROM RUE (degrees)  RUE General AROM: See OT Assessment  AROM LUE (degrees)  LUE General AROM: See OT Assessment  Strength RLE  Comment: Grossly 4+/5 for hip, knee, and ankle, L transmetatarsal amp, R 4th toe amp.  Strength LLE  Comment: Grossly 4/5 for hip, knee, and ankle, L transmetatarsal amp, R 4th toe amp.  Strength RUE  Comment: See OT Assessment  Strength LUE  Comment: See OT Assessment        Bed Mobility Training  Bed Mobility Training: Yes  Overall Level of Assistance: Modified independent (HOB elevated, bed rails.)  Interventions: Safety awareness training;Tactile cues;Verbal cues (Cues for safety, pacing.)  Rolling: Modified independent  Supine to Sit: Modified independent  Sit to Supine: Other (comment) (Pt agreeable to sit in bedside chair following session.)  Scooting: Modified independent  Balance  Sitting: Intact  Standing: High guard (SBA with no AD.)  Transfer Training  Transfer Training: Yes  Overall Level of Assistance: Stand-by assistance  Interventions: Safety awareness training;Verbal cues (Cues for hand placement, safety, pacing.)  Sit to Stand: Stand-by assistance  Stand to Sit: Stand-by assistance  Gait  Gait Training: Yes  Overall Level of Assistance: Stand-by assistance (From EOB to hallway and back to bedside chair with no AD. No noted LOB, cues for pacing self and line awareness.)  Assistive Device: Gait belt  Interventions:  Safety awareness training;Verbal cues (Cues for pacing self, and line awareness.)  Bed mobility  Supine to Sit: Modified independent  Sit to Supine: Modified independent  Scooting: Modified independent  Bed Mobility Comments: HOB elevated, pt able to independently dangle self at EOB  Transfers  Sit to Stand: Stand by assistance  Stand to Sit: Stand by assistance  Stand Pivot Transfers: Stand by assistance  Comment: No use of AD  Ambulation  Surface: Level tile  Device: No Device  Assistance: Stand by assistance  Quality of Gait: SBA for line management  Gait Deviations: None  Distance: 14ft x 2, 15ft around bed  More Ambulation?: No  Stairs/Curb  Stairs?: No (NT at this time)     Balance  Posture: Fair  Sitting - Static: Good  Sitting - Dynamic: Good  Standing - Static: Good (No AD)  Standing - Dynamic: Good (No AD)                                                         -PAC - Mobility    AM-PAC Basic Mobility - Inpatient   How much help is needed turning from your back to your side while in a flat bed without using bedrails?: A Little  How much help is needed moving from lying on your back to sitting on the side of a flat bed without using bedrails?: A Little  How much help is needed moving to and from a bed to a chair?: A Little  How much help is needed standing up from a chair using your arms?: A Little  How much help is needed walking in hospital room?: A Little  How much help is needed climbing 3-5 steps with a railing?: Total  AM-PAC Inpatient Mobility Raw Score : 16  AM-PAC Inpatient T-Scale Score : 40.78  Mobility Inpatient CMS 0-100% Score: 54.16  Mobility Inpatient CMS G-Code Modifier : CK            Goals  Short Term Goals  Time Frame for Short Term Goals: 5-7x per week  Short Term Goal 1: Pt to demonstrate abilitly to perform bed mobilitly independently  Short Term Goal 2: Pt to demonstrate transfer activities independently  Short Term Goal 3: Pt to demonstrate an increased walking distance of

## 2024-08-14 NOTE — CONSULTS
IX, X - symmetrical palate elevation                                               XI - symmetrical shoulder shrug                                                       XII - midline tongue without atrophy or fasciculation     Motor function  Strength: 5/5 RUE, 5/5 RLE, 5/5 LUE, 5/5  LLE  Normal bulk and tone.   No tremors                      Sensory function Intact to touch throughout     Cerebellar Intact finger-nose-finger testing.     Reflex function 1/4 symmetric throughout . Downgoing plantar response bilaterally. (-)Boyer's sign bilaterally    Gait                  Deferred             Diagnostics:      Laboratory Testing:  CBC:   Recent Labs     08/13/24  1208 08/13/24  1908   WBC 6.4 7.9   HGB 14.1 14.6    200     BMP:    Recent Labs     08/13/24  1208 08/13/24  1908    136   K 4.1 4.1    100   CO2 23 23   BUN 32* 32*   CREATININE 1.6* 1.7*   GLUCOSE 259* 155*         Lab Results   Component Value Date    CHOL 133 08/13/2024    HDL 35 (L) 08/13/2024    TRIG 265 (H) 08/13/2024    ALT 31 08/13/2024    AST 27 08/13/2024    TSH 3.80 06/30/2021    INR 1.8 08/14/2024    LABA1C 6.8 (H) 03/11/2022    MG 1.9 06/30/2022       Imaging/Diagnostics:      Results for orders placed during the hospital encounter of 08/13/24    CT HEAD WO CONTRAST    Addendum 8/13/2024 12:42 PM  ADDENDUM:  Findings were communicated to Dr. Hu by the CORE team on 8/13/2024 at  12:31 pm.    Narrative  EXAMINATION:  CT OF THE HEAD WITHOUT CONTRAST  8/13/2024 12:04 pm    TECHNIQUE:  CT of the head was performed without the administration of intravenous  contrast. Automated exposure control, iterative reconstruction, and/or weight  based adjustment of the mA/kV was utilized to reduce the radiation dose to as  low as reasonably achievable.    COMPARISON:  02/03/2021.    HISTORY:  ORDERING SYSTEM PROVIDED HISTORY: Stroke Symptoms  TECHNOLOGIST PROVIDED HISTORY:    Stroke Symptoms  Decision Support Exception - unselect if  not a suspected or confirmed  emergency medical condition->Emergency Medical Condition (MA)  Reason for Exam: stroke  Additional signs and symptoms: stroke    Initial evaluation.    FINDINGS:  BRAIN/VENTRICLES: Areas of encephalomalacia involving the inferior frontal  lobes bilaterally as well as the right parietal/occipital lobe.  A small  chronic infarct is also seen involving the right frontal corona radiata.  The  gray-white differentiation otherwise appears maintained.  No acute  intracranial hemorrhage.  No mass effect or midline shift.  There are areas  of hypoattenuation in the periventricular and subcortical white matter, which  is nonspecific, but may represent chronic microvasvular ischemic change.  There is prominence of the ventricles and sulci due to global parenchymal  volume loss.  Atherosclerosis of the intracranial vasculature is noted.    ORBITS: The visualized portion of the orbits demonstrate no acute abnormality.    SINUSES: The visualized paranasal sinuses and mastoid air cells demonstrate  no acute abnormality.    SOFT TISSUES/SKULL:  No acute abnormality of the visualized skull or soft  tissues.    Impression  1. No convincing acute intracranial abnormality.  2. Areas of encephalomalacia involving the inferior frontal lobes bilaterally  as well as the right parietal/occipital lobe.  3. There is a small chronic infarct involving the right frontal corona  radiata.  4. Mild global parenchymal volume loss with chronic microvascular ischemic  changes.  5. Atherosclerosis of the intracranial vasculature.  These results were sent to the CORE Team on 8/13/2024 at 12:29 pm to be  communicated to the referring/covering health care provider/office.        I personally reviewed all of the above medications, clinical laboratory, imaging and other diagnostic tests.         Impression:      68-year-old male with past medical history of stroke, CAD, CHF, CKD, hypertension, hyperlipidemia, A-fib on Coumadin

## 2024-08-14 NOTE — PLAN OF CARE
Problem: Discharge Planning  Goal: Discharge to home or other facility with appropriate resources  Outcome: Progressing     Problem: Safety - Adult  Goal: Free from fall injury  Outcome: Progressing     Problem: ABCDS Injury Assessment  Goal: Absence of physical injury  Outcome: Progressing     Problem: Neurosensory - Adult  Goal: Achieves stable or improved neurological status  Outcome: Progressing

## 2024-08-14 NOTE — CARE COORDINATION
Case Management Assessment  Initial Evaluation    Date/Time of Evaluation: 8/14/2024 10:34 AM  Assessment Completed by: Lovely Antunez RN    If patient is discharged prior to next notation, then this note serves as note for discharge by case management.    Patient Name: Asher Ayon                   YOB: 1955  Diagnosis: Acute CVA (cerebrovascular accident) (HCC) [I63.9]  Cerebrovascular accident (CVA), unspecified mechanism (HCC) [I63.9]                   Date / Time: 8/13/2024 12:05 PM    Patient Admission Status: Inpatient   Readmission Risk (Low < 19, Mod (19-27), High > 27): Readmission Risk Score: 13.1    Current PCP: Vincent Xiong, DO  PCP verified by CM?      Chart Reviewed: Yes      History Provided by:    Patient Orientation:      Patient Cognition:      Hospitalization in the last 30 days (Readmission):  No    If yes, Readmission Assessment in CM Navigator will be completed.    Advance Directives:      Code Status: Full Code   Patient's Primary Decision Maker is:      Primary Decision Maker: Pat Ayon - Parent - [phone]    Discharge Planning:    Patient lives with: Parent Type of Home: House  Primary Care Giver:    Patient Support Systems include: Parent   Current Financial resources:    Current community resources:    Current services prior to admission: None            Current DME:              Type of Home Care services:  None    ADLS  Prior functional level:    Current functional level:      PT AM-PAC:   /24  OT AM-PAC:   /24    Family can provide assistance at DC:    Would you like Case Management to discuss the discharge plan with any other family members/significant others, and if so, who?    Plans to Return to Present Housing:    Other Identified Issues/Barriers to RETURNING to current housing: None  Potential Assistance needed at discharge: N/A            Potential DME:    Patient expects to discharge to: House  Plan for transportation at discharge:

## 2024-08-14 NOTE — PROGRESS NOTES
SLP ALL NOTES  Facility/Department: [facility]   CLINICAL BEDSIDE SWALLOW EVALUATION    NAME: Asher Ayon  : 1955  MRN: [id number]    ADMISSION DATE: 2024  ADMITTING DIAGNOSIS: has Diabetic foot infection (HCC); Diabetic foot ulcer with osteomyelitis (HCC); Chronic osteomyelitis (HCC); PVD (peripheral vascular disease) (HCC); Atherosclerosis of coronary artery without angina pectoris; Cardiomyopathy (HCC); Cardiac left ventricular ejection fraction 21-40 percent; Type II diabetes mellitus with peripheral circulatory disorder (HCC); Ulcer of toe of right foot, with necrosis of bone (HCC); Chronic osteomyelitis of left foot (HCC); Onychomycosis; Heart failure (HCC); MRSA bacteremia; Gangrene of toe of left foot (HCC); Elevated C-reactive protein (CRP); Elevated erythrocyte sedimentation rate; Acute kidney injury (HCC); Allergy to penicillin; Ulcer of left foot, with fat layer exposed (HCC); Osteomyelitis of second toe of right foot (HCC); Cellulitis of left foot; Abscess of bursa of left foot; Diabetic polyneuropathy associated with type 2 diabetes mellitus (HCC); Osteomyelitis (HCC); Cellulitis; Atherosclerosis of native arteries of left leg with ulceration of other part of foot (HCC); Normocytic normochromic anemia; Surgical wound, non healing; Chest pain; Post-op pain; Cerebrovascular accident (CVA) (HCC); Facial droop; Dysarthria; Stenosis of right carotid artery; and Intracranial atherosclerosis on their problem list.    Recent Chest Xray/CT of Chest: (  Date CXR )nothing acute    Date of Eval: 2024  Evaluating Therapist: TROY Fernandez    Current Diet level:  Current Diet : Regular  Current Liquid Diet : Thin    Primary Complaint   Per neurology H&P: 68-year-old male with past medical history of stroke, CAD, CHF, CKD, hypertension, hyperlipidemia, A-fib on Coumadin status post pacemaker/ICD, diabetes, who presented with acute onset right facial droop and dysarthria.

## 2024-08-14 NOTE — CARE COORDINATION
Patient and/or primary caregiver participated in post-hospital care planning and their ability to address care needs was assessed. Yes    Family/caregiver is willing and able to care for patient at home.  Unsure at this time, Pt. Lives w/ his Mom.    Family/caregiver educated on resources available including durable medical equipment and respite care. Yes

## 2024-08-14 NOTE — CONSULTS
Date:   8/14/2024  Patient name: Asher Ayon  Date of admission:  8/13/2024 12:05 PM  MRN:   [id number]  YOB: 1955  PCP: Vincent Xiong DO    Reason for Admission: Acute CVA (cerebrovascular accident) (HCC) [I63.9]  Cerebrovascular accident (CVA), unspecified mechanism (HCC) [I63.9]    Cardiology consult: Cardiomyopathy chronic A-fib       Referring physician: Dr Lucas Hernadez    Impression    Admission 8/13/2022 CVA acute onset dysarthria and drooping of right side of face  CT head no acute abnormality, CTA head and neck chronic occlusion of the right internal carotid artery at its origin with reconstitution at the level of the distal right supraclinoid segment    Ischemic cardiomyopathy, multivessel CAD, CABG  Status post AICD [employer]  Congestive heart failure systolic and diastolic  Ejection fraction 30 to 35% elevated wedge pressure 30  Severe tricuspid regurgitation, severe mitral regurgitation eccentric  Moderate pulmonary hypertension as per cardiac cath right ventricle pressure 57/27  Hyperlipidemia current lipid profile triglyceride 265, LDL 45, HDL 35, cholesterol 133 and cholesterol HDL ratio 3.8  Peripheral vascular disease      Past surgical history.  Bilateral cataract surgery, hernia repair, EGD, colonoscopy, ICD placement, CABG, right foot toe amputation, wound debridement, left foot toe amputation    Investigation work    ECG 8/13/2024  Sinus atrial fibrillation, heart rate 77 ,probable old anterior infarct age undetermined    CTA head and neck 8/13/2024  Chronic occlusion of right internal carotid artery at its origin with reconstitution at the level of the distal right supraclinoid segment  Atherosclerosis contributes to the moderate stenosis at the origin of the left vertebral artery  Atherosclerosis contributes to stenosis involving the V4 segment of the intracranial vertebral artery bilaterally mild on the right and moderate on the left    CT head without contrast

## 2024-08-14 NOTE — ACP (ADVANCE CARE PLANNING)
Advance Care Planning     Advance Care Planning Activator (Inpatient)  Conversation Note      Date of ACP Conversation: 8/14/2024     Conversation Conducted with: Patient with Decision Making Capacity    ACP Activator: Lovely Antunez RN        Health Care Decision Maker:     Current Designated Health Care Decision Maker:     Primary Decision Maker: GaboPat [facility] - [phone]        Care Preferences    Ventilation:  \"If you were in your present state of health and suddenly became very ill and were unable to breathe on your own, what would your preference be about the use of a ventilator (breathing machine) if it were available to you?\"      Would the patient desire the use of ventilator (breathing machine)?: yes    \"If your health worsens and it becomes clear that your chance of recovery is unlikely, what would your preference be about the use of a ventilator (breathing machine) if it were available to you?\"     Would the patient desire the use of ventilator (breathing machine)?: Yes      Resuscitation  \"CPR works best to restart the heart when there is a sudden event, like a heart attack, in someone who is otherwise healthy. Unfortunately, CPR does not typically restart the heart for people who have serious health conditions or who are very sick.\"    \"In the event your heart stopped as a result of an underlying serious health condition, would you want attempts to be made to restart your heart (answer \"yes\" for attempt to resuscitate) or would you prefer a natural death (answer \"no\" for do not attempt to resuscitate)?\" yes       [] Yes   [] No   Educated Patient / Decision Maker regarding differences between Advance Directives and portable DNR orders.    Length of ACP Conversation in minutes:      Conversation Outcomes:  ACP discussion completed    Follow-up plan:    [] Schedule follow-up conversation to continue planning  [] Referred individual to Provider for additional questions/concerns   [] Advised  patient/agent/surrogate to review completed ACP document and update if needed with changes in condition, patient preferences or care setting    [] This note routed to one or more involved healthcare providers    }

## 2024-08-14 NOTE — PROGRESS NOTES
SLP ALL NOTES  Facility/Department: [facility]  Initial Speech/Language/Cognitive Assessment    NAME: Asher Ayon  : 1955   MRN: [id number]  ADMISSION DATE: 2024  ADMITTING DIAGNOSIS: has Diabetic foot infection (HCC); Diabetic foot ulcer with osteomyelitis (HCC); Chronic osteomyelitis (HCC); PVD (peripheral vascular disease) (HCC); Atherosclerosis of coronary artery without angina pectoris; Cardiomyopathy (HCC); Cardiac left ventricular ejection fraction 21-40 percent; Type II diabetes mellitus with peripheral circulatory disorder (HCC); Ulcer of toe of right foot, with necrosis of bone (HCC); Chronic osteomyelitis of left foot (HCC); Onychomycosis; Heart failure (HCC); MRSA bacteremia; Gangrene of toe of left foot (HCC); Elevated C-reactive protein (CRP); Elevated erythrocyte sedimentation rate; Acute kidney injury (HCC); Allergy to penicillin; Ulcer of left foot, with fat layer exposed (HCC); Osteomyelitis of second toe of right foot (HCC); Cellulitis of left foot; Abscess of bursa of left foot; Diabetic polyneuropathy associated with type 2 diabetes mellitus (HCC); Osteomyelitis (HCC); Cellulitis; Atherosclerosis of native arteries of left leg with ulceration of other part of foot (HCC); Normocytic normochromic anemia; Surgical wound, non healing; Chest pain; Post-op pain; Cerebrovascular accident (CVA) (HCC); Facial droop; Dysarthria; Stenosis of right carotid artery; and Intracranial atherosclerosis on their problem list.    Date of Eval: 2024   Evaluating Therapist: TROY Fernandez    RECENT RESULTS  CT OF HEAD/MRI: - CT head- 1. No convincing acute intracranial abnormality.  2. Areas of encephalomalacia involving the inferior frontal lobes bilaterally  as well as the right parietal/occipital lobe.  3. There is a small chronic infarct involving the right frontal corona  radiata.  4. Mild global parenchymal volume loss with chronic microvascular ischemic  changes.  5.  WFL  Convergent Thinking: Mild  Divergent Thinking: Mild  Safety/Judgment  Safety/Judgment: Exceptions to WFL  Flexibility of Thought: Mild      Education:  Patient Education Response: Verbalizes understanding          Therapy Time:   Individual Concurrent Group Co-treatment   Time In 1510         Time Out 1520         Minutes 10                 Electronically signed by Allison De La Garza M.A.CCC/SLP    on 8/14/2024 at 3:34 PM

## 2024-08-14 NOTE — PROGRESS NOTES
Occupational Therapy  Facility/Department: [facility] PROGRESSIVE CARE  Occupational Therapy Initial Assessment    Name: Asher Ayon  : 1955  MRN: [id number]  Date of Service: 2024    RN reports patient is medically stable for therapy treatment this date.    Chart reviewed prior to treatment and patient is agreeable for therapy.  All lines intact and patient positioned comfortably at end of treatment.  All patient needs addressed prior to ending therapy session.      Discharge Recommendations:  Home with assist PRN  OT Equipment Recommendations  Equipment Needed: No    PER HPI: The patient is a pleasant 68 y.o. male with significant medical history of severe coronary disease cardiomyopathy A-fib defibrillator pacemaker diabetes hypertension hyperlipidemia with previous history of TIA presented with with acute onset of dysarthria and drooping to his right sided face patient states that he went out to have breakfast yesterday he drove he drove back home he was feeling okay he sat down to watch elevated TV decided to get a go to bathroom he said on the way back he was trying to talk and he felt his speech was off and felt his right side and was not feeling right to him he had no arm numbness or weakness or leg numbness or weakness or any difficulty with his gait or ambulation or any dysphagia type symptoms.  No headaches no dizziness no syncopal episode no chest pain or increased shortness of breath.  Patient he knew some of the symptoms of could be strokelike and he called 911 was brought into the emergency room patient workup in the emergency room including CTA brain and neck were negative except for chronic occlusion of his right side which patient knew about he has seen vascular surgeon through [facility] in the past he says.  Patient has a chronic A-fib he was taken off Coumadin about a week ago to have a cardiac cath at [facility] which she did please see report below and patient went back on  Safety  Education Provided Comments: OT role, POC, safety, importance of continued mobility, OOB activity, call light use, ECT, AE/DME, d/c planning.  Education Method: Verbal  Barriers to Learning: None  Education Outcome: Verbalized understanding;Continued education needed;Demonstrated understanding     AM-PAC - ADL  AM-PAC Daily Activity - Inpatient   How much help is needed for putting on and taking off regular lower body clothing?: A Little  How much help is needed for bathing (which includes washing, rinsing, drying)?: A Little  How much help is needed for toileting (which includes using toilet, bedpan, or urinal)?: None  How much help is needed for putting on and taking off regular upper body clothing?: None  How much help is needed for taking care of personal grooming?: None  How much help for eating meals?: None  AM-PAC Inpatient Daily Activity Raw Score: 22  AM-PAC Inpatient ADL T-Scale Score : 47.1  ADL Inpatient CMS 0-100% Score: 25.8  ADL Inpatient CMS G-Code Modifier : CJ    Goals  Short Term Goals  Time Frame for Short Term Goals: By discharge  Short Term Goal 1: Pt will demo bed mobility with indep to increase indep with ADLs.  Short Term Goal 2: Pt will demo functional mobility with indep HH distances to increase indep with ADLs  Short Term Goal 3: Pt will demo BADL routine with indep to increase indep with ADLs.  Short Term Goal 4: Pt will tolerate 15+ minutes functional activity with indep to increase endurance and indep for ADLs.  Short Term Goal 5: Pt will participate in BUE HEP with indep to incresae indep for ADLs.  Long Term Goals  Time Frame for Long Term Goals : By discharge  Long Term Goal 1: Pt will demo/verbalize education provided over BUE HEP, safety, fall prevention, AE/DME, skin integrity, d/c planning, ECT with M/I for d/c.  Patient Goals   Patient goals : No goals stated       Therapy Time   Individual Concurrent Group Co-treatment   Time In 0908         Time Out 0927

## 2024-08-15 ENCOUNTER — HOSPITAL ENCOUNTER (INPATIENT)
Dept: VASCULAR LAB | Age: 69
Discharge: HOME | End: 2024-08-17
Attending: FAMILY MEDICINE
Payer: MEDICARE

## 2024-08-15 LAB
ANION GAP SERPL CALCULATED.3IONS-SCNC: 13 MMOL/L (ref 9–17)
ANTI-XA UNFRAC HEPARIN: 0.48 IU/L (ref 0.3–0.7)
ANTI-XA UNFRAC HEPARIN: 0.5 IU/L (ref 0.3–0.7)
BUN SERPL-MCNC: 31 MG/DL (ref 8–23)
CALCIUM SERPL-MCNC: 9 MG/DL (ref 8.6–10.4)
CHLORIDE SERPL-SCNC: 100 MMOL/L (ref 98–107)
CO2 SERPL-SCNC: 20 MMOL/L (ref 20–31)
CREAT SERPL-MCNC: 1.7 MG/DL (ref 0.7–1.2)
ECHO BSA: 2.16 M2
ERYTHROCYTE [DISTWIDTH] IN BLOOD BY AUTOMATED COUNT: 14.6 % (ref 11.5–14.9)
GFR, ESTIMATED: 43 ML/MIN/1.73M2
GLUCOSE BLD-MCNC: 169 MG/DL (ref 75–110)
GLUCOSE BLD-MCNC: 197 MG/DL (ref 75–110)
GLUCOSE BLD-MCNC: 203 MG/DL (ref 75–110)
GLUCOSE BLD-MCNC: 301 MG/DL (ref 75–110)
GLUCOSE SERPL-MCNC: 215 MG/DL (ref 70–99)
HCT VFR BLD AUTO: 39.3 % (ref 41–53)
HGB BLD-MCNC: 13.3 G/DL (ref 13.5–17.5)
INR PPP: 1.8
MAGNESIUM SERPL-MCNC: 1.9 MG/DL (ref 1.6–2.6)
MCH RBC QN AUTO: 33.1 PG (ref 26–34)
MCHC RBC AUTO-ENTMCNC: 33.9 G/DL (ref 31–37)
MCV RBC AUTO: 97.6 FL (ref 80–100)
PLATELET # BLD AUTO: 204 K/UL (ref 150–450)
PMV BLD AUTO: 8.5 FL (ref 6–12)
POTASSIUM SERPL-SCNC: 4.2 MMOL/L (ref 3.7–5.3)
PROTHROMBIN TIME: 20.8 SEC (ref 11.8–14.6)
RBC # BLD AUTO: 4.03 M/UL (ref 4.5–5.9)
SODIUM SERPL-SCNC: 133 MMOL/L (ref 135–144)
VAS LEFT BULB EDV: 10.9 CM/S
VAS LEFT BULB PSV: 50.5 CM/S
VAS LEFT CCA DIST EDV: 12.8 CM/S
VAS LEFT CCA DIST PSV: 74.4 CM/S
VAS LEFT CCA MID EDV: 12.8 CM/S
VAS LEFT CCA MID PSV: 71.1 CM/S
VAS LEFT CCA PROX EDV: 11.7 CM/S
VAS LEFT CCA PROX PSV: 81 CM/S
VAS LEFT ECA EDV: 5.4 CM/S
VAS LEFT ECA PSV: 83.5 CM/S
VAS LEFT ICA DIST EDV: 27.1 CM/S
VAS LEFT ICA DIST PSV: 107.4 CM/S
VAS LEFT ICA MID EDV: 18 CM/S
VAS LEFT ICA MID PSV: 93.2 CM/S
VAS LEFT ICA PROX EDV: 15.4 CM/S
VAS LEFT ICA PROX PSV: 80.2 CM/S
VAS LEFT ICA/CCA PSV: 1.51 NO UNITS
VAS LEFT VERTEBRAL EDV: 15.4 CM/S
VAS LEFT VERTEBRAL PSV: 72.5 CM/S
VAS RIGHT CCA DIST EDV: 0 CM/S
VAS RIGHT CCA DIST PSV: 25.2 CM/S
VAS RIGHT CCA MID EDV: 0 CM/S
VAS RIGHT CCA MID PSV: 46.2 CM/S
VAS RIGHT CCA PROX EDV: 0 CM/S
VAS RIGHT CCA PROX PSV: 47.9 CM/S
VAS RIGHT ECA EDV: 22.9 CM/S
VAS RIGHT ECA PSV: 306.8 CM/S
VAS RIGHT ICA DIST EDV: 0 CM/S
VAS RIGHT ICA DIST PSV: 0 CM/S
VAS RIGHT ICA MID EDV: 0 CM/S
VAS RIGHT ICA MID PSV: 0 CM/S
VAS RIGHT ICA PROX EDV: 0 CM/S
VAS RIGHT ICA PROX PSV: 0 CM/S
VAS RIGHT ICA/CCA PSV: 0 NO UNITS
VAS RIGHT VERTEBRAL EDV: 15.4 CM/S
VAS RIGHT VERTEBRAL PSV: 51.1 CM/S
WBC OTHER # BLD: 6.8 K/UL (ref 3.5–11)

## 2024-08-15 PROCEDURE — 6360000002 HC RX W HCPCS: Performed by: EMERGENCY MEDICINE

## 2024-08-15 PROCEDURE — 97129 THER IVNTJ 1ST 15 MIN: CPT

## 2024-08-15 PROCEDURE — 82947 ASSAY GLUCOSE BLOOD QUANT: CPT

## 2024-08-15 PROCEDURE — 6370000000 HC RX 637 (ALT 250 FOR IP): Performed by: PSYCHIATRY & NEUROLOGY

## 2024-08-15 PROCEDURE — 99232 SBSQ HOSP IP/OBS MODERATE 35: CPT | Performed by: PSYCHIATRY & NEUROLOGY

## 2024-08-15 PROCEDURE — 2060000000 HC ICU INTERMEDIATE R&B

## 2024-08-15 PROCEDURE — 6370000000 HC RX 637 (ALT 250 FOR IP): Performed by: FAMILY MEDICINE

## 2024-08-15 PROCEDURE — 97530 THERAPEUTIC ACTIVITIES: CPT

## 2024-08-15 PROCEDURE — 2580000003 HC RX 258: Performed by: PSYCHIATRY & NEUROLOGY

## 2024-08-15 PROCEDURE — 92507 TX SP LANG VOICE COMM INDIV: CPT

## 2024-08-15 PROCEDURE — 85027 COMPLETE CBC AUTOMATED: CPT

## 2024-08-15 PROCEDURE — 80048 BASIC METABOLIC PNL TOTAL CA: CPT

## 2024-08-15 PROCEDURE — 36415 COLL VENOUS BLD VENIPUNCTURE: CPT

## 2024-08-15 PROCEDURE — 97535 SELF CARE MNGMENT TRAINING: CPT

## 2024-08-15 PROCEDURE — 83735 ASSAY OF MAGNESIUM: CPT

## 2024-08-15 PROCEDURE — 2580000003 HC RX 258: Performed by: FAMILY MEDICINE

## 2024-08-15 PROCEDURE — 85610 PROTHROMBIN TIME: CPT

## 2024-08-15 PROCEDURE — 85520 HEPARIN ASSAY: CPT

## 2024-08-15 PROCEDURE — 93880 EXTRACRANIAL BILAT STUDY: CPT

## 2024-08-15 PROCEDURE — 93880 EXTRACRANIAL BILAT STUDY: CPT | Performed by: SURGERY

## 2024-08-15 RX ORDER — WARFARIN SODIUM 5 MG/1
5 TABLET ORAL
Status: COMPLETED | OUTPATIENT
Start: 2024-08-15 | End: 2024-08-15

## 2024-08-15 RX ADMIN — CARVEDILOL 3.12 MG: 3.12 TABLET, FILM COATED ORAL at 07:51

## 2024-08-15 RX ADMIN — SODIUM CHLORIDE: 9 INJECTION, SOLUTION INTRAVENOUS at 04:04

## 2024-08-15 RX ADMIN — SODIUM CHLORIDE: 9 INJECTION, SOLUTION INTRAVENOUS at 20:30

## 2024-08-15 RX ADMIN — TOBRAMYCIN 0.5 INCH: 3 OINTMENT OPHTHALMIC at 07:53

## 2024-08-15 RX ADMIN — Medication 1000 UNITS: at 07:51

## 2024-08-15 RX ADMIN — WARFARIN SODIUM 5 MG: 5 TABLET ORAL at 17:18

## 2024-08-15 RX ADMIN — ATORVASTATIN CALCIUM 40 MG: 40 TABLET, FILM COATED ORAL at 20:26

## 2024-08-15 RX ADMIN — GLIPIZIDE 5 MG: 5 TABLET ORAL at 07:51

## 2024-08-15 RX ADMIN — ASPIRIN 81 MG: 81 TABLET, CHEWABLE ORAL at 07:51

## 2024-08-15 RX ADMIN — CARVEDILOL 3.12 MG: 3.12 TABLET, FILM COATED ORAL at 17:18

## 2024-08-15 RX ADMIN — Medication 10 ML: at 07:23

## 2024-08-15 RX ADMIN — HEPARIN SODIUM 14 UNITS/KG/HR: 10000 INJECTION, SOLUTION INTRAVENOUS at 04:07

## 2024-08-15 RX ADMIN — POLYETHYLENE GLYCOL 3350 17 G: 17 POWDER, FOR SOLUTION ORAL at 20:35

## 2024-08-15 RX ADMIN — LISINOPRIL 5 MG: 5 TABLET ORAL at 07:52

## 2024-08-15 RX ADMIN — FUROSEMIDE 80 MG: 40 TABLET ORAL at 07:52

## 2024-08-15 RX ADMIN — HEPARIN SODIUM 14 UNITS/KG/HR: 10000 INJECTION, SOLUTION INTRAVENOUS at 21:01

## 2024-08-15 RX ADMIN — TOBRAMYCIN 0.5 INCH: 3 OINTMENT OPHTHALMIC at 13:31

## 2024-08-15 RX ADMIN — TOBRAMYCIN 0.5 INCH: 3 OINTMENT OPHTHALMIC at 20:26

## 2024-08-15 NOTE — PROGRESS NOTES
HEPARIN DRIP  Date   8/15/24 @ 0902  Anti-Xa    0.50    Plt 204 K  Anti-Xa 0.3-0.7 units/mL No bolus. No change in rate.   Continue with    Administration Action Time Recorded Time Documented By Site Comment Reason   Handoff : 14 Units/kg/hr : 13.2 mL/hr : IntraVENous 08/15/24 0649 08/15/24 0649       ELAINA Adames.Priscilla.  8/15/2024  9:35 AM

## 2024-08-15 NOTE — PROGRESS NOTES
Pt refusing bed alarm and chair alarm. Writer explained the importance to the pt on those alarms and the hospitals med to high fall risk policy. Writer explained that those alarms are in place to keep him safe and prevent falls.     Pt was very adamant that he doesn't want those alarms on. Writer provided him w/ the refusal form for them. Pt signed it and the form placed in the chart

## 2024-08-15 NOTE — PROGRESS NOTES
[facility] Neurology   IN-PATIENT SERVICE      NEUROLOGY PROGRESS  NOTE            Date:   8/15/2024  Patient name:  Asher Ayon  Date of admission:  8/13/2024  YOB: 1955      Interval History:     No acute events.  No new neurological changes.  Denies any extremity weakness, numbness.  Had repeat CT brain yesterday.    History of Present Illness:     68-year-old male with past medical history of stroke, CAD, CHF, CKD, hypertension, hyperlipidemia, A-fib on Coumadin status post pacemaker/ICD, diabetes, who presented with acute onset right facial droop and dysarthria.  Neurology consulted for further evaluation.  History obtained from patient and chart review.     Patient states he was in usual state of health when he woke up in the morning yesterday.  He had breakfast, used the restroom.  Then, he noticed sudden onset dysarthria along with right facial droop.  There was no associated arm/leg weakness, numbness, headache, visual changes, aphasia, ataxia.  EMS was called and was transported to our hospital.     Patient was admitted in March of this year at [facility] when he presented with right-sided weakness.  He was found to have small left perirolandic infarct on MRI.  At the time, his INR was subtherapeutic as he was holding Coumadin for upcoming toe amputation.  His symptoms improved.  Was discharged after his INR was therapeutic.  He states he is compliant with all of his medications.  Of note, he had a recent heart catheterization and had stopped taking Coumadin from 8/2-8/9 for procedure.  States that he follows with Coumadin clinic.  He is also on aspirin 81 mg per cardiology.  He denies smoking, drinks alcohol on weekends, no drug use.     In the ED, stroke alert was called.  CT brain with no acute findings.  CTA head and neck showed chronic right ICA occlusion along with iCAD.  Seen by stroke team.  TNK was not offered as his symptoms were mild and nondisabling.  INR later  periventricular and  subcortical white matter, which likely represent chronic microvascular  ischemic change.  There is encephalomalacia in the right occipital lobe  likely from prior infarct.    ORBITS: The visualized portion of the orbits demonstrate no acute abnormality.    SINUSES: The visualized paranasal sinuses and mastoid air cells demonstrate  no acute abnormality.    SOFT TISSUES/SKULL: No acute abnormality of the visualized skull or soft  tissues.    Impression  No acute intracranial abnormality.          I personally reviewed all of the above medications, clinical laboratory, imaging and other diagnostic tests.           Impression:       68-year-old male with past medical history of stroke, CAD, CHF, CKD, hypertension, hyperlipidemia, A-fib on Coumadin status post pacemaker/ICD, diabetes, who presented with acute onset right facial droop and dysarthria.  Neurology consulted for further evaluation.      CT brain with no acute findings, noted with chronic infarcts.  CTA head and neck with chronic right ICA occlusion along with iCAD.  Appears he cannot have MRI as pacemaker/defibrillator not compatible.  Concern for stroke given subtherapeutic INR.  Repeat CT brain with no acute findings.  He remains neurologically stable.     Right facial droop, dysarthria, concern for stroke  History of left perirolandic stroke  Multiple vascular risk factors including A-fib on Coumadin  History of chronic right ICA occlusion, iCAD     Plan:      Neurochecks per protocol  Goal blood pressure normotensive.  On heparin gtt while INR becomes therapeutic on coumadin.   On home asa 81 mg (denies s/s of bleeding)  On lipitor 40 mg. LDL 45, A1c 7.6.  He had MRI brain at OSH in March but confirmed with our MRI team that his PM/lead is not compatible (they verified with [employer]).   TTE with no acute findings.  Counseled extensively on vascular risk factor modification.  He is currently on maximal medical therapy.

## 2024-08-15 NOTE — PROGRESS NOTES
well  Assessment  Performance deficits / Impairments: Decreased safe awareness, Decreased endurance  Treatment Diagnosis: Impaired self-care status  Prognosis: Fair  Decision Making: Medium Complexity  Discharge Recommendations: Home with assist PRN  OT Equipment Recommendations  Equipment Needed: No  Safety Devices  Type of Devices: All fall risk precautions in place, Call light within reach, Gait belt, Patient at risk for falls, Left in chair, Nurse notified    AM-PAC Daily Activities Inpatient  AM-PAC Daily Activity - Inpatient   How much help is needed for putting on and taking off regular lower body clothing?: A Little  How much help is needed for bathing (which includes washing, rinsing, drying)?: A Little  How much help is needed for toileting (which includes using toilet, bedpan, or urinal)?: A Little  How much help is needed for putting on and taking off regular upper body clothing?: A Little  How much help is needed for taking care of personal grooming?: A Little  How much help for eating meals?: None  AM-PAC Inpatient Daily Activity Raw Score: 19  AM-PAC Inpatient ADL T-Scale Score : 40.22  ADL Inpatient CMS 0-100% Score: 42.8  ADL Inpatient CMS G-Code Modifier : CK    OT Minutes  OT Individual Minutes  Time In: 1517  Time Out: 1541  Minutes: 24  Time Code Minutes   Timed Code Treatment Minutes: 24 Minutes    Electronically signed by TAMRA Barnes on 8/15/24 at 4:49 PM EDT

## 2024-08-15 NOTE — PROGRESS NOTES
SLP ALL NOTES  Speech Language Pathology  [facility]    Speech  /   Cognition Treatment Note    Date: 8/15/2024  Patient’s Name: Asher Ayon  MRN: [id number]  Diagnosis:   Patient Active Problem List   Diagnosis Code    Diabetic foot infection ([facility]) E11.628, L08.9    Diabetic foot ulcer with osteomyelitis ([facility]) E11.621, E11.69, L97.509, M86.9    Chronic osteomyelitis ([facility]) M86.60    PVD (peripheral vascular disease) ([facility]) I73.9    Atherosclerosis of coronary artery without angina pectoris I25.10    Cardiomyopathy ([facility]) I42.9    Cardiac left ventricular ejection fraction 21-40 percent R94.30    Type II diabetes mellitus with peripheral circulatory disorder ([facility]) E11.51    Ulcer of toe of right foot, with necrosis of bone ([facility]) L97.514    Chronic osteomyelitis of left foot ([facility]) M86.672    Onychomycosis B35.1    Heart failure ([facility]) I50.9    MRSA bacteremia R78.81, B95.62    Gangrene of toe of left foot ([facility]) I96    Elevated C-reactive protein (CRP) R79.82    Elevated erythrocyte sedimentation rate R70.0    Acute kidney injury ([facility]) N17.9    Allergy to penicillin Z88.0    Ulcer of left foot, with fat layer exposed ([facility]) L97.522    Osteomyelitis of second toe of right foot ([facility]) M86.9    Cellulitis of left foot L03.116    Abscess of bursa of left foot M71.072    Diabetic polyneuropathy associated with type 2 diabetes mellitus ([facility]) E11.42    Osteomyelitis ([facility]) M86.9    Cellulitis L03.90    Atherosclerosis of native arteries of left leg with ulceration of other part of foot ([facility]) I70.245    Normocytic normochromic anemia D64.9    Surgical wound, non healing T81.89XA    Chest pain R07.9    Post-op pain G89.18    Cerebrovascular accident (CVA) ([facility]) I63.9    Facial droop R29.810    Dysarthria R47.1    Stenosis of right carotid artery I65.21    Intracranial atherosclerosis I67.2       Pain: 0/10    Speech and Language Treatment  Treatment time: 4371-0138    Subjective: [x] Alert [x] Cooperative     []

## 2024-08-15 NOTE — PROGRESS NOTES
Placeholder    tobramycin  0.5 inch Right Eye TID    carvedilol  3.125 mg Oral BID WC    lisinopril  5 mg Oral Daily    aspirin  81 mg Oral Daily    glipiZIDE  5 mg Oral QAM AC    furosemide  80 mg Oral Daily    Vitamin D  1,000 Units Oral Daily    sodium chloride flush  5-40 mL IntraVENous 2 times per day    atorvastatin  40 mg Oral Nightly     Continuous Infusions:   sodium chloride 50 mL/hr at 08/15/24 0404    heparin (PORCINE) Infusion 14 Units/kg/hr (08/15/24 0936)    sodium chloride       CBC:   Recent Labs     08/13/24  1908 08/14/24  1437 08/15/24  0322   WBC 7.9 6.9 6.8   HGB 14.6 14.3 13.3*    202 204     BMP:    Recent Labs     08/13/24  1208 08/13/24  1908 08/15/24  0322    136 133*   K 4.1 4.1 4.2    100 100   CO2 23 23 20   BUN 32* 32* 31*   CREATININE 1.6* 1.7* 1.7*   GLUCOSE 259* 155* 215*     Hepatic:   Recent Labs     08/13/24  1208   AST 27   ALT 31   BILITOT 0.7   ALKPHOS 149*     Troponin: No results for input(s): \"TROPONINI\" in the last 72 hours.  BNP: No results for input(s): \"BNP\" in the last 72 hours.  Lipids:   Recent Labs     08/13/24  1908   CHOL 133   HDL 35*     INR:   Recent Labs     08/13/24  1208 08/14/24  0206 08/15/24  0322   INR 1.7 1.8 1.8       Objective:   Vitals: BP (!) 173/83   Pulse 82   Temp 97.2 °F (36.2 °C) (Oral)   Resp 18   Ht 1.778 m (5' 10\")   Wt 94.3 kg (208 lb)   SpO2 100%   BMI 29.84 kg/m²   General appearance: alert and cooperative with exam  HEENT: Head: Normal, normocephalic, right facial droop  Neck: no JVD and supple, symmetrical, trachea midline  Lungs: diminished breath sounds bibasilar  Heart: irregularly irregular rhythm  Abdomen:  Obese soft bowel sounds present  Extremities: Homans sign is negative, no sign of DVT  Neurologic: Mental status: Alert, oriented, thought content appropriate    EKG: Atrial fibrillation.  ECHO: reviewed.   Ejection fraction: 30-35%, severe MR mitral and tricuspid regurg eccentric jet  Stress Test: not  (HCC)     Facial droop     Dysarthria     Stenosis of right carotid artery     Intracranial atherosclerosis      Plan of Treatment:   Medications reviewed  1: Chronic A-fib, status post CVA on warfarin, carvedilol 3.125 mg twice daily, aspirin 81 mg  Continue IV heparin infusion until INR is above 2.5  2: Systolic and diastolic CHF on Lasix 80 mg, lisinopril 5 mg  3: Hypertension elevated blood pressure patient may benefit with low-dose calcium channel blocker Norvasc 4: mg a day and Aldactone 25 mg will discuss with the neurology  5: Diabetes mellitus on glipizide 5 mg a day  6: Hyperlipidemia continue Lipitor 40 mg daily    Electronically signed by Ale Herzog MD on 8/15/2024 at 3:56 PM

## 2024-08-15 NOTE — CARE COORDINATION
ONGOING DISCHARGE PLAN:    Patient is alert and oriented x4.    Spoke with patient regarding discharge plan and patient confirms that plan is still home without needs. Declined VNS.    INR 1.8. Remains on Heparin gtt. On Coumadin PTA, follows at [facility].    Will continue to follow for additional discharge needs.    If patient is discharged prior to next notation, then this note serves as note for discharge by case management.    Electronically signed by Parul Avendaño RN on 8/15/2024 at 9:54 AM

## 2024-08-15 NOTE — PROGRESS NOTES
DR PIEDRA      PROGRESS NOTE        Patient:  Asher Ayon  YOB: 1955    MRN: [id number]     Acct: [id number]     Admit date: 8/13/2024    Pt seen and Chart reviewed.  Consultant notes reviewed and care evaluated.    Subjective: Patient is doing okay still feel the strength is good he tells me he ambulated with therapy yesterday not using any walker or or cane.  Speech is still somewhat dysarthric but he has no dysphagia he is almost getting started to eat breakfast no cough with eating.  Patient INR still 1.8 he still on heparin drip still not therapeutic to be able to discharge him on the dose of Coumadin.  He still denies any chest pain or shortness of breath he had an echo yesterday gave him EF of 40 to 45% 2 months ago he was in the 30 percentile which I am glad it is a little bit better for him if that is accurate and reflects his EF at this stage.    Diet:  ADULT DIET; Regular; 3 carb choices (45 gm/meal); Low Sodium (2 gm)      Medications:Current Inpatient    Scheduled Meds:   warfarin  5 mg Oral Once    tobramycin  0.5 inch Right Eye TID    carvedilol  3.125 mg Oral BID WC    lisinopril  5 mg Oral Daily    aspirin  81 mg Oral Daily    glipiZIDE  5 mg Oral QAM AC    furosemide  80 mg Oral Daily    Vitamin D  1,000 Units Oral Daily    sodium chloride flush  5-40 mL IntraVENous 2 times per day    atorvastatin  40 mg Oral Nightly     Continuous Infusions:   sodium chloride 50 mL/hr at 08/15/24 0404    heparin (PORCINE) Infusion 14 Units/kg/hr (08/15/24 0648)    sodium chloride       PRN Meds:glucose, dextrose bolus **OR** dextrose bolus, glucagon (rDNA), perflutren lipid microspheres, hydrALAZINE, labetalol, sodium chloride flush, heparin (porcine), heparin (porcine), sodium chloride flush, sodium chloride, polyethylene glycol        Physical Exam:  Vitals: BP (!) 152/75   Pulse 55   Temp 97.6 °F (36.4 °C) (Oral)   Resp 18

## 2024-08-15 NOTE — PLAN OF CARE
Problem: Discharge Planning  Goal: Discharge to home or other facility with appropriate resources  8/15/2024 0307 by Eileen Kilpatrick RN  Outcome: Progressing     Problem: Safety - Adult  Goal: Free from fall injury  8/15/2024 0307 by Eileen Kilpatrick RN  Outcome: Progressing  Flowsheets (Taken 8/15/2024 0307)  Free From Fall Injury: Instruct family/caregiver on patient safety  Note: Pt assessed as a fall risk this shift. Remains free from falls and accidental injury at this time. Fall precautions in place, including falling star sign and fall risk band on pt. Floor free from obstacles, and bed is locked and in lowest position. Adequate lighting provided.  Pt encouraged to call  for any need.  Bed alarm activated. Will continue to monitor needs during hourly rounding, and reinforce education on use of call light.     Problem: Neurosensory - Adult  Goal: Achieves stable or improved neurological status  8/15/2024 0307 by Eileen Kilpatrick RN  Outcome: Progressing  Flowsheets (Taken 8/15/2024 0307)  Achieves stable or improved neurological status: Assess for and report changes in neurological status  Note: Pt remained at baseline neuro status during shift     Problem: Chronic Conditions and Co-morbidities  Goal: Patient's chronic conditions and co-morbidity symptoms are monitored and maintained or improved  Outcome: Progressing

## 2024-08-15 NOTE — PLAN OF CARE
Problem: Discharge Planning  Goal: Discharge to home or other facility with appropriate resources  8/15/2024 1357 by Cortez Fall RN  Outcome: Progressing  Flowsheets (Taken 8/15/2024 0800)  Discharge to home or other facility with appropriate resources: Identify barriers to discharge with patient and caregiver  8/15/2024 0307 by Eileen Kilpatrick RN  Outcome: Progressing     Problem: Safety - Adult  Goal: Free from fall injury  8/15/2024 1357 by Cortez Fall RN  Outcome: Progressing  Flowsheets (Taken 8/15/2024 1212)  Free From Fall Injury: Instruct family/caregiver on patient safety  8/15/2024 0307 by Eileen Kilpatrick RN  Outcome: Progressing  Flowsheets (Taken 8/15/2024 0307)  Free From Fall Injury: Instruct family/caregiver on patient safety  Note: Pt assessed as a fall risk this shift. Remains free from falls and accidental injury at this time. Fall precautions in place, including falling star sign and fall risk band on pt. Floor free from obstacles, and bed is locked and in lowest position. Adequate lighting provided.  Pt encouraged to call  for any need.  Bed alarm activated. Will continue to monitor needs during hourly rounding, and reinforce education on use of call light.     Problem: ABCDS Injury Assessment  Goal: Absence of physical injury  Outcome: Progressing  Flowsheets (Taken 8/15/2024 1212)  Absence of Physical Injury: Implement safety measures based on patient assessment     Problem: Neurosensory - Adult  Goal: Achieves stable or improved neurological status  8/15/2024 1357 by Cortez Fall RN  Outcome: Progressing  Flowsheets (Taken 8/15/2024 0800)  Achieves stable or improved neurological status: Assess for and report changes in neurological status  8/15/2024 0307 by Eileen Kilpatrick RN  Outcome: Progressing  Flowsheets (Taken 8/15/2024 0307)  Achieves stable or improved neurological status: Assess for and report changes in neurological status  Note: Pt remained at baseline neuro

## 2024-08-16 VITALS
RESPIRATION RATE: 18 BRPM | WEIGHT: 210.54 LBS | HEIGHT: 70 IN | SYSTOLIC BLOOD PRESSURE: 161 MMHG | BODY MASS INDEX: 30.14 KG/M2 | HEART RATE: 68 BPM | OXYGEN SATURATION: 98 % | DIASTOLIC BLOOD PRESSURE: 97 MMHG | TEMPERATURE: 97.3 F

## 2024-08-16 LAB
ANION GAP SERPL CALCULATED.3IONS-SCNC: 10 MMOL/L (ref 9–17)
ANTI-XA UNFRAC HEPARIN: 0.51 IU/L (ref 0.3–0.7)
BUN SERPL-MCNC: 31 MG/DL (ref 8–23)
CALCIUM SERPL-MCNC: 9.3 MG/DL (ref 8.6–10.4)
CHLORIDE SERPL-SCNC: 101 MMOL/L (ref 98–107)
CO2 SERPL-SCNC: 26 MMOL/L (ref 20–31)
CREAT SERPL-MCNC: 1.8 MG/DL (ref 0.7–1.2)
GFR, ESTIMATED: 40 ML/MIN/1.73M2
GLUCOSE BLD-MCNC: 180 MG/DL (ref 75–110)
GLUCOSE SERPL-MCNC: 189 MG/DL (ref 70–99)
INR PPP: 2.1
POTASSIUM SERPL-SCNC: 4.6 MMOL/L (ref 3.7–5.3)
PROTHROMBIN TIME: 23.4 SEC (ref 11.8–14.6)
SODIUM SERPL-SCNC: 137 MMOL/L (ref 135–144)

## 2024-08-16 PROCEDURE — 6370000000 HC RX 637 (ALT 250 FOR IP): Performed by: PSYCHIATRY & NEUROLOGY

## 2024-08-16 PROCEDURE — 82947 ASSAY GLUCOSE BLOOD QUANT: CPT

## 2024-08-16 PROCEDURE — 80048 BASIC METABOLIC PNL TOTAL CA: CPT

## 2024-08-16 PROCEDURE — 85610 PROTHROMBIN TIME: CPT

## 2024-08-16 PROCEDURE — 6360000002 HC RX W HCPCS: Performed by: PSYCHIATRY & NEUROLOGY

## 2024-08-16 PROCEDURE — 36415 COLL VENOUS BLD VENIPUNCTURE: CPT

## 2024-08-16 PROCEDURE — 6370000000 HC RX 637 (ALT 250 FOR IP): Performed by: FAMILY MEDICINE

## 2024-08-16 PROCEDURE — 85520 HEPARIN ASSAY: CPT

## 2024-08-16 PROCEDURE — 99232 SBSQ HOSP IP/OBS MODERATE 35: CPT | Performed by: PSYCHIATRY & NEUROLOGY

## 2024-08-16 RX ORDER — NITROGLYCERIN 0.4 MG/1
TABLET SUBLINGUAL
Qty: 25 TABLET | Refills: 3 | Status: SHIPPED | OUTPATIENT
Start: 2024-08-16

## 2024-08-16 RX ORDER — POLYETHYLENE GLYCOL 3350 17 G/17G
17 POWDER, FOR SOLUTION ORAL DAILY PRN
Qty: 527 G | Refills: 1
Start: 2024-08-16 | End: 2024-09-15

## 2024-08-16 RX ADMIN — Medication 1000 UNITS: at 08:04

## 2024-08-16 RX ADMIN — TOBRAMYCIN 0.5 INCH: 3 OINTMENT OPHTHALMIC at 08:04

## 2024-08-16 RX ADMIN — ASPIRIN 81 MG: 81 TABLET, CHEWABLE ORAL at 08:04

## 2024-08-16 RX ADMIN — HYDRALAZINE HYDROCHLORIDE 10 MG: 20 INJECTION, SOLUTION INTRAMUSCULAR; INTRAVENOUS at 11:31

## 2024-08-16 RX ADMIN — GLIPIZIDE 5 MG: 5 TABLET ORAL at 08:04

## 2024-08-16 RX ADMIN — FUROSEMIDE 80 MG: 40 TABLET ORAL at 08:04

## 2024-08-16 RX ADMIN — CARVEDILOL 3.12 MG: 3.12 TABLET, FILM COATED ORAL at 08:04

## 2024-08-16 NOTE — PROGRESS NOTES
Date:   8/16/2024  Patient name: Asher Ayon  Date of admission:  8/13/2024 12:05 PM  MRN:   [id number]  YOB: 1955  PCP: Vincent Xiong DO    Reason for Admission: Acute CVA (cerebrovascular accident) (HCC) [I63.9]  Cerebrovascular accident (CVA), unspecified mechanism (HCC) [I63.9]    Cardiology follow-up: Ischemic cardiomyopathy multivessel coronary artery disease, chronic A-fib severe mitral and tricuspid regurgitation       Referring physician: Dr Lucas Hernadez     Impression     Admission 8/13/2022 CVA acute onset dysarthria and drooping of right side of face  CT head no acute abnormality, CTA head and neck chronic occlusion of the right internal carotid artery at its origin with reconstitution at the level of the distal right supraclinoid segment     Ischemic cardiomyopathy, multivessel CAD, CABG  Status post AICD Medtronic  Congestive heart failure systolic and diastolic  Ejection fraction 30 to 35% elevated wedge pressure 30  Severe tricuspid regurgitation, severe mitral regurgitation eccentric  Moderate pulmonary hypertension as per cardiac cath right ventricle pressure 57/27  Hyperlipidemia current lipid profile triglyceride 265, LDL 45, HDL 35, cholesterol 133 and cholesterol HDL ratio 3.8  Peripheral vascular disease  CKD stage III  Diabetes mellitus on glipizide     Past surgical history.  Bilateral cataract surgery, hernia repair, EGD, colonoscopy, ICD placement, CABG, right foot toe amputation, wound debridement, left foot toe amputation    Investigation work     ECG 8/13/2024  Sinus atrial fibrillation, heart rate 77 ,probable old anterior infarct age undetermined     CTA head and neck 8/13/2024  Chronic occlusion of right internal carotid artery at its origin with reconstitution at the level of the distal right supraclinoid segment  Atherosclerosis contributes to the moderate stenosis at the origin of the left vertebral artery  Atherosclerosis contributes to stenosis involving  anemia     Surgical wound, non healing     Chest pain     Post-op pain     Cerebrovascular accident (CVA) (HCC)     Facial droop     Dysarthria     Stenosis of right carotid artery     Intracranial atherosclerosis      Plan of Treatment:   Medications reviewed  1: Chronic A-fib, status post CVA on warfarin, carvedilol 3.125 mg twice daily, aspirin 81 mg    2: Systolic and diastolic CHF on Lasix 80 mg, lisinopril 5 mg  3: Hypertension elevated blood pressure patient may benefit with low-dose calcium channel blocker Norvasc 4: mg a day and Aldactone 25 mg will discuss with the neurology  5: Diabetes mellitus on glipizide 5 mg a day  6: Hyperlipidemia continue Lipitor 40 mg daily  Okay to discharge home  Follow-up with regular cardiologist and structural heart disease specialist for severe tricuspid regurgitation and mitral regurgitation.    Electronically signed by Ale Herzog MD on 8/16/2024 at 10:59 AM

## 2024-08-16 NOTE — CARE COORDINATION
ONGOING DISCHARGE PLAN:    Patient is alert and oriented x4.    Spoke with patient regarding discharge plan and patient confirms that plan is still to return home with mother, denies needs. Coumadin PTA (was on hold for cardiac cath prior). INR 2.1. Follows at [facility].     DME: Glucometer.    VNS: Denies need.     IMM letter provided to patient.  Patient offered four hours to make informed decision regarding appeal process; patient agreeable to discharge.     Will continue to follow for additional discharge needs.    If patient is discharged prior to next notation, then this note serves as note for discharge by case management.    Electronically signed by Dayanna Summers RN on 8/16/2024 at 10:55 AM

## 2024-08-16 NOTE — DISCHARGE INSTR - COC
Continuity of Care Form    Patient Name: Asher Ayon   :  1955  MRN:  [id number]    Admit date:  2024  Discharge date:  ***    Code Status Order: Full Code   Advance Directives:   Advance Care Flowsheet Documentation             Admitting Physician:  Lucas Hernadez DO  PCP: Vincent Xiong DO    Discharging Nurse: ***  Discharging Hospital Unit/Room#: 2107/2107-01  Discharging Unit Phone Number: ***    Emergency Contact:   Extended Emergency Contact Information  Primary Emergency Contact: Pat Ayon  Address: [address] [place] [address]  Home Phone: [phone]  Relation: Parent    Past Surgical History:  Past Surgical History:   Procedure Laterality Date    CARDIAC SURGERY  2010    CABG X3    COLONOSCOPY      DEBRIDEMENT Right 2015    DEBRIDEMENT WOUND FOOT RIGHT W/ APPLICATION BILAT INTEG RAT GRAFT    ENDOSCOPY, COLON, DIAGNOSTIC      EYE SURGERY Bilateral     cataracts    HERNIA REPAIR      umbilical    KNEE ARTHROSCOPY Right     OTHER SURGICAL HISTORY Right 2015    wound care graft procedure foot,appl of integra    PACEMAKER PLACEMENT  2011    WITH DEFIBRILLATOR- Medtronic    TOE AMPUTATION Right     R great    TOE AMPUTATION Left 2022    TOE AMPUTATION--left 2nd digit performed by Eris Xiong DPM at [facility] OR    TOE AMPUTATION Left 2022    Left third fourth and fifth toe amputation through the metatarsal for completion transmetatarsal amputation performed by Abdias Nicole MD at [facility] OR    TOE AMPUTATION Right 2023    3RD DIGIT AMPUTATION performed by Eris Xiong DPM at [facility] OR    TOE AMPUTATION Right 2024    4th toe    TOE AMPUTATION Right 3/1/2024    RIGHT FOURTH TOE AMPUTATAION AT [facility], RIGHT FILLET OF FOURTH TOE performed by Eris Xiong DPM at [facility] OR    VASCULAR SURGERY Left 2022    RIGHT COMMON FEMORAL ACCESS UNDER ULTRASOUND GUIDANCE DISTAL AORTOGRAPHY WITH PELVIC RUN OFF PLACEMENT OF CATHETER  Restriction: {CHP DME Yes amt example:051701727}  Last Modified Barium Swallow with Video (Video Swallowing Test): {Done Not Done Date:}    Treatments at the Time of Hospital Discharge:   Respiratory Treatments: ***  Oxygen Therapy:  {Therapy; copd oxygen:18576}  Ventilator:    {[facility] Vent List:424151084}    Rehab Therapies: {THERAPEUTIC INTERVENTION:3809154654}  Weight Bearing Status/Restrictions: {[facility] Weight Bearin}  Other Medical Equipment (for information only, NOT a DME order):  {EQUIPMENT:238979232}  Other Treatments: ***    Patient's personal belongings (please select all that are sent with patient):  {P DME Belongings:051708408}    RN SIGNATURE:  {Esignature:805961851}    CASE MANAGEMENT/SOCIAL WORK SECTION    Inpatient Status Date: ***    Readmission Risk Assessment Score:  Readmission Risk              Risk of Unplanned Readmission:  15           Discharging to Facility/ Agency   Name:   Address:  Phone:  Fax:    Dialysis Facility (if applicable)   Name:  Address:  Dialysis Schedule:  Phone:  Fax:    / signature: {Esignature:215340306}    PHYSICIAN SECTION    Prognosis: Good    Condition at Discharge: Stable    Rehab Potential (if transferring to Rehab): Fair    Recommended Labs or Other Treatments After Discharge: PT/INR on Tuesday patient is doing okay    Physician Certification: I certify the above information and transfer of Asher Ayon  is necessary for the continuing treatment of the diagnosis listed and that he requires Home Care for greater 30 days.     Update Admission H&P: No change in H&P    PHYSICIAN SIGNATURE:  Electronically signed by Lucas Hernadez DO on 24 at 12:36 PM EDT

## 2024-08-16 NOTE — DISCHARGE SUMMARY
[[id number]]     Resulted: 08/15/24 1519     Updated: 08/15/24 1519       Right cca dist PSV 25.2 cm/s     Right CCA dist EDV 0.0 cm/s     Right CCA mid PSV 46.2 cm/s     Right CCA mid EDV 0.0 cm/s     Right CCA prox PSV 47.9 cm/s     Right CCA prox EDV 0.0 cm/s     Right ECA .8 cm/s     Right ECA EDV 22.9 cm/s     Right ICA mid PSV 0.0 cm/s     Right ICA mid EDV 0.0 cm/s     Right vertebral PSV 51.1 cm/s     Right vertebral EDV 15.4 cm/s     Right ICA/CCA PSV 0.0 no units     Left CCA dist PSV 74.4 cm/s     Left CCA dist EDV 12.8 cm/s     Left CCA mid PSV 71.1 cm/s     Left CCA mid EDV 12.8 cm/s     Left CCA prox PSV 81.0 cm/s     Left CCA prox EDV 11.7 cm/s     Left ECA PSV 83.5 cm/s     Left ECA EDV 5.4 cm/s     Left ICA dist .4 cm/s     Left ICA dist EDV 27.1 cm/s     Left ICA mid PSV 93.2 cm/s     Left ICA mid EDV 18.0 cm/s     Left ICA prox PSV 80.2 cm/s     Left ICA prox EDV 15.4 cm/s     Left vertebral PSV 72.5 cm/s     Left vertebral EDV 15.4 cm/s     Left bulb PSV 50.5 cm/s     Left bulb EDV 10.9 cm/s     Left ICA/CCA PSV 1.51 no units     Body Surface Area 2.16 m2     Right ICA prox PSV 0.0 cm/s     Right ICA prox EDV 0.0 cm/s     Right ICA dist PSV 0.0 cm/s     Right ICA dist EDV 0.0 cm/s   Narrative:       Occluded right internal carotid artery.    < 50% stenosis involving the left internal carotid artery.    Normal antegrade flow in the bilateral vertebral arteries.   POC Glucose Fingerstick [[id number]] (Abnormal)     Collected: 08/15/24 1201     Updated: 08/15/24 1208       POC Glucose 169 High      Patient Instructions: As above to follow the regimen for Coumadin and have it checked on Tuesday and to call if any problems before  Disposition to home    Condition on discharge stable but guarded  Discharge Medications:  [unfilled]  Coumadin 4 mg Friday Saturday Sunday and then on Monday to go on the 2 mg and to follow his home regiment and to have his Coumadin checked Tuesday

## 2024-08-16 NOTE — PLAN OF CARE
Problem: Discharge Planning  Goal: Discharge to home or other facility with appropriate resources  Outcome: Progressing     Problem: Safety - Adult  Goal: Free from fall injury  Outcome: Progressing  Note: No falls noted this shift. Patient ambulates with x1 staff assistance without difficulty.  Bed kept in low position. Safe environment maintained. Bedside table & call light in reach.     Bed alarm not currently activated. Patient denied. Bed alarm refusal form signed.     Problem: ABCDS Injury Assessment  Goal: Absence of physical injury  Outcome: Progressing     Problem: Neurosensory - Adult  Goal: Achieves stable or improved neurological status  Outcome: Progressing     Problem: Chronic Conditions and Co-morbidities  Goal: Patient's chronic conditions and co-morbidity symptoms are monitored and maintained or improved  Outcome: Progressing

## 2024-08-16 NOTE — PROGRESS NOTES
[facility]   IN-PATIENT SERVICE      NEUROLOGY PROGRESS  NOTE            Date:   8/16/2024  Patient name:  Asher Ayon  Date of admission:  8/13/2024  YOB: 1955      Interval History:     8/16: Feels better. Reports facial droop and dysarthria improved. No new neuro complaints. INR is now 2.1.    8/15: No acute events.  No new neurological changes.  Denies any extremity weakness, numbness.  Had repeat CT brain yesterday.    History of Present Illness:     68-year-old male with past medical history of stroke, CAD, CHF, CKD, hypertension, hyperlipidemia, A-fib on Coumadin status post pacemaker/ICD, diabetes, who presented with acute onset right facial droop and dysarthria.  Neurology consulted for further evaluation.  History obtained from patient and chart review.     Patient states he was in usual state of health when he woke up in the morning yesterday.  He had breakfast, used the restroom.  Then, he noticed sudden onset dysarthria along with right facial droop.  There was no associated arm/leg weakness, numbness, headache, visual changes, aphasia, ataxia.  EMS was called and was transported to our hospital.     Patient was admitted in March of this year at [facility] when he presented with right-sided weakness.  He was found to have small left perirolandic infarct on MRI.  At the time, his INR was subtherapeutic as he was holding Coumadin for upcoming toe amputation.  His symptoms improved.  Was discharged after his INR was therapeutic.  He states he is compliant with all of his medications.  Of note, he had a recent heart catheterization and had stopped taking Coumadin from 8/2-8/9 for procedure.  States that he follows with Coumadin clinic.  He is also on aspirin 81 mg per cardiology.  He denies smoking, drinks alcohol on weekends, no drug use.     In the ED, stroke alert was called.  CT brain with no acute findings.  CTA head and neck showed chronic right ICA occlusion

## 2024-08-16 NOTE — PROGRESS NOTES
DR PIEDRA      PROGRESS NOTE        Patient:  Asher Ayon  YOB: 1955    MRN: [id number]     Acct: [id number]     Admit date: 8/13/2024    Pt seen and Chart reviewed.  Consultant notes reviewed and care evaluated.    Subjective: Patient is doing very good he likes the monitor be DC'd but he feels good he is ready to go home likely his INR now is 2.1.  Him we will plan on stopping heparin and keeping on his Coumadin patient tells me he goes to [facility] Cou[facility] clinic and he usually takes 4 mg of Coumadin Sunday Tuesday Thursdays and Saturdays and on Monday Wednesday and Friday he takes 2 mg.  He is ambulating on his own his strength is still good he feels his swallowing and his speech is getting better he tells me he does not need VNS or outpatient speech therapy since speech therapist showed him what to do and he will be able to do it on his own he also has an appointment with thoracic surgeon next week and he will be getting his Coumadin checked on Tuesday which is scheduled for him he says.  Which I told him to make sure he knows to make sure that he is still therapeutic with his Coumadin    Diet:  ADULT DIET; Regular; 3 carb choices (45 gm/meal); Low Sodium (2 gm)      Medications:Current Inpatient    Scheduled Meds:   warfarin placeholder: dosing by provider   Other RX Placeholder    tobramycin  0.5 inch Right Eye TID    carvedilol  3.125 mg Oral BID WC    lisinopril  5 mg Oral Daily    aspirin  81 mg Oral Daily    glipiZIDE  5 mg Oral QAM AC    furosemide  80 mg Oral Daily    Vitamin D  1,000 Units Oral Daily    sodium chloride flush  5-40 mL IntraVENous 2 times per day    atorvastatin  40 mg Oral Nightly     Continuous Infusions:   sodium chloride 50 mL/hr at 08/15/24 2030    heparin (PORCINE) Infusion 14 Units/kg/hr (08/16/24 0815)    sodium chloride       PRN Meds:glucose, dextrose bolus **OR** dextrose bolus, glucagon  ventricle size is normal. Mildly increased wall thickness. Mild global hypokinesis present.   Left Atrium Left atrium size is normal.   Interatrial Septum No interatrial shunt visualized with color Doppler. Grade 0 Absence of bubbles.   Right Ventricle Right ventricle size is normal. Normal systolic function. TAPSE is normal.   Right Atrium Right atrium size is normal.   Aortic Valve Valve structure is normal. Trace regurgitation. No stenosis.   Mitral Valve Valve structure is normal. Moderate regurgitation. No stenosis noted.   Tricuspid Valve Valve structure is normal. Moderate regurgitation. The estimated RVSP is 41 mmHg.   Pulmonic Valve Valve structure is normal. No regurgitation.   Aorta Normal sized aortic root.   IVC/Hepatic Veins IVC diameter is less than or equal to 21 mm and decreases greater than 50% during inspiration; therefore the estimated right atrial pressure is normal (~3 mmHg).   Pericardium No pericardial effusion.      Study Details     Image quality: adequate. Saline contrast was given to evaluate for intracardiac shunt.      Volumes and Function (Range)       ESV ESV Index   LA Biplane 110 mL  (18 - 58) Abnormal        52 ml/m2  (16 - 34) Abnormal          LA A4C  mL  (18 - 58) Abnormal        58 ml/m2  (16 - 34) Abnormal          LA A2C MOD 93 mL  (18 - 58) Abnormal        44 ml/m2  (16 - 34) Abnormal          RA A4C 55 ml       26 mL/m2            Left Ventricle Measurements          Dimensions   Fractional Shortening 2D 38 %  (Range: 28 - 44)           LVIDd 5.6 cm  (Range: 4.2 - 5.9)           LVIDd Index 2.64 cm/m2           LVIDs 3.5 cm           LVIDs Index 1.65 cm/m2           IVSd 1.1 cm  (Range: 0.6 - 1.0) Abnormal            LVPWd 1.1 cm  (Range: 0.6 - 1.0) Abnormal            LV RWT Ratio 0.39           LV Mass 2D 249.3 g  (Range: 88 - 224) Abnormal            LV Mass 2D Index 117.6 g/m2  (Range: 49 - 115) Abnormal            Body Surface Area 2.16 m2

## 2024-08-16 NOTE — PROGRESS NOTES
HEPARIN DRIP  Date   08/16/24  @ 0537  Anti-Xa 0.51       Plt 204 K    Anti-Xa 0.3-0.7 units/mL No bolus. No change in rate.   Continue with:    Handoff : 14 Units/kg/hr : 13.2 mL/hr : IntraVENous 08/16/24 0659 08/16/24 0659   Abdias Farias R.Ph.  8/16/2024  8:04 AM      INR 2.1, Vishal RN says cardiologist wants INR 2.5 or above so will continue hep drip for now.

## 2024-08-17 NOTE — PROGRESS NOTES
SLP ALL NOTES  [facility]  Speech Language Pathology    Date: 8/17/2024  Patient Name: Asher Ayon  YOB: 1955   AGE: 68 y.o.  MRN: [id number]        Patient Not Available for Speech Therapy     Due to:  [] Testing  [] Hemodialysis  [] Cancelled by RN  [] Surgery   [] Intubation/Sedation/Pain Medication  [] Medical instability  [x] Other:   attempted ST at 1414- RNs going over pt. D/c paperwork/orders.    Recommend continued ST for dysarthria and high level cognition.    Allison De La Garza M.A.CCC/SLP

## 2024-10-28 ENCOUNTER — OFFICE VISIT (OUTPATIENT)
Dept: PODIATRY | Age: 69
End: 2024-10-28
Payer: MEDICARE

## 2024-10-28 VITALS — WEIGHT: 210 LBS | HEIGHT: 70 IN | BODY MASS INDEX: 30.06 KG/M2

## 2024-10-28 DIAGNOSIS — L97.509 DIABETIC FOOT ULCER WITH OSTEOMYELITIS (HCC): ICD-10-CM

## 2024-10-28 DIAGNOSIS — M86.9 DIABETIC FOOT ULCER WITH OSTEOMYELITIS (HCC): ICD-10-CM

## 2024-10-28 DIAGNOSIS — M79.671 PAIN IN RIGHT FOOT: ICD-10-CM

## 2024-10-28 DIAGNOSIS — L97.513 RIGHT FOOT ULCER, WITH NECROSIS OF MUSCLE (HCC): ICD-10-CM

## 2024-10-28 DIAGNOSIS — E11.69 DIABETIC FOOT ULCER WITH OSTEOMYELITIS (HCC): ICD-10-CM

## 2024-10-28 DIAGNOSIS — E11.621 DIABETIC FOOT ULCER WITH OSTEOMYELITIS (HCC): ICD-10-CM

## 2024-10-28 DIAGNOSIS — E11.51 TYPE II DIABETES MELLITUS WITH PERIPHERAL CIRCULATORY DISORDER (HCC): Primary | ICD-10-CM

## 2024-10-28 DIAGNOSIS — Z89.419 HISTORY OF AMPUTATION OF GREAT TOE (HCC): ICD-10-CM

## 2024-10-28 DIAGNOSIS — M79.671 RIGHT FOOT PAIN: ICD-10-CM

## 2024-10-28 DIAGNOSIS — I73.9 PVD (PERIPHERAL VASCULAR DISEASE) (HCC): ICD-10-CM

## 2024-10-28 PROCEDURE — 1123F ACP DISCUSS/DSCN MKR DOCD: CPT | Performed by: PODIATRIST

## 2024-10-28 PROCEDURE — 3051F HG A1C>EQUAL 7.0%<8.0%: CPT | Performed by: PODIATRIST

## 2024-10-28 PROCEDURE — 11043 DBRDMT MUSC&/FSCA 1ST 20/<: CPT | Performed by: PODIATRIST

## 2024-10-28 PROCEDURE — 1159F MED LIST DOCD IN RCRD: CPT | Performed by: PODIATRIST

## 2024-10-28 PROCEDURE — 1125F AMNT PAIN NOTED PAIN PRSNT: CPT | Performed by: PODIATRIST

## 2024-10-28 PROCEDURE — 99214 OFFICE O/P EST MOD 30 MIN: CPT | Performed by: PODIATRIST

## 2024-10-28 RX ORDER — MUPIROCIN 20 MG/G
OINTMENT TOPICAL
COMMUNITY
Start: 2024-02-12

## 2024-10-28 RX ORDER — ACETAMINOPHEN 325 MG/1
650 TABLET ORAL EVERY 6 HOURS PRN
COMMUNITY

## 2024-10-28 RX ORDER — MUPIROCIN 20 MG/G
OINTMENT TOPICAL
Qty: 30 G | Refills: 1 | Status: SHIPPED | OUTPATIENT
Start: 2024-10-28 | End: 2024-11-04

## 2024-10-28 RX ORDER — HYDROCODONE BITARTRATE AND ACETAMINOPHEN 5; 325 MG/1; MG/1
TABLET ORAL
COMMUNITY
Start: 2024-03-01

## 2024-11-04 NOTE — PROGRESS NOTES
[facility]  [address] [place], [place] [address]  Dept: [phone]    RETURN PATIENT PROGRESS NOTE  Date of patient's visit: 11/4/2024  Patient's Name:  Asher Ayon YOB: 1955            Patient Care Team:  Vincent Xiong DO as PCP - General  Eris Xiong DPM as Physician (Podiatry)  Eris Xiong DPM as Consulting Physician (Podiatry, Foot & Ankle Surgery)       Asher Ayon 69 y.o. male that presents for follow-up of   Chief Complaint   Patient presents with    Diabetes    Foot Pain     Bilateral     Pt's primary care physician is Vincent Xiong DO last seen may 16 2024  Symptoms began 3 month(s) ago and are decreased .  Patient relates pain is mild .  Pain is rated 2 out of 10 and is described as intermittent.  Treatments prior to today's visit include: previous podiatry treatment.  Currently denies F/C/N/V.     Patient states he does not feel his feet or his legs, but states his wound has stopped draining    Allergies   Allergen Reactions    Doxycycline Other (See Comments)     Skin peeling    Pcn [Penicillins] Rash    Penicillin G Rash       Past Medical History:   Diagnosis Date    Atrial fibrillation (HCC)     CAD (coronary artery disease) 2010    s/p CABG- no stents    Cardiomyopathy (HCC)     Cellulitis     CHF (congestive heart failure) (HCC)     CKD (chronic kidney disease)     Diabetes mellitus (HCC)     Foot infection     Heart failure (HCC)     decreased EF    Hyperlipidemia     Hypertension     MRSA (methicillin resistant staph aureus) culture positive     foot    Osteomyelitis     PVD (peripheral vascular disease) ([facility])     Wound, open, foot     left foot       Prior to Admission medications    Medication Sig Start Date End Date Taking? Authorizing Provider   acetaminophen (TYLENOL) 325 MG tablet Take 2 tablets by mouth every 6 hours as needed   Yes Provider, MD Pratibha

## 2024-11-11 ENCOUNTER — OFFICE VISIT (OUTPATIENT)
Dept: PODIATRY | Age: 69
End: 2024-11-11
Payer: MEDICARE

## 2024-11-11 VITALS — BODY MASS INDEX: 30.06 KG/M2 | WEIGHT: 210 LBS | HEIGHT: 70 IN

## 2024-11-11 DIAGNOSIS — E11.51 TYPE II DIABETES MELLITUS WITH PERIPHERAL CIRCULATORY DISORDER (HCC): Primary | ICD-10-CM

## 2024-11-11 DIAGNOSIS — M86.9 DIABETIC FOOT ULCER WITH OSTEOMYELITIS (HCC): ICD-10-CM

## 2024-11-11 DIAGNOSIS — I73.9 PVD (PERIPHERAL VASCULAR DISEASE) (HCC): ICD-10-CM

## 2024-11-11 DIAGNOSIS — E11.69 DIABETIC FOOT ULCER WITH OSTEOMYELITIS (HCC): ICD-10-CM

## 2024-11-11 DIAGNOSIS — M79.671 PAIN IN RIGHT FOOT: ICD-10-CM

## 2024-11-11 DIAGNOSIS — E11.621 DIABETIC FOOT ULCER WITH OSTEOMYELITIS (HCC): ICD-10-CM

## 2024-11-11 DIAGNOSIS — L97.509 DIABETIC FOOT ULCER WITH OSTEOMYELITIS (HCC): ICD-10-CM

## 2024-11-11 DIAGNOSIS — Z89.419 HISTORY OF AMPUTATION OF GREAT TOE (HCC): ICD-10-CM

## 2024-11-11 DIAGNOSIS — L97.513 RIGHT FOOT ULCER, WITH NECROSIS OF MUSCLE (HCC): ICD-10-CM

## 2024-11-11 PROCEDURE — 11043 DBRDMT MUSC&/FSCA 1ST 20/<: CPT | Performed by: PODIATRIST

## 2024-11-14 NOTE — PROGRESS NOTES
[facility]  [address] [place], [place] [address]  Dept: [phone]    RETURN PATIENT PROGRESS NOTE  Date of patient's visit: 11/11/2024  Patient's Name:  Asher Ayon YOB: 1955            Patient Care Team:  Vincent Xiong DO as PCP - General  Eris Xoing DPM as Physician (Podiatry)  Eris Xiong DPM as Consulting Physician (Podiatry, Foot & Ankle Surgery)       Asher Ayon 69 y.o. male that presents for follow-up of   Chief Complaint   Patient presents with    Diabetes    Wound Check     Right foot     Pt's primary care physician is Vincent Xiong DO last seen may 16 2024  Symptoms began 3 month(s) ago and are decreased .  Patient relates pain is mild .  Pain is rated 2 out of 10 and is described as intermittent.  Treatments prior to today's visit include: previous podiatry treatment.  Currently denies F/C/N/V.     Patient states he does not feel his feet or his legs, but states his wound has stopped draining  States that he has been putting Bactroban to that area and covering it with a Band-Aid.  Patient has not been using his diabetic shoes    Allergies   Allergen Reactions    Doxycycline Other (See Comments)     Skin peeling    Pcn [Penicillins] Rash    Penicillin G Rash       Past Medical History:   Diagnosis Date    Atrial fibrillation (HCC)     CAD (coronary artery disease) 2010    s/p CABG- no stents    Cardiomyopathy (HCC)     Cellulitis     CHF (congestive heart failure) ([facility])     CKD (chronic kidney disease)     Diabetes mellitus (HCC)     Foot infection     Heart failure ([facility])     decreased EF    Hyperlipidemia     Hypertension     MRSA (methicillin resistant staph aureus) culture positive     foot    Osteomyelitis     PVD (peripheral vascular disease) ([facility])     Wound, open, foot     left foot       Prior to Admission medications    Medication Sig Start Date End Date Taking?

## 2025-01-01 ENCOUNTER — HOSPITAL ENCOUNTER (INPATIENT)
Age: 70
LOS: 3 days | Discharge: NURSING, REHAB OR LONG-TERM CARE | End: 2025-08-09
Attending: EMERGENCY MEDICINE | Admitting: FAMILY MEDICINE
Payer: MEDICARE

## 2025-01-01 ENCOUNTER — APPOINTMENT (OUTPATIENT)
Age: 70
End: 2025-01-01
Attending: INTERNAL MEDICINE
Payer: MEDICARE

## 2025-01-01 ENCOUNTER — APPOINTMENT (OUTPATIENT)
Dept: CT IMAGING | Age: 70
End: 2025-01-01
Payer: MEDICARE

## 2025-01-01 ENCOUNTER — APPOINTMENT (OUTPATIENT)
Dept: INTERVENTIONAL RADIOLOGY/VASCULAR | Age: 70
End: 2025-01-01
Payer: MEDICARE

## 2025-01-01 ENCOUNTER — APPOINTMENT (OUTPATIENT)
Dept: GENERAL RADIOLOGY | Age: 70
End: 2025-01-01
Payer: MEDICARE

## 2025-01-01 ENCOUNTER — OUTSIDE SERVICES (OUTPATIENT)
Dept: PRIMARY CARE CLINIC | Age: 70
End: 2025-01-01

## 2025-01-01 VITALS
HEART RATE: 61 BPM | WEIGHT: 185 LBS | SYSTOLIC BLOOD PRESSURE: 184 MMHG | RESPIRATION RATE: 18 BRPM | TEMPERATURE: 97.8 F | OXYGEN SATURATION: 100 % | DIASTOLIC BLOOD PRESSURE: 82 MMHG | BODY MASS INDEX: 26.48 KG/M2 | HEIGHT: 70 IN

## 2025-01-01 DIAGNOSIS — G89.18 POST-OP PAIN: ICD-10-CM

## 2025-01-01 DIAGNOSIS — N17.9 AKI (ACUTE KIDNEY INJURY): Primary | ICD-10-CM

## 2025-01-01 DIAGNOSIS — T23.039A BURN OF MULTIPLE FINGERS: Primary | ICD-10-CM

## 2025-01-01 DIAGNOSIS — R06.02 SHORTNESS OF BREATH: ICD-10-CM

## 2025-01-01 DIAGNOSIS — I24.9 ACUTE CORONARY SYNDROME (HCC): ICD-10-CM

## 2025-01-01 DIAGNOSIS — T23.039A BURN OF MULTIPLE FINGERS: ICD-10-CM

## 2025-01-01 DIAGNOSIS — I42.9 CARDIOMYOPATHY, UNSPECIFIED TYPE (HCC): Chronic | ICD-10-CM

## 2025-01-01 LAB
ALBUMIN PERCENT: 50 % (ref 56–66)
ALBUMIN SERPL-MCNC: 3.5 G/DL (ref 3.2–5.2)
ALBUMIN SERPL-MCNC: 3.7 G/DL (ref 3.5–5.2)
ALP SERPL-CCNC: 157 U/L (ref 40–129)
ALPHA 2 PERCENT: 14 % (ref 7–12)
ALPHA1 GLOB SERPL ELPH-MCNC: 0.4 G/DL (ref 0.1–0.4)
ALPHA1 GLOB SERPL ELPH-MCNC: 6 % (ref 3–5)
ALPHA2 GLOB SERPL ELPH-MCNC: 1 G/DL (ref 0.5–0.9)
ALT SERPL-CCNC: 20 U/L (ref 10–50)
ANA SER QL IA: NEGATIVE
ANION GAP SERPL CALCULATED.3IONS-SCNC: 11 MMOL/L (ref 9–16)
ANION GAP SERPL CALCULATED.3IONS-SCNC: 13 MMOL/L (ref 9–16)
ANION GAP SERPL CALCULATED.3IONS-SCNC: 14 MMOL/L (ref 9–16)
ANION GAP SERPL CALCULATED.3IONS-SCNC: 18 MMOL/L (ref 9–16)
AST SERPL-CCNC: 24 U/L (ref 10–50)
B-GLOBULIN SERPL ELPH-MCNC: 1 G/DL (ref 0.7–1.4)
B-GLOBULIN SERPL ELPH-MCNC: 14 % (ref 8–13)
BACTERIA URNS QL MICRO: ABNORMAL
BASOPHILS # BLD: 0.03 K/UL (ref 0–0.2)
BASOPHILS # BLD: 0.04 K/UL (ref 0–0.2)
BASOPHILS # BLD: 0.05 K/UL (ref 0–0.2)
BASOPHILS # BLD: 0.05 K/UL (ref 0–0.2)
BASOPHILS NFR BLD: 1 % (ref 0–2)
BILIRUB SERPL-MCNC: 0.5 MG/DL (ref 0–1.2)
BILIRUB UR QL STRIP: NEGATIVE
BNP SERPL-MCNC: 1404 PG/ML (ref 0–300)
BNP SERPL-MCNC: 1816 PG/ML (ref 0–300)
BNP SERPL-MCNC: 2320 PG/ML (ref 0–300)
BUN SERPL-MCNC: 45 MG/DL (ref 8–23)
BUN SERPL-MCNC: 53 MG/DL (ref 8–23)
BUN SERPL-MCNC: 59 MG/DL (ref 8–23)
BUN SERPL-MCNC: 61 MG/DL (ref 8–23)
CALCIUM SERPL-MCNC: 9.3 MG/DL (ref 8.6–10.4)
CALCIUM SERPL-MCNC: 9.5 MG/DL (ref 8.6–10.4)
CALCIUM SERPL-MCNC: 9.6 MG/DL (ref 8.6–10.4)
CALCIUM SERPL-MCNC: 9.8 MG/DL (ref 8.6–10.4)
CASTS #/AREA URNS LPF: ABNORMAL /LPF
CHLORIDE SERPL-SCNC: 101 MMOL/L (ref 98–107)
CHLORIDE SERPL-SCNC: 103 MMOL/L (ref 98–107)
CHLORIDE SERPL-SCNC: 105 MMOL/L (ref 98–107)
CHLORIDE SERPL-SCNC: 96 MMOL/L (ref 98–107)
CK SERPL-CCNC: 58 U/L (ref 39–308)
CLARITY UR: ABNORMAL
CO2 SERPL-SCNC: 18 MMOL/L (ref 20–31)
CO2 SERPL-SCNC: 18 MMOL/L (ref 20–31)
CO2 SERPL-SCNC: 20 MMOL/L (ref 20–31)
CO2 SERPL-SCNC: 20 MMOL/L (ref 20–31)
COLOR UR: YELLOW
CREAT SERPL-MCNC: 1.8 MG/DL (ref 0.7–1.2)
CREAT SERPL-MCNC: 2.1 MG/DL (ref 0.7–1.2)
CREAT SERPL-MCNC: 2.6 MG/DL (ref 0.7–1.2)
CREAT SERPL-MCNC: 3.1 MG/DL (ref 0.7–1.2)
CREAT UR-MCNC: 91.7 MG/DL (ref 39–259)
CREAT UR-MCNC: 92.6 MG/DL (ref 39–259)
DSDNA IGG SER QL IA: 1.2 IU/ML
ECHO AO ROOT DIAM: 3.3 CM
ECHO AO ROOT INDEX: 1.63 CM/M2
ECHO AV AREA PEAK VELOCITY: 1.3 CM2
ECHO AV AREA VTI: 1.1 CM2
ECHO AV AREA/BSA PEAK VELOCITY: 0.6 CM2/M2
ECHO AV AREA/BSA VTI: 0.5 CM2/M2
ECHO AV MEAN GRADIENT: 5 MMHG
ECHO AV MEAN VELOCITY: 1 M/S
ECHO AV PEAK GRADIENT: 7 MMHG
ECHO AV PEAK VELOCITY: 1.4 M/S
ECHO AV VELOCITY RATIO: 0.43
ECHO AV VTI: 34.5 CM
ECHO BSA: 2.04 M2
ECHO EST RA PRESSURE: 3 MMHG
ECHO LA AREA 2C: 29.1 CM2
ECHO LA AREA 4C: 29.1 CM2
ECHO LA DIAMETER INDEX: 2.52 CM/M2
ECHO LA DIAMETER: 5.1 CM
ECHO LA MAJOR AXIS: 7.3 CM
ECHO LA MINOR AXIS: 7.4 CM
ECHO LA TO AORTIC ROOT RATIO: 1.55
ECHO LA VOL BP: 93 ML (ref 18–58)
ECHO LA VOL MOD A2C: 93 ML (ref 18–58)
ECHO LA VOL MOD A4C: 93 ML (ref 18–58)
ECHO LA VOL/BSA BIPLANE: 46 ML/M2 (ref 16–34)
ECHO LA VOLUME INDEX MOD A2C: 46 ML/M2 (ref 16–34)
ECHO LA VOLUME INDEX MOD A4C: 46 ML/M2 (ref 16–34)
ECHO LV E' LATERAL VELOCITY: 11.3 CM/S
ECHO LV E' SEPTAL VELOCITY: 6.31 CM/S
ECHO LV EDV A2C: 92 ML
ECHO LV EDV A4C: 123 ML
ECHO LV EDV INDEX A4C: 61 ML/M2
ECHO LV EDV NDEX A2C: 46 ML/M2
ECHO LV EF PHYSICIAN: 50 %
ECHO LV EJECTION FRACTION A2C: 50 %
ECHO LV EJECTION FRACTION A4C: 48 %
ECHO LV EJECTION FRACTION BIPLANE: 50 % (ref 55–100)
ECHO LV ESV A2C: 47 ML
ECHO LV ESV A4C: 64 ML
ECHO LV ESV INDEX A2C: 23 ML/M2
ECHO LV ESV INDEX A4C: 32 ML/M2
ECHO LV FRACTIONAL SHORTENING: 26 % (ref 28–44)
ECHO LV INTERNAL DIMENSION DIASTOLE INDEX: 2.82 CM/M2
ECHO LV INTERNAL DIMENSION DIASTOLIC: 5.7 CM (ref 4.2–5.9)
ECHO LV INTERNAL DIMENSION SYSTOLIC INDEX: 2.08 CM/M2
ECHO LV INTERNAL DIMENSION SYSTOLIC: 4.2 CM
ECHO LV IVSD: 1 CM (ref 0.6–1)
ECHO LV MASS 2D: 226.4 G (ref 88–224)
ECHO LV MASS INDEX 2D: 112.1 G/M2 (ref 49–115)
ECHO LV POSTERIOR WALL DIASTOLIC: 1 CM (ref 0.6–1)
ECHO LV RELATIVE WALL THICKNESS RATIO: 0.35
ECHO LVOT AREA: 3.1 CM2
ECHO LVOT AV VTI INDEX: 0.35
ECHO LVOT DIAM: 2 CM
ECHO LVOT MEAN GRADIENT: 1 MMHG
ECHO LVOT PEAK GRADIENT: 1 MMHG
ECHO LVOT PEAK VELOCITY: 0.6 M/S
ECHO LVOT STROKE VOLUME INDEX: 18.7 ML/M2
ECHO LVOT SV: 37.7 ML
ECHO LVOT VTI: 12 CM
ECHO MV AREA VTI: 0.9 CM2
ECHO MV E DECELERATION TIME (DT): 278 MS
ECHO MV E VELOCITY: 1.61 M/S
ECHO MV E/E' LATERAL: 14.25
ECHO MV E/E' RATIO (AVERAGED): 19.88
ECHO MV E/E' SEPTAL: 25.52
ECHO MV LVOT VTI INDEX: 3.39
ECHO MV MAX VELOCITY: 1.7 M/S
ECHO MV MEAN GRADIENT: 4 MMHG
ECHO MV MEAN VELOCITY: 0.8 M/S
ECHO MV PEAK GRADIENT: 11 MMHG
ECHO MV VTI: 40.7 CM
ECHO RA AREA 4C: 21.3 CM2
ECHO RA END SYSTOLIC VOLUME APICAL 4 CHAMBER INDEX BSA: 30 ML/M2
ECHO RA VOLUME: 61 ML
ECHO RIGHT VENTRICULAR SYSTOLIC PRESSURE (RVSP): 36 MMHG
ECHO RV BASAL DIMENSION: 3.7 CM
ECHO RV FREE WALL PEAK S': 8.8 CM/S
ECHO RV TAPSE: 1.5 CM (ref 1.7–?)
ECHO TV REGURGITANT MAX VELOCITY: 2.87 M/S
ECHO TV REGURGITANT PEAK GRADIENT: 33 MMHG
EKG Q-T INTERVAL: 344 MS
EKG Q-T INTERVAL: 374 MS
EKG QRS DURATION: 80 MS
EKG QRS DURATION: 88 MS
EKG QTC CALCULATION (BAZETT): 406 MS
EKG QTC CALCULATION (BAZETT): 436 MS
EKG R AXIS: 76 DEGREES
EKG R AXIS: 91 DEGREES
EKG T AXIS: 13 DEGREES
EKG T AXIS: 18 DEGREES
EKG VENTRICULAR RATE: 71 BPM
EKG VENTRICULAR RATE: 97 BPM
EOSINOPHIL # BLD: 0.19 K/UL (ref 0–0.44)
EOSINOPHIL # BLD: 0.21 K/UL (ref 0–0.44)
EOSINOPHIL # BLD: 0.22 K/UL (ref 0–0.44)
EOSINOPHIL # BLD: 0.22 K/UL (ref 0–0.44)
EOSINOPHIL,URINE: NORMAL
EOSINOPHILS RELATIVE PERCENT: 3 % (ref 0–4)
EOSINOPHILS RELATIVE PERCENT: 3 % (ref 0–4)
EOSINOPHILS RELATIVE PERCENT: 4 % (ref 0–4)
EOSINOPHILS RELATIVE PERCENT: 5 % (ref 0–4)
EPI CELLS #/AREA URNS HPF: ABNORMAL /HPF
ERYTHROCYTE [DISTWIDTH] IN BLOOD BY AUTOMATED COUNT: 14.8 % (ref 11.5–14.9)
ERYTHROCYTE [DISTWIDTH] IN BLOOD BY AUTOMATED COUNT: 14.9 % (ref 11.5–14.9)
ERYTHROCYTE [DISTWIDTH] IN BLOOD BY AUTOMATED COUNT: 15 % (ref 11.5–14.9)
ERYTHROCYTE [DISTWIDTH] IN BLOOD BY AUTOMATED COUNT: 15 % (ref 11.5–14.9)
FREE KAPPA/LAMBDA RATIO: 0.74 (ref 0.22–1.74)
GAMMA GLOB SERPL ELPH-MCNC: 1.1 G/DL (ref 0.5–1.5)
GAMMA GLOBULIN %: 16 % (ref 11–19)
GFR, ESTIMATED: 21 ML/MIN/1.73M2
GFR, ESTIMATED: 26 ML/MIN/1.73M2
GFR, ESTIMATED: 33 ML/MIN/1.73M2
GFR, ESTIMATED: 40 ML/MIN/1.73M2
GLUCOSE SERPL-MCNC: 123 MG/DL (ref 74–99)
GLUCOSE SERPL-MCNC: 147 MG/DL (ref 74–99)
GLUCOSE SERPL-MCNC: 148 MG/DL (ref 74–99)
GLUCOSE SERPL-MCNC: 186 MG/DL (ref 74–99)
GLUCOSE UR STRIP-MCNC: NEGATIVE MG/DL
HCT VFR BLD AUTO: 27.3 % (ref 41–53)
HCT VFR BLD AUTO: 27.7 % (ref 41–53)
HCT VFR BLD AUTO: 28.3 % (ref 41–53)
HCT VFR BLD AUTO: 29.5 % (ref 41–53)
HGB BLD-MCNC: 8.9 G/DL (ref 13.5–17.5)
HGB BLD-MCNC: 8.9 G/DL (ref 13.5–17.5)
HGB BLD-MCNC: 9.4 G/DL (ref 13.5–17.5)
HGB BLD-MCNC: 9.9 G/DL (ref 13.5–17.5)
HGB UR QL STRIP.AUTO: ABNORMAL
IMM GRANULOCYTES # BLD AUTO: 0.03 K/UL (ref 0–0.3)
IMM GRANULOCYTES # BLD AUTO: <0.03 K/UL (ref 0–0.3)
IMM GRANULOCYTES NFR BLD: 0 %
IMM GRANULOCYTES NFR BLD: 0 %
IMM GRANULOCYTES NFR BLD: 1 %
IMM GRANULOCYTES NFR BLD: 1 %
INR PPP: 2.1
INR PPP: 2.1
INR PPP: 2.2
INR PPP: 2.3
ITYP INTERPRETATION: NORMAL
KAPPA LC FREE SER-MCNC: 62.1 MG/L
KETONES UR STRIP-MCNC: NEGATIVE MG/DL
LAMBDA LC FREE SERPL-MCNC: 83.7 MG/L (ref 4.2–27.7)
LEUKOCYTE ESTERASE UR QL STRIP: ABNORMAL
LYMPHOCYTES NFR BLD: 0.98 K/UL (ref 1.1–3.7)
LYMPHOCYTES NFR BLD: 0.99 K/UL (ref 1.1–3.7)
LYMPHOCYTES NFR BLD: 1.23 K/UL (ref 1.1–3.7)
LYMPHOCYTES NFR BLD: 1.54 K/UL (ref 1.1–3.7)
LYMPHOCYTES RELATIVE PERCENT: 16 % (ref 24–44)
LYMPHOCYTES RELATIVE PERCENT: 17 % (ref 24–44)
LYMPHOCYTES RELATIVE PERCENT: 20 % (ref 24–44)
LYMPHOCYTES RELATIVE PERCENT: 24 % (ref 24–44)
MCH RBC QN AUTO: 30.2 PG (ref 26–34)
MCH RBC QN AUTO: 30.6 PG (ref 26–34)
MCH RBC QN AUTO: 30.6 PG (ref 26–34)
MCH RBC QN AUTO: 31 PG (ref 26–34)
MCHC RBC AUTO-ENTMCNC: 32.1 G/DL (ref 31–37)
MCHC RBC AUTO-ENTMCNC: 32.6 G/DL (ref 31–37)
MCHC RBC AUTO-ENTMCNC: 33.2 G/DL (ref 31–37)
MCHC RBC AUTO-ENTMCNC: 33.6 G/DL (ref 31–37)
MCV RBC AUTO: 92.2 FL (ref 80–100)
MCV RBC AUTO: 92.5 FL (ref 80–100)
MCV RBC AUTO: 93.8 FL (ref 80–100)
MCV RBC AUTO: 93.9 FL (ref 80–100)
MICROORGANISM SPEC CULT: ABNORMAL
MONOCYTES NFR BLD: 0.41 K/UL (ref 0.1–1.2)
MONOCYTES NFR BLD: 0.64 K/UL (ref 0.1–1.2)
MONOCYTES NFR BLD: 0.69 K/UL (ref 0.1–1.2)
MONOCYTES NFR BLD: 0.69 K/UL (ref 0.1–1.2)
MONOCYTES NFR BLD: 10 % (ref 3–12)
MONOCYTES NFR BLD: 10 % (ref 3–12)
MONOCYTES NFR BLD: 11 % (ref 3–12)
MONOCYTES NFR BLD: 9 % (ref 3–12)
NEUTROPHIL AB SER QL FC: NEGATIVE
NEUTROPHILS NFR BLD: 61 % (ref 36–66)
NEUTROPHILS NFR BLD: 65 % (ref 36–66)
NEUTROPHILS NFR BLD: 67 % (ref 36–66)
NEUTROPHILS NFR BLD: 69 % (ref 36–66)
NEUTS SEG NFR BLD: 3.16 K/UL (ref 1.5–8.1)
NEUTS SEG NFR BLD: 4.11 K/UL (ref 1.5–8.1)
NEUTS SEG NFR BLD: 4.24 K/UL (ref 1.5–8.1)
NEUTS SEG NFR BLD: 5.02 K/UL (ref 1.5–8.1)
NITRITE UR QL STRIP: NEGATIVE
NRBC BLD-RTO: 0 PER 100 WBC
NUCLEAR IGG SER IA-RTO: 0.3 U/ML
P E INTERPRETATION, U: NORMAL
PATH REV: NORMAL
PATHOLOGIST: ABNORMAL
PATHOLOGIST: NORMAL
PH UR STRIP: 5.5 [PH] (ref 5–8)
PLATELET # BLD AUTO: 208 K/UL (ref 150–450)
PLATELET # BLD AUTO: 224 K/UL (ref 150–450)
PLATELET # BLD AUTO: 243 K/UL (ref 150–450)
PLATELET # BLD AUTO: 273 K/UL (ref 150–450)
PMV BLD AUTO: 8.5 FL (ref 8–13.5)
PMV BLD AUTO: 9.1 FL (ref 8–13.5)
PMV BLD AUTO: 9.4 FL (ref 8–13.5)
PMV BLD AUTO: 9.7 FL (ref 8–13.5)
POTASSIUM SERPL-SCNC: 4.5 MMOL/L (ref 3.7–5.3)
POTASSIUM SERPL-SCNC: 4.7 MMOL/L (ref 3.7–5.3)
POTASSIUM SERPL-SCNC: 4.8 MMOL/L (ref 3.7–5.3)
POTASSIUM SERPL-SCNC: 5.3 MMOL/L (ref 3.7–5.3)
POTASSIUM SERPL-SCNC: 5.5 MMOL/L (ref 3.7–5.3)
PROT PATTERN SERPL ELPH-IMP: ABNORMAL
PROT SERPL-MCNC: 7.1 G/DL (ref 6.6–8.7)
PROT SERPL-MCNC: 7.4 G/DL (ref 6.6–8.7)
PROT UR STRIP-MCNC: ABNORMAL MG/DL
PROTHROMBIN TIME: 25.7 SEC (ref 11.8–14.6)
PROTHROMBIN TIME: 25.8 SEC (ref 11.8–14.6)
PROTHROMBIN TIME: 26.7 SEC (ref 11.8–14.6)
PROTHROMBIN TIME: 27.8 SEC (ref 11.8–14.6)
RBC # BLD AUTO: 2.91 M/UL (ref 4.21–5.77)
RBC # BLD AUTO: 2.95 M/UL (ref 4.21–5.77)
RBC # BLD AUTO: 3.07 M/UL (ref 4.21–5.77)
RBC # BLD AUTO: 3.19 M/UL (ref 4.21–5.77)
RBC #/AREA URNS HPF: ABNORMAL /HPF
SODIUM SERPL-SCNC: 132 MMOL/L (ref 136–145)
SODIUM SERPL-SCNC: 134 MMOL/L (ref 136–145)
SODIUM SERPL-SCNC: 135 MMOL/L (ref 136–145)
SODIUM SERPL-SCNC: 136 MMOL/L (ref 136–145)
SODIUM UR-SCNC: 64 MMOL/L
SP GR UR STRIP: 1.01 (ref 1–1.03)
SPECIMEN DESCRIPTION: ABNORMAL
SPECIMEN TYPE: NORMAL
TOTAL PROT. SUM,%: 100 % (ref 98–102)
TOTAL PROT. SUM: 7 G/DL (ref 6.3–8.2)
TOTAL PROTEIN, URINE: 52 MG/DL
TROPONIN I SERPL HS-MCNC: 59 NG/L (ref 0–22)
TROPONIN I SERPL HS-MCNC: 62 NG/L (ref 0–22)
TROPONIN I SERPL HS-MCNC: 62 NG/L (ref 0–22)
URINE TOTAL PROTEIN CREATININE RATIO: 0.57
URINE TOTAL PROTEIN: 52 MG/DL
UROBILINOGEN UR STRIP-ACNC: NORMAL EU/DL (ref 0–1)
WBC #/AREA URNS HPF: ABNORMAL /HPF
WBC OTHER # BLD: 4.8 K/UL (ref 3.5–11)
WBC OTHER # BLD: 6.2 K/UL (ref 3.5–11)
WBC OTHER # BLD: 6.6 K/UL (ref 3.5–11)
WBC OTHER # BLD: 7.2 K/UL (ref 3.5–11)

## 2025-01-01 PROCEDURE — 84300 ASSAY OF URINE SODIUM: CPT

## 2025-01-01 PROCEDURE — 85025 COMPLETE CBC W/AUTO DIFF WBC: CPT

## 2025-01-01 PROCEDURE — 84155 ASSAY OF PROTEIN SERUM: CPT

## 2025-01-01 PROCEDURE — 36558 INSERT TUNNELED CV CATH: CPT

## 2025-01-01 PROCEDURE — 93010 ELECTROCARDIOGRAM REPORT: CPT | Performed by: INTERNAL MEDICINE

## 2025-01-01 PROCEDURE — 82570 ASSAY OF URINE CREATININE: CPT

## 2025-01-01 PROCEDURE — 86334 IMMUNOFIX E-PHORESIS SERUM: CPT

## 2025-01-01 PROCEDURE — 6370000000 HC RX 637 (ALT 250 FOR IP): Performed by: INTERNAL MEDICINE

## 2025-01-01 PROCEDURE — 99233 SBSQ HOSP IP/OBS HIGH 50: CPT | Performed by: FAMILY MEDICINE

## 2025-01-01 PROCEDURE — 82550 ASSAY OF CK (CPK): CPT

## 2025-01-01 PROCEDURE — 97116 GAIT TRAINING THERAPY: CPT

## 2025-01-01 PROCEDURE — 6360000002 HC RX W HCPCS: Performed by: FAMILY MEDICINE

## 2025-01-01 PROCEDURE — 36415 COLL VENOUS BLD VENIPUNCTURE: CPT

## 2025-01-01 PROCEDURE — 81001 URINALYSIS AUTO W/SCOPE: CPT

## 2025-01-01 PROCEDURE — 99222 1ST HOSP IP/OBS MODERATE 55: CPT | Performed by: INTERNAL MEDICINE

## 2025-01-01 PROCEDURE — 87186 SC STD MICRODIL/AGAR DIL: CPT

## 2025-01-01 PROCEDURE — 84132 ASSAY OF SERUM POTASSIUM: CPT

## 2025-01-01 PROCEDURE — 84156 ASSAY OF PROTEIN URINE: CPT

## 2025-01-01 PROCEDURE — 51798 US URINE CAPACITY MEASURE: CPT

## 2025-01-01 PROCEDURE — 77001 FLUOROGUIDE FOR VEIN DEVICE: CPT

## 2025-01-01 PROCEDURE — 80053 COMPREHEN METABOLIC PANEL: CPT

## 2025-01-01 PROCEDURE — 0JH63XZ INSERTION OF TUNNELED VASCULAR ACCESS DEVICE INTO CHEST SUBCUTANEOUS TISSUE AND FASCIA, PERCUTANEOUS APPROACH: ICD-10-PCS | Performed by: RADIOLOGY

## 2025-01-01 PROCEDURE — 84165 PROTEIN E-PHORESIS SERUM: CPT

## 2025-01-01 PROCEDURE — 99233 SBSQ HOSP IP/OBS HIGH 50: CPT | Performed by: NURSE PRACTITIONER

## 2025-01-01 PROCEDURE — 80048 BASIC METABOLIC PNL TOTAL CA: CPT

## 2025-01-01 PROCEDURE — 84484 ASSAY OF TROPONIN QUANT: CPT

## 2025-01-01 PROCEDURE — 99223 1ST HOSP IP/OBS HIGH 75: CPT | Performed by: FAMILY MEDICINE

## 2025-01-01 PROCEDURE — 2580000003 HC RX 258: Performed by: FAMILY MEDICINE

## 2025-01-01 PROCEDURE — 93306 TTE W/DOPPLER COMPLETE: CPT | Performed by: INTERNAL MEDICINE

## 2025-01-01 PROCEDURE — 02H633Z INSERTION OF INFUSION DEVICE INTO RIGHT ATRIUM, PERCUTANEOUS APPROACH: ICD-10-PCS | Performed by: RADIOLOGY

## 2025-01-01 PROCEDURE — 83880 ASSAY OF NATRIURETIC PEPTIDE: CPT

## 2025-01-01 PROCEDURE — C1894 INTRO/SHEATH, NON-LASER: HCPCS

## 2025-01-01 PROCEDURE — 96374 THER/PROPH/DIAG INJ IV PUSH: CPT

## 2025-01-01 PROCEDURE — 83521 IG LIGHT CHAINS FREE EACH: CPT

## 2025-01-01 PROCEDURE — 74176 CT ABD & PELVIS W/O CONTRAST: CPT

## 2025-01-01 PROCEDURE — 2060000000 HC ICU INTERMEDIATE R&B

## 2025-01-01 PROCEDURE — 2580000003 HC RX 258: Performed by: EMERGENCY MEDICINE

## 2025-01-01 PROCEDURE — 2580000003 HC RX 258: Performed by: INTERNAL MEDICINE

## 2025-01-01 PROCEDURE — 2500000003 HC RX 250 WO HCPCS

## 2025-01-01 PROCEDURE — 6370000000 HC RX 637 (ALT 250 FOR IP): Performed by: FAMILY MEDICINE

## 2025-01-01 PROCEDURE — 97166 OT EVAL MOD COMPLEX 45 MIN: CPT

## 2025-01-01 PROCEDURE — 97530 THERAPEUTIC ACTIVITIES: CPT

## 2025-01-01 PROCEDURE — G0545 PR INHERENT VISIT TO INPT: HCPCS | Performed by: INTERNAL MEDICINE

## 2025-01-01 PROCEDURE — 6360000002 HC RX W HCPCS

## 2025-01-01 PROCEDURE — 2500000003 HC RX 250 WO HCPCS: Performed by: FAMILY MEDICINE

## 2025-01-01 PROCEDURE — 71045 X-RAY EXAM CHEST 1 VIEW: CPT

## 2025-01-01 PROCEDURE — 84166 PROTEIN E-PHORESIS/URINE/CSF: CPT

## 2025-01-01 PROCEDURE — 6360000002 HC RX W HCPCS: Performed by: INTERNAL MEDICINE

## 2025-01-01 PROCEDURE — 86038 ANTINUCLEAR ANTIBODIES: CPT

## 2025-01-01 PROCEDURE — 86021 WBC ANTIBODY IDENTIFICATION: CPT

## 2025-01-01 PROCEDURE — 96375 TX/PRO/DX INJ NEW DRUG ADDON: CPT

## 2025-01-01 PROCEDURE — 76937 US GUIDE VASCULAR ACCESS: CPT

## 2025-01-01 PROCEDURE — 93005 ELECTROCARDIOGRAM TRACING: CPT | Performed by: EMERGENCY MEDICINE

## 2025-01-01 PROCEDURE — 85610 PROTHROMBIN TIME: CPT

## 2025-01-01 PROCEDURE — 87205 SMEAR GRAM STAIN: CPT

## 2025-01-01 PROCEDURE — 86225 DNA ANTIBODY NATIVE: CPT

## 2025-01-01 PROCEDURE — 87077 CULTURE AEROBIC IDENTIFY: CPT

## 2025-01-01 PROCEDURE — 87086 URINE CULTURE/COLONY COUNT: CPT

## 2025-01-01 PROCEDURE — 1200000000 HC SEMI PRIVATE

## 2025-01-01 PROCEDURE — 97110 THERAPEUTIC EXERCISES: CPT

## 2025-01-01 PROCEDURE — 99239 HOSP IP/OBS DSCHRG MGMT >30: CPT | Performed by: FAMILY MEDICINE

## 2025-01-01 PROCEDURE — 99285 EMERGENCY DEPT VISIT HI MDM: CPT

## 2025-01-01 PROCEDURE — 99223 1ST HOSP IP/OBS HIGH 75: CPT | Performed by: INTERNAL MEDICINE

## 2025-01-01 PROCEDURE — 97162 PT EVAL MOD COMPLEX 30 MIN: CPT

## 2025-01-01 PROCEDURE — 6360000004 HC RX CONTRAST MEDICATION: Performed by: INTERNAL MEDICINE

## 2025-01-01 PROCEDURE — 2580000003 HC RX 258

## 2025-01-01 PROCEDURE — C8929 TTE W OR WO FOL WCON,DOPPLER: HCPCS

## 2025-01-01 RX ORDER — VITAMIN B COMPLEX
1000 TABLET ORAL DAILY
Status: DISCONTINUED | OUTPATIENT
Start: 2025-01-01 | End: 2025-01-01 | Stop reason: HOSPADM

## 2025-01-01 RX ORDER — HEPARIN SODIUM 10000 [USP'U]/100ML
5-30 INJECTION, SOLUTION INTRAVENOUS CONTINUOUS
Status: CANCELLED | OUTPATIENT
Start: 2025-01-01

## 2025-01-01 RX ORDER — TOBRAMYCIN 3 MG/ML
1 SOLUTION/ DROPS OPHTHALMIC EVERY 4 HOURS
Qty: 5 ML | Refills: 0
Start: 2025-01-01 | End: 2025-08-14

## 2025-01-01 RX ORDER — GABAPENTIN 100 MG/1
100 CAPSULE ORAL 2 TIMES DAILY
Status: DISCONTINUED | OUTPATIENT
Start: 2025-01-01 | End: 2025-01-01 | Stop reason: HOSPADM

## 2025-01-01 RX ORDER — ACETAMINOPHEN 325 MG/1
325 TABLET ORAL EVERY 6 HOURS PRN
Status: DISCONTINUED | OUTPATIENT
Start: 2025-01-01 | End: 2025-01-01 | Stop reason: HOSPADM

## 2025-01-01 RX ORDER — WARFARIN SODIUM 2 MG/1
4 TABLET ORAL
Status: DISCONTINUED | OUTPATIENT
Start: 2025-01-01 | End: 2025-01-01 | Stop reason: HOSPADM

## 2025-01-01 RX ORDER — WARFARIN SODIUM 2 MG/1
2 TABLET ORAL DAILY
Status: DISCONTINUED | OUTPATIENT
Start: 2025-01-01 | End: 2025-01-01

## 2025-01-01 RX ORDER — TAMSULOSIN HYDROCHLORIDE 0.4 MG/1
0.4 CAPSULE ORAL DAILY
Status: DISCONTINUED | OUTPATIENT
Start: 2025-01-01 | End: 2025-01-01 | Stop reason: HOSPADM

## 2025-01-01 RX ORDER — HEPARIN SODIUM 1000 [USP'U]/ML
60 INJECTION, SOLUTION INTRAVENOUS; SUBCUTANEOUS PRN
Status: CANCELLED | OUTPATIENT
Start: 2025-01-01

## 2025-01-01 RX ORDER — NITROGLYCERIN 0.4 MG/1
0.4 TABLET SUBLINGUAL PRN
Status: DISCONTINUED | OUTPATIENT
Start: 2025-01-01 | End: 2025-01-01 | Stop reason: HOSPADM

## 2025-01-01 RX ORDER — SODIUM CHLORIDE 9 MG/ML
INJECTION, SOLUTION INTRAVENOUS CONTINUOUS
Status: DISCONTINUED | OUTPATIENT
Start: 2025-01-01 | End: 2025-01-01

## 2025-01-01 RX ORDER — HEPARIN SODIUM 1000 [USP'U]/ML
30 INJECTION, SOLUTION INTRAVENOUS; SUBCUTANEOUS PRN
Status: CANCELLED | OUTPATIENT
Start: 2025-01-01

## 2025-01-01 RX ORDER — HEPARIN SODIUM 1000 [USP'U]/ML
60 INJECTION, SOLUTION INTRAVENOUS; SUBCUTANEOUS ONCE
Status: CANCELLED | OUTPATIENT
Start: 2025-01-01 | End: 2025-01-01

## 2025-01-01 RX ORDER — ASPIRIN 81 MG/1
81 TABLET, CHEWABLE ORAL DAILY
Status: DISCONTINUED | OUTPATIENT
Start: 2025-01-01 | End: 2025-01-01 | Stop reason: HOSPADM

## 2025-01-01 RX ORDER — GLIPIZIDE 5 MG/1
2.5 TABLET ORAL
Status: DISCONTINUED | OUTPATIENT
Start: 2025-01-01 | End: 2025-01-01 | Stop reason: HOSPADM

## 2025-01-01 RX ORDER — OXYCODONE HYDROCHLORIDE 5 MG/1
5 TABLET ORAL EVERY 6 HOURS PRN
Qty: 60 TABLET | Refills: 0 | Status: SHIPPED | OUTPATIENT
Start: 2025-01-01 | End: 2025-08-14

## 2025-01-01 RX ORDER — TAMSULOSIN HYDROCHLORIDE 0.4 MG/1
0.4 CAPSULE ORAL DAILY
Status: DISCONTINUED | OUTPATIENT
Start: 2025-01-01 | End: 2025-01-01

## 2025-01-01 RX ORDER — CARVEDILOL 3.12 MG/1
3.12 TABLET ORAL 2 TIMES DAILY WITH MEALS
Status: DISCONTINUED | OUTPATIENT
Start: 2025-01-01 | End: 2025-01-01 | Stop reason: HOSPADM

## 2025-01-01 RX ORDER — TOBRAMYCIN AND DEXAMETHASONE 3; 1 MG/ML; MG/ML
1 SUSPENSION/ DROPS OPHTHALMIC 3 TIMES DAILY
Status: DISCONTINUED | OUTPATIENT
Start: 2025-01-01 | End: 2025-01-01 | Stop reason: HOSPADM

## 2025-01-01 RX ORDER — WARFARIN SODIUM 2 MG/1
2 TABLET ORAL
Status: DISCONTINUED | OUTPATIENT
Start: 2025-01-01 | End: 2025-01-01 | Stop reason: HOSPADM

## 2025-01-01 RX ORDER — FENTANYL CITRATE 0.05 MG/ML
50 INJECTION, SOLUTION INTRAMUSCULAR; INTRAVENOUS ONCE
Status: COMPLETED | OUTPATIENT
Start: 2025-01-01 | End: 2025-01-01

## 2025-01-01 RX ORDER — 0.9 % SODIUM CHLORIDE 0.9 %
500 INTRAVENOUS SOLUTION INTRAVENOUS ONCE
Status: COMPLETED | OUTPATIENT
Start: 2025-01-01 | End: 2025-01-01

## 2025-01-01 RX ORDER — ATORVASTATIN CALCIUM 40 MG/1
40 TABLET, FILM COATED ORAL NIGHTLY
Status: DISCONTINUED | OUTPATIENT
Start: 2025-01-01 | End: 2025-01-01 | Stop reason: HOSPADM

## 2025-01-01 RX ADMIN — CARVEDILOL 3.12 MG: 3.12 TABLET, FILM COATED ORAL at 16:31

## 2025-01-01 RX ADMIN — TOBRAMYCIN AND DEXAMETHASONE 1 DROP: 1; 3 SUSPENSION/ DROPS OPHTHALMIC at 21:27

## 2025-01-01 RX ADMIN — GABAPENTIN 100 MG: 100 CAPSULE ORAL at 08:38

## 2025-01-01 RX ADMIN — TOBRAMYCIN AND DEXAMETHASONE 1 DROP: 1; 3 SUSPENSION/ DROPS OPHTHALMIC at 20:19

## 2025-01-01 RX ADMIN — ATORVASTATIN CALCIUM 40 MG: 40 TABLET, FILM COATED ORAL at 21:04

## 2025-01-01 RX ADMIN — ACETAMINOPHEN 325 MG: 325 TABLET ORAL at 13:21

## 2025-01-01 RX ADMIN — CARVEDILOL 3.12 MG: 3.12 TABLET, FILM COATED ORAL at 16:02

## 2025-01-01 RX ADMIN — SULFUR HEXAFLUORIDE 5 ML: KIT at 16:56

## 2025-01-01 RX ADMIN — Medication 1000 UNITS: at 08:48

## 2025-01-01 RX ADMIN — MEROPENEM 1000 MG: 1 INJECTION INTRAVENOUS at 20:16

## 2025-01-01 RX ADMIN — Medication 1000 UNITS: at 08:38

## 2025-01-01 RX ADMIN — SODIUM CHLORIDE: 0.9 INJECTION, SOLUTION INTRAVENOUS at 02:02

## 2025-01-01 RX ADMIN — TAMSULOSIN HYDROCHLORIDE 0.4 MG: 0.4 CAPSULE ORAL at 08:43

## 2025-01-01 RX ADMIN — CARVEDILOL 3.12 MG: 3.12 TABLET, FILM COATED ORAL at 17:44

## 2025-01-01 RX ADMIN — GABAPENTIN 100 MG: 100 CAPSULE ORAL at 21:26

## 2025-01-01 RX ADMIN — TOBRAMYCIN AND DEXAMETHASONE 1 DROP: 1; 3 SUSPENSION/ DROPS OPHTHALMIC at 13:22

## 2025-01-01 RX ADMIN — ASPIRIN 81 MG: 81 TABLET, CHEWABLE ORAL at 08:43

## 2025-01-01 RX ADMIN — SODIUM CHLORIDE: 0.9 INJECTION, SOLUTION INTRAVENOUS at 06:15

## 2025-01-01 RX ADMIN — ACETAMINOPHEN 325 MG: 325 TABLET ORAL at 16:33

## 2025-01-01 RX ADMIN — CARVEDILOL 3.12 MG: 3.12 TABLET, FILM COATED ORAL at 08:43

## 2025-01-01 RX ADMIN — FENTANYL CITRATE 50 MCG: 0.05 INJECTION, SOLUTION INTRAMUSCULAR; INTRAVENOUS at 01:34

## 2025-01-01 RX ADMIN — TAMSULOSIN HYDROCHLORIDE 0.4 MG: 0.4 CAPSULE ORAL at 08:38

## 2025-01-01 RX ADMIN — WARFARIN SODIUM 4 MG: 2 TABLET ORAL at 21:04

## 2025-01-01 RX ADMIN — SODIUM CHLORIDE: 0.9 INJECTION, SOLUTION INTRAVENOUS at 21:29

## 2025-01-01 RX ADMIN — ATORVASTATIN CALCIUM 40 MG: 40 TABLET, FILM COATED ORAL at 20:17

## 2025-01-01 RX ADMIN — GABAPENTIN 100 MG: 100 CAPSULE ORAL at 08:48

## 2025-01-01 RX ADMIN — TOBRAMYCIN AND DEXAMETHASONE 1 DROP: 1; 3 SUSPENSION/ DROPS OPHTHALMIC at 08:49

## 2025-01-01 RX ADMIN — GABAPENTIN 100 MG: 100 CAPSULE ORAL at 08:43

## 2025-01-01 RX ADMIN — WATER 1000 MG: 1 INJECTION INTRAMUSCULAR; INTRAVENOUS; SUBCUTANEOUS at 23:10

## 2025-01-01 RX ADMIN — TOBRAMYCIN AND DEXAMETHASONE 1 DROP: 1; 3 SUSPENSION/ DROPS OPHTHALMIC at 08:55

## 2025-01-01 RX ADMIN — ASPIRIN 81 MG: 81 TABLET, CHEWABLE ORAL at 10:16

## 2025-01-01 RX ADMIN — SODIUM CHLORIDE: 0.9 INJECTION, SOLUTION INTRAVENOUS at 10:21

## 2025-01-01 RX ADMIN — WARFARIN SODIUM 2 MG: 2 TABLET ORAL at 17:46

## 2025-01-01 RX ADMIN — SODIUM ZIRCONIUM CYCLOSILICATE 5 G: 5 POWDER, FOR SUSPENSION ORAL at 11:48

## 2025-01-01 RX ADMIN — TAMSULOSIN HYDROCHLORIDE 0.4 MG: 0.4 CAPSULE ORAL at 08:48

## 2025-01-01 RX ADMIN — TOBRAMYCIN AND DEXAMETHASONE 1 DROP: 1; 3 SUSPENSION/ DROPS OPHTHALMIC at 21:05

## 2025-01-01 RX ADMIN — ACETAMINOPHEN 325 MG: 325 TABLET ORAL at 08:54

## 2025-01-01 RX ADMIN — TOBRAMYCIN AND DEXAMETHASONE 1 DROP: 1; 3 SUSPENSION/ DROPS OPHTHALMIC at 16:03

## 2025-01-01 RX ADMIN — TOBRAMYCIN AND DEXAMETHASONE 1 DROP: 1; 3 SUSPENSION/ DROPS OPHTHALMIC at 10:17

## 2025-01-01 RX ADMIN — Medication 1000 UNITS: at 10:16

## 2025-01-01 RX ADMIN — WATER 1000 MG: 1 INJECTION INTRAMUSCULAR; INTRAVENOUS; SUBCUTANEOUS at 23:46

## 2025-01-01 RX ADMIN — GABAPENTIN 100 MG: 100 CAPSULE ORAL at 21:04

## 2025-01-01 RX ADMIN — MEROPENEM 1000 MG: 1 INJECTION INTRAVENOUS at 08:53

## 2025-01-01 RX ADMIN — GABAPENTIN 100 MG: 100 CAPSULE ORAL at 20:18

## 2025-01-01 RX ADMIN — GLIPIZIDE 2.5 MG: 5 TABLET ORAL at 06:16

## 2025-01-01 RX ADMIN — GLIPIZIDE 2.5 MG: 5 TABLET ORAL at 07:14

## 2025-01-01 RX ADMIN — TOBRAMYCIN AND DEXAMETHASONE 1 DROP: 1; 3 SUSPENSION/ DROPS OPHTHALMIC at 10:59

## 2025-01-01 RX ADMIN — TAMSULOSIN HYDROCHLORIDE 0.4 MG: 0.4 CAPSULE ORAL at 10:16

## 2025-01-01 RX ADMIN — TOBRAMYCIN AND DEXAMETHASONE 1 DROP: 1; 3 SUSPENSION/ DROPS OPHTHALMIC at 15:34

## 2025-01-01 RX ADMIN — CARVEDILOL 3.12 MG: 3.12 TABLET, FILM COATED ORAL at 08:38

## 2025-01-01 RX ADMIN — CARVEDILOL 3.12 MG: 3.12 TABLET, FILM COATED ORAL at 10:16

## 2025-01-01 RX ADMIN — ASPIRIN 81 MG: 81 TABLET, CHEWABLE ORAL at 08:48

## 2025-01-01 RX ADMIN — ERTAPENEM SODIUM 1000 MG: 1 INJECTION INTRAMUSCULAR; INTRAVENOUS at 08:59

## 2025-01-01 RX ADMIN — GLIPIZIDE 2.5 MG: 5 TABLET ORAL at 06:11

## 2025-01-01 RX ADMIN — WATER 1000 MG: 1 INJECTION INTRAMUSCULAR; INTRAVENOUS; SUBCUTANEOUS at 00:24

## 2025-01-01 RX ADMIN — ATORVASTATIN CALCIUM 40 MG: 40 TABLET, FILM COATED ORAL at 21:26

## 2025-01-01 RX ADMIN — GLIPIZIDE 2.5 MG: 5 TABLET ORAL at 08:38

## 2025-01-01 RX ADMIN — WARFARIN SODIUM 2 MG: 2 TABLET ORAL at 20:17

## 2025-01-01 RX ADMIN — SODIUM CHLORIDE 500 ML: 0.9 INJECTION, SOLUTION INTRAVENOUS at 21:38

## 2025-01-01 RX ADMIN — Medication 1000 UNITS: at 08:43

## 2025-01-01 RX ADMIN — ACETAMINOPHEN 325 MG: 325 TABLET ORAL at 21:04

## 2025-01-01 RX ADMIN — GABAPENTIN 100 MG: 100 CAPSULE ORAL at 10:16

## 2025-01-01 RX ADMIN — CARVEDILOL 3.12 MG: 3.12 TABLET, FILM COATED ORAL at 08:48

## 2025-01-01 RX ADMIN — ASPIRIN 81 MG: 81 TABLET, CHEWABLE ORAL at 08:38

## 2025-01-01 ASSESSMENT — ENCOUNTER SYMPTOMS
DIARRHEA: 0
VOMITING: 0
NAUSEA: 0
NAUSEA: 0
DIARRHEA: 0
SHORTNESS OF BREATH: 0
COUGH: 0
VOMITING: 0
SHORTNESS OF BREATH: 1

## 2025-01-01 ASSESSMENT — PAIN DESCRIPTION - LOCATION
LOCATION: FINGER (COMMENT WHICH ONE)
LOCATION: HAND
LOCATION: GENERALIZED
LOCATION: FINGER (COMMENT WHICH ONE)
LOCATION: FINGER (COMMENT WHICH ONE)
LOCATION: FINGER (COMMENT WHICH ONE);HAND

## 2025-01-01 ASSESSMENT — PAIN SCALES - GENERAL
PAINLEVEL_OUTOF10: 5
PAINLEVEL_OUTOF10: 5
PAINLEVEL_OUTOF10: 8
PAINLEVEL_OUTOF10: 0
PAINLEVEL_OUTOF10: 4
PAINLEVEL_OUTOF10: 8
PAINLEVEL_OUTOF10: 5
PAINLEVEL_OUTOF10: 10
PAINLEVEL_OUTOF10: 6
PAINLEVEL_OUTOF10: 7
PAINLEVEL_OUTOF10: 0

## 2025-01-01 ASSESSMENT — PAIN DESCRIPTION - DESCRIPTORS
DESCRIPTORS: DISCOMFORT
DESCRIPTORS: ACHING
DESCRIPTORS: BURNING;THROBBING
DESCRIPTORS: ACHING;DULL
DESCRIPTORS: ACHING
DESCRIPTORS: BURNING

## 2025-01-01 ASSESSMENT — PAIN - FUNCTIONAL ASSESSMENT
PAIN_FUNCTIONAL_ASSESSMENT: NONE - DENIES PAIN
PAIN_FUNCTIONAL_ASSESSMENT: PREVENTS OR INTERFERES SOME ACTIVE ACTIVITIES AND ADLS
PAIN_FUNCTIONAL_ASSESSMENT: 0-10

## 2025-01-01 ASSESSMENT — PAIN DESCRIPTION - ORIENTATION
ORIENTATION: RIGHT

## 2025-01-01 ASSESSMENT — HEART SCORE
ECG: NORMAL
ECG: NORMAL

## 2025-01-01 ASSESSMENT — PAIN DESCRIPTION - PAIN TYPE
TYPE: ACUTE PAIN
TYPE: ACUTE PAIN

## 2025-03-11 ENCOUNTER — HOSPITAL ENCOUNTER (EMERGENCY)
Age: 70
Discharge: HOME | End: 2025-03-11
Attending: EMERGENCY MEDICINE
Payer: MEDICARE

## 2025-03-11 VITALS
HEIGHT: 70 IN | WEIGHT: 216 LBS | OXYGEN SATURATION: 96 % | SYSTOLIC BLOOD PRESSURE: 147 MMHG | BODY MASS INDEX: 30.92 KG/M2 | RESPIRATION RATE: 18 BRPM | TEMPERATURE: 97.7 F | DIASTOLIC BLOOD PRESSURE: 77 MMHG | HEART RATE: 82 BPM

## 2025-03-11 DIAGNOSIS — S61.401A OPEN WOUND OF RIGHT HAND, FOREIGN BODY PRESENCE UNSPECIFIED, UNSPECIFIED WOUND TYPE, INITIAL ENCOUNTER: Primary | ICD-10-CM

## 2025-03-11 PROCEDURE — 99283 EMERGENCY DEPT VISIT LOW MDM: CPT

## 2025-03-11 RX ORDER — AMLODIPINE BESYLATE 2.5 MG/1
2.5 TABLET ORAL DAILY
Qty: 30 TABLET | Refills: 1 | Status: SHIPPED | OUTPATIENT
Start: 2025-03-11

## 2025-03-11 RX ORDER — CEPHALEXIN 500 MG/1
500 CAPSULE ORAL 4 TIMES DAILY
Qty: 28 CAPSULE | Refills: 0 | Status: SHIPPED | OUTPATIENT
Start: 2025-03-11 | End: 2025-03-18

## 2025-03-11 ASSESSMENT — LIFESTYLE VARIABLES
HOW MANY STANDARD DRINKS CONTAINING ALCOHOL DO YOU HAVE ON A TYPICAL DAY: 3 OR 4
HOW OFTEN DO YOU HAVE A DRINK CONTAINING ALCOHOL: 2-4 TIMES A MONTH

## 2025-03-11 ASSESSMENT — PAIN - FUNCTIONAL ASSESSMENT: PAIN_FUNCTIONAL_ASSESSMENT: 0-10

## 2025-03-11 ASSESSMENT — PAIN SCALES - GENERAL: PAINLEVEL_OUTOF10: 5
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
## 2025-03-24 ENCOUNTER — HOSPITAL ENCOUNTER (OUTPATIENT)
Dept: WOUND CARE | Age: 70
Discharge: HOME | End: 2025-03-24
Payer: MEDICARE

## 2025-03-24 VITALS
SYSTOLIC BLOOD PRESSURE: 198 MMHG | TEMPERATURE: 96.4 F | BODY MASS INDEX: 30.92 KG/M2 | WEIGHT: 216 LBS | HEIGHT: 70 IN | HEART RATE: 72 BPM | DIASTOLIC BLOOD PRESSURE: 97 MMHG | RESPIRATION RATE: 18 BRPM

## 2025-03-24 DIAGNOSIS — T23.039A BURN OF MULTIPLE FINGERS: Primary | ICD-10-CM

## 2025-03-24 PROCEDURE — 99213 OFFICE O/P EST LOW 20 MIN: CPT

## 2025-03-24 PROCEDURE — 97597 DBRDMT OPN WND 1ST 20 CM/<: CPT

## 2025-03-24 PROCEDURE — 16020 DRESS/DEBRID P-THICK BURN S: CPT

## 2025-03-24 PROCEDURE — 16020 DRESS/DEBRID P-THICK BURN S: CPT | Performed by: PLASTIC SURGERY

## 2025-03-24 RX ORDER — TRIAMCINOLONE ACETONIDE 1 MG/G
OINTMENT TOPICAL PRN
OUTPATIENT
Start: 2025-03-24

## 2025-03-24 RX ORDER — SILVER SULFADIAZINE 10 MG/G
CREAM TOPICAL PRN
OUTPATIENT
Start: 2025-03-24

## 2025-03-24 RX ORDER — LIDOCAINE HYDROCHLORIDE 40 MG/ML
SOLUTION TOPICAL PRN
OUTPATIENT
Start: 2025-03-24

## 2025-03-24 RX ORDER — BACITRACIN ZINC AND POLYMYXIN B SULFATE 500; 1000 [USP'U]/G; [USP'U]/G
OINTMENT TOPICAL PRN
OUTPATIENT
Start: 2025-03-24

## 2025-03-24 RX ORDER — CLOBETASOL PROPIONATE 0.5 MG/G
OINTMENT TOPICAL PRN
OUTPATIENT
Start: 2025-03-24

## 2025-03-24 RX ORDER — MUPIROCIN 20 MG/G
OINTMENT TOPICAL PRN
OUTPATIENT
Start: 2025-03-24

## 2025-03-24 RX ORDER — NEOMYCIN/BACITRACIN/POLYMYXINB 3.5-400-5K
OINTMENT (GRAM) TOPICAL PRN
OUTPATIENT
Start: 2025-03-24

## 2025-03-24 RX ORDER — SILVER SULFADIAZINE 10 MG/G
CREAM TOPICAL
Qty: 400 G | Refills: 0 | Status: SHIPPED | OUTPATIENT
Start: 2025-03-24

## 2025-03-24 RX ORDER — GINSENG 100 MG
CAPSULE ORAL PRN
OUTPATIENT
Start: 2025-03-24

## 2025-03-24 RX ORDER — SODIUM CHLOR/HYPOCHLOROUS ACID 0.033 %
SOLUTION, IRRIGATION IRRIGATION PRN
OUTPATIENT
Start: 2025-03-24

## 2025-03-24 RX ORDER — BETAMETHASONE DIPROPIONATE 0.5 MG/G
CREAM TOPICAL PRN
OUTPATIENT
Start: 2025-03-24

## 2025-03-24 RX ORDER — GENTAMICIN SULFATE 1 MG/G
OINTMENT TOPICAL PRN
OUTPATIENT
Start: 2025-03-24

## 2025-03-24 RX ORDER — LIDOCAINE HYDROCHLORIDE 20 MG/ML
JELLY TOPICAL PRN
OUTPATIENT
Start: 2025-03-24

## 2025-03-24 RX ORDER — LIDOCAINE 50 MG/G
OINTMENT TOPICAL PRN
OUTPATIENT
Start: 2025-03-24

## 2025-03-24 RX ORDER — LIDOCAINE 40 MG/G
CREAM TOPICAL PRN
OUTPATIENT
Start: 2025-03-24

## 2025-03-24 ASSESSMENT — PAIN DESCRIPTION - ORIENTATION: ORIENTATION: RIGHT

## 2025-03-24 ASSESSMENT — PAIN DESCRIPTION - DESCRIPTORS: DESCRIPTORS: ACHING

## 2025-03-24 ASSESSMENT — PAIN DESCRIPTION - PAIN TYPE: TYPE: CHRONIC PAIN

## 2025-03-24 ASSESSMENT — PAIN SCALES - GENERAL: PAINLEVEL_OUTOF10: 1

## 2025-03-24 ASSESSMENT — PAIN DESCRIPTION - ONSET: ONSET: ON-GOING

## 2025-03-24 ASSESSMENT — PAIN DESCRIPTION - FREQUENCY: FREQUENCY: INTERMITTENT

## 2025-03-24 ASSESSMENT — PAIN - FUNCTIONAL ASSESSMENT: PAIN_FUNCTIONAL_ASSESSMENT: ACTIVITIES ARE NOT PREVENTED

## 2025-03-24 ASSESSMENT — PAIN DESCRIPTION - LOCATION: LOCATION: HAND

## 2025-03-24 NOTE — DISCHARGE INSTRUCTIONS
[facility] WOUND and HYPERBARIC TREATMENT  CENTER      Visit  Discharge Instructions / Physician Orders  DATE:3/24/25     Home Care:NA     SUPPLIES ORDERED THRU:                     DATE LAST SUPPLIED     Wound Location:  Right Hand  3rd finger     Cleanse with: Liquid antibacterial soap and water, rinse well      Dressing Orders:  Primary dressing  Apply Silvadine to wound( use adaptic in wound care)  Secondary dressing  cover with dry dressing                            apply in am. In the evening remove dressing wash hand thoroughly with Liquid antibacterial soap and water,rinse well then apply new layer of the cream apply new wrap    Frequency: 2 x per day am and pm     Additional Orders: Increase protein to diet (meat, cheese, eggs, fish, peanut butter, nuts and beans)  Multivitamin daily  Elevate legs if swollen or wearing compression when sitting  OFFLOADING [] YES  TYPE:                  [x] NA    Weekly wound care visits until determined otherwise.    Antibiotic therapy-wound care related YES [] NO [x] NA[]  DRUG- Silvadine cream to [facility] in [place]                                                            Duration-             Script sent to-                      on (date)  MY CHART []     Smart Device  []     HYPERBARIC TREATMENT-                TREATMENT #                          Your next appointment with the Wound Care Center is in 1 week                                                                                                   (Please note your next appointment above and if you are unable to keep, kindly give a 24 hour notice. Thank you.)  If more than 15 min late we cannot guarantee you will be seen due to clinician schedule    Per Policy,  3 or more cancellations or no show may result in dismissal from program  If you experience any of the following, please call the Wound Care Center during business hours:  [phone]     * Increase in Pain  * Temperature over 101  * Increase in

## 2025-03-24 NOTE — PLAN OF CARE
Problem: Pain  Goal: Verbalizes/displays adequate comfort level or baseline comfort level  Outcome: Progressing     Problem: Cognitive:  Goal: Knowledge of wound care  Description: Knowledge of wound care  Outcome: Progressing  Goal: Understands risk factors for wounds  Description: Understands risk factors for wounds  Outcome: Progressing     Problem: Falls - Risk of:  Goal: Will remain free from falls  Description: Will remain free from falls  Outcome: Progressing
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
## 2025-04-01 ENCOUNTER — HOSPITAL ENCOUNTER (OUTPATIENT)
Dept: WOUND CARE | Age: 70
Discharge: HOME | End: 2025-04-01
Payer: MEDICARE

## 2025-04-01 VITALS
BODY MASS INDEX: 29.49 KG/M2 | WEIGHT: 206 LBS | HEIGHT: 70 IN | HEART RATE: 77 BPM | RESPIRATION RATE: 20 BRPM | TEMPERATURE: 96.2 F | SYSTOLIC BLOOD PRESSURE: 158 MMHG | DIASTOLIC BLOOD PRESSURE: 90 MMHG

## 2025-04-01 DIAGNOSIS — T23.331D: Primary | ICD-10-CM

## 2025-04-01 DIAGNOSIS — T23.039A BURN OF MULTIPLE FINGERS: ICD-10-CM

## 2025-04-01 PROCEDURE — 16020 DRESS/DEBRID P-THICK BURN S: CPT

## 2025-04-01 RX ORDER — MUPIROCIN 20 MG/G
OINTMENT TOPICAL PRN
OUTPATIENT
Start: 2025-04-01

## 2025-04-01 RX ORDER — SILVER SULFADIAZINE 10 MG/G
CREAM TOPICAL PRN
OUTPATIENT
Start: 2025-04-01

## 2025-04-01 RX ORDER — CLOBETASOL PROPIONATE 0.5 MG/G
OINTMENT TOPICAL PRN
OUTPATIENT
Start: 2025-04-01

## 2025-04-01 RX ORDER — LIDOCAINE HYDROCHLORIDE 20 MG/ML
JELLY TOPICAL PRN
OUTPATIENT
Start: 2025-04-01

## 2025-04-01 RX ORDER — LIDOCAINE 40 MG/G
CREAM TOPICAL PRN
OUTPATIENT
Start: 2025-04-01

## 2025-04-01 RX ORDER — GINSENG 100 MG
CAPSULE ORAL PRN
OUTPATIENT
Start: 2025-04-01

## 2025-04-01 RX ORDER — BETAMETHASONE DIPROPIONATE 0.5 MG/G
CREAM TOPICAL PRN
OUTPATIENT
Start: 2025-04-01

## 2025-04-01 RX ORDER — LIDOCAINE 50 MG/G
OINTMENT TOPICAL PRN
OUTPATIENT
Start: 2025-04-01

## 2025-04-01 RX ORDER — NEOMYCIN/BACITRACIN/POLYMYXINB 3.5-400-5K
OINTMENT (GRAM) TOPICAL PRN
OUTPATIENT
Start: 2025-04-01

## 2025-04-01 RX ORDER — LIDOCAINE HYDROCHLORIDE 40 MG/ML
SOLUTION TOPICAL PRN
Status: DISCONTINUED | OUTPATIENT
Start: 2025-04-01 | End: 2025-04-02 | Stop reason: HOSPADM

## 2025-04-01 RX ORDER — LIDOCAINE HYDROCHLORIDE 40 MG/ML
SOLUTION TOPICAL PRN
OUTPATIENT
Start: 2025-04-01

## 2025-04-01 RX ORDER — SODIUM CHLOR/HYPOCHLOROUS ACID 0.033 %
SOLUTION, IRRIGATION IRRIGATION PRN
OUTPATIENT
Start: 2025-04-01

## 2025-04-01 RX ORDER — TRIAMCINOLONE ACETONIDE 1 MG/G
OINTMENT TOPICAL PRN
OUTPATIENT
Start: 2025-04-01

## 2025-04-01 RX ORDER — GENTAMICIN SULFATE 1 MG/G
OINTMENT TOPICAL PRN
OUTPATIENT
Start: 2025-04-01

## 2025-04-01 RX ORDER — BACITRACIN ZINC AND POLYMYXIN B SULFATE 500; 1000 [USP'U]/G; [USP'U]/G
OINTMENT TOPICAL PRN
OUTPATIENT
Start: 2025-04-01

## 2025-04-01 RX ADMIN — LIDOCAINE HYDROCHLORIDE 5 ML: 40 SOLUTION TOPICAL at 13:07

## 2025-04-01 ASSESSMENT — PAIN - FUNCTIONAL ASSESSMENT
PAIN_FUNCTIONAL_ASSESSMENT: ACTIVITIES ARE NOT PREVENTED
PAIN_FUNCTIONAL_ASSESSMENT: PREVENTS OR INTERFERES SOME ACTIVE ACTIVITIES AND ADLS

## 2025-04-01 ASSESSMENT — PAIN DESCRIPTION - PAIN TYPE: TYPE: CHRONIC PAIN

## 2025-04-01 ASSESSMENT — PAIN DESCRIPTION - LOCATION: LOCATION: HAND

## 2025-04-01 ASSESSMENT — PAIN SCALES - GENERAL
PAINLEVEL_OUTOF10: 2
PAINLEVEL_OUTOF10: 2

## 2025-04-01 ASSESSMENT — PAIN DESCRIPTION - DESCRIPTORS: DESCRIPTORS: ACHING

## 2025-04-01 ASSESSMENT — PAIN DESCRIPTION - FREQUENCY: FREQUENCY: INTERMITTENT

## 2025-04-01 ASSESSMENT — PAIN DESCRIPTION - ONSET: ONSET: ON-GOING

## 2025-04-01 ASSESSMENT — PAIN DESCRIPTION - ORIENTATION: ORIENTATION: RIGHT

## 2025-04-01 NOTE — DISCHARGE INSTRUCTIONS
[facility] WOUND and HYPERBARIC TREATMENT  CENTER                                 Visit  Discharge Instructions / Physician Orders  DATE:4/125     Home Care:NA     SUPPLIES ORDERED THRU:                     DATE LAST SUPPLIED     Wound Location:  Right Hand  3rd finger     Cleanse with: Liquid antibacterial soap and water, rinse well      Dressing Orders:  Primary dressing  Apply Silvadine to wound( use adaptic in wound care)  Secondary dressing  cover with dry dressing                            apply in am. In the evening remove dressing wash hand thoroughly with Liquid antibacterial soap and water,rinse well then apply new layer of the cream apply new wrap   HOLD Silvadine cream for now- Start Triad cream to area apply daily Once Santyl ointment to wounds _Apply Nickel thickness to wound cover with normal saline moistened gauze cover with dry dressing-   Frequency:Daily     Additional Orders: Increase protein to diet (meat, cheese, eggs, fish, peanut butter, nuts and beans)  Multivitamin daily  Elevate legs if swollen or wearing compression when sitting  Obtain Squeezy Ball  OFFLOADING [] YES  TYPE:                  [x] NA     Weekly wound care visits until determined otherwise.     Antibiotic therapy-wound care related YES [] NO [x] NA[]  DRUG- [facility]vadine cream to [facility] [place]                                                            Duration-             Script sent to-                      on (date)  MY CHART []     Smart Device  []      HYPERBARIC TREATMENT-                TREATMENT #                          Your next appointment with the Wound Care Center is in 1 week                                                                                                   (Please note your next appointment above and if you are unable to keep, kindly give a 24 hour notice. Thank you.)  If more than 15 min late we cannot guarantee you will be seen due to clinician schedule     Per Policy,  3 or more
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
## 2025-04-07 NOTE — DISCHARGE INSTRUCTIONS
drainage from your wound  * Drainage with a foul odor  * Bleeding  * Increase in swelling  * Need for compression bandage changes due to slippage, breakthrough drainage.     If you need medical attention outside of the business hours of the Wound Care Centers please contact your PCP or go to the nearest emergency room.     The information contained in the After Visit Summary has been reviewed with me, the patient and/or responsible adult, by my health care provider(s). I had the opportunity to ask questions regarding this information. I have elected to receive;      []After Visit Summary  [x]Comprehensive Discharge Instruction        Patient signature______________________________________Date:_______  Electronically signed by Deandre Schuler RN on 4/8/2025 at 2:05 PM   Electronically signed by Celeste Prince MD on 4/8/25 at 2:21 PM EDT

## 2025-04-08 ENCOUNTER — HOSPITAL ENCOUNTER (OUTPATIENT)
Dept: WOUND CARE | Age: 70
Discharge: HOME | End: 2025-04-08
Payer: MEDICARE

## 2025-04-08 VITALS
DIASTOLIC BLOOD PRESSURE: 84 MMHG | TEMPERATURE: 97.3 F | RESPIRATION RATE: 20 BRPM | HEART RATE: 84 BPM | SYSTOLIC BLOOD PRESSURE: 147 MMHG

## 2025-04-08 DIAGNOSIS — T23.039A BURN OF MULTIPLE FINGERS: Primary | ICD-10-CM

## 2025-04-08 PROCEDURE — 16020 DRESS/DEBRID P-THICK BURN S: CPT

## 2025-04-08 PROCEDURE — 16020 DRESS/DEBRID P-THICK BURN S: CPT | Performed by: SURGERY

## 2025-04-08 RX ORDER — TRIAMCINOLONE ACETONIDE 1 MG/G
OINTMENT TOPICAL PRN
OUTPATIENT
Start: 2025-04-08

## 2025-04-08 RX ORDER — LIDOCAINE HYDROCHLORIDE 40 MG/ML
SOLUTION TOPICAL PRN
OUTPATIENT
Start: 2025-04-08

## 2025-04-08 RX ORDER — LIDOCAINE HYDROCHLORIDE 20 MG/ML
JELLY TOPICAL PRN
OUTPATIENT
Start: 2025-04-08

## 2025-04-08 RX ORDER — BETAMETHASONE DIPROPIONATE 0.5 MG/G
CREAM TOPICAL PRN
OUTPATIENT
Start: 2025-04-08

## 2025-04-08 RX ORDER — LIDOCAINE 40 MG/G
CREAM TOPICAL PRN
OUTPATIENT
Start: 2025-04-08

## 2025-04-08 RX ORDER — BACITRACIN ZINC AND POLYMYXIN B SULFATE 500; 1000 [USP'U]/G; [USP'U]/G
OINTMENT TOPICAL PRN
OUTPATIENT
Start: 2025-04-08

## 2025-04-08 RX ORDER — NEOMYCIN/BACITRACIN/POLYMYXINB 3.5-400-5K
OINTMENT (GRAM) TOPICAL PRN
OUTPATIENT
Start: 2025-04-08

## 2025-04-08 RX ORDER — LIDOCAINE HYDROCHLORIDE 40 MG/ML
SOLUTION TOPICAL PRN
Status: DISCONTINUED | OUTPATIENT
Start: 2025-04-08 | End: 2025-04-09 | Stop reason: HOSPADM

## 2025-04-08 RX ORDER — GINSENG 100 MG
CAPSULE ORAL PRN
OUTPATIENT
Start: 2025-04-08

## 2025-04-08 RX ORDER — SODIUM CHLOR/HYPOCHLOROUS ACID 0.033 %
SOLUTION, IRRIGATION IRRIGATION PRN
OUTPATIENT
Start: 2025-04-08

## 2025-04-08 RX ORDER — LIDOCAINE 50 MG/G
OINTMENT TOPICAL PRN
OUTPATIENT
Start: 2025-04-08

## 2025-04-08 RX ORDER — GENTAMICIN SULFATE 1 MG/G
OINTMENT TOPICAL PRN
OUTPATIENT
Start: 2025-04-08

## 2025-04-08 RX ORDER — SILVER SULFADIAZINE 10 MG/G
CREAM TOPICAL PRN
OUTPATIENT
Start: 2025-04-08

## 2025-04-08 RX ORDER — CLOBETASOL PROPIONATE 0.5 MG/G
OINTMENT TOPICAL PRN
OUTPATIENT
Start: 2025-04-08

## 2025-04-08 RX ORDER — MUPIROCIN 20 MG/G
OINTMENT TOPICAL PRN
OUTPATIENT
Start: 2025-04-08

## 2025-04-08 RX ADMIN — LIDOCAINE HYDROCHLORIDE 10 ML: 40 SOLUTION TOPICAL at 13:54

## 2025-04-08 ASSESSMENT — PAIN DESCRIPTION - DESCRIPTORS: DESCRIPTORS: BURNING

## 2025-04-08 ASSESSMENT — PAIN DESCRIPTION - ONSET: ONSET: ON-GOING

## 2025-04-08 ASSESSMENT — PAIN DESCRIPTION - FREQUENCY: FREQUENCY: INTERMITTENT

## 2025-04-08 ASSESSMENT — PAIN DESCRIPTION - ORIENTATION: ORIENTATION: RIGHT

## 2025-04-08 ASSESSMENT — PAIN DESCRIPTION - LOCATION: LOCATION: HEAD

## 2025-04-08 ASSESSMENT — PAIN SCALES - GENERAL: PAINLEVEL_OUTOF10: 1
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
## 2025-04-15 ENCOUNTER — OFFICE VISIT (OUTPATIENT)
Dept: BURN CARE | Age: 70
End: 2025-04-15
Payer: MEDICARE

## 2025-04-15 VITALS
BODY MASS INDEX: 27.44 KG/M2 | DIASTOLIC BLOOD PRESSURE: 84 MMHG | HEIGHT: 71 IN | HEART RATE: 66 BPM | SYSTOLIC BLOOD PRESSURE: 117 MMHG | WEIGHT: 196 LBS

## 2025-04-15 DIAGNOSIS — T23.039A BURN OF MULTIPLE FINGERS: Primary | ICD-10-CM

## 2025-04-15 PROCEDURE — 99213 OFFICE O/P EST LOW 20 MIN: CPT | Performed by: NURSE PRACTITIONER

## 2025-04-15 PROCEDURE — 1160F RVW MEDS BY RX/DR IN RCRD: CPT | Performed by: NURSE PRACTITIONER

## 2025-04-15 PROCEDURE — 1159F MED LIST DOCD IN RCRD: CPT | Performed by: NURSE PRACTITIONER

## 2025-04-15 PROCEDURE — 1123F ACP DISCUSS/DSCN MKR DOCD: CPT | Performed by: NURSE PRACTITIONER

## 2025-04-15 RX ORDER — SILVER SULFADIAZINE 10 MG/G
CREAM TOPICAL ONCE
Status: COMPLETED | OUTPATIENT
Start: 2025-04-15 | End: 2025-04-15

## 2025-04-15 RX ORDER — GABAPENTIN 100 MG/1
100 CAPSULE ORAL 2 TIMES DAILY
Qty: 60 CAPSULE | Refills: 0 | Status: SHIPPED | OUTPATIENT
Start: 2025-04-15 | End: 2025-05-15

## 2025-04-15 RX ADMIN — SILVER SULFADIAZINE: 10 CREAM TOPICAL at 11:01

## 2025-04-15 ASSESSMENT — ENCOUNTER SYMPTOMS: RESPIRATORY NEGATIVE: 1
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
## 2025-04-16 NOTE — PROGRESS NOTES
Patient presents in clinic today for evaluation of slow healing burns. Patient has slow healing burns to the right middle and ring fingers.

## 2025-04-30 ENCOUNTER — OFFICE VISIT (OUTPATIENT)
Dept: PODIATRY | Age: 70
End: 2025-04-30
Payer: MEDICARE

## 2025-04-30 VITALS — BODY MASS INDEX: 27.44 KG/M2 | WEIGHT: 196 LBS | HEIGHT: 71 IN

## 2025-04-30 DIAGNOSIS — E11.69 DIABETIC FOOT ULCER WITH OSTEOMYELITIS (HCC): Primary | ICD-10-CM

## 2025-04-30 DIAGNOSIS — M86.9 DIABETIC FOOT ULCER WITH OSTEOMYELITIS (HCC): Primary | ICD-10-CM

## 2025-04-30 DIAGNOSIS — L97.513 RIGHT FOOT ULCER, WITH NECROSIS OF MUSCLE (HCC): ICD-10-CM

## 2025-04-30 DIAGNOSIS — E11.621 DIABETIC FOOT ULCER WITH OSTEOMYELITIS (HCC): Primary | ICD-10-CM

## 2025-04-30 DIAGNOSIS — Z89.419 HISTORY OF AMPUTATION OF GREAT TOE: ICD-10-CM

## 2025-04-30 DIAGNOSIS — E11.51 TYPE II DIABETES MELLITUS WITH PERIPHERAL CIRCULATORY DISORDER (HCC): ICD-10-CM

## 2025-04-30 DIAGNOSIS — L97.509 DIABETIC FOOT ULCER WITH OSTEOMYELITIS (HCC): Primary | ICD-10-CM

## 2025-04-30 DIAGNOSIS — I73.9 PVD (PERIPHERAL VASCULAR DISEASE): ICD-10-CM

## 2025-04-30 PROCEDURE — 11043 DBRDMT MUSC&/FSCA 1ST 20/<: CPT | Performed by: PODIATRIST

## 2025-05-06 ENCOUNTER — TELEPHONE (OUTPATIENT)
Dept: BURN CARE | Age: 70
End: 2025-05-06

## 2025-05-06 ENCOUNTER — OFFICE VISIT (OUTPATIENT)
Age: 70
End: 2025-05-06
Payer: MEDICARE

## 2025-05-06 VITALS
SYSTOLIC BLOOD PRESSURE: 160 MMHG | BODY MASS INDEX: 28.6 KG/M2 | HEART RATE: 82 BPM | DIASTOLIC BLOOD PRESSURE: 101 MMHG | WEIGHT: 199.8 LBS | HEIGHT: 70 IN

## 2025-05-06 DIAGNOSIS — T23.039A BURN OF MULTIPLE FINGERS: Primary | ICD-10-CM

## 2025-05-06 DIAGNOSIS — I96 FINGER NECROSIS (HCC): ICD-10-CM

## 2025-05-06 PROCEDURE — 1160F RVW MEDS BY RX/DR IN RCRD: CPT | Performed by: NURSE PRACTITIONER

## 2025-05-06 PROCEDURE — 1159F MED LIST DOCD IN RCRD: CPT | Performed by: NURSE PRACTITIONER

## 2025-05-06 PROCEDURE — 99214 OFFICE O/P EST MOD 30 MIN: CPT | Performed by: NURSE PRACTITIONER

## 2025-05-06 PROCEDURE — 1123F ACP DISCUSS/DSCN MKR DOCD: CPT | Performed by: NURSE PRACTITIONER

## 2025-05-06 ASSESSMENT — ENCOUNTER SYMPTOMS
SHORTNESS OF BREATH: 0
COUGH: 0
GASTROINTESTINAL NEGATIVE: 1
RESPIRATORY NEGATIVE: 1
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
## 2025-05-06 NOTE — PROGRESS NOTES
[facility]YSICIANS [facility] PODIATRY  [address] [place], [place] [id number]  Dept: [phone]    RETURN PATIENT PROGRESS NOTE  Date of patient's visit: 11/11/2024  Patient's Name:  Asher Ayon YOB: 1955            Patient Care Team:  Kevin Herrera MD as PCP - General (Family Medicine)  Eris Xiong DPM as Physician (Podiatry)  Eris Xiong DPM as Consulting Physician (Podiatry, Foot & Ankle Surgery)       Asher Ayon 69 y.o. male that presents for follow-up of   Chief Complaint   Patient presents with    Diabetes    Wound Check     Right foot     Pt's primary care physician is Kevin Herrera MD last seen may 16 2024  Symptoms began 3 month(s) ago and are decreased .  Patient relates pain is mild .  Pain is rated 2 out of 10 and is described as intermittent.  Treatments prior to today's visit include: previous podiatry treatment.  Currently denies F/C/N/V.     Patient states he does not feel his feet or his legs, but states his wound has stopped draining  States that he has been putting Bactroban to that area and covering it with a Band-Aid.  Patient has not been using his diabetic shoes    Allergies   Allergen Reactions    Doxycycline Other (See Comments)     Skin peeling    Pcn [Penicillins] Rash    Penicillin G Rash       Past Medical History:   Diagnosis Date    Atrial fibrillation (HCC)     CAD (coronary artery disease) 2010    s/p CABG- no stents    Cardiomyopathy (HCC)     Cellulitis     CHF (congestive heart failure) (HCC)     CKD (chronic kidney disease)     Diabetes mellitus (HCC)     Foot infection     Heart failure (HCC)     decreased EF    Hyperlipidemia     Hypertension     MRSA (methicillin resistant staph aureus) culture positive     foot    Osteomyelitis (HCC)     PVD (peripheral vascular disease)     Wound, open, foot     left foot       Prior to Admission medications    Medication Sig Start Date

## 2025-05-06 NOTE — TELEPHONE ENCOUNTER
Patient called in stating he has a pacemaker and defibrillator. Wanting to advise before he proceeds with scheduling any surgery  Please return his call  Thank you

## 2025-05-07 ENCOUNTER — TELEPHONE (OUTPATIENT)
Age: 70
End: 2025-05-07

## 2025-05-07 NOTE — TELEPHONE ENCOUNTER
Patient is scheduled for surgery on 5/27 at 11:30 AM and pre admit testing on 5/14 at 2:00 PM.  Talked to patient and gave him the date, time and instructions.  Patient confirmed and verbalized understanding.  Letter mailed out today.  Cardiac clearance form sent to [facility] cardiology.

## 2025-05-08 RX ORDER — SODIUM CHLORIDE, SODIUM LACTATE, POTASSIUM CHLORIDE, CALCIUM CHLORIDE 600; 310; 30; 20 MG/100ML; MG/100ML; MG/100ML; MG/100ML
INJECTION, SOLUTION INTRAVENOUS CONTINUOUS
OUTPATIENT
Start: 2025-05-08

## 2025-05-08 NOTE — DISCHARGE INSTRUCTIONS
Pre-operative Instructions    Please arrive at the surgery center by 9:30 AM on 5/27/2025  (or as directed by your surgeon's office). See Directons to Surgery Center below.                FASTING    NOTHING TO EAT OR DRINK AFTER MIDNIGHT the night prior to surgery (This includes gum, candy, mints, chewing tobacco, etc).                 MEDICATIONS    What to STOP: ANY BLOOD THINNING MEDICATION(S) as directed by your surgeon or prescribing physician.  FAILURE TO STOP CERTAIN MEDICATIONS MAY INTERFERE WITH YOUR SCHEDULED SURGERY.      According to the medication list you provided today, PLEASE STOP: ASPIRIN AND COUMADIN, AS DIRECTED BY YOUR PRESCRIBING PROVIDER; voltaren    2. What to CONTINUE leading up to your surgery:   Please take all your other daily medications except the medications listed above that you were instructed to hold.    3. What to TAKE MORNING OF SURGERY with SMALL SIP OF WATER: amlodipine (Norvasc), carvedilol (Coreg), gabapentin (Neurontin), norco if needed.    Hold benazepril 24 hours prior to surgery.     PLEASE NOTE: ANY \"STOPPED\" MEDICATIONS MAY BE DUE TO CLEANING UP MEDICATION LIST DURING THIS VISIT.                        IF APPLICABLE:  -If you have been given a blood band, you must bring it with you the day of surgery, unclasped.  -Use routine inhalers and bring inhalers the day of surgery.   -Bring C-Pap/Bi-pap with you morning of surgery if planning on staying in the hospital overnight.  -Do not take diabetic medications on the day of surgery.  -Any weekly antidiabetic injections must be stopped one week prior to surgery and plan discussed with prescribing provider.                             OTHER IMPORTANT REMINDERS    1) You may be required to provide a urine sample upon your arrival to the pre-op area, so please take this into consideration.     2) If  NOT planning on staying in the hospital overnight :    A.You will need an adult family member /friend to drive you home after

## 2025-05-12 ENCOUNTER — TELEPHONE (OUTPATIENT)
Age: 70
End: 2025-05-12

## 2025-05-12 NOTE — TELEPHONE ENCOUNTER
Lovely from [facility] called, wanting to know if patient was to hold his Coumadin prior to surgery, and if he needed to be bridged with Lovenox? Call her at [phone]

## 2025-05-14 ENCOUNTER — HOSPITAL ENCOUNTER (OUTPATIENT)
Dept: PREADMISSION TESTING | Age: 70
Discharge: HOME | End: 2025-05-14

## 2025-05-15 NOTE — DISCHARGE INSTRUCTIONS
Pre-operative Instructions    Please arrive at the surgery center by 9:30 AM on 5/27/2025  (or as directed by your surgeon's office). See Directons to Surgery Center below.                FASTING    NOTHING TO EAT OR DRINK AFTER MIDNIGHT the night prior to surgery (This includes gum, candy, mints, chewing tobacco, etc).                 MEDICATIONS    What to STOP: ANY BLOOD THINNING MEDICATION(S) as directed by your surgeon or prescribing physician.  FAILURE TO STOP CERTAIN MEDICATIONS MAY INTERFERE WITH YOUR SCHEDULED SURGERY.      According to the medication list you provided today, PLEASE STOP ASPIRIN 7 DAYS PRIOR TO SURGERY AND STOP COUMADIN 5 DAYS PRIOR TO SURGERY; voltaren    2. What to CONTINUE leading up to your surgery:   Please take all your other daily medications except the medications listed above that you were instructed to hold.    3. What to TAKE MORNING OF SURGERY with SMALL SIP OF WATER: amlodipine (Norvasc), carvedilol (Coreg), gabapentin (Neurontin), norco if needed    Hold benazepril 24 hours prior to surgery.     PLEASE NOTE: ANY \"STOPPED\" MEDICATIONS MAY BE DUE TO CLEANING UP MEDICATION LIST DURING THIS VISIT.                        IF APPLICABLE:  -If you have been given a blood band, you must bring it with you the day of surgery, unclasped.  -Use routine inhalers and bring inhalers the day of surgery.   -Bring C-Pap/Bi-pap with you morning of surgery if planning on staying in the hospital overnight.  -Do not take diabetic medications on the day of surgery.  -Any weekly antidiabetic injections must be stopped one week prior to surgery and plan discussed with prescribing provider.                             OTHER IMPORTANT REMINDERS    1) You may be required to provide a urine sample upon your arrival to the pre-op area, so please take this into consideration.     2) If  NOT planning on staying in the hospital overnight :    A.You will need an adult family member /friend to drive you home  surgery.      If you have any other questions regarding your procedure/surgery please call  your surgeon's office.     If you have a last minute question(s) the DAY OF your surgery, you may call [phone]

## 2025-05-16 ENCOUNTER — HOSPITAL ENCOUNTER (OUTPATIENT)
Dept: PREADMISSION TESTING | Age: 70
Discharge: HOME | End: 2025-05-20
Payer: MEDICARE

## 2025-05-16 VITALS
BODY MASS INDEX: 26.6 KG/M2 | WEIGHT: 190 LBS | HEIGHT: 71 IN | TEMPERATURE: 97.5 F | DIASTOLIC BLOOD PRESSURE: 85 MMHG | RESPIRATION RATE: 18 BRPM | SYSTOLIC BLOOD PRESSURE: 136 MMHG | OXYGEN SATURATION: 95 % | HEART RATE: 75 BPM

## 2025-05-16 LAB
ANION GAP SERPL CALCULATED.3IONS-SCNC: 12 MMOL/L (ref 9–16)
BUN SERPL-MCNC: 36 MG/DL (ref 8–23)
CALCIUM SERPL-MCNC: 9.4 MG/DL (ref 8.6–10.4)
CHLORIDE SERPL-SCNC: 101 MMOL/L (ref 98–107)
CO2 SERPL-SCNC: 23 MMOL/L (ref 20–31)
CREAT SERPL-MCNC: 1.9 MG/DL (ref 0.7–1.2)
ERYTHROCYTE [DISTWIDTH] IN BLOOD BY AUTOMATED COUNT: 13.9 % (ref 11.8–14.4)
GFR, ESTIMATED: 37 ML/MIN/1.73M2
GLUCOSE SERPL-MCNC: 203 MG/DL (ref 74–99)
HCT VFR BLD AUTO: 36.1 % (ref 40.7–50.3)
HGB BLD-MCNC: 12.6 G/DL (ref 13–17)
INR PPP: 2.9
MCH RBC QN AUTO: 32.2 PG (ref 25.2–33.5)
MCHC RBC AUTO-ENTMCNC: 34.9 G/DL (ref 28.4–34.8)
MCV RBC AUTO: 92.3 FL (ref 82.6–102.9)
NRBC BLD-RTO: 0 PER 100 WBC
PLATELET # BLD AUTO: 241 K/UL (ref 138–453)
PMV BLD AUTO: 10 FL (ref 8.1–13.5)
POTASSIUM SERPL-SCNC: 3.9 MMOL/L (ref 3.7–5.3)
PROTHROMBIN TIME: 31.2 SEC (ref 11.7–14.9)
RBC # BLD AUTO: 3.91 M/UL (ref 4.21–5.77)
SODIUM SERPL-SCNC: 136 MMOL/L (ref 136–145)
WBC OTHER # BLD: 6.9 K/UL (ref 3.5–11.3)

## 2025-05-16 PROCEDURE — 36415 COLL VENOUS BLD VENIPUNCTURE: CPT

## 2025-05-16 PROCEDURE — 80048 BASIC METABOLIC PNL TOTAL CA: CPT

## 2025-05-16 PROCEDURE — 93005 ELECTROCARDIOGRAM TRACING: CPT | Performed by: ANESTHESIOLOGY

## 2025-05-16 PROCEDURE — 85027 COMPLETE CBC AUTOMATED: CPT

## 2025-05-16 PROCEDURE — 85610 PROTHROMBIN TIME: CPT

## 2025-05-16 RX ORDER — GABAPENTIN 300 MG/1
300 CAPSULE ORAL 2 TIMES DAILY PRN
COMMUNITY
Start: 2025-04-25

## 2025-05-16 RX ORDER — PIOGLITAZONE 15 MG/1
15 TABLET ORAL DAILY
COMMUNITY
Start: 2025-03-20

## 2025-05-16 NOTE — PROGRESS NOTES
RN verified with Dr Kuo, anesthesiologist,  that anesthesia is ok if pt stays on coumadin as recommended per cardiology and ok'd with Dr Duran. Writer phoned pt and left message on answering machine as instructed prior to his leaving PAT appointment.

## 2025-05-17 LAB
EKG ATRIAL RATE: 78 BPM
EKG Q-T INTERVAL: 386 MS
EKG QRS DURATION: 92 MS
EKG QTC CALCULATION (BAZETT): 445 MS
EKG R AXIS: 82 DEGREES
EKG T AXIS: 36 DEGREES
EKG VENTRICULAR RATE: 80 BPM

## 2025-05-27 ENCOUNTER — ANESTHESIA EVENT (OUTPATIENT)
Dept: OPERATING ROOM | Age: 70
End: 2025-05-27
Payer: MEDICARE

## 2025-05-27 ENCOUNTER — ANESTHESIA (OUTPATIENT)
Dept: OPERATING ROOM | Age: 70
End: 2025-05-27
Payer: MEDICARE

## 2025-05-27 ENCOUNTER — HOSPITAL ENCOUNTER (OUTPATIENT)
Age: 70
Setting detail: OUTPATIENT SURGERY
Discharge: HOME | End: 2025-05-27
Attending: PLASTIC SURGERY | Admitting: PLASTIC SURGERY
Payer: MEDICARE

## 2025-05-27 VITALS
BODY MASS INDEX: 28 KG/M2 | WEIGHT: 200 LBS | RESPIRATION RATE: 16 BRPM | DIASTOLIC BLOOD PRESSURE: 75 MMHG | TEMPERATURE: 97.5 F | OXYGEN SATURATION: 98 % | HEART RATE: 66 BPM | HEIGHT: 71 IN | SYSTOLIC BLOOD PRESSURE: 162 MMHG

## 2025-05-27 DIAGNOSIS — G89.18 POST-OP PAIN: ICD-10-CM

## 2025-05-27 DIAGNOSIS — T30.0 BURN: ICD-10-CM

## 2025-05-27 DIAGNOSIS — T23.039A BURN OF MULTIPLE FINGERS: Primary | ICD-10-CM

## 2025-05-27 LAB
GLUCOSE BLD-MCNC: 138 MG/DL (ref 75–110)
GLUCOSE BLD-MCNC: 143 MG/DL (ref 74–100)
INR PPP: 2.5
POTASSIUM BLD-SCNC: 3.9 MMOL/L (ref 3.5–4.5)
PROTHROMBIN TIME: 28.4 SEC (ref 11.7–14.9)

## 2025-05-27 PROCEDURE — 6370000000 HC RX 637 (ALT 250 FOR IP): Performed by: PLASTIC SURGERY

## 2025-05-27 PROCEDURE — 6360000002 HC RX W HCPCS: Performed by: PLASTIC SURGERY

## 2025-05-27 PROCEDURE — 88311 DECALCIFY TISSUE: CPT

## 2025-05-27 PROCEDURE — 85610 PROTHROMBIN TIME: CPT

## 2025-05-27 PROCEDURE — 2500000003 HC RX 250 WO HCPCS: Performed by: PLASTIC SURGERY

## 2025-05-27 PROCEDURE — 2500000003 HC RX 250 WO HCPCS

## 2025-05-27 PROCEDURE — 6360000002 HC RX W HCPCS

## 2025-05-27 PROCEDURE — 6360000002 HC RX W HCPCS: Performed by: ANESTHESIOLOGY

## 2025-05-27 PROCEDURE — 82947 ASSAY GLUCOSE BLOOD QUANT: CPT

## 2025-05-27 PROCEDURE — 3600000004 HC SURGERY LEVEL 4 BASE: Performed by: PLASTIC SURGERY

## 2025-05-27 PROCEDURE — 3700000000 HC ANESTHESIA ATTENDED CARE: Performed by: PLASTIC SURGERY

## 2025-05-27 PROCEDURE — 6370000000 HC RX 637 (ALT 250 FOR IP): Performed by: ANESTHESIOLOGY

## 2025-05-27 PROCEDURE — 3700000001 HC ADD 15 MINUTES (ANESTHESIA): Performed by: PLASTIC SURGERY

## 2025-05-27 PROCEDURE — 7100000011 HC PHASE II RECOVERY - ADDTL 15 MIN: Performed by: PLASTIC SURGERY

## 2025-05-27 PROCEDURE — C9363 INTEGRA MESHED BIL WOUND MAT: HCPCS | Performed by: PLASTIC SURGERY

## 2025-05-27 PROCEDURE — 84132 ASSAY OF SERUM POTASSIUM: CPT

## 2025-05-27 PROCEDURE — 88305 TISSUE EXAM BY PATHOLOGIST: CPT

## 2025-05-27 PROCEDURE — 7100000001 HC PACU RECOVERY - ADDTL 15 MIN: Performed by: PLASTIC SURGERY

## 2025-05-27 PROCEDURE — 7100000010 HC PHASE II RECOVERY - FIRST 15 MIN: Performed by: PLASTIC SURGERY

## 2025-05-27 PROCEDURE — 3600000014 HC SURGERY LEVEL 4 ADDTL 15MIN: Performed by: PLASTIC SURGERY

## 2025-05-27 PROCEDURE — 2709999900 HC NON-CHARGEABLE SUPPLY: Performed by: PLASTIC SURGERY

## 2025-05-27 PROCEDURE — 2580000003 HC RX 258: Performed by: ANESTHESIOLOGY

## 2025-05-27 PROCEDURE — 7100000000 HC PACU RECOVERY - FIRST 15 MIN: Performed by: PLASTIC SURGERY

## 2025-05-27 PROCEDURE — 36415 COLL VENOUS BLD VENIPUNCTURE: CPT

## 2025-05-27 DEVICE — INTEGRA® MESHED BILAYER WOUND MATRIX 2 IN*2 IN (5 CM*5 CM)
Type: IMPLANTABLE DEVICE | Status: FUNCTIONAL
Brand: INTEGRA®

## 2025-05-27 RX ORDER — METOCLOPRAMIDE HYDROCHLORIDE 5 MG/ML
10 INJECTION INTRAMUSCULAR; INTRAVENOUS
Status: DISCONTINUED | OUTPATIENT
Start: 2025-05-27 | End: 2025-05-27 | Stop reason: HOSPADM

## 2025-05-27 RX ORDER — OXYCODONE AND ACETAMINOPHEN 5; 325 MG/1; MG/1
1 TABLET ORAL ONCE
Status: COMPLETED | OUTPATIENT
Start: 2025-05-27 | End: 2025-05-27

## 2025-05-27 RX ORDER — HYDRALAZINE HYDROCHLORIDE 20 MG/ML
10 INJECTION INTRAMUSCULAR; INTRAVENOUS ONCE
Status: COMPLETED | OUTPATIENT
Start: 2025-05-27 | End: 2025-05-27

## 2025-05-27 RX ORDER — PROPOFOL 10 MG/ML
INJECTION, EMULSION INTRAVENOUS
Status: DISCONTINUED | OUTPATIENT
Start: 2025-05-27 | End: 2025-05-27 | Stop reason: SDUPTHER

## 2025-05-27 RX ORDER — GINSENG 100 MG
CAPSULE ORAL PRN
Status: DISCONTINUED | OUTPATIENT
Start: 2025-05-27 | End: 2025-05-27 | Stop reason: HOSPADM

## 2025-05-27 RX ORDER — SODIUM CHLORIDE, SODIUM LACTATE, POTASSIUM CHLORIDE, CALCIUM CHLORIDE 600; 310; 30; 20 MG/100ML; MG/100ML; MG/100ML; MG/100ML
INJECTION, SOLUTION INTRAVENOUS CONTINUOUS
Status: DISCONTINUED | OUTPATIENT
Start: 2025-05-27 | End: 2025-05-27 | Stop reason: HOSPADM

## 2025-05-27 RX ORDER — MAGNESIUM HYDROXIDE 1200 MG/15ML
LIQUID ORAL CONTINUOUS PRN
Status: DISCONTINUED | OUTPATIENT
Start: 2025-05-27 | End: 2025-05-27 | Stop reason: HOSPADM

## 2025-05-27 RX ORDER — NALOXONE HYDROCHLORIDE 0.4 MG/ML
INJECTION, SOLUTION INTRAMUSCULAR; INTRAVENOUS; SUBCUTANEOUS PRN
Status: DISCONTINUED | OUTPATIENT
Start: 2025-05-27 | End: 2025-05-27 | Stop reason: HOSPADM

## 2025-05-27 RX ORDER — BUPIVACAINE HYDROCHLORIDE 2.5 MG/ML
INJECTION, SOLUTION INFILTRATION; PERINEURAL PRN
Status: DISCONTINUED | OUTPATIENT
Start: 2025-05-27 | End: 2025-05-27 | Stop reason: HOSPADM

## 2025-05-27 RX ORDER — FENTANYL CITRATE 50 UG/ML
25 INJECTION, SOLUTION INTRAMUSCULAR; INTRAVENOUS EVERY 5 MIN PRN
Status: DISCONTINUED | OUTPATIENT
Start: 2025-05-27 | End: 2025-05-27 | Stop reason: HOSPADM

## 2025-05-27 RX ORDER — FENTANYL CITRATE 50 UG/ML
INJECTION, SOLUTION INTRAMUSCULAR; INTRAVENOUS
Status: DISCONTINUED | OUTPATIENT
Start: 2025-05-27 | End: 2025-05-27 | Stop reason: SDUPTHER

## 2025-05-27 RX ORDER — ONDANSETRON 2 MG/ML
INJECTION INTRAMUSCULAR; INTRAVENOUS
Status: DISCONTINUED | OUTPATIENT
Start: 2025-05-27 | End: 2025-05-27 | Stop reason: SDUPTHER

## 2025-05-27 RX ORDER — ROCURONIUM BROMIDE 10 MG/ML
INJECTION, SOLUTION INTRAVENOUS
Status: DISCONTINUED | OUTPATIENT
Start: 2025-05-27 | End: 2025-05-27 | Stop reason: SDUPTHER

## 2025-05-27 RX ORDER — CEFAZOLIN SODIUM 1 G/3ML
INJECTION, POWDER, FOR SOLUTION INTRAMUSCULAR; INTRAVENOUS
Status: DISCONTINUED | OUTPATIENT
Start: 2025-05-27 | End: 2025-05-27 | Stop reason: SDUPTHER

## 2025-05-27 RX ORDER — LABETALOL HYDROCHLORIDE 5 MG/ML
10 INJECTION, SOLUTION INTRAVENOUS
Status: COMPLETED | OUTPATIENT
Start: 2025-05-27 | End: 2025-05-27

## 2025-05-27 RX ORDER — OXYCODONE AND ACETAMINOPHEN 5; 325 MG/1; MG/1
1 TABLET ORAL EVERY 6 HOURS PRN
Qty: 28 TABLET | Refills: 0 | Status: SHIPPED | OUTPATIENT
Start: 2025-05-27 | End: 2025-06-03

## 2025-05-27 RX ORDER — LIDOCAINE HYDROCHLORIDE 10 MG/ML
INJECTION, SOLUTION EPIDURAL; INFILTRATION; INTRACAUDAL; PERINEURAL
Status: DISCONTINUED | OUTPATIENT
Start: 2025-05-27 | End: 2025-05-27 | Stop reason: SDUPTHER

## 2025-05-27 RX ORDER — SODIUM CHLORIDE 0.9 % (FLUSH) 0.9 %
5-40 SYRINGE (ML) INJECTION EVERY 12 HOURS SCHEDULED
Status: DISCONTINUED | OUTPATIENT
Start: 2025-05-27 | End: 2025-05-27 | Stop reason: HOSPADM

## 2025-05-27 RX ORDER — PROCHLORPERAZINE EDISYLATE 5 MG/ML
5 INJECTION INTRAMUSCULAR; INTRAVENOUS
Status: DISCONTINUED | OUTPATIENT
Start: 2025-05-27 | End: 2025-05-27 | Stop reason: HOSPADM

## 2025-05-27 RX ORDER — CEPHALEXIN 500 MG/1
500 CAPSULE ORAL 3 TIMES DAILY
Qty: 21 CAPSULE | Refills: 0 | Status: SHIPPED | OUTPATIENT
Start: 2025-05-27 | End: 2025-06-03

## 2025-05-27 RX ORDER — DEXAMETHASONE SODIUM PHOSPHATE 10 MG/ML
INJECTION, SOLUTION INTRA-ARTICULAR; INTRALESIONAL; INTRAMUSCULAR; INTRAVENOUS; SOFT TISSUE
Status: DISCONTINUED | OUTPATIENT
Start: 2025-05-27 | End: 2025-05-27 | Stop reason: SDUPTHER

## 2025-05-27 RX ADMIN — Medication 10 MG: at 14:39

## 2025-05-27 RX ADMIN — LIDOCAINE HYDROCHLORIDE 50 MG: 10 INJECTION, SOLUTION EPIDURAL; INFILTRATION; INTRACAUDAL; PERINEURAL at 11:41

## 2025-05-27 RX ADMIN — CEFAZOLIN 2 G: 1 INJECTION, POWDER, FOR SOLUTION INTRAMUSCULAR; INTRAVENOUS at 11:53

## 2025-05-27 RX ADMIN — OXYCODONE HYDROCHLORIDE AND ACETAMINOPHEN 1 TABLET: 5; 325 TABLET ORAL at 14:45

## 2025-05-27 RX ADMIN — SODIUM CHLORIDE, POTASSIUM CHLORIDE, SODIUM LACTATE AND CALCIUM CHLORIDE: 600; 310; 30; 20 INJECTION, SOLUTION INTRAVENOUS at 11:05

## 2025-05-27 RX ADMIN — ROCURONIUM BROMIDE 50 MG: 10 INJECTION, SOLUTION INTRAVENOUS at 11:41

## 2025-05-27 RX ADMIN — FENTANYL CITRATE 25 MCG: 50 INJECTION, SOLUTION INTRAMUSCULAR; INTRAVENOUS at 12:27

## 2025-05-27 RX ADMIN — FENTANYL CITRATE 100 MCG: 50 INJECTION, SOLUTION INTRAMUSCULAR; INTRAVENOUS at 11:41

## 2025-05-27 RX ADMIN — Medication 10 MG: at 14:06

## 2025-05-27 RX ADMIN — PROPOFOL 150 MG: 10 INJECTION, EMULSION INTRAVENOUS at 11:41

## 2025-05-27 RX ADMIN — DEXAMETHASONE SODIUM PHOSPHATE 10 MG: 10 INJECTION INTRAMUSCULAR; INTRAVENOUS at 11:53

## 2025-05-27 RX ADMIN — HYDRALAZINE HYDROCHLORIDE 10 MG: 20 INJECTION INTRAMUSCULAR; INTRAVENOUS at 14:56

## 2025-05-27 RX ADMIN — ONDANSETRON 4 MG: 2 INJECTION, SOLUTION INTRAMUSCULAR; INTRAVENOUS at 11:54

## 2025-05-27 RX ADMIN — FENTANYL CITRATE 25 MCG: 50 INJECTION, SOLUTION INTRAMUSCULAR; INTRAVENOUS at 12:01

## 2025-05-27 RX ADMIN — SUGAMMADEX 200 MG: 100 INJECTION, SOLUTION INTRAVENOUS at 12:46

## 2025-05-27 RX ADMIN — FENTANYL CITRATE 25 MCG: 50 INJECTION, SOLUTION INTRAMUSCULAR; INTRAVENOUS at 12:05

## 2025-05-27 ASSESSMENT — PAIN SCALES - GENERAL
PAINLEVEL_OUTOF10: 3
PAINLEVEL_OUTOF10: 4
PAINLEVEL_OUTOF10: 3
PAINLEVEL_OUTOF10: 3
PAINLEVEL_OUTOF10: 4
PAINLEVEL_OUTOF10: 3
PAINLEVEL_OUTOF10: 3

## 2025-05-27 ASSESSMENT — PAIN DESCRIPTION - ORIENTATION: ORIENTATION: RIGHT

## 2025-05-27 ASSESSMENT — PAIN DESCRIPTION - DESCRIPTORS: DESCRIPTORS: ACHING

## 2025-05-27 ASSESSMENT — PAIN - FUNCTIONAL ASSESSMENT
PAIN_FUNCTIONAL_ASSESSMENT: 0-10
PAIN_FUNCTIONAL_ASSESSMENT: ADULT NONVERBAL PAIN SCALE (NPVS)

## 2025-05-27 ASSESSMENT — PAIN DESCRIPTION - LOCATION: LOCATION: HAND
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
## 2025-05-27 NOTE — DISCHARGE INSTRUCTIONS
Orthopaedic Instructions:  -Weight bearing status: Non weight bearing with the right arm  -Do not remove dressings until your post-operative follow up visit.  -Always look for signs of compartment syndrome: pain out of proportion to the injury, pain not controlled with pain medication, numbness in digits, changing of color of digits (paleness). If these signs occur return to ED immediately for reassessment.  -Always work on finger motion (to non-injured fingers) to decrease swelling.  -Ice (20 minutes on and off 1 hour) and elevate above the level of the heart to reduce swelling and throbbing pain.  -Should urinate within 8 hours of surgery.  -Call the office or come to Emergency Room if signs of infection appear (hot, swollen, red, draining pus, fever).  -Take medications as prescribed.  -Wean off narcotics (percocet/norco) as soon as possible. Do not take tylenol if still taking narcotics.  -Follow up with Dr. Duran in his office 7-10 days after surgery. Call [phone] to schedule/confirm or with any questions/concerns.      No alcoholic beverages, no driving or operating machinery, no making important decisions for 24 hours.   You may have a normal diet but should eat lightly day of surgery.  Drink plenty of fluids.  Urinate within 8 hours after surgery, if unable to urinate call your doctor    Call your doctor for the following:   Chills   Temperature greater than 101   Pain that is not tolerable despite taking pain medicine as ordered   There is increased swelling, redness or warmth at surgical site   There is increased drainage or bleeding from surgical site   Do not remove surgical dressing unless instructed to do so by your surgeon
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
## 2025-05-30 LAB — SURGICAL PATHOLOGY REPORT: NORMAL

## 2025-06-03 ENCOUNTER — OFFICE VISIT (OUTPATIENT)
Age: 70
End: 2025-06-03

## 2025-06-03 VITALS
OXYGEN SATURATION: 98 % | HEART RATE: 83 BPM | SYSTOLIC BLOOD PRESSURE: 117 MMHG | BODY MASS INDEX: 28 KG/M2 | HEIGHT: 71 IN | DIASTOLIC BLOOD PRESSURE: 73 MMHG | WEIGHT: 200 LBS

## 2025-06-03 DIAGNOSIS — T23.039A BURN OF MULTIPLE FINGERS: Primary | ICD-10-CM

## 2025-06-03 PROCEDURE — 99024 POSTOP FOLLOW-UP VISIT: CPT | Performed by: PLASTIC SURGERY

## 2025-06-03 RX ORDER — GINSENG 100 MG
CAPSULE ORAL ONCE
Status: COMPLETED | OUTPATIENT
Start: 2025-06-03 | End: 2025-06-03

## 2025-06-03 RX ORDER — CLINDAMYCIN HYDROCHLORIDE 300 MG/1
300 CAPSULE ORAL 3 TIMES DAILY
Qty: 21 CAPSULE | Refills: 0 | Status: SHIPPED | OUTPATIENT
Start: 2025-06-03 | End: 2025-06-10

## 2025-06-03 RX ORDER — OXYCODONE AND ACETAMINOPHEN 5; 325 MG/1; MG/1
1 TABLET ORAL EVERY 6 HOURS PRN
Qty: 28 TABLET | Refills: 0 | Status: SHIPPED | OUTPATIENT
Start: 2025-06-03 | End: 2025-06-10

## 2025-06-03 RX ADMIN — Medication: at 10:23

## 2025-06-05 ENCOUNTER — TELEPHONE (OUTPATIENT)
Age: 70
End: 2025-06-05

## 2025-06-10 ENCOUNTER — HOSPITAL ENCOUNTER (EMERGENCY)
Age: 70
Discharge: ANOTHER HOSPITAL | End: 2025-06-10
Attending: EMERGENCY MEDICINE
Payer: MEDICARE

## 2025-06-10 ENCOUNTER — HOSPITAL ENCOUNTER (INPATIENT)
Age: 70
LOS: 9 days | Discharge: NURSING, REHAB OR LONG-TERM CARE | End: 2025-06-19
Attending: STUDENT IN AN ORGANIZED HEALTH CARE EDUCATION/TRAINING PROGRAM | Admitting: STUDENT IN AN ORGANIZED HEALTH CARE EDUCATION/TRAINING PROGRAM
Payer: MEDICARE

## 2025-06-10 ENCOUNTER — APPOINTMENT (OUTPATIENT)
Dept: GENERAL RADIOLOGY | Age: 70
End: 2025-06-10
Payer: MEDICARE

## 2025-06-10 VITALS
HEART RATE: 89 BPM | OXYGEN SATURATION: 100 % | TEMPERATURE: 97.5 F | RESPIRATION RATE: 16 BRPM | SYSTOLIC BLOOD PRESSURE: 176 MMHG | DIASTOLIC BLOOD PRESSURE: 83 MMHG | WEIGHT: 200 LBS | BODY MASS INDEX: 28 KG/M2 | HEIGHT: 71 IN

## 2025-06-10 DIAGNOSIS — L03.119 CELLULITIS OF HAND: Primary | ICD-10-CM

## 2025-06-10 DIAGNOSIS — T23.039A BURN OF MULTIPLE FINGERS: Primary | ICD-10-CM

## 2025-06-10 DIAGNOSIS — N17.9 AKI (ACUTE KIDNEY INJURY): ICD-10-CM

## 2025-06-10 DIAGNOSIS — G89.18 POST-OP PAIN: ICD-10-CM

## 2025-06-10 LAB
ALBUMIN SERPL-MCNC: 4 G/DL (ref 3.5–5.2)
ALP SERPL-CCNC: 128 U/L (ref 40–129)
ALT SERPL-CCNC: 25 U/L (ref 10–50)
ANION GAP SERPL CALCULATED.3IONS-SCNC: 17 MMOL/L (ref 9–16)
AST SERPL-CCNC: 36 U/L (ref 10–50)
BACTERIA URNS QL MICRO: ABNORMAL
BASOPHILS # BLD: 0.04 K/UL (ref 0–0.2)
BASOPHILS NFR BLD: 0 % (ref 0–2)
BILIRUB SERPL-MCNC: 0.6 MG/DL (ref 0–1.2)
BILIRUB UR QL STRIP: NEGATIVE
BODY TEMPERATURE: 37
BUN SERPL-MCNC: 59 MG/DL (ref 8–23)
CALCIUM SERPL-MCNC: 9.6 MG/DL (ref 8.6–10.4)
CASTS #/AREA URNS LPF: ABNORMAL /LPF
CASTS #/AREA URNS LPF: ABNORMAL /LPF
CHLORIDE SERPL-SCNC: 96 MMOL/L (ref 98–107)
CHP ED QC CHECK: YES
CLARITY UR: CLEAR
CO2 SERPL-SCNC: 20 MMOL/L (ref 20–31)
COHGB MFR BLD: 2.1 % (ref 0–5)
COLOR UR: YELLOW
CORTIS SERPL-MCNC: 20.8 UG/DL (ref 2.5–19.5)
CORTISOL COLLECTION INFO: ABNORMAL
CREAT SERPL-MCNC: 3 MG/DL (ref 0.7–1.2)
EOSINOPHIL # BLD: 0.1 K/UL (ref 0–0.44)
EOSINOPHILS RELATIVE PERCENT: 1 % (ref 0–4)
EPI CELLS #/AREA URNS HPF: ABNORMAL /HPF
ERYTHROCYTE [DISTWIDTH] IN BLOOD BY AUTOMATED COUNT: 14.7 % (ref 11.5–14.9)
GFR, ESTIMATED: 22 ML/MIN/1.73M2
GLUCOSE BLD-MCNC: 141 MG/DL (ref 75–110)
GLUCOSE BLD-MCNC: 151 MG/DL (ref 75–110)
GLUCOSE BLD-MCNC: 155 MG/DL
GLUCOSE BLD-MCNC: 23 MG/DL (ref 75–110)
GLUCOSE BLD-MCNC: 238 MG/DL (ref 75–110)
GLUCOSE SERPL-MCNC: 173 MG/DL (ref 74–99)
GLUCOSE UR STRIP-MCNC: ABNORMAL MG/DL
HCO3 VENOUS: 18.2 MMOL/L (ref 24–30)
HCT VFR BLD AUTO: 38.8 % (ref 41–53)
HGB BLD-MCNC: 13.4 G/DL (ref 13.5–17.5)
HGB UR QL STRIP.AUTO: NEGATIVE
IMM GRANULOCYTES # BLD AUTO: 0.07 K/UL (ref 0–0.3)
IMM GRANULOCYTES NFR BLD: 1 %
INR PPP: 2.6
KETONES UR STRIP-MCNC: NEGATIVE MG/DL
LACTATE BLDV-SCNC: 1 MMOL/L (ref 0.5–2.2)
LEUKOCYTE ESTERASE UR QL STRIP: NEGATIVE
LYMPHOCYTES NFR BLD: 0.88 K/UL (ref 1.1–3.7)
LYMPHOCYTES RELATIVE PERCENT: 8 % (ref 24–44)
MCH RBC QN AUTO: 31.9 PG (ref 26–34)
MCHC RBC AUTO-ENTMCNC: 34.5 G/DL (ref 31–37)
MCV RBC AUTO: 92.4 FL (ref 80–100)
METHEMOGLOBIN: 0.3 % (ref 0–1.9)
MONOCYTES NFR BLD: 0.88 K/UL (ref 0.1–1.2)
MONOCYTES NFR BLD: 8 % (ref 3–12)
NEGATIVE BASE EXCESS, VEN: 8.3 MMOL/L (ref 0–2)
NEUTROPHILS NFR BLD: 82 % (ref 36–66)
NEUTS SEG NFR BLD: 9.33 K/UL (ref 1.5–8.1)
NITRITE UR QL STRIP: NEGATIVE
NRBC BLD-RTO: 0 PER 100 WBC
O2 SAT, VEN: 59.9 % (ref 60–85)
PCO2 VENOUS: 41.1 MM HG (ref 39–55)
PH UR STRIP: 5 [PH] (ref 5–8)
PH VENOUS: 7.26 (ref 7.32–7.42)
PLATELET # BLD AUTO: 241 K/UL (ref 150–450)
PMV BLD AUTO: 10.1 FL (ref 8–13.5)
PO2 VENOUS: 35 MM HG (ref 30–50)
POTASSIUM SERPL-SCNC: 4.4 MMOL/L (ref 3.7–5.3)
PROT SERPL-MCNC: 7.3 G/DL (ref 6.6–8.7)
PROT UR STRIP-MCNC: ABNORMAL MG/DL
PROTHROMBIN TIME: 30.5 SEC (ref 11.8–14.6)
RBC # BLD AUTO: 4.2 M/UL (ref 4.21–5.77)
RBC #/AREA URNS HPF: ABNORMAL /HPF
SODIUM SERPL-SCNC: 133 MMOL/L (ref 136–145)
SP GR UR STRIP: 1.01 (ref 1–1.03)
UROBILINOGEN UR STRIP-ACNC: NORMAL EU/DL (ref 0–1)
WBC #/AREA URNS HPF: ABNORMAL /HPF
WBC OTHER # BLD: 11.3 K/UL (ref 3.5–11)

## 2025-06-10 PROCEDURE — 87040 BLOOD CULTURE FOR BACTERIA: CPT

## 2025-06-10 PROCEDURE — 96366 THER/PROPH/DIAG IV INF ADDON: CPT

## 2025-06-10 PROCEDURE — 96365 THER/PROPH/DIAG IV INF INIT: CPT

## 2025-06-10 PROCEDURE — 82533 TOTAL CORTISOL: CPT

## 2025-06-10 PROCEDURE — 71045 X-RAY EXAM CHEST 1 VIEW: CPT

## 2025-06-10 PROCEDURE — 2580000003 HC RX 258: Performed by: STUDENT IN AN ORGANIZED HEALTH CARE EDUCATION/TRAINING PROGRAM

## 2025-06-10 PROCEDURE — 6360000002 HC RX W HCPCS: Performed by: EMERGENCY MEDICINE

## 2025-06-10 PROCEDURE — 1200000000 HC SEMI PRIVATE

## 2025-06-10 PROCEDURE — 36415 COLL VENOUS BLD VENIPUNCTURE: CPT

## 2025-06-10 PROCEDURE — 6370000000 HC RX 637 (ALT 250 FOR IP): Performed by: EMERGENCY MEDICINE

## 2025-06-10 PROCEDURE — 80053 COMPREHEN METABOLIC PANEL: CPT

## 2025-06-10 PROCEDURE — 2500000003 HC RX 250 WO HCPCS: Performed by: EMERGENCY MEDICINE

## 2025-06-10 PROCEDURE — 82805 BLOOD GASES W/O2 SATURATION: CPT

## 2025-06-10 PROCEDURE — 82947 ASSAY GLUCOSE BLOOD QUANT: CPT

## 2025-06-10 PROCEDURE — 96375 TX/PRO/DX INJ NEW DRUG ADDON: CPT

## 2025-06-10 PROCEDURE — 83605 ASSAY OF LACTIC ACID: CPT

## 2025-06-10 PROCEDURE — 85610 PROTHROMBIN TIME: CPT

## 2025-06-10 PROCEDURE — 99285 EMERGENCY DEPT VISIT HI MDM: CPT

## 2025-06-10 PROCEDURE — 82800 BLOOD PH: CPT

## 2025-06-10 PROCEDURE — 81001 URINALYSIS AUTO W/SCOPE: CPT

## 2025-06-10 PROCEDURE — 85025 COMPLETE CBC W/AUTO DIFF WBC: CPT

## 2025-06-10 RX ORDER — SODIUM CHLORIDE 0.9 % (FLUSH) 0.9 %
5-40 SYRINGE (ML) INJECTION EVERY 12 HOURS SCHEDULED
Status: DISCONTINUED | OUTPATIENT
Start: 2025-06-11 | End: 2025-06-19 | Stop reason: HOSPADM

## 2025-06-10 RX ORDER — SODIUM CHLORIDE 9 MG/ML
INJECTION, SOLUTION INTRAVENOUS PRN
Status: DISCONTINUED | OUTPATIENT
Start: 2025-06-10 | End: 2025-06-19 | Stop reason: HOSPADM

## 2025-06-10 RX ORDER — ONDANSETRON 4 MG/1
4 TABLET, ORALLY DISINTEGRATING ORAL EVERY 8 HOURS PRN
Status: DISCONTINUED | OUTPATIENT
Start: 2025-06-10 | End: 2025-06-19 | Stop reason: HOSPADM

## 2025-06-10 RX ORDER — ONDANSETRON 2 MG/ML
4 INJECTION INTRAMUSCULAR; INTRAVENOUS EVERY 6 HOURS PRN
Status: DISCONTINUED | OUTPATIENT
Start: 2025-06-10 | End: 2025-06-19 | Stop reason: HOSPADM

## 2025-06-10 RX ORDER — ACETAMINOPHEN 325 MG/1
650 TABLET ORAL EVERY 6 HOURS PRN
Status: DISCONTINUED | OUTPATIENT
Start: 2025-06-10 | End: 2025-06-19 | Stop reason: HOSPADM

## 2025-06-10 RX ORDER — POTASSIUM CHLORIDE 7.45 MG/ML
10 INJECTION INTRAVENOUS PRN
Status: DISCONTINUED | OUTPATIENT
Start: 2025-06-10 | End: 2025-06-19 | Stop reason: HOSPADM

## 2025-06-10 RX ORDER — ATORVASTATIN CALCIUM 40 MG/1
40 TABLET, FILM COATED ORAL NIGHTLY
Status: DISCONTINUED | OUTPATIENT
Start: 2025-06-11 | End: 2025-06-19 | Stop reason: HOSPADM

## 2025-06-10 RX ORDER — DEXTROSE MONOHYDRATE 100 MG/ML
INJECTION, SOLUTION INTRAVENOUS CONTINUOUS PRN
Status: DISCONTINUED | OUTPATIENT
Start: 2025-06-10 | End: 2025-06-10 | Stop reason: HOSPADM

## 2025-06-10 RX ORDER — SODIUM CHLORIDE 0.9 % (FLUSH) 0.9 %
5-40 SYRINGE (ML) INJECTION PRN
Status: DISCONTINUED | OUTPATIENT
Start: 2025-06-10 | End: 2025-06-19 | Stop reason: HOSPADM

## 2025-06-10 RX ORDER — LINEZOLID 2 MG/ML
600 INJECTION, SOLUTION INTRAVENOUS EVERY 12 HOURS
Status: DISCONTINUED | OUTPATIENT
Start: 2025-06-10 | End: 2025-06-10 | Stop reason: HOSPADM

## 2025-06-10 RX ORDER — ACETAMINOPHEN 650 MG/1
650 SUPPOSITORY RECTAL EVERY 6 HOURS PRN
Status: DISCONTINUED | OUTPATIENT
Start: 2025-06-10 | End: 2025-06-19 | Stop reason: HOSPADM

## 2025-06-10 RX ORDER — DEXTROSE MONOHYDRATE 100 MG/ML
INJECTION, SOLUTION INTRAVENOUS CONTINUOUS PRN
Status: DISCONTINUED | OUTPATIENT
Start: 2025-06-10 | End: 2025-06-19 | Stop reason: HOSPADM

## 2025-06-10 RX ORDER — ASPIRIN 81 MG/1
81 TABLET ORAL DAILY
Status: DISCONTINUED | OUTPATIENT
Start: 2025-06-11 | End: 2025-06-19 | Stop reason: HOSPADM

## 2025-06-10 RX ORDER — LIDOCAINE HYDROCHLORIDE 20 MG/ML
JELLY TOPICAL PRN
Status: DISCONTINUED | OUTPATIENT
Start: 2025-06-10 | End: 2025-06-10 | Stop reason: HOSPADM

## 2025-06-10 RX ORDER — CARVEDILOL 3.12 MG/1
3.12 TABLET ORAL 2 TIMES DAILY
Status: DISCONTINUED | OUTPATIENT
Start: 2025-06-11 | End: 2025-06-11

## 2025-06-10 RX ORDER — NITROGLYCERIN 0.4 MG/1
0.4 TABLET SUBLINGUAL EVERY 5 MIN PRN
Status: DISCONTINUED | OUTPATIENT
Start: 2025-06-10 | End: 2025-06-19 | Stop reason: HOSPADM

## 2025-06-10 RX ORDER — SODIUM CHLORIDE 9 MG/ML
INJECTION, SOLUTION INTRAVENOUS CONTINUOUS
Status: DISCONTINUED | OUTPATIENT
Start: 2025-06-11 | End: 2025-06-11

## 2025-06-10 RX ORDER — INSULIN LISPRO 100 [IU]/ML
0-8 INJECTION, SOLUTION INTRAVENOUS; SUBCUTANEOUS
Status: DISCONTINUED | OUTPATIENT
Start: 2025-06-11 | End: 2025-06-19 | Stop reason: HOSPADM

## 2025-06-10 RX ORDER — POTASSIUM CHLORIDE 1500 MG/1
40 TABLET, EXTENDED RELEASE ORAL PRN
Status: DISCONTINUED | OUTPATIENT
Start: 2025-06-10 | End: 2025-06-19 | Stop reason: HOSPADM

## 2025-06-10 RX ORDER — GLUCAGON 1 MG/ML
1 KIT INJECTION PRN
Status: DISCONTINUED | OUTPATIENT
Start: 2025-06-10 | End: 2025-06-19 | Stop reason: HOSPADM

## 2025-06-10 RX ADMIN — LINEZOLID 600 MG: 600 INJECTION, SOLUTION INTRAVENOUS at 21:53

## 2025-06-10 RX ADMIN — WATER 1000 MG: 1 INJECTION INTRAMUSCULAR; INTRAVENOUS; SUBCUTANEOUS at 11:07

## 2025-06-10 RX ADMIN — LINEZOLID 600 MG: 600 INJECTION, SOLUTION INTRAVENOUS at 09:45

## 2025-06-10 RX ADMIN — DEXTROSE 250 ML: 10 SOLUTION INTRAVENOUS at 21:27

## 2025-06-10 RX ADMIN — LIDOCAINE HYDROCHLORIDE: 20 JELLY TOPICAL at 10:28

## 2025-06-10 ASSESSMENT — LIFESTYLE VARIABLES
HOW MANY STANDARD DRINKS CONTAINING ALCOHOL DO YOU HAVE ON A TYPICAL DAY: PATIENT DOES NOT DRINK
HOW OFTEN DO YOU HAVE A DRINK CONTAINING ALCOHOL: NEVER

## 2025-06-10 ASSESSMENT — ENCOUNTER SYMPTOMS
ABDOMINAL PAIN: 0
COUGH: 0

## 2025-06-10 NOTE — ED PROVIDER NOTES
[facility] EMERGENCY DEPARTMENT  EMERGENCY DEPARTMENT ENCOUNTER      Pt Name: Asher Ayon  MRN: [id number]  Birthdate 1955  Date of evaluation: 6/10/25      CHIEF COMPLAINT       Chief Complaint   Patient presents with    Hypoglycemia    Loss of Consciousness         HISTORY OF PRESENT ILLNESS   HPI 69 y.o. male presents with c/o hypoglycemia.  Patient was found with altered mental status this morning.  EMS was called.  They found his glucose in the 40s.  He was treated prehospital with dextrose with normalization of his mental status.  Patient reports that he took his glimepiride 2 mg last dose was last evening.  Patient also on pioglitazone.  Patient is not on any insulin.  Patient had recent right middle finger amputation along with irrigation and debridement of skin wounds from hand burn..     REVIEW OF SYSTEMS       Review of Systems   Constitutional:  Positive for fatigue. Negative for fever.   Respiratory:  Negative for cough.    Cardiovascular:  Negative for chest pain.   Gastrointestinal:  Negative for abdominal pain.   Skin:  Positive for wound.   Psychiatric/Behavioral:  Positive for confusion.        PAST MEDICAL HISTORY     Past Medical History:   Diagnosis Date    Amputation of toe     all left foot toes and 3.5 right foot toes    Atrial fibrillation ([facility])     Burn     fingers    CAD (coronary artery disease) 2010    s/p CABG- no stents    Cardiomyopathy ([facility])     Cellulitis     Cerebral artery occlusion with cerebral infarction ([facility])     2 \"mini\" strokes approx 2024    CHF (congestive heart failure) ([facility])     CKD (chronic kidney disease)     Diabetes mellitus ([facility])     Finger necrosis ([facility])     Foot infection     Heart failure ([facility])     decreased EF    Hyperlipidemia     Hypertension     MRSA (methicillin resistant staph aureus) culture positive     foot    Osteomyelitis ([facility]CC)     Pacemaker     medtronic    Polyneuropathy     PVD (peripheral vascular disease)     S/P mitral valve clip

## 2025-06-10 NOTE — ED NOTES
Mode of arrival (squad #, walk in, police, etc) : EMS-[facility]        Chief complaint(s): hypoglycemia        Arrival Note (brief scenario, treatment PTA, etc).:  pt was unresponsive with a sugar of 47 upon EMS arrival to patients home. Pt was given D10 by EMS and was alert and oriented upon arrival to the hospital. BS was checked at 155 when pt got to the ER.        C= \"Have you ever felt that you should Cut down on your drinking?\"  No  A= \"Have people Annoyed you by criticizing your drinking?\"  No  G= \"Have you ever felt bad or Guilty about your drinking?\"  No  E= \"Have you ever had a drink as an Eye-opener first thing in the morning to steady your nerves or to help a hangover?\"  No      Deferred []      Reason for deferring: N/A    *If yes to two or more: probable alcohol abuse.*

## 2025-06-10 NOTE — PROGRESS NOTES
mouth daily   gabapentin (NEURONTIN) 300 MG capsule Take 1 capsule by mouth 2 times daily as needed.  Patient not taking: Reported on 6/10/2025   gabapentin (NEURONTIN) 100 MG capsule Take 1 capsule by mouth in the morning and 1 capsule in the evening. Do all this for 30 days. Intended supply: 30 days.   acetaminophen (TYLENOL) 325 MG tablet Take 2 tablets by mouth every 6 hours as needed   nitroGLYCERIN (NITROSTAT) 0.4 MG SL tablet up to max of 3 total doses. If no relief after 1 dose, call 911.  Take 1 tablet at onset of chest pain may repeat every 5 minutes time to and call 911   glimepiride (AMARYL) 2 MG tablet TAKE 1 TABLET BY MOUTH TWICE A DAY FOR 30 DAYS   carvedilol (COREG) 3.125 MG tablet Take 1 tablet by mouth 2 times daily   benazepril (LOTENSIN) 5 MG tablet Take 1 tablet by mouth in the morning and at bedtime   Vitamin D (CHOLECALCIFEROL) 25 MCG (1000 UT) TABS tablet Take 1 tablet by mouth daily   aspirin 81 MG EC tablet Take 1 tablet by mouth daily   furosemide (LASIX) 80 MG tablet Take 1 tablet by mouth daily   atorvastatin (LIPITOR) 40 MG tablet Take 1 tablet by mouth at bedtime         Please let me know if you have any questions about this encounter. Thank you!    Electronically signed by Halle Juan RPH on 6/10/2025 at 1:28 PM

## 2025-06-11 PROBLEM — Z79.01 ENCOUNTER FOR MONITORING COUMADIN THERAPY: Status: ACTIVE | Noted: 2025-06-11

## 2025-06-11 PROBLEM — Z95.810 ICD (IMPLANTABLE CARDIOVERTER-DEFIBRILLATOR) IN PLACE: Status: ACTIVE | Noted: 2025-06-11

## 2025-06-11 PROBLEM — Z98.890 S/P MITRAL VALVE CLIP IMPLANTATION: Status: ACTIVE | Noted: 2025-06-11

## 2025-06-11 PROBLEM — E16.2 HYPOGLYCEMIA: Status: ACTIVE | Noted: 2025-06-11

## 2025-06-11 PROBLEM — R31.0 GROSS HEMATURIA: Status: ACTIVE | Noted: 2025-06-11

## 2025-06-11 PROBLEM — N17.9 AKI (ACUTE KIDNEY INJURY): Status: ACTIVE | Noted: 2025-06-11

## 2025-06-11 PROBLEM — R40.1 STUPOR: Status: ACTIVE | Noted: 2025-06-11

## 2025-06-11 PROBLEM — N18.30 CKD (CHRONIC KIDNEY DISEASE) STAGE 3, GFR 30-59 ML/MIN (HCC): Status: ACTIVE | Noted: 2025-06-11

## 2025-06-11 PROBLEM — Z51.81 ENCOUNTER FOR MONITORING COUMADIN THERAPY: Status: ACTIVE | Noted: 2025-06-11

## 2025-06-11 PROBLEM — I16.0 HYPERTENSIVE URGENCY: Status: ACTIVE | Noted: 2025-06-11

## 2025-06-11 PROBLEM — L03.119 CELLULITIS OF HAND: Status: ACTIVE | Noted: 2025-06-11

## 2025-06-11 PROBLEM — Z95.818 S/P MITRAL VALVE CLIP IMPLANTATION: Status: ACTIVE | Noted: 2025-06-11

## 2025-06-11 PROBLEM — Z22.322 MRSA CARRIER: Status: ACTIVE | Noted: 2025-06-11

## 2025-06-11 PROBLEM — I50.22 CHRONIC SYSTOLIC (CONGESTIVE) HEART FAILURE (HCC): Status: ACTIVE | Noted: 2025-06-11

## 2025-06-11 LAB
ANION GAP SERPL CALCULATED.3IONS-SCNC: 11 MMOL/L (ref 9–16)
BASOPHILS # BLD: 0.03 K/UL (ref 0–0.2)
BASOPHILS NFR BLD: 0 % (ref 0–2)
BUN SERPL-MCNC: 36 MG/DL (ref 8–23)
CALCIUM SERPL-MCNC: 9.3 MG/DL (ref 8.6–10.4)
CHLORIDE SERPL-SCNC: 104 MMOL/L (ref 98–107)
CO2 SERPL-SCNC: 20 MMOL/L (ref 20–31)
CREAT SERPL-MCNC: 1.7 MG/DL (ref 0.7–1.2)
EOSINOPHIL # BLD: 0.13 K/UL (ref 0–0.44)
EOSINOPHILS RELATIVE PERCENT: 2 % (ref 1–4)
ERYTHROCYTE [DISTWIDTH] IN BLOOD BY AUTOMATED COUNT: 14.5 % (ref 11.8–14.4)
GFR, ESTIMATED: 43 ML/MIN/1.73M2
GLUCOSE BLD-MCNC: 105 MG/DL (ref 75–110)
GLUCOSE BLD-MCNC: 129 MG/DL (ref 75–110)
GLUCOSE BLD-MCNC: 210 MG/DL (ref 75–110)
GLUCOSE BLD-MCNC: 235 MG/DL (ref 75–110)
GLUCOSE BLD-MCNC: 239 MG/DL (ref 75–110)
GLUCOSE SERPL-MCNC: 118 MG/DL (ref 74–99)
HCT VFR BLD AUTO: 33.4 % (ref 40.7–50.3)
HGB BLD-MCNC: 11.8 G/DL (ref 13–17)
IMM GRANULOCYTES # BLD AUTO: 0.05 K/UL (ref 0–0.3)
IMM GRANULOCYTES NFR BLD: 1 %
INR PPP: 2.3
LYMPHOCYTES NFR BLD: 1.05 K/UL (ref 1.1–3.7)
LYMPHOCYTES RELATIVE PERCENT: 12 % (ref 24–43)
MCH RBC QN AUTO: 31.6 PG (ref 25.2–33.5)
MCHC RBC AUTO-ENTMCNC: 35.3 G/DL (ref 28.4–34.8)
MCV RBC AUTO: 89.3 FL (ref 82.6–102.9)
MONOCYTES NFR BLD: 0.75 K/UL (ref 0.1–1.2)
MONOCYTES NFR BLD: 8 % (ref 3–12)
NEUTROPHILS NFR BLD: 77 % (ref 36–65)
NEUTS SEG NFR BLD: 6.88 K/UL (ref 1.5–8.1)
NRBC BLD-RTO: 0 PER 100 WBC
OSMOLALITY UR: 376 MOSM/KG (ref 80–1300)
PLATELET # BLD AUTO: 231 K/UL (ref 138–453)
PMV BLD AUTO: 10 FL (ref 8.1–13.5)
POTASSIUM SERPL-SCNC: 3.7 MMOL/L (ref 3.7–5.3)
PROTHROMBIN TIME: 26.5 SEC (ref 11.7–14.9)
RBC # BLD AUTO: 3.74 M/UL (ref 4.21–5.77)
RBC # BLD: ABNORMAL 10*6/UL
SODIUM SERPL-SCNC: 135 MMOL/L (ref 136–145)
SODIUM UR-SCNC: 48 MMOL/L
TOTAL PROTEIN, URINE: 244 MG/DL
WBC OTHER # BLD: 8.9 K/UL (ref 3.5–11.3)

## 2025-06-11 PROCEDURE — 85025 COMPLETE CBC W/AUTO DIFF WBC: CPT

## 2025-06-11 PROCEDURE — 1200000000 HC SEMI PRIVATE

## 2025-06-11 PROCEDURE — 2500000003 HC RX 250 WO HCPCS

## 2025-06-11 PROCEDURE — 85610 PROTHROMBIN TIME: CPT

## 2025-06-11 PROCEDURE — G0545 PR INHERENT VISIT TO INPT: HCPCS | Performed by: INTERNAL MEDICINE

## 2025-06-11 PROCEDURE — 6370000000 HC RX 637 (ALT 250 FOR IP): Performed by: STUDENT IN AN ORGANIZED HEALTH CARE EDUCATION/TRAINING PROGRAM

## 2025-06-11 PROCEDURE — 6360000002 HC RX W HCPCS

## 2025-06-11 PROCEDURE — 99223 1ST HOSP IP/OBS HIGH 75: CPT | Performed by: STUDENT IN AN ORGANIZED HEALTH CARE EDUCATION/TRAINING PROGRAM

## 2025-06-11 PROCEDURE — 6370000000 HC RX 637 (ALT 250 FOR IP)

## 2025-06-11 PROCEDURE — 84300 ASSAY OF URINE SODIUM: CPT

## 2025-06-11 PROCEDURE — 99222 1ST HOSP IP/OBS MODERATE 55: CPT | Performed by: INTERNAL MEDICINE

## 2025-06-11 PROCEDURE — 36415 COLL VENOUS BLD VENIPUNCTURE: CPT

## 2025-06-11 PROCEDURE — 87641 MR-STAPH DNA AMP PROBE: CPT

## 2025-06-11 PROCEDURE — 83935 ASSAY OF URINE OSMOLALITY: CPT

## 2025-06-11 PROCEDURE — 2580000003 HC RX 258

## 2025-06-11 PROCEDURE — 6370000000 HC RX 637 (ALT 250 FOR IP): Performed by: PHYSICIAN ASSISTANT

## 2025-06-11 PROCEDURE — 6360000002 HC RX W HCPCS: Performed by: STUDENT IN AN ORGANIZED HEALTH CARE EDUCATION/TRAINING PROGRAM

## 2025-06-11 PROCEDURE — 80048 BASIC METABOLIC PNL TOTAL CA: CPT

## 2025-06-11 PROCEDURE — 84156 ASSAY OF PROTEIN URINE: CPT

## 2025-06-11 RX ORDER — LABETALOL HYDROCHLORIDE 5 MG/ML
10 INJECTION, SOLUTION INTRAVENOUS EVERY 6 HOURS PRN
Status: DISCONTINUED | OUTPATIENT
Start: 2025-06-11 | End: 2025-06-19 | Stop reason: HOSPADM

## 2025-06-11 RX ORDER — AMLODIPINE BESYLATE 10 MG/1
5 TABLET ORAL DAILY
Status: DISCONTINUED | OUTPATIENT
Start: 2025-06-11 | End: 2025-06-12

## 2025-06-11 RX ORDER — TAMSULOSIN HYDROCHLORIDE 0.4 MG/1
0.4 CAPSULE ORAL DAILY
Status: DISCONTINUED | OUTPATIENT
Start: 2025-06-11 | End: 2025-06-19 | Stop reason: HOSPADM

## 2025-06-11 RX ORDER — CARVEDILOL 12.5 MG/1
12.5 TABLET ORAL 2 TIMES DAILY
Status: DISCONTINUED | OUTPATIENT
Start: 2025-06-11 | End: 2025-06-19 | Stop reason: HOSPADM

## 2025-06-11 RX ORDER — OXYCODONE HYDROCHLORIDE 5 MG/1
10 TABLET ORAL EVERY 4 HOURS PRN
Refills: 0 | Status: DISCONTINUED | OUTPATIENT
Start: 2025-06-11 | End: 2025-06-19 | Stop reason: HOSPADM

## 2025-06-11 RX ORDER — TRAMADOL HYDROCHLORIDE 50 MG/1
50 TABLET ORAL EVERY 6 HOURS PRN
Status: DISCONTINUED | OUTPATIENT
Start: 2025-06-11 | End: 2025-06-12

## 2025-06-11 RX ORDER — WARFARIN SODIUM 2 MG/1
4 TABLET ORAL
Status: DISCONTINUED | OUTPATIENT
Start: 2025-06-12 | End: 2025-06-11

## 2025-06-11 RX ORDER — CARVEDILOL 3.12 MG/1
6.25 TABLET ORAL ONCE
Status: COMPLETED | OUTPATIENT
Start: 2025-06-11 | End: 2025-06-11

## 2025-06-11 RX ORDER — OXYCODONE HYDROCHLORIDE 5 MG/1
5 TABLET ORAL EVERY 4 HOURS PRN
Refills: 0 | Status: DISCONTINUED | OUTPATIENT
Start: 2025-06-11 | End: 2025-06-19 | Stop reason: HOSPADM

## 2025-06-11 RX ORDER — WARFARIN SODIUM 2 MG/1
2 TABLET ORAL
Status: DISCONTINUED | OUTPATIENT
Start: 2025-06-11 | End: 2025-06-11

## 2025-06-11 RX ADMIN — Medication 10 MG: at 08:14

## 2025-06-11 RX ADMIN — TRAMADOL HYDROCHLORIDE 50 MG: 50 TABLET, COATED ORAL at 04:29

## 2025-06-11 RX ADMIN — VANCOMYCIN HYDROCHLORIDE 1750 MG: 10 INJECTION, POWDER, LYOPHILIZED, FOR SOLUTION INTRAVENOUS at 05:24

## 2025-06-11 RX ADMIN — CARVEDILOL 12.5 MG: 12.5 TABLET, FILM COATED ORAL at 20:48

## 2025-06-11 RX ADMIN — TAMSULOSIN HYDROCHLORIDE 0.4 MG: 0.4 CAPSULE ORAL at 11:33

## 2025-06-11 RX ADMIN — CARVEDILOL 3.12 MG: 3.12 TABLET, FILM COATED ORAL at 00:19

## 2025-06-11 RX ADMIN — SODIUM CHLORIDE: 0.9 INJECTION, SOLUTION INTRAVENOUS at 00:10

## 2025-06-11 RX ADMIN — AMLODIPINE BESYLATE 5 MG: 10 TABLET ORAL at 12:35

## 2025-06-11 RX ADMIN — CARVEDILOL 6.25 MG: 3.12 TABLET, FILM COATED ORAL at 12:35

## 2025-06-11 RX ADMIN — SODIUM CHLORIDE, PRESERVATIVE FREE 10 ML: 5 INJECTION INTRAVENOUS at 20:25

## 2025-06-11 RX ADMIN — OXYCODONE 10 MG: 5 TABLET ORAL at 08:26

## 2025-06-11 RX ADMIN — ATORVASTATIN CALCIUM 40 MG: 40 TABLET, FILM COATED ORAL at 00:19

## 2025-06-11 RX ADMIN — ASPIRIN 81 MG: 81 TABLET, COATED ORAL at 08:14

## 2025-06-11 RX ADMIN — ACETAMINOPHEN 650 MG: 325 TABLET ORAL at 00:25

## 2025-06-11 RX ADMIN — OXYCODONE 10 MG: 5 TABLET ORAL at 20:48

## 2025-06-11 RX ADMIN — ATORVASTATIN CALCIUM 40 MG: 40 TABLET, FILM COATED ORAL at 20:48

## 2025-06-11 RX ADMIN — CEFEPIME 1000 MG: 1 INJECTION, POWDER, FOR SOLUTION INTRAMUSCULAR; INTRAVENOUS at 23:17

## 2025-06-11 RX ADMIN — CARVEDILOL 3.12 MG: 3.12 TABLET, FILM COATED ORAL at 08:14

## 2025-06-11 RX ADMIN — CEFEPIME 1000 MG: 1 INJECTION, POWDER, FOR SOLUTION INTRAMUSCULAR; INTRAVENOUS at 00:22

## 2025-06-11 RX ADMIN — OXYCODONE 10 MG: 5 TABLET ORAL at 16:37

## 2025-06-11 RX ADMIN — Medication 10 MG: at 16:41

## 2025-06-11 RX ADMIN — SODIUM CHLORIDE, PRESERVATIVE FREE 10 ML: 5 INJECTION INTRAVENOUS at 08:14

## 2025-06-11 ASSESSMENT — PAIN SCALES - GENERAL
PAINLEVEL_OUTOF10: 3
PAINLEVEL_OUTOF10: 7
PAINLEVEL_OUTOF10: 0
PAINLEVEL_OUTOF10: 10
PAINLEVEL_OUTOF10: 3
PAINLEVEL_OUTOF10: 7
PAINLEVEL_OUTOF10: 8
PAINLEVEL_OUTOF10: 1
PAINLEVEL_OUTOF10: 5
PAINLEVEL_OUTOF10: 3
PAINLEVEL_OUTOF10: 2

## 2025-06-11 ASSESSMENT — PAIN DESCRIPTION - ORIENTATION
ORIENTATION: RIGHT

## 2025-06-11 ASSESSMENT — PAIN DESCRIPTION - LOCATION
LOCATION: HAND
LOCATION: FINGER (COMMENT WHICH ONE);ARM
LOCATION: HAND

## 2025-06-11 ASSESSMENT — PAIN DESCRIPTION - DESCRIPTORS
DESCRIPTORS: ACHING;DISCOMFORT
DESCRIPTORS: ACHING
DESCRIPTORS: SHARP
DESCRIPTORS: ACHING
DESCRIPTORS: ACHING;SHARP

## 2025-06-11 ASSESSMENT — PAIN SCALES - WONG BAKER: WONGBAKER_NUMERICALRESPONSE: NO HURT

## 2025-06-11 NOTE — PLAN OF CARE
Problem: Chronic Conditions and Co-morbidities  Goal: Patient's chronic conditions and co-morbidity symptoms are monitored and maintained or improved  Outcome: Progressing  Flowsheets (Taken 6/11/2025 0205)  Care Plan - Patient's Chronic Conditions and Co-Morbidity Symptoms are Monitored and Maintained or Improved:   Monitor and assess patient's chronic conditions and comorbid symptoms for stability, deterioration, or improvement   Collaborate with multidisciplinary team to address chronic and comorbid conditions and prevent exacerbation or deterioration     Problem: Discharge Planning  Goal: Discharge to home or other facility with appropriate resources  Outcome: Progressing  Flowsheets (Taken 6/11/2025 0205)  Discharge to home or other facility with appropriate resources:   Identify barriers to discharge with patient and caregiver   Arrange for needed discharge resources and transportation as appropriate   Identify discharge learning needs (meds, wound care, etc)     Problem: Safety - Adult  Goal: Free from fall injury  Outcome: Progressing  Flowsheets (Taken 6/11/2025 0205)  Free From Fall Injury: Instruct family/caregiver on patient safety     Problem: Pain  Goal: Verbalizes/displays adequate comfort level or baseline comfort level  Outcome: Progressing  Flowsheets (Taken 6/11/2025 0205)  Verbalizes/displays adequate comfort level or baseline comfort level:   Encourage patient to monitor pain and request assistance   Assess pain using appropriate pain scale   Administer analgesics based on type and severity of pain and evaluate response

## 2025-06-11 NOTE — CONSULTS
ATTESTATION:    I have discussed the case, including pertinent history and exam findings with the APRN. I have evaluated the  History, physical findings and pictures of the patient and the key elements of the encounter have been performed by me. I have reviewed the laboratory data, other diagnostic studies and discussed them with the APRN. I have updated the medical record where necessary.    I agree with the assessment, plan and orders as documented by the APRN.    In addition diagnostic and decision making elements, performed by the Attending Physician, are included in the Diagnostic and Decision Making Section of the text.    Elements of Medical Decision Making:  Note: I have independently performed the steps listed below as part of the medical decision making and evaluation.   Examined and discussed with patient.  Altered mentation  Concern for right hand cellulitis  History of right middle finger burn.  Status post finger amputation with placement of Integra skin graft on 5/27/2025  BRIANNA on CKD  Essential hypertension  Diabetes mellitus type 2  Coronary artery disease with previous history of CABG  Atrial fibrillation  Prior history of osteomyelitis  Prior toe amputations on both feet  Peripheral vascular disease  MRSA carrier since 2015  Allergy to penicillin  Allergy to doxycycline  Labs, medications, radiologic studies were reviewed with personal review of films  Radiologic studies  Lab work  Cultures  Large amounts of data were reviewed  Please see the history of present illness and progress note  Discussed with nursing Staff, Discharge planner  Dr. Overton  Infection Control and Prevention measures reviewed  Universal precaution  Contact isolation: MRSA  All prior entries were reviewed  Internal medicine notes reviewed  Administer medications as ordered  On cefepime pending further culture data  On vancomycin pending further culture data  Prognosis:   Guarded  Discharge planning reviewed  Follow up as

## 2025-06-11 NOTE — CARE COORDINATION
Case Management Assessment  Initial Evaluation    Date/Time of Evaluation: 6/11/2025 4:40 PM   Assessment Completed by: Zoë Manriquez    If patient is discharged prior to next notation, then this note serves as note for discharge by case management.    Patient Name: Asher Ayon                   YOB: 1955  Diagnosis: BRIANNA (acute kidney injury) [N17.9]                   Date / Time: 6/10/2025 11:47 PM    Patient Admission Status: Inpatient   Readmission Risk (Low < 19, Mod (19-27), High > 27): Readmission Risk Score: 14.6    Current PCP: Kevin Herrera MD  PCP verified by CM? Yes (Dr. Herrera)    Chart Reviewed: Yes      History Provided by: Patient  Patient Orientation: Alert and Oriented, Person, Place, Situation    Patient Cognition: Alert    Hospitalization in the last 30 days (Readmission):  No    If yes, Readmission Assessment in CM Navigator will be completed.    Advance Directives:      Code Status: Full Code   Patient's Primary Decision Maker is: Legal Next of Kin    Primary Decision Maker: Pat Ayon - Parent - [phone]    Discharge Planning:    Patient lives with: Alone Type of Home: House  Primary Care Giver: Self  Patient Support Systems include: Parent   Current Financial resources: Medicare  Current community resources: None  Current services prior to admission: Home Care (Patient reports current with [facility]- needs verified)            Current DME:              Type of Home Care services:  None    ADLS  Prior functional level: Independent in ADLs/IADLs  Current functional level: Independent in ADLs/IADLs    PT AM-PAC:   /24  OT AM-PAC:   /24    Family can provide assistance at DC: No  Would you like Case Management to discuss the discharge plan with any other family members/significant others, and if so, who? No  Plans to Return to Present Housing: Yes  Other Identified Issues/Barriers to RETURNING to current housing: None    Potential Assistance needed at discharge:

## 2025-06-11 NOTE — CARE COORDINATION
Case Management   Daily Progress Note       Patient Name: Asher Ayon                   YOB: 1955  Diagnosis: BRIANNA (acute kidney injury) [N17.9]                       GMLOS: 3 days  Length of Stay: 1  days    Anticipated Discharge Date: Two or more days before discharge    Readmission Risk (Low < 19, Mod (19-27), High > 27): Readmission Risk Score: 14.6        Current Transitional Plan    [] Home Independently    [x] Home with HC    [] Skilled Nursing Facility    [] Acute Rehabilitation    [] Long Term Acute Care (LTAC)    [] Other:     Plan for the Stay (Medical Management) :    [facility] cab for transport, monitor for IV ATB      Workflow Continuation (Additional Notes) :    1707: CM sent referral via [employer] to [employer]- awaiting acceptance/verification if patient is current.     1710: CM received message via [employer] from Shantel, agent for [employer]. Patient is current with services and agency is able to accept.     Zoë Manriquez  June 11, 2025

## 2025-06-11 NOTE — PROGRESS NOTES
Eric [facility]   Pharmacy Pharmacokinetic Monitoring Service - Vancomycin     Asher Ayon is a 69 y.o. male starting on vancomycin therapy for cellulitis. Pharmacy consulted by Hardeep Jacobson  for monitoring and adjustment.    Target Concentration: Goal AUC/EUGENE 400-600 mg*hr/L    Additional Antimicrobials: cefepime     Pertinent Laboratory Values:   Wt Readings from Last 1 Encounters:   06/11/25 89.2 kg (196 lb 10.4 oz)     Temp Readings from Last 1 Encounters:   06/10/25 97.5 °F (36.4 °C) (Oral)     Estimated Creatinine Clearance: 24 mL/min (A) (based on SCr of 3 mg/dL (H)).  Recent Labs     06/10/25  0759   CREATININE 3.0*   BUN 59*   WBC 11.3*     Procalcitonin:     Pertinent Cultures:  Culture Date Source Results   pending     MRSA Nasal Swab: N/A. Non-respiratory infection.    Plan:  Concentration-guided dosing due to renal impairment/insufficiency  Start vancomycin 1750 mg for one dose.  Renal labs as indicated   Pharmacy will continue to monitor patient and adjust therapy as indicated    Thank you for the consult,  Blayne Anne RPH  6/11/2025 12:22 AM

## 2025-06-11 NOTE — PROGRESS NOTES
Pharmacy Note  Warfarin Consult    Asher Ayon is a 69 y.o. male for whom pharmacy has been consulted to manage warfarin therapy.     Consulting Provider: Hardeep Jacobson   Reason for Admission: BRIANNA / cellulitis     Warfarin dose prior to admission: 4 mg Sun, Tues and Thurs; 2 mg all other days.  Warfarin indication: CVA  Target INR range: 2-3     Past Medical History:   Diagnosis Date    Amputation of toe     all left foot toes and 3.5 right foot toes    Atrial fibrillation (HCC)     Burn     fingers    CAD (coronary artery disease) 2010    s/p CABG- no stents    Cardiomyopathy (HCC)     Cellulitis     Cerebral artery occlusion with cerebral infarction (HCC)     2 \"mini\" strokes approx 2024    CHF (congestive heart failure) (HCC)     CKD (chronic kidney disease)     Diabetes mellitus (HCC)     Finger necrosis (HCC)     Foot infection     Heart failure (HCC)     decreased EF    Hyperlipidemia     Hypertension     MRSA (methicillin resistant staph aureus) culture positive     foot    Osteomyelitis (HCC)     Pacemaker     medtronic    Polyneuropathy     PVD (peripheral vascular disease)     S/P mitral valve clip implantation     Under care of service provider     pcp-gladys langegaytpm-lskwn-afmdu visit april 2025    [facility] service provider     [facility]    Wears dentures     Wound, open, foot     left foot--resolved                Recent Labs     06/10/25  0759   INR 2.6     Recent Labs     06/10/25  0759   HGB 13.4*   HCT 38.8*          Current warfarin drug-drug interactions: Aspirin and acetaminophen      Date             INR        Dose   6/10/2025          2.6  6/11/2025                       Daily PT/INR while inpatient. PT/INR ordered to start 6/11/25.    Thank you for the consult.  Will continue to follow.    Blayne Anne [facility]  6/11/2025 12:18 AM

## 2025-06-11 NOTE — CONSULTS
Nikki Gutierrez, Zach, Karen, Evan, Renny, Chantale, & Eric  Urology Consultation      Patient:  Asher Ayon  MRN: [id number]  YOB: 1955    REASON FOR CONSULT:  hematuria    HISTORY OF PRESENT ILLNESS:   The patient is a 69 y.o. male who presented as transfer from [place] for hypoglycemia. Also found ot have BRIANNA with Cr 3.0, now down to 1.7 which appears to be his baseline.   He had almazan placed at OSH and hematuria was noted after catheter placement. Patient states it was painful when catheter was placed, and it is bothering him, keeping him from resting. He states he was voiding fine prior to almazan placement. He is on Warfarin. Urine currently cherry red, translucent, no clots.   Patient denies urology history. He denies kidney stones, dysuria, UTI, flank pain, suprapubic pain, bladder spasms. He is having good urine output.  No smoking history. No chemical exposures.    Patient's old records, notes and chart reviewed and summarized above.    Past Medical History:    Past Medical History:   Diagnosis Date    Amputation of toe     all left foot toes and 3.5 right foot toes    Atrial fibrillation (HCC)     Burn     fingers    CAD (coronary artery disease) 2010    s/p CABG- no stents    Cardiomyopathy (HCC)     Cellulitis     Cerebral artery occlusion with cerebral infarction (HCC)     2 \"mini\" strokes approx 2024    CHF (congestive heart failure) (HCC)     CKD (chronic kidney disease)     Diabetes mellitus (HCC)     Finger necrosis (HCC)     Foot infection     Heart failure (HCC)     decreased EF    Hyperlipidemia     Hypertension     MRSA (methicillin resistant staph aureus) culture positive     foot    Osteomyelitis (HCC)     Pacemaker     medtronic    Polyneuropathy     PVD (peripheral vascular disease)     S/P mitral valve clip implantation     Under care of service provider     pcp-gladys estrada visit april 2025    Under care of service provider      midline  Lungs: Respiratory effort normal  Cardiovascular:  Regular rhythm  Abdomen: Soft, non-tender, non-distended. No CVA tenderness   Ext: 2+ DP pulses bilaterally  Skin: No rashes or bruising present  Bladder: Non-tender and not distended. Indwelling almazan with cherry red urine, translucent. No clots, flowing well.  Lymphatics: No palpable lymphadenopathy      Labs:  Recent Labs     06/10/25  0759 06/11/25  0609   WBC 11.3* 8.9   HGB 13.4* 11.8*   HCT 38.8* 33.4*   MCV 92.4 89.3    231     Recent Labs     06/10/25  0759 06/11/25  0609   * 135*   K 4.4 3.7   CL 96* 104   CO2 20 20   BUN 59* 36*   CREATININE 3.0* 1.7*       Recent Labs     06/10/25  1021   COLORU Yellow   PHUR 5.0   WBCUA 3 to 5*   RBCUA 0 TO 2   BACTERIA None   LEUKOCYTESUR NEGATIVE   UROBILINOGEN Normal   BILIRUBINUR NEGATIVE       Culture Results:  Blood cultures pending    -----------------------------------------------------------------  Imaging Results:  XR CHEST PORTABLE  Result Date: 6/10/2025  EXAMINATION: ONE XRAY VIEW OF THE CHEST 6/10/2025 9:54 am COMPARISON: Chest radiograph 08/13/2024 HISTORY: ORDERING SYSTEM PROVIDED HISTORY: sepsis TECHNOLOGIST PROVIDED HISTORY: sepsis Reason for Exam: sepsis, dyspnea FINDINGS: Left chest wall ICD. No focal consolidation.  No pulmonary edema.  No pleural effusion or pneumothorax.  Unchanged postoperative appearance of the cardiomediastinal silhouette.  No acute osseous abnormality.     No acute cardiopulmonary process.       Assessment and Plan   Impression:  70 yo male with   Patient Active Problem List   Diagnosis    Diabetic foot infection (HCC)    Diabetic foot ulcer with osteomyelitis (HCC)    Chronic osteomyelitis (HCC)    PVD (peripheral vascular disease)    Atherosclerosis of coronary artery without angina pectoris    Cardiomyopathy (HCC)    Cardiac left ventricular ejection fraction 21-40 percent    Type II diabetes mellitus with peripheral circulatory disorder (HCC)    Ulcer

## 2025-06-11 NOTE — PROGRESS NOTES
Pharmacy Note  Warfarin Consult follow-up      Recent Labs     06/11/25  0609   INR 2.3     Recent Labs     06/10/25  0759 06/11/25  0609   HGB 13.4* 11.8*   HCT 38.8* 33.4*    231       Current warfarin drug-drug interactions: n/a      Date INR Dose   6/10 2.6    6/11 2.3             Notes:                   Will give home regimen of 2mg today.   Thank you.  Juan David Lyles, PharmD  6/11/2025  11:06 AM    Daily PT/INR while inpatient.

## 2025-06-11 NOTE — PROGRESS NOTES
Upon assessment writer noted a moderate amount of bleeding coming from tip of patient's penis. Patient denied any pain, but almazan bag was full of bloody urine as well. Writer got report from nurse at [facility] who stated she irrigated patient's almazan twice due to the blood in urine. I asked writer if there was any trauma to the area she stated not that she knew of but patient did try getting out of bed multiples times.    0158: MD notified, stated continue to monitor  0607: Urine output cont's to be bloody, blood from tip of penis has decreased but blood still noted.  0635: MD notified of bloody urine continuing. No new orders at this time. Will cont with plan of care.

## 2025-06-11 NOTE — H&P
[facility]  Office: [phone]  Jorge Valenzuela DO, Nacho Dent, DO, Justin Dumont DO, Maxwell Dunlap, DO, Nica House MD, Helene Munoz MD, Sarmad Hu MD, Lupis Novak MD,  Jasen Wood MD, Kenia Jones MD, Afsaneh Rodriguez MD,  Jack Read DO, Alise Overton MD, David Thornton MD, Asher Valenzuela DO, Shanel Bah MD,  Eris Wyman DO, Li Lyles MD, Imani Diego MD, Anthony Cuadra MD,  Talat Vásquez MD, Simran Singh MD, Raven Mann MD, Sana Kiser MD, Dakota Teague MD, Scott Sandoval MD, Blayne Richards, DO, Zaina Rod MD, Elpidio Gonzalez DO, Earl Haney MD, Jack Ga MD, Mohsin Reza, MD, Lee Ann Cantu MD, Shirley Waterhouse, CNP,  Meenakshi Kimbrough, CNP, Blayne Fowler, CNP,  Nahomy Salvador, SAHIL, Any Traylor, CNP, Allyson Hernández, CNP, Justina Graham, CNP, Joselyn Michaud, CNP, Corine Conrad, PA-C, Mini Butterfield, CNP, Amy Echevarria, CNP,  Chastity Jean, CNP, Oriana Sanders, CNP, Hardeep Jacobson, PA-C, Evelia Dinh, CNP,  Sera Marcum, CNS, Cuca Nieto, CNP, Andra Paez, CNP,   Jeri Mcadams, CNP         [facility]   IN-PATIENT SERVICE   [facility]    HISTORY AND PHYSICAL EXAMINATION            Date:   6/11/2025  Patient name:  Asher Ayon  Date of admission:  6/10/2025 11:47 PM  MRN:   [id number]  Account:  [id number]  YOB: 1955  PCP:    Kevin Herrera MD  Room:   [facility]0332-  Code Status:    Full Code    Chief Complaint:   AMS    History Obtained From:     patient, electronic medical record    History of Present Illness:     Asher Ayon is a 69 y.o. Non- / non  male who presents with No chief complaint on file.   and is admitted to the hospital for the management of BRIANNA (acute kidney injury).    69-year-old male with known medical history of type 2 diabetes mellitus, CKD, CAD with history of CABG, A-fib, hypertension, hyperlipidemia, mitral valve clip implantation was  W/ APPLICATION BILAT INTEG RAT GRAFT    ENDOSCOPY, COLON, DIAGNOSTIC      EYE SURGERY Bilateral     cataracts    FINGER AMPUTATION Right 05/27/2025    Middle finger Amputation, Index Finger Burn Debridement with Split Thickness Skin Graft    FINGER AMPUTATION Right 5/27/2025    MIDDLE FINGER AMPUTATION, VOLAR FLAP CLOSURE, INDEX FINGER BURN DEBRIDEMENT WITH INTEGRA SKIN GRAFT APPLICATION performed by JUSTIN Duran MD at [facility] OR    HERNIA REPAIR      umbilical    KNEE ARTHROSCOPY Right     OTHER SURGICAL HISTORY Right 12/29/2015    wound care graft procedure foot,appl of integra    PACEMAKER PLACEMENT  01/01/2011    WITH DEFIBRILLATOR- Medtronic    TOE AMPUTATION Right     R great    TOE AMPUTATION Left 03/13/2022    TOE AMPUTATION--left 2nd digit performed by Eris Xiong DPM at [facility] OR    TOE AMPUTATION Left 08/24/2022    Left third fourth and fifth toe amputation through the metatarsal for completion transmetatarsal amputation performed by Abdias Nicole MD at [facility] OR    TOE AMPUTATION Right 03/04/2023    3RD DIGIT AMPUTATION performed by Eris Xiong DPM at [facility] OR    TOE AMPUTATION Right 03/01/2024    4th toe    TOE AMPUTATION Right 03/01/2024    RIGHT FOURTH TOE AMPUTATAION AT [facility], RIGHT FILLET OF FOURTH TOE performed by Eris Xiong DPM at [facility] OR    VASCULAR SURGERY Left 07/13/2022    RIGHT COMMON FEMORAL ACCESS UNDER ULTRASOUND GUIDANCE DISTAL AORTOGRAPHY WITH PELVIC RUN OFF PLACEMENT OF CATHETER INTO LEFT COMMON FEMORAL ARTERY, PROFUNDA FEMORAL AND SFA  ARTERIOGRAPHY PLACEMENT OF CATHETER INTO LEFT SFA, WITH SFA POPLITEAL AND TIBIAL TIBIOPERONEAL ARTERIOGRAPHY INTO LEFT FOOT performed by Abdias Nicole MD at [facility] OR        Medications Prior to Admission:     Prior to Admission medications    Medication Sig Start Date End Date Taking? Authorizing Provider   POTASSIUM CHLORIDE ER PO Take 1 tablet by mouth as needed (diuretic) Takes prn because of diuretic    Provider,

## 2025-06-12 LAB
ANION GAP SERPL CALCULATED.3IONS-SCNC: 15 MMOL/L (ref 9–16)
BASOPHILS # BLD: 0.08 K/UL (ref 0–0.2)
BASOPHILS NFR BLD: 1 % (ref 0–2)
BUN SERPL-MCNC: 30 MG/DL (ref 8–23)
CALCIUM SERPL-MCNC: 9.4 MG/DL (ref 8.6–10.4)
CHLORIDE SERPL-SCNC: 103 MMOL/L (ref 98–107)
CO2 SERPL-SCNC: 17 MMOL/L (ref 20–31)
CREAT SERPL-MCNC: 1.5 MG/DL (ref 0.7–1.2)
EOSINOPHIL # BLD: 0.23 K/UL (ref 0–0.44)
EOSINOPHILS RELATIVE PERCENT: 3 % (ref 1–4)
ERYTHROCYTE [DISTWIDTH] IN BLOOD BY AUTOMATED COUNT: 14.8 % (ref 11.8–14.4)
GFR, ESTIMATED: 50 ML/MIN/1.73M2
GLUCOSE BLD-MCNC: 142 MG/DL (ref 75–110)
GLUCOSE BLD-MCNC: 167 MG/DL (ref 75–110)
GLUCOSE BLD-MCNC: 190 MG/DL (ref 75–110)
GLUCOSE BLD-MCNC: 201 MG/DL (ref 75–110)
GLUCOSE SERPL-MCNC: 144 MG/DL (ref 74–99)
HCT VFR BLD AUTO: 35.6 % (ref 40.7–50.3)
HGB BLD-MCNC: 11.9 G/DL (ref 13–17)
IMM GRANULOCYTES # BLD AUTO: 0.05 K/UL (ref 0–0.3)
IMM GRANULOCYTES NFR BLD: 1 %
INR PPP: 1.7
LYMPHOCYTES NFR BLD: 1.56 K/UL (ref 1.1–3.7)
LYMPHOCYTES RELATIVE PERCENT: 17 % (ref 24–43)
MCH RBC QN AUTO: 31.8 PG (ref 25.2–33.5)
MCHC RBC AUTO-ENTMCNC: 33.4 G/DL (ref 28.4–34.8)
MCV RBC AUTO: 95.2 FL (ref 82.6–102.9)
MONOCYTES NFR BLD: 0.84 K/UL (ref 0.1–1.2)
MONOCYTES NFR BLD: 9 % (ref 3–12)
MRSA, DNA, NASAL: NEGATIVE
NEUTROPHILS NFR BLD: 70 % (ref 36–65)
NEUTS SEG NFR BLD: 6.55 K/UL (ref 1.5–8.1)
NRBC BLD-RTO: 0 PER 100 WBC
PLATELET # BLD AUTO: 255 K/UL (ref 138–453)
PMV BLD AUTO: 10 FL (ref 8.1–13.5)
POTASSIUM SERPL-SCNC: 4.5 MMOL/L (ref 3.7–5.3)
PROTHROMBIN TIME: 20.7 SEC (ref 11.7–14.9)
RBC # BLD AUTO: 3.74 M/UL (ref 4.21–5.77)
RBC # BLD: ABNORMAL 10*6/UL
SODIUM SERPL-SCNC: 135 MMOL/L (ref 136–145)
SPECIMEN DESCRIPTION: NORMAL
VANCOMYCIN SERPL-MCNC: 23.4 UG/ML (ref 5–40)
WBC OTHER # BLD: 9.3 K/UL (ref 3.5–11.3)

## 2025-06-12 PROCEDURE — 99232 SBSQ HOSP IP/OBS MODERATE 35: CPT | Performed by: INTERNAL MEDICINE

## 2025-06-12 PROCEDURE — 80048 BASIC METABOLIC PNL TOTAL CA: CPT

## 2025-06-12 PROCEDURE — 80202 ASSAY OF VANCOMYCIN: CPT

## 2025-06-12 PROCEDURE — 6360000002 HC RX W HCPCS: Performed by: STUDENT IN AN ORGANIZED HEALTH CARE EDUCATION/TRAINING PROGRAM

## 2025-06-12 PROCEDURE — G0545 PR INHERENT VISIT TO INPT: HCPCS | Performed by: INTERNAL MEDICINE

## 2025-06-12 PROCEDURE — 6370000000 HC RX 637 (ALT 250 FOR IP)

## 2025-06-12 PROCEDURE — APPSS30 APP SPLIT SHARED TIME 16-30 MINUTES: Performed by: NURSE PRACTITIONER

## 2025-06-12 PROCEDURE — 6360000002 HC RX W HCPCS

## 2025-06-12 PROCEDURE — 6370000000 HC RX 637 (ALT 250 FOR IP): Performed by: PHYSICIAN ASSISTANT

## 2025-06-12 PROCEDURE — 99232 SBSQ HOSP IP/OBS MODERATE 35: CPT | Performed by: STUDENT IN AN ORGANIZED HEALTH CARE EDUCATION/TRAINING PROGRAM

## 2025-06-12 PROCEDURE — 51702 INSERT TEMP BLADDER CATH: CPT

## 2025-06-12 PROCEDURE — 2580000003 HC RX 258

## 2025-06-12 PROCEDURE — 36415 COLL VENOUS BLD VENIPUNCTURE: CPT

## 2025-06-12 PROCEDURE — 85025 COMPLETE CBC W/AUTO DIFF WBC: CPT

## 2025-06-12 PROCEDURE — 6370000000 HC RX 637 (ALT 250 FOR IP): Performed by: STUDENT IN AN ORGANIZED HEALTH CARE EDUCATION/TRAINING PROGRAM

## 2025-06-12 PROCEDURE — 82947 ASSAY GLUCOSE BLOOD QUANT: CPT

## 2025-06-12 PROCEDURE — 6370000000 HC RX 637 (ALT 250 FOR IP): Performed by: INTERNAL MEDICINE

## 2025-06-12 PROCEDURE — 85610 PROTHROMBIN TIME: CPT

## 2025-06-12 PROCEDURE — 2500000003 HC RX 250 WO HCPCS

## 2025-06-12 PROCEDURE — 1200000000 HC SEMI PRIVATE

## 2025-06-12 RX ORDER — LINEZOLID 600 MG/1
600 TABLET, FILM COATED ORAL EVERY 12 HOURS SCHEDULED
Status: DISCONTINUED | OUTPATIENT
Start: 2025-06-12 | End: 2025-06-19 | Stop reason: HOSPADM

## 2025-06-12 RX ORDER — SODIUM CHLORIDE 9 MG/ML
INJECTION, SOLUTION INTRAVENOUS CONTINUOUS
Status: DISCONTINUED | OUTPATIENT
Start: 2025-06-12 | End: 2025-06-12

## 2025-06-12 RX ORDER — AMLODIPINE BESYLATE 10 MG/1
10 TABLET ORAL DAILY
Status: DISCONTINUED | OUTPATIENT
Start: 2025-06-13 | End: 2025-06-19 | Stop reason: HOSPADM

## 2025-06-12 RX ORDER — POLYETHYLENE GLYCOL 3350 17 G/17G
17 POWDER, FOR SOLUTION ORAL DAILY
Status: DISCONTINUED | OUTPATIENT
Start: 2025-06-12 | End: 2025-06-19 | Stop reason: HOSPADM

## 2025-06-12 RX ORDER — AMLODIPINE BESYLATE 10 MG/1
5 TABLET ORAL ONCE
Status: COMPLETED | OUTPATIENT
Start: 2025-06-12 | End: 2025-06-12

## 2025-06-12 RX ADMIN — CARVEDILOL 12.5 MG: 12.5 TABLET, FILM COATED ORAL at 21:17

## 2025-06-12 RX ADMIN — SODIUM CHLORIDE, PRESERVATIVE FREE 10 ML: 5 INJECTION INTRAVENOUS at 21:16

## 2025-06-12 RX ADMIN — TRAMADOL HYDROCHLORIDE 50 MG: 50 TABLET, COATED ORAL at 00:33

## 2025-06-12 RX ADMIN — TAMSULOSIN HYDROCHLORIDE 0.4 MG: 0.4 CAPSULE ORAL at 07:51

## 2025-06-12 RX ADMIN — OXYCODONE 10 MG: 5 TABLET ORAL at 07:51

## 2025-06-12 RX ADMIN — AMLODIPINE BESYLATE 5 MG: 10 TABLET ORAL at 17:36

## 2025-06-12 RX ADMIN — AMLODIPINE BESYLATE 5 MG: 10 TABLET ORAL at 07:51

## 2025-06-12 RX ADMIN — Medication 10 MG: at 16:33

## 2025-06-12 RX ADMIN — VANCOMYCIN HYDROCHLORIDE 1000 MG: 1 INJECTION, POWDER, LYOPHILIZED, FOR SOLUTION INTRAVENOUS at 03:34

## 2025-06-12 RX ADMIN — OXYCODONE 10 MG: 5 TABLET ORAL at 13:07

## 2025-06-12 RX ADMIN — OXYCODONE 10 MG: 5 TABLET ORAL at 21:16

## 2025-06-12 RX ADMIN — CARVEDILOL 12.5 MG: 12.5 TABLET, FILM COATED ORAL at 07:51

## 2025-06-12 RX ADMIN — ASPIRIN 81 MG: 81 TABLET, COATED ORAL at 07:50

## 2025-06-12 RX ADMIN — ATORVASTATIN CALCIUM 40 MG: 40 TABLET, FILM COATED ORAL at 21:17

## 2025-06-12 RX ADMIN — SODIUM CHLORIDE, PRESERVATIVE FREE 5 ML: 5 INJECTION INTRAVENOUS at 07:51

## 2025-06-12 RX ADMIN — LINEZOLID 600 MG: 600 TABLET, FILM COATED ORAL at 21:16

## 2025-06-12 ASSESSMENT — PAIN DESCRIPTION - ORIENTATION
ORIENTATION: RIGHT
ORIENTATION: RIGHT

## 2025-06-12 ASSESSMENT — PAIN DESCRIPTION - DESCRIPTORS
DESCRIPTORS: THROBBING
DESCRIPTORS: ACHING
DESCRIPTORS: JABBING

## 2025-06-12 ASSESSMENT — PAIN SCALES - GENERAL
PAINLEVEL_OUTOF10: 10
PAINLEVEL_OUTOF10: 10
PAINLEVEL_OUTOF10: 7
PAINLEVEL_OUTOF10: 10
PAINLEVEL_OUTOF10: 5
PAINLEVEL_OUTOF10: 0
PAINLEVEL_OUTOF10: 0

## 2025-06-12 ASSESSMENT — PAIN SCALES - WONG BAKER
WONGBAKER_NUMERICALRESPONSE: NO HURT
WONGBAKER_NUMERICALRESPONSE: NO HURT

## 2025-06-12 ASSESSMENT — PAIN DESCRIPTION - LOCATION
LOCATION: PENIS
LOCATION: HAND
LOCATION: FINGER (COMMENT WHICH ONE);ARM

## 2025-06-12 NOTE — PLAN OF CARE
Patient failed void trial and had 1 L on bladder scan.  18 Citizen of [place] coudé catheter placed with ease.  Patient will need to be discharged with George catheter and follow-up with urology in outpatient to address his urinary retention.    Roberto Carlos Perry MD  Urology Resident, PGY-3

## 2025-06-12 NOTE — PLAN OF CARE
Problem: Chronic Conditions and Co-morbidities  Goal: Patient's chronic conditions and co-morbidity symptoms are monitored and maintained or improved  Outcome: Progressing     Problem: Discharge Planning  Goal: Discharge to home or other facility with appropriate resources  Outcome: Progressing     Problem: Safety - Adult  Goal: Free from fall injury  Outcome: Progressing     Problem: Pain  Goal: Verbalizes/displays adequate comfort level or baseline comfort level  Outcome: Progressing     Problem: Musculoskeletal - Adult  Goal: Return mobility to safest level of function  Outcome: Progressing  Goal: Maintain proper alignment of affected body part  Outcome: Progressing  Goal: Return ADL status to a safe level of function  Outcome: Progressing     Problem: Musculoskeletal - Adult  Goal: Return mobility to safest level of function  Outcome: Progressing  Goal: Maintain proper alignment of affected body part  Outcome: Progressing  Goal: Return ADL status to a safe level of function  Outcome: Progressing

## 2025-06-12 NOTE — PROGRESS NOTES
Spiritual Health History and Assessment/Progress Note  [facility]    (P) Initial Encounter,  ,  ,      Name: Asher Ayon MRN: [id number]    Age: 69 y.o.     Sex: male   Language: English   Mandaen: None   BRIANNA (acute kidney injury)     Date: 6/11/2025            Total Time Calculated: (P) 15 min              Spiritual Assessment began in [facility]MED SURG        Referral/Consult From: (P) Rounding   Encounter Overview/Reason: (P) Initial Encounter  Service Provided For: (P) Patient    Patient was lying in bed, holding injured hand. Writer took a few tries to hear that he did not feel he was getting the pain meds he wanted. Writer asked how he is holding up, patient said pain was at 11. Writer pointed out that the patient is very strong for handling it with barely an outward sign of pain. Writer noted that sleep in the hospital is scarce and then departed.     Regla, Belief, Meaning:   Patient unable to assess at this time  Family/Friends No family/friends present      Importance and Influence:  Patient unable to assess at this time  Family/Friends No family/friends present    Community:  Patient expresses feelings of isolation: disconnected from family/friends and Other: Mother is elderly  Family/Friends No family/friends present    Assessment and Plan of Care:     Patient Interventions include: Facilitated expression of thoughts and feelings and Affirmed coping skills/support systems  Family/Friends Interventions include: No family/friends present    Patient Plan of Care: Spiritual Care available upon further referral  Family/Friends Plan of Care: No family/friends present    Electronically signed by Chaplain Iftikhar Intern on 6/11/2025 at 10:27 PM

## 2025-06-12 NOTE — PROGRESS NOTES
Eric [facility]   Pharmacy Pharmacokinetic Monitoring Service - Vancomycin    Consulting Provider: Dr. Jacobson  Indication: cellulitis- hand infection  Target Concentration: Goal AUC/EUGENE 400-600 mg*hr/L  Day of Therapy: 2  Additional Antimicrobials: cefepime    Pertinent Laboratory Values:   Wt Readings from Last 1 Encounters:   06/12/25 85.1 kg (187 lb 9.8 oz)     Temp Readings from Last 1 Encounters:   06/12/25 97.3 °F (36.3 °C) (Oral)     Estimated Creatinine Clearance: 49 mL/min (A) (based on SCr of 1.5 mg/dL (H)).  Recent Labs     06/11/25  0609 06/12/25  0715   CREATININE 1.7* 1.5*   BUN 36* 30*   WBC 8.9 9.3     Procalcitonin:     Pertinent Cultures:  Culture Date Source Results        MRSA Nasal Swab: N/A. Non-respiratory infection.    Recent vancomycin administrations                     vancomycin (VANCOCIN) 1,000 mg in sodium chloride 0.9 % 250 mL IVPB ([id number]) (mg) 1,000 mg New Bag 06/12/25 0334    vancomycin (VANCOCIN) 1750 mg in sodium chloride 0.9 % 500 mL IVPB (mg) 1,750 mg New Bag 06/11/25 0524                    Assessment:  Date/Time Current Dose Concentration Timing of Concentration (h) AUC   6/12 1130 1000mg q24 23.4 Peak, 2.5 hrs post dose 415   Note: Serum concentrations collected for AUC dosing may appear elevated if collected in close proximity to the dose administered, this is not necessarily an indication of toxicity    Plan:  Current dosing regimen is therapeutic  Continue current dose  Repeat vancomycin concentration will be ordered when clinically indicated.   Pharmacy will continue to monitor patient and adjust therapy as indicated    Thank you for the consult,    Saloni Andujar.D., BCPS, BCSCP  6/12/2025  11:32 AM

## 2025-06-12 NOTE — PROGRESS NOTES
[facility] - Progress Note    Today's Date and Time: 6/12/2025, 3:49 PM    Impression :   Altered mentation-resolved  Concern for right hand cellulitis   Hx of right middle finger burn s/p finger amputation with placement of integra skin graft on 5/27/25  BRIANNA on CKD  HTN  DM II  CAD with a history of CABG  Afib  Hx of osteomyelitis  Previous toe amputations on both feet  PVD  MRSA hx in 2015  PCN allergy  Doxycycline allergy    Recommendations:   D/C Vancomycin and Zosyn   Linezolid 600 mg po BID x14 days  Wound Care  Plastic Surgery recommendations    Medical Decision Making/Summary/Discussion:6/12/2025     Elements of Medical Decision Making:  Note: I have independently performed the steps listed below as part of the medical decision making and evaluation.   Examined and discussed with patient.  Altered mentation  Concern for right hand cellulitis  History of right middle finger burn.  Status post finger amputation with placement of Integra skin graft on 5/27/2025  BRIANNA on CKD  Essential hypertension  Diabetes mellitus type 2  Coronary artery disease with previous history of CABG  Atrial fibrillation  Prior history of osteomyelitis  Prior toe amputations on both feet  Peripheral vascular disease  MRSA carrier since 2015  Allergy to penicillin  Allergy to doxycycline  Labs, medications, radiologic studies were reviewed with personal review of films  Radiologic studies  Lab work  Cultures  Large amounts of data were reviewed  Please see the history of present illness and progress note  Discussed with nursing Staff, Discharge planner  Dr. Overton  Infection Control and Prevention measures reviewed  Universal precaution  Contact isolation: MRSA  All prior entries were reviewed  Internal medicine notes reviewed  Administer medications as ordered  On cefepime pending further culture data  On vancomycin pending further culture data  Prognosis:   Guarded  Discharge planning  have independently reviewed/ordered the following labs:    CBC with Differential:   Recent Labs     06/11/25  0609 06/12/25  0715   WBC 8.9 9.3   HGB 11.8* 11.9*   HCT 33.4* 35.6*    255   LYMPHOPCT 12* 17*   MONOPCT 8 9   EOSPCT 2 3     BMP:   Recent Labs     06/11/25  0609 06/12/25  0715   * 135*   K 3.7 4.5    103   CO2 20 17*   BUN 36* 30*   CREATININE 1.7* 1.5*     Hepatic Function Panel:   Recent Labs     06/10/25  0759   BILITOT 0.6   ALKPHOS 128   ALT 25   AST 36     Lab Results   Component Value Date/Time    VANCOTROUGH 10.0 10/13/2015 02:20 PM        Medications:      polyethylene glycol  17 g Oral Daily    [START ON 6/13/2025] amLODIPine  10 mg Oral Daily    vancomycin (VANCOCIN) intermittent dosing (placeholder)   Other RX Placeholder    tamsulosin  0.4 mg Oral Daily    carvedilol  12.5 mg Oral BID    vancomycin  1,000 mg IntraVENous Q24H    aspirin  81 mg Oral Daily    atorvastatin  40 mg Oral Nightly    cefepime  1,000 mg IntraVENous Q24H    sodium chloride flush  5-40 mL IntraVENous 2 times per day    insulin lispro  0-8 Units SubCUTAneous 4x Daily AC & HS       Thank you for allowing us to participate in the care of this patient. Please call with questions.    Maricel Ballard, KAYLENE  Pager: [phone]  - Office: [phone]

## 2025-06-12 NOTE — PROGRESS NOTES
Patient refused 2 units of insulin coverage as ordered. Patient stated, \"I don’t take insulin and I like my blood sugar high. I feel better that way.\" Blood glucose at the time was 210 mg/dL. Risks and benefits of insulin administration were explained to the patient. Patient verbalized understanding but continued to refuse insulin. Provider was notified of the refusal and patient’s stated preference. Will continue to monitor.

## 2025-06-13 LAB
ANION GAP SERPL CALCULATED.3IONS-SCNC: 9 MMOL/L (ref 9–16)
BASOPHILS # BLD: 0.04 K/UL (ref 0–0.2)
BASOPHILS NFR BLD: 1 % (ref 0–2)
BUN SERPL-MCNC: 30 MG/DL (ref 8–23)
CALCIUM SERPL-MCNC: 9 MG/DL (ref 8.6–10.4)
CHLORIDE SERPL-SCNC: 100 MMOL/L (ref 98–107)
CO2 SERPL-SCNC: 21 MMOL/L (ref 20–31)
CREAT SERPL-MCNC: 1.6 MG/DL (ref 0.7–1.2)
EOSINOPHIL # BLD: 0.17 K/UL (ref 0–0.44)
EOSINOPHILS RELATIVE PERCENT: 2 % (ref 1–4)
ERYTHROCYTE [DISTWIDTH] IN BLOOD BY AUTOMATED COUNT: 14.6 % (ref 11.8–14.4)
GFR, ESTIMATED: 46 ML/MIN/1.73M2
GLUCOSE BLD-MCNC: 164 MG/DL (ref 75–110)
GLUCOSE BLD-MCNC: 210 MG/DL (ref 75–110)
GLUCOSE BLD-MCNC: 242 MG/DL (ref 75–110)
GLUCOSE BLD-MCNC: 247 MG/DL (ref 75–110)
GLUCOSE SERPL-MCNC: 236 MG/DL (ref 74–99)
HCT VFR BLD AUTO: 29.9 % (ref 40.7–50.3)
HGB BLD-MCNC: 10 G/DL (ref 13–17)
IMM GRANULOCYTES # BLD AUTO: 0.04 K/UL (ref 0–0.3)
IMM GRANULOCYTES NFR BLD: 1 %
INR PPP: 1.5
LYMPHOCYTES NFR BLD: 0.95 K/UL (ref 1.1–3.7)
LYMPHOCYTES RELATIVE PERCENT: 13 % (ref 24–43)
MCH RBC QN AUTO: 31.8 PG (ref 25.2–33.5)
MCHC RBC AUTO-ENTMCNC: 33.4 G/DL (ref 28.4–34.8)
MCV RBC AUTO: 95.2 FL (ref 82.6–102.9)
MONOCYTES NFR BLD: 0.73 K/UL (ref 0.1–1.2)
MONOCYTES NFR BLD: 10 % (ref 3–12)
NEUTROPHILS NFR BLD: 73 % (ref 36–65)
NEUTS SEG NFR BLD: 5.44 K/UL (ref 1.5–8.1)
NRBC BLD-RTO: 0 PER 100 WBC
PLATELET # BLD AUTO: 197 K/UL (ref 138–453)
PMV BLD AUTO: 9.8 FL (ref 8.1–13.5)
POTASSIUM SERPL-SCNC: 4.6 MMOL/L (ref 3.7–5.3)
PROTHROMBIN TIME: 18.6 SEC (ref 11.7–14.9)
RBC # BLD AUTO: 3.14 M/UL (ref 4.21–5.77)
RBC # BLD: ABNORMAL 10*6/UL
SODIUM SERPL-SCNC: 130 MMOL/L (ref 136–145)
WBC OTHER # BLD: 7.4 K/UL (ref 3.5–11.3)

## 2025-06-13 PROCEDURE — 6370000000 HC RX 637 (ALT 250 FOR IP): Performed by: STUDENT IN AN ORGANIZED HEALTH CARE EDUCATION/TRAINING PROGRAM

## 2025-06-13 PROCEDURE — 1200000000 HC SEMI PRIVATE

## 2025-06-13 PROCEDURE — 6370000000 HC RX 637 (ALT 250 FOR IP)

## 2025-06-13 PROCEDURE — 99232 SBSQ HOSP IP/OBS MODERATE 35: CPT | Performed by: INTERNAL MEDICINE

## 2025-06-13 PROCEDURE — 6360000002 HC RX W HCPCS: Performed by: STUDENT IN AN ORGANIZED HEALTH CARE EDUCATION/TRAINING PROGRAM

## 2025-06-13 PROCEDURE — 85610 PROTHROMBIN TIME: CPT

## 2025-06-13 PROCEDURE — 85025 COMPLETE CBC W/AUTO DIFF WBC: CPT

## 2025-06-13 PROCEDURE — 51798 US URINE CAPACITY MEASURE: CPT

## 2025-06-13 PROCEDURE — 36415 COLL VENOUS BLD VENIPUNCTURE: CPT

## 2025-06-13 PROCEDURE — 99232 SBSQ HOSP IP/OBS MODERATE 35: CPT | Performed by: STUDENT IN AN ORGANIZED HEALTH CARE EDUCATION/TRAINING PROGRAM

## 2025-06-13 PROCEDURE — 6370000000 HC RX 637 (ALT 250 FOR IP): Performed by: PHYSICIAN ASSISTANT

## 2025-06-13 PROCEDURE — 2500000003 HC RX 250 WO HCPCS

## 2025-06-13 PROCEDURE — 6360000002 HC RX W HCPCS: Performed by: NURSE PRACTITIONER

## 2025-06-13 PROCEDURE — APPSS30 APP SPLIT SHARED TIME 16-30 MINUTES: Performed by: NURSE PRACTITIONER

## 2025-06-13 PROCEDURE — 80048 BASIC METABOLIC PNL TOTAL CA: CPT

## 2025-06-13 PROCEDURE — 6370000000 HC RX 637 (ALT 250 FOR IP): Performed by: INTERNAL MEDICINE

## 2025-06-13 PROCEDURE — 82947 ASSAY GLUCOSE BLOOD QUANT: CPT

## 2025-06-13 RX ORDER — INSULIN GLARGINE 100 [IU]/ML
10 INJECTION, SOLUTION SUBCUTANEOUS NIGHTLY
Status: DISCONTINUED | OUTPATIENT
Start: 2025-06-13 | End: 2025-06-17

## 2025-06-13 RX ORDER — SENNA AND DOCUSATE SODIUM 50; 8.6 MG/1; MG/1
2 TABLET, FILM COATED ORAL DAILY PRN
Status: DISCONTINUED | OUTPATIENT
Start: 2025-06-13 | End: 2025-06-15

## 2025-06-13 RX ADMIN — OXYCODONE 10 MG: 5 TABLET ORAL at 15:21

## 2025-06-13 RX ADMIN — INSULIN LISPRO 2 UNITS: 100 INJECTION, SOLUTION INTRAVENOUS; SUBCUTANEOUS at 12:41

## 2025-06-13 RX ADMIN — SODIUM CHLORIDE, PRESERVATIVE FREE 10 ML: 5 INJECTION INTRAVENOUS at 05:25

## 2025-06-13 RX ADMIN — SODIUM CHLORIDE, PRESERVATIVE FREE 10 ML: 5 INJECTION INTRAVENOUS at 00:04

## 2025-06-13 RX ADMIN — OXYCODONE 10 MG: 5 TABLET ORAL at 00:37

## 2025-06-13 RX ADMIN — SODIUM CHLORIDE, PRESERVATIVE FREE 10 ML: 5 INJECTION INTRAVENOUS at 19:55

## 2025-06-13 RX ADMIN — Medication 10 MG: at 00:30

## 2025-06-13 RX ADMIN — INSULIN GLARGINE 10 UNITS: 100 INJECTION, SOLUTION SUBCUTANEOUS at 19:56

## 2025-06-13 RX ADMIN — OXYCODONE 10 MG: 5 TABLET ORAL at 08:38

## 2025-06-13 RX ADMIN — DOCUSATE SODIUM 50MG AND SENNOSIDES 8.6MG 2 TABLET: 8.6; 5 TABLET, FILM COATED ORAL at 12:41

## 2025-06-13 RX ADMIN — ATORVASTATIN CALCIUM 40 MG: 40 TABLET, FILM COATED ORAL at 19:55

## 2025-06-13 RX ADMIN — TAMSULOSIN HYDROCHLORIDE 0.4 MG: 0.4 CAPSULE ORAL at 08:38

## 2025-06-13 RX ADMIN — INSULIN LISPRO 2 UNITS: 100 INJECTION, SOLUTION INTRAVENOUS; SUBCUTANEOUS at 19:55

## 2025-06-13 RX ADMIN — INSULIN LISPRO 2 UNITS: 100 INJECTION, SOLUTION INTRAVENOUS; SUBCUTANEOUS at 17:34

## 2025-06-13 RX ADMIN — CARVEDILOL 12.5 MG: 12.5 TABLET, FILM COATED ORAL at 19:55

## 2025-06-13 RX ADMIN — ACETAMINOPHEN 650 MG: 325 TABLET ORAL at 23:32

## 2025-06-13 RX ADMIN — LINEZOLID 600 MG: 600 TABLET, FILM COATED ORAL at 19:55

## 2025-06-13 RX ADMIN — LINEZOLID 600 MG: 600 TABLET, FILM COATED ORAL at 08:38

## 2025-06-13 RX ADMIN — CARVEDILOL 12.5 MG: 12.5 TABLET, FILM COATED ORAL at 08:38

## 2025-06-13 RX ADMIN — SODIUM CHLORIDE, PRESERVATIVE FREE 10 ML: 5 INJECTION INTRAVENOUS at 08:39

## 2025-06-13 RX ADMIN — OXYCODONE 10 MG: 5 TABLET ORAL at 23:31

## 2025-06-13 RX ADMIN — OXYCODONE 10 MG: 5 TABLET ORAL at 19:54

## 2025-06-13 RX ADMIN — HYDROMORPHONE HYDROCHLORIDE 0.5 MG: 1 INJECTION, SOLUTION INTRAMUSCULAR; INTRAVENOUS; SUBCUTANEOUS at 21:40

## 2025-06-13 RX ADMIN — AMLODIPINE BESYLATE 10 MG: 10 TABLET ORAL at 08:38

## 2025-06-13 RX ADMIN — ASPIRIN 81 MG: 81 TABLET, COATED ORAL at 08:38

## 2025-06-13 ASSESSMENT — PAIN SCALES - GENERAL
PAINLEVEL_OUTOF10: 10
PAINLEVEL_OUTOF10: 8
PAINLEVEL_OUTOF10: 0
PAINLEVEL_OUTOF10: 8
PAINLEVEL_OUTOF10: 2
PAINLEVEL_OUTOF10: 2
PAINLEVEL_OUTOF10: 9
PAINLEVEL_OUTOF10: 8

## 2025-06-13 ASSESSMENT — PAIN DESCRIPTION - ORIENTATION
ORIENTATION: RIGHT
ORIENTATION: LOWER
ORIENTATION: LOWER

## 2025-06-13 ASSESSMENT — PAIN SCALES - WONG BAKER
WONGBAKER_NUMERICALRESPONSE: NO HURT
WONGBAKER_NUMERICALRESPONSE: HURTS A LITTLE BIT
WONGBAKER_NUMERICALRESPONSE: HURTS A LITTLE BIT

## 2025-06-13 ASSESSMENT — PAIN DESCRIPTION - LOCATION
LOCATION: ABDOMEN;BACK
LOCATION: HAND
LOCATION: HAND
LOCATION: ABDOMEN

## 2025-06-13 ASSESSMENT — PAIN DESCRIPTION - DESCRIPTORS
DESCRIPTORS: THROBBING
DESCRIPTORS: ACHING
DESCRIPTORS: ACHING
DESCRIPTORS: THROBBING

## 2025-06-13 ASSESSMENT — PAIN - FUNCTIONAL ASSESSMENT
PAIN_FUNCTIONAL_ASSESSMENT: ACTIVITIES ARE NOT PREVENTED
PAIN_FUNCTIONAL_ASSESSMENT: ACTIVITIES ARE NOT PREVENTED

## 2025-06-13 NOTE — PLAN OF CARE
Problem: Chronic Conditions and Co-morbidities  Goal: Patient's chronic conditions and co-morbidity symptoms are monitored and maintained or improved  6/13/2025 0140 by Oleg Willams RN  Outcome: Progressing  6/12/2025 2224 by Oleg Willams RN  Outcome: Progressing     Problem: Discharge Planning  Goal: Discharge to home or other facility with appropriate resources  6/13/2025 0140 by Olge Willams RN  Outcome: Progressing  6/12/2025 2224 by Oleg Willams RN  Outcome: Progressing     Problem: Safety - Adult  Goal: Free from fall injury  6/13/2025 0140 by Oleg Willams RN  Outcome: Progressing  6/12/2025 2224 by Oleg Willams RN  Outcome: Progressing     Problem: Pain  Goal: Verbalizes/displays adequate comfort level or baseline comfort level  6/13/2025 0140 by Oleg Willams RN  Outcome: Progressing  6/12/2025 2224 by Oleg Willams RN  Outcome: Progressing     Problem: Musculoskeletal - Adult  Goal: Return mobility to safest level of function  6/13/2025 0140 by Oleg Willams RN  Outcome: Progressing  6/12/2025 2224 by Oleg Willams RN  Outcome: Progressing  Goal: Maintain proper alignment of affected body part  6/13/2025 0140 by Oleg Willams RN  Outcome: Progressing  6/12/2025 2224 by Oleg Willams RN  Outcome: Progressing  Goal: Return ADL status to a safe level of function  6/13/2025 0140 by Oleg Willams RN  Outcome: Progressing  6/12/2025 2224 by Oleg Willams RN  Outcome: Progressing

## 2025-06-13 NOTE — PROGRESS NOTES
[facility]  Office: [phone]  Jorge Valenzuela, DO, Nacho Dent, DO, Justin Dumont DO, Maxwell Dunlap, DO, Nica House MD, Helene Munoz MD, Sarmad Hu MD, Lupis Novak MD,  Jasen Wood MD, Kenia Jones MD, Afsaneh Rodriguez MD,  Jack Read DO, Alise Overton MD, David Thornton MD, Asher Valenzuela DO, Shanel Bah MD,  Eris Wyman DO, Li Lyles MD, Imani Diego MD, Anthony Cuadra MD,  Talat Vásquez MD, Simran Singh MD, Raven Mann MD, Sana Kiser MD, Dakota Teague MD, Scott Sandoval MD, Blayne Richards, DO, Zaina Rod MD, Elpidio Gonzalez DO, Earl Haney MD, Jack Ga MD, Mohsin Reza, MD, Lee Ann Cantu MD, Shirley Waterhouse, CNP,  Meenakshi Kimbrough, CNP, Blayne Fowler, CNP,  Nahomy Salvador, SAHIL, Any Traylor, CNP, Allyson Hernández, CNP, Justina Graahm, CNP, Joselyn Michaud, CNP, Corine Conrad, PA-C, Mini Butterfield, CNP, Amy Echevarria, CNP,  Chastity Jean, CNP, Oriana Sanders, CNP, Hardeep Jacobson, PA-C, Evelia Dinh, CNP,  Sera Marcum, CNS, Cuca Nieto, CNP, Andra Paez, CNP,   Jeri Mcadams, CNP         [facility]   IN-PATIENT SERVICE   [facility]    Progress Note    6/12/2025    9:19 PM    Name:   Asher Ayon  MRN:     [id number]     Acct:      [id number]   Room:   0332/0332-01  IP Day:  2  Admit Date:  6/10/2025 11:47 PM    PCP:   Kevin Herrera MD  Code Status:  Full Code    Subjective:     C/C: No chief complaint on file.    Interval History Status: not changed.     Seen at bedside, hypertensive  Complaining of hand pain, plastic surgery evaluation endocrine  Labs reviewed, creatinine improving  Failed void trial George reinserted    Brief History:     Per my colleague   \"69-year-old male with known medical history of type 2 diabetes mellitus, CKD, CAD with history of CABG, A-fib, hypertension, hyperlipidemia, mitral valve clip implantation was transferred from outlying facility after patient was  6/11/2025 Yes    Chronic systolic (congestive) heart failure (HCC) 6/11/2025 Yes    ICD (implantable cardioverter-defibrillator) in place 6/11/2025 Yes    Encounter for monitoring Coumadin therapy 6/11/2025 Yes    S/P mitral valve clip implantation 6/11/2025 Yes    Hypoglycemia 6/11/2025 Yes    Stupor 6/11/2025 Yes    Cellulitis of hand 6/11/2025 Yes    MRSA carrier 6/11/2025 Yes       Plan:        AMS likely due to hypoglycemia resolved  Hypoglycemia with type 2 diabetes-continue to watch blood sugars, continue sliding scale     Complicated hand infection with right middle finger amputation s/p burn -continue Zyvox appreciate ID/plastic surgery recommendations, pain meds as needed     BRIANNA on CKD stage III-status post IV fluids George in place, urology consulted for hematuria.  Coumadin on hold for now, continues to be on Flomax  Uncontrolled blood pressure with history of essential hypertension optimize medications as needed     CAD with history of CABG, hyperlipidemia-on aspirin, Lipitor, beta-blocker, nitro as needed     Ischemic CMP, moderate pulmonary hypertension- has AICD In place, diuretics on hold for now     Mitral clip in place     Afib on coumadin,currently held, continue beta-blocker  History of TIA, peripheral artery disease continue aspirin, Lipitor      Raven Harris Sra, MD  6/12/2025  9:19 PM

## 2025-06-13 NOTE — PLAN OF CARE
Problem: Chronic Conditions and Co-morbidities  Goal: Patient's chronic conditions and co-morbidity symptoms are monitored and maintained or improved  Outcome: Progressing     Problem: Discharge Planning  Goal: Discharge to home or other facility with appropriate resources  Outcome: Progressing     Problem: Safety - Adult  Goal: Free from fall injury  Outcome: Progressing     Problem: Pain  Goal: Verbalizes/displays adequate comfort level or baseline comfort level  Outcome: Progressing     Problem: Musculoskeletal - Adult  Goal: Return mobility to safest level of function  Outcome: Progressing  Goal: Maintain proper alignment of affected body part  Outcome: Progressing  Goal: Return ADL status to a safe level of function  Outcome: Progressing

## 2025-06-13 NOTE — PROGRESS NOTES
0855- Patient called out and stated he \"needed to urinate and was cramping\". Patient has almazan catheter and writer notes no output in bag. Writer bladder scans and notes that there is 476 ml in bladder. Writer pages urology to get the okay to hand irrigate. Patient hand irrigated, only small dark clots removed and patients catheter starts to flow. Urine returned is very dark brown. Writer re-scans bladder and notes it to be 20 mls. Urology updated. Will continue to monitor.

## 2025-06-13 NOTE — DISCHARGE INSTRUCTIONS
Continue taking medications as prescribed , keep a record of your blood sugars at home and take to your PCP office  Follow-up with PCP within 1 week, discuss with PCP/nephrologist regarding medication changes made in the hospital and optimization as required  keep George catheter at least for 1 week and follow-up with urologist as outpatient for further workup of hematuria, return back to ER if your hematuria/blood in urine worsens  Follow-up with plastic surgery/orthopedic surgery as given    Orthopaedic Instructions:  -Weight bearing status: Weight bearing as tolerated with the right arm  -Do not remove dressings until your post-operative follow up visit.  -Always look for signs of compartment syndrome: pain out of proportion to the injury, pain not controlled with pain medication, numbness in digits, changing of color of digits (paleness). If these signs occur return to ED immediately for reassessment.  -Always work on finger motion (to non-injured fingers) while in splint to decrease swelling.  -Ice (20 minutes on and off 1 hour) and elevate above the level of the heart to reduce swelling and throbbing pain.  -Call the office or come to Emergency Room if signs of infection appear (hot, swollen, red, draining pus, fever).  -Take medications as prescribed.  -Wean off narcotics (percocet/norco) as soon as possible. Do not take tylenol if still taking narcotics.  -Follow up with Dr. Duran in his office on 7/1/2025. Call [phone] to schedule/confirm or with any questions/concerns.

## 2025-06-13 NOTE — CONSULTS
Plastic Surgery Consult  (Dr. Duran)    Time of Evaluation: 4:45 PM    CC/Reason for consult:      HPI:      The patient is a 69 y.o. right-hand-dominant male that presented to [facility] on 6/10/2025 as a result of AMS.  The patient was initially found unresponsive by family and his glucose at the time was reportedly measuring 40.  Plastic surgery was consulted on this patient as a result of a recent surgery that they underwent with Dr. Duran.  The patient underwent right middle finger amputation as well as right index finger and little finger Integra skin graft placement on 5/27/2025.  The patient still has sutures in place to the right middle finger amputation site, as well as staples in place to the right index finger and little finger Integra skin graft sites.  The patient was last seen in our plastic surgery clinic on 5/3/2025.  At that time the patient was doing well and they were still continuing with their oral antibiotics at that time.  The patient was supposed to follow-up with us on 6/10/2025, however, this is the day in which they were admitted to [facility] and therefore did miss their appointment.  The patient states that since that time the areas in which staples and sutures had been placed are painful and that his skin has become to overgrow those areas.  The patient denies experiencing any drainage from the prior surgical sites.    Past Medical History:    Past Medical History:   Diagnosis Date    Amputation of toe     all left foot toes and 3.5 right foot toes    Atrial fibrillation (HCC)     Burn     fingers    CAD (coronary artery disease) 2010    s/p CABG- no stents    Cardiomyopathy (HCC)     Cellulitis     Cerebral artery occlusion with cerebral infarction ([facility])     2 \"mini\" strokes approx 2024    CHF (congestive heart failure) (HCC)     CKD (chronic kidney disease)     Diabetes mellitus (HCC)     Finger necrosis (HCC)     Foot infection      AMPUTATAION AT MTP, RIGHT FILLET OF FOURTH TOE performed by Eris Xiong DPM at [facility] OR    VASCULAR SURGERY Left 07/13/2022    RIGHT COMMON FEMORAL ACCESS UNDER ULTRASOUND GUIDANCE DISTAL AORTOGRAPHY WITH PELVIC RUN OFF PLACEMENT OF CATHETER INTO LEFT COMMON FEMORAL ARTERY, PROFUNDA FEMORAL AND SFA  ARTERIOGRAPHY PLACEMENT OF CATHETER INTO LEFT SFA, WITH SFA POPLITEAL AND TIBIAL TIBIOPERONEAL ARTERIOGRAPHY INTO LEFT FOOT performed by Abdias Nicole MD at [facility] OR     Medications Prior to Admission:   Prior to Admission medications    Medication Sig Start Date End Date Taking? Authorizing Provider   POTASSIUM CHLORIDE ER PO Take 1 tablet by mouth as needed (diuretic) Takes prn because of diuretic   Yes Pratibha Levy MD   pioglitazone (ACTOS) 45 MG tablet Take 1 tablet by mouth daily 3/20/25  Yes Pratibha Levy MD   gabapentin (NEURONTIN) 300 MG capsule Take 1 capsule by mouth 2 times daily as needed. 4/25/25  Yes Pratibha Levy MD   acetaminophen (TYLENOL) 325 MG tablet Take 2 tablets by mouth every 6 hours as needed   Yes ProviderPratibha MD   Misc. Devices MISC 1 PAIR OF DIABETIC SHOES (1 LEFT/ 1 RIGHT)  1-3 PAIRS OF INSERTS (LEFT/ RIGHT) 9/26/23  Yes Eris Xiong DPM   glimepiride (AMARYL) 2 MG tablet TAKE 1 TABLET BY MOUTH TWICE A DAY FOR 30 DAYS 3/28/23  Yes Pratibha Levy MD   warfarin (COUMADIN) 2 MG tablet Take 4 mg three times weekly on Tues, Thurs, Sun and 2 mg on all other days. Patient's INR is managed by [facility]   Yes Pratibha Levy MD   carvedilol (COREG) 3.125 MG tablet Take 1 tablet by mouth 2 times daily 1/21/23  Yes Pratibha Levy MD   benazepril (LOTENSIN) 5 MG tablet Take 1 tablet by mouth in the morning and at bedtime   Yes Pratibha Levy MD   Vitamin D (CHOLECALCIFEROL) 25 MCG (1000 UT) TABS tablet Take 1 tablet by mouth daily   Yes Pratibha Levy MD   aspirin 81 MG EC tablet Take 1

## 2025-06-13 NOTE — PROGRESS NOTES
[facility] - Progress Note    Today's Date and Time: 6/13/2025, 3:59 PM    Impression :   Altered mentation-resolved  Concern for right hand cellulitis   Hx of right middle finger burn s/p finger amputation with placement of integra skin graft on 5/27/25  BRIANNA on CKD  HTN  DM II  CAD with a history of CABG  Afib  Hx of osteomyelitis  Previous toe amputations on both feet  PVD  MRSA hx in 2015  PCN allergy  Doxycycline allergy    Recommendations:   D/C Vancomycin and Zosyn   Linezolid 600 mg po BID x14 days  Wound Care  Plastic Surgery recommendations    Medical Decision Making/Summary/Discussion:6/13/2025     Elements of Medical Decision Making:  Note: I have independently performed the steps listed below as part of the medical decision making and evaluation.   Examined and discussed with patient.  Altered mentation  Concern for right hand cellulitis  History of right middle finger burn.  Status post finger amputation with placement of Integra skin graft on 5/27/2025  BRIANNA on CKD  Essential hypertension  Diabetes mellitus type 2  Coronary artery disease with previous history of CABG  Atrial fibrillation  Prior history of osteomyelitis  Prior toe amputations on both feet  Peripheral vascular disease  MRSA carrier since 2015  Allergy to penicillin  Allergy to doxycycline  Labs, medications, radiologic studies were reviewed with personal review of films  Radiologic studies  Lab work  Cultures  Large amounts of data were reviewed  Please see the history of present illness and progress note  Discussed with nursing Staff, Discharge planner  Dr. Overton  Infection Control and Prevention measures reviewed  Universal precaution  Contact isolation: MRSA  All prior entries were reviewed  Internal medicine notes reviewed  Administer medications as ordered  On linezolid  Prognosis:   Guarded  Discharge planning reviewed  Follow up as outpatient.    Infection Control Recommendations   Universal  conjunctivae pink. No embolic phenomena.  ENT: Oropharynx clear, without erythema, exudate, or thrush. No tenderness of sinuses. Mouth/throat: mucosa pink and moist. No lesions. Dentition in good repair.  Neck:Supple, without lymphadenopathy. Thyroid normal, No bruits.  Pulmonary/Chest: Clear to auscultation, without wheezes, rales, or rhonchi.  No dullness to percussion.   Cardiovascular: Regular rate and rhythm without murmurs, rubs, or gallops.   Abdomen: Soft, non tender. Bowel sounds normal. No organomegaly  All four Extremities: No cyanosis, clubbing, edema, or effusions.  Neurologic: No gross sensory or motor deficits.  Skin: Warm and dry with good turgor. signs of peripheral arterial or venous insufficiency. . Right middle finger amputation site. Previous toe amputation sites on both feet      I have personally reviewed the past medical history, past surgical history, medications, social history, and family history, and I have updated the database accordingly.    Past Medical History:     Past Medical History:   Diagnosis Date    Amputation of toe     all left foot toes and 3.5 right foot toes    Atrial fibrillation (HCC)     Burn     fingers    CAD (coronary artery disease) 2010    s/p CABG- no stents    Cardiomyopathy (HCC)     Cellulitis     Cerebral artery occlusion with cerebral infarction (HCC)     2 \"mini\" strokes approx 2024    CHF (congestive heart failure) (HCC)     CKD (chronic kidney disease)     Diabetes mellitus (HCC)     Finger necrosis (HCC)     Foot infection     Heart failure (HCC)     decreased EF    Hyperlipidemia     Hypertension     MRSA (methicillin resistant staph aureus) culture positive     foot    Osteomyelitis (HCC)     Pacemaker     medtronic    Polyneuropathy     PVD (peripheral vascular disease)     S/P mitral valve clip implantation     Under care of service provider     pcp-gladys estrada visit april 2025    Under care of service provider

## 2025-06-13 NOTE — PROGRESS NOTES
[facility]  Office: [phone]  Jorge Valenzuela, DO, Nacho Dent, DO, Justin Dumont DO, Maxwell Dunlap, DO, Nica House MD, Helene Munoz MD, Sarmad Hu MD, Lupis Novak MD,  Jasen Wood MD, Kenia Jnoes MD, Afsaneh Rodriguez MD,  Jack Read DO, Alise Overton MD, David Thornton MD, Asher Valenzuela DO, Shanel Bah MD,  Eris Wyman DO, Li Lyles MD, Imani Diego MD, Anthony Cuadra MD,  Talat Vásquez MD, Simran Singh MD, Raven Mann MD, Sana Kiser MD, Dakota Teague MD, Scott Sandoval MD, Blayne Richards, DO, Zaina Rod MD, Elpidio Gonzalez DO, Earl Haney MD, Jack Ga MD, Mohsin Reza, MD, Lee Ann Cantu MD, Shirley Waterhouse, CNP,  Meenakshi Kimbrough, CNP, Blayne Fowler, CNP,  Nahomy Salvador, SAHIL, Any Traylor, CNP, Allyson Hernández, CNP, Justina Graham, CNP, Joselyn Michaud, CNP, Corine Conrad, PA-C, Mini Butterfield, CNP, Amy Echevarria, CNP,  Chastity Jean, CNP, Oriana Sanders, CNP, Hardeep Jacobson, PA-C, Evelia Dinh, CNP,  Sera Marcum, CNS, Cuca Nieto, CNP, Andra Paez, CNP,   Jeri Mcadams, CNP         [facility]   IN-PATIENT SERVICE   [facility]    Progress Note    6/13/2025    4:07 PM    Name:   Asher Ayon  MRN:     [id number]     Acct:      [id number]   Room:   0332/0332-01   Day:  3  Admit Date:  6/10/2025 11:47 PM    PCP:   Kevin Herrera MD  Code Status:  Full Code    Subjective:     C/C: No chief complaint on file.    Interval History Status: not changed.     Seen at bedside, blood pressure improved  Complaining of right hand pain, discussed with plastic surgery recommended orthopedic surgery evaluation  Labs reviewed, creatinine at baseline  Continues to have hematuria hemoglobin 10 urology service was informed  Blood sugars on the higher side regimen adjusted    Brief History:     Per my colleague   \"69-year-old male with known medical history of type 2 diabetes mellitus, CKD, CAD with

## 2025-06-14 LAB
ANION GAP SERPL CALCULATED.3IONS-SCNC: 12 MMOL/L (ref 9–16)
BASOPHILS # BLD: 0.06 K/UL (ref 0–0.2)
BASOPHILS NFR BLD: 1 % (ref 0–2)
BUN SERPL-MCNC: 29 MG/DL (ref 8–23)
CALCIUM SERPL-MCNC: 8.8 MG/DL (ref 8.6–10.4)
CHLORIDE SERPL-SCNC: 103 MMOL/L (ref 98–107)
CO2 SERPL-SCNC: 15 MMOL/L (ref 20–31)
CREAT SERPL-MCNC: 1.6 MG/DL (ref 0.7–1.2)
EOSINOPHIL # BLD: 0.21 K/UL (ref 0–0.44)
EOSINOPHILS RELATIVE PERCENT: 3 % (ref 1–4)
ERYTHROCYTE [DISTWIDTH] IN BLOOD BY AUTOMATED COUNT: 14.6 % (ref 11.8–14.4)
GFR, ESTIMATED: 46 ML/MIN/1.73M2
GLUCOSE BLD-MCNC: 110 MG/DL (ref 75–110)
GLUCOSE BLD-MCNC: 189 MG/DL (ref 75–110)
GLUCOSE BLD-MCNC: 192 MG/DL (ref 75–110)
GLUCOSE BLD-MCNC: 236 MG/DL (ref 75–110)
GLUCOSE SERPL-MCNC: 105 MG/DL (ref 74–99)
HCT VFR BLD AUTO: 29.5 % (ref 40.7–50.3)
HGB BLD-MCNC: 10 G/DL (ref 13–17)
IMM GRANULOCYTES # BLD AUTO: 0.05 K/UL (ref 0–0.3)
IMM GRANULOCYTES NFR BLD: 1 %
LYMPHOCYTES NFR BLD: 0.97 K/UL (ref 1.1–3.7)
LYMPHOCYTES RELATIVE PERCENT: 13 % (ref 24–43)
MCH RBC QN AUTO: 31.8 PG (ref 25.2–33.5)
MCHC RBC AUTO-ENTMCNC: 33.9 G/DL (ref 28.4–34.8)
MCV RBC AUTO: 93.9 FL (ref 82.6–102.9)
MONOCYTES NFR BLD: 0.75 K/UL (ref 0.1–1.2)
MONOCYTES NFR BLD: 10 % (ref 3–12)
NEUTROPHILS NFR BLD: 72 % (ref 36–65)
NEUTS SEG NFR BLD: 5.72 K/UL (ref 1.5–8.1)
NRBC BLD-RTO: 0 PER 100 WBC
PLATELET # BLD AUTO: 196 K/UL (ref 138–453)
PMV BLD AUTO: 9.4 FL (ref 8.1–13.5)
POTASSIUM SERPL-SCNC: 4.7 MMOL/L (ref 3.7–5.3)
RBC # BLD AUTO: 3.14 M/UL (ref 4.21–5.77)
RBC # BLD: ABNORMAL 10*6/UL
SODIUM SERPL-SCNC: 130 MMOL/L (ref 136–145)
WBC OTHER # BLD: 7.8 K/UL (ref 3.5–11.3)

## 2025-06-14 PROCEDURE — 85025 COMPLETE CBC W/AUTO DIFF WBC: CPT

## 2025-06-14 PROCEDURE — 6370000000 HC RX 637 (ALT 250 FOR IP): Performed by: PHYSICIAN ASSISTANT

## 2025-06-14 PROCEDURE — 6370000000 HC RX 637 (ALT 250 FOR IP): Performed by: NURSE PRACTITIONER

## 2025-06-14 PROCEDURE — 82947 ASSAY GLUCOSE BLOOD QUANT: CPT

## 2025-06-14 PROCEDURE — 6370000000 HC RX 637 (ALT 250 FOR IP): Performed by: INTERNAL MEDICINE

## 2025-06-14 PROCEDURE — 36415 COLL VENOUS BLD VENIPUNCTURE: CPT

## 2025-06-14 PROCEDURE — 99232 SBSQ HOSP IP/OBS MODERATE 35: CPT | Performed by: INTERNAL MEDICINE

## 2025-06-14 PROCEDURE — 6370000000 HC RX 637 (ALT 250 FOR IP): Performed by: STUDENT IN AN ORGANIZED HEALTH CARE EDUCATION/TRAINING PROGRAM

## 2025-06-14 PROCEDURE — 2500000003 HC RX 250 WO HCPCS

## 2025-06-14 PROCEDURE — 99232 SBSQ HOSP IP/OBS MODERATE 35: CPT | Performed by: STUDENT IN AN ORGANIZED HEALTH CARE EDUCATION/TRAINING PROGRAM

## 2025-06-14 PROCEDURE — 6370000000 HC RX 637 (ALT 250 FOR IP)

## 2025-06-14 PROCEDURE — 80048 BASIC METABOLIC PNL TOTAL CA: CPT

## 2025-06-14 PROCEDURE — 1200000000 HC SEMI PRIVATE

## 2025-06-14 PROCEDURE — G0545 PR INHERENT VISIT TO INPT: HCPCS | Performed by: INTERNAL MEDICINE

## 2025-06-14 RX ORDER — FUROSEMIDE 20 MG/1
20 TABLET ORAL DAILY
Status: DISCONTINUED | OUTPATIENT
Start: 2025-06-14 | End: 2025-06-19 | Stop reason: HOSPADM

## 2025-06-14 RX ORDER — POLYETHYLENE GLYCOL 3350 17 G/17G
17 POWDER, FOR SOLUTION ORAL ONCE
Status: COMPLETED | OUTPATIENT
Start: 2025-06-14 | End: 2025-06-14

## 2025-06-14 RX ORDER — LINEZOLID 600 MG/1
600 TABLET, FILM COATED ORAL EVERY 12 HOURS SCHEDULED
Qty: 26 TABLET | Refills: 0 | Status: SHIPPED | OUTPATIENT
Start: 2025-06-14 | End: 2025-06-27

## 2025-06-14 RX ADMIN — CARVEDILOL 12.5 MG: 12.5 TABLET, FILM COATED ORAL at 21:41

## 2025-06-14 RX ADMIN — SODIUM CHLORIDE, PRESERVATIVE FREE 10 ML: 5 INJECTION INTRAVENOUS at 21:41

## 2025-06-14 RX ADMIN — FUROSEMIDE 20 MG: 20 TABLET ORAL at 18:09

## 2025-06-14 RX ADMIN — TAMSULOSIN HYDROCHLORIDE 0.4 MG: 0.4 CAPSULE ORAL at 08:31

## 2025-06-14 RX ADMIN — OXYCODONE 10 MG: 5 TABLET ORAL at 06:06

## 2025-06-14 RX ADMIN — DOCUSATE SODIUM 50MG AND SENNOSIDES 8.6MG 2 TABLET: 8.6; 5 TABLET, FILM COATED ORAL at 08:36

## 2025-06-14 RX ADMIN — POLYETHYLENE GLYCOL 3350 17 G: 17 POWDER, FOR SOLUTION ORAL at 08:31

## 2025-06-14 RX ADMIN — ACETAMINOPHEN 650 MG: 325 TABLET ORAL at 06:06

## 2025-06-14 RX ADMIN — ASPIRIN 81 MG: 81 TABLET, COATED ORAL at 08:31

## 2025-06-14 RX ADMIN — SODIUM CHLORIDE, PRESERVATIVE FREE 10 ML: 5 INJECTION INTRAVENOUS at 08:32

## 2025-06-14 RX ADMIN — ATORVASTATIN CALCIUM 40 MG: 40 TABLET, FILM COATED ORAL at 21:41

## 2025-06-14 RX ADMIN — AMLODIPINE BESYLATE 10 MG: 10 TABLET ORAL at 08:31

## 2025-06-14 RX ADMIN — POLYETHYLENE GLYCOL 3350 17 G: 17 POWDER, FOR SOLUTION ORAL at 18:09

## 2025-06-14 RX ADMIN — INSULIN GLARGINE 10 UNITS: 100 INJECTION, SOLUTION SUBCUTANEOUS at 21:38

## 2025-06-14 RX ADMIN — INSULIN LISPRO 2 UNITS: 100 INJECTION, SOLUTION INTRAVENOUS; SUBCUTANEOUS at 21:38

## 2025-06-14 RX ADMIN — OXYCODONE 10 MG: 5 TABLET ORAL at 16:43

## 2025-06-14 RX ADMIN — ACETAMINOPHEN 650 MG: 325 TABLET ORAL at 21:40

## 2025-06-14 RX ADMIN — OXYCODONE 10 MG: 5 TABLET ORAL at 21:41

## 2025-06-14 RX ADMIN — Medication 3 MG: at 21:40

## 2025-06-14 RX ADMIN — LINEZOLID 600 MG: 600 TABLET, FILM COATED ORAL at 21:40

## 2025-06-14 RX ADMIN — Medication 3 MG: at 00:20

## 2025-06-14 RX ADMIN — LINEZOLID 600 MG: 600 TABLET, FILM COATED ORAL at 08:31

## 2025-06-14 RX ADMIN — CARVEDILOL 12.5 MG: 12.5 TABLET, FILM COATED ORAL at 08:31

## 2025-06-14 ASSESSMENT — PAIN DESCRIPTION - LOCATION
LOCATION: HAND

## 2025-06-14 ASSESSMENT — PAIN SCALES - WONG BAKER: WONGBAKER_NUMERICALRESPONSE: HURTS A LITTLE BIT

## 2025-06-14 ASSESSMENT — PAIN SCALES - GENERAL
PAINLEVEL_OUTOF10: 6
PAINLEVEL_OUTOF10: 2
PAINLEVEL_OUTOF10: 5
PAINLEVEL_OUTOF10: 7
PAINLEVEL_OUTOF10: 7
PAINLEVEL_OUTOF10: 5
PAINLEVEL_OUTOF10: 7
PAINLEVEL_OUTOF10: 8

## 2025-06-14 ASSESSMENT — PAIN DESCRIPTION - DESCRIPTORS
DESCRIPTORS: THROBBING
DESCRIPTORS: ACHING;THROBBING
DESCRIPTORS: ACHING

## 2025-06-14 ASSESSMENT — PAIN - FUNCTIONAL ASSESSMENT: PAIN_FUNCTIONAL_ASSESSMENT: ACTIVITIES ARE NOT PREVENTED

## 2025-06-14 ASSESSMENT — PAIN DESCRIPTION - ORIENTATION
ORIENTATION: RIGHT

## 2025-06-14 NOTE — PROGRESS NOTES
[facility] -   Infectious diseases evaluation  admission date 6/10/2025    reason for consultation:   R hand cellulitis    Impression :   Current:  R hand cellulitis  Recent R middle finger burn and amputation 5/27/25  Acute metab encephalopathy - resolved  AKIU on CKD  CABG - A FIB- PNC and doxy allergy  Post mitral clip- iCD    Other:    Discussion / summary of stay / plan of care/ Recommendations:     HENCE:   Zyvox x 14 days per Dr tenorio   Plastic - no surg plans    Infection Control Recommendations   [place] Precautions  Contact Isolation       Antimicrobial Stewardship Recommendations   Simplification of therapy  Targeted therapy      History of Present Illness:   Initial history:  Asher Ayon is a 69 y.o.-year-old male               Interval changes  6/14/2025   Patient Vitals for the past 8 hrs:   BP Temp Temp src Pulse Resp SpO2 Weight   06/14/25 0736 (!) 143/64 97.7 °F (36.5 °C) Oral 67 16 95 % --   06/14/25 0600 -- -- -- -- -- -- 85.1 kg (187 lb 9.8 oz)   06/14/25 0401 (!) 140/73 98.4 °F (36.9 °C) Oral 69 16 95 % --     6/14  Pain controlled and finger still discolored  Ox 3   No nausea, vomiting, diarrhea     Summary of relevant labs:  Labs:    Micro:      Procedures      Cardiology      Imaging:      I have personally reviewed the past medical history, past surgical history, medications, social history, and family history, and I haveupdated the database accordingly.      Allergies:   Doxycycline, Pcn [penicillins], and Penicillin g     Review of Systems:     Review of Systems   Constitutional:  Negative for activity change.   HENT:  Negative for congestion.    Eyes:  Negative for discharge.   Respiratory:  Negative for apnea.    Cardiovascular:  Negative for chest pain.   Gastrointestinal:  Negative for abdominal distention.   Endocrine: Negative for cold intolerance.   Genitourinary:  Negative for dysuria.   Musculoskeletal:  Negative for  RIGHT FILLET OF FOURTH TOE performed by Eris Xiong DPM at [facility] OR    VASCULAR SURGERY Left 07/13/2022    RIGHT COMMON FEMORAL ACCESS UNDER ULTRASOUND GUIDANCE DISTAL AORTOGRAPHY WITH PELVIC RUN OFF PLACEMENT OF CATHETER INTO LEFT COMMON FEMORAL ARTERY, PROFUNDA FEMORAL AND SFA  ARTERIOGRAPHY PLACEMENT OF CATHETER INTO LEFT SFA, WITH SFA POPLITEAL AND TIBIAL TIBIOPERONEAL ARTERIOGRAPHY INTO LEFT FOOT performed by Abdias Nicole MD at [facility] OR       Medications:      insulin glargine  10 Units SubCUTAneous Nightly    polyethylene glycol  17 g Oral Daily    amLODIPine  10 mg Oral Daily    linezolid  600 mg Oral 2 times per day    tamsulosin  0.4 mg Oral Daily    carvedilol  12.5 mg Oral BID    aspirin  81 mg Oral Daily    atorvastatin  40 mg Oral Nightly    sodium chloride flush  5-40 mL IntraVENous 2 times per day    insulin lispro  0-8 Units SubCUTAneous 4x Daily AC & HS       Social History:     Social History     Socioeconomic History    Marital status: Single     Spouse name: Not on file    Number of children: 0    Years of education: Not on file    Highest education level: Not on file   Occupational History    Occupation:    Tobacco Use    Smoking status: Never    Smokeless tobacco: Never   Vaping Use    Vaping status: Never Used   Substance and Sexual Activity    Alcohol use: Yes     Alcohol/week: 3.0 standard drinks of alcohol     Types: 3 Cans of beer per week     Comment: SOCIAL on weekends    Drug use: No    Sexual activity: Not Currently   Other Topics Concern    Not on file   Social History Narrative    Not on file     Social Drivers of Health     Financial Resource Strain: Not on file   Food Insecurity: No Food Insecurity (6/11/2025)    Hunger Vital Sign     Worried About Running Out of Food in the Last Year: Never true     Ran Out of Food in the Last Year: Never true   Transportation Needs: No Transportation Needs (6/11/2025)    PRAPARE - Transportation     Lack of Transportation

## 2025-06-14 NOTE — PLAN OF CARE
Problem: Chronic Conditions and Co-morbidities  Goal: Patient's chronic conditions and co-morbidity symptoms are monitored and maintained or improved  6/14/2025 1852 by Winnie Swenson RN  Outcome: Progressing     Problem: Discharge Planning  Goal: Discharge to home or other facility with appropriate resources  6/14/2025 1852 by Winnie Swenson RN  Outcome: Progressing     Problem: Safety - Adult  Goal: Free from fall injury  6/14/2025 1852 by Winnie Swenson RN  Outcome: Progressing     Problem: Pain  Goal: Verbalizes/displays adequate comfort level or baseline comfort level  6/14/2025 1852 by Winnie Swenson RN  Outcome: Progressing     Problem: Musculoskeletal - Adult  Goal: Return mobility to safest level of function  6/14/2025 1852 by Winnie Swenson RN  Outcome: Progressing     Problem: Musculoskeletal - Adult  Goal: Maintain proper alignment of affected body part  6/14/2025 1852 by Winnie Swenson RN  Outcome: Progressing

## 2025-06-14 NOTE — PROGRESS NOTES
[facility]  Office: [phone]  Jorge Valenzuela, DO, Nacho Dent, DO, Justin Dumont DO, Maxwell Dunlap, DO, Nica House MD, Helene Munoz MD, Sarmad Hu MD, Lupis Novak MD,  Jasen Wood MD, Kenia Jones MD, Afsaneh Rodriguez MD,  Jack Read DO, Alise Overton MD, David Thornton MD, Asher Valenzuela DO, Shanel Bah MD,  Eris Wyman DO, Li Lyles MD, Imani Diego MD, Anthony Cuadra MD,  Talat Vásquez MD, Simran Singh MD, Raven Mann MD, Sana Kiser MD, Dakota Teague MD, Scott Sandoval MD, Blayne Richards, DO, Zaina Rod MD, Elpidio Gonzalez DO, Earl Haney MD, Jack Ga MD, Mohsin Reza, MD, Lee Ann Cantu MD, Shirley Waterhouse, CNP,  Meenakshi Kimbrough, CNP, Blayne Fowler, CNP,  Nahomy Salvador, SAHIL, Any Traylor, CNP, Allyson Hernández, CNP, Justina Graham, CNP, Joselyn Michaud, CNP, Corine Conrad, PA-C, Mini Butterfield, CNP, Amy Echevarria, CNP,  Chastity Jean, CNP, Oriana Sanders, CNP, Hardeep Jacobson, PA-C, Evelia Dinh, CNP,  Sera Marcum, CNS, Cuca Nieto, CNP, Andra Paez, CNP,   Jeri Mcadams, CNP         [facility]   IN-PATIENT SERVICE   [facility]    Progress Note    6/14/2025    5:00 PM    Name:   Asher Ayon  MRN:     [id number]     Acct:      [id number]   Room:   0332/0332-01   Day:  4  Admit Date:  6/10/2025 11:47 PM    PCP:   Kevin Herrera MD  Code Status:  Full Code    Subjective:     C/C: No chief complaint on file.    Interval History Status: not changed.     Seen at bedside, hemodynamically stable  Currently has George catheter in place hematuria improving, hemoglobin stable  Does not want to go with George catheter in place discussed with RN will discuss with   Labs reviewed, creatinine stable blood sugars improved from before    Brief History:     Per my colleague   \"69-year-old male with known medical history of type 2 diabetes mellitus, CKD, CAD with history of CABG, A-fib,

## 2025-06-15 LAB
ANION GAP SERPL CALCULATED.3IONS-SCNC: 11 MMOL/L (ref 9–16)
ANION GAP SERPL CALCULATED.3IONS-SCNC: 13 MMOL/L (ref 9–16)
BASOPHILS # BLD: 0.07 K/UL (ref 0–0.2)
BASOPHILS NFR BLD: 1 % (ref 0–2)
BUN SERPL-MCNC: 33 MG/DL (ref 8–23)
BUN SERPL-MCNC: 35 MG/DL (ref 8–23)
CALCIUM SERPL-MCNC: 9.1 MG/DL (ref 8.6–10.4)
CALCIUM SERPL-MCNC: 9.5 MG/DL (ref 8.6–10.4)
CHLORIDE SERPL-SCNC: 100 MMOL/L (ref 98–107)
CHLORIDE SERPL-SCNC: 97 MMOL/L (ref 98–107)
CO2 SERPL-SCNC: 19 MMOL/L (ref 20–31)
CO2 SERPL-SCNC: 21 MMOL/L (ref 20–31)
CREAT SERPL-MCNC: 1.9 MG/DL (ref 0.7–1.2)
CREAT SERPL-MCNC: 2 MG/DL (ref 0.7–1.2)
EOSINOPHIL # BLD: 0.25 K/UL (ref 0–0.44)
EOSINOPHILS RELATIVE PERCENT: 3 % (ref 1–4)
ERYTHROCYTE [DISTWIDTH] IN BLOOD BY AUTOMATED COUNT: 14.5 % (ref 11.8–14.4)
GFR, ESTIMATED: 35 ML/MIN/1.73M2
GFR, ESTIMATED: 37 ML/MIN/1.73M2
GLUCOSE BLD-MCNC: 117 MG/DL (ref 75–110)
GLUCOSE BLD-MCNC: 172 MG/DL (ref 75–110)
GLUCOSE BLD-MCNC: 230 MG/DL (ref 75–110)
GLUCOSE BLD-MCNC: 239 MG/DL (ref 75–110)
GLUCOSE SERPL-MCNC: 161 MG/DL (ref 74–99)
GLUCOSE SERPL-MCNC: 203 MG/DL (ref 74–99)
HCT VFR BLD AUTO: 32.2 % (ref 40.7–50.3)
HGB BLD-MCNC: 10.5 G/DL (ref 13–17)
IMM GRANULOCYTES # BLD AUTO: 0.04 K/UL (ref 0–0.3)
IMM GRANULOCYTES NFR BLD: 0 %
INR PPP: 1.1
LYMPHOCYTES NFR BLD: 1.08 K/UL (ref 1.1–3.7)
LYMPHOCYTES RELATIVE PERCENT: 11 % (ref 24–43)
MCH RBC QN AUTO: 31.6 PG (ref 25.2–33.5)
MCHC RBC AUTO-ENTMCNC: 32.6 G/DL (ref 28.4–34.8)
MCV RBC AUTO: 97 FL (ref 82.6–102.9)
MICROORGANISM SPEC CULT: NORMAL
MICROORGANISM SPEC CULT: NORMAL
MONOCYTES NFR BLD: 0.8 K/UL (ref 0.1–1.2)
MONOCYTES NFR BLD: 8 % (ref 3–12)
NEUTROPHILS NFR BLD: 77 % (ref 36–65)
NEUTS SEG NFR BLD: 7.23 K/UL (ref 1.5–8.1)
NRBC BLD-RTO: 0 PER 100 WBC
PLATELET # BLD AUTO: 234 K/UL (ref 138–453)
PMV BLD AUTO: 9.5 FL (ref 8.1–13.5)
POTASSIUM SERPL-SCNC: 4.6 MMOL/L (ref 3.7–5.3)
POTASSIUM SERPL-SCNC: 4.7 MMOL/L (ref 3.7–5.3)
PROTHROMBIN TIME: 14.1 SEC (ref 11.7–14.9)
RBC # BLD AUTO: 3.32 M/UL (ref 4.21–5.77)
RBC # BLD: ABNORMAL 10*6/UL
SERVICE CMNT-IMP: NORMAL
SERVICE CMNT-IMP: NORMAL
SODIUM SERPL-SCNC: 129 MMOL/L (ref 136–145)
SODIUM SERPL-SCNC: 132 MMOL/L (ref 136–145)
SPECIMEN DESCRIPTION: NORMAL
SPECIMEN DESCRIPTION: NORMAL
WBC OTHER # BLD: 9.5 K/UL (ref 3.5–11.3)

## 2025-06-15 PROCEDURE — 85025 COMPLETE CBC W/AUTO DIFF WBC: CPT

## 2025-06-15 PROCEDURE — 6370000000 HC RX 637 (ALT 250 FOR IP): Performed by: STUDENT IN AN ORGANIZED HEALTH CARE EDUCATION/TRAINING PROGRAM

## 2025-06-15 PROCEDURE — 2500000003 HC RX 250 WO HCPCS

## 2025-06-15 PROCEDURE — 6370000000 HC RX 637 (ALT 250 FOR IP): Performed by: INTERNAL MEDICINE

## 2025-06-15 PROCEDURE — 1200000000 HC SEMI PRIVATE

## 2025-06-15 PROCEDURE — 85610 PROTHROMBIN TIME: CPT

## 2025-06-15 PROCEDURE — 82947 ASSAY GLUCOSE BLOOD QUANT: CPT

## 2025-06-15 PROCEDURE — 6370000000 HC RX 637 (ALT 250 FOR IP): Performed by: PHYSICIAN ASSISTANT

## 2025-06-15 PROCEDURE — 80048 BASIC METABOLIC PNL TOTAL CA: CPT

## 2025-06-15 PROCEDURE — 6370000000 HC RX 637 (ALT 250 FOR IP): Performed by: NURSE PRACTITIONER

## 2025-06-15 PROCEDURE — 36415 COLL VENOUS BLD VENIPUNCTURE: CPT

## 2025-06-15 PROCEDURE — 99232 SBSQ HOSP IP/OBS MODERATE 35: CPT | Performed by: STUDENT IN AN ORGANIZED HEALTH CARE EDUCATION/TRAINING PROGRAM

## 2025-06-15 PROCEDURE — 6370000000 HC RX 637 (ALT 250 FOR IP)

## 2025-06-15 RX ORDER — WARFARIN SODIUM 7.5 MG/1
7.5 TABLET ORAL
Status: COMPLETED | OUTPATIENT
Start: 2025-06-15 | End: 2025-06-15

## 2025-06-15 RX ORDER — SENNA AND DOCUSATE SODIUM 50; 8.6 MG/1; MG/1
2 TABLET, FILM COATED ORAL DAILY
Status: DISCONTINUED | OUTPATIENT
Start: 2025-06-15 | End: 2025-06-19 | Stop reason: HOSPADM

## 2025-06-15 RX ADMIN — TAMSULOSIN HYDROCHLORIDE 0.4 MG: 0.4 CAPSULE ORAL at 08:32

## 2025-06-15 RX ADMIN — POLYETHYLENE GLYCOL 3350 17 G: 17 POWDER, FOR SOLUTION ORAL at 08:32

## 2025-06-15 RX ADMIN — ACETAMINOPHEN 650 MG: 325 TABLET ORAL at 05:36

## 2025-06-15 RX ADMIN — DOCUSATE SODIUM 50MG AND SENNOSIDES 8.6MG 2 TABLET: 8.6; 5 TABLET, FILM COATED ORAL at 12:28

## 2025-06-15 RX ADMIN — AMLODIPINE BESYLATE 10 MG: 10 TABLET ORAL at 08:32

## 2025-06-15 RX ADMIN — OXYCODONE 10 MG: 5 TABLET ORAL at 05:37

## 2025-06-15 RX ADMIN — SODIUM CHLORIDE, PRESERVATIVE FREE 10 ML: 5 INJECTION INTRAVENOUS at 08:33

## 2025-06-15 RX ADMIN — OXYCODONE 10 MG: 5 TABLET ORAL at 17:44

## 2025-06-15 RX ADMIN — INSULIN GLARGINE 10 UNITS: 100 INJECTION, SOLUTION SUBCUTANEOUS at 20:55

## 2025-06-15 RX ADMIN — ASPIRIN 81 MG: 81 TABLET, COATED ORAL at 08:32

## 2025-06-15 RX ADMIN — CARVEDILOL 12.5 MG: 12.5 TABLET, FILM COATED ORAL at 20:55

## 2025-06-15 RX ADMIN — ATORVASTATIN CALCIUM 40 MG: 40 TABLET, FILM COATED ORAL at 20:55

## 2025-06-15 RX ADMIN — INSULIN LISPRO 2 UNITS: 100 INJECTION, SOLUTION INTRAVENOUS; SUBCUTANEOUS at 20:55

## 2025-06-15 RX ADMIN — LINEZOLID 600 MG: 600 TABLET, FILM COATED ORAL at 20:55

## 2025-06-15 RX ADMIN — FUROSEMIDE 20 MG: 20 TABLET ORAL at 08:33

## 2025-06-15 RX ADMIN — CARVEDILOL 12.5 MG: 12.5 TABLET, FILM COATED ORAL at 08:33

## 2025-06-15 RX ADMIN — WARFARIN SODIUM 7.5 MG: 7.5 TABLET ORAL at 17:42

## 2025-06-15 RX ADMIN — LINEZOLID 600 MG: 600 TABLET, FILM COATED ORAL at 08:33

## 2025-06-15 RX ADMIN — Medication 3 MG: at 20:55

## 2025-06-15 RX ADMIN — SODIUM CHLORIDE, PRESERVATIVE FREE 10 ML: 5 INJECTION INTRAVENOUS at 20:55

## 2025-06-15 RX ADMIN — INSULIN LISPRO 2 UNITS: 100 INJECTION, SOLUTION INTRAVENOUS; SUBCUTANEOUS at 17:45

## 2025-06-15 RX ADMIN — ACETAMINOPHEN 650 MG: 325 TABLET ORAL at 17:44

## 2025-06-15 ASSESSMENT — PAIN SCALES - GENERAL
PAINLEVEL_OUTOF10: 10
PAINLEVEL_OUTOF10: 8
PAINLEVEL_OUTOF10: 2

## 2025-06-15 ASSESSMENT — PAIN DESCRIPTION - LOCATION
LOCATION: HAND
LOCATION: HAND

## 2025-06-15 ASSESSMENT — PAIN DESCRIPTION - ORIENTATION
ORIENTATION: RIGHT
ORIENTATION: RIGHT

## 2025-06-15 ASSESSMENT — PAIN DESCRIPTION - DESCRIPTORS
DESCRIPTORS: ACHING
DESCRIPTORS: THROBBING

## 2025-06-15 ASSESSMENT — PAIN - FUNCTIONAL ASSESSMENT: PAIN_FUNCTIONAL_ASSESSMENT: ACTIVITIES ARE NOT PREVENTED

## 2025-06-15 ASSESSMENT — PAIN SCALES - WONG BAKER: WONGBAKER_NUMERICALRESPONSE: HURTS A LITTLE BIT

## 2025-06-15 NOTE — PLAN OF CARE
Problem: Safety - Adult  Goal: Free from fall injury  6/15/2025 0344 by eLslie Del Valle  Outcome: Progressing  6/14/2025 1852 by Winnie Swenson RN  Outcome: Progressing     Problem: Pain  Goal: Verbalizes/displays adequate comfort level or baseline comfort level  6/15/2025 0344 by Leslie Del Valle  Outcome: Progressing  6/14/2025 1852 by Winnie Swenson, RN  Outcome: Progressing     Problem: Skin/Tissue Integrity  Goal: Skin integrity remains intact  Description: 1.  Monitor for areas of redness and/or skin breakdown  2.  Assess vascular access sites hourly  3.  Every 4-6 hours minimum:  Change oxygen saturation probe site  4.  Every 4-6 hours:  If on nasal continuous positive airway pressure, respiratory therapy assess nares and determine need for appliance change or resting period  6/15/2025 0344 by Leslie Del Valle  Outcome: Progressing  6/14/2025 1852 by Winnie Swenson, RN  Outcome: Progressing

## 2025-06-15 NOTE — PROGRESS NOTES
Pharmacy Note  Warfarin Consult    Asher Ayon is a 69 y.o. male for whom pharmacy has been consulted to manage warfarin therapy.     Consulting Provider: Hardeep Jacobson   Reason for Admission: BRIANNA / cellulitis      Warfarin dose prior to admission: 4 mg Sun, Tues and Thurs; 2 mg all other days. (Follow with [facility])  Warfarin indication: CVA  Target INR range: 2-3     Past Medical History:   Diagnosis Date    Amputation of toe     all left foot toes and 3.5 right foot toes    Atrial fibrillation (HCC)     Burn     fingers    CAD (coronary artery disease) 2010    s/p CABG- no stents    Cardiomyopathy (HCC)     Cellulitis     Cerebral artery occlusion with cerebral infarction (HCC)     2 \"mini\" strokes approx 2024    CHF (congestive heart failure) (HCC)     CKD (chronic kidney disease)     Diabetes mellitus (HCC)     Finger necrosis (HCC)     Foot infection     Heart failure (HCC)     decreased EF    Hyperlipidemia     Hypertension     MRSA (methicillin resistant staph aureus) culture positive     foot    Osteomyelitis (HCC)     Pacemaker     medtronic    Polyneuropathy     PVD (peripheral vascular disease)     S/P mitral valve clip implantation     Under care of service provider     pcp-gladys [id number] visit april 2025    [facility] care of service provider     [facility]    Wears dentures     Wound, open, foot     left foot--resolved           Recent Labs     06/15/25  1141   INR 1.1     Recent Labs     06/13/25  1052 06/14/25  1144 06/15/25  0923   HGB 10.0* 10.0* 10.5*   HCT 29.9* 29.5* 32.2*    196 234       Current warfarin drug-drug interactions: linezolid    Date INR Dose   6/15 1.1                Resume warfarin today and INR is subtherapeutic. Will give 7.5 mg x 1 today as a boost dose.  Daily PT/INR while inpatient. PT/INR ordered to start 6/16/25.    Thank you for the consult.  Will continue to follow.

## 2025-06-15 NOTE — PROGRESS NOTES
[facility]  Office: [phone]  Jorge Valenzuela, DO, Nacho Dent, DO, Justin Dumont DO, Maxwell Dunlap, DO, Nica House MD, Helene Munoz MD, Sarmad Hu MD, Lupis Novak MD,  Jasen Wood MD, Kenia Jones MD, Afsaneh Rodriguez MD,  Jack Read DO, Alise Overton MD, David Thornton MD, Asher Valenzuela DO, Shanel Bah MD,  Eris Wyman DO, Li Lyles MD, Imani Diego MD, Anthony Cuadra MD,  Talat Vásquez MD, Simran Singh MD, Raven Mann MD, Sana Kiser MD, Dakota Teague MD, Scott Sandoval MD, Blayne Richards, DO, Zaina Rod MD, Elpidio Gonzalez DO, Earl Haney MD, Jack Ga MD, Mohsin Reza, MD, Lee Ann Cantu MD, Shirley Waterhouse, CNP,  Meenakshi Kimbrough, CNP, Blayne Fowler, CNP,  Nahomy Salvador, SAHIL, Any Traylor, CNP, Allyson Hernández, CNP, Justina Graham, CNP, Joselyn Michaud, CNP, Corine Conrad, PA-C, Mini Butterfield, CNP, Amy Echevarria, CNP,  Chastity Jean, CNP, Oriana Sanders, CNP, Hardeep Jacobson, PA-C, Evelia Dinh, CNP,  Sera Marcum, CNS, Cuca Nieto, CNP, Andra Paez, CNP,   Jeri Mcadams, CNP         [facility]   IN-PATIENT SERVICE   [facility]    Progress Note    6/15/2025    4:43 PM    Name:   Asher Ayon  MRN:     [id number]     Acct:      [id number]   Room:   0332/0332-01   Day:  5  Admit Date:  6/10/2025 11:47 PM    PCP:   Kevin Herrera MD  Code Status:  Full Code    Subjective:     C/C: No chief complaint on file.    Interval History Status: not changed.     Seen at bedside, hemodynamically stable  Currently has George catheter in place hematuria improving, hemoglobin stable  Does not want to go with George catheter in place discussed with RN will discuss with   Labs reviewed,    Brief History:     Per my colleague   \"69-year-old male with known medical history of type 2 diabetes mellitus, CKD, CAD with history of CABG, A-fib, hypertension, hyperlipidemia, mitral valve clip

## 2025-06-16 LAB
ANION GAP SERPL CALCULATED.3IONS-SCNC: 12 MMOL/L (ref 9–16)
BASOPHILS # BLD: 0.06 K/UL (ref 0–0.2)
BASOPHILS NFR BLD: 1 % (ref 0–2)
BUN SERPL-MCNC: 33 MG/DL (ref 8–23)
CALCIUM SERPL-MCNC: 9.4 MG/DL (ref 8.6–10.4)
CHLORIDE SERPL-SCNC: 99 MMOL/L (ref 98–107)
CO2 SERPL-SCNC: 21 MMOL/L (ref 20–31)
CREAT SERPL-MCNC: 2 MG/DL (ref 0.7–1.2)
EOSINOPHIL # BLD: 0.28 K/UL (ref 0–0.44)
EOSINOPHILS RELATIVE PERCENT: 4 % (ref 1–4)
ERYTHROCYTE [DISTWIDTH] IN BLOOD BY AUTOMATED COUNT: 14.2 % (ref 11.8–14.4)
EST. AVERAGE GLUCOSE BLD GHB EST-MCNC: 163 MG/DL
GFR, ESTIMATED: 35 ML/MIN/1.73M2
GLUCOSE BLD-MCNC: 108 MG/DL (ref 75–110)
GLUCOSE BLD-MCNC: 128 MG/DL (ref 75–110)
GLUCOSE BLD-MCNC: 192 MG/DL (ref 75–110)
GLUCOSE BLD-MCNC: 196 MG/DL (ref 75–110)
GLUCOSE SERPL-MCNC: 108 MG/DL (ref 74–99)
HBA1C MFR BLD: 7.3 % (ref 4–6)
HCT VFR BLD AUTO: 29.3 % (ref 40.7–50.3)
HGB BLD-MCNC: 10.1 G/DL (ref 13–17)
IMM GRANULOCYTES # BLD AUTO: 0.03 K/UL (ref 0–0.3)
IMM GRANULOCYTES NFR BLD: 1 %
INR PPP: 1.4
LYMPHOCYTES NFR BLD: 1.02 K/UL (ref 1.1–3.7)
LYMPHOCYTES RELATIVE PERCENT: 16 % (ref 24–43)
MCH RBC QN AUTO: 32 PG (ref 25.2–33.5)
MCHC RBC AUTO-ENTMCNC: 34.5 G/DL (ref 28.4–34.8)
MCV RBC AUTO: 92.7 FL (ref 82.6–102.9)
MONOCYTES NFR BLD: 0.5 K/UL (ref 0.1–1.2)
MONOCYTES NFR BLD: 8 % (ref 3–12)
NEUTROPHILS NFR BLD: 70 % (ref 36–65)
NEUTS SEG NFR BLD: 4.43 K/UL (ref 1.5–8.1)
NRBC BLD-RTO: 0 PER 100 WBC
PLATELET # BLD AUTO: 201 K/UL (ref 138–453)
PMV BLD AUTO: 9.5 FL (ref 8.1–13.5)
POTASSIUM SERPL-SCNC: 4.5 MMOL/L (ref 3.7–5.3)
PROTHROMBIN TIME: 17.9 SEC (ref 11.7–14.9)
RBC # BLD AUTO: 3.16 M/UL (ref 4.21–5.77)
SODIUM SERPL-SCNC: 132 MMOL/L (ref 136–145)
WBC OTHER # BLD: 6.3 K/UL (ref 3.5–11.3)

## 2025-06-16 PROCEDURE — 6370000000 HC RX 637 (ALT 250 FOR IP): Performed by: PHYSICIAN ASSISTANT

## 2025-06-16 PROCEDURE — 82947 ASSAY GLUCOSE BLOOD QUANT: CPT

## 2025-06-16 PROCEDURE — 6370000000 HC RX 637 (ALT 250 FOR IP): Performed by: STUDENT IN AN ORGANIZED HEALTH CARE EDUCATION/TRAINING PROGRAM

## 2025-06-16 PROCEDURE — APPSS30 APP SPLIT SHARED TIME 16-30 MINUTES: Performed by: NURSE PRACTITIONER

## 2025-06-16 PROCEDURE — 51702 INSERT TEMP BLADDER CATH: CPT

## 2025-06-16 PROCEDURE — 99232 SBSQ HOSP IP/OBS MODERATE 35: CPT | Performed by: INTERNAL MEDICINE

## 2025-06-16 PROCEDURE — 6370000000 HC RX 637 (ALT 250 FOR IP): Performed by: NURSE PRACTITIONER

## 2025-06-16 PROCEDURE — 85610 PROTHROMBIN TIME: CPT

## 2025-06-16 PROCEDURE — 6370000000 HC RX 637 (ALT 250 FOR IP): Performed by: INTERNAL MEDICINE

## 2025-06-16 PROCEDURE — 99232 SBSQ HOSP IP/OBS MODERATE 35: CPT | Performed by: STUDENT IN AN ORGANIZED HEALTH CARE EDUCATION/TRAINING PROGRAM

## 2025-06-16 PROCEDURE — 80048 BASIC METABOLIC PNL TOTAL CA: CPT

## 2025-06-16 PROCEDURE — 2500000003 HC RX 250 WO HCPCS

## 2025-06-16 PROCEDURE — 83036 HEMOGLOBIN GLYCOSYLATED A1C: CPT

## 2025-06-16 PROCEDURE — 85025 COMPLETE CBC W/AUTO DIFF WBC: CPT

## 2025-06-16 PROCEDURE — 6370000000 HC RX 637 (ALT 250 FOR IP)

## 2025-06-16 PROCEDURE — 36415 COLL VENOUS BLD VENIPUNCTURE: CPT

## 2025-06-16 PROCEDURE — 1200000000 HC SEMI PRIVATE

## 2025-06-16 RX ORDER — ATORVASTATIN CALCIUM 40 MG/1
40 TABLET, FILM COATED ORAL NIGHTLY
Qty: 30 TABLET | Refills: 3 | Status: SHIPPED | OUTPATIENT
Start: 2025-06-16

## 2025-06-16 RX ORDER — TAMSULOSIN HYDROCHLORIDE 0.4 MG/1
0.4 CAPSULE ORAL DAILY
Qty: 30 CAPSULE | Refills: 3 | Status: SHIPPED | OUTPATIENT
Start: 2025-06-17

## 2025-06-16 RX ORDER — WARFARIN SODIUM 5 MG/1
5 TABLET ORAL
Status: COMPLETED | OUTPATIENT
Start: 2025-06-16 | End: 2025-06-16

## 2025-06-16 RX ORDER — SENNA AND DOCUSATE SODIUM 50; 8.6 MG/1; MG/1
2 TABLET, FILM COATED ORAL DAILY
Qty: 20 TABLET | Refills: 0 | Status: SHIPPED | OUTPATIENT
Start: 2025-06-17 | End: 2025-06-27

## 2025-06-16 RX ORDER — OXYCODONE HYDROCHLORIDE 5 MG/1
5 TABLET ORAL EVERY 8 HOURS PRN
Qty: 9 TABLET | Refills: 0 | Status: SHIPPED | OUTPATIENT
Start: 2025-06-16 | End: 2025-06-17

## 2025-06-16 RX ORDER — FUROSEMIDE 20 MG/1
20 TABLET ORAL DAILY
Qty: 60 TABLET | Refills: 3 | Status: SHIPPED | OUTPATIENT
Start: 2025-06-17

## 2025-06-16 RX ADMIN — ASPIRIN 81 MG: 81 TABLET, COATED ORAL at 09:29

## 2025-06-16 RX ADMIN — AMLODIPINE BESYLATE 10 MG: 10 TABLET ORAL at 09:30

## 2025-06-16 RX ADMIN — SODIUM CHLORIDE, PRESERVATIVE FREE 10 ML: 5 INJECTION INTRAVENOUS at 09:28

## 2025-06-16 RX ADMIN — ATORVASTATIN CALCIUM 40 MG: 40 TABLET, FILM COATED ORAL at 19:48

## 2025-06-16 RX ADMIN — POLYETHYLENE GLYCOL 3350 17 G: 17 POWDER, FOR SOLUTION ORAL at 09:29

## 2025-06-16 RX ADMIN — WARFARIN SODIUM 5 MG: 5 TABLET ORAL at 18:23

## 2025-06-16 RX ADMIN — ACETAMINOPHEN 650 MG: 325 TABLET ORAL at 06:16

## 2025-06-16 RX ADMIN — TAMSULOSIN HYDROCHLORIDE 0.4 MG: 0.4 CAPSULE ORAL at 09:31

## 2025-06-16 RX ADMIN — CARVEDILOL 12.5 MG: 12.5 TABLET, FILM COATED ORAL at 19:48

## 2025-06-16 RX ADMIN — SODIUM CHLORIDE, PRESERVATIVE FREE 10 ML: 5 INJECTION INTRAVENOUS at 19:41

## 2025-06-16 RX ADMIN — INSULIN LISPRO 2 UNITS: 100 INJECTION, SOLUTION INTRAVENOUS; SUBCUTANEOUS at 12:24

## 2025-06-16 RX ADMIN — OXYCODONE 10 MG: 5 TABLET ORAL at 01:00

## 2025-06-16 RX ADMIN — INSULIN GLARGINE 10 UNITS: 100 INJECTION, SOLUTION SUBCUTANEOUS at 19:54

## 2025-06-16 RX ADMIN — INSULIN LISPRO 2 UNITS: 100 INJECTION, SOLUTION INTRAVENOUS; SUBCUTANEOUS at 16:54

## 2025-06-16 RX ADMIN — ACETAMINOPHEN 650 MG: 325 TABLET ORAL at 01:00

## 2025-06-16 RX ADMIN — LINEZOLID 600 MG: 600 TABLET, FILM COATED ORAL at 19:54

## 2025-06-16 RX ADMIN — OXYCODONE 10 MG: 5 TABLET ORAL at 12:24

## 2025-06-16 RX ADMIN — OXYCODONE 10 MG: 5 TABLET ORAL at 21:08

## 2025-06-16 RX ADMIN — CARVEDILOL 12.5 MG: 12.5 TABLET, FILM COATED ORAL at 09:29

## 2025-06-16 RX ADMIN — DOCUSATE SODIUM 50MG AND SENNOSIDES 8.6MG 2 TABLET: 8.6; 5 TABLET, FILM COATED ORAL at 09:30

## 2025-06-16 RX ADMIN — OXYCODONE 10 MG: 5 TABLET ORAL at 16:53

## 2025-06-16 RX ADMIN — FUROSEMIDE 20 MG: 20 TABLET ORAL at 09:29

## 2025-06-16 RX ADMIN — LINEZOLID 600 MG: 600 TABLET, FILM COATED ORAL at 09:29

## 2025-06-16 RX ADMIN — Medication 3 MG: at 19:48

## 2025-06-16 RX ADMIN — OXYCODONE 10 MG: 5 TABLET ORAL at 06:16

## 2025-06-16 ASSESSMENT — PAIN DESCRIPTION - ORIENTATION
ORIENTATION: RIGHT

## 2025-06-16 ASSESSMENT — PAIN SCALES - GENERAL
PAINLEVEL_OUTOF10: 9
PAINLEVEL_OUTOF10: 8
PAINLEVEL_OUTOF10: 4
PAINLEVEL_OUTOF10: 4
PAINLEVEL_OUTOF10: 10
PAINLEVEL_OUTOF10: 8
PAINLEVEL_OUTOF10: 4
PAINLEVEL_OUTOF10: 7
PAINLEVEL_OUTOF10: 5
PAINLEVEL_OUTOF10: 9

## 2025-06-16 ASSESSMENT — PAIN DESCRIPTION - DESCRIPTORS
DESCRIPTORS: BURNING;ACHING
DESCRIPTORS: BURNING;ACHING
DESCRIPTORS: THROBBING
DESCRIPTORS: THROBBING
DESCRIPTORS: SORE
DESCRIPTORS: ACHING;BURNING

## 2025-06-16 ASSESSMENT — PAIN DESCRIPTION - LOCATION
LOCATION: HAND

## 2025-06-16 NOTE — PROGRESS NOTES
[facility] - Progress Note    Today's Date and Time: 6/16/2025, 3:13 PM    Impression :   Altered mentation-resolved  Concern for right hand cellulitis   Hx of right middle finger burn s/p finger amputation with placement of integra skin graft on 5/27/25  BRIANNA on CKD  HTN  DM II  CAD with a history of CABG  Afib  Hx of osteomyelitis  Previous toe amputations on both feet  PVD  MRSA hx in 2015  PCN allergy  Doxycycline allergy    Recommendations:   D/C Vancomycin and Zosyn   Linezolid 600 mg po BID x14 days. Stop date 6-26-25  Wound Care  Plastic Surgery recommendations    Medical Decision Making/Summary/Discussion:6/16/2025     Elements of Medical Decision Making:  Note: I have independently performed the steps listed below as part of the medical decision making and evaluation.   Examined and discussed with patient.  Altered mentation  Concern for right hand cellulitis  History of right middle finger burn.  Status post finger amputation with placement of Integra skin graft on 5/27/2025  BRIANNA on CKD  Essential hypertension  Diabetes mellitus type 2  Coronary artery disease with previous history of CABG  Atrial fibrillation  Prior history of osteomyelitis  Prior toe amputations on both feet  Peripheral vascular disease  MRSA carrier since 2015  Allergy to penicillin  Allergy to doxycycline  Labs, medications, radiologic studies were reviewed with personal review of films  Radiologic studies  Lab work  Cultures  Large amounts of data were reviewed  Please see the history of present illness and progress note  Discussed with nursing Staff, Discharge planner  Dr. Overton  Infection Control and Prevention measures reviewed  Universal precaution  Contact isolation: MRSA  All prior entries were reviewed  Internal medicine notes reviewed  Administer medications as ordered  On linezolid. Stop date 6-26-25  Prognosis:   Guarded  Discharge planning reviewed  Follow up as

## 2025-06-16 NOTE — PROGRESS NOTES
[facility]  Office: [phone]  Jorge Valenzuela, DO, Nacho Dent, DO, Justin Dumont DO, Maxwell Dunlap, DO, Nica House MD, Helene Munoz MD, Sarmad Hu MD, Lupis Novak MD,  Jasen Wood MD, Kenia Jones MD, Afsaneh Rodriguez MD,  Jack Read DO, Alise Overton MD, David Thornton MD, Asher Valenzuela DO, Shanel Bah MD,  Eris Wyman DO, Li Lyles MD, Imani Diego MD, Anthony Cuadra MD,  Talat Vásquez MD, Simran Singh MD, Raven Mann MD, Sana Kiser MD, Dakota Teague MD, Scott Sandoval MD, Blayne Richards, DO, Zaina Rod MD, Elpidio Gonzalez DO, Earl Haney MD, Jack Ga MD, Mohsin Reza, MD, Lee Ann Cantu MD, Shirley Waterhouse, CNP,  Meenakshi Kimbrough, CNP, Blayne Fowler, CNP,  Nahomy Salvador, SAHIL, Any Traylor, CNP, Allyson Hernández, CNP, Justina Graham, CNP, Joselyn Michaud, CNP, Corine Conrad, PA-C, Mini Butterfield, CNP, Amy Echevarria, CNP,  Chastity Jean, CNP, Oriana Sanders, CNP, Hardeep Jacobson, PA-C, Evelia Dinh, CNP,  Sera Marcum, CNS, Cuca Nieto, CNP, Andra Paez, CNP,   Jeri Mcadams, CNP         [facility]   IN-PATIENT SERVICE   [facility]    Progress Note    6/16/2025    5:08 PM    Name:   Asher Ayon  MRN:     [id number]     Acct:      [id number]   Room:   0332/0332-01   Day:  6  Admit Date:  6/10/2025 11:47 PM    PCP:   Kevin Herrera MD  Code Status:  Full Code    Subjective:     C/C: No chief complaint on file.    Interval History Status: not changed.     Seen at bedside, hemodynamically stable  Currently has George catheter in place hematuria improving, hemoglobin stable, Coumadin was restarted yesterday  Does not want to go with George catheter in place, initially was refusing SNF now agreeable  working on it      Brief History:     Per my colleague   \"69-year-old male with known medical history of type 2 diabetes mellitus, CKD, CAD with history of CABG, A-fib, hypertension,     CKD (chronic kidney disease) stage 3, GFR 30-59 ml/min ([facility]) 6/11/2025 Yes    Gross hematuria 6/11/2025 Yes    Hypertensive urgency 6/11/2025 Yes    Chronic systolic (congestive) heart failure ([facility]) 6/11/2025 Yes    ICD (implantable cardioverter-defibrillator) in place 6/11/2025 Yes    Encounter for monitoring Coumadin therapy 6/11/2025 Yes    S/P mitral valve clip implantation 6/11/2025 Yes    Hypoglycemia 6/11/2025 Yes    Stupor 6/11/2025 Yes    Cellulitis of hand 6/11/2025 Yes    MRSA carrier 6/11/2025 Yes       Plan:        AMS likely due to hypoglycemia resolved  Type 2 diabetes mellitus continue current regimen, will discontinue Actos with underlying heart failure  Complicated hand infection with right middle finger amputation as well as right index finger and little finger Integra skin graft placement on 5/27/2025. -continue Zyvox appreciate ID recommendations, seen by plastic surgery/orthopedic surgery no further intervention planned, soft dressings placed to patient's right index, right little and right middle residual digit following suture and staple removal WBAT to right upper extremity, follow-up with the hand clinic on 6/17/25, pain meds as needed     BRIANNA on CKD stage III-status post IV fluids George in place, urology consulted for hematuria.  Currently improving however recommend keeping George in place for at least 1 week, okay with resuming anticoagulation started back on Coumadin , continues to be on Flomax, Lasix resumed back on lower dose continue to monitor    Essential hypertension continue current antihypertensives     CAD with history of CABG, hyperlipidemia-on aspirin, Lipitor, beta-blocker, nitro as needed     Ischemic CMP, moderate pulmonary hypertension- has AICD In place, diuretics resumed.     Mitral clip in place     Afib on coumadin, continue beta-blocker   History of TIA, peripheral artery disease continue aspirin, Lipitor  Constipation bowel regimen as needed    Raven Harris

## 2025-06-16 NOTE — PLAN OF CARE
Problem: Safety - Adult  Goal: Free from fall injury  Outcome: Progressing     Problem: Pain  Goal: Verbalizes/displays adequate comfort level or baseline comfort level  Outcome: Progressing     Problem: Skin/Tissue Integrity  Goal: Skin integrity remains intact  Description: 1.  Monitor for areas of redness and/or skin breakdown  2.  Assess vascular access sites hourly  3.  Every 4-6 hours minimum:  Change oxygen saturation probe site  4.  Every 4-6 hours:  If on nasal continuous positive airway pressure, respiratory therapy assess nares and determine need for appliance change or resting period  Outcome: Progressing      Pharmacy -- Admission Medication Reconciliation    Prior to admission (PTA) medications were reviewed and the patient's PTA medication list was updated.    Sources Consulted: Patient Interview, Chart Review     The reliability of this Medication Reconciliation is: Reliability: Reliable    The following significant changes were made:  Spoke to patient at length- at first patient was not full of information in re: to his insulin regimen.     Asked him open-ended questions and repeated back all information provided. Requested that he access his pump and review the information for me.     He states the pump reads that he uses 19.45 units per day on continuous mode and the patient estimates that he boluses another 20 units per day for average daily use of 40 units. Repeated back to patient and he confirmed. Asked patient on how often he needs to change his continuous mode or if his bolus needs have changed dramatically- patient estimates he has been on this regimen for about a year.    Patient states that his pump works just fine and that he has plenty of insulin. He ran out of supplies for his meter- he states that cost is no issue and [facility]. He will pick these up.    States he needs no assistance from [employer].     In addition, the patient's allergies were reviewed with the patient and updated as follows:   Allergies: Patient has no known allergies.    The pharmacist has reviewed with the patient that all personal medications should be removed from the building or locked in the belongings safe.  Patient shall only take medications ordered by the physician and administered by the nursing staff.       Medication barriers identified: None noted- just needs to be more consistent on picking up supplies.    Medication adherence concerns: DEMARCUS   Understanding of emergency medications: DEMARCUS [facility], 1/16/2022,  12:02 PM

## 2025-06-16 NOTE — PLAN OF CARE
Problem: Chronic Conditions and Co-morbidities  Goal: Patient's chronic conditions and co-morbidity symptoms are monitored and maintained or improved  6/16/2025 1512 by Oriana Elmore RN  Outcome: Progressing  6/16/2025 0548 by Leslie Del Valle  Outcome: Progressing     Problem: Discharge Planning  Goal: Discharge to home or other facility with appropriate resources  6/16/2025 1512 by Oriana Elmore RN  Outcome: Progressing  Flowsheets (Taken 6/16/2025 0800)  Discharge to home or other facility with appropriate resources: Identify barriers to discharge with patient and caregiver  6/16/2025 0548 by Leslie Del Valle  Outcome: Progressing     Problem: Safety - Adult  Goal: Free from fall injury  6/16/2025 1512 by Oriana Elmore RN  Outcome: Progressing  6/16/2025 0548 by Leslie Del Valle  Outcome: Progressing     Problem: Pain  Goal: Verbalizes/displays adequate comfort level or baseline comfort level  6/16/2025 1512 by Oriana Elmore RN  Outcome: Progressing  6/16/2025 0548 by Leslie Del Valle  Outcome: Progressing

## 2025-06-16 NOTE — PROGRESS NOTES
Pharmacy Note  Warfarin Consult follow-up      Recent Labs     06/16/25  0909   INR 1.4     Recent Labs     06/14/25  1144 06/15/25  0923 06/16/25  0909   HGB 10.0* 10.5* 10.1*   HCT 29.5* 32.2* 29.3*    234 201       Current warfarin drug-drug interactions:      Date INR Dose   6/15 1.1 7.5 mg   6/16 1.4             Notes:                 INR increased from 1.1 to 1.4. Will give 5mg today.    Daily PT/INR while inpatient.     Juan David Lyles, PharmD  6/16/2025  1:58 PM

## 2025-06-16 NOTE — DISCHARGE INSTR - COC
Continuity of Care Form    Patient Name: sAher Ayon   :  1955  MRN:  [id number]    Admit date:  6/10/2025  Discharge date:  2025    Code Status Order: Full Code   Advance Directives:     Admitting Physician:  Alise Overton MD  PCP: Kevin Herrera MD    Discharging Nurse: [facility] Unit/Room#: 0332/0332-01  Discharging Unit Phone Number: [phone]    Emergency Contact:   Extended Emergency Contact Information  Primary Emergency Contact: Pat Ayon  Address: [address] [place] [address]  Home Phone: [phone]  Relation: Parent  Secondary Emergency Contact: Afia Wilkerson  Mobile Phone: [phone]  Relation: Friend    Past Surgical History:  Past Surgical History:   Procedure Laterality Date    CARDIAC SURGERY  2010    CABG X3    COLONOSCOPY      DEBRIDEMENT Right 2015    DEBRIDEMENT WOUND FOOT RIGHT W/ APPLICATION BILAT INTEG RAT GRAFT    ENDOSCOPY, COLON, DIAGNOSTIC      EYE SURGERY Bilateral     cataracts    FINGER AMPUTATION Right 2025    Middle finger Amputation, Index Finger Burn Debridement with Split Thickness Skin Graft    FINGER AMPUTATION Right 2025    MIDDLE FINGER AMPUTATION, VOLAR FLAP CLOSURE, INDEX FINGER BURN DEBRIDEMENT WITH INTEGRA SKIN GRAFT APPLICATION performed by JUSTIN Duran MD at [facility] OR    HERNIA REPAIR      umbilical    KNEE ARTHROSCOPY Right     OTHER SURGICAL HISTORY Right 2015    wound care graft procedure foot,appl of integra    PACEMAKER PLACEMENT  2011    WITH DEFIBRILLATOR- Medtronic    TOE AMPUTATION Right     R great    TOE AMPUTATION Left 2022    TOE AMPUTATION--left 2nd digit performed by Eris Xiong DPM at [facility] OR    TOE AMPUTATION Left 2022    Left third fourth and fifth toe amputation through the metatarsal for completion transmetatarsal amputation performed by Abdias Nicole MD at [facility] OR    TOE AMPUTATION Right 2023    3RD DIGIT AMPUTATION  Posterior;Right (Active)   Dressing Status Other (Comment) 06/16/25 0400   Incision Cleansed Not Cleansed 06/16/25 0800   Dressing/Treatment Dry dressing 06/16/25 0800   Closure Sutures 06/16/25 0800   Incision Assessment Erythema 06/16/25 0800   Drainage Amount None (dry) 06/16/25 0800   Odor None 05/27/25 1500   Number of days: 20        Elimination:  Continence:   Bowel: Yes  Bladder: almazan  Urinary Catheter: Indication for Use of Catheter: Acute urinary retention/obstruction   Colostomy/Ileostomy/Ileal Conduit: No       Date of Last BM: ***    Intake/Output Summary (Last 24 hours) at 6/16/2025 1340  Last data filed at 6/16/2025 1242  Gross per 24 hour   Intake 800 ml   Output 750 ml   Net 50 ml     I/O last 3 completed shifts:  In: 450 [P.O.:450]  Out: 1350 [Urine:1350]    Safety Concerns:     At Risk for Falls    Impairments/Disabilities:      Amputation - r hand digits    Nutrition Therapy:  Current Nutrition Therapy:   - Oral Diet:  Carb Control 4 carbs/meal (1800kcals/day)    Routes of Feeding: Oral  Liquids: Thin Liquids  Daily Fluid Restriction: no  Last Modified Barium Swallow with Video (Video Swallowing Test): not done    Treatments at the Time of Hospital Discharge:   Respiratory Treatments: none  Oxygen Therapy:  is not on home oxygen therapy.  Ventilator:    - No ventilator support    Rehab Therapies: Physical Therapy and Occupational Therapy  Weight Bearing Status/Restrictions: No weight bearing restrictions  Other Medical Equipment (for information only, NOT a DME order):  walker  Other Treatments: skilled nursing care and assessments , almazan care , wound care R hand     Patient's personal belongings (please select all that are sent with patient):  All belongings sent with patient    RN SIGNATURE:  Electronically signed by MELISSA TARANGO RN on 6/16/25 at 1:43 PM EDT    CASE MANAGEMENT/SOCIAL WORK SECTION    Inpatient Status Date: ***    Readmission Risk Assessment Score:  [facility] RISK OF UNPLANNED

## 2025-06-16 NOTE — CARE COORDINATION
Met w/ patient and agreeable to SNF and choices as follows: 1) [facility] 2) [facility] 3) [facility] and referral sent.    3:00 PM [facility] is able to accept. Patient wants [facility] or [facility]. Called and left message for Rakel at [facility] to call CM back about referral.    Has follow up w/ Burn tomorrow for R hand wounds. Almazan to remain at least 7 days until can see if Flomax works. Patient is unable to care for almazan and has no one to assist and [facility] cannot come out for daily cath care.

## 2025-06-17 LAB
ANION GAP SERPL CALCULATED.3IONS-SCNC: 12 MMOL/L (ref 9–16)
BASOPHILS # BLD: 0.05 K/UL (ref 0–0.2)
BASOPHILS NFR BLD: 1 % (ref 0–2)
BUN SERPL-MCNC: 35 MG/DL (ref 8–23)
CALCIUM SERPL-MCNC: 9.4 MG/DL (ref 8.6–10.4)
CHLORIDE SERPL-SCNC: 98 MMOL/L (ref 98–107)
CO2 SERPL-SCNC: 21 MMOL/L (ref 20–31)
CREAT SERPL-MCNC: 1.8 MG/DL (ref 0.7–1.2)
EOSINOPHIL # BLD: 0.24 K/UL (ref 0–0.44)
EOSINOPHILS RELATIVE PERCENT: 4 % (ref 1–4)
ERYTHROCYTE [DISTWIDTH] IN BLOOD BY AUTOMATED COUNT: 14.3 % (ref 11.8–14.4)
GFR, ESTIMATED: 40 ML/MIN/1.73M2
GLUCOSE BLD-MCNC: 159 MG/DL (ref 75–110)
GLUCOSE BLD-MCNC: 161 MG/DL (ref 75–110)
GLUCOSE BLD-MCNC: 176 MG/DL (ref 75–110)
GLUCOSE BLD-MCNC: 96 MG/DL (ref 75–110)
GLUCOSE SERPL-MCNC: 88 MG/DL (ref 74–99)
HCT VFR BLD AUTO: 29.3 % (ref 40.7–50.3)
HGB BLD-MCNC: 9.9 G/DL (ref 13–17)
IMM GRANULOCYTES # BLD AUTO: 0.03 K/UL (ref 0–0.3)
IMM GRANULOCYTES NFR BLD: 1 %
INR PPP: 1.7
LYMPHOCYTES NFR BLD: 0.88 K/UL (ref 1.1–3.7)
LYMPHOCYTES RELATIVE PERCENT: 14 % (ref 24–43)
MCH RBC QN AUTO: 31.8 PG (ref 25.2–33.5)
MCHC RBC AUTO-ENTMCNC: 33.8 G/DL (ref 28.4–34.8)
MCV RBC AUTO: 94.2 FL (ref 82.6–102.9)
MONOCYTES NFR BLD: 0.45 K/UL (ref 0.1–1.2)
MONOCYTES NFR BLD: 7 % (ref 3–12)
NEUTROPHILS NFR BLD: 73 % (ref 36–65)
NEUTS SEG NFR BLD: 4.82 K/UL (ref 1.5–8.1)
NRBC BLD-RTO: 0 PER 100 WBC
PLATELET # BLD AUTO: 209 K/UL (ref 138–453)
PMV BLD AUTO: 9.6 FL (ref 8.1–13.5)
POTASSIUM SERPL-SCNC: 4.5 MMOL/L (ref 3.7–5.3)
PROTHROMBIN TIME: 20.2 SEC (ref 11.7–14.9)
RBC # BLD AUTO: 3.11 M/UL (ref 4.21–5.77)
SODIUM SERPL-SCNC: 131 MMOL/L (ref 136–145)
WBC OTHER # BLD: 6.5 K/UL (ref 3.5–11.3)

## 2025-06-17 PROCEDURE — 97535 SELF CARE MNGMENT TRAINING: CPT

## 2025-06-17 PROCEDURE — 85025 COMPLETE CBC W/AUTO DIFF WBC: CPT

## 2025-06-17 PROCEDURE — 6370000000 HC RX 637 (ALT 250 FOR IP): Performed by: PHYSICIAN ASSISTANT

## 2025-06-17 PROCEDURE — 97530 THERAPEUTIC ACTIVITIES: CPT

## 2025-06-17 PROCEDURE — 51702 INSERT TEMP BLADDER CATH: CPT

## 2025-06-17 PROCEDURE — 6370000000 HC RX 637 (ALT 250 FOR IP): Performed by: INTERNAL MEDICINE

## 2025-06-17 PROCEDURE — 6370000000 HC RX 637 (ALT 250 FOR IP)

## 2025-06-17 PROCEDURE — 85610 PROTHROMBIN TIME: CPT

## 2025-06-17 PROCEDURE — 1200000000 HC SEMI PRIVATE

## 2025-06-17 PROCEDURE — 82947 ASSAY GLUCOSE BLOOD QUANT: CPT

## 2025-06-17 PROCEDURE — 97162 PT EVAL MOD COMPLEX 30 MIN: CPT

## 2025-06-17 PROCEDURE — 6370000000 HC RX 637 (ALT 250 FOR IP): Performed by: STUDENT IN AN ORGANIZED HEALTH CARE EDUCATION/TRAINING PROGRAM

## 2025-06-17 PROCEDURE — APPSS30 APP SPLIT SHARED TIME 16-30 MINUTES: Performed by: NURSE PRACTITIONER

## 2025-06-17 PROCEDURE — 6370000000 HC RX 637 (ALT 250 FOR IP): Performed by: NURSE PRACTITIONER

## 2025-06-17 PROCEDURE — 99232 SBSQ HOSP IP/OBS MODERATE 35: CPT | Performed by: INTERNAL MEDICINE

## 2025-06-17 PROCEDURE — 36415 COLL VENOUS BLD VENIPUNCTURE: CPT

## 2025-06-17 PROCEDURE — 2500000003 HC RX 250 WO HCPCS

## 2025-06-17 PROCEDURE — 80048 BASIC METABOLIC PNL TOTAL CA: CPT

## 2025-06-17 PROCEDURE — 99232 SBSQ HOSP IP/OBS MODERATE 35: CPT | Performed by: STUDENT IN AN ORGANIZED HEALTH CARE EDUCATION/TRAINING PROGRAM

## 2025-06-17 PROCEDURE — 97166 OT EVAL MOD COMPLEX 45 MIN: CPT

## 2025-06-17 RX ORDER — WARFARIN SODIUM 2 MG/1
4 TABLET ORAL
Status: COMPLETED | OUTPATIENT
Start: 2025-06-17 | End: 2025-06-17

## 2025-06-17 RX ORDER — OXYCODONE HYDROCHLORIDE 5 MG/1
5 TABLET ORAL EVERY 8 HOURS PRN
Qty: 9 TABLET | Refills: 0 | Status: SHIPPED | OUTPATIENT
Start: 2025-06-17 | End: 2025-06-20

## 2025-06-17 RX ORDER — GLIPIZIDE 5 MG/1
5 TABLET ORAL
Status: DISCONTINUED | OUTPATIENT
Start: 2025-06-18 | End: 2025-06-19 | Stop reason: HOSPADM

## 2025-06-17 RX ADMIN — OXYCODONE 10 MG: 5 TABLET ORAL at 13:06

## 2025-06-17 RX ADMIN — CARVEDILOL 12.5 MG: 12.5 TABLET, FILM COATED ORAL at 08:38

## 2025-06-17 RX ADMIN — CARVEDILOL 12.5 MG: 12.5 TABLET, FILM COATED ORAL at 20:28

## 2025-06-17 RX ADMIN — MAGNESIUM HYDROXIDE 30 ML: 400 SUSPENSION ORAL at 12:17

## 2025-06-17 RX ADMIN — LINEZOLID 600 MG: 600 TABLET, FILM COATED ORAL at 20:28

## 2025-06-17 RX ADMIN — DOCUSATE SODIUM 50MG AND SENNOSIDES 8.6MG 2 TABLET: 8.6; 5 TABLET, FILM COATED ORAL at 08:38

## 2025-06-17 RX ADMIN — ASPIRIN 81 MG: 81 TABLET, COATED ORAL at 08:39

## 2025-06-17 RX ADMIN — OXYCODONE 10 MG: 5 TABLET ORAL at 22:18

## 2025-06-17 RX ADMIN — AMLODIPINE BESYLATE 10 MG: 10 TABLET ORAL at 08:38

## 2025-06-17 RX ADMIN — SODIUM CHLORIDE, PRESERVATIVE FREE 10 ML: 5 INJECTION INTRAVENOUS at 20:30

## 2025-06-17 RX ADMIN — POLYETHYLENE GLYCOL 3350 17 G: 17 POWDER, FOR SOLUTION ORAL at 08:37

## 2025-06-17 RX ADMIN — TAMSULOSIN HYDROCHLORIDE 0.4 MG: 0.4 CAPSULE ORAL at 08:38

## 2025-06-17 RX ADMIN — SODIUM CHLORIDE, PRESERVATIVE FREE 10 ML: 5 INJECTION INTRAVENOUS at 08:38

## 2025-06-17 RX ADMIN — OXYCODONE 10 MG: 5 TABLET ORAL at 05:05

## 2025-06-17 RX ADMIN — FUROSEMIDE 20 MG: 20 TABLET ORAL at 08:38

## 2025-06-17 RX ADMIN — ATORVASTATIN CALCIUM 40 MG: 40 TABLET, FILM COATED ORAL at 20:28

## 2025-06-17 RX ADMIN — WARFARIN SODIUM 4 MG: 2 TABLET ORAL at 17:52

## 2025-06-17 RX ADMIN — LINEZOLID 600 MG: 600 TABLET, FILM COATED ORAL at 08:38

## 2025-06-17 RX ADMIN — OXYCODONE 10 MG: 5 TABLET ORAL at 18:14

## 2025-06-17 RX ADMIN — Medication 3 MG: at 20:28

## 2025-06-17 ASSESSMENT — PAIN SCALES - GENERAL
PAINLEVEL_OUTOF10: 4
PAINLEVEL_OUTOF10: 5
PAINLEVEL_OUTOF10: 9
PAINLEVEL_OUTOF10: 9
PAINLEVEL_OUTOF10: 10
PAINLEVEL_OUTOF10: 6
PAINLEVEL_OUTOF10: 0
PAINLEVEL_OUTOF10: 10
PAINLEVEL_OUTOF10: 0

## 2025-06-17 ASSESSMENT — PAIN DESCRIPTION - ORIENTATION
ORIENTATION: RIGHT

## 2025-06-17 ASSESSMENT — PAIN DESCRIPTION - DESCRIPTORS
DESCRIPTORS: BURNING
DESCRIPTORS: ACHING;BURNING
DESCRIPTORS: SORE
DESCRIPTORS: ACHING;BURNING

## 2025-06-17 ASSESSMENT — PAIN DESCRIPTION - LOCATION
LOCATION: HAND
LOCATION: HAND
LOCATION: HEAD
LOCATION: HAND
LOCATION: HAND

## 2025-06-17 NOTE — PROGRESS NOTES
Orthopedic Progress Note    Patient:  Asher Ayon  YOB: 1955     69 y.o. male    Subjective:  Patient seen and examined this morning. No complaints or concerns. No issues overnight per nursing. Pain is well controlled on current regimen. Denies CP, SOB, N/V, new numbness/tingling.     Vitals reviewed, afebrile    Objective:   Vitals:    06/16/25 2138   BP:    Pulse:    Resp: 16   Temp:    SpO2:      Gen: NAD, cooperative    Cardiovascular: Regular rate on monitor   Respiratory: No acute respiratory distress, breathing comfortably    Orthopedic Exam  RUE:  Soft dressings overlying the patient's prior surgical wound to the right middle finger removed today to further assess the patient's prior surgical incision.  These dressings were then not replaced and the patient's hand was left open to air.  The patient does have a small pimple that is just proximal to the prior surgical incision.  Aside from this, there is no expressible drainage or palpable fluctuance in the immediate vicinity of the prior surgical incision.  The right index finger does appear to be declaring itself as shown in the imaging provided below.  In addition there does appear to be some necrosis of the dorsal aspect of the patient's right little finger, although there does appear to be good blood flow remaining to the patient's volar aspect of the little finger.  The patient does endorse sensation to the radial and ulnar aspects of the distalmost aspect of the patient's little finger.  The patient does state that he is able to sense light touch to the distalmost aspect of the ring finger, although he does admit that it is minimal and numb.  Sensation intact to the remaining digits/residual digits.  Patient does endorse a difficulty in being able to flex and extend the joint is distal to the MCP of all digits of the hand.  Clinical media provided below.              Recent Labs     06/16/25  0909   WBC 6.3   HGB 10.1*

## 2025-06-17 NOTE — CARE COORDINATION
Call to Rakel at [facility] about referral. May need to move to 2nd choice if do not get acceptance by [facility].    10:00 PM Call to Rak[facility] again and left another message about referral.    11:50 AM Call back from [facility] and most likely will accept and will call CM back within 15 minutes.    1:30 PM Call to [facility] and left VM message about acceptance.    3:20 PM Last call to Rakel and left another message.     3:40 Message sent to [facility] [place]. Call to [facility] @ [facility] and can accept. We will start precert.     3:45 PM Call to [facility] and will start precert tomorrow.

## 2025-06-17 NOTE — PROGRESS NOTES
2015  PCN allergy  Doxycycline allergy    Recommendations:   Please follow suggestions as per Dr Benavides's recommendations:  Linezolid 600 mg po BID x14 days  Wound Care  Plastic Surgery performed suture and stable removal completed with no plan for further intervention at this time. Plan for follow-up in the hand clinic    Interval History:     2025  BP (!) 145/88   Pulse 66   Temp 98.1 °F (36.7 °C) (Oral)   Resp 15   Ht 1.803 m (5' 10.98\")   Wt 85.1 kg (187 lb 9.8 oz)   SpO2 94%   BMI 26.18 kg/m²     Afebrile  VSS on room air  HTN    The patient was doing well upon upon evaluation.   Coudé cath replaced for retention per Urology-Outpatient follow-up will be needed.  Patient is requesting catheter removal prior to discharge, but it is recommended to stay in for a week. He has no one at home to help him, so he has agreed to a SNF.     Plastic Surgery performed suture and staple removal completed with no plan for further intervention at this time. Plan for follow-up in the hand clinic on 25  Right hand surgical sites noted. .   Lab-work reviewed  Leukocytosis resolved    No growth on blood cultures thus far  MRSA nares negative    Zyvox continues    No acute issues per RN  D/C planning underway to SNF    Right hand   25            Physical Examination :   Patient Vitals for the past 8 hrs:   BP Temp Temp src Pulse Resp SpO2 Height   25 1336 -- -- -- -- 15 -- --   25 1306 -- -- -- -- 15 -- --   25 1113 -- -- -- -- -- -- 1.803 m (5' 10.98\")   25 0727 (!) 145/88 98.1 °F (36.7 °C) Oral 66 16 94 % --     Temp (24hrs), Av.8 °F (36.6 °C), Min:97.5 °F (36.4 °C), Max:98.1 °F (36.7 °C)    General Appearance: Awake, alert, and in no apparent distress  Eyes: Sclera anicteric; conjunctivae pink, No hemorhages, no embolic phenomena.  ENT: Oropharynx clear, without erythema, exudate, or thrush.No tenderness of sinuses. No nasal  drainage.  Neck: Supple, without lymphadenopathy. No JVD  Pulmonary/Chest: Clear to auscultation, without wheezes, rales, or rhonchi. No areas of consolidation.Good air movement bilaterally. No egophony.  Cardiovascular: Regular rate and rhythm without murmurs, rubs, or gallops. S1 and S2 normal.  Abdomen: Soft, no tenderness, bowel sounds normal  All four Extremities: No cyanosis, clubbing, edema, or effusions.Wounds to right hand with amp site  Neurologic: No gross sensory or motor deficits.  Skin: Wounds to right hand with amp site. IV site inspected: no inflammation.      Medical Decision Making:   I have independently reviewed/ordered the following labs:    CBC with Differential:   Recent Labs     06/16/25  0909 06/17/25  0643   WBC 6.3 6.5   HGB 10.1* 9.9*   HCT 29.3* 29.3*    209   LYMPHOPCT 16* 14*   MONOPCT 8 7   EOSPCT 4 4     BMP:   Recent Labs     06/16/25  0909 06/17/25  0643   * 131*   K 4.5 4.5   CL 99 98   CO2 21 21   BUN 33* 35*   CREATININE 2.0* 1.8*     Hepatic Function Panel:   No results for input(s): \"LABALBU\", \"BILIDIR\", \"IBILI\", \"BILITOT\", \"ALKPHOS\", \"ALT\", \"AST\" in the last 72 hours.    Invalid input(s): \"PROT\"    Lab Results   Component Value Date/Time    [facility] 10.0 10/13/2015 02:20 PM        Medications:      [START ON 6/18/2025] glipiZIDE  5 mg Oral QAM AC    warfarin  4 mg Oral Once    sennosides-docusate sodium  2 tablet Oral Daily    warfarin placeholder: dosing by pharmacy   Oral RX Placeholder    furosemide  20 mg Oral Daily    polyethylene glycol  17 g Oral Daily    amLODIPine  10 mg Oral Daily    linezolid  600 mg Oral 2 times per day    tamsulosin  0.4 mg Oral Daily    carvedilol  12.5 mg Oral BID    aspirin  81 mg Oral Daily    atorvastatin  40 mg Oral Nightly    sodium chloride flush  5-40 mL IntraVENous 2 times per day    insulin lispro  0-8 Units SubCUTAneous 4x Daily AC & HS       Thank you for allowing us to participate in the care of this patient. Please

## 2025-06-17 NOTE — PROGRESS NOTES
Pharmacy Note  Warfarin Consult follow-up      Recent Labs     06/17/25  0643   INR 1.7     Recent Labs     06/15/25  0923 06/16/25  0909 06/17/25  0643   HGB 10.5* 10.1* 9.9*   HCT 32.2* 29.3* 29.3*    201 209       Warfarin dose prior to admission: 4 mg Sun, Tues and Thurs; 2 mg all other days.  Warfarin indication: CVA/A. fib  Target INR range: 2-3     Current warfarin drug-drug interactions: APAP, ASA, Zyvox    Date INR Dose   6/15 1.1 7.5 mg   6/16 1.4 5 mg   6/17 1.7 4 mg         Notes:      -Subtherapeutic INR trending upwards  -Coumadin 4 mg dose this evening  -INR reevaluation in AM                   Daily PT/INR while inpatient.      Thank you for the consult. Will continue to follow.  Rodolfo Burns, PharmD

## 2025-06-17 NOTE — PROGRESS NOTES
[facility]  Office: [phone]  Jorge Valenzuela, DO, Nahco Dent, DO, Justin Dumont DO, Maxwell Dunlap, DO, Nica House MD, Helene Munoz MD, Sarmad Hu MD, Lupis Novak MD,  Jasen Wood MD, Kenia Jones MD, Afsaneh Rodriugez MD,  Jack Read DO, Alise Overton MD, David Thornton MD, Asher Valenzuela DO, Shanel Bah MD,  Eris Wyman DO, Li Lyles MD, Imani Diego MD, Anthony Cuadra MD,  Talat Vásquez MD, Simran Singh MD, Raven Mann MD, Sana Kiser MD, Dakota Teague MD, Scott Sandoval MD, Blayne Richards, DO, Zaina Rod MD, Elpidio Gonzalez DO, Earl Haney MD, Jack Ga MD, Mohsin Reza, MD, Lee Ann Cantu MD, Shirley Waterhouse, CNP,  Meenakshi Kimbrough, CNP, Blayne Fowler, CNP,  Nahomy Salvador, SAHIL, Any Traylor, CNP, Allyson Hernández, CNP, Justina Graham, CNP, Joselyn Michaud, CNP, Corine Conrad, PA-C, Mini Butterfield, CNP, Amy Echevarria, CNP,  Chastity Jean, CNP, Oriana Sanders, CNP, Hardeep Jacobson, PA-C, Evelia Dinh, CNP,  Sera Marcum, CNS, Cuca Nieto, CNP, Andra Paez, CNP,   Jeri Mcadams, CNP         [facility]   IN-PATIENT SERVICE   [facility]    Progress Note    6/17/2025    8:51 AM    Name:   Asher Ayon  MRN:     [id number]     Acct:      [id number]   Room:   0332/0332-01  IP Day:  7  Admit Date:  6/10/2025 11:47 PM    PCP:   Kevin Herrera MD  Code Status:  Full Code    Subjective:     C/C: ams  Interval History Status: not changed.     Patient reports pain in his abdomen, reports constipation  He also reports pain in his right hand where the burn site is.  Labs and vitals reviewed-blood sugar around 170  Hemoglobin 9.9  Sodium 131, creatinine 1.8.  Brief History:     69-year-old male with known medical history of type 2 diabetes mellitus, CKD, CAD with history of CABG, A-fib, hypertension, hyperlipidemia, mitral valve clip implantation was transferred from outlying facility after patient was  Cardiomyopathy (HCC), Cellulitis, Cerebral artery occlusion with cerebral infarction (HCC), CHF (congestive heart failure) (HCC), CKD (chronic kidney disease), Diabetes mellitus (HCC), Finger necrosis (HCC), Foot infection, Heart failure (HCC), Hyperlipidemia, Hypertension, MRSA (methicillin resistant staph aureus) culture positive, Osteomyelitis (HCC), Pacemaker, Polyneuropathy, PVD (peripheral vascular disease), S/P mitral valve clip implantation, Under care of service provider, Under care of service provider, Wears dentures, and Wound, open, foot.    Social History:   reports that he has never smoked. He has never used smokeless tobacco. He reports current alcohol use of about 3.0 standard drinks of alcohol per week. He reports that he does not use drugs.     Family History:   Family History   Problem Relation Age of Onset    Osteoarthritis Mother     Cancer Father         pancreatic cancer       Vitals:  BP (!) 145/88   Pulse 66   Temp 98.1 °F (36.7 °C) (Oral)   Resp 16   Ht 1.803 m (5' 11\")   Wt 85.1 kg (187 lb 9.8 oz)   SpO2 94%   BMI 26.17 kg/m²   Temp (24hrs), Av.8 °F (36.6 °C), Min:97.5 °F (36.4 °C), Max:98.1 °F (36.7 °C)    Recent Labs     25  1101 25  1642 25  1947 25  0725   POCGLU 196* 192* 128* 96       I/O (24Hr):    Intake/Output Summary (Last 24 hours) at 2025 0851  Last data filed at 2025 0838  Gross per 24 hour   Intake 810 ml   Output 1800 ml   Net -990 ml       Labs:  Hematology:  Recent Labs     06/15/25  0923 06/15/25  1141 25  0909 25  0643   WBC 9.5  --  6.3 6.5   RBC 3.32*  --  3.16* 3.11*   HGB 10.5*  --  10.1* 9.9*   HCT 32.2*  --  29.3* 29.3*   MCV 97.0  --  92.7 94.2   MCH 31.6  --  32.0 31.8   MCHC 32.6  --  34.5 33.8   RDW 14.5*  --  14.2 14.3     --  201 209   MPV 9.5  --  9.5 9.6   INR  --  1.1 1.4 1.7     Chemistry:  Recent Labs     06/15/25  1709 25  0909 25  0643   * 132* 131*   K 4.6 4.5 4.5   CL

## 2025-06-17 NOTE — PLAN OF CARE
Problem: Chronic Conditions and Co-morbidities  Goal: Patient's chronic conditions and co-morbidity symptoms are monitored and maintained or improved  6/16/2025 2316 by Jair Irizarry RN  Outcome: Progressing  6/16/2025 1512 by Oriana Elmore RN  Outcome: Progressing     Problem: Discharge Planning  Goal: Discharge to home or other facility with appropriate resources  6/16/2025 2316 by Jair Irizarry RN  Outcome: Progressing  6/16/2025 1512 by Oriana Elmore RN  Outcome: Progressing  Flowsheets (Taken 6/16/2025 0800)  Discharge to home or other facility with appropriate resources: Identify barriers to discharge with patient and caregiver     Problem: Safety - Adult  Goal: Free from fall injury  6/16/2025 2316 by Jair Irizarry RN  Outcome: Progressing  6/16/2025 1512 by Oriana Elmore RN  Outcome: Progressing     Problem: Pain  Goal: Verbalizes/displays adequate comfort level or baseline comfort level  6/16/2025 2316 by Jair Irizarry RN  Outcome: Progressing  6/16/2025 1512 by Oriana Elmore RN  Outcome: Progressing     Problem: Musculoskeletal - Adult  Goal: Return mobility to safest level of function  6/16/2025 2316 by Jair Irizarry RN  Outcome: Progressing  6/16/2025 1512 by Oriana Elmore RN  Outcome: Progressing  Goal: Maintain proper alignment of affected body part  6/16/2025 2316 by Jair Irizarry RN  Outcome: Progressing  6/16/2025 1512 by Oriana Elmore RN  Outcome: Progressing  Goal: Return ADL status to a safe level of function  6/16/2025 2316 by Jair Irizarry RN  Outcome: Progressing  6/16/2025 1512 by Oriana Emlore RN  Outcome: Progressing     Problem: Skin/Tissue Integrity  Goal: Skin integrity remains intact  Description: 1.  Monitor for areas of redness and/or skin breakdown  2.  Assess vascular access sites hourly  3.  Every 4-6 hours minimum:  Change oxygen saturation probe site  4.  Every 4-6 hours:  If on nasal continuous positive airway pressure,

## 2025-06-17 NOTE — PLAN OF CARE
Problem: Chronic Conditions and Co-morbidities  Goal: Patient's chronic conditions and co-morbidity symptoms are monitored and maintained or improved  Outcome: Progressing     Problem: Discharge Planning  Goal: Discharge to home or other facility with appropriate resources  Outcome: Progressing  Flowsheets (Taken 6/17/2025 0800)  Discharge to home or other facility with appropriate resources: Identify barriers to discharge with patient and caregiver     Problem: Safety - Adult  Goal: Free from fall injury  Outcome: Progressing  Flowsheets (Taken 6/17/2025 0729)  Free From Fall Injury: Instruct family/caregiver on patient safety     Problem: Pain  Goal: Verbalizes/displays adequate comfort level or baseline comfort level  Outcome: Progressing     Problem: Musculoskeletal - Adult  Goal: Return mobility to safest level of function  Outcome: Progressing  Goal: Maintain proper alignment of affected body part  Outcome: Progressing  Goal: Return ADL status to a safe level of function  Outcome: Progressing     Problem: Skin/Tissue Integrity  Goal: Skin integrity remains intact  Description: 1.  Monitor for areas of redness and/or skin breakdown  2.  Assess vascular access sites hourly  3.  Every 4-6 hours minimum:  Change oxygen saturation probe site  4.  Every 4-6 hours:  If on nasal continuous positive airway pressure, respiratory therapy assess nares and determine need for appliance change or resting period  Outcome: Progressing

## 2025-06-17 NOTE — PROGRESS NOTES
Occupational Therapy  Occupational Therapy Initial Evaluation  Facility/Department: [facility] [place] RENAL//MED SURG   Patient Name: Asher Ayon        MRN: [id number]    : 1955    Date of Service: 2025      Per Chart \" HPI 69 y.o. male presents with c/o hypoglycemia.  Patient was found with altered mental status this morning.  EMS was called.  They found his glucose in the 40s.  He was treated prehospital with dextrose with normalization of his mental status.  Patient reports that he took his glimepiride 2 mg last dose was last evening.  Patient also on pioglitazone.  Patient is not on any insulin.  Patient had recent right middle finger amputation along with irrigation and debridement of skin wounds from hand burn \"    Past Surgical History:  has a past surgical history that includes eye surgery (Bilateral); hernia repair; Knee arthroscopy (Right); Colonoscopy; Endoscopy, colon, diagnostic; pacemaker placement (2011); Cardiac surgery (2010); Toe amputation (Right); Wound debridement (Right, 2015); other surgical history (Right, 2015); Toe amputation (Left, 2022); vascular surgery (Left, 2022); Toe amputation (Left, 2022); Toe amputation (Right, 2023); Toe amputation (Right, 2024); Toe amputation (Right, 2024); Finger amputation (Right, 2025); and Finger amputation (Right, 2025).    Discharge Recommendations  Discharge Recommendations: Patient would benefit from continued therapy after discharge  OT Equipment Recommendations  Equipment Needed: Yes  Mobility Devices: ADL Assistive Devices, Walker  Walker: Rolling  ADL Assistive Devices: Grab Bars - shower, Grab Bars - toilet    Assessment  Performance deficits / Impairments: Decreased functional mobility ;Decreased ADL status;Decreased balance;Decreased endurance;Decreased safe awareness;Decreased cognition;Decreased high-level IADLs  Assessment: Pt agreed to occupational therapy

## 2025-06-17 NOTE — PROGRESS NOTES
Nutrition Assessment     Type and Reason for Visit: Initial, LOS    Nutrition Recommendations/Plan:   Continue current diet.  Start high johnnie/high pro ONS (Ensure Plus) once daily.     Malnutrition Assessment:  Malnutrition Status: No malnutrition    Nutrition Assessment:  Pt admit w/ BRIANNA. Seen for LOS. PMH: DM, CKD, CAD, CABG, HTN, HLD. Pt intakes since admit average 50-75%. Pt states appetite is ok. When asked about appetite is at home pt avoided eye contact and stated he eats toast in the morning and drinks pop. When questioned more pt states he eats 1-2 meals a day at home. Discussed the importance of nutrition w/ pt and encouraged him to eat more then one meal a day at home. Pt may benefit from ONS. Per chart mild wt loss noted over past year. Per chart plan for pt to go to SNF.    Nutrition Related Findings:   Non-pitting edema RUE. Labs: Na 131, A1C (6/16) 7.3%. Meds: humalog, glycolax, senokot, coumadin. Wound Type: Surgical Incision    Current Nutrition Therapies:    ADULT DIET; Regular; 4 carb choices (60 gm/meal); Low Fat/Low Chol/High Fiber/SANJEEV  ADULT ORAL NUTRITION SUPPLEMENT; Lunch; Standard High Calorie/High Protein Oral Supplement    Anthropometric Measures:  Height: 180.3 cm (5' 10.98\")  Current Body Wt: 85.1 kg (187 lb 9.8 oz)   BMI: 26.2        Nutrition Diagnosis:   No nutrition diagnosis at this time       Nutrition Interventions:   Food and/or Nutrient Delivery: Continue Current Diet, Start Oral Nutrition Supplement    Discharge Planning:    Continue Oral Nutrition Supplement, Continue current diet     LUCIE MCCLOUD RD  Contact: [phone]   Mobile: [phone]       [phone]
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
## 2025-06-18 LAB
GLUCOSE BLD-MCNC: 127 MG/DL (ref 75–110)
GLUCOSE BLD-MCNC: 129 MG/DL (ref 75–110)
GLUCOSE BLD-MCNC: 75 MG/DL (ref 75–110)
GLUCOSE BLD-MCNC: 95 MG/DL (ref 75–110)
INR PPP: 2.3
PROTHROMBIN TIME: 26.5 SEC (ref 11.7–14.9)

## 2025-06-18 PROCEDURE — 6370000000 HC RX 637 (ALT 250 FOR IP): Performed by: INTERNAL MEDICINE

## 2025-06-18 PROCEDURE — 99232 SBSQ HOSP IP/OBS MODERATE 35: CPT | Performed by: INTERNAL MEDICINE

## 2025-06-18 PROCEDURE — 6370000000 HC RX 637 (ALT 250 FOR IP): Performed by: STUDENT IN AN ORGANIZED HEALTH CARE EDUCATION/TRAINING PROGRAM

## 2025-06-18 PROCEDURE — G0545 PR INHERENT VISIT TO INPT: HCPCS | Performed by: INTERNAL MEDICINE

## 2025-06-18 PROCEDURE — 1200000000 HC SEMI PRIVATE

## 2025-06-18 PROCEDURE — 2500000003 HC RX 250 WO HCPCS

## 2025-06-18 PROCEDURE — 82947 ASSAY GLUCOSE BLOOD QUANT: CPT

## 2025-06-18 PROCEDURE — 99232 SBSQ HOSP IP/OBS MODERATE 35: CPT | Performed by: STUDENT IN AN ORGANIZED HEALTH CARE EDUCATION/TRAINING PROGRAM

## 2025-06-18 PROCEDURE — 6370000000 HC RX 637 (ALT 250 FOR IP): Performed by: PHYSICIAN ASSISTANT

## 2025-06-18 PROCEDURE — 85610 PROTHROMBIN TIME: CPT

## 2025-06-18 PROCEDURE — 6370000000 HC RX 637 (ALT 250 FOR IP)

## 2025-06-18 PROCEDURE — 36415 COLL VENOUS BLD VENIPUNCTURE: CPT

## 2025-06-18 RX ORDER — WARFARIN SODIUM 2 MG/1
2 TABLET ORAL
Status: COMPLETED | OUTPATIENT
Start: 2025-06-18 | End: 2025-06-18

## 2025-06-18 RX ADMIN — GLIPIZIDE 5 MG: 5 TABLET ORAL at 09:38

## 2025-06-18 RX ADMIN — SODIUM CHLORIDE, PRESERVATIVE FREE 10 ML: 5 INJECTION INTRAVENOUS at 09:39

## 2025-06-18 RX ADMIN — TAMSULOSIN HYDROCHLORIDE 0.4 MG: 0.4 CAPSULE ORAL at 09:39

## 2025-06-18 RX ADMIN — ATORVASTATIN CALCIUM 40 MG: 40 TABLET, FILM COATED ORAL at 21:23

## 2025-06-18 RX ADMIN — POLYETHYLENE GLYCOL 3350 17 G: 17 POWDER, FOR SOLUTION ORAL at 09:37

## 2025-06-18 RX ADMIN — ASPIRIN 81 MG: 81 TABLET, COATED ORAL at 09:39

## 2025-06-18 RX ADMIN — SODIUM CHLORIDE, PRESERVATIVE FREE 10 ML: 5 INJECTION INTRAVENOUS at 22:07

## 2025-06-18 RX ADMIN — DOCUSATE SODIUM 50MG AND SENNOSIDES 8.6MG 2 TABLET: 8.6; 5 TABLET, FILM COATED ORAL at 09:39

## 2025-06-18 RX ADMIN — LINEZOLID 600 MG: 600 TABLET, FILM COATED ORAL at 09:39

## 2025-06-18 RX ADMIN — CARVEDILOL 12.5 MG: 12.5 TABLET, FILM COATED ORAL at 09:38

## 2025-06-18 RX ADMIN — OXYCODONE 10 MG: 5 TABLET ORAL at 18:04

## 2025-06-18 RX ADMIN — LINEZOLID 600 MG: 600 TABLET, FILM COATED ORAL at 21:23

## 2025-06-18 RX ADMIN — AMLODIPINE BESYLATE 10 MG: 10 TABLET ORAL at 09:39

## 2025-06-18 RX ADMIN — FUROSEMIDE 20 MG: 20 TABLET ORAL at 09:38

## 2025-06-18 RX ADMIN — CARVEDILOL 12.5 MG: 12.5 TABLET, FILM COATED ORAL at 21:23

## 2025-06-18 RX ADMIN — WARFARIN SODIUM 2 MG: 2 TABLET ORAL at 18:23

## 2025-06-18 ASSESSMENT — PAIN - FUNCTIONAL ASSESSMENT: PAIN_FUNCTIONAL_ASSESSMENT: ACTIVITIES ARE NOT PREVENTED

## 2025-06-18 ASSESSMENT — PAIN DESCRIPTION - LOCATION
LOCATION: HAND
LOCATION: HAND
LOCATION: OTHER (COMMENT)

## 2025-06-18 ASSESSMENT — PAIN SCALES - GENERAL
PAINLEVEL_OUTOF10: 4
PAINLEVEL_OUTOF10: 4
PAINLEVEL_OUTOF10: 10

## 2025-06-18 ASSESSMENT — PAIN DESCRIPTION - DESCRIPTORS: DESCRIPTORS: SORE

## 2025-06-18 ASSESSMENT — PAIN DESCRIPTION - PAIN TYPE: TYPE: ACUTE PAIN

## 2025-06-18 NOTE — PROGRESS NOTES
[facility]  Office: [phone]  Jorge Valenzuela, DO, Nacho Dent, DO, Justin Dumont DO, Maxwell Dunlap, DO, Nica House MD, Helene Munoz MD, Sarmad Hu MD, Lupis Novak MD,  Jasen Wood MD, Kenia Jones MD, Afsaneh Rodriguez MD,  Jack Read DO, Alise Overton MD, David Thornton MD, Asher Valenzuela DO, Shanel Bah MD,  Eris Wyman DO, Li Lyles MD, Imani Diego MD, Anthony Cuadra MD,  Talat Vásquez MD, Simran Singh MD, Raven Mann MD, Sana Kiser MD, Dakota Teague MD, Scott Sandoval MD, Blayne Richards, DO, Zaina Rod MD, Elpidio Gonzalez DO, Earl Haney MD, Jack Ga MD, Mohsin Reza, MD, Lee Ann Cantu MD, Shirley Waterhouse, CNP,  Meenakshi Kimbrough, CNP, Blayne Fowler, CNP,  Nahomy Salvador, SAHIL, Any Traylor, CNP, Allyson Hernández, CNP, Justina Graham, CNP, Joselyn Michaud, CNP, Corine Conrad, PA-C, Mini Butterfield, CNP, Amy Echevarria, CNP,  Chastity Jean, CNP, Oriana Sanders, CNP, Hardeep Jacobson, PA-C, Evelia Dinh, CNP,  Sera Marcum, CNS, Cuca Nieto, CNP, Andra Paez, CNP,   Jeri Mcadams, CNP         [facility]   IN-PATIENT SERVICE   [facility]    Progress Note    6/18/2025    12:25 PM    Name:   Asher Ayon  MRN:     [id number]     Acct:      [id number]   Room:   0332/0332-01  IP Day:  8  Admit Date:  6/10/2025 11:47 PM    PCP:   Kevin Herrera MD  Code Status:  Full Code    Subjective:     C/C: ams  Interval History Status: not changed.     Patient was slightly drowsy at the time of my exam.  States that he did not have a good night sleep.  He was able to answer all questions appropriately.  No chest pain or shortness of breath.  Continues to have pain in his hand.  Has George in place.  Patient is still constipated and refused suppository previously.  He is willing to try suppository today.  Vital stable  Brief History:     69-year-old male with known medical history of type 2 diabetes mellitus,

## 2025-06-18 NOTE — PLAN OF CARE
Problem: Chronic Conditions and Co-morbidities  Goal: Patient's chronic conditions and co-morbidity symptoms are monitored and maintained or improved  6/18/2025 0227 by Emilia Chong  Outcome: Progressing  6/18/2025 0227 by Emilia Chong  Outcome: Progressing  Flowsheets (Taken 6/17/2025 2000)  Care Plan - Patient's Chronic Conditions and Co-Morbidity Symptoms are Monitored and Maintained or Improved:   Monitor and assess patient's chronic conditions and comorbid symptoms for stability, deterioration, or improvement   Collaborate with multidisciplinary team to address chronic and comorbid conditions and prevent exacerbation or deterioration   Update acute care plan with appropriate goals if chronic or comorbid symptoms are exacerbated and prevent overall improvement and discharge  6/17/2025 1614 by Oriana Elmore, RN  Outcome: Progressing     Problem: Discharge Planning  Goal: Discharge to home or other facility with appropriate resources  6/18/2025 0227 by Emilia Chong  Outcome: Progressing  6/18/2025 0227 by Emilia Chong  Outcome: Progressing  Flowsheets (Taken 6/17/2025 2000)  Discharge to home or other facility with appropriate resources:   Identify barriers to discharge with patient and caregiver   Arrange for needed discharge resources and transportation as appropriate   Identify discharge learning needs (meds, wound care, etc)   Refer to discharge planning if patient needs post-hospital services based on physician order or complex needs related to functional status, cognitive ability or social support system  6/17/2025 1614 by Oriana Elmore, RN  Outcome: Progressing  Flowsheets (Taken 6/17/2025 0800)  Discharge to home or other facility with appropriate resources: Identify barriers to discharge with patient and caregiver     Problem: Safety - Adult  Goal: Free from fall injury  6/18/2025 0227 by Emilia Chong  Outcome: Progressing  6/18/2025 0227 by Emilia Chong  Outcome:

## 2025-06-18 NOTE — PROGRESS NOTES
Pharmacy Note  Warfarin Consult follow-up      Recent Labs     06/18/25  0935   INR 2.3     Recent Labs     06/16/25  0909 06/17/25  0643   HGB 10.1* 9.9*   HCT 29.3* 29.3*    209       Current warfarin drug-drug interactions:      Date INR Dose   6/18 2.3 2mg                 Notes:                     Daily PT/INR while inpatient.     -Shyam Burton PharmD, BCPS 6/18/2025 10:14 AM

## 2025-06-18 NOTE — CARE COORDINATION
Case Management   Daily Progress Note       Patient Name: Asher Ayon                   YOB: 1955  Diagnosis: BRIANNA (acute kidney injury) [N17.9]                       GMLOS: 4.4 days  Length of Stay: 8  days    Anticipated Discharge Date: Ready for discharge    Readmission Risk (Low < 19, Mod (19-27), High > 27): Readmission Risk Score: 16.3        Current Transitional Plan    [] Home Independently    [] Home with HC    [x] Skilled Nursing Facility    [] Acute Rehabilitation    [] Long Term Acute Care (LTAC)    [] Other:     Plan for the Stay (Medical Management) :    Ready for discharge      Workflow Continuation (Additional Notes) :  Unable to start precert.  Attempted to contact through [facility].  Called facility.  Awaiting call        Zully Lynch RN  June 18, 2025
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
## 2025-06-18 NOTE — CARE COORDINATION
Case Management   Daily Progress Note       Patient Name: Asher Ayon                   YOB: 1955  Diagnosis: BRIANNA (acute kidney injury) [N17.9]                       GMLOS: 4.4 days  Length of Stay: 8  days    Anticipated Discharge Date: Ready for discharge    Readmission Risk (Low < 19, Mod (19-27), High > 27): Readmission Risk Score: 16.3        Current Transitional Plan    [] Home Independently    [] Home with HC    [] Skilled Nursing Facility    [] Acute Rehabilitation    [] Long Term Acute Care (LTAC)    [] Other:     Plan for the Stay (Medical Management) :  Awaiting PT note for auth to be started        Workflow Continuation (Additional Notes) :  Will start auth once PT note is available  1645 Received call from [facility] they have received Auth.  Will set up transportation .    [address] [facility] awaiting KARINA to be signed.  Unable to obtain transport until 6/19        Zully Lynch RN  June 18, 2025

## 2025-06-19 VITALS
BODY MASS INDEX: 25.93 KG/M2 | HEIGHT: 71 IN | WEIGHT: 185.19 LBS | OXYGEN SATURATION: 92 % | RESPIRATION RATE: 18 BRPM | DIASTOLIC BLOOD PRESSURE: 63 MMHG | TEMPERATURE: 98.1 F | SYSTOLIC BLOOD PRESSURE: 113 MMHG | HEART RATE: 61 BPM

## 2025-06-19 LAB
GLUCOSE BLD-MCNC: 102 MG/DL (ref 75–110)
INR PPP: 3.8
PROTHROMBIN TIME: 39.1 SEC (ref 11.7–14.9)

## 2025-06-19 PROCEDURE — 82947 ASSAY GLUCOSE BLOOD QUANT: CPT

## 2025-06-19 PROCEDURE — 85610 PROTHROMBIN TIME: CPT

## 2025-06-19 PROCEDURE — 2500000003 HC RX 250 WO HCPCS

## 2025-06-19 PROCEDURE — 6370000000 HC RX 637 (ALT 250 FOR IP): Performed by: STUDENT IN AN ORGANIZED HEALTH CARE EDUCATION/TRAINING PROGRAM

## 2025-06-19 PROCEDURE — 6370000000 HC RX 637 (ALT 250 FOR IP)

## 2025-06-19 PROCEDURE — 6370000000 HC RX 637 (ALT 250 FOR IP): Performed by: PHYSICIAN ASSISTANT

## 2025-06-19 PROCEDURE — 6370000000 HC RX 637 (ALT 250 FOR IP): Performed by: INTERNAL MEDICINE

## 2025-06-19 PROCEDURE — 99239 HOSP IP/OBS DSCHRG MGMT >30: CPT | Performed by: STUDENT IN AN ORGANIZED HEALTH CARE EDUCATION/TRAINING PROGRAM

## 2025-06-19 PROCEDURE — 36415 COLL VENOUS BLD VENIPUNCTURE: CPT

## 2025-06-19 RX ORDER — GLIMEPIRIDE 2 MG/1
2 TABLET ORAL
Qty: 30 TABLET | Refills: 3 | Status: SHIPPED | OUTPATIENT
Start: 2025-06-19

## 2025-06-19 RX ADMIN — OXYCODONE 10 MG: 5 TABLET ORAL at 01:52

## 2025-06-19 RX ADMIN — OXYCODONE 10 MG: 5 TABLET ORAL at 08:31

## 2025-06-19 RX ADMIN — ASPIRIN 81 MG: 81 TABLET, COATED ORAL at 08:04

## 2025-06-19 RX ADMIN — SODIUM CHLORIDE, PRESERVATIVE FREE 10 ML: 5 INJECTION INTRAVENOUS at 08:04

## 2025-06-19 RX ADMIN — AMLODIPINE BESYLATE 10 MG: 10 TABLET ORAL at 08:04

## 2025-06-19 RX ADMIN — DOCUSATE SODIUM 50MG AND SENNOSIDES 8.6MG 2 TABLET: 8.6; 5 TABLET, FILM COATED ORAL at 08:04

## 2025-06-19 RX ADMIN — LINEZOLID 600 MG: 600 TABLET, FILM COATED ORAL at 08:06

## 2025-06-19 RX ADMIN — TAMSULOSIN HYDROCHLORIDE 0.4 MG: 0.4 CAPSULE ORAL at 08:04

## 2025-06-19 RX ADMIN — GLIPIZIDE 5 MG: 5 TABLET ORAL at 06:22

## 2025-06-19 RX ADMIN — FUROSEMIDE 20 MG: 20 TABLET ORAL at 08:04

## 2025-06-19 RX ADMIN — CARVEDILOL 12.5 MG: 12.5 TABLET, FILM COATED ORAL at 08:04

## 2025-06-19 RX ADMIN — POLYETHYLENE GLYCOL 3350 17 G: 17 POWDER, FOR SOLUTION ORAL at 08:04

## 2025-06-19 ASSESSMENT — PAIN SCALES - GENERAL
PAINLEVEL_OUTOF10: 1
PAINLEVEL_OUTOF10: 10
PAINLEVEL_OUTOF10: 6
PAINLEVEL_OUTOF10: 10

## 2025-06-19 ASSESSMENT — PAIN DESCRIPTION - ORIENTATION
ORIENTATION: RIGHT
ORIENTATION: RIGHT

## 2025-06-19 ASSESSMENT — PAIN DESCRIPTION - LOCATION
LOCATION: FINGER (COMMENT WHICH ONE)
LOCATION: FINGER (COMMENT WHICH ONE)

## 2025-06-19 ASSESSMENT — PAIN DESCRIPTION - DESCRIPTORS
DESCRIPTORS: ACHING
DESCRIPTORS: ACHING

## 2025-06-19 NOTE — PLAN OF CARE
Problem: Chronic Conditions and Co-morbidities  Goal: Patient's chronic conditions and co-morbidity symptoms are monitored and maintained or improved  6/19/2025 1053 by Kathy Nunez RN  Outcome: Adequate for Discharge  Flowsheets (Taken 6/19/2025 0758)  Care Plan - Patient's Chronic Conditions and Co-Morbidity Symptoms are Monitored and Maintained or Improved:   Monitor and assess patient's chronic conditions and comorbid symptoms for stability, deterioration, or improvement   Collaborate with multidisciplinary team to address chronic and comorbid conditions and prevent exacerbation or deterioration   Update acute care plan with appropriate goals if chronic or comorbid symptoms are exacerbated and prevent overall improvement and discharge  6/19/2025 0715 by Kathy Nunez RN  Outcome: Progressing     Problem: Discharge Planning  Goal: Discharge to home or other facility with appropriate resources  6/19/2025 1053 by Kathy Nunez RN  Outcome: Adequate for Discharge  Flowsheets (Taken 6/19/2025 0758)  Discharge to home or other facility with appropriate resources:   Identify barriers to discharge with patient and caregiver   Arrange for needed discharge resources and transportation as appropriate   Identify discharge learning needs (meds, wound care, etc)   Refer to discharge planning if patient needs post-hospital services based on physician order or complex needs related to functional status, cognitive ability or social support system  6/19/2025 0715 by Kathy Nunez RN  Outcome: Progressing  6/19/2025 0432 by Brenda Corrales RN  Outcome: Progressing     Problem: Safety - Adult  Goal: Free from fall injury  6/19/2025 1053 by Kathy Nunez, RN  Outcome: Adequate for Discharge  6/19/2025 0715 by Kathy Nunez RN  Outcome: Progressing     Problem: Pain  Goal: Verbalizes/displays adequate comfort level or baseline comfort level  6/19/2025 1053 by Kathy Nunez, RN  Outcome: Adequate

## 2025-06-19 NOTE — DISCHARGE SUMMARY
[facility]  Office: [phone]  Jorge Valenzuela DO, Nacho Dent, DO, Justin Dumont DO, Maxwell Dunlap DO, Nica House MD, Helene Munoz MD, Sarmad Hu MD, Lupis Novak MD,  Jasen Wood MD, Kenia Jones MD, Afsaneh Rodriguez MD,  Jack Read DO, Alise Overton MD, David Thornton MD, Asher Valenzuela DO, Shanel Bah MD,  Eris Wyman DO, Li Lyles MD, Imani Diego MD, Anthony Cuadra MD,  Talat Vásquez MD, Simran Singh MD, Raven Mann MD, Sana Kiser MD, Dakota Teague MD, Scott Sandoval MD, Blayne Richards, DO, Zaina Rod MD, Elpidio Gonzalez DO, Earl Haney MD, Jack Ga MD, Mohsin Reza, MD, Lee Ann Cantu MD, Shirley Waterhouse, CNP,  Meenakshi Kimbrough, CNP, Blayne Fowler, CNP,  Nahomy Salvador, SAHIL, Any Traylor, CNP, Allyson Hernández, CNP, Justina Graham, CNP, Joselyn Michaud, CNP, Corine Conrad, PA-C, Mini Butterfield, CNP, Amy Echevarria, CNP,  Chastity Jean, CNP, Oriana Sanders, CNP, Hardeep Jacobson, PA-C, Evelia Dinh, CNP,  Sera Marcum, CNS, Cuca Nieto, CNP, Andra Paez, CNP,   Jeri Mcadams, CNP         [facility]   IN-PATIENT SERVICE   [facility] [place]    Discharge Summary     Patient ID: Asher Ayon  :  1955   MRN: [id number]     ACCOUNT:  [id number]   Patient's PCP: Kevin Herrera MD  Admit Date: 6/10/2025   Discharge Date: 2025     Length of Stay: 9  Code Status:  Full Code  Admitting Physician: No admitting provider for patient encounter.  Discharge Physician: Alise Overton MD     Active Discharge Diagnoses:     Hospital Problem Lists:  Principal Problem:    BRIANNA (acute kidney injury)  Active Problems:    Permanent atrial fibrillation (HCC)    Atherosclerosis of coronary artery without angina pectoris    Type II diabetes mellitus with peripheral circulatory disorder (HCC)    CKD (chronic kidney disease) stage 3, GFR 30-59 ml/min (HCC)    Gross hematuria    Hypertensive urgency    Chronic  [address]      Phone: [phone]   furosemide 20 MG tablet  linezolid 600 MG tablet  sennosides-docusate sodium 8.6-50 MG tablet  tamsulosin 0.4 MG capsule       You can get these medications from any pharmacy    Bring a paper prescription for each of these medications  oxyCODONE 5 MG immediate release tablet         No discharge procedures on file.    Time Spent on discharge is  34 mins in patient examination, evaluation, counseling as well as medication reconciliation, prescriptions for required medications, discharge plan and follow up.    Electronically signed by   Alise Overton MD  6/19/2025  11:05 AM      Thank you Kevin Mrorison MD for the opportunity to be involved in this patient's care.

## 2025-06-19 NOTE — PROGRESS NOTES
Pharmacy Note  Warfarin Consult follow-up      Recent Labs     06/19/25  0826   INR 3.8     Recent Labs     06/17/25  0643   HGB 9.9*   HCT 29.3*          Current warfarin drug-drug interactions:      Date INR Dose   6/19 3.8 hold                 Notes:                     Daily PT/INR while inpatient.     -Shyam Burton PharmD, BCPS 6/19/2025 10:04 AM

## 2025-06-19 NOTE — PLAN OF CARE
Problem: Chronic Conditions and Co-morbidities  Goal: Patient's chronic conditions and co-morbidity symptoms are monitored and maintained or improved  Outcome: Progressing     Problem: Discharge Planning  Goal: Discharge to home or other facility with appropriate resources  6/19/2025 0715 by Kathy Nunez, RN  Outcome: Progressing  6/19/2025 0432 by Brenda Corrales, RN  Outcome: Progressing     Problem: Safety - Adult  Goal: Free from fall injury  Outcome: Progressing     Problem: Pain  Goal: Verbalizes/displays adequate comfort level or baseline comfort level  Outcome: Progressing     Problem: Musculoskeletal - Adult  Goal: Return mobility to safest level of function  Outcome: Progressing  Goal: Maintain proper alignment of affected body part  Outcome: Progressing  Goal: Return ADL status to a safe level of function  Outcome: Progressing     Problem: Skin/Tissue Integrity  Goal: Skin integrity remains intact  Description: 1.  Monitor for areas of redness and/or skin breakdown  2.  Assess vascular access sites hourly  3.  Every 4-6 hours minimum:  Change oxygen saturation probe site  4.  Every 4-6 hours:  If on nasal continuous positive airway pressure, respiratory therapy assess nares and determine need for appliance change or resting period  Outcome: Progressing     Problem: ABCDS Injury Assessment  Goal: Absence of physical injury  Outcome: Progressing

## 2025-06-19 NOTE — CARE COORDINATION
Case Management   Daily Progress Note       Patient Name: Asher Ayon                   YOB: 1955  Diagnosis: BRIANNA (acute kidney injury) [N17.9]                       GMLOS: 4.4 days  Length of Stay: 9  days    Anticipated Discharge Date: Ready for discharge    Readmission Risk (Low < 19, Mod (19-27), High > 27): Readmission Risk Score: 16.3        Current Transitional Plan    [] Home Independently    [] Home with HC    [x] Skilled Nursing Facility    [] Acute Rehabilitation    [] Long Term Acute Care (LTAC)    [] Other:     Plan for the Stay (Medical Management) :  Ready for d/c , awaiting transport time.         Workflow Continuation (Additional Notes) :  Spoke seng Mccarthy at [facility] and confirmed ETA of 11.   Rogelio updated in [facility]. KARINA / HENS completed, awaiting fax number   KARINA / HENS and CII faxed to [email or web address].     Discharge Report    [facility]  Clinical Case Management Department  Written by: ANDREZ LU    Patient Name: Asher Ayon  Attending Provider: Alise Overton MD  Admit Date: 6/10/2025 11:47 PM  MRN: [id number]  Account: [id number]                     : 1955  Discharge Date:       Disposition: [facility]    ANDREZ TABITHA LU  2025

## 2025-07-03 ENCOUNTER — OUTSIDE SERVICES (OUTPATIENT)
Dept: PRIMARY CARE CLINIC | Age: 70
End: 2025-07-03

## 2025-07-03 DIAGNOSIS — I48.21 PERMANENT ATRIAL FIBRILLATION (HCC): ICD-10-CM

## 2025-07-03 DIAGNOSIS — I25.5 ISCHEMIC CARDIOMYOPATHY: ICD-10-CM

## 2025-07-03 DIAGNOSIS — E11.9 TYPE 2 DIABETES MELLITUS WITHOUT COMPLICATION, WITHOUT LONG-TERM CURRENT USE OF INSULIN (HCC): ICD-10-CM

## 2025-07-03 DIAGNOSIS — T23.039A BURN OF MULTIPLE FINGERS: Primary | ICD-10-CM

## 2025-07-04 PROBLEM — E11.9 TYPE 2 DIABETES MELLITUS WITHOUT COMPLICATION (HCC): Status: ACTIVE | Noted: 2024-05-14

## 2025-07-04 PROBLEM — I10 ESSENTIAL HYPERTENSION: Status: ACTIVE | Noted: 2024-05-14

## 2025-07-04 PROBLEM — I27.20 MILD PULMONARY HYPERTENSION (HCC): Status: ACTIVE | Noted: 2017-12-05

## 2025-07-04 PROBLEM — Z89.029: Status: ACTIVE | Noted: 2025-07-04

## 2025-07-04 PROBLEM — I36.1 NONRHEUMATIC TRICUSPID VALVE REGURGITATION: Status: ACTIVE | Noted: 2024-08-22

## 2025-07-04 PROBLEM — I25.5 ISCHEMIC CARDIOMYOPATHY: Status: ACTIVE | Noted: 2017-12-05

## 2025-07-04 PROBLEM — Z95.0 PACEMAKER: Status: ACTIVE | Noted: 2025-07-04

## 2025-07-04 PROBLEM — Z89.432 PARTIAL NONTRAUMATIC AMPUTATION OF LEFT FOOT (HCC): Status: ACTIVE | Noted: 2024-05-14

## 2025-07-04 PROBLEM — G62.9 POLYNEUROPATHY: Status: ACTIVE | Noted: 2025-07-04

## 2025-07-04 PROBLEM — I34.0 SEVERE MITRAL REGURGITATION: Status: ACTIVE | Noted: 2024-04-25

## 2025-07-04 PROBLEM — I65.21 OCCLUSION OF RIGHT CAROTID ARTERY: Status: ACTIVE | Noted: 2024-04-25

## 2025-07-04 PROBLEM — Z86.73 HISTORY OF TIA (TRANSIENT ISCHEMIC ATTACK): Status: ACTIVE | Noted: 2024-04-25

## 2025-07-04 PROBLEM — R33.9 RETENTION OF URINE: Status: ACTIVE | Noted: 2025-07-04

## 2025-07-04 PROBLEM — E78.5 HYPERLIPEMIA: Status: ACTIVE | Noted: 2025-07-04

## 2025-07-04 ASSESSMENT — ENCOUNTER SYMPTOMS
COUGH: 0
SHORTNESS OF BREATH: 0
VOMITING: 0
NAUSEA: 0
DIARRHEA: 0

## 2025-07-04 NOTE — ASSESSMENT & PLAN NOTE
>>ASSESSMENT AND PLAN FOR TYPE 2 DIABETES MELLITUS WITHOUT COMPLICATION (HCC) WRITTEN ON 7/4/2025 11:40 AM BY DAVE BRYANT MD    Continue actos and check BS regularly. Will add insulin SS as some sugars are high.  States he normally takes glipizide at home, but will hold that due to concern for low BS.

## 2025-07-04 NOTE — ASSESSMENT & PLAN NOTE
Continue actos and check BS regularly. Will add insulin SS as some sugars are high.  States he normally takes glipizide at home, but will hold that due to concern for low BS.
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
## 2025-07-11 ENCOUNTER — OUTSIDE SERVICES (OUTPATIENT)
Dept: PRIMARY CARE CLINIC | Age: 70
End: 2025-07-11

## 2025-07-11 DIAGNOSIS — T23.039A BURN OF MULTIPLE FINGERS: Primary | ICD-10-CM

## 2025-07-11 DIAGNOSIS — I25.5 ISCHEMIC CARDIOMYOPATHY: ICD-10-CM

## 2025-07-11 DIAGNOSIS — N39.0 URINARY TRACT INFECTION WITHOUT HEMATURIA, SITE UNSPECIFIED: ICD-10-CM

## 2025-07-11 DIAGNOSIS — E11.9 TYPE 2 DIABETES MELLITUS WITHOUT COMPLICATION, WITHOUT LONG-TERM CURRENT USE OF INSULIN (HCC): ICD-10-CM

## 2025-07-11 ASSESSMENT — ENCOUNTER SYMPTOMS
ABDOMINAL PAIN: 0
SORE THROAT: 0
BURN: 1
EYE DISCHARGE: 0
WHEEZING: 0
COUGH: 0
RHINORRHEA: 0
VOMITING: 0
NAUSEA: 0
EYE REDNESS: 0
COLOR CHANGE: 1
DIARRHEA: 0
SHORTNESS OF BREATH: 0
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
## 2025-07-15 ENCOUNTER — OUTSIDE SERVICES (OUTPATIENT)
Dept: PRIMARY CARE CLINIC | Age: 70
End: 2025-07-15

## 2025-07-15 DIAGNOSIS — L03.119 CELLULITIS OF HAND: ICD-10-CM

## 2025-07-15 DIAGNOSIS — H10.31 ACUTE CONJUNCTIVITIS OF RIGHT EYE, UNSPECIFIED ACUTE CONJUNCTIVITIS TYPE: ICD-10-CM

## 2025-07-15 DIAGNOSIS — T23.039A BURN OF MULTIPLE FINGERS: Primary | ICD-10-CM

## 2025-07-15 ASSESSMENT — ENCOUNTER SYMPTOMS
VOMITING: 0
COUGH: 0
SHORTNESS OF BREATH: 0
NAUSEA: 0
DIARRHEA: 0

## 2025-07-15 NOTE — ASSESSMENT & PLAN NOTE
Pt still has staple in his finger from surgery. Will have nursing contact surgery to see about f/u and taking out the staple, as surgery was in May.  Wounds seen for first time today as pt had it wrapped last time.  They are not healing well c/t pictures from June.  F/u with surgery
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
## 2025-07-15 NOTE — ASSESSMENT & PLAN NOTE
S/p surgery. As above. No signs of infection.  Just not healing.  No pictures in chart since June, so not sure what it has been looking like, but does not look like it did in June post op.  Pt needs to follow up with the surgeon.

## 2025-07-18 ENCOUNTER — OUTSIDE SERVICES (OUTPATIENT)
Dept: PRIMARY CARE CLINIC | Age: 70
End: 2025-07-18

## 2025-07-18 DIAGNOSIS — N18.32 STAGE 3B CHRONIC KIDNEY DISEASE (HCC): ICD-10-CM

## 2025-07-18 DIAGNOSIS — T23.039A BURN OF MULTIPLE FINGERS: Primary | ICD-10-CM

## 2025-07-19 ASSESSMENT — ENCOUNTER SYMPTOMS
BACK PAIN: 1
CONSTIPATION: 0
BURN: 1
COUGH: 0
DIARRHEA: 0
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
## 2025-07-22 ENCOUNTER — OUTSIDE SERVICES (OUTPATIENT)
Dept: PRIMARY CARE CLINIC | Age: 70
End: 2025-07-22

## 2025-07-22 DIAGNOSIS — L03.119 CELLULITIS OF HAND: ICD-10-CM

## 2025-07-22 DIAGNOSIS — T23.039A BURN OF MULTIPLE FINGERS: ICD-10-CM

## 2025-07-22 DIAGNOSIS — H10.31 ACUTE CONJUNCTIVITIS OF RIGHT EYE, UNSPECIFIED ACUTE CONJUNCTIVITIS TYPE: ICD-10-CM

## 2025-07-22 DIAGNOSIS — E11.51 TYPE II DIABETES MELLITUS WITH PERIPHERAL CIRCULATORY DISORDER (HCC): ICD-10-CM

## 2025-07-22 DIAGNOSIS — S00.411A ABRASION OF RIGHT EAR, INITIAL ENCOUNTER: Primary | ICD-10-CM

## 2025-07-22 DIAGNOSIS — Z86.73 H/O: CVA (CEREBROVASCULAR ACCIDENT): ICD-10-CM

## 2025-07-22 DIAGNOSIS — R33.9 RETENTION OF URINE: ICD-10-CM

## 2025-07-22 ASSESSMENT — ENCOUNTER SYMPTOMS
VOMITING: 0
SHORTNESS OF BREATH: 0
DIARRHEA: 0
NAUSEA: 0
COUGH: 0

## 2025-07-22 NOTE — ASSESSMENT & PLAN NOTE
Done with abx drops, but eye still slightly red.  C/o a little fuzzy vision.  Will try artificial tears, but if not helping will need to see eye doctor.

## 2025-07-22 NOTE — PROGRESS NOTES
Asher Ayon is a 69 y.o. male being seen for his weekly follow up.  Location of visit: [facility]    HPI:  New today:  Pt c/o ear pain - has open sore on his ear.  Eye better, but a little fuzzy vision.  Also asking about getting the George out.         Review of Systems   Constitutional:  Negative for activity change, appetite change, chills, diaphoresis and fever.   HENT:  Negative for congestion.    Respiratory:  Negative for cough and shortness of breath.    Cardiovascular:  Negative for chest pain and palpitations.   Gastrointestinal:  Negative for diarrhea, nausea and vomiting.   Genitourinary:  Negative for hematuria.   Musculoskeletal:  Negative for arthralgias and myalgias.   Skin:  Negative for rash.   Neurological:  Negative for weakness and headaches.   Psychiatric/Behavioral:  Negative for agitation, dysphoric mood and sleep disturbance. The patient is not nervous/anxious.           Physical Exam  Vitals reviewed.   Constitutional:       General: He is not in acute distress.  HENT:      Head: Normocephalic and atraumatic.      Nose: No congestion or rhinorrhea.   Eyes:      General:         Right eye: No discharge.         Left eye: No discharge.   Cardiovascular:      Rate and Rhythm: Normal rate and regular rhythm.   Pulmonary:      Effort: Pulmonary effort is normal. No respiratory distress.      Breath sounds: Normal breath sounds.   Abdominal:      Palpations: Abdomen is soft.      Tenderness: There is no abdominal tenderness.   Musculoskeletal:      Right lower leg: No edema.      Left lower leg: No edema.   Skin:     General: Skin is warm and dry.   Neurological:      Mental Status: He is alert. Mental status is at baseline.          ASSESSMENT:   Diagnosis Orders   1. Abrasion of right ear, initial encounter      area is red, but is not infected. Pt states it is from the pillow case coming off and laying directly on the plastic pillow.  Vaseline tid & double cover pillow      2.

## 2025-07-29 ENCOUNTER — OFFICE VISIT (OUTPATIENT)
Age: 70
End: 2025-07-29
Payer: MEDICARE

## 2025-07-29 VITALS
SYSTOLIC BLOOD PRESSURE: 140 MMHG | WEIGHT: 185 LBS | DIASTOLIC BLOOD PRESSURE: 67 MMHG | HEIGHT: 70 IN | HEART RATE: 81 BPM | BODY MASS INDEX: 26.48 KG/M2

## 2025-07-29 DIAGNOSIS — T23.039A BURN OF MULTIPLE FINGERS: Primary | ICD-10-CM

## 2025-07-29 DIAGNOSIS — I96 FINGER NECROSIS (HCC): ICD-10-CM

## 2025-07-29 PROCEDURE — 99213 OFFICE O/P EST LOW 20 MIN: CPT | Performed by: NURSE PRACTITIONER

## 2025-07-29 PROCEDURE — 1125F AMNT PAIN NOTED PAIN PRSNT: CPT | Performed by: NURSE PRACTITIONER

## 2025-07-29 PROCEDURE — 3077F SYST BP >= 140 MM HG: CPT | Performed by: NURSE PRACTITIONER

## 2025-07-29 PROCEDURE — 1123F ACP DISCUSS/DSCN MKR DOCD: CPT | Performed by: NURSE PRACTITIONER

## 2025-07-29 PROCEDURE — 3078F DIAST BP <80 MM HG: CPT | Performed by: NURSE PRACTITIONER
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
## 2025-08-06 PROBLEM — Z95.810 AUTOMATIC IMPLANTABLE CARDIOVERTER-DEFIBRILLATOR IN SITU: Status: ACTIVE | Noted: 2025-01-01

## 2025-08-06 PROBLEM — I25.810 CORONARY ARTERY DISEASE INVOLVING CORONARY BYPASS GRAFT OF NATIVE HEART WITHOUT ANGINA PECTORIS: Status: ACTIVE | Noted: 2025-01-01

## 2025-08-06 PROBLEM — Z95.818 STATUS POST IMPLANTATION OF MITRAL VALVE LEAFLET CLIP: Status: ACTIVE | Noted: 2025-01-01

## 2025-08-06 PROBLEM — R06.02 SHORTNESS OF BREATH: Status: ACTIVE | Noted: 2025-08-06

## 2025-08-06 PROBLEM — Z98.890 STATUS POST IMPLANTATION OF MITRAL VALVE LEAFLET CLIP: Status: ACTIVE | Noted: 2025-08-06

## 2025-08-06 PROBLEM — I21.4 NSTEMI (NON-ST ELEVATED MYOCARDIAL INFARCTION) (HCC): Status: ACTIVE | Noted: 2025-01-01

## 2025-08-06 PROBLEM — I24.9 ACUTE CORONARY SYNDROME (HCC): Status: ACTIVE | Noted: 2025-01-01

## 2025-08-06 PROBLEM — N17.9 ACUTE RENAL FAILURE: Status: ACTIVE | Noted: 2025-01-01

## (undated) DEVICE — MERCY HEALTH ST CHARLES: Brand: MEDLINE INDUSTRIES, INC.

## (undated) DEVICE — RADIFOCUS GLIDEWIRE ADVANTAGE GUIDEWIRE: Brand: GLIDEWIRE ADVANTAGE

## (undated) DEVICE — SHEET, T, LAPAROTOMY, STERILE: Brand: MEDLINE

## (undated) DEVICE — GAUZE,SPONGE,FLUFF,6"X6.75",STRL,5/TRAY: Brand: MEDLINE

## (undated) DEVICE — MARKER,SKIN,WI/RULER AND LABELS: Brand: MEDLINE

## (undated) DEVICE — SET INTRO SHTH 4FR L11CM 0035IN J TIP GWIRE SIL ROT SUT

## (undated) DEVICE — SUTURE MCRYL SZ 3-0 L27IN ABSRB UD L19MM PS-2 3/8 CIR PRIM Y427H

## (undated) DEVICE — SINGLE PORT MANIFOLD: Brand: NEPTUNE 2

## (undated) DEVICE — GLOVE SURG SZ 8 L12IN FNGR THK75MIL WHT LTX POLYMER BEAD

## (undated) DEVICE — SYRINGE MED 10ML LUERLOCK TIP W/O SFTY DISP

## (undated) DEVICE — Device

## (undated) DEVICE — BNDG,ELSTC,MATRIX,STRL,2"X5YD,LF,HOOK&LP: Brand: MEDLINE

## (undated) DEVICE — C-ARM: Brand: UNBRANDED

## (undated) DEVICE — BLADE ES ELASTOMERIC COAT INSUL DURABLE BEND UPTO 90DEG

## (undated) DEVICE — PERCUTANEOUS ENTRY THINWALL NEEDLE  ONE-PART: Brand: COOK

## (undated) DEVICE — NEEDLE HYPO 25GA L1.5IN BLU POLYPR HUB S STL REG BVL STR

## (undated) DEVICE — PRECISION THIN (9.0 X 0.38 X 25.0MM)

## (undated) DEVICE — BANDAGE COMPR W4INXL5YD WHT BGE POLY COT M E WRP WV HK AND

## (undated) DEVICE — BLADE,STAINLESS-STEEL,10,STRL,DISPOSABLE: Brand: MEDLINE

## (undated) DEVICE — BANDAGE,GAUZE,BULKEE II,4.5"X4.1YD,STRL: Brand: MEDLINE

## (undated) DEVICE — SUTURE MCRYL SZ 2-0 L27IN ABSRB VLT SH L26MM 1/2 CIR TAPR Y317H

## (undated) DEVICE — GLOVE SURG SZ 7.5 L11.73IN FNGR THK9.8MIL STRW LTX POLYMER

## (undated) DEVICE — SUTURE MCRYL SZ 3-0 L27IN ABSRB UD PS-2 3/8 CIR REV CUT NDL MCP427H

## (undated) DEVICE — 450 ML BOTTLE OF 0.05% CHLORHEXIDINE GLUCONATE IN 99.95% STERILE WATER FOR IRRIGATION, USP AND APPLICATOR.: Brand: IRRISEPT ANTIMICROBIAL WOUND LAVAGE

## (undated) DEVICE — PROVE COVER: Brand: UNBRANDED

## (undated) DEVICE — STAZ LOWER EXTREMITY: Brand: MEDLINE INDUSTRIES, INC.

## (undated) DEVICE — SOLUTION PREP POVIDONE IOD FOR SKIN MUCOUS MEM PRIOR TO

## (undated) DEVICE — BLADE,CARBON-STEEL,15,STRL,DISPOSABLE,TB: Brand: MEDLINE

## (undated) DEVICE — DRESSING PETRO W3XL3IN OIL EMUL N ADH GZ KNIT IMPREG CELOS

## (undated) DEVICE — GLOVE ORANGE PI 8   MSG9080

## (undated) DEVICE — GAUZE,SPONGE,4"X4",16PLY,STRL,LF,10/TRAY: Brand: MEDLINE

## (undated) DEVICE — SUTURE VCRL + SZ 2-0 L27IN ABSRB UD CT-2 L26MM 1/2 CIR TAPR VCP269H

## (undated) DEVICE — DRAPE,REIN 53X77,STERILE: Brand: MEDLINE

## (undated) DEVICE — SUTURE NONABSORBABLE MONOFILAMENT 3-0 PS-1 18 IN BLK ETHILON 1663H

## (undated) DEVICE — SOLUTION IV IRRIG 500ML 0.9% SODIUM CHL 2F7123

## (undated) DEVICE — SUTURE ETHLN SZ 4-0 L18IN NONABSORBABLE BLK L24MM FS-1 3/8 1629H

## (undated) DEVICE — NAVICROSS SUPPORT CATHETER: Brand: NAVICROSS

## (undated) DEVICE — SOLUTION SCRB 4OZ 10% POVIDONE IOD ANTIMIC BTL

## (undated) DEVICE — YANKAUER,FLEXIBLE HANDLE,REGLR CAPACITY: Brand: MEDLINE INDUSTRIES, INC.

## (undated) DEVICE — SHOE POSTOP L M 9.5-12 WOM 10.5-13 SQUARED HI ANK STRP RIG

## (undated) DEVICE — DECANTER BAG 9": Brand: MEDLINE INDUSTRIES, INC.

## (undated) DEVICE — GOWN,AURORA,NONREINFORCED,LARGE: Brand: MEDLINE

## (undated) DEVICE — SPONGE LAP W18XL18IN WHT COT 4 PLY FLD STRUNG RADPQ DISP ST

## (undated) DEVICE — SKIN PREP TRAY W/CHG: Brand: MEDLINE INDUSTRIES, INC.

## (undated) DEVICE — BLANKET WRM W29.9XL79.1IN UP BODY FORC AIR MISTRAL-AIR

## (undated) DEVICE — SUTURE PROL SZ 3-0 L18IN NONABSORBABLE BLU L24MM FS-1 3/8 8684G

## (undated) DEVICE — SOLUTION IV 1000ML 0.9% SOD CHL PH 5 INJ USP VIAFLX PLAS

## (undated) DEVICE — GOWN,SIRUS,NONRNF,SETINSLV,XL,20/CS: Brand: MEDLINE

## (undated) DEVICE — MERCY HEALTH TOLEDO ANGIO PACK: Brand: MEDLINE INDUSTRIES, INC.

## (undated) DEVICE — PREMIUM DRY TRAY LF: Brand: MEDLINE INDUSTRIES, INC.

## (undated) DEVICE — ST CHARLES MINOR ABDOMINAL PK: Brand: MEDLINE INDUSTRIES, INC.

## (undated) DEVICE — GAUZE,SPONGE,4"X4",16PLY,XRAY,STRL,LF: Brand: MEDLINE

## (undated) DEVICE — STERILE POLYISOPRENE POWDER-FREE SURGICAL GLOVES: Brand: PROTEXIS

## (undated) DEVICE — CRUTCH WLK TALL AD 300LB STD AX ALUM PUSH BTTN GRDIAN

## (undated) DEVICE — STRAP,POSITIONING,KNEE/BODY,FOAM,4X60": Brand: MEDLINE

## (undated) DEVICE — STRAP ARMBRD W1.5XL32IN FOAM STR YET SFT W/ HK AND LOOP

## (undated) DEVICE — COVER LT HNDL BLU PLAS

## (undated) DEVICE — LUER-LOK 360°: Brand: CONNECTA, LUER-LOK

## (undated) DEVICE — GLOVE SURG SZ 75 CRM LTX FREE POLYISOPRENE POLYMER BEAD ANTI

## (undated) DEVICE — GLOVE SURG SZ 8 CRM LTX FREE POLYISOPRENE POLYMER BEAD ANTI

## (undated) DEVICE — STERILE POLYISOPRENE POWDER-FREE SURGICAL GLOVES WITH EMOLLIENT COATING: Brand: PROTEXIS

## (undated) DEVICE — BLADE SAW W9XL25MM THK0.38MM CUT THK0.43MM REPL SAG OSC THN

## (undated) DEVICE — 60-7070-103 TRNQT,DPSB,PLC RED: Brand: MEDLINE RENEWAL

## (undated) DEVICE — SMALL TEAR CROSS CUT RASP (11.0 X 5.0MM)

## (undated) DEVICE — SHEET,DRAPE,53X77,STERILE: Brand: MEDLINE

## (undated) DEVICE — SUTURE PERMAHAND SZ 4-0 L12X30IN NONABSORBABLE BLK SILK A303H

## (undated) DEVICE — POUCH INSTR W6.75XL11.5IN FRST 2 PKT ADH FOR ORTH AND

## (undated) DEVICE — CATH OMNI FLUSH 4FRX65CM BERENSTEIN .035

## (undated) DEVICE — WAX SURG 2.5GM HEMSTAT BNE BEESWAX PARAFFIN ISO PALMITATE

## (undated) DEVICE — APPLICATOR MEDICATED 26 CC SOLUTION HI LT ORNG CHLORAPREP